# Patient Record
Sex: MALE | Race: WHITE | NOT HISPANIC OR LATINO | URBAN - METROPOLITAN AREA
[De-identification: names, ages, dates, MRNs, and addresses within clinical notes are randomized per-mention and may not be internally consistent; named-entity substitution may affect disease eponyms.]

---

## 2017-01-13 ENCOUNTER — EMERGENCY (EMERGENCY)
Facility: HOSPITAL | Age: 42
LOS: 1 days | Discharge: HOME | End: 2017-01-13
Admitting: EMERGENCY MEDICINE
Payer: MEDICAID

## 2017-01-13 PROCEDURE — 99285 EMERGENCY DEPT VISIT HI MDM: CPT

## 2017-01-13 PROCEDURE — 99283 EMERGENCY DEPT VISIT LOW MDM: CPT

## 2021-04-24 ENCOUNTER — EMERGENCY (EMERGENCY)
Facility: HOSPITAL | Age: 46
LOS: 1 days | Discharge: HOME | End: 2021-04-24
Attending: EMERGENCY MEDICINE | Admitting: EMERGENCY MEDICINE
Payer: SELF-PAY

## 2021-04-24 VITALS
RESPIRATION RATE: 18 BRPM | SYSTOLIC BLOOD PRESSURE: 115 MMHG | TEMPERATURE: 98 F | OXYGEN SATURATION: 92 % | DIASTOLIC BLOOD PRESSURE: 66 MMHG | WEIGHT: 179.9 LBS | HEIGHT: 68 IN | HEART RATE: 118 BPM

## 2021-04-24 VITALS
SYSTOLIC BLOOD PRESSURE: 121 MMHG | HEART RATE: 107 BPM | DIASTOLIC BLOOD PRESSURE: 74 MMHG | RESPIRATION RATE: 16 BRPM | OXYGEN SATURATION: 96 %

## 2021-04-24 LAB
ACETONE SERPL-MCNC: NEGATIVE — SIGNIFICANT CHANGE UP
ALBUMIN SERPL ELPH-MCNC: 3.3 G/DL — SIGNIFICANT CHANGE UP (ref 3.3–5)
ALP SERPL-CCNC: 94 U/L — SIGNIFICANT CHANGE UP (ref 40–120)
ALT FLD-CCNC: 71 U/L — HIGH (ref 10–45)
ANION GAP SERPL CALC-SCNC: 14 MMOL/L — SIGNIFICANT CHANGE UP (ref 5–17)
APTT BLD: 30.4 SEC — SIGNIFICANT CHANGE UP (ref 27.5–35.5)
AST SERPL-CCNC: 51 U/L — HIGH (ref 10–40)
BASOPHILS # BLD AUTO: 0.05 K/UL — SIGNIFICANT CHANGE UP (ref 0–0.2)
BASOPHILS NFR BLD AUTO: 1.1 % — SIGNIFICANT CHANGE UP (ref 0–2)
BILIRUB SERPL-MCNC: 0.5 MG/DL — SIGNIFICANT CHANGE UP (ref 0.2–1.2)
BUN SERPL-MCNC: 32 MG/DL — HIGH (ref 7–23)
CALCIUM SERPL-MCNC: 8.7 MG/DL — SIGNIFICANT CHANGE UP (ref 8.4–10.5)
CHLORIDE SERPL-SCNC: 103 MMOL/L — SIGNIFICANT CHANGE UP (ref 96–108)
CO2 BLDV-SCNC: 22 MMOL/L — SIGNIFICANT CHANGE UP (ref 21–29)
CO2 SERPL-SCNC: 21 MMOL/L — LOW (ref 22–31)
CREAT SERPL-MCNC: 1.6 MG/DL — HIGH (ref 0.5–1.3)
D DIMER BLD IA.RAPID-MCNC: 397 NG/ML DDU — HIGH
EOSINOPHIL # BLD AUTO: 0.06 K/UL — SIGNIFICANT CHANGE UP (ref 0–0.5)
EOSINOPHIL NFR BLD AUTO: 1.3 % — SIGNIFICANT CHANGE UP (ref 0–6)
ETHANOL SERPL-MCNC: 342 MG/DL — HIGH (ref 0–3)
GAS PNL BLDV: SIGNIFICANT CHANGE UP
GLUCOSE SERPL-MCNC: 433 MG/DL — HIGH (ref 70–99)
HCT VFR BLD CALC: 36 % — LOW (ref 39–50)
HGB BLD-MCNC: 12.6 G/DL — LOW (ref 13–17)
IMM GRANULOCYTES NFR BLD AUTO: 0.4 % — SIGNIFICANT CHANGE UP (ref 0–1.5)
INR BLD: 1.05 RATIO — SIGNIFICANT CHANGE UP (ref 0.88–1.16)
LYMPHOCYTES # BLD AUTO: 1.71 K/UL — SIGNIFICANT CHANGE UP (ref 1–3.3)
LYMPHOCYTES # BLD AUTO: 36.5 % — SIGNIFICANT CHANGE UP (ref 13–44)
MCHC RBC-ENTMCNC: 30.1 PG — SIGNIFICANT CHANGE UP (ref 27–34)
MCHC RBC-ENTMCNC: 35 GM/DL — SIGNIFICANT CHANGE UP (ref 32–36)
MCV RBC AUTO: 86.1 FL — SIGNIFICANT CHANGE UP (ref 80–100)
MONOCYTES # BLD AUTO: 0.29 K/UL — SIGNIFICANT CHANGE UP (ref 0–0.9)
MONOCYTES NFR BLD AUTO: 6.2 % — SIGNIFICANT CHANGE UP (ref 2–14)
NEUTROPHILS # BLD AUTO: 2.55 K/UL — SIGNIFICANT CHANGE UP (ref 1.8–7.4)
NEUTROPHILS NFR BLD AUTO: 54.5 % — SIGNIFICANT CHANGE UP (ref 43–77)
NRBC # BLD: 0 /100 WBCS — SIGNIFICANT CHANGE UP (ref 0–0)
PCO2 BLDV: 40 MMHG — LOW (ref 42–55)
PH BLDV: 7.33 — LOW (ref 7.35–7.45)
PLATELET # BLD AUTO: 114 K/UL — LOW (ref 150–400)
PO2 BLDV: 57 MMHG — HIGH (ref 25–45)
POTASSIUM SERPL-MCNC: 4 MMOL/L — SIGNIFICANT CHANGE UP (ref 3.5–5.3)
POTASSIUM SERPL-SCNC: 4 MMOL/L — SIGNIFICANT CHANGE UP (ref 3.5–5.3)
PROT SERPL-MCNC: 6.8 G/DL — SIGNIFICANT CHANGE UP (ref 6–8.3)
PROTHROM AB SERPL-ACNC: 12.7 SEC — SIGNIFICANT CHANGE UP (ref 10.6–13.6)
RBC # BLD: 4.18 M/UL — LOW (ref 4.2–5.8)
RBC # FLD: 12.5 % — SIGNIFICANT CHANGE UP (ref 10.3–14.5)
SAO2 % BLDV: 83 % — SIGNIFICANT CHANGE UP (ref 67–88)
SODIUM SERPL-SCNC: 138 MMOL/L — SIGNIFICANT CHANGE UP (ref 135–145)
WBC # BLD: 4.68 K/UL — SIGNIFICANT CHANGE UP (ref 3.8–10.5)
WBC # FLD AUTO: 4.68 K/UL — SIGNIFICANT CHANGE UP (ref 3.8–10.5)

## 2021-04-24 PROCEDURE — 93005 ELECTROCARDIOGRAM TRACING: CPT

## 2021-04-24 PROCEDURE — 36415 COLL VENOUS BLD VENIPUNCTURE: CPT

## 2021-04-24 PROCEDURE — 71275 CT ANGIOGRAPHY CHEST: CPT

## 2021-04-24 PROCEDURE — 93010 ELECTROCARDIOGRAM REPORT: CPT

## 2021-04-24 PROCEDURE — 85730 THROMBOPLASTIN TIME PARTIAL: CPT

## 2021-04-24 PROCEDURE — 99285 EMERGENCY DEPT VISIT HI MDM: CPT

## 2021-04-24 PROCEDURE — 85610 PROTHROMBIN TIME: CPT

## 2021-04-24 PROCEDURE — 82803 BLOOD GASES ANY COMBINATION: CPT

## 2021-04-24 PROCEDURE — 82962 GLUCOSE BLOOD TEST: CPT

## 2021-04-24 PROCEDURE — 80307 DRUG TEST PRSMV CHEM ANLYZR: CPT

## 2021-04-24 PROCEDURE — 96374 THER/PROPH/DIAG INJ IV PUSH: CPT | Mod: XU

## 2021-04-24 PROCEDURE — 96361 HYDRATE IV INFUSION ADD-ON: CPT

## 2021-04-24 PROCEDURE — 80053 COMPREHEN METABOLIC PANEL: CPT

## 2021-04-24 PROCEDURE — 99285 EMERGENCY DEPT VISIT HI MDM: CPT | Mod: 25

## 2021-04-24 PROCEDURE — 71275 CT ANGIOGRAPHY CHEST: CPT | Mod: 26,MA

## 2021-04-24 PROCEDURE — 85379 FIBRIN DEGRADATION QUANT: CPT

## 2021-04-24 PROCEDURE — 82009 KETONE BODYS QUAL: CPT

## 2021-04-24 PROCEDURE — 85025 COMPLETE CBC W/AUTO DIFF WBC: CPT

## 2021-04-24 RX ORDER — SODIUM CHLORIDE 9 MG/ML
1000 INJECTION INTRAMUSCULAR; INTRAVENOUS; SUBCUTANEOUS ONCE
Refills: 0 | Status: COMPLETED | OUTPATIENT
Start: 2021-04-24 | End: 2021-04-24

## 2021-04-24 RX ADMIN — Medication 1 MILLIGRAM(S): at 14:05

## 2021-04-24 RX ADMIN — SODIUM CHLORIDE 1000 MILLILITER(S): 9 INJECTION INTRAMUSCULAR; INTRAVENOUS; SUBCUTANEOUS at 14:20

## 2021-04-24 RX ADMIN — SODIUM CHLORIDE 1000 MILLILITER(S): 9 INJECTION INTRAMUSCULAR; INTRAVENOUS; SUBCUTANEOUS at 13:20

## 2021-04-24 RX ADMIN — SODIUM CHLORIDE 1000 MILLILITER(S): 9 INJECTION INTRAMUSCULAR; INTRAVENOUS; SUBCUTANEOUS at 14:42

## 2021-04-24 RX ADMIN — SODIUM CHLORIDE 1000 MILLILITER(S): 9 INJECTION INTRAMUSCULAR; INTRAVENOUS; SUBCUTANEOUS at 15:22

## 2021-04-24 RX ADMIN — SODIUM CHLORIDE 1000 MILLILITER(S): 9 INJECTION INTRAMUSCULAR; INTRAVENOUS; SUBCUTANEOUS at 13:42
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
## 2021-04-24 NOTE — ED ADULT NURSE NOTE - OBJECTIVE STATEMENT
BIB EMS from [place] for acute alcohol intoxication, upon arrival patient  is hyperglycemic (380s). Patient states he is prediabetic, has not been taking his medications due to affordability. Patient is uncooperative, unwilling to provide much history. He states he is not a daily drinker, he is declining ativan, librium and covid swab.

## 2021-04-24 NOTE — ED ADULT NURSE NOTE - NSIMPLEMENTINTERV_GEN_ALL_ED
Implemented All Fall with Harm Risk Interventions:  [place] to call system. Call bell, personal items and telephone within reach. Instruct patient to call for assistance. Room bathroom lighting operational. Non-slip footwear when patient is off stretcher. Physically safe environment: no spills, clutter or unnecessary equipment. Stretcher in lowest position, wheels locked, appropriate side rails in place. Provide visual cue, wrist band, yellow gown, etc. Monitor gait and stability. Monitor for mental status changes and reorient to person, place, and time. Review medications for side effects contributing to fall risk. Reinforce activity limits and safety measures with patient and family. Provide visual clues: red socks.
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
## 2021-04-24 NOTE — ED PROVIDER NOTE - NSFOLLOWUPCLINICS_GEN_ALL_ED_FT
[facility]  Family Medicine  [address] [place] [id number]  Phone: [phone]  Fax:

## 2021-04-24 NOTE — ED PROVIDER NOTE - PROGRESS NOTE DETAILS
pt refusing ekg and covid swab. initially cursing and refusing IV but now cooperating. still elopement risk as pt is intoxicated and states he is going to leave

## 2021-04-24 NOTE — ED PROVIDER NOTE - CARE PLAN
Principal Discharge DX:	Hyperglycemia   Principal Discharge DX:	Hyperglycemia  Secondary Diagnosis:	Alcoholic intoxication without complication  Secondary Diagnosis:	Hypoxia

## 2021-04-24 NOTE — ED PROVIDER NOTE - PATIENT PORTAL LINK FT
You can access the FollowMyHealth Patient Portal offered by [facility] by registering at the following website: [email or web address]. By joining [facility]’s FollowMyHealth portal, you will also be able to view your health information using other applications (apps) compatible with our system.

## 2021-04-24 NOTE — ED PROVIDER NOTE - CLINICAL SUMMARY MEDICAL DECISION MAKING FREE TEXT BOX
Dr. Aguilar: 45M h/o ETOH abuse, DM, non-adherent with meds because "cannot afford", BIBEMS for public intoxication and hyperglycemia, no trauma. On exam pt is intoxicated, uncooperative, small abrasion left forearm, rrr, ctab, abdo soft/nt/nd, dry mucosa, mild hypoxia 88-92% on RA, abdo soft/nt/nd. Pt medicated for agitation, will rehydrate, dimer r/o PE, obs till pt sober. Pt hyperglycemic but with no gap. Dr. Aguilar: 45M h/o ETOH abuse, DM, non-adherent with meds because "cannot afford", BIBEMS for public intoxication and hyperglycemia, no trauma. On exam pt is intoxicated, uncooperative, small abrasion left forearm, rrr, ctab, abdo soft/nt/nd, dry mucosa, mild hypoxia 88-92% on RA, abdo soft/nt/nd. Pt medicated for agitation, will rehydrate, dimer r/o PE, obs till pt sober. Pt hyperglycemic but with no gap.  On reassessment pt doing well, ambulating without difficulty. Mother, who he lives with, at bedside but pt states he will get his own ride home.

## 2021-04-24 NOTE — ED PROVIDER NOTE - NSFOLLOWUPINSTRUCTIONS_ED_ALL_ED_FT
1. Please take your diabetes meds as prescribed  2. Taper down your drinking  3. Follow up with Family Practice in 1-2 days  4. Return for any worsening symptoms  ***    Hyperglycemia      Hyperglycemia occurs when the level of sugar (glucose) in the blood is too high. Glucose is a type of sugar that provides the body's main source of energy. Certain hormones (insulin and glucagon) control the level of glucose in the blood. Insulin lowers blood glucose, and glucagon increases blood glucose. Hyperglycemia can result from not having enough insulin in the bloodstream, or from the body not responding normally to insulin.    Hyperglycemia occurs most often in people who have diabetes (diabetes mellitus), but it can happen in people who do not have diabetes. It can develop quickly, and it can be life-threatening if it causes you to become severely dehydrated (diabetic ketoacidosis or hyperglycemic hyperosmolar state). Severe hyperglycemia is a medical emergency.    For most people with diabetes, a blood glucose level above 240 mg/dL is considered hyperglycemia.      What are the causes?  If you have diabetes, hyperglycemia may be caused by:  •Medicines that increase blood glucose or affect your diabetes control.      •Getting less physical activity.      •Eating more than planned.      •Being sick or injured, having an infection, or having surgery.      •Stress.      •Not giving yourself enough insulin (if you are taking insulin).      If you have undiagnosed diabetes, this may cause hyperglycemia.  If you do not have diabetes, hyperglycemia may be caused by:•Certain medicines, including:  •Steroid medicines.      •Beta-blockers.      •Epinephrine.      •Thiazide diuretics.        •Stress.      •Having a serious illness, an infection, or surgery.      •Diseases of the pancreas.        What increases the risk?  Hyperglycemia is more likely to develop in people who have risk factors for diabetes, such as:  •Having a family member with diabetes.      •Certain conditions in which the body's disease-fighting system (immune system) attacks itself (autoimmune disorders).      •Being overweight or obese.      •Having an inactive (sedentary) lifestyle.      •Having been diagnosed with insulin resistance.      •Having a history of prediabetes, gestational diabetes, or polycystic ovarian syndrome (PCOS).        What are the signs or symptoms?  Hyperglycemia may not cause any symptoms. If you do have symptoms, they may include:  •Increased thirst.      •Needing to urinate more often than usual.      •Hunger.      •Feeling very tired.      •Blurry vision.    Other symptoms may develop if hyperglycemia gets worse, such as:  •Dry mouth.      •Abdominal pain.      •Loss of appetite.      •Fruity-smelling breath.      •Weakness.      •Unexpected weight loss.      •Tingling or numbness in the hands or feet.      •Headache.      •Cuts or bruises that are slow to heal.        How is this diagnosed?    Hyperglycemia is diagnosed with a blood test to measure your blood glucose level. This blood test is usually done while you are having symptoms. Your health care provider may also do a physical exam and review your medical history.  You may have more tests to determine the cause of your hyperglycemia, such as:  •A fasting blood glucose (FBG) test. You will not be allowed to eat (you will fast) for at least 8 hours before a blood sample is taken.      •An A1c (hemoglobin A1c) blood test. This provides information about blood glucose control over the previous 2–3 months.    •An oral glucose tolerance test (OGTT). This measures your blood glucose at two times:  •After fasting. This is your baseline blood glucose level.      •Two hours after drinking a beverage that contains glucose.          How is this treated?  Treatment depends on the cause of your hyperglycemia. Treatment may include:  •Taking medicine to regulate your blood glucose levels. If you take insulin or other diabetes medicines, your medicine or dosage may be adjusted.      •Lifestyle changes, such as exercising more, eating healthier foods, or losing weight.      •Treating an illness or infection.      •Checking your blood glucose more often.      •Stopping or reducing steroid medicines.      If your hyperglycemia becomes severe and it results in diabetic ketoacidosis or hyperglycemic hyperosmolar state, you must be hospitalized and given IV fluids and IV insulin.      Follow these instructions at home:    General instructions     •Take over-the-counter and prescription medicines only as told by your health care provider.      • Do not use any products that contain nicotine or tobacco, such as cigarettes, e-cigarettes, and chewing tobacco. If you need help quitting, ask your health care provider.    •If you drink alcohol: •Limit how much you use to:   •0–1 drink a day for women.      •0–2 drinks a day for men.         •Be aware of how much alcohol is in your drink. In the [place] one drink equals one 12 oz bottle of beer (355 mL), one 5 oz glass of wine (148 mL), or one 1½ oz glass of hard liquor (44 mL).        •Learn to manage stress. If you need help with this, ask your health care provider.      •Exercise regularly, as told by your health care provider.      •Keep all follow-up visits as told by your health care provider. This is important.        Eating and drinking      •Maintain a healthy weight.      •Stay hydrated, especially when you exercise, get sick, or spend time in hot temperatures. Drink enough fluid to keep your urine pale yellow.      •Follow your meal plan. Eat on time. Do not skip meals.        If you have diabetes:      •Make sure you know the symptoms of hyperglycemia.    •Follow your diabetes management plan as told by your health care provider. Make sure you:  •Take your insulin and medicines as told.      •Follow your exercise plan.      •Follow your meal plan. Eat on time, and do not skip meals.      •Check your blood glucose as often as told. Make sure to check your blood glucose before and after exercise. If you exercise longer or in a different way than usual, check your blood glucose more often.      •Follow your sick day plan whenever you cannot eat or drink normally. Make this plan in advance with your health care provider.        •Share your diabetes management plan with people in your workplace, school, and household.      •Check your urine for ketones when you are ill and as told by your health care provider.      •Carry a medical alert card or wear medical alert jewelry.        Contact a health care provider if:    •Your blood glucose is at or above 240 mg/dL (13.3 mmol/L) for 2 days in a row.      •You have problems keeping your blood glucose in your target range.      •You have frequent episodes of hyperglycemia.      •You have signs of illness, such as nausea, vomiting, or fever.        Get help right away if:    •Your blood glucose monitor reads "high" even when you are taking insulin.      •You have trouble breathing.      •You have a change in how you think, feel, or act (mental status).      •You have nausea or vomiting that does not go away.      These symptoms may represent a serious problem that is an emergency. Do not wait to see if the symptoms will go away. Get medical help right away. Call your local emergency services (911 in the [place] Do not drive yourself to the hospital.       Summary    •Hyperglycemia occurs when the level of sugar (glucose) in the blood is too high.      •Hyperglycemia can happen with or without diabetes, and severe hyperglycemia can be life-threatening.      •Hyperglycemia is diagnosed with a blood test to measure your blood glucose level. This blood test is usually done while you are having symptoms. Your health care provider may also do a physical exam and review your medical history.      •If you have diabetes, follow your diabetes management plan as told by your health care provider.      •Contact your health care provider if you have problems keeping your blood glucose in your target range.      This information is not intended to replace advice given to you by your health care provider. Make sure you discuss any questions you have with your health care provider.    ***      Alcohol Use Disorder      Alcohol use disorder is a condition in which drinking disrupts daily life. People with this condition drink too much alcohol and cannot control their drinking.    Alcohol use disorder can cause serious problems with physical health. It can affect the brain, heart, and other internal organs. This disorder can raise the risk for certain cancers and cause problems with mental health, such as depression or anxiety.      What are the causes?    This condition is caused by drinking too much alcohol over time. Some people with this condition drink to cope with or escape from negative life events. Others drink to relieve pain or symptoms of mental illness.      What increases the risk?  You are more likely to develop this condition if:  •You have a family history of alcohol use disorder.      •Your culture encourages drinking to the point of becoming drunk (intoxication).      •You had a mood or conduct disorder in childhood.      •You have been abused.    •You are an adolescent and you:  •Have poor performance in school.      •Have poor supervision or guidance.      •Act on impulse and like taking risks.          What are the signs or symptoms?  Symptoms of this condition include:  •Drinking more than you want to.      •Trying several times without success to drink less.      •Spending a lot of time thinking about alcohol, getting alcohol, drinking, or recovering from drinking.      •Continuing to drink even when it is causing serious problems in your daily life.      •Drinking when it is dangerous to drink, such as before driving a car.      •Needing more and more alcohol to get the same effect you want (building up tolerance).    •Having symptoms of withdrawal when you stop drinking. Withdrawal symptoms may include:  •Trouble sleeping, leading to tiredness (fatigue).      •Mood swings of depression and anxiety.      •Physical symptoms, such as a fast heart rate, rapid breathing, high blood pressure (hypertension), fever, cold sweats, or nausea.      •Seizures.      •Severe confusion.      •Feeling or seeing things that are not there (hallucinations).      •Shaking movements that you cannot control (tremors).          How is this diagnosed?    This condition is diagnosed with an assessment. Your health care provider may start by asking three or four questions about your drinking, or he or she may give you a simple test to take. This helps to get clear information from you.    You may also have a physical exam or lab tests. You may be referred to a substance abuse counselor.      How is this treated?  With education, some people with alcohol use disorder are able to reduce their drinking. Many with this disorder cannot change their drinking behavior on their own and need help from substance use specialists. These specialists are counselors who can help diagnose how severe your disorder is and what type of treatment you need. Treatments may include:  •Detoxification. Detoxification involves quitting drinking with supervision and direction of health care providers. Your health care provider may prescribe prescription medicines within the first week to help lessen withdrawal symptoms. Alcohol withdrawal can be dangerous and life-threatening. Detoxification may be provided in a home, community, or primary care setting, or in a hospital or substance use treatment facility.    •Counseling. This may involve motivational interviewing (MI), family therapy, or cognitive behavioral therapy (CBT). It is provided by substance use treatment counselors or professional therapists. A counselor can address the things you can do to change your drinking behavior and how to maintain the changes. Talk therapy aims to:  •Identify your positive motivations to change.      •Identify and avoid the things that trigger your drinking.      •Help you learn how to plan your behavior change.      •Develop support systems that can help you sustain the change.      •Medicines. Medicines can help treat this disorder by:  •Decreasing cravings.      •Decreasing the positive feeling you have when you drink.      •Causing an uncomfortable physical reaction when you drink (aversion therapy).        •[place] help groups such as Alcoholics Anonymous (AA). These groups are led by people who have quit drinking. The groups provide emotional support, advice, and guidance.      Some people with this condition benefit from a combination of treatments provided by specialized substance use treatment centers.      Follow these instructions at home:     Medicines     •Take over-the-counter and prescription medicines only as told by your health care provider.      •Ask before starting any new medicines, herbs, or supplements.      General instructions     •Ask friends and family members to support your choice to stay sober.      •Avoid situations where alcohol is served.      •Create a plan to deal with tempting situations.      •Attend support groups regularly.      •Practice hobbies or activities you enjoy.      • Do not drink and drive.      •Keep all follow-up visits as told by your health care provider. This is important.        How is this prevented?  •If you drink alcohol:•Limit how much you use to:  •0–1 drink a day for nonpregnant women.      •0–2 drinks a day for men.        •Be aware of how much alcohol is in your drink. In the [place] one drink equals one 12 oz bottle of beer (355 mL), one 5 oz glass of wine (148 mL), or one 1½ oz glass of hard liquor (44 mL).      •If you have a mental health condition, seek treatment. Develop a healthy lifestyle through:  •Meditation or deep breathing.      •Exercise.      •Spending time in nature.       •Listening to music.      •Talking with a trusted friend or family member.      •If you are an adolescent:  •Do not drink alcohol. Avoid gatherings where you might be tempted.      •Do not be afraid to say no if someone offers you alcohol. Speak up about why you do not want to drink. Set a positive example for others around you by not drinking.      •Build relationships with friends who do not drink.          Where to find more information    •Substance Abuse and Mental Health Services Administration: samhsa.gov      •Alcoholics Anonymous: aa.org        Contact a health care provider if:    •You cannot take your medicines as told.      •Your symptoms get worse or you experience symptoms of withdrawal when you stop drinking.      •You start drinking again (relapse) and your symptoms get worse.        Get help right away if:    •You have thoughts about hurting yourself or others.    If you ever feel like you may hurt yourself or others, or have thoughts about taking your own life, get help right away. Go to your nearest emergency department or:    • Call your local emergency services (645 in the [place]       • Call a suicide crisis helpline, such as the National Suicide Prevention Lifeline at 1-524.278.5020. This is open 24 hours a day in the [place]       • Text the Crisis Text Line at 397238 (in the [place]         Summary    •Alcohol use disorder is a condition in which drinking disrupts daily life. People with this condition drink too much alcohol and cannot control their drinking.      •Treatment may include detoxification, counseling, medicines, and support groups.      •Ask friends and family members to support you. Avoid situations where alcohol is served.      •Get help right away if you have thoughts about hurting yourself or others.      This information is not intended to replace advice given to you by your health care provider. Make sure you discuss any questions you have with your health care provider.

## 2021-04-24 NOTE — ED PROVIDER NOTE - ATTENDING CONTRIBUTION TO CARE
Dr. Aguilar: I performed a face to face bedside interview with patient regarding history of present illness, review of symptoms and past medical history. I completed an independent physical exam.  I have discussed patient's plan of care with PA.   I agree with note as stated above, having amended the EMR as needed to reflect my findings.   This includes HISTORY OF PRESENT ILLNESS, HIV, PAST MEDICAL/SURGICAL/FAMILY/SOCIAL HISTORY, ALLERGIES AND HOME MEDICATIONS, REVIEW OF SYSTEMS, PHYSICAL EXAM, and any PROGRESS NOTES during the time I functioned as the attending physician for this patient.    see mdm

## 2021-10-28 PROBLEM — R73.03 PREDIABETES: Chronic | Status: ACTIVE | Noted: 2021-04-24

## 2021-11-06 ENCOUNTER — OUTPATIENT (OUTPATIENT)
Dept: OUTPATIENT SERVICES | Facility: HOSPITAL | Age: 46
LOS: 1 days | End: 2021-11-06
Payer: MEDICAID

## 2021-11-06 ENCOUNTER — APPOINTMENT (OUTPATIENT)
Dept: RADIOLOGY | Facility: HOSPITAL | Age: 46
End: 2021-11-06
Payer: MEDICAID

## 2021-11-06 DIAGNOSIS — Z00.8 ENCOUNTER FOR OTHER GENERAL EXAMINATION: ICD-10-CM

## 2021-11-06 PROCEDURE — 73630 X-RAY EXAM OF FOOT: CPT | Mod: 26,RT,76

## 2021-11-06 PROCEDURE — 73630 X-RAY EXAM OF FOOT: CPT

## 2021-11-09 ENCOUNTER — NON-APPOINTMENT (OUTPATIENT)
Age: 46
End: 2021-11-09

## 2021-11-09 ENCOUNTER — APPOINTMENT (OUTPATIENT)
Dept: CARDIOLOGY | Facility: CLINIC | Age: 46
End: 2021-11-09
Payer: MEDICAID

## 2021-11-09 VITALS
HEART RATE: 111 BPM | OXYGEN SATURATION: 99 % | TEMPERATURE: 97.5 F | HEIGHT: 69 IN | BODY MASS INDEX: 24.88 KG/M2 | DIASTOLIC BLOOD PRESSURE: 98 MMHG | WEIGHT: 168 LBS | SYSTOLIC BLOOD PRESSURE: 149 MMHG | RESPIRATION RATE: 16 BRPM

## 2021-11-09 DIAGNOSIS — Z00.00 ENCOUNTER FOR GENERAL ADULT MEDICAL EXAMINATION W/OUT ABNORMAL FINDINGS: ICD-10-CM

## 2021-11-09 DIAGNOSIS — Z82.3 FAMILY HISTORY OF STROKE: ICD-10-CM

## 2021-11-09 DIAGNOSIS — R94.31 ABNORMAL ELECTROCARDIOGRAM [ECG] [EKG]: ICD-10-CM

## 2021-11-09 DIAGNOSIS — Z78.9 OTHER SPECIFIED HEALTH STATUS: ICD-10-CM

## 2021-11-09 PROCEDURE — 99203 OFFICE O/P NEW LOW 30 MIN: CPT

## 2021-11-09 PROCEDURE — 93000 ELECTROCARDIOGRAM COMPLETE: CPT

## 2021-11-10 PROBLEM — R94.31 ABNORMAL ELECTROCARDIOGRAM: Status: ACTIVE | Noted: 2021-11-09

## 2021-12-08 ENCOUNTER — APPOINTMENT (OUTPATIENT)
Dept: CARDIOLOGY | Facility: CLINIC | Age: 46
End: 2021-12-08
Payer: MEDICAID

## 2021-12-08 DIAGNOSIS — I73.9 PERIPHERAL VASCULAR DISEASE, UNSPECIFIED: ICD-10-CM

## 2021-12-08 PROCEDURE — 93015 CV STRESS TEST SUPVJ I&R: CPT

## 2021-12-08 PROCEDURE — 93306 TTE W/DOPPLER COMPLETE: CPT

## 2021-12-08 PROCEDURE — 99214 OFFICE O/P EST MOD 30 MIN: CPT | Mod: 25

## 2021-12-08 NOTE — REASON FOR VISIT
[CV Risk Factors and Non-Cardiac Disease] : CV risk factors and non-cardiac disease [Coronary Artery Disease] : coronary artery disease [FreeTextEntry1] : Angel reports a history of diabetes and a secondary peripheral neuropathy. During the pandemic he had some weight gain and he is currently being seen by podiatry. With an abnormal EKG and peripheral neuropathy he was sent in cardiac evaluation. He has not undergone Covid vaccination nor is he suffered a Covid -like illness. He  comes today for clarification of  his overall cardiovascular risk. he  was advised to undergo a complete cardiac evaluation. he denies chest pains shortness of breath or loss of consciousnes.

## 2021-12-08 NOTE — ASSESSMENT
[FreeTextEntry1] : The EKG is borderline abnormal with a QS pattern in V2 and anteroseptal abnormality cannot be excluded diabetes represents a coronary equivalent and a plain stress test and echo are planned and I would consider advancing towards cardiac CTA or even cardiac cath or angiography based upon the results fasting lipids are noted with a total cholesterol of 317 and LDL fraction of 220 however it is unclear whether or not this was a fasting sample we note marked elevation in blood sugar at 391 as well which is also severely elevated SARS-CoV -2 antibodies negative SAMEERA negative I have asked Angel to undergo detailed cardiac testing in order to evaluate his overall cardiovascular risk. An assessment of both structural and functional heart disease was recommended to the patient. In this regard, an echocardiogram and a stress test were advised to the patient. I await the upcoming noninvasive cardiac testing in order to assess her overall cardiovascular risk. We discussed the pros and cons of plain treadmill stress testing nuclear stress testing and angiography including a sensitivity analysis. We discussed current ACC guidelines and the calculated 10 year risk is approximately   19.4% which is borderline severely elevated. \par  More than half of the face to face encounter of  45 minutes  was spent in counseling the patient with respect to  cardiovascular risk\par \par Quality measures \par Tobacco intervention not indicated\par Statin for prevention of cardiovascular disease indicated\par Hypertension compensated\par Aspirin for ischemic vascular disease possibly indicated\par Tobacco screening cessation and intervention not indicated\par \par Medical necessity\par This is a high encounter based upon two or more chronic illnesses with mild exacerbation requiring further management and evaluation.   .\par \par EKG is indicated for evaluation of abnormal EKG\par \par risks benefits alternatives were discussed with the patient.\par all questions were answered to his satisfaction.

## 2021-12-09 NOTE — REASON FOR VISIT
[FreeTextEntry1] : Angel reports a history of diabetes and a secondary peripheral neuropathy. During the pandemic he had some weight gain and he is currently being seen by podiatry. With an abnormal EKG and peripheral neuropathy he was sent in cardiac evaluation. He has not undergone Covid vaccination nor is he suffered a Covid -like illness. He  comes today for clarification of  his overall cardiovascular risk. he  was advised to undergo a complete cardiac evaluation. he denies chest pains shortness of breath or loss of consciousnes. He is seeing Dr. De the neuro evaluation.

## 2021-12-09 NOTE — CARDIOLOGY SUMMARY
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

## 2021-12-09 NOTE — ASSESSMENT
[FreeTextEntry1] : The echocardiograph today demonstrates preserved left ventricular function stress test was nondiagnostic at the achieved heart rate Angel plans a consultation with Dr. De for a peripheral neuropathy in the meantime I have asked Angel to undergo a peripheral arterial study to rule out peripheral arterial disease in the setting of diabetes in addition to this I would caution against the use of pioglitazone which has increased cardiovascular risk especially if a diagnosis of heart failure is made a cardiac CTA is requested in view of diabetes which is a coronary equivalent and nondiagnostic noninvasive stress testing\par \par Medical necessity there is a high risk encounter based upon the recommendation for further diagnostic testing and drug evaluation and management\par \par EKG indicated for abnormal EKG

## 2021-12-10 ENCOUNTER — APPOINTMENT (OUTPATIENT)
Dept: NEUROLOGY | Facility: CLINIC | Age: 46
End: 2021-12-10
Payer: MEDICAID

## 2021-12-10 VITALS
DIASTOLIC BLOOD PRESSURE: 84 MMHG | HEIGHT: 69 IN | BODY MASS INDEX: 24.88 KG/M2 | HEART RATE: 94 BPM | SYSTOLIC BLOOD PRESSURE: 144 MMHG | WEIGHT: 168 LBS

## 2021-12-10 DIAGNOSIS — Z83.3 FAMILY HISTORY OF DIABETES MELLITUS: ICD-10-CM

## 2021-12-10 DIAGNOSIS — Z78.9 OTHER SPECIFIED HEALTH STATUS: ICD-10-CM

## 2021-12-10 PROCEDURE — 99204 OFFICE O/P NEW MOD 45 MIN: CPT

## 2021-12-10 RX ORDER — METFORMIN HYDROCHLORIDE 625 MG/1
TABLET ORAL
Refills: 0 | Status: ACTIVE | COMMUNITY

## 2021-12-10 NOTE — CONSULT LETTER
[Dear  ___] : Dear  [unfilled], [Consult Letter:] : I had the pleasure of evaluating your patient, [unfilled]. [Please see my note below.] : Please see my note below. [Consult Closing:] : Thank you very much for allowing me to participate in the care of this patient.  If you have any questions, please do not hesitate to contact me. [Sincerely,] : Sincerely, [FreeTextEntry3] : Rayshawn De MD\par

## 2021-12-10 NOTE — PHYSICAL EXAM
[FreeTextEntry1] : Head:  Normocephalic Neck: Supple nontender no carotid bruits.  Spine:  Nontender negative straight leg raising.\par \par Mental Status:  Alert Oriented X3 Speech normal and no aphasia or dysarthria.\par \par Cranial Nerves:  PERRL, Fundi normal Visual Fields full  EOMI no diplopia no ptosis no nystagmus, V through XII intact.\par \par Motor: Good strength in the uppers and there is atrophy of the left greater than right interossei.  In the lower extremities there is diffuse weakness proximal and distal at 4/5 with diffuse atrophy but no fasciculations and normal tone and no definite dysmetria.\par \par DTRs: Symmetric absent in the lowers at the knees and ankles..  Plantars withdrawal.  No Clonus.\par \par Sensory: Prominent decreased vibration and also decreased pinprick and touch distally in the lowers.  Position sense is basically preserved.\par \par Gait: Ataxic paretic difficulty with tandem walking sways in [place]..\par \par Present pedal pulses.\par

## 2021-12-10 NOTE — HISTORY OF PRESENT ILLNESS
[FreeTextEntry1] : Angel Holly is seen for an office consultation.  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  He describes muscle wasting generalized weakness more in the lower extremities painful lower extremities especially in his feet with occasional burning sensation and stinging but no definite numbness.  He has been diagnosed with diabetes within the last 1 month.  He has been started on medication. \par \par  He did have blood test performed on 10/11/2021 which have been reviewed in detail and the most remarkable result his A1c of 12.2.\par \par He denies cranial complaints or problems with cognition and speech or vision.  He is not having headaches.  He is not having neck or back pain.  He denies bowel or bladder problems..\par \par Family history positive for diabetes.

## 2021-12-10 NOTE — ASSESSMENT
[FreeTextEntry1] : Impression: This is a 46-year-old male patient presents with new onset diabetes with A1c of 12.1 and a significant diabetic peripheral neuropathy affecting both lower extremities with motor and sensory deficits and gait ataxia.  Other etiologies I think would be less likely.\par \par Recommendations: Diabetic control is most important.  Gabapentin beginning at 300 mg at bedtime and increasing to 300 mg 3 times daily for treatment of neuropathic pain.  EMG nerve conduction study.  Lumbar MRI.  A few additional blood tests have been ordered to rule out other causes of sensorimotor peripheral neuropathy.  Physical therapy.  Office follow-up.\par

## 2021-12-21 ENCOUNTER — APPOINTMENT (OUTPATIENT)
Dept: ENDOCRINOLOGY | Facility: CLINIC | Age: 46
End: 2021-12-21
Payer: MEDICAID

## 2021-12-21 ENCOUNTER — APPOINTMENT (OUTPATIENT)
Dept: CARDIOLOGY | Facility: CLINIC | Age: 46
End: 2021-12-21
Payer: MEDICAID

## 2021-12-21 VITALS
OXYGEN SATURATION: 98 % | WEIGHT: 168 LBS | TEMPERATURE: 98.1 F | SYSTOLIC BLOOD PRESSURE: 140 MMHG | BODY MASS INDEX: 24.88 KG/M2 | DIASTOLIC BLOOD PRESSURE: 80 MMHG | HEIGHT: 69 IN | HEART RATE: 104 BPM

## 2021-12-21 DIAGNOSIS — E78.5 HYPERLIPIDEMIA, UNSPECIFIED: ICD-10-CM

## 2021-12-21 PROCEDURE — 99203 OFFICE O/P NEW LOW 30 MIN: CPT

## 2021-12-21 PROCEDURE — 93925 LOWER EXTREMITY STUDY: CPT

## 2021-12-26 ENCOUNTER — TRANSCRIPTION ENCOUNTER (OUTPATIENT)
Age: 46
End: 2021-12-26

## 2021-12-26 PROBLEM — E78.5 HYPERLIPEMIA: Status: ACTIVE | Noted: 2021-11-09

## 2021-12-26 LAB — HBA1C MFR BLD HPLC: 9.7

## 2021-12-26 NOTE — HISTORY OF PRESENT ILLNESS
[FreeTextEntry1] : Diabetes New Patient HPI\par \par CC\par Patient referred by Dr. Masters for diabetes management.\par \par he reported that for the last several years he was having a lot of pain in his feet. \par He in the past year he was losing a lot of weight and noted to have muscle atrophy. \par Now has been started on gabapentin 300 mg BID \par He has history of christensen's neuroma \par \par HPI:\par \par Duration of Diabetes:         \par Is patient on Insulin?          \par If yes, how long on insulin?        \par \par List Current Medications for Glycemic control and the doses:\par 1-  Pioglitazone 15 mg daily         \par 2-  Metformin/Dapagliflozin 10 mg-1000 mg daily     \par 3-        \par \par SMBG (self monitored blood glucose) readings: \par - Name of glucometer:          \par - How often does the patient check BG?             \par - Does the patient keep a log?           \par \par If detailed record is available, what is the range of most of the BG readings?\par - Before Breakfast: _______\par - Before Lunch: ________\par - Before Dinner: __________\par - Before Bedtime: _____________\par \par \par \par Does patient get Hypoglycemic episodes?   None recently            \par    \par \par Diabetic Complications: Is patient aware of having any of those complications?\par - Eyes: Retinopathy?           \par        	When was the last fully dilated eye exam?     None recently        \par - Feet: 	Neuropathy? Yes             \par         	Foot Ulcers?  No        \par 	When was the last time patient saw a Podiatrist?   Yes       \par - Kidneys: Nephropathy?     gfr of 59      \par \par Diet: review patient's diet:      \par Breakfast: Skip \par Snacks: Chicken\par Dessert: Ice cream \par Dinner: Chicken, beef, fish, pasta, potatoes, vegetables \par \par \par Exercise: review patient exercise habits:  Minimal                  \par \par

## 2021-12-26 NOTE — REVIEW OF SYSTEMS
[Fatigue] : no fatigue [Decreased Appetite] : appetite not decreased [Recent Weight Gain (___ Lbs)] : no recent weight gain [Recent Weight Loss (___ Lbs)] : no recent weight loss

## 2021-12-26 NOTE — PHYSICAL EXAM
[Alert] : alert [Well Nourished] : well nourished [No Acute Distress] : no acute distress [Normal Sclera/Conjunctiva] : normal sclera/conjunctiva [EOMI] : extra ocular movement intact [PERRL] : pupils equal, round and reactive to light [Normal Outer Ear/Nose] : the ears and nose were normal in appearance [Normal Hearing] : hearing was normal [Normal TMs] : both tympanic membranes were normal [No Neck Mass] : no neck mass was observed [Thyroid Not Enlarged] : the thyroid was not enlarged [No Respiratory Distress] : no respiratory distress [Clear to Auscultation] : lungs were clear to auscultation bilaterally [Normal S1, S2] : normal S1 and S2 [Normal Rate] : heart rate was normal [Regular Rhythm] : with a regular rhythm [Normal Bowel Sounds] : normal bowel sounds [Not Tender] : non-tender [Soft] : abdomen soft [Normal Gait] : normal gait [No Clubbing, Cyanosis] : no clubbing  or cyanosis of the fingernails [No Joint Swelling] : no joint swelling seen [Normal Strength/Tone] : muscle strength and tone were normal [No Rash] : no rash [No Skin Lesions] : no skin lesions [No Motor Deficits] : the motor exam was normal [No Tremors] : no tremors [Normal Reflexes] : deep tendon reflexes were 2+ and symmetric [Oriented x3] : oriented to person, place, and time [Normal Affect] : the affect was normal [Normal Insight/Judgement] : insight and judgment were intact [Normal Mood] : the mood was normal

## 2021-12-26 NOTE — ASSESSMENT
[FreeTextEntry1] : 46 year old male here for evaluation of DM Type 2\par \par Dm Type 2\par -Currently on Pioglitazone 15 mg daily\par -Will be switching to Segluro-met 7.5-1000 mg daily \par -Advised to start checking BG 2-3 times per day \par -Low carb consistent diet is highly advised \par \par HLD\par -Currently on Rosuvastatin 10 mg daily\par \par -Check BMP, lipid panel and microalb/cr ratio \par \par Follow up in 3 months

## 2022-01-11 ENCOUNTER — APPOINTMENT (OUTPATIENT)
Dept: CARDIOLOGY | Facility: CLINIC | Age: 47
End: 2022-01-11

## 2022-01-20 ENCOUNTER — APPOINTMENT (OUTPATIENT)
Dept: CARDIOLOGY | Facility: CLINIC | Age: 47
End: 2022-01-20
Payer: MEDICAID

## 2022-01-20 PROCEDURE — 93015 CV STRESS TEST SUPVJ I&R: CPT

## 2022-01-20 PROCEDURE — 78452 HT MUSCLE IMAGE SPECT MULT: CPT

## 2022-01-20 PROCEDURE — A9500: CPT

## 2022-01-24 ENCOUNTER — NON-APPOINTMENT (OUTPATIENT)
Age: 47
End: 2022-01-24

## 2022-01-26 ENCOUNTER — NON-APPOINTMENT (OUTPATIENT)
Age: 47
End: 2022-01-26

## 2022-01-27 ENCOUNTER — APPOINTMENT (OUTPATIENT)
Dept: NEUROLOGY | Facility: CLINIC | Age: 47
End: 2022-01-27
Payer: MEDICAID

## 2022-01-27 VITALS
DIASTOLIC BLOOD PRESSURE: 80 MMHG | WEIGHT: 168 LBS | HEIGHT: 69 IN | BODY MASS INDEX: 24.88 KG/M2 | HEART RATE: 94 BPM | SYSTOLIC BLOOD PRESSURE: 128 MMHG

## 2022-01-27 PROCEDURE — 99215 OFFICE O/P EST HI 40 MIN: CPT | Mod: 25

## 2022-01-27 PROCEDURE — 95912 NRV CNDJ TEST 11-12 STUDIES: CPT

## 2022-01-27 PROCEDURE — 95885 MUSC TST DONE W/NERV TST LIM: CPT

## 2022-01-27 NOTE — PROCEDURE
[FreeTextEntry1] : Electrodiagnostic exam was performed of both lower extremities including nerve conduction testing and EMG.  The study is significantly abnormal with findings motor and sensory abnormalities with testing of distal latency and conduction velocity.  Sensory responses were absent in both lower extremities.  EMG testing revealed neuropathic changes in both lower extremities diffusely with denervation and chronic neuropathic findings.  The results suggest a severe sensorimotor peripheral neuropathy with axonal involvement.  The most likely etiology would be his diabetes.

## 2022-01-27 NOTE — HISTORY OF PRESENT ILLNESS
[FreeTextEntry1] : Angel Holly is seen for an office visit.  He is being treated for diabetes with neuropathy.  He has been taking gabapentin 300 mg 3 times daily with improvement in his lower extremity pain.  Also restless legs complaint has diminished.  He has residual numbness and weakness in the lower extremities.\par \par At the time of the last visit I suggested an MRI of the lumbar spine which was not performed.  Is not having significant lower back pain.  Physical therapy was not started.  Blood test was suggested to look at other possible causes of neuropathy which were not performed.\par \par

## 2022-01-27 NOTE — PHYSICAL EXAM
[FreeTextEntry1] : Head:  Normocephalic Neck: Supple nontender no carotid bruits.  Spine:  Nontender negative straight leg raising.\par \par Mental Status:  Alert Oriented X3 Speech normal and no aphasia or dysarthria.\par \par Cranial Nerves:  PERRL, Fundi normal Visual Fields full  EOMI no diplopia no ptosis no nystagmus, V through XII intact.\par \par Motor: Good strength in the uppers with atrophy of intrinsic hand muscles.  Lower extremities weakness proximal and also distal at 3-4 over 5.  There is diffuse atrophy but I did not detect any fasciculations.  Tone is definitely normal.  Rapid movements are slowed.. \par \par DTRs: Absent in the lowers with downgoing toe signs.\par \par Sensory: Decreased vibration pin touch distally in the lowers.\par \par Gait: Paretic unsteady improved when compared to his last exam in December 2021.\par

## 2022-01-27 NOTE — REASON FOR VISIT
[Follow-Up: _____] : a [unfilled] follow-up visit [FreeTextEntry1] : Diabetic peripheral neuropathy for follow-up visit and electrodiagnostic exam.

## 2022-01-27 NOTE — ASSESSMENT
[FreeTextEntry1] : Impression: This 46-year-old male patient with new onset diabetes has a significant sensorimotor peripheral neuropathy with axonal in involvement affecting both lower extremities.  The most likely etiology is diabetes.\par \par Recommendations: Diabetic control is essential.  Physical therapy.  Patient would be considered disabled and unable to return to work at this juncture.  Blood test looking for other causes of sensorimotor peripheral neuropathy which were ordered at the time of the 12/10/2021 visit should be performed though the most likely etiology the does appear to be his diabetes.  Office follow-up.\par

## 2022-01-27 NOTE — CONSULT LETTER
[Dear  ___] : Dear  [unfilled], [Consult Letter:] : I had the pleasure of evaluating your patient, [unfilled]. [Consult Closing:] : Thank you very much for allowing me to participate in the care of this patient.  If you have any questions, please do not hesitate to contact me. [Sincerely,] : Sincerely, [FreeTextEntry3] : Rayshawn De MD\par

## 2022-02-03 DIAGNOSIS — E11.9 TYPE 2 DIABETES MELLITUS W/OUT COMPLICATIONS: ICD-10-CM

## 2022-02-03 RX ORDER — ORAL SEMAGLUTIDE 3 MG/1
3 TABLET ORAL
Qty: 1 | Refills: 5 | Status: ACTIVE | COMMUNITY
Start: 2022-02-03 | End: 1900-01-01

## 2022-03-10 ENCOUNTER — APPOINTMENT (OUTPATIENT)
Dept: CARDIOLOGY | Facility: CLINIC | Age: 47
End: 2022-03-10
Payer: MEDICAID

## 2022-03-10 ENCOUNTER — NON-APPOINTMENT (OUTPATIENT)
Age: 47
End: 2022-03-10

## 2022-03-10 VITALS
DIASTOLIC BLOOD PRESSURE: 97 MMHG | HEART RATE: 89 BPM | BODY MASS INDEX: 28.14 KG/M2 | HEIGHT: 69 IN | TEMPERATURE: 97.1 F | RESPIRATION RATE: 16 BRPM | OXYGEN SATURATION: 98 % | SYSTOLIC BLOOD PRESSURE: 137 MMHG | WEIGHT: 190 LBS

## 2022-03-10 DIAGNOSIS — I10 ESSENTIAL (PRIMARY) HYPERTENSION: ICD-10-CM

## 2022-03-10 PROCEDURE — 99214 OFFICE O/P EST MOD 30 MIN: CPT

## 2022-03-10 PROCEDURE — 93000 ELECTROCARDIOGRAM COMPLETE: CPT

## 2022-03-10 RX ORDER — ORAL SEMAGLUTIDE 7 MG/1
7 TABLET ORAL
Refills: 0 | Status: DISCONTINUED | COMMUNITY
Start: 2022-03-10 | End: 2022-03-10

## 2022-03-10 RX ORDER — PIOGLITAZONE HYDROCHLORIDE 30 MG/1
30 TABLET ORAL
Qty: 1 | Refills: 1 | Status: DISCONTINUED | COMMUNITY
Start: 2021-12-21 | End: 2022-03-10

## 2022-03-10 RX ORDER — PIOGLITAZONE HYDROCHLORIDE 30 MG/1
30 TABLET ORAL
Refills: 0 | Status: DISCONTINUED | COMMUNITY
End: 2022-03-10

## 2022-03-10 RX ORDER — METOPROLOL SUCCINATE 50 MG/1
50 TABLET, EXTENDED RELEASE ORAL
Refills: 0 | Status: DISCONTINUED | COMMUNITY
End: 2022-03-10

## 2022-03-12 PROBLEM — I10 BENIGN ESSENTIAL HYPERTENSION: Status: ACTIVE | Noted: 2022-03-10

## 2022-03-12 NOTE — CARDIOLOGY SUMMARY
[de-identified] : 11/9/2021 sinus tachycardia QS in V2 minor Q waves 3 aVF [de-identified] : 12/8/2021 nondiagnostic at achieved heart rate no significant ST-T segment shifts noted\par 1/20/22 normal one motion post stress EF 66% [de-identified] : 12/8/2021 preserved left ventricular function possible claw deformity of aortic outflow no definite asymmetric hypertrophy to suggest hocm

## 2022-03-12 NOTE — DISCUSSION/SUMMARY
[FreeTextEntry1] : Technically the ARBs are allowed if renal function is stable. We will be considering this option especially In the setting of diabetes. However the meantime I will transition from metoprolol Tool available. Consider the addition of losartan with close attention to renal indicesIf stable\par \par Medical necessity was encounter based upon drug evaluation and management EKG indicated for coronary disease

## 2022-03-12 NOTE — PHYSICAL EXAM
[Well Developed] : well developed [Well Nourished] : well nourished [No Acute Distress] : no acute distress [Normal Conjunctiva] : normal conjunctiva [Normal Venous Pressure] : normal venous pressure [No Carotid Bruit] : no carotid bruit [Normal S1, S2] : normal S1, S2 [No Murmur] : no murmur [No Rub] : no rub [No Gallop] : no gallop [Clear Lung Fields] : clear lung fields [No Respiratory Distress] : no respiratory distress  [Good Air Entry] : good air entry [Soft] : abdomen soft [Non Tender] : non-tender [No Masses/organomegaly] : no masses/organomegaly [Normal Bowel Sounds] : normal bowel sounds [Normal Gait] : normal gait [No Edema] : no edema [No Cyanosis] : no cyanosis [No Varicosities] : no varicosities [No Clubbing] : no clubbing [No Rash] : no rash [No Skin Lesions] : no skin lesions [Moves all extremities] : moves all extremities [No Focal Deficits] : no focal deficits [Normal Speech] : normal speech [Alert and Oriented] : alert and oriented [Normal memory] : normal memory
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
## 2022-04-28 ENCOUNTER — APPOINTMENT (OUTPATIENT)
Dept: NEUROLOGY | Facility: CLINIC | Age: 47
End: 2022-04-28
Payer: MEDICAID

## 2022-04-28 VITALS
HEIGHT: 69 IN | WEIGHT: 190 LBS | DIASTOLIC BLOOD PRESSURE: 82 MMHG | BODY MASS INDEX: 28.14 KG/M2 | HEART RATE: 88 BPM | SYSTOLIC BLOOD PRESSURE: 128 MMHG

## 2022-04-28 DIAGNOSIS — G57.93 UNSPECIFIED MONONEUROPATHY OF BILATERAL LOWER LIMBS: ICD-10-CM

## 2022-04-28 DIAGNOSIS — G62.9 POLYNEUROPATHY, UNSPECIFIED: ICD-10-CM

## 2022-04-28 PROCEDURE — 99214 OFFICE O/P EST MOD 30 MIN: CPT

## 2022-04-28 RX ORDER — GABAPENTIN 300 MG/1
300 CAPSULE ORAL 3 TIMES DAILY
Qty: 180 | Refills: 3 | Status: ACTIVE | COMMUNITY
Start: 2021-12-10 | End: 1900-01-01

## 2022-04-28 NOTE — PHYSICAL EXAM
[FreeTextEntry1] : Head:  Normocephalic Neck: Supple nontender no carotid bruits.  Spine:  Nontender negative straight leg raising.\par \par Mental Status:  Alert Oriented X3 Speech normal and no aphasia or dysarthria.\par \par Cranial Nerves:  PERRL,Visual Fields full  EOMI no diplopia no ptosis no nystagmus, V through XII intact.\par \par Motor: Improved bilateral lower extremity motor power.  Power is now basically 4+/5 with the weakness being more distal than proximal..  There is distal atrophy.  There are no fasciculations.\par \par DTRs: Knee reflexes present ankle reflexes absent.  Plantars flexor.  No Clonus.\par \par Sensory: Stocking decreased to pin vibration touch distally though I do detect some improvement.\par \par Gait: Further improvement.  Able to attempt bilateral heel and toe walking.\par

## 2022-04-28 NOTE — HISTORY OF PRESENT ILLNESS
[FreeTextEntry1] : This patient presents for an office visit.  There is improvement in the neuropathic complaints referable to both distal lower extremities.  He continues to have tenderness involving the plantar aspect of both feet.  The sharp pains have diminished.  There is improvement with gabapentin which he takes 300 mg 3 times daily without side effects.  Diabetic control is improved and there has been weight gain.  Glipizide was recently added to the diabetic medication.  His gait has improved.  Strength has improved.

## 2022-04-28 NOTE — ASSESSMENT
[FreeTextEntry1] : Impression: This is a 46-year-old male patient with new onset diabetes and a significant sensorimotor peripheral neuropathy with axonal involvement based on EMG is showing improvement.\par \par Recommendations: Increase gabapentin gradually to 300 mg 2 capsules 3 times daily.  Continue diabetic control.  Office follow-up.\par

## 2022-05-24 ENCOUNTER — APPOINTMENT (OUTPATIENT)
Dept: ENDOCRINOLOGY | Facility: CLINIC | Age: 47
End: 2022-05-24

## 2022-06-11 ENCOUNTER — NON-APPOINTMENT (OUTPATIENT)
Age: 47
End: 2022-06-11

## 2022-06-12 ENCOUNTER — RX RENEWAL (OUTPATIENT)
Age: 47
End: 2022-06-12

## 2022-07-28 ENCOUNTER — RX RENEWAL (OUTPATIENT)
Age: 47
End: 2022-07-28

## 2022-07-28 RX ORDER — ERTUGLIFLOZIN AND METFORMIN HYDROCHLORIDE 7.5; 1 MG/1; MG/1
7.5-1 TABLET, FILM COATED ORAL
Qty: 30 | Refills: 5 | Status: ACTIVE | COMMUNITY
Start: 2021-12-21 | End: 1900-01-01

## 2022-09-12 ENCOUNTER — APPOINTMENT (OUTPATIENT)
Dept: NEUROLOGY | Facility: CLINIC | Age: 47
End: 2022-09-12

## 2023-03-14 ENCOUNTER — EMERGENCY (EMERGENCY)
Facility: HOSPITAL | Age: 48
LOS: 1 days | Discharge: HOME | End: 2023-03-14
Attending: EMERGENCY MEDICINE | Admitting: EMERGENCY MEDICINE
Payer: MEDICAID

## 2023-03-14 VITALS
TEMPERATURE: 99 F | HEART RATE: 71 BPM | DIASTOLIC BLOOD PRESSURE: 100 MMHG | SYSTOLIC BLOOD PRESSURE: 166 MMHG | WEIGHT: 194.01 LBS | RESPIRATION RATE: 15 BRPM | HEIGHT: 69 IN | OXYGEN SATURATION: 96 %

## 2023-03-14 PROCEDURE — 99284 EMERGENCY DEPT VISIT MOD MDM: CPT

## 2023-03-14 PROCEDURE — 99283 EMERGENCY DEPT VISIT LOW MDM: CPT

## 2023-03-14 RX ORDER — IBUPROFEN 200 MG
600 TABLET ORAL ONCE
Refills: 0 | Status: COMPLETED | OUTPATIENT
Start: 2023-03-14 | End: 2023-03-14

## 2023-03-14 NOTE — ED PROVIDER NOTE - CLINICAL SUMMARY MEDICAL DECISION MAKING FREE TEXT BOX
47 M c/o left ear pain, drainage. Last week was treated for uri/cough with zpak. Symptoms improving but few days ago developed left ear pain that increased and with drainage. Went to urgent care 2 days ago, was prescribed ciprodex ear drops and po cefdinir. Was told needs to follow up with ENT. Denies f/c, sore throat, cp, sob, n/v.    PE- VSS afebrile  Left ear tympanic membrane red and covered and vesicular eruption with drainage of serous fluid.  Ear canal with minor edema and debris.  Right ear clear posterior pharynx clear neck supple no adenopathy.  Lungs clear.  Impression is left otitis media with effusion.  Already on oral and topical antibiotics.  Patient needs ENT follow-up for further evaluation.  Will refer to ENT clinic and have patient continue medications as prescribed.

## 2023-03-14 NOTE — ED ADULT NURSE NOTE - OBJECTIVE STATEMENT
48yo male walked into ED, pt c/o "I have left ear pain". pt recently seen by MD and placed on antibiotics.

## 2023-03-14 NOTE — ED PROVIDER NOTE - PATIENT PORTAL LINK FT
You can access the FollowMyHealth Patient Portal offered by [facility] by registering at the following website: [email or web address]. By joining [facility]’s FollowMyHealth portal, you will also be able to view your health information using other applications (apps) compatible with our system.

## 2023-03-14 NOTE — ED PROVIDER NOTE - CROS ED CONS ALL NEG
negative...

## 2023-03-14 NOTE — ED ADULT NURSE NOTE - NSIMPLEMENTINTERV_GEN_ALL_ED
Implemented All Universal Safety Interventions:  [place] to call system. Call bell, personal items and telephone within reach. Instruct patient to call for assistance. Room bathroom lighting operational. Non-slip footwear when patient is off stretcher. Physically safe environment: no spills, clutter or unnecessary equipment. Stretcher in lowest position, wheels locked, appropriate side rails in place.

## 2023-03-14 NOTE — ED PROVIDER NOTE - NSFOLLOWUPCLINICS_GEN_ALL_ED_FT
[facility] - ENT  Otolaryngology (ENT)  [address] [place] [address]  Phone: [phone]  Fax:     [facility] ENT  ENT  [address], [address]  [place] [address]  Phone: [phone]  Fax:

## 2023-03-14 NOTE — ED PROVIDER NOTE - NSFOLLOWUPINSTRUCTIONS_ED_ALL_ED_FT
Continue medications prescribed to you.  Follow up with ENT.  Tylenol, motrin for pain.  Return for worsening or concerning symptoms.          Follow these instructions at home:    •Take over-the-counter and prescription medicines only as told by your doctor.      •If you were prescribed an antibiotic medicine, take it as told by your doctor. Do not stop taking it even if you start to feel better.      •Keep all follow-up visits.        Contact a doctor if:    •You have bleeding from your nose.      •There is a lump on your neck.      •You are not feeling better in 5 days.      •You feel worse instead of better.        Get help right away if:    •You have pain that is not helped with medicine.      •You have swelling, redness, or pain around your ear.      •You get a stiff neck.      •You cannot move part of your face (paralysis).      •You notice that the bone behind your ear hurts when you touch it.      •You get a very bad headache.        Summary    •Otitis media means that the middle ear is red, swollen, and full of fluid.      •This condition usually goes away on its own.       •If the problem does not go away, treatment may be needed. You may be given medicines to treat the infection or to treat your pain.      •If you were prescribed an antibiotic medicine, take it as told by your doctor. Do not stop taking it even if you start to feel better.      •Keep all follow-up visits.      This information is not intended to replace advice given to you by your health care provider. Make sure you discuss any questions you have with your health care provider.

## 2023-03-14 NOTE — ED PROVIDER NOTE - ENMT, MLM
Right ear and TM unremarkable. Left ear with swelling of canal, TM perforated, drainage, noted, erythema, tenderness with tragal and aural manipulation, tenderness around ear.

## 2023-03-14 NOTE — ED PROVIDER NOTE - OBJECTIVE STATEMENT
47 M c/o left ear pain, drainage. Last week was treated for uri/cough with zpak. Symptoms improving but few days ago developed left ear pain that increased and with drainage. Went to urgent care 2 days ago, was prescribed ciprodex ear drops and po cefdinir. Was told needs to follow up with ENT. Denies f/c, sore throat, cp, sob, n/v.
Patient/Caregiver provided printed discharge information.

## 2023-03-14 NOTE — ED PROVIDER NOTE - NS ED ATTENDING STATEMENT MOD
This was a shared visit with the [facility]. I reviewed and verified the documentation and independently performed the documented:

## 2023-03-15 ENCOUNTER — APPOINTMENT (OUTPATIENT)
Dept: OTOLARYNGOLOGY | Facility: CLINIC | Age: 48
End: 2023-03-15
Payer: MEDICAID

## 2023-03-15 VITALS — HEART RATE: 98 BPM | DIASTOLIC BLOOD PRESSURE: 84 MMHG | SYSTOLIC BLOOD PRESSURE: 138 MMHG

## 2023-03-15 PROCEDURE — 99202 OFFICE O/P NEW SF 15 MIN: CPT

## 2023-03-15 RX ORDER — ROSUVASTATIN CALCIUM 10 MG/1
10 TABLET, FILM COATED ORAL
Refills: 0 | Status: ACTIVE | COMMUNITY

## 2023-03-20 NOTE — HISTORY OF PRESENT ILLNESS
[de-identified] : 47 year old male presents with evaluation of Left ear infection for one week \par States went to urgent care and was prescribed cephalexin for 10 days and Ciprodex drops \par Recent sinus infection 2 weeks ago- treated with antibiotics \par States constant clear/ brown otorrhea \par Reports clogged/ muffled, decreased hearing and otalgia \par States Right ear is fine. \par Reports nasal congestion, left worse than right \par Reports sore throat\par Patient denies ear infections, hearing loss, tinnitus, dizziness, vertigo, headaches, epistaxis and fevers. \par Patient denies dysphagia, odynophagia, dyspnea, and dysphonia

## 2023-03-20 NOTE — ASSESSMENT
[FreeTextEntry1] : 47 year old male with left otitis externa - complete ciprodex drops as prescribed.

## 2023-03-22 ENCOUNTER — APPOINTMENT (OUTPATIENT)
Dept: OTOLARYNGOLOGY | Facility: CLINIC | Age: 48
End: 2023-03-22
Payer: MEDICAID

## 2023-03-22 PROCEDURE — 99212 OFFICE O/P EST SF 10 MIN: CPT

## 2023-03-27 NOTE — ASSESSMENT
[FreeTextEntry1] : 47 year old male with persistent right OM. To continue drops and return in 7-10 days.

## 2023-03-27 NOTE — HISTORY OF PRESENT ILLNESS
[de-identified] : 47 year old male presents for follow-up of left otitis externa\par Patient denies otalgia, otorrhea, ear infections, hearing loss, tinnitus, dizziness, vertigo, headaches related to hearing.

## 2023-03-30 ENCOUNTER — RESULT REVIEW (OUTPATIENT)
Age: 48
End: 2023-03-30

## 2023-03-30 ENCOUNTER — APPOINTMENT (OUTPATIENT)
Dept: OTOLARYNGOLOGY | Facility: CLINIC | Age: 48
End: 2023-03-30
Payer: MEDICAID

## 2023-03-30 VITALS
DIASTOLIC BLOOD PRESSURE: 72 MMHG | SYSTOLIC BLOOD PRESSURE: 120 MMHG | HEIGHT: 69 IN | OXYGEN SATURATION: 97 % | BODY MASS INDEX: 28.14 KG/M2 | HEART RATE: 96 BPM | WEIGHT: 190 LBS | TEMPERATURE: 97.5 F

## 2023-03-30 PROCEDURE — 99212 OFFICE O/P EST SF 10 MIN: CPT

## 2023-03-30 NOTE — HISTORY OF PRESENT ILLNESS
[de-identified] : 47 year old male presents for follow-up of left otitis externa\par Patient denies otalgia, otorrhea, ear infections, hearing loss, tinnitus, dizziness, vertigo, headaches related to hearing. \par  [FreeTextEntry1] : 3/30: Patient reports improvement in pain and drainage in ear but still has fullness and clogged sensation. He feels like his hearing is decreased still. Used drops yesterday. Previously completed two courses of oral abx.

## 2023-03-30 NOTE — ASSESSMENT
[FreeTextEntry1] : 47 year old male with left CSOM - improved but today his TM appears opacificied, thickened, and erythematous. Will start another course of abx, but I will also obtain hearing test and CTTB to eval middle ear.

## 2023-03-30 NOTE — PHYSICAL EXAM
[de-identified] : left OE resolved, TM opacified, thick erythematous appearing [Midline] : trachea located in midline position [Normal] : no rashes

## 2023-04-05 ENCOUNTER — APPOINTMENT (OUTPATIENT)
Dept: CT IMAGING | Facility: CLINIC | Age: 48
End: 2023-04-05
Payer: MEDICAID

## 2023-04-05 ENCOUNTER — OUTPATIENT (OUTPATIENT)
Dept: OUTPATIENT SERVICES | Facility: HOSPITAL | Age: 48
LOS: 1 days | End: 2023-04-05
Payer: MEDICAID

## 2023-04-05 DIAGNOSIS — H60.312 DIFFUSE OTITIS EXTERNA, LEFT EAR: ICD-10-CM

## 2023-04-05 PROCEDURE — 70480 CT ORBIT/EAR/FOSSA W/O DYE: CPT

## 2023-04-05 PROCEDURE — 70480 CT ORBIT/EAR/FOSSA W/O DYE: CPT | Mod: 26

## 2023-04-12 ENCOUNTER — APPOINTMENT (OUTPATIENT)
Dept: OTOLARYNGOLOGY | Facility: CLINIC | Age: 48
End: 2023-04-12
Payer: MEDICAID

## 2023-04-12 VITALS
HEIGHT: 69 IN | DIASTOLIC BLOOD PRESSURE: 73 MMHG | SYSTOLIC BLOOD PRESSURE: 107 MMHG | OXYGEN SATURATION: 93 % | WEIGHT: 190 LBS | HEART RATE: 84 BPM | BODY MASS INDEX: 28.14 KG/M2

## 2023-04-12 PROCEDURE — 99212 OFFICE O/P EST SF 10 MIN: CPT

## 2023-04-12 PROCEDURE — 92567 TYMPANOMETRY: CPT

## 2023-04-12 PROCEDURE — 92557 COMPREHENSIVE HEARING TEST: CPT

## 2023-04-12 NOTE — ASSESSMENT
[FreeTextEntry1] : 47 year old male with left chronic mastoiditis - CTTB consistent, with tegmen erosion, however to ossicle or scutum erosion. We will try to expedite appt with Otology.

## 2023-04-12 NOTE — HISTORY OF PRESENT ILLNESS
[de-identified] : 47 year old male presents for follow-up of  Left CSOM\par Completed oral antibiotics as well as antibiotic drops. NO improvement in symptoms. He still feels fullness, decreased hearing, and has left mastoid pain. He reports no longer has otorrhea. He has appointment with otology next month.  [Hearing Loss] : hearing loss [Ear Fullness] : ear fullness

## 2023-04-12 NOTE — PHYSICAL EXAM
[de-identified] : left OE resolved, TM opacified, thick erythematous appearing [Midline] : trachea located in midline position [Normal] : no rashes

## 2023-05-16 ENCOUNTER — APPOINTMENT (OUTPATIENT)
Dept: OTOLARYNGOLOGY | Facility: CLINIC | Age: 48
End: 2023-05-16
Payer: MEDICAID

## 2023-05-16 DIAGNOSIS — Z86.39 PERSONAL HISTORY OF OTHER ENDOCRINE, NUTRITIONAL AND METABOLIC DISEASE: ICD-10-CM

## 2023-05-16 DIAGNOSIS — H91.92 UNSPECIFIED HEARING LOSS, LEFT EAR: ICD-10-CM

## 2023-05-16 DIAGNOSIS — H93.12 TINNITUS, LEFT EAR: ICD-10-CM

## 2023-05-16 DIAGNOSIS — H93.8X9 OTHER SPECIFIED DISORDERS OF EAR, UNSPECIFIED EAR: ICD-10-CM

## 2023-05-16 DIAGNOSIS — Z86.79 PERSONAL HISTORY OF OTHER DISEASES OF THE CIRCULATORY SYSTEM: ICD-10-CM

## 2023-05-16 DIAGNOSIS — H92.02 OTALGIA, LEFT EAR: ICD-10-CM

## 2023-05-16 PROCEDURE — 99214 OFFICE O/P EST MOD 30 MIN: CPT | Mod: 25

## 2023-05-16 PROCEDURE — 69433 CREATE EARDRUM OPENING: CPT | Mod: LT

## 2023-05-16 PROCEDURE — 92504 EAR MICROSCOPY EXAMINATION: CPT

## 2023-05-16 RX ORDER — OFLOXACIN OTIC 3 MG/ML
0.3 SOLUTION AURICULAR (OTIC) TWICE DAILY
Qty: 1 | Refills: 1 | Status: DISCONTINUED | COMMUNITY
Start: 2023-03-22 | End: 2023-05-16

## 2023-05-16 RX ORDER — CEPHALEXIN 500 MG/1
500 CAPSULE ORAL
Refills: 0 | Status: DISCONTINUED | COMMUNITY
End: 2023-05-16

## 2023-05-16 RX ORDER — SULFAMETHOXAZOLE AND TRIMETHOPRIM 800; 160 MG/1; MG/1
800-160 TABLET ORAL TWICE DAILY
Qty: 14 | Refills: 0 | Status: DISCONTINUED | COMMUNITY
Start: 2023-03-30 | End: 2023-05-16

## 2023-05-16 RX ORDER — GLIPIZIDE 2.5 MG/1
TABLET ORAL
Refills: 0 | Status: ACTIVE | COMMUNITY

## 2023-05-16 RX ORDER — CIPROFLOXACIN AND DEXAMETHASONE 3; 1 MG/ML; MG/ML
0.3-0.1 SUSPENSION/ DROPS AURICULAR (OTIC) TWICE DAILY
Qty: 1 | Refills: 0 | Status: DISCONTINUED | COMMUNITY
Start: 2023-03-15 | End: 2023-05-16

## 2023-05-16 RX ORDER — OFLOXACIN OTIC 3 MG/ML
0.3 SOLUTION AURICULAR (OTIC)
Qty: 2 | Refills: 1 | Status: ACTIVE | COMMUNITY
Start: 2023-05-16 | End: 1900-01-01

## 2023-05-16 NOTE — PHYSICAL EXAM
[Binocular Microscopic Exam] : Binocular microscopic exam was performed [Rinne Test Air Conduction Persists > Bone Conduction Right] : air conduction greater than bone conduction on the right [Hearing Mckeon Test (Tuning Fork On Forehead)] : no lateralization of tone [Rinne Test Air Conduction Persists > Bone Conduction Left] : air conduction greater than bone conduction on the left [Midline] : trachea located in midline position [Normal] : no rashes [Hearing Loss Right Only] : normal [Hearing Loss Left Only] : normal [Nystagmus] : ~T no ~M nystagmus was seen [Fukuda Step Test] : Fukuda Step Test was Negative [Romberg's Sign] : Romberg's sign was absent [Fistula Sign] : Fistula Sign: Negative [Past-Pointing] : Past-Pointing: Negative [Luis A-Halljennyke] : [place]-Hallpike: Negative [FreeTextEntry9] : cerumen, removed [de-identified] : desquamating, thick, erythematous

## 2023-05-16 NOTE — HISTORY OF PRESENT ILLNESS
[de-identified] : 48 year old man, referred by Dr. Christianson, for Left chronic mastoiditis\par History of Left CSOM\par Associated symptoms of ear fullness, Left otalgia and significant diminished\par States no longer having otorrhea - treated for Left ear infection with 2 courses of oral antibiotics & 2 types of otic drops\par Reports Left ear popping and tinnitus\par Denies dizziness, vertigo, headaches related to ears or recent fevers\par last audio April 2023\par s/p CT IACs 4/05/23 IMPRESSION:\par 1.  Complete opacification of the left mastoid air cells and middle ear cavity suggesting otomastoiditis. Areas of bony thinning/dehiscence along the inner wall of the left mastoid (5:56, 7:134). Areas of thinning of the left tegmen mastoideum; dehiscence cannot be excluded.\par 2.  Soft tissue thickening along the left external auditory canal suggesting otitis externa.

## 2023-05-16 NOTE — DATA REVIEWED
[de-identified] : I have independently reviewed the patient's audiogram from april and my findings include B tymp L and CHL\par  [de-identified] : CT with otomastoiditis, no cholesteatoma

## 2023-05-16 NOTE — PROCEDURE
[Same] : same as the Pre Op Dx. [] : M & T [FreeTextEntry1] : Left mastoiditis [FreeTextEntry4] : none [FreeTextEntry6] : Operative microscope was used to examine the ear canal, ear drum and visible middle landmarks.  Adequate exam would not have been possible without the use of a microscope.  Findings are described.\par After application of topical phenol for anesthesia, incision was made with a myringotomy blade anteriorly in the tympanic membrane. Fluid was aspirated from the middle ear. A paparella type 1 tube was inserted atraumatically. Drops were instilled and cotton ball applied to lateral ear canal.  Patient tolerated the procedure well without complications.\par \par

## 2023-05-16 NOTE — CONSULT LETTER
[Consult Letter:] : I had the pleasure of evaluating your patient, [unfilled]. [Please see my note below.] : Please see my note below. [Consult Closing:] : Thank you very much for allowing me to participate in the care of this patient.  If you have any questions, please do not hesitate to contact me. [Sincerely,] : Sincerely, [FreeTextEntry2] : Ricardo Masters MD ([place], [place]) [FreeTextEntry3] : Nancy Shay MD, Otology Neurotology & Skull Base Surgery

## 2023-05-17 ENCOUNTER — RX RENEWAL (OUTPATIENT)
Age: 48
End: 2023-05-17

## 2023-05-23 LAB — EAR NOSE AND THROAT CULTURE: NORMAL

## 2023-05-30 ENCOUNTER — APPOINTMENT (OUTPATIENT)
Dept: OTOLARYNGOLOGY | Facility: CLINIC | Age: 48
End: 2023-05-30
Payer: MEDICAID

## 2023-05-30 DIAGNOSIS — H60.312 DIFFUSE OTITIS EXTERNA, LEFT EAR: ICD-10-CM

## 2023-05-30 DIAGNOSIS — H70.12 CHRONIC MASTOIDITIS, LEFT EAR: ICD-10-CM

## 2023-05-30 DIAGNOSIS — H90.A12 CONDUCTIVE HEARING LOSS, UNILATERAL, LEFT EAR WITH RESTRICTED HEARING ON THE CONTRALATERAL SIDE: ICD-10-CM

## 2023-05-30 PROCEDURE — 99213 OFFICE O/P EST LOW 20 MIN: CPT

## 2023-05-30 PROCEDURE — 92504 EAR MICROSCOPY EXAMINATION: CPT

## 2023-05-30 NOTE — HISTORY OF PRESENT ILLNESS
[de-identified] : 48 year old man, referred by Dr. Christianson, for Left chronic mastoiditis/CHARITY - s/p ear culture\par History of Left CSOM\par Associated symptoms of ear fullness, Left otalgia and significant diminished\par States no longer having otorrhea - treated for Left ear infection with 2 courses of oral antibiotics & 2 types of otic drops\par s/p placement of Left Myringotomy tube - prescribed Ofloxacin drops - using as needed\par Reports Left ear continues to have clogged sensation with increased ear pressure - intermittent popping and constant tinnitus - clear otorrhea noted, NO odor - having mild discomfort\par Denies dizziness, vertigo, headaches related to ears or recent fevers\par \par s/p CT IACs 4/05/23 IMPRESSION:\par 1.  Complete opacification of the left mastoid air cells and middle ear cavity suggesting otomastoiditis. Areas of bony thinning/dehiscence along the inner wall of the left mastoid (5:56, 7:134). Areas of thinning of the left tegmen mastoideum; dehiscence cannot be excluded.\par 2.  Soft tissue thickening along the left external auditory canal suggesting otitis externa.

## 2023-05-30 NOTE — PHYSICAL EXAM
[Midline] : trachea located in midline position [Binocular Microscopic Exam] : Binocular microscopic exam was performed [Rinne Test Air Conduction Persists > Bone Conduction Right] : air conduction greater than bone conduction on the right [Rinne Test Air Conduction Persists > Bone Conduction Left] : air conduction greater than bone conduction on the left [Hearing Mckeon Test (Tuning Fork On Forehead)] : no lateralization of tone [Normal] : gait was normal [Hearing Loss Right Only] : normal [Hearing Loss Left Only] : normal [Nystagmus] : ~T no ~M nystagmus was seen [Fukuda Step Test] : Fukuda Step Test was Negative [Romberg's Sign] : Romberg's sign was absent [Fistula Sign] : Fistula Sign: Negative [Past-Pointing] : Past-Pointing: Negative [Luis A-Halljennyke] : [place]-Hallpike: Negative [de-identified] : desquamating, thick, erythematous+ PE tube; powder and GV placed

## 2023-05-30 NOTE — CONSULT LETTER
[Consult Letter:] : I had the pleasure of evaluating your patient, [unfilled]. [Please see my note below.] : Please see my note below. [Consult Closing:] : Thank you very much for allowing me to participate in the care of this patient.  If you have any questions, please do not hesitate to contact me. [Sincerely,] : Sincerely, [Dear  ___] : Dear  [unfilled], [FreeTextEntry2] : Ricardo Masters MD ([place], [place]) [FreeTextEntry3] : Nancy Shay MD, Otology Neurotology & Skull Base Surgery

## 2023-07-06 ENCOUNTER — APPOINTMENT (OUTPATIENT)
Dept: OTOLARYNGOLOGY | Facility: CLINIC | Age: 48
End: 2023-07-06

## 2023-10-17 ENCOUNTER — APPOINTMENT (OUTPATIENT)
Dept: OTOLARYNGOLOGY | Facility: CLINIC | Age: 48
End: 2023-10-17

## 2023-11-18 ENCOUNTER — RX RENEWAL (OUTPATIENT)
Age: 48
End: 2023-11-18

## 2023-11-18 RX ORDER — LABETALOL HYDROCHLORIDE 100 MG/1
100 TABLET, FILM COATED ORAL
Qty: 180 | Refills: 1 | Status: ACTIVE | COMMUNITY
Start: 2022-03-10 | End: 1900-01-01

## 2024-09-17 ENCOUNTER — INPATIENT (INPATIENT)
Facility: HOSPITAL | Age: 49
LOS: 16 days | Discharge: HOME | DRG: 639 | End: 2024-10-04
Attending: INTERNAL MEDICINE | Admitting: INTERNAL MEDICINE
Payer: MEDICAID

## 2024-09-17 VITALS
DIASTOLIC BLOOD PRESSURE: 100 MMHG | SYSTOLIC BLOOD PRESSURE: 190 MMHG | HEART RATE: 96 BPM | WEIGHT: 199.96 LBS | RESPIRATION RATE: 16 BRPM | OXYGEN SATURATION: 98 % | HEIGHT: 69 IN | TEMPERATURE: 99 F

## 2024-09-17 DIAGNOSIS — E11.621 TYPE 2 DIABETES MELLITUS WITH FOOT ULCER: ICD-10-CM

## 2024-09-17 DIAGNOSIS — E11.628 TYPE 2 DIABETES MELLITUS WITH OTHER SKIN COMPLICATIONS: ICD-10-CM

## 2024-09-17 LAB
ACETONE SERPL-MCNC: NEGATIVE — SIGNIFICANT CHANGE UP
ALBUMIN SERPL ELPH-MCNC: 3.1 G/DL — LOW (ref 3.3–5)
ALP SERPL-CCNC: 126 U/L — HIGH (ref 30–120)
ALT FLD-CCNC: 66 U/L — HIGH (ref 10–60)
ANION GAP SERPL CALC-SCNC: 9 MMOL/L — SIGNIFICANT CHANGE UP (ref 5–17)
APTT BLD: 29.6 SEC — SIGNIFICANT CHANGE UP (ref 24.5–35.6)
AST SERPL-CCNC: 45 U/L — HIGH (ref 10–40)
BASOPHILS # BLD AUTO: 0.08 K/UL — SIGNIFICANT CHANGE UP (ref 0–0.2)
BASOPHILS NFR BLD AUTO: 1.1 % — SIGNIFICANT CHANGE UP (ref 0–2)
BILIRUB SERPL-MCNC: 0.9 MG/DL — SIGNIFICANT CHANGE UP (ref 0.2–1.2)
BUN SERPL-MCNC: 34 MG/DL — HIGH (ref 7–23)
CALCIUM SERPL-MCNC: 9.6 MG/DL — SIGNIFICANT CHANGE UP (ref 8.4–10.5)
CHLORIDE SERPL-SCNC: 93 MMOL/L — LOW (ref 96–108)
CO2 SERPL-SCNC: 26 MMOL/L — SIGNIFICANT CHANGE UP (ref 22–31)
CREAT SERPL-MCNC: 2.4 MG/DL — HIGH (ref 0.5–1.3)
EGFR: 32 ML/MIN/1.73M2 — LOW
EOSINOPHIL # BLD AUTO: 0.06 K/UL — SIGNIFICANT CHANGE UP (ref 0–0.5)
EOSINOPHIL NFR BLD AUTO: 0.8 % — SIGNIFICANT CHANGE UP (ref 0–6)
ERYTHROCYTE [SEDIMENTATION RATE] IN BLOOD: 89 MM/HR — HIGH (ref 0–9)
GLUCOSE BLDC GLUCOMTR-MCNC: 339 MG/DL — HIGH (ref 70–99)
GLUCOSE SERPL-MCNC: 464 MG/DL — CRITICAL HIGH (ref 70–99)
HCT VFR BLD CALC: 31.8 % — LOW (ref 39–50)
HGB BLD-MCNC: 11.2 G/DL — LOW (ref 13–17)
IMM GRANULOCYTES NFR BLD AUTO: 1.8 % — HIGH (ref 0–0.9)
INR BLD: 1.12 RATIO — SIGNIFICANT CHANGE UP (ref 0.85–1.18)
LACTATE SERPL-SCNC: 1.5 MMOL/L — SIGNIFICANT CHANGE UP (ref 0.7–2)
LYMPHOCYTES # BLD AUTO: 1.15 K/UL — SIGNIFICANT CHANGE UP (ref 1–3.3)
LYMPHOCYTES # BLD AUTO: 15.8 % — SIGNIFICANT CHANGE UP (ref 13–44)
MCHC RBC-ENTMCNC: 29.9 PG — SIGNIFICANT CHANGE UP (ref 27–34)
MCHC RBC-ENTMCNC: 35.2 GM/DL — SIGNIFICANT CHANGE UP (ref 32–36)
MCV RBC AUTO: 84.8 FL — SIGNIFICANT CHANGE UP (ref 80–100)
MONOCYTES # BLD AUTO: 0.92 K/UL — HIGH (ref 0–0.9)
MONOCYTES NFR BLD AUTO: 12.6 % — SIGNIFICANT CHANGE UP (ref 2–14)
NEUTROPHILS # BLD AUTO: 4.96 K/UL — SIGNIFICANT CHANGE UP (ref 1.8–7.4)
NEUTROPHILS NFR BLD AUTO: 67.9 % — SIGNIFICANT CHANGE UP (ref 43–77)
NRBC # BLD: 0 /100 WBCS — SIGNIFICANT CHANGE UP (ref 0–0)
PLATELET # BLD AUTO: 162 K/UL — SIGNIFICANT CHANGE UP (ref 150–400)
POTASSIUM SERPL-MCNC: 3.9 MMOL/L — SIGNIFICANT CHANGE UP (ref 3.5–5.3)
POTASSIUM SERPL-SCNC: 3.9 MMOL/L — SIGNIFICANT CHANGE UP (ref 3.5–5.3)
PROT SERPL-MCNC: 7.8 G/DL — SIGNIFICANT CHANGE UP (ref 6–8.3)
PROTHROM AB SERPL-ACNC: 12.2 SEC — SIGNIFICANT CHANGE UP (ref 9.5–13)
RBC # BLD: 3.75 M/UL — LOW (ref 4.2–5.8)
RBC # FLD: 12.1 % — SIGNIFICANT CHANGE UP (ref 10.3–14.5)
SODIUM SERPL-SCNC: 128 MMOL/L — LOW (ref 135–145)
WBC # BLD: 7.3 K/UL — SIGNIFICANT CHANGE UP (ref 3.8–10.5)
WBC # FLD AUTO: 7.3 K/UL — SIGNIFICANT CHANGE UP (ref 3.8–10.5)

## 2024-09-17 PROCEDURE — 73630 X-RAY EXAM OF FOOT: CPT | Mod: 26,LT

## 2024-09-17 PROCEDURE — 93925 LOWER EXTREMITY STUDY: CPT | Mod: 26

## 2024-09-17 PROCEDURE — 99285 EMERGENCY DEPT VISIT HI MDM: CPT

## 2024-09-17 RX ORDER — INSULIN LISPRO 100/ML
VIAL (ML) SUBCUTANEOUS
Refills: 0 | Status: DISCONTINUED | OUTPATIENT
Start: 2024-09-17 | End: 2024-09-21

## 2024-09-17 RX ORDER — SODIUM CHLORIDE IRRIG SOLUTION 0.9 %
1000 SOLUTION, IRRIGATION IRRIGATION
Refills: 0 | Status: DISCONTINUED | OUTPATIENT
Start: 2024-09-17 | End: 2024-10-04

## 2024-09-17 RX ORDER — ALCOHOL ANTISEPTIC PADS
25 PADS, MEDICATED (EA) TOPICAL ONCE
Refills: 0 | Status: DISCONTINUED | OUTPATIENT
Start: 2024-09-17 | End: 2024-10-04

## 2024-09-17 RX ORDER — LABETALOL HYDROCHLORIDE 200 MG/1
10 TABLET, FILM COATED ORAL EVERY 8 HOURS
Refills: 0 | Status: DISCONTINUED | OUTPATIENT
Start: 2024-09-17 | End: 2024-10-04

## 2024-09-17 RX ORDER — ENOXAPARIN SODIUM 150 MG/ML
40 INJECTION SUBCUTANEOUS EVERY 24 HOURS
Refills: 0 | Status: DISCONTINUED | OUTPATIENT
Start: 2024-09-17 | End: 2024-10-04

## 2024-09-17 RX ORDER — GLUCAGON INJECTION, SOLUTION 0.5 MG/.1ML
1 INJECTION, SOLUTION SUBCUTANEOUS ONCE
Refills: 0 | Status: DISCONTINUED | OUTPATIENT
Start: 2024-09-17 | End: 2024-10-04

## 2024-09-17 RX ORDER — MUPIROCIN 20 MG/G
1 OINTMENT TOPICAL
Refills: 0 | Status: DISCONTINUED | OUTPATIENT
Start: 2024-09-17 | End: 2024-10-04

## 2024-09-17 RX ORDER — SODIUM CHLORIDE 0.9 % (FLUSH) 0.9 %
1000 SYRINGE (ML) INJECTION ONCE
Refills: 0 | Status: COMPLETED | OUTPATIENT
Start: 2024-09-17 | End: 2024-09-17

## 2024-09-17 RX ORDER — INSULIN LISPRO 100/ML
VIAL (ML) SUBCUTANEOUS AT BEDTIME
Refills: 0 | Status: DISCONTINUED | OUTPATIENT
Start: 2024-09-17 | End: 2024-09-21

## 2024-09-17 RX ORDER — LACTOBACILLUS ACIDOPHILUS 25MM CELL
1 CAPSULE ORAL
Refills: 0 | Status: DISCONTINUED | OUTPATIENT
Start: 2024-09-17 | End: 2024-10-04

## 2024-09-17 RX ORDER — ALCOHOL ANTISEPTIC PADS
12.5 PADS, MEDICATED (EA) TOPICAL ONCE
Refills: 0 | Status: DISCONTINUED | OUTPATIENT
Start: 2024-09-17 | End: 2024-10-04

## 2024-09-17 RX ORDER — ALCOHOL ANTISEPTIC PADS
15 PADS, MEDICATED (EA) TOPICAL ONCE
Refills: 0 | Status: DISCONTINUED | OUTPATIENT
Start: 2024-09-17 | End: 2024-10-04

## 2024-09-17 RX ORDER — ACETAMINOPHEN 325 MG
650 TABLET ORAL EVERY 6 HOURS
Refills: 0 | Status: DISCONTINUED | OUTPATIENT
Start: 2024-09-17 | End: 2024-10-04

## 2024-09-17 RX ORDER — LOSARTAN POTASSIUM 100 MG/1
50 TABLET, FILM COATED ORAL DAILY
Refills: 0 | Status: DISCONTINUED | OUTPATIENT
Start: 2024-09-17 | End: 2024-09-20

## 2024-09-17 RX ADMIN — Medication 1000 MILLILITER(S): at 20:09

## 2024-09-17 RX ADMIN — Medication 1000 MILLILITER(S): at 18:02

## 2024-09-17 RX ADMIN — Medication 2: at 22:29

## 2024-09-17 RX ADMIN — Medication 400 MILLIGRAM(S): at 19:33

## 2024-09-17 RX ADMIN — Medication 200 MILLIGRAM(S): at 18:32

## 2024-09-17 RX ADMIN — Medication 1000 MILLILITER(S): at 19:40

## 2024-09-17 RX ADMIN — ENOXAPARIN SODIUM 40 MILLIGRAM(S): 150 INJECTION SUBCUTANEOUS at 22:30

## 2024-09-17 RX ADMIN — Medication 1000 MILLILITER(S): at 19:33

## 2024-09-17 NOTE — H&P ADULT - HISTORY OF PRESENT ILLNESS
49-year-old male with past medical history of DM2, HTN, HLD, NEUROPATHY, PVD, NON COMPLIANT WITH TT, ETOH ABUSE, presents today due to left toe infection.  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  Patient experienced a blister to the left toe in which he eventually fell orders follow-up on its own.  Patient notes that the blister opened up and he developed some blood in the blister as well.  Patient notes that the blister scab has slightly fallen off but now his toe is red and painful.  Patient not currently on antibiotics.  Patient denies fever, injury, numbness, weakness, or any other complaints.   IN ED TEMP 99, /100, HR 89, had consult with podiatry and sidney started on Cipro and admitted for further management

## 2024-09-17 NOTE — H&P ADULT - ADDITIONAL PE
cellulitic changes to left hallux  Noted plantar left hallux diabetic ulcer, apx 3cm x 4 cm x 0.1 cm + edema, + erythema, + calor, wound base fibro-granular, + tracking, + mal odor, - purulence, + hyperkeratotic borders   Vascular: Dorsalis Pedis and Posterior Tibial pulses 2/4.  Capillary re-fill time less then 3 seconds digits 1-5 bilateral.    Neuro: Protective sensation diminished to the level of the digits bilateral.

## 2024-09-17 NOTE — CONSULT NOTE ADULT - SUBJECTIVE AND OBJECTIVE BOX
S : 49y year old Male seen at bedside for diabetic left hallux ulcer    Chief Complaint : Patient is a 49y old  Male who presents with a chief complaint of left toe infection  HPI :  49-year-old male with past medical history of diabetes presents today due to left toe infection.  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  Patient experienced a blister to the left toe in which he eventually fell orders follow-up on its own.  Patient notes that the blister opened up and he developed some blood in the blister as well.  Patient notes that the blister scab has slightly fallen off but now his toe is red and painful.  Patient not currently on antibiotics.  Patient denies fever, injury, numbness, weakness, or any other complaints.      Patient admits to  (-) Fevers, (-) Chills, (-) Nausea, (-) Vomiting, (-) Shortness of Breath      PMH: Prediabetes      PSH:    Allergies:amoxicillin (Unknown)      Labs:                          11.2   7.30  )-----------( 162      ( 17 Sep 2024 18:00 )             31.8     WBC Trend  7.30 Date (09-17 @ 18:00)      Chem  09-17    128[L]  |  93[L]  |  34[H]  ----------------------------<  464[HH]  3.9   |  26  |  2.40[H]    Ca    9.6      17 Sep 2024 18:00    TPro  7.8  /  Alb  3.1[L]  /  TBili  0.9  /  DBili  x   /  AST  45[H]  /  ALT  66[H]  /  AlkPhos  126[H]  09-17          T(F): 99.3 (09-17-24 @ 17:14), Max: 99.3 (09-17-24 @ 17:14)  HR: 96 (09-17-24 @ 17:14) (96 - 96)  BP: 190/100 (09-17-24 @ 17:14) (190/100 - 190/100)  RR: 16 (09-17-24 @ 17:14) (16 - 16)  SpO2: 98% (09-17-24 @ 17:14) (98% - 98%)  Wt(kg): --    O:   General: Pleasant  male NAD & AOX3.    Integument:  Skin warm, dry and supple bilateral.    Noted cellulitic changes to left hallux  Noted plantar left hallux diabetic ulcer, apx 3cm x 4 cm x 0.1 cm + edema, + erythema, + calor, wound base fibro-granular, + tracking, + mal odor, - purulence, + hyperkeratotic borders   Vascular: Dorsalis Pedis and Posterior Tibial pulses 2/4.  Capillary re-fill time less then 3 seconds digits 1-5 bilateral.    Neuro: Protective sensation diminished to the level of the digits bilateral.  MSK: Muscle strength 5/5 all major muscle groups bilateral.

## 2024-09-17 NOTE — ED ADULT NURSE NOTE - NSFALLUNIVINTERV_ED_ALL_ED
Bed/Stretcher in lowest position, wheels locked, appropriate side rails in place/Call bell, personal items and telephone in reach/Instruct patient to call for assistance before getting out of bed/chair/stretcher/Non-slip footwear applied when patient is off stretcher/[place] to call system/Physically safe environment - no spills, clutter or unnecessary equipment/Purposeful proactive rounding/Room/bathroom lighting operational, light cord in reach

## 2024-09-17 NOTE — ED ADULT NURSE NOTE - OBJECTIVE STATEMENT
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 states hx of htn dm cholesterol but non compliant with med due to increased stress and caring for parents who are both in nursing homes. pt denies abd pain, nausea, vomiting, back pain, neck pain, recent travel, sick contacts, headache, numbness, tingling, weakness, blurred vision, sinus congestion, fevers, chills, body aches, hematuria, dizziness, chest pain, sob, trouble speaking, trouble walking or any other complaints.

## 2024-09-17 NOTE — H&P ADULT - NSICDXPASTMEDICALHX_GEN_ALL_CORE_FT
PAST MEDICAL HISTORY:  DM2 (diabetes mellitus, type 2)     HLD (hyperlipidemia)     HTN (hypertension)     Prediabetes

## 2024-09-17 NOTE — ED PROVIDER NOTE - PHYSICAL EXAMINATION
Constitutional: Awake, Alert, non-toxic. NAD. Well appearing, well nourished.   HEAD: Normocephalic, atraumatic.   EYES: EOM intact, conjunctiva and sclera are clear bilaterally.   ENT: No rhinorrhea, patent, mucous membranes pink/moist, no drooling or stridor.   NECK: Supple, non-tender  CARDIOVASCULAR: Normal S1, S2; regular rate and rhythm.  RESPIRATORY: Normal respiratory effort; breath sounds CTAB, no wheezes, rhonchi, or rales. Speaking in full sentences. No accessory muscle use.   ABDOMEN: Soft; non-tender, non-distended.   EXTREMITIES: Full passive and active ROM in all extremities; non-tender to palpation; distal pulses palpable and symmetric  SKIN: Warm, dry; good skin turgor, no apparent lesions or rashes, no ecchymosis, brisk capillary refill.  NEURO: A&O x3. Sensory and motor functions are grossly intact. Speech is normal. Appearance and judgement seem appropriate for gender and age. Constitutional: Awake, Alert, non-toxic  HEAD: Normocephalic, atraumatic.   EYES: EOM intact, conjunctiva and sclera are clear bilaterally.   ENT: No rhinorrhea, patent, mucous membranes pink/moist, no drooling or stridor.   NECK: Supple, non-tender  CARDIOVASCULAR: Normal S1, S2; regular rate and rhythm.  RESPIRATORY: Normal respiratory effort; breath sounds CTAB, no wheezes, rhonchi, or rales. Speaking in full sentences. No accessory muscle use.   EXTREMITIES: (+) left medial toe ulceration with discharge and surrounding erythema, (+) malodorous, foot non-tender to palpation; distal pulses palpable and symmetric  SKIN: Warm, dry; good skin turgor, no ecchymosis, brisk capillary refill.  NEURO: A&O x3. Sensory and motor functions are grossly intact. Speech is normal. Appearance and judgement seem appropriate for gender and age.

## 2024-09-17 NOTE — ED ADULT NURSE NOTE - WOUND TYPE
Benefits, risks, and possible complications of procedure explained to patient/caregiver who verbalized understanding and gave verbal consent.
ulcer

## 2024-09-17 NOTE — ED PROVIDER NOTE - OBJECTIVE STATEMENT
49-year-old male with past medical history of diabetes presents today due to left toe infection.  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  Patient experienced a blister to the left toe in which he eventually fell orders follow-up on its own.  Patient notes that the blister opened up and he developed some blood in the blister as well.  Patient notes that the blister scab has slightly fallen off but now his toe is red and painful.  Patient not currently on antibiotics.  Patient denies fever, injury, numbness, weakness, or any other complaints.

## 2024-09-17 NOTE — ED ADULT NURSE REASSESSMENT NOTE - BP NONINVASIVE DIASTOLIC (MM HG)
Discussed with pt, she is wanting the higher dose vitamin D, because everytime she tries to use the daily dose, her vitamin D level drops and she does not feel good.  She stays in the house a lot, and does not get much sunlight.    She would prefer to have the high dose vitamin D.  She would also like the folic acid and magnesium filled.      Desiree Godinez RN  [facility]     84

## 2024-09-17 NOTE — H&P ADULT - ASSESSMENT
49-year-old male with past medical history of DM2, HTN, HLD, NEUROPATHY, PVD, NON COMPLIANT WITH TT, ETOH ABUSE, presents today due to left toe infection.  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  Patient experienced a blister to the left toe in which he eventually fell orders follow-up on its own.  Patient notes that the blister opened up and he developed some blood in the blister as well.  Patient notes that the blister scab has slightly fallen off but now his toe is red and painful.  Patient not currently on antibiotics.  Patient denies fever, injury, numbness, weakness, or any other complaints.   IN ED TEMP 99, /100, HR 89, had consult with podiatry and sidney started on Cipro and admitted for further management    # Diabetic foot ulcer   started on ABX, podiatry consult noted, daily dressing, R/o PVD- arterial doppler  # DM2 with hyperglycemia   ssi, consistent carb, Endo consult,  hba1c  # Ess HTN   started on losartan, monitor, cardiology consult   DVT prophylaxis  ID , endo and podiatry consult     49-year-old male with past medical history of DM2, HTN, HLD, NEUROPATHY, PVD, NON COMPLIANT WITH TT, ETOH ABUSE, presents today due to left toe infection.  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  Patient experienced a blister to the left toe in which he eventually fell orders follow-up on its own.  Patient notes that the blister opened up and he developed some blood in the blister as well.  Patient notes that the blister scab has slightly fallen off but now his toe is red and painful.  Patient not currently on antibiotics.  Patient denies fever, injury, numbness, weakness, or any other complaints.   IN ED TEMP 99, /100, HR 89, had consult with podiatry and sidney started on Cipro and admitted for further management    # Diabetic foot ulcer with cellullitis(left halluxdiabetic ulcer 3x4x0.1 cm with erythema and edema mal odorous , hyperkeratotic  started on ABX, podiatry consult noted, daily dressing, R/o PVD- arterial doppler  ID consult  # DM2 with hyperglycemia, with neuropathy , nephropathy with likely PVD    ssi, consistent carb, Endo consult,  hba1c   Endo consult  # Ess HTN   started on losartan, monitor, cardiology consult   DVT prophylaxis  ID , endo and podiatry consult  # LUISA with liely CKD   nephro consult, will monitor indices  # DVT prophylaxis

## 2024-09-17 NOTE — ED PROVIDER NOTE - WHO RECOMMENDED
Discussed with Dr. Davenport (attending podiatrist) he will have his resident see patient Discussed with Dr. Davenport (attending podiatrist) he will have his resident see patient    Discussed with Dr. Weaver (medical attending) she will admit patient

## 2024-09-17 NOTE — ED ADULT NURSE REASSESSMENT NOTE - NS ED NURSE REASSESS COMMENT FT1
pt resting comfortably awaiting bed, plan of care on going. pt voices no complaints at this time.  no further concerns as of present, no acute changes noted, needs attended to, safety maintained,
pt resting comfortably, improvement noted, in no acute distress. Respirations are even and unlabored. pt voices no complaints at this time. plan of care ongoing, no further concerns as of present, pt repositioned in bed, pt hemodynamically stable. no acute changes noted, needs attended to, safety maintained, call bell within reach.

## 2024-09-17 NOTE — ED PROVIDER NOTE - ATTENDING APP SHARED VISIT CONTRIBUTION OF CARE
Patient complaining of left great toe swelling patient relates developed a blister to his left great toe which opened up a few days ago then he developed erythema swelling to his left great toe.  Patient denies fevers or chills.    Plan left foot x-ray labs IV fluids Cipro podiatry consult    Discussed with Dr. Davenport (attending podiatrist) he will have his resident see patient    Differential including but not limited to cellulitis abscess osteomyelitis

## 2024-09-18 DIAGNOSIS — E11.65 TYPE 2 DIABETES MELLITUS WITH HYPERGLYCEMIA: ICD-10-CM

## 2024-09-18 LAB
A1C WITH ESTIMATED AVERAGE GLUCOSE RESULT: 10.6 % — HIGH (ref 4–5.6)
ALBUMIN SERPL ELPH-MCNC: 2.6 G/DL — LOW (ref 3.3–5)
ALP SERPL-CCNC: 108 U/L — SIGNIFICANT CHANGE UP (ref 30–120)
ALT FLD-CCNC: 57 U/L — SIGNIFICANT CHANGE UP (ref 10–60)
ANION GAP SERPL CALC-SCNC: 8 MMOL/L — SIGNIFICANT CHANGE UP (ref 5–17)
AST SERPL-CCNC: 40 U/L — SIGNIFICANT CHANGE UP (ref 10–40)
BASOPHILS # BLD AUTO: 0.08 K/UL — SIGNIFICANT CHANGE UP (ref 0–0.2)
BASOPHILS NFR BLD AUTO: 1.2 % — SIGNIFICANT CHANGE UP (ref 0–2)
BILIRUB SERPL-MCNC: 1 MG/DL — SIGNIFICANT CHANGE UP (ref 0.2–1.2)
BUN SERPL-MCNC: 28 MG/DL — HIGH (ref 7–23)
CALCIUM SERPL-MCNC: 8.9 MG/DL — SIGNIFICANT CHANGE UP (ref 8.4–10.5)
CHLORIDE SERPL-SCNC: 98 MMOL/L — SIGNIFICANT CHANGE UP (ref 96–108)
CHOLEST SERPL-MCNC: 236 MG/DL — HIGH
CO2 SERPL-SCNC: 29 MMOL/L — SIGNIFICANT CHANGE UP (ref 22–31)
CREAT SERPL-MCNC: 2.14 MG/DL — HIGH (ref 0.5–1.3)
EGFR: 37 ML/MIN/1.73M2 — LOW
EOSINOPHIL # BLD AUTO: 0.1 K/UL — SIGNIFICANT CHANGE UP (ref 0–0.5)
EOSINOPHIL NFR BLD AUTO: 1.6 % — SIGNIFICANT CHANGE UP (ref 0–6)
ESTIMATED AVERAGE GLUCOSE: 258 MG/DL — HIGH (ref 68–114)
GLUCOSE BLDC GLUCOMTR-MCNC: 274 MG/DL — HIGH (ref 70–99)
GLUCOSE BLDC GLUCOMTR-MCNC: 343 MG/DL — HIGH (ref 70–99)
GLUCOSE BLDC GLUCOMTR-MCNC: 355 MG/DL — HIGH (ref 70–99)
GLUCOSE BLDC GLUCOMTR-MCNC: 362 MG/DL — HIGH (ref 70–99)
GLUCOSE SERPL-MCNC: 281 MG/DL — HIGH (ref 70–99)
HCT VFR BLD CALC: 31.1 % — LOW (ref 39–50)
HDLC SERPL-MCNC: 42 MG/DL — SIGNIFICANT CHANGE UP
HGB BLD-MCNC: 10.9 G/DL — LOW (ref 13–17)
IMM GRANULOCYTES NFR BLD AUTO: 1.4 % — HIGH (ref 0–0.9)
LIPID PNL WITH DIRECT LDL SERPL: 138 MG/DL — HIGH
LYMPHOCYTES # BLD AUTO: 1.42 K/UL — SIGNIFICANT CHANGE UP (ref 1–3.3)
LYMPHOCYTES # BLD AUTO: 22.1 % — SIGNIFICANT CHANGE UP (ref 13–44)
MCHC RBC-ENTMCNC: 30.4 PG — SIGNIFICANT CHANGE UP (ref 27–34)
MCHC RBC-ENTMCNC: 35 GM/DL — SIGNIFICANT CHANGE UP (ref 32–36)
MCV RBC AUTO: 86.9 FL — SIGNIFICANT CHANGE UP (ref 80–100)
MONOCYTES # BLD AUTO: 0.82 K/UL — SIGNIFICANT CHANGE UP (ref 0–0.9)
MONOCYTES NFR BLD AUTO: 12.8 % — SIGNIFICANT CHANGE UP (ref 2–14)
NEUTROPHILS # BLD AUTO: 3.91 K/UL — SIGNIFICANT CHANGE UP (ref 1.8–7.4)
NEUTROPHILS NFR BLD AUTO: 60.9 % — SIGNIFICANT CHANGE UP (ref 43–77)
NON HDL CHOLESTEROL: 193 MG/DL — HIGH
NRBC # BLD: 0 /100 WBCS — SIGNIFICANT CHANGE UP (ref 0–0)
PLATELET # BLD AUTO: 142 K/UL — LOW (ref 150–400)
POTASSIUM SERPL-MCNC: 3.6 MMOL/L — SIGNIFICANT CHANGE UP (ref 3.5–5.3)
POTASSIUM SERPL-SCNC: 3.6 MMOL/L — SIGNIFICANT CHANGE UP (ref 3.5–5.3)
PROT SERPL-MCNC: 7 G/DL — SIGNIFICANT CHANGE UP (ref 6–8.3)
RBC # BLD: 3.58 M/UL — LOW (ref 4.2–5.8)
RBC # FLD: 12.4 % — SIGNIFICANT CHANGE UP (ref 10.3–14.5)
SODIUM SERPL-SCNC: 135 MMOL/L — SIGNIFICANT CHANGE UP (ref 135–145)
TRIGL SERPL-MCNC: 305 MG/DL — HIGH
WBC # BLD: 6.42 K/UL — SIGNIFICANT CHANGE UP (ref 3.8–10.5)
WBC # FLD AUTO: 6.42 K/UL — SIGNIFICANT CHANGE UP (ref 3.8–10.5)

## 2024-09-18 PROCEDURE — 93010 ELECTROCARDIOGRAM REPORT: CPT

## 2024-09-18 PROCEDURE — 99221 1ST HOSP IP/OBS SF/LOW 40: CPT

## 2024-09-18 RX ORDER — CEFAZOLIN SODIUM 1 G
2000 VIAL (EA) INJECTION EVERY 8 HOURS
Refills: 0 | Status: DISCONTINUED | OUTPATIENT
Start: 2024-09-18 | End: 2024-09-20

## 2024-09-18 RX ORDER — INSULIN GLARGINE 300 U/ML
10 INJECTION, SOLUTION SUBCUTANEOUS AT BEDTIME
Refills: 0 | Status: DISCONTINUED | OUTPATIENT
Start: 2024-09-18 | End: 2024-09-23

## 2024-09-18 RX ORDER — 5-HYDROXYTRYPTOPHAN (5-HTP) 100 MG
3 TABLET,DISINTEGRATING ORAL AT BEDTIME
Refills: 0 | Status: DISCONTINUED | OUTPATIENT
Start: 2024-09-18 | End: 2024-10-04

## 2024-09-18 RX ORDER — INFLUENZA VIRUS VACCINE 15; 15; 15; 15 UG/.5ML; UG/.5ML; UG/.5ML; UG/.5ML
0.5 SUSPENSION INTRAMUSCULAR ONCE
Refills: 0 | Status: DISCONTINUED | OUTPATIENT
Start: 2024-09-18 | End: 2024-10-04

## 2024-09-18 RX ADMIN — ENOXAPARIN SODIUM 40 MILLIGRAM(S): 150 INJECTION SUBCUTANEOUS at 21:58

## 2024-09-18 RX ADMIN — Medication 3: at 22:04

## 2024-09-18 RX ADMIN — LOSARTAN POTASSIUM 50 MILLIGRAM(S): 100 TABLET, FILM COATED ORAL at 05:06

## 2024-09-18 RX ADMIN — Medication 3 MILLIGRAM(S): at 21:59

## 2024-09-18 RX ADMIN — Medication 100 MILLIGRAM(S): at 21:58

## 2024-09-18 RX ADMIN — Medication 4: at 12:33

## 2024-09-18 RX ADMIN — Medication 200 MILLIGRAM(S): at 05:05

## 2024-09-18 RX ADMIN — Medication 3: at 08:22

## 2024-09-18 RX ADMIN — Medication 1 TABLET(S): at 17:14

## 2024-09-18 RX ADMIN — Medication 5: at 17:10

## 2024-09-18 RX ADMIN — INSULIN GLARGINE 10 UNIT(S): 300 INJECTION, SOLUTION SUBCUTANEOUS at 21:58

## 2024-09-18 RX ADMIN — Medication 1 TABLET(S): at 08:23

## 2024-09-18 NOTE — PROGRESS NOTE ADULT - PROBLEM SELECTOR PLAN 1
Chart reviewed and Patient evaluated  Discussed diagnosis and treatment with patient  There is concern for uncontrolled diabetic infected toe ulcer, concern for OM  Wound culture results pending  Wound flush with betadine saline mixture  Upon discussion and verbal consent, with the use of pickups and suture scissors, hyperkeratotic tissue excised from left hallux, revealing undermining plantar hallux ulceration.    Applied dry sterile dressing  Bactroban to be applied daily with dressings  X-rays reviewed : Shows no obvious or acute fracture pathology  Rec vascular consult  Rec with IV antibiotics As Per ID  MRI pending  Weight bearing as tolerated  Patient understands he may require surgical intervention if symptoms do not remit, will allow demarcation of infective process, and will await MRI results to r/o OM  Discussed importance of daily foot examinations and proper shoe gear and to importance of lower Fasting Blood Glucose levels.   Podiatry will follow while in house.  will discuss care plan  with all  Attendings

## 2024-09-18 NOTE — CONSULT NOTE ADULT - SUBJECTIVE AND OBJECTIVE BOX
Patient is a 49y old  Male who presents with a chief complaint of infected toe (18 Sep 2024 14:10)    Type: 2 DM DX 10 years known complications neuropathy, no Endocrine, PCP Dr Sonam Silva, has not seen in >6 months, unsure last A1c. Rx home segluromet 7.5mg-1000mg daily, was taking rybelsus 3mg daily but unable to take as prescribed. has not taken medications in >6 months. educated patient on pathophysiology and progressive nature of T2DM, need for improved glycemic control, regimen and BG monitoring, will send script for testing supplies, possibly use long acting insulin on discharge. reviewed CC diet and carb identification/portion control, priortizing lean protein and nonstarchy vegetables, provided diabetes daily meal planning guide. reviewed elevated cholesterol panel, need to reduce carbs and fat intake. 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 educated need to f/u with endo and podiatrist annually. verbal education and handouts provided  diabetes education provided- A1c measure and BG targets  fasting, <180 2 hours postmeal. medication MOA and considerations/side effects, inhospital BGM frequency and insulin administration, s/s of hyperglycemia/hypoglycemia and management, glycemic control and preventing complications, consistent carb diet, balanced plate method, consistent meal planning. sick day management, provider f/u  Hx Neuropathy, HLD    HPI:  49-year-old male with past medical history of DM2, HTN, HLD, NEUROPATHY, PVD, NON COMPLIANT WITH TT, ETOH ABUSE, presents today due to left toe infection.  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  Patient experienced a blister to the left toe in which he eventually fell orders follow-up on its own.  Patient notes that the blister opened up and he developed some blood in the blister as well.  Patient notes that the blister scab has slightly fallen off but now his toe is red and painful.  Patient not currently on antibiotics.  Patient denies fever, injury, numbness, weakness, or any other complaints.   IN ED TEMP 99, /100, HR 89, had consult with podiatry and sidney started on Cipro and admitted for further management (17 Sep 2024 19:36)      PAST MEDICAL & SURGICAL HISTORY:  Prediabetes      HTN (hypertension)      HLD (hyperlipidemia)      DM2 (diabetes mellitus, type 2)      Allergies    amoxicillin (Unknown)    Intolerances        MEDICATIONS  (STANDING):  ceFAZolin   IVPB 2000 milliGRAM(s) IV Intermittent every 8 hours  dextrose 5%. 1000 milliLiter(s) (50 mL/Hr) IV Continuous <Continuous>  dextrose 5%. 1000 milliLiter(s) (100 mL/Hr) IV Continuous <Continuous>  dextrose 50% Injectable 25 Gram(s) IV Push once  dextrose 50% Injectable 12.5 Gram(s) IV Push once  dextrose 50% Injectable 25 Gram(s) IV Push once  enoxaparin Injectable 40 milliGRAM(s) SubCutaneous every 24 hours  glucagon  Injectable 1 milliGRAM(s) IntraMuscular once  influenza   Vaccine 0.5 milliLiter(s) IntraMuscular once  insulin glargine Injectable (LANTUS) 10 Unit(s) SubCutaneous at bedtime  insulin lispro (ADMELOG) corrective regimen sliding scale   SubCutaneous three times a day before meals  insulin lispro (ADMELOG) corrective regimen sliding scale   SubCutaneous at bedtime  lactobacillus acidophilus 1 Tablet(s) Oral two times a day with meals  losartan 50 milliGRAM(s) Oral daily  mupirocin 2% Ointment 1 Application(s) Topical <User Schedule>

## 2024-09-18 NOTE — CARE COORDINATION ASSESSMENT. - ASSESSMENT CONCERNS TO BE ADDRESSED
Pt is a 48 year old make who came in for infected toe.  Pt has DM and has reportedly not been complaint with medications.  Pt is alert and oriented.  States he has not been able to get to the doctor due to stressors ie. aging parents and problems at work.  Pt ambulates independently.  SW to follow for any needs and support.

## 2024-09-18 NOTE — CARE COORDINATION ASSESSMENT. - NSCAREPROVIDERS_GEN_ALL_CORE_FT
CARE PROVIDERS:  Accepting Physician: Mabel Weaver  Administration: Rigoberto Robison  Admitting: Mabel Weaver  Attending: Mabel Weaver  Consultant: Salvatore Davenport  Consultant: Walter Chawla  Consultant: Saman Mayo  ED ACP: Riky Casanova ED Attending: Raul Mcintyre  ED Nurse: Deandra Overton  Emergency Medicine: Pradeep Moise  Infection Control: Leeann Edwards  Nurse: Deandra Overton  Nurse: Riri Matute  Nurse: Elvira Lipscomb  Ordered: ServiceAccount, SCMMLM  Override: Gracia Marks  Override: Deandra Overton  PCA/Nursing Assistant: Millie Rodriguez  PCA/Nursing Assistant: Yong Faria  Primary Team: Mabel Weaver  Registered Dietitian: Gela Slater  : Anibal Gomez  : Tomasa Sellers

## 2024-09-18 NOTE — CONSULT NOTE ADULT - SUBJECTIVE AND OBJECTIVE BOX
History of Present Illness: The patient is a 49 year old male with a history of HTN, HL, DM, PAD who presents with a toe infection. He states he had a blister to the left toe with pain and swelling. No chest pain or shortness of breath. He has been walking a mile daily with no exertional symptoms.    Past Medical/Surgical History:  HTN, HL, DM, PAD    Medications:  Home Medications:  non compliant with meds for months:  (17 Sep 2024 19:54)      Family History: Non-contributory family history of premature cardiovascular atherosclerotic disease    Social History: No tobacco, alcohol or drug use    Review of Systems:  General: No fevers, chills, weight gain  Skin: No rashes, color changes  Cardiovascular: No chest pain, orthopnea  Respiratory: No shortness of breath, cough  Gastrointestinal: No nausea, abdominal pain  Genitourinary: No incontinence, pain with urination  Musculoskeletal: No pain, swelling, decreased range of motion  Neurological: No headache, weakness  Psychiatric: No depression, anxiety  Endocrine: No weight gain, increased thirst  All other systems are comprehensively negative.    Physical Exam:  Vitals:        Vital Signs Last 24 Hrs  T(C): 37.4 (18 Sep 2024 05:30), Max: 37.7 (18 Sep 2024 00:21)  T(F): 99.3 (18 Sep 2024 05:30), Max: 99.9 (18 Sep 2024 00:21)  HR: 89 (18 Sep 2024 05:30) (75 - 96)  BP: 163/90 (18 Sep 2024 05:30) (125/84 - 190/100)  BP(mean): --  RR: 18 (18 Sep 2024 05:30) (15 - 18)  SpO2: 96% (18 Sep 2024 05:30) (95% - 98%)  Parameters below as of 18 Sep 2024 05:30  Patient On (Oxygen Delivery Method): room air  General: NAD  HEENT: MMM  Neck: No JVD, no carotid bruit  Lungs: CTAB  CV: RRR, nl S1/S2, no M/R/G  Abdomen: S/NT/ND, +BS  Extremities: No LE edema, no cyanosis  Neuro: AAOx3, non-focal  Skin: No rash    Labs:                        10.9   6.42  )-----------( 142      ( 18 Sep 2024 05:30 )             31.1     09-18    135  |  98  |  28[H]  ----------------------------<  281[H]  3.6   |  29  |  2.14[H]    Ca    8.9      18 Sep 2024 05:30    TPro  7.0  /  Alb  2.6[L]  /  TBili  1.0  /  DBili  x   /  AST  40  /  ALT  57  /  AlkPhos  108  09-18        PT/INR - ( 17 Sep 2024 18:00 )   PT: 12.2 sec;   INR: 1.12 ratio         PTT - ( 17 Sep 2024 18:00 )  PTT:29.6 sec    ECG/Telemetry: Pending

## 2024-09-18 NOTE — PROGRESS NOTE ADULT - SUBJECTIVE AND OBJECTIVE BOX
PROGRESS NOTE   Patient is a 49y old  Male who presents with a chief complaint of infected toe (18 Sep 2024 10:26)      HPI:  49-year-old male with past medical history of DM2, HTN, HLD, NEUROPATHY, PVD, NON COMPLIANT WITH TT, ETOH ABUSE, presents today due to left toe infection.  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  Patient experienced a blister to the left toe in which he eventually fell orders follow-up on its own.  Patient notes that the blister opened up and he developed some blood in the blister as well.  Patient notes that the blister scab has slightly fallen off but now his toe is red and painful.  Patient not currently on antibiotics.  Patient denies fever, injury, numbness, weakness, or any other complaints.   IN ED TEMP 99, /100, HR 89, had consult with podiatry and sidney started on Cipro and admitted for further management (17 Sep 2024 19:36)      Vital Signs Last 24 Hrs  T(C): 37.4 (18 Sep 2024 05:30), Max: 37.7 (18 Sep 2024 00:21)  T(F): 99.3 (18 Sep 2024 05:30), Max: 99.9 (18 Sep 2024 00:21)  HR: 89 (18 Sep 2024 05:30) (75 - 96)  BP: 163/90 (18 Sep 2024 05:30) (125/84 - 190/100)  BP(mean): --  RR: 18 (18 Sep 2024 05:30) (15 - 18)  SpO2: 96% (18 Sep 2024 05:30) (95% - 98%)    Parameters below as of 18 Sep 2024 05:30  Patient On (Oxygen Delivery Method): room air                              10.9   6.42  )-----------( 142      ( 18 Sep 2024 05:30 )             31.1               09-18    135  |  98  |  28[H]  ----------------------------<  281[H]  3.6   |  29  |  2.14[H]    Ca    8.9      18 Sep 2024 05:30    TPro  7.0  /  Alb  2.6[L]  /  TBili  1.0  /  DBili  x   /  AST  40  /  ALT  57  /  AlkPhos  108  09-18      PHYSICAL EXAM:  :  General: Pleasant  male NAD & AOX3.    Integument:  Skin warm, dry and supple bilateral.    Noted cellulitic changes to left hallux  Noted plantar left hallux diabetic ulcer, apx 3cm x 4 cm x 0.1 cm + edema, + erythema, + calor, wound base fibro-granular, + tracking, + mal odor, - purulence, + hyperkeratotic borders   Vascular: Dorsalis Pedis and Posterior Tibial pulses 2/4.  Capillary re-fill time less then 3 seconds digits 1-5 bilateral.    Neuro: Protective sensation diminished to the level of the digits bilateral.  MSK: Muscle strength 5/5 all major muscle groups bilateral.

## 2024-09-18 NOTE — CONSULT NOTE ADULT - SUBJECTIVE AND OBJECTIVE BOX
HPI:  50YO M PMH DM2, HTN, HLD, NEUROPATHY, PVD, NON COMPLIANT WITH TT, ETOH ABUSE, who presented to the hospital with c/o left great toe infection.  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 Initially developed callous then blister of left great to then it opened up - noticed blood then he noted toe to be red swollen and painful. Denies fever chills n/v/d. Has not seen podiatrist for years.    Infectious Disease consult was called to evaluate pt and for antibiotic management.      Past Medical & Surgical Hx:  PAST MEDICAL & SURGICAL HISTORY:  HTN (hypertension)  HLD (hyperlipidemia)  DM2 (diabetes mellitus, type 2)    Social History--  EtOH: denies  Tobacco: denies  Drug Use: denies    FAMILY HISTORY:  Noncontributory    Allergies  amoxicillin (Unknown)    Intolerances  NONE      Home Medications:  non compliant with meds for months:  (17 Sep 2024 19:54)      Current Inpatient Medications :    ANTIBIOTICS:   ciprofloxacin   IVPB 400 milliGRAM(s) IV Intermittent every 12 hours      OTHER RELEVANT MEDICATIONS :  acetaminophen     Tablet .. 650 milliGRAM(s) Oral every 6 hours PRN  dextrose 5%. 1000 milliLiter(s) IV Continuous <Continuous>  dextrose 5%. 1000 milliLiter(s) IV Continuous <Continuous>  dextrose 50% Injectable 25 Gram(s) IV Push once  dextrose 50% Injectable 12.5 Gram(s) IV Push once  dextrose 50% Injectable 25 Gram(s) IV Push once  dextrose Oral Gel 15 Gram(s) Oral once PRN  enoxaparin Injectable 40 milliGRAM(s) SubCutaneous every 24 hours  glucagon  Injectable 1 milliGRAM(s) IntraMuscular once  influenza   Vaccine 0.5 milliLiter(s) IntraMuscular once  insulin lispro (ADMELOG) corrective regimen sliding scale   SubCutaneous three times a day before meals  insulin lispro (ADMELOG) corrective regimen sliding scale   SubCutaneous at bedtime  labetalol Injectable 10 milliGRAM(s) IV Push every 8 hours PRN  losartan 50 milliGRAM(s) Oral daily  mupirocin 2% Ointment 1 Application(s) Topical <User Schedule>      ROS:  CONSTITUTIONAL:  Negative fever or chills  EYES:  Negative  blurry vision or double vision  CARDIOVASCULAR:  Negative for chest pain or palpitations  RESPIRATORY:  Negative for cough, wheezing, or SOB   GASTROINTESTINAL:  Negative for nausea, vomiting, diarrhea, constipation, or abdominal pain  GENITOURINARY:  Negative frequency, urgency , dysuria or hematuria   NEUROLOGIC:  No headache, confusion, dizziness, lightheadedness  All other systems were reviewed and are negative      Physical Exam:  Vital Signs Last 24 Hrs  T(C): 37.4 (18 Sep 2024 05:30), Max: 37.7 (18 Sep 2024 00:21)  T(F): 99.3 (18 Sep 2024 05:30), Max: 99.9 (18 Sep 2024 00:21)  HR: 89 (18 Sep 2024 05:30) (75 - 96)  BP: 163/90 (18 Sep 2024 05:30) (125/84 - 190/100)  RR: 18 (18 Sep 2024 05:30) (15 - 18)  SpO2: 96% (18 Sep 2024 05:30) (95% - 98%)    Parameters below as of 18 Sep 2024 05:30  Patient On (Oxygen Delivery Method): room air      Height (cm): 175.3 (09-17 @ 17:14)  Weight (kg): 90.7 (09-17 @ 17:14)  BMI (kg/m2): 29.5 (09-17 @ 17:14)  BSA (m2): 2.07 (09-17 @ 17:14)    General: no acute distress  Neck: supple, trachea midline  Lungs: clear, no wheeze/rhonchi  Cardiovascular: regular rate and rhythm, S1 S2  Abdomen: soft, nontender, ND, bowel sounds normal  Neurological:  alert and oriented x3  Skin: no rash  Extremities: Left great toe swelling redness Ulcer base of toe + tender    Labs:                         10.9   6.42  )-----------( 142      ( 18 Sep 2024 05:30 )             31.1   09-18    135  |  98  |  28[H]  ----------------------------<  281[H]  3.6   |  29  |  2.14[H]    Ca    8.9      18 Sep 2024 05:30    TPro  7.0  /  Alb  2.6[L]  /  TBili  1.0  /  DBili  x   /  AST  40  /  ALT  57  /  AlkPhos  108  09-18      RECENT CULTURES:          RADIOLOGY & ADDITIONAL STUDIES:  ACC: [id number] EXAM:  XR FOOT COMP MIN 3 VIEWS LT   ORDERED BY: JUSTINE SANTA     PROCEDURE DATE:  09/17/2024          INTERPRETATION:  XR FOOT COMPLETE 3 VIEWS LEFT    Clinical History: First toe infection    AP, lateral and oblique view left foot      FINDINGS:    Moderate swelling of the foot.  Large wound plantar surface first toe  Deformity middle phalanx of second toe with periosteal changes compatible   with healing fracture.  Atherosclerotic changes.  There is no dislocation orjoint effusion.  Tiny plantar spur.  No radiopaque foreign body.    IMPRESSION:    Moderate swelling of the foot.  Large wound plantar surface first toe  Deformity middle phalanx of second toe with periosteal changes compatible   with healing fracture.  Atherosclerotic changes.    Assessment :   50YO M PMH DM2, HTN, HLD, NEUROPATHY, PVD, NON COMPLIANT WITH TT, ETOH ABUSE, admitted with infected DM left great toe infection -rule out OM      Plan :   Change antibiotic to Ancef  MRI rule out OM  Dm control  MRSA screen  Trend temps and cbc    Continue with present regiment .  Approptiate use of antibiotics and adverse effects reviewed.      I have discussed the above plan of care with patient in detail. He expressed understanding of the treatment plan . Risks, benefits and alternatives discussed in detail. I have asked if he has any questions or concerns and appropriately addressed them to the best of my ability .      > 45 minutes spent in direct patient care reviewing  the notes, lab data/ imaging , discussion with multidisciplinary team. All questions were addressed and answered to the best of my capacity .    Thank you for allowing me to participate in the care of your patient .      Linsey Mack MD  Infectious Disease  [phone]

## 2024-09-18 NOTE — CONSULT NOTE ADULT - ASSESSMENT
Physical Exam:   Vital Signs Last 24 Hrs  T(C): 37.4 (18 Sep 2024 05:30), Max: 37.7 (18 Sep 2024 00:21)  T(F): 99.3 (18 Sep 2024 05:30), Max: 99.9 (18 Sep 2024 00:21)  HR: 89 (18 Sep 2024 05:30) (75 - 96)  BP: 163/90 (18 Sep 2024 05:30) (125/84 - 190/100)  BP(mean): --  RR: 18 (18 Sep 2024 05:30) (15 - 18)  SpO2: 96% (18 Sep 2024 05:30) (95% - 98%)    Parameters below as of 18 Sep 2024 05:30  Patient On (Oxygen Delivery Method): room air     CAPILLARY BLOOD GLUCOSE      POCT Blood Glucose.: 343 mg/dL (18 Sep 2024 12:17)  POCT Blood Glucose.: 274 mg/dL (18 Sep 2024 07:37)  POCT Blood Glucose.: 339 mg/dL (17 Sep 2024 22:19)      Cholesterol, Serum: 113 mg/dL (05.19.21 @ 08:36)     HDL Cholesterol, Serum: 22 mg/dL (05.19.21 @ 08:36)     LDL Cholesterol Calculated: 66 mg/dL (05.19.21 @ 08:36)     DIET: CC  >50%

## 2024-09-18 NOTE — CARE COORDINATION ASSESSMENT. - NSPASTMEDSURGHISTORY_GEN_ALL_CORE_FT
PAST MEDICAL & SURGICAL HISTORY:  Prediabetes      DM2 (diabetes mellitus, type 2)      HLD (hyperlipidemia)      HTN (hypertension)

## 2024-09-18 NOTE — PROGRESS NOTE ADULT - SUBJECTIVE AND OBJECTIVE BOX
Patient is a 49y old  Male who presents with a chief complaint of infected toe (17 Sep 2024 19:36)      INTERVAL HPI/OVERNIGHT EVENTS:overnight events noted    Home Medications:  non compliant with meds for months:  (17 Sep 2024 19:54)      MEDICATIONS  (STANDING):  ciprofloxacin   IVPB 400 milliGRAM(s) IV Intermittent every 12 hours  dextrose 5%. 1000 milliLiter(s) (50 mL/Hr) IV Continuous <Continuous>  dextrose 5%. 1000 milliLiter(s) (100 mL/Hr) IV Continuous <Continuous>  dextrose 50% Injectable 25 Gram(s) IV Push once  dextrose 50% Injectable 12.5 Gram(s) IV Push once  dextrose 50% Injectable 25 Gram(s) IV Push once  enoxaparin Injectable 40 milliGRAM(s) SubCutaneous every 24 hours  glucagon  Injectable 1 milliGRAM(s) IntraMuscular once  influenza   Vaccine 0.5 milliLiter(s) IntraMuscular once  insulin lispro (ADMELOG) corrective regimen sliding scale   SubCutaneous at bedtime  insulin lispro (ADMELOG) corrective regimen sliding scale   SubCutaneous three times a day before meals  lactobacillus acidophilus 1 Tablet(s) Oral two times a day with meals  losartan 50 milliGRAM(s) Oral daily  mupirocin 2% Ointment 1 Application(s) Topical <User Schedule>    MEDICATIONS  (PRN):  acetaminophen     Tablet .. 650 milliGRAM(s) Oral every 6 hours PRN Temp greater or equal to 38C (100.4F), Mild Pain (1 - 3), Moderate Pain (4 - 6)  dextrose Oral Gel 15 Gram(s) Oral once PRN Blood Glucose LESS THAN 70 milliGRAM(s)/deciliter  labetalol Injectable 10 milliGRAM(s) IV Push every 8 hours PRN Systolic blood pressure >180      Allergies    amoxicillin (Unknown)    Intolerances        REVIEW OF SYSTEMS:  CONSTITUTIONAL: No fever, weight loss, or fatigue  EYES: No eye pain, visual disturbances, or discharge  ENMT:  No difficulty hearing, tinnitus, vertigo; No sinus or throat pain  NECK: No pain or stiffness  BREASTS: No pain, masses, or nipple discharge  RESPIRATORY: No cough, wheezing, chills or hemoptysis; No shortness of breath  CARDIOVASCULAR: No chest pain, palpitations, dizziness, or leg swelling  GASTROINTESTINAL: No abdominal or epigastric pain. No nausea, vomiting, or hematemesis; No diarrhea or constipation. No melena or hematochezia.  GENITOURINARY: No dysuria, frequency, hematuria, or incontinence  NEUROLOGICAL: No headaches, memory loss, loss of strength, numbness, or tremors  SKIN: No itching, burning, rashes, or lesions   ENDOCRINE: No heat or cold intolerance; No hair loss  MUSCULOSKELETAL: left foot pain and hallux ulcer    Vital Signs Last 24 Hrs  T(C): 37.4 (18 Sep 2024 05:30), Max: 37.7 (18 Sep 2024 00:21)  T(F): 99.3 (18 Sep 2024 05:30), Max: 99.9 (18 Sep 2024 00:21)  HR: 89 (18 Sep 2024 05:30) (75 - 96)  BP: 163/90 (18 Sep 2024 05:30) (125/84 - 190/100)  BP(mean): --  RR: 18 (18 Sep 2024 05:30) (15 - 18)  SpO2: 96% (18 Sep 2024 05:30) (95% - 98%)    Parameters below as of 18 Sep 2024 05:30  Patient On (Oxygen Delivery Method): room air        PHYSICAL EXAM:  GENERAL: NAD, well-groomed, well-developed  HEAD:  Atraumatic, Normocephalic  EYES: EOMI, PERRLA, conjunctiva and sclera clear  ENMT: Moist mucous membranes,   NECK: Supple, No JVD, Normal thyroid  NERVOUS SYSTEM:  Alert & Oriented X3, non focal  CHEST/LUNG: Clear to percussion bilaterally;   HEART: Regular rate and rhythm;   ABDOMEN: Soft, Nontender, Nondistended; Bowel sounds present  EXTREMITIES:  left foot in dressing  SKIN: No rashes or lesions    LABS:                        10.9   6.42  )-----------( 142      ( 18 Sep 2024 05:30 )             31.1     09-18    135  |  98  |  28[H]  ----------------------------<  281[H]  3.6   |  29  |  2.14[H]    Ca    8.9      18 Sep 2024 05:30    TPro  7.0  /  Alb  2.6[L]  /  TBili  1.0  /  DBili  x   /  AST  40  /  ALT  57  /  AlkPhos  108  09-18    PT/INR - ( 17 Sep 2024 18:00 )   PT: 12.2 sec;   INR: 1.12 ratio         PTT - ( 17 Sep 2024 18:00 )  PTT:29.6 sec  Urinalysis Basic - ( 18 Sep 2024 05:30 )    Color: x / Appearance: x / SG: x / pH: x  Gluc: 281 mg/dL / Ketone: x  / Bili: x / Urobili: x   Blood: x / Protein: x / Nitrite: x   Leuk Esterase: x / RBC: x / WBC x   Sq Epi: x / Non Sq Epi: x / Bacteria: x      CAPILLARY BLOOD GLUCOSE      POCT Blood Glucose.: 274 mg/dL (18 Sep 2024 07:37)  POCT Blood Glucose.: 339 mg/dL (17 Sep 2024 22:19)          I&O's Summary      RADIOLOGY & ADDITIONAL TESTS:    Imaging Personally Reviewed:  [ x] YES  [ ] NO    Consultant(s) Notes Reviewed:  [ x] YES  [ ] NO    Care Discussed with Consultants/Other Providers [ x] YES  [ ] NO

## 2024-09-18 NOTE — CONSULT NOTE ADULT - ASSESSMENT
The patient is a 49 year old male with a history of HTN, HL, DM, PAD who presents with a toe infection.    Plan:  - Check ECG  - The patient was on labetalol in the past but has been noncompliant with medications  - Continue losartan 50 mg daily - monitor for worsening LUISA  - If BP remains elevated, will add amlodipine 5 mg daily  - LE arterial duplex with no significant stenosis  - MRI foot planned  - Podiatry follow-up  - If there is a plan for surgery, the patient is at intermediate risk for cardiac events and is optimized to proceed from a cardiac standpoint

## 2024-09-18 NOTE — PROGRESS NOTE ADULT - ASSESSMENT
49-year-old male with past medical history of DM2, HTN, HLD, NEUROPATHY, PVD, NON COMPLIANT WITH TT, ETOH ABUSE, presents today due to left toe infection.  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  Patient experienced a blister to the left toe in which he eventually fell orders follow-up on its own.  Patient notes that the blister opened up and he developed some blood in the blister as well.  Patient notes that the blister scab has slightly fallen off but now his toe is red and painful.  Patient not currently on antibiotics.  Patient denies fever, injury, numbness, weakness, or any other complaints.   IN ED TEMP 99, /100, HR 89, had consult with podiatry and sidney started on Cipro and admitted for further management    # Diabetic foot ulcer with cellullitis(left halluxdiabetic ulcer 3x4x0.1 cm with erythema and edema mal odorous , hyperkeratotic  started on ABX, podiatry consult noted, daily dressing, R/o PVD- arterial doppler- negative for any arterial disease  ID consult  # DM2 with hyperglycemia, with neuropathy , nephropathy with likely PVD    ssi, consistent carb, Endo consult,  hba1c   Endo consult  # Ess HTN   started on losartan, monitor, cardiology consult noted  will monitor renal indices   DVT prophylaxis  ID , endo and podiatry consult  # LUISA with liely CKD   nephro consult, will monitor indices, improving   # DVT prophylaxis  Pt is Intermediate risk for surg and anaesthesia if planned for surg , being optimized

## 2024-09-18 NOTE — PATIENT PROFILE ADULT - NSPROGENOTHERPROVIDER_GEN_A_NUR
MEDICATIONS  (PRN):  albuterol    90 MICROgram(s) HFA Inhaler 2 Puff(s) Inhalation every 6 hours PRN asthma  aluminum hydroxide/magnesium hydroxide/simethicone Suspension 30 milliLiter(s) Oral every 8 hours PRN Dyspepsia  dextrose Oral Gel 15 Gram(s) Oral once PRN Blood Glucose LESS THAN 70 milliGRAM(s)/deciliter  haloperidol     Tablet 5 milliGRAM(s) Oral every 6 hours PRN agitation  haloperidol    Injectable 5 milliGRAM(s) IntraMuscular once PRN combative behavior  polyethylene glycol 3350 17 Gram(s) Oral daily PRN Constipation   head none

## 2024-09-19 ENCOUNTER — OUTPATIENT (OUTPATIENT)
Dept: OUTPATIENT SERVICES | Facility: HOSPITAL | Age: 49
LOS: 1 days | End: 2024-09-19
Payer: COMMERCIAL

## 2024-09-19 ENCOUNTER — TRANSCRIPTION ENCOUNTER (OUTPATIENT)
Age: 49
End: 2024-09-19

## 2024-09-19 DIAGNOSIS — M86.179 OTHER ACUTE OSTEOMYELITIS, UNSPECIFIED ANKLE AND FOOT: ICD-10-CM

## 2024-09-19 DIAGNOSIS — E11.628 TYPE 2 DIABETES MELLITUS WITH OTHER SKIN COMPLICATIONS: ICD-10-CM

## 2024-09-19 LAB
ALBUMIN SERPL ELPH-MCNC: 2.7 G/DL — LOW (ref 3.3–5)
ALP SERPL-CCNC: 110 U/L — SIGNIFICANT CHANGE UP (ref 30–120)
ALT FLD-CCNC: 62 U/L — HIGH (ref 10–60)
ANION GAP SERPL CALC-SCNC: 8 MMOL/L — SIGNIFICANT CHANGE UP (ref 5–17)
APPEARANCE UR: CLEAR — SIGNIFICANT CHANGE UP
AST SERPL-CCNC: 58 U/L — HIGH (ref 10–40)
BACTERIA # UR AUTO: ABNORMAL /HPF
BASOPHILS # BLD AUTO: 0.09 K/UL — SIGNIFICANT CHANGE UP (ref 0–0.2)
BASOPHILS NFR BLD AUTO: 1.1 % — SIGNIFICANT CHANGE UP (ref 0–2)
BILIRUB SERPL-MCNC: 0.8 MG/DL — SIGNIFICANT CHANGE UP (ref 0.2–1.2)
BILIRUB UR-MCNC: NEGATIVE — SIGNIFICANT CHANGE UP
BUN SERPL-MCNC: 31 MG/DL — HIGH (ref 7–23)
CALCIUM SERPL-MCNC: 9.3 MG/DL — SIGNIFICANT CHANGE UP (ref 8.4–10.5)
CHLORIDE SERPL-SCNC: 99 MMOL/L — SIGNIFICANT CHANGE UP (ref 96–108)
CO2 SERPL-SCNC: 29 MMOL/L — SIGNIFICANT CHANGE UP (ref 22–31)
COLOR SPEC: YELLOW — SIGNIFICANT CHANGE UP
CREAT SERPL-MCNC: 2.44 MG/DL — HIGH (ref 0.5–1.3)
DIFF PNL FLD: ABNORMAL
EGFR: 32 ML/MIN/1.73M2 — LOW
EOSINOPHIL # BLD AUTO: 0.09 K/UL — SIGNIFICANT CHANGE UP (ref 0–0.5)
EOSINOPHIL NFR BLD AUTO: 1.1 % — SIGNIFICANT CHANGE UP (ref 0–6)
EPI CELLS # UR: SIGNIFICANT CHANGE UP
FERRITIN SERPL-MCNC: 622 NG/ML — HIGH (ref 30–400)
FOLATE SERPL-MCNC: 8.5 NG/ML — SIGNIFICANT CHANGE UP
GLUCOSE BLDC GLUCOMTR-MCNC: 272 MG/DL — HIGH (ref 70–99)
GLUCOSE BLDC GLUCOMTR-MCNC: 315 MG/DL — HIGH (ref 70–99)
GLUCOSE BLDC GLUCOMTR-MCNC: 336 MG/DL — HIGH (ref 70–99)
GLUCOSE BLDC GLUCOMTR-MCNC: 393 MG/DL — HIGH (ref 70–99)
GLUCOSE SERPL-MCNC: 271 MG/DL — HIGH (ref 70–99)
GLUCOSE UR QL: >=1000 MG/DL
HCT VFR BLD CALC: 34.1 % — LOW (ref 39–50)
HGB BLD-MCNC: 11.6 G/DL — LOW (ref 13–17)
IMM GRANULOCYTES NFR BLD AUTO: 2 % — HIGH (ref 0–0.9)
IRON SATN MFR SERPL: 15 % — LOW (ref 16–55)
IRON SATN MFR SERPL: 41 UG/DL — LOW (ref 45–165)
KETONES UR-MCNC: ABNORMAL MG/DL
LEUKOCYTE ESTERASE UR-ACNC: NEGATIVE — SIGNIFICANT CHANGE UP
LYMPHOCYTES # BLD AUTO: 1.49 K/UL — SIGNIFICANT CHANGE UP (ref 1–3.3)
LYMPHOCYTES # BLD AUTO: 18.7 % — SIGNIFICANT CHANGE UP (ref 13–44)
MCHC RBC-ENTMCNC: 29.9 PG — SIGNIFICANT CHANGE UP (ref 27–34)
MCHC RBC-ENTMCNC: 34 GM/DL — SIGNIFICANT CHANGE UP (ref 32–36)
MCV RBC AUTO: 87.9 FL — SIGNIFICANT CHANGE UP (ref 80–100)
MONOCYTES # BLD AUTO: 0.97 K/UL — HIGH (ref 0–0.9)
MONOCYTES NFR BLD AUTO: 12.2 % — SIGNIFICANT CHANGE UP (ref 2–14)
NEUTROPHILS # BLD AUTO: 5.18 K/UL — SIGNIFICANT CHANGE UP (ref 1.8–7.4)
NEUTROPHILS NFR BLD AUTO: 64.9 % — SIGNIFICANT CHANGE UP (ref 43–77)
NITRITE UR-MCNC: NEGATIVE — SIGNIFICANT CHANGE UP
NRBC # BLD: 0 /100 WBCS — SIGNIFICANT CHANGE UP (ref 0–0)
PH UR: 5.5 — SIGNIFICANT CHANGE UP (ref 5–8)
PLATELET # BLD AUTO: 170 K/UL — SIGNIFICANT CHANGE UP (ref 150–400)
POTASSIUM SERPL-MCNC: 3.6 MMOL/L — SIGNIFICANT CHANGE UP (ref 3.5–5.3)
POTASSIUM SERPL-SCNC: 3.6 MMOL/L — SIGNIFICANT CHANGE UP (ref 3.5–5.3)
PROT SERPL-MCNC: 7.2 G/DL — SIGNIFICANT CHANGE UP (ref 6–8.3)
PROT UR-MCNC: >=1000 MG/DL
RBC # BLD: 3.88 M/UL — LOW (ref 4.2–5.8)
RBC # FLD: 12.5 % — SIGNIFICANT CHANGE UP (ref 10.3–14.5)
RBC CASTS # UR COMP ASSIST: 3 /HPF — SIGNIFICANT CHANGE UP (ref 0–4)
SODIUM SERPL-SCNC: 136 MMOL/L — SIGNIFICANT CHANGE UP (ref 135–145)
SP GR SPEC: 1.02 — SIGNIFICANT CHANGE UP (ref 1–1.03)
TIBC SERPL-MCNC: 277 UG/DL — SIGNIFICANT CHANGE UP (ref 220–430)
UIBC SERPL-MCNC: 236 UG/DL — SIGNIFICANT CHANGE UP (ref 110–370)
URATE SERPL-MCNC: 5.1 MG/DL — SIGNIFICANT CHANGE UP (ref 3.4–8.8)
UROBILINOGEN FLD QL: 0.2 MG/DL — SIGNIFICANT CHANGE UP (ref 0.2–1)
VIT B12 SERPL-MCNC: 782 PG/ML — SIGNIFICANT CHANGE UP (ref 232–1245)
WBC # BLD: 7.98 K/UL — SIGNIFICANT CHANGE UP (ref 3.8–10.5)
WBC # FLD AUTO: 7.98 K/UL — SIGNIFICANT CHANGE UP (ref 3.8–10.5)
WBC UR QL: 2 /HPF — SIGNIFICANT CHANGE UP (ref 0–5)

## 2024-09-19 PROCEDURE — 73718 MRI LOWER EXTREMITY W/O DYE: CPT

## 2024-09-19 PROCEDURE — 76775 US EXAM ABDO BACK WALL LIM: CPT | Mod: 26

## 2024-09-19 PROCEDURE — 73718 MRI LOWER EXTREMITY W/O DYE: CPT | Mod: 26,LT

## 2024-09-19 RX ADMIN — Medication 100 MILLIGRAM(S): at 21:27

## 2024-09-19 RX ADMIN — Medication 1 TABLET(S): at 07:54

## 2024-09-19 RX ADMIN — LOSARTAN POTASSIUM 50 MILLIGRAM(S): 100 TABLET, FILM COATED ORAL at 05:34

## 2024-09-19 RX ADMIN — Medication 100 MILLIGRAM(S): at 05:34

## 2024-09-19 RX ADMIN — MUPIROCIN 1 APPLICATION(S): 20 OINTMENT TOPICAL at 14:20

## 2024-09-19 RX ADMIN — Medication 3: at 07:54

## 2024-09-19 RX ADMIN — Medication 1 TABLET(S): at 17:36

## 2024-09-19 RX ADMIN — Medication 4: at 12:03

## 2024-09-19 RX ADMIN — Medication 2: at 21:26

## 2024-09-19 RX ADMIN — Medication 100 MILLIGRAM(S): at 13:47

## 2024-09-19 RX ADMIN — Medication 650 MILLIGRAM(S): at 19:02

## 2024-09-19 RX ADMIN — Medication 650 MILLIGRAM(S): at 17:37

## 2024-09-19 RX ADMIN — INSULIN GLARGINE 10 UNIT(S): 300 INJECTION, SOLUTION SUBCUTANEOUS at 21:27

## 2024-09-19 RX ADMIN — Medication 5: at 17:36

## 2024-09-19 RX ADMIN — ENOXAPARIN SODIUM 40 MILLIGRAM(S): 150 INJECTION SUBCUTANEOUS at 21:41

## 2024-09-19 NOTE — PROGRESS NOTE ADULT - SUBJECTIVE AND OBJECTIVE BOX
Patient is a 49y old  Male who presents with a chief complaint of infected toe (19 Sep 2024 09:59)      INTERVAL HPI/OVERNIGHT EVENTS:overnight events noted    Home Medications:  non compliant with meds for months:  (17 Sep 2024 19:54)      MEDICATIONS  (STANDING):  ceFAZolin   IVPB 2000 milliGRAM(s) IV Intermittent every 8 hours  dextrose 5%. 1000 milliLiter(s) (50 mL/Hr) IV Continuous <Continuous>  dextrose 5%. 1000 milliLiter(s) (100 mL/Hr) IV Continuous <Continuous>  dextrose 50% Injectable 25 Gram(s) IV Push once  dextrose 50% Injectable 12.5 Gram(s) IV Push once  dextrose 50% Injectable 25 Gram(s) IV Push once  enoxaparin Injectable 40 milliGRAM(s) SubCutaneous every 24 hours  glucagon  Injectable 1 milliGRAM(s) IntraMuscular once  influenza   Vaccine 0.5 milliLiter(s) IntraMuscular once  insulin glargine Injectable (LANTUS) 10 Unit(s) SubCutaneous at bedtime  insulin lispro (ADMELOG) corrective regimen sliding scale   SubCutaneous three times a day before meals  insulin lispro (ADMELOG) corrective regimen sliding scale   SubCutaneous at bedtime  lactobacillus acidophilus 1 Tablet(s) Oral two times a day with meals  losartan 50 milliGRAM(s) Oral daily  mupirocin 2% Ointment 1 Application(s) Topical <User Schedule>    MEDICATIONS  (PRN):  acetaminophen     Tablet .. 650 milliGRAM(s) Oral every 6 hours PRN Temp greater or equal to 38C (100.4F), Mild Pain (1 - 3), Moderate Pain (4 - 6)  dextrose Oral Gel 15 Gram(s) Oral once PRN Blood Glucose LESS THAN 70 milliGRAM(s)/deciliter  labetalol Injectable 10 milliGRAM(s) IV Push every 8 hours PRN Systolic blood pressure >180  melatonin 3 milliGRAM(s) Oral at bedtime PRN Insomnia      Allergies    amoxicillin (Unknown)    Intolerances        REVIEW OF SYSTEMS:  CONSTITUTIONAL: No fever, weight loss, or fatigue  EYES: No eye pain, visual disturbances, or discharge  ENMT:  No difficulty hearing, tinnitus, vertigo; No sinus or throat pain  NECK: No pain or stiffness  BREASTS: No pain, masses, or nipple discharge  RESPIRATORY: No cough, wheezing, chills or hemoptysis; No shortness of breath  CARDIOVASCULAR: No chest pain, palpitations, dizziness, or leg swelling  GASTROINTESTINAL: No abdominal or epigastric pain. No nausea, vomiting, or hematemesis; No diarrhea or constipation. No melena or hematochezia.  GENITOURINARY: No dysuria, frequency, hematuria, or incontinence  NEUROLOGICAL: No headaches, memory loss, loss of strength, numbness, or tremors  SKIN: No itching, burning, rashes, or lesions   MUSCULOSKELETAL: No joint pain or swelling; No muscle, back, or extremity pain, toe infection    Vital Signs Last 24 Hrs  T(C): 37.4 (19 Sep 2024 07:53), Max: 37.4 (19 Sep 2024 07:53)  T(F): 99.4 (19 Sep 2024 07:53), Max: 99.4 (19 Sep 2024 07:53)  HR: 100 (19 Sep 2024 07:53) (86 - 100)  BP: 153/100 (19 Sep 2024 07:53) (129/89 - 161/99)  BP(mean): --  RR: 16 (19 Sep 2024 07:53) (16 - 18)  SpO2: 97% (19 Sep 2024 07:53) (95% - 98%)    Parameters below as of 19 Sep 2024 07:53  Patient On (Oxygen Delivery Method): room air        PHYSICAL EXAM:  GENERAL: NAD, well-groomed, well-developed  HEAD:  Atraumatic, Normocephalic  EYES: EOMI, PERRLA, conjunctiva and sclera clear  ENMT: Moist mucous membranes,   NECK: Supple, No JVD, Normal thyroid  NERVOUS SYSTEM:  Alert & Oriented X3, non focal  CHEST/LUNG: Clear to percussion bilaterally;   HEART: Regular rate and rhythm;   ABDOMEN: Soft, Nontender, Nondistended; Bowel sounds present  EXTREMITIES:  2+ Peripheral Pulses, No clubbing, cyanosis, or edema, foot in dressing  SKIN: No rashes or lesions    LABS:                        11.6   7.98  )-----------( 170      ( 19 Sep 2024 08:00 )             34.1     09-19    136  |  99  |  31[H]  ----------------------------<  271[H]  3.6   |  29  |  2.44[H]    Ca    9.3      19 Sep 2024 08:00    TPro  7.2  /  Alb  2.7[L]  /  TBili  0.8  /  DBili  x   /  AST  58[H]  /  ALT  62[H]  /  AlkPhos  110  09-19    PT/INR - ( 17 Sep 2024 18:00 )   PT: 12.2 sec;   INR: 1.12 ratio         PTT - ( 17 Sep 2024 18:00 )  PTT:29.6 sec  Urinalysis Basic - ( 19 Sep 2024 08:00 )    Color: x / Appearance: x / SG: x / pH: x  Gluc: 271 mg/dL / Ketone: x  / Bili: x / Urobili: x   Blood: x / Protein: x / Nitrite: x   Leuk Esterase: x / RBC: x / WBC x   Sq Epi: x / Non Sq Epi: x / Bacteria: x      CAPILLARY BLOOD GLUCOSE      POCT Blood Glucose.: 272 mg/dL (19 Sep 2024 07:49)  POCT Blood Glucose.: 355 mg/dL (18 Sep 2024 21:04)  POCT Blood Glucose.: 362 mg/dL (18 Sep 2024 16:50)  POCT Blood Glucose.: 343 mg/dL (18 Sep 2024 12:17)        Culture - Blood (collected 09-17-24 @ 18:13)  Source: .Blood Blood-Peripheral  Preliminary Report (09-19-24 @ 01:01):    No growth at 24 hours    Culture - Blood (collected 09-17-24 @ 18:13)  Source: .Blood Blood-Peripheral  Preliminary Report (09-19-24 @ 01:01):    No growth at 24 hours        I&O's Summary      RADIOLOGY & ADDITIONAL TESTS:    Imaging Personally Reviewed:  [ x] YES  [ ] NO    Consultant(s) Notes Reviewed:  [x ] YES  [ ] NO    Care Discussed with Consultants/Other Providers [ x] YES  [ ] NO

## 2024-09-19 NOTE — DIETITIAN INITIAL EVALUATION ADULT - PERTINENT MEDS FT
MEDICATIONS  (STANDING):  ceFAZolin   IVPB 2000 milliGRAM(s) IV Intermittent every 8 hours  dextrose 5%. 1000 milliLiter(s) (50 mL/Hr) IV Continuous <Continuous>  dextrose 5%. 1000 milliLiter(s) (100 mL/Hr) IV Continuous <Continuous>  dextrose 50% Injectable 25 Gram(s) IV Push once  dextrose 50% Injectable 12.5 Gram(s) IV Push once  dextrose 50% Injectable 25 Gram(s) IV Push once  enoxaparin Injectable 40 milliGRAM(s) SubCutaneous every 24 hours  glucagon  Injectable 1 milliGRAM(s) IntraMuscular once  influenza   Vaccine 0.5 milliLiter(s) IntraMuscular once  insulin glargine Injectable (LANTUS) 10 Unit(s) SubCutaneous at bedtime  insulin lispro (ADMELOG) corrective regimen sliding scale   SubCutaneous three times a day before meals  insulin lispro (ADMELOG) corrective regimen sliding scale   SubCutaneous at bedtime  lactobacillus acidophilus 1 Tablet(s) Oral two times a day with meals  losartan 50 milliGRAM(s) Oral daily  mupirocin 2% Ointment 1 Application(s) Topical <User Schedule>    MEDICATIONS  (PRN):  acetaminophen     Tablet .. 650 milliGRAM(s) Oral every 6 hours PRN Temp greater or equal to 38C (100.4F), Mild Pain (1 - 3), Moderate Pain (4 - 6)  dextrose Oral Gel 15 Gram(s) Oral once PRN Blood Glucose LESS THAN 70 milliGRAM(s)/deciliter  labetalol Injectable 10 milliGRAM(s) IV Push every 8 hours PRN Systolic blood pressure >180  melatonin 3 milliGRAM(s) Oral at bedtime PRN Insomnia

## 2024-09-19 NOTE — DIETITIAN INITIAL EVALUATION ADULT - ADD RECOMMEND
1. Continue with current diet order, allow double protein at each meal  2. Provide ONS: Manish (7 gm arginine/7 gm glutamine/1.5 gm hmb) x1 packet bid  3. Encourage po intake as needed   4. Diet modified and sent to MD via teams  5. Reinforced diet as needed

## 2024-09-19 NOTE — CASE MANAGEMENT PROGRESS NOTE - NSCMPROGRESSNOTE_GEN_ALL_CORE
RN/CM noted that pt's case discussed during Interdisciplinary rounds, pt had MRI L foot area today, remains on IV Ancef Q 8H. Pt was independent prior to admission. DC pending hospital course. CM remains available.  RN/CM noted that pt's case discussed during Interdisciplinary rounds, pt had MRI L foot area today, remains on IV Ancef Q 8H. Pt was independent prior to admission. CM consult noted- Diabetes NP has been notified to eval pt. DC pending hospital course. CM remains available.  RN/CM noted that pt's case discussed during Interdisciplinary rounds, pt had MRI L foot area today, remains on IV Ancef Q 8H. Pt was independent prior to admission. CM consult noted- Diabetes NP has been notified- he met with pt and discussed DM management. DC pending hospital course. CM remains available.

## 2024-09-19 NOTE — CONSULT NOTE ADULT - SUBJECTIVE AND OBJECTIVE BOX
Patient is a 49y old  Male who presents with a chief complaint of infected toe (18 Sep 2024 15:50)      Reason For Consult:     HPI:  49-year-old male with past medical history of DM2, HTN, HLD, NEUROPATHY, PVD, NON COMPLIANT WITH TT, ETOH ABUSE, presents today due to left toe infection.  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  Patient experienced a blister to the left toe in which he eventually fell orders follow-up on its own.  Patient notes that the blister opened up and he developed some blood in the blister as well.  Patient notes that the blister scab has slightly fallen off but now his toe is red and painful.  Patient not currently on antibiotics.  Patient denies fever, injury, numbness, weakness, or any other complaints.   IN ED TEMP 99, /100, HR 89, had consult with podiatry and sidney started on Cipro and admitted for further management (17 Sep 2024 19:36)      PAST MEDICAL & SURGICAL HISTORY:  Prediabetes      HTN (hypertension)      HLD (hyperlipidemia)      DM2 (diabetes mellitus, type 2)          FAMILY HISTORY:        Social History:    MEDICATIONS  (STANDING):  ceFAZolin   IVPB 2000 milliGRAM(s) IV Intermittent every 8 hours  dextrose 5%. 1000 milliLiter(s) (100 mL/Hr) IV Continuous <Continuous>  dextrose 5%. 1000 milliLiter(s) (50 mL/Hr) IV Continuous <Continuous>  dextrose 50% Injectable 25 Gram(s) IV Push once  dextrose 50% Injectable 12.5 Gram(s) IV Push once  dextrose 50% Injectable 25 Gram(s) IV Push once  enoxaparin Injectable 40 milliGRAM(s) SubCutaneous every 24 hours  glucagon  Injectable 1 milliGRAM(s) IntraMuscular once  influenza   Vaccine 0.5 milliLiter(s) IntraMuscular once  insulin glargine Injectable (LANTUS) 10 Unit(s) SubCutaneous at bedtime  insulin lispro (ADMELOG) corrective regimen sliding scale   SubCutaneous three times a day before meals  insulin lispro (ADMELOG) corrective regimen sliding scale   SubCutaneous at bedtime  lactobacillus acidophilus 1 Tablet(s) Oral two times a day with meals  losartan 50 milliGRAM(s) Oral daily  mupirocin 2% Ointment 1 Application(s) Topical <User Schedule>    MEDICATIONS  (PRN):  acetaminophen     Tablet .. 650 milliGRAM(s) Oral every 6 hours PRN Temp greater or equal to 38C (100.4F), Mild Pain (1 - 3), Moderate Pain (4 - 6)  dextrose Oral Gel 15 Gram(s) Oral once PRN Blood Glucose LESS THAN 70 milliGRAM(s)/deciliter  labetalol Injectable 10 milliGRAM(s) IV Push every 8 hours PRN Systolic blood pressure >180  melatonin 3 milliGRAM(s) Oral at bedtime PRN Insomnia        T(C): 37.4 (09-19-24 @ 07:53), Max: 37.4 (09-19-24 @ 07:53)  HR: 100 (09-19-24 @ 07:53) (86 - 100)  BP: 153/100 (09-19-24 @ 07:53) (129/89 - 161/99)  RR: 16 (09-19-24 @ 07:53) (16 - 18)  SpO2: 97% (09-19-24 @ 07:53) (95% - 98%)  Wt(kg): --    PHYSICAL EXAM:  GENERAL: NAD, well-groomed, well-developed  HEAD:  Atraumatic, Normocephalic  NECK: Supple, No JVD, Normal thyroid  CHEST/LUNG: Clear to percussion bilaterally; No rales, rhonchi, wheezing, or rubs  HEART: Regular rate and rhythm; No murmurs, rubs, or gallops  ABDOMEN: Soft, Nontender, Nondistended; Bowel sounds present  EXTREMITIES: le foot dsg intact    CAPILLARY BLOOD GLUCOSE      POCT Blood Glucose.: 272 mg/dL (19 Sep 2024 07:49)  POCT Blood Glucose.: 355 mg/dL (18 Sep 2024 21:04)  POCT Blood Glucose.: 362 mg/dL (18 Sep 2024 16:50)  POCT Blood Glucose.: 343 mg/dL (18 Sep 2024 12:17)                            11.6   7.98  )-----------( 170      ( 19 Sep 2024 08:00 )             34.1       CMP:  09-19 @ 08:00  SGPT 62  Albumin 2.7   Alk Phos 110   Anion Gap 8   SGOT 58   Total Bili 0.8   BUN 31   Calcium Total 9.3   CO2 29   Chloride 99   Creatinine 2.44   eGFR if AA --   eGFR if non AA --   Glucose 271   Potassium 3.6   Protein 7.2   Sodium 136      Thyroid Function Tests:      Diabetes Tests:       Radiology:

## 2024-09-19 NOTE — DIETITIAN INITIAL EVALUATION ADULT - REASON FOR ADMISSION
HPI:  49-year-old male with past medical history of DM2, HTN, HLD, NEUROPATHY, PVD, NON COMPLIANT WITH TT, ETOH ABUSE, presents today due to left toe infection.  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  Patient experienced a blister to the left toe in which he eventually fell orders follow-up on its own.  Patient notes that the blister opened up and he developed some blood in the blister as well.  Patient notes that the blister scab has slightly fallen off but now his toe is red and painful.  Patient not currently on antibiotics.  Patient denies fever, injury, numbness, weakness, or any other complaints.   IN ED TEMP 99, /100, HR 89, had consult with podiatry and sidney started on Cipro and admitted for further management (17 Sep 2024 19:36)

## 2024-09-19 NOTE — DIETITIAN INITIAL EVALUATION ADULT - PERTINENT LABORATORY DATA
09-19    136  |  99  |  31[H]  ----------------------------<  271[H]  3.6   |  29  |  2.44[H]    Ca    9.3      19 Sep 2024 08:00    TPro  7.2  /  Alb  2.7[L]  /  TBili  0.8  /  DBili  x   /  AST  58[H]  /  ALT  62[H]  /  AlkPhos  110  09-19  POCT Blood Glucose.: 315 mg/dL (09-19-24 @ 12:00)  A1C with Estimated Average Glucose Result: 10.6 % (09-18-24 @ 05:30)

## 2024-09-19 NOTE — PROGRESS NOTE ADULT - SUBJECTIVE AND OBJECTIVE BOX
PROGRESS NOTE   Patient is a 49y old  Male who presents with a chief complaint of HPI:  49-year-old male with past medical history of DM2, HTN, HLD, NEUROPATHY, PVD, NON COMPLIANT WITH TT, ETOH ABUSE, presents today due to left toe infection.  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  Patient experienced a blister to the left toe in which he eventually fell orders follow-up on its own.  Patient notes that the blister opened up and he developed some blood in the blister as well.  Patient notes that the blister scab has slightly fallen off but now his toe is red and painful.  Patient not currently on antibiotics.  Patient denies fever, injury, numbness, weakness, or any other complaints.   IN ED TEMP 99, /100, HR 89, had consult with podiatry and sidney started on Cipro and admitted for further management (17 Sep 2024 19:36)     (19 Sep 2024 13:34)      HPI:  49-year-old male with past medical history of DM2, HTN, HLD, NEUROPATHY, PVD, NON COMPLIANT WITH TT, ETOH ABUSE, presents today due to left toe infection.  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  Patient experienced a blister to the left toe in which he eventually fell orders follow-up on its own.  Patient notes that the blister opened up and he developed some blood in the blister as well.  Patient notes that the blister scab has slightly fallen off but now his toe is red and painful.  Patient not currently on antibiotics.  Patient denies fever, injury, numbness, weakness, or any other complaints.   IN ED TEMP 99, /100, HR 89, had consult with podiatry and sidney started on Cipro and admitted for further management (17 Sep 2024 19:36)      Vital Signs Last 24 Hrs  T(C): 37.4 (19 Sep 2024 07:53), Max: 37.4 (19 Sep 2024 07:53)  T(F): 99.4 (19 Sep 2024 07:53), Max: 99.4 (19 Sep 2024 07:53)  HR: 95 (19 Sep 2024 14:01) (86 - 100)  BP: 162/92 (19 Sep 2024 14:01) (129/89 - 162/92)  BP(mean): --  RR: 18 (19 Sep 2024 14:01) (16 - 18)  SpO2: 95% (19 Sep 2024 14:01) (95% - 98%)    Parameters below as of 19 Sep 2024 07:53  Patient On (Oxygen Delivery Method): room air                              11.6   7.98  )-----------( 170      ( 19 Sep 2024 08:00 )             34.1               09-19    136  |  99  |  31[H]  ----------------------------<  271[H]  3.6   |  29  |  2.44[H]    Ca    9.3      19 Sep 2024 08:00    TPro  7.2  /  Alb  2.7[L]  /  TBili  0.8  /  DBili  x   /  AST  58[H]  /  ALT  62[H]  /  AlkPhos  110  09-19      PHYSICAL EXAM  General: Pleasant  male NAD & AOX3.    Integument:  Skin warm, dry and supple bilateral.    Noted cellulitic changes to left hallux  Noted plantar left hallux diabetic ulcer, apx 3cm x 4 cm x 0.1 cm + edema, + erythema, + calor, wound base fibro-granular, + tracking, + mal odor, - purulence, + hyperkeratotic borders   Vascular: Dorsalis Pedis and Posterior Tibial pulses 2/4.  Capillary re-fill time less then 3 seconds digits 1-5 bilateral.    Neuro: Protective sensation diminished to the level of the digits bilateral.  MSK: Muscle strength 5/5 all major muscle groups bilateral.    MRI IMPRESSION:    1. Acute on chronic osteomyelitis/septic arthritis at the proximal interphalangeal joint of left second toe in the proper clinical setting.  2. Focal acute osteomyelitis along the plantar lateral cortex of left great toe distal phalanx base with overlying deep soft tissue ulceration.    --- End of Report ---      JUANA URIBE MD; Attending Radiologist  This document has been electronically signed. Sep 19 2024 8:58AM

## 2024-09-19 NOTE — PROGRESS NOTE ADULT - SUBJECTIVE AND OBJECTIVE BOX
ANUJASUZANNA HURLEY is a 49yMale , patient examined and chart reviewed.    INTERVAL HPI/ OVERNIGHT EVENTS:   Afebrile. No events.    PAST MEDICAL & SURGICAL HISTORY:  Prediabetes  HTN (hypertension)  HLD (hyperlipidemia)  DM2 (diabetes mellitus, type 2)      For details regarding the patient's social history, family history, and other miscellaneous elements, please refer the initial infectious diseases consultation and/or the admitting history and physical examination for this admission.    ROS:  CONSTITUTIONAL:  Negative fever or chills  EYES:  Negative  blurry vision or double vision  CARDIOVASCULAR:  Negative for chest pain or palpitations  RESPIRATORY:  Negative for cough, wheezing, or SOB   GASTROINTESTINAL:  Negative for nausea, vomiting, diarrhea, constipation, or abdominal pain  GENITOURINARY:  Negative frequency, urgency or dysuria  NEUROLOGIC:  No headache, confusion, dizziness, lightheadedness  All other systems were reviewed and are negative     ALLERGIES  amoxicillin (Unknown)      Current inpatient medications :    ANTIBIOTICS/RELEVANT:  ceFAZolin   IVPB 2000 milliGRAM(s) IV Intermittent every 8 hours  lactobacillus acidophilus 1 Tablet(s) Oral two times a day with meals      acetaminophen     Tablet .. 650 milliGRAM(s) Oral every 6 hours PRN  dextrose 5%. 1000 milliLiter(s) IV Continuous <Continuous>  dextrose 5%. 1000 milliLiter(s) IV Continuous <Continuous>  dextrose 50% Injectable 25 Gram(s) IV Push once  dextrose 50% Injectable 12.5 Gram(s) IV Push once  dextrose 50% Injectable 25 Gram(s) IV Push once  dextrose Oral Gel 15 Gram(s) Oral once PRN  enoxaparin Injectable 40 milliGRAM(s) SubCutaneous every 24 hours  glucagon  Injectable 1 milliGRAM(s) IntraMuscular once  insulin glargine Injectable (LANTUS) 10 Unit(s) SubCutaneous at bedtime  insulin lispro (ADMELOG) corrective regimen sliding scale   SubCutaneous three times a day before meals  insulin lispro (ADMELOG) corrective regimen sliding scale   SubCutaneous at bedtime  labetalol Injectable 10 milliGRAM(s) IV Push every 8 hours PRN  losartan 50 milliGRAM(s) Oral daily  melatonin 3 milliGRAM(s) Oral at bedtime PRN  mupirocin 2% Ointment 1 Application(s) Topical <User Schedule>      Objective:  T(C): 37.4 (09-19-24 @ 07:53), Max: 37.4 (09-19-24 @ 07:53)  HR: 100 (09-19-24 @ 07:53) (86 - 100)  BP: 153/100 (09-19-24 @ 07:53) (129/89 - 161/99)  RR: 16 (09-19-24 @ 07:53) (16 - 18)  SpO2: 97% (09-19-24 @ 07:53) (95% - 98%)      Physical Exam:  General:  no acute distress  Neck: supple, trachea midline  Lungs: clear, no wheeze/rhonchi  Cardiovascular: regular rate and rhythm, S1 S2  Abdomen: soft, nontender,  bowel sounds normal  Neurological: alert and oriented x3  Skin: no rash  Extremities: right foot drsg c/d/i      LABS:                        11.6   7.98  )-----------( 170      ( 19 Sep 2024 08:00 )             34.1     09-19    136  |  99  |  31[H]  ----------------------------<  271[H]  3.6   |  29  |  2.44[H]    Ca    9.3      19 Sep 2024 08:00    TPro  7.2  /  Alb  2.7[L]  /  TBili  0.8  /  DBili  x   /  AST  58[H]  /  ALT  62[H]  /  AlkPhos  110  09-19      PT/INR - ( 17 Sep 2024 18:00 )   PT: 12.2 sec;   INR: 1.12 ratio         PTT - ( 17 Sep 2024 18:00 )  PTT:29.6 sec    MICROBIOLOGY:  Culture - Blood (collected 17 Sep 2024 18:13)  Source: .Blood Blood-Peripheral  Preliminary Report (19 Sep 2024 01:01):    No growth at 24 hours    Culture - Blood (collected 17 Sep 2024 18:13)  Source: .Blood Blood-Peripheral  Preliminary Report (19 Sep 2024 01:01):    No growth at 24 hours    RADIOLOGY & ADDITIONAL STUDIES:    ACC: [id number] EXAM:  MR FOOT LT   ORDERED BY: LISA ALY     PROCEDURE DATE:  09/19/2024          INTERPRETATION:  LEFT FOOT MRI    CLINICAL INDICATION: Diabetic foot ulcer, for evaluation of osteomyelitis.    COMPARISON: Radiographs dated 9/17/2024.    TECHNIQUE: Multiplanar, multisequence MRI was obtained of the left foot.    FINDINGS:    OSSEOUS: Acute on chronic osteomyelitis/septic arthritis at the proximal   interphalangeal joint of the second toe in the proper clinical setting.   Focal acute osteomyelitis along the plantar lateral cortex of the great   toe distal phalanx base with ill-defined overlying deep soft tissue   ulceration. Lisfranc interval is not widened on this non-weightbearing   examination.    TENDONS: Visualized flexor and extensor tendons are intact.    LIGAMENTS: Lisfranc ligament is intact.    GENERAL: No drainable encapsulated fluid collection. No significant   intermetatarsal bursal fluid distension. No interdigital neuroma. Mild to   moderate heterogeneous chronic muscular atrophy with superimposed patchy   denervation edema like signal.    IMPRESSION:    1.  Acute on chronic osteomyelitis/septic arthritis at the proximal   interphalangeal joint of left second toe in the proper clinical setting.  2.Focal acute osteomyelitis along the plantar lateral cortex of left   great toe distal phalanx base with overlying deep soft tissue ulceration.    Assessment :  48YO M PMH DM2, HTN, HLD, NEUROPATHY, PVD, NON COMPLIANT WITH TT, ETOH ABUSE, admitted with infected DM left great toe infection  MRI + OM      Plan :   Cont Ancef  Fu cultures  Dm control  Trend temps and cbc  Podiatry follow up    Continue with present regiment.  Appropriate use of antibiotics and adverse effects reviewed.      I have discussed the above plan of care with patient in detail. He expressed understanding of the  treatment plan . Risks, benefits and alternatives discussed in detail. I have asked if he has any questions or concerns and appropriately addressed them to the best of my ability .    > 35 minutes were spent in direct patient care reviewing notes, medications ,labs data/ imaging , discussion with multidisciplinary team.    Thank you for allowing me to participate in care of your patient .    Linsey Mack MD  Infectious Disease  [phone]      ANUJASUZANNA HURLEY is a 49yMale , patient examined and chart reviewed.    INTERVAL HPI/ OVERNIGHT EVENTS:   Afebrile. No events.    PAST MEDICAL & SURGICAL HISTORY:  Prediabetes  HTN (hypertension)  HLD (hyperlipidemia)  DM2 (diabetes mellitus, type 2)      For details regarding the patient's social history, family history, and other miscellaneous elements, please refer the initial infectious diseases consultation and/or the admitting history and physical examination for this admission.    ROS:  CONSTITUTIONAL:  Negative fever or chills  EYES:  Negative  blurry vision or double vision  CARDIOVASCULAR:  Negative for chest pain or palpitations  RESPIRATORY:  Negative for cough, wheezing, or SOB   GASTROINTESTINAL:  Negative for nausea, vomiting, diarrhea, constipation, or abdominal pain  GENITOURINARY:  Negative frequency, urgency or dysuria  NEUROLOGIC:  No headache, confusion, dizziness, lightheadedness  All other systems were reviewed and are negative     ALLERGIES  amoxicillin (Unknown)      Current inpatient medications :    ANTIBIOTICS/RELEVANT:  ceFAZolin   IVPB 2000 milliGRAM(s) IV Intermittent every 8 hours  lactobacillus acidophilus 1 Tablet(s) Oral two times a day with meals      acetaminophen     Tablet .. 650 milliGRAM(s) Oral every 6 hours PRN  dextrose 5%. 1000 milliLiter(s) IV Continuous <Continuous>  dextrose 5%. 1000 milliLiter(s) IV Continuous <Continuous>  dextrose 50% Injectable 25 Gram(s) IV Push once  dextrose 50% Injectable 12.5 Gram(s) IV Push once  dextrose 50% Injectable 25 Gram(s) IV Push once  dextrose Oral Gel 15 Gram(s) Oral once PRN  enoxaparin Injectable 40 milliGRAM(s) SubCutaneous every 24 hours  glucagon  Injectable 1 milliGRAM(s) IntraMuscular once  insulin glargine Injectable (LANTUS) 10 Unit(s) SubCutaneous at bedtime  insulin lispro (ADMELOG) corrective regimen sliding scale   SubCutaneous three times a day before meals  insulin lispro (ADMELOG) corrective regimen sliding scale   SubCutaneous at bedtime  labetalol Injectable 10 milliGRAM(s) IV Push every 8 hours PRN  losartan 50 milliGRAM(s) Oral daily  melatonin 3 milliGRAM(s) Oral at bedtime PRN  mupirocin 2% Ointment 1 Application(s) Topical <User Schedule>      Objective:  T(C): 37.4 (09-19-24 @ 07:53), Max: 37.4 (09-19-24 @ 07:53)  HR: 100 (09-19-24 @ 07:53) (86 - 100)  BP: 153/100 (09-19-24 @ 07:53) (129/89 - 161/99)  RR: 16 (09-19-24 @ 07:53) (16 - 18)  SpO2: 97% (09-19-24 @ 07:53) (95% - 98%)      Physical Exam:  General:  no acute distress  Neck: supple, trachea midline  Lungs: clear, no wheeze/rhonchi  Cardiovascular: regular rate and rhythm, S1 S2  Abdomen: soft, nontender,  bowel sounds normal  Neurological: alert and oriented x3  Skin: no rash  Extremities: Left foot drsg c/d/i      LABS:                        11.6   7.98  )-----------( 170      ( 19 Sep 2024 08:00 )             34.1     09-19    136  |  99  |  31[H]  ----------------------------<  271[H]  3.6   |  29  |  2.44[H]    Ca    9.3      19 Sep 2024 08:00    TPro  7.2  /  Alb  2.7[L]  /  TBili  0.8  /  DBili  x   /  AST  58[H]  /  ALT  62[H]  /  AlkPhos  110  09-19      PT/INR - ( 17 Sep 2024 18:00 )   PT: 12.2 sec;   INR: 1.12 ratio         PTT - ( 17 Sep 2024 18:00 )  PTT:29.6 sec    MICROBIOLOGY:  Culture - Blood (collected 17 Sep 2024 18:13)  Source: .Blood Blood-Peripheral  Preliminary Report (19 Sep 2024 01:01):    No growth at 24 hours    Culture - Blood (collected 17 Sep 2024 18:13)  Source: .Blood Blood-Peripheral  Preliminary Report (19 Sep 2024 01:01):    No growth at 24 hours    RADIOLOGY & ADDITIONAL STUDIES:    ACC: [id number] EXAM:  MR FOOT LT   ORDERED BY: LISA ALY     PROCEDURE DATE:  09/19/2024          INTERPRETATION:  LEFT FOOT MRI    CLINICAL INDICATION: Diabetic foot ulcer, for evaluation of osteomyelitis.    COMPARISON: Radiographs dated 9/17/2024.    TECHNIQUE: Multiplanar, multisequence MRI was obtained of the left foot.    FINDINGS:    OSSEOUS: Acute on chronic osteomyelitis/septic arthritis at the proximal   interphalangeal joint of the second toe in the proper clinical setting.   Focal acute osteomyelitis along the plantar lateral cortex of the great   toe distal phalanx base with ill-defined overlying deep soft tissue   ulceration. Lisfranc interval is not widened on this non-weightbearing   examination.    TENDONS: Visualized flexor and extensor tendons are intact.    LIGAMENTS: Lisfranc ligament is intact.    GENERAL: No drainable encapsulated fluid collection. No significant   intermetatarsal bursal fluid distension. No interdigital neuroma. Mild to   moderate heterogeneous chronic muscular atrophy with superimposed patchy   denervation edema like signal.    IMPRESSION:    1.  Acute on chronic osteomyelitis/septic arthritis at the proximal   interphalangeal joint of left second toe in the proper clinical setting.  2.Focal acute osteomyelitis along the plantar lateral cortex of left   great toe distal phalanx base with overlying deep soft tissue ulceration.    Assessment :  48YO M PMH DM2, HTN, HLD, NEUROPATHY, PVD, NON COMPLIANT WITH TT, ETOH ABUSE, admitted with infected DM left great toe infection  MRI + OM      Plan :   Cont Ancef  Fu cultures  Dm control  Trend temps and cbc  Podiatry follow up    Continue with present regiment.  Appropriate use of antibiotics and adverse effects reviewed.      I have discussed the above plan of care with patient in detail. He expressed understanding of the  treatment plan . Risks, benefits and alternatives discussed in detail. I have asked if he has any questions or concerns and appropriately addressed them to the best of my ability .    > 35 minutes were spent in direct patient care reviewing notes, medications ,labs data/ imaging , discussion with multidisciplinary team.    Thank you for allowing me to participate in care of your patient .    Linsey Mack MD  Infectious Disease  [phone]

## 2024-09-19 NOTE — PROGRESS NOTE ADULT - ASSESSMENT
The patient is a 49 year old male with a history of HTN, HL, DM, PAD who presents with a toe infection.    Plan:  - ECG with sinus rhythm and no evidence of ischemia/infarction  - The patient was on labetalol in the past but has been noncompliant with medications  - Continue losartan 50 mg daily  - If renal function worsens further, will discontinue losartan  - If BP remains elevated, will add amlodipine 5 mg daily  - LE arterial duplex with no significant stenosis  - MRI foot with acute/chronic osteomyelitis  - Podiatry follow-up  - If there is a plan for surgery, the patient is at intermediate risk for cardiac events and is optimized to proceed from a cardiac standpoint

## 2024-09-19 NOTE — PROGRESS NOTE ADULT - SUBJECTIVE AND OBJECTIVE BOX
Chief Complaint:     Interval Events:    Review of Systems:  General: No fevers, chills, weight gain  Skin: No rashes, color changes  Cardiovascular: No chest pain, orthopnea  Respiratory: No shortness of breath, cough  Gastrointestinal: No nausea, abdominal pain  Genitourinary: No incontinence, pain with urination  Musculoskeletal: No pain, swelling, decreased range of motion  Neurological: No headache, weakness  Psychiatric: No depression, anxiety  Endocrine: No weight gain, increased thirst  All other systems are comprehensively negative.    Physical Exam:  Vitals:        Vital Signs Last 24 Hrs  T(C): 36.3 (19 Sep 2024 05:10), Max: 37.3 (18 Sep 2024 18:36)  T(F): 97.4 (19 Sep 2024 05:10), Max: 99.2 (18 Sep 2024 18:36)  HR: 86 (19 Sep 2024 05:10) (86 - 90)  BP: 141/89 (19 Sep 2024 05:10) (129/89 - 161/99)  BP(mean): --  RR: 18 (19 Sep 2024 05:10) (16 - 18)  SpO2: 95% (19 Sep 2024 05:10) (95% - 98%)    Parameters below as of 19 Sep 2024 05:10  Patient On (Oxygen Delivery Method): room air      General: NAD  HEENT: MMM  Neck: No JVD, no carotid bruit  Lungs: CTAB  CV: RRR, nl S1/S2, no M/R/G  Abdomen: S/NT/ND, +BS  Extremities: No LE edema, no cyanosis  Neuro: AAOx3, non-focal  Skin: No rash    Labs:                        11.6   7.98  )-----------( 170      ( 19 Sep 2024 08:00 )             34.1     09-19    136  |  99  |  31[H]  ----------------------------<  271[H]  3.6   |  29  |  2.44[H]    Ca    9.3      19 Sep 2024 08:00    TPro  7.2  /  Alb  2.7[L]  /  TBili  0.8  /  DBili  x   /  AST  58[H]  /  ALT  62[H]  /  AlkPhos  110  09-19        PT/INR - ( 17 Sep 2024 18:00 )   PT: 12.2 sec;   INR: 1.12 ratio         PTT - ( 17 Sep 2024 18:00 )  PTT:29.6 sec    ECG/Telemetry: Sinus rhythm

## 2024-09-19 NOTE — DIETITIAN INITIAL EVALUATION ADULT - LAB (SPECIFY)
Attempt made to [facility] to get report from Rick KENT who answered said he will pass along the message.    FS

## 2024-09-19 NOTE — DIETITIAN INITIAL EVALUATION ADULT - OTHER INFO
Visited patient in room, presents with good appetite/po intake, consuming >% of meals. Denies n/v/d/c, last BM 9/19. No reported difficulty chewing or swallowing. NKFA. Reported UBW ~200#, current adm weight 200#, weight appears to be stable, will continue to monitor weight trends as able.     Pertinent medications/nutrition labs reviewed; -362 x24hr, a1c 10.6%, elevated LDL, cholesterol; In house ordered for Lantus 10 units, lispro sliding scale to aid in glucose management.     Discussed the importance of consistent carbohydrate intake with pt. Educated on avoidance of concentrated sweets/beverages. Pair a protein with carbohydrate at each meal/snack to aid in glycemic control. Recommend adding Manish (7 gm arginine/7 gm glutamine/1.5 gm hmb) x1 packet bid to aid in wound healing, allow double protein at each meal. Pt receptive, no nutrition related questions at this time. RD to remain available as needed.

## 2024-09-19 NOTE — DIETITIAN INITIAL EVALUATION ADULT - ORAL INTAKE PTA/DIET HISTORY
Reported following no concentrated diet, 50% protein meals at home, appetite/po intake was good. No vitamin/mineral or other nutrition supplements reported. Pt states not taking meds for 6 months 2/2 financial hardship, taking care of parents who are at NH, business etc.

## 2024-09-19 NOTE — PROGRESS NOTE ADULT - PROBLEM SELECTOR PLAN 1
Chart reviewed and Patient evaluated  Discussed diagnosis and treatment with patient  There is concern for uncontrolled diabetic infected toe ulcer, concern for OM  Wound culture results pending  Applied dry sterile dressing  Bactroban to be applied daily with dressings  X-rays reviewed : Demonstrates possible hx of fracture at 2nd PIPJ. Possible cortical irregularity at 1st distal phalanx.  Rec with IV antibiotics As Per ID  MRI Report:  1. Acute on chronic osteomyelitis/septic arthritis at the proximal interphalangeal joint of left second toe in the proper clinical setting.  2. Focal acute osteomyelitis along the plantar lateral cortex of left great toe distal phalanx base with overlying deep soft tissue ulceration.  Will proceed with surgical management tomorrow, 09/19/2024.  Patient will require medical/cardiac optimization prior to surgery tomorrow.  Weight bearing as tolerated  Podiatry will follow while in house.  Will discuss care plan with all attendings. Chart reviewed and Patient evaluated  Discussed diagnosis and treatment with patient  There is concern for uncontrolled diabetic infected toe ulcer, concern for OM  Wound culture results pending  Applied dry sterile dressing  Bactroban to be applied daily with dressings  X-rays reviewed : Demonstrates possible hx of fracture at 2nd PIPJ. Possible cortical irregularity at 1st distal phalanx.  Rec with IV antibiotics As Per ID  MRI Report:  1. Acute on chronic osteomyelitis/septic arthritis at the proximal interphalangeal joint of left second toe in the proper clinical setting.  2. Focal acute osteomyelitis along the plantar lateral cortex of left great toe distal phalanx base with overlying deep soft tissue ulceration.  Will proceed with left hallux amputation with debridement of all non-viable bone and soft tissue with flap closure tomorrow, 09/20/2024.  NPO after midnight.  Patient will require medical/cardiac optimization prior to surgery tomorrow.  Weight bearing as tolerated  Podiatry will follow while in house.  Will discuss care plan with all attendings.

## 2024-09-19 NOTE — PROGRESS NOTE ADULT - ASSESSMENT
49-year-old male with past medical history of DM2, HTN, HLD, NEUROPATHY, PVD, NON COMPLIANT WITH TT, ETOH ABUSE, presents today due to left toe infection.  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  Patient experienced a blister to the left toe in which he eventually fell orders follow-up on its own.  Patient notes that the blister opened up and he developed some blood in the blister as well.  Patient notes that the blister scab has slightly fallen off but now his toe is red and painful.  Patient not currently on antibiotics.  Patient denies fever, injury, numbness, weakness, or any other complaints.   IN ED TEMP 99, /100, HR 89, had consult with podiatry and sidney started on Cipro and admitted for further management    # Diabetic foot ulcer with cellullitis(left halluxdiabetic ulcer 3x4x0.1 cm with erythema and edema mal odorous , hyperkeratotic  started on ABX, podiatry consult noted, daily dressing, R/o PVD- arterial doppler- negative for any arterial disease  ID consult noted   MR foot done- ac on ch OM with sepstic arthritis of 2ndleft toe, OM left great toe- Podiatry and ID f up, abx as per ID  # DM2 with hyperglycemia, with neuropathy , nephropathy with likely PVD    ssi, consistent carb, Endo consult,  hba1c 10.2   Endo consult noted , started on lantus  # Ess HTN   started on losartan, monitor, cardiology consult noted  will monitor renal indices   DVT prophylaxis  ID , endo and podiatry consult  # LUISA with CKD   nephro consult, will monitor indices,  # HLD   low fat diet  # DVT prophylaxis  Pt is Intermediate risk for surg and anaesthesia if planned for surg , optimized, cardio clearance noted

## 2024-09-19 NOTE — CONSULT NOTE ADULT - SUBJECTIVE AND OBJECTIVE BOX
HPI:  Patient is a 49y old  Male with poorly controlled DM, Hgb A1C > 10, neuropathy, PAD who was admitted for management of R DFU with a concern for OM. Had MRI this morning, results still pending.   His serum Cr has ranged between 2.14 and 2.44 last few days. He is vaguely aware of CKD hx, has not seen a nephrologist. Per available data, his Cr was 1.6 in 2021.   Denies daily NSAIDs. No difficulty voiding.         PAST MEDICAL & SURGICAL HISTORY:  Prediabetes  HTN (hypertension)  HLD (hyperlipidemia)  DM2 (diabetes mellitus, type 2)  PAD,   CKD        Allergies:  amoxicillin (Unknown )        MEDICATIONS  (STANDING):  ceFAZolin   IVPB 2000 milliGRAM(s) IV Intermittent every 8 hours  dextrose 5%. 1000 milliLiter(s) (100 mL/Hr) IV Continuous <Continuous>  dextrose 5%. 1000 milliLiter(s) (50 mL/Hr) IV Continuous <Continuous>  dextrose 50% Injectable 25 Gram(s) IV Push once  dextrose 50% Injectable 12.5 Gram(s) IV Push once  dextrose 50% Injectable 25 Gram(s) IV Push once  enoxaparin Injectable 40 milliGRAM(s) SubCutaneous every 24 hours  glucagon  Injectable 1 milliGRAM(s) IntraMuscular once  influenza   Vaccine 0.5 milliLiter(s) IntraMuscular once  insulin glargine Injectable (LANTUS) 10 Unit(s) SubCutaneous at bedtime  insulin lispro (ADMELOG) corrective regimen sliding scale   SubCutaneous at bedtime  insulin lispro (ADMELOG) corrective regimen sliding scale   SubCutaneous three times a day before meals  lactobacillus acidophilus 1 Tablet(s) Oral two times a day with meals  losartan 50 milliGRAM(s) Oral daily  mupirocin 2% Ointment 1 Application(s) Topical <User Schedule>    MEDICATIONS  (PRN):  acetaminophen     Tablet .. 650 milliGRAM(s) Oral every 6 hours PRN Temp greater or equal to 38C (100.4F), Mild Pain (1 - 3), Moderate Pain (4 - 6)  dextrose Oral Gel 15 Gram(s) Oral once PRN Blood Glucose LESS THAN 70 milliGRAM(s)/deciliter  labetalol Injectable 10 milliGRAM(s) IV Push every 8 hours PRN Systolic blood pressure >180  melatonin 3 milliGRAM(s) Oral at bedtime PRN Insomnia      Daily Height in cm: 175.26 (18 Sep 2024 15:50)    Daily     Drug Dosing Weight  Height (cm): 175.3 (18 Sep 2024 15:50)  Weight (kg): 90.7 (18 Sep 2024 15:50)  BMI (kg/m2): 29.5 (18 Sep 2024 15:50)  BSA (m2): 2.07 (18 Sep 2024 15:50)      REVIEW OF SYSTEMS:    Per HPI          PHYSICAL EXAM:    GENERAL: NAD  HEAD:  Atraumatic, normocephalic  EYES: EOMI, PERRLA. Conjunctiva and sclera clear  ENMT: moist mucous membranes.   NECK: Supple. No increase in JVP  NERVOUS SYSTEM:  Alert & Oriented X3. Motor Strength 5/5 B/L upper and lower extremities; DTRs 2+ intact and symmetric  CHEST/LUNG: Clear to auscultation bilaterally  HEART: Regular rate and rhythm. No murmurs, rubs, or gallops  ABDOMEN: Soft, Nontender, Nondistended. POS BS  EXTREMITIES:  no edema. L foot dressed.   LYMPH: No lymphadenopathy noted  SKIN: No rashes or lesions    LABS:  CBC Full  -  ( 19 Sep 2024 08:00 )  WBC Count : 7.98 K/uL  RBC Count : 3.88 M/uL  Hemoglobin : 11.6 g/dL  Hematocrit : 34.1 %  Platelet Count - Automated : 170 K/uL  Mean Cell Volume : 87.9 fl  Mean Cell Hemoglobin : 29.9 pg  Mean Cell Hemoglobin Concentration : 34.0 gm/dL  Auto Neutrophil # : 5.18 K/uL  Auto Lymphocyte # : 1.49 K/uL  Auto Monocyte # : 0.97 K/uL  Auto Eosinophil # : 0.09 K/uL  Auto Basophil # : 0.09 K/uL  Auto Neutrophil % : 64.9 %  Auto Lymphocyte % : 18.7 %  Auto Monocyte % : 12.2 %  Auto Eosinophil % : 1.1 %  Auto Basophil % : 1.1 %    09-19    136  |  99  |  31[H]  ----------------------------<  271[H]  3.6   |  29  |  2.44[H]    Ca    9.3      19 Sep 2024 08:00    TPro  7.2  /  Alb  2.7[L]  /  TBili  0.8  /  DBili  x   /  AST  58[H]  /  ALT  62[H]  /  AlkPhos  110  09-19    CAPILLARY BLOOD GLUCOSE      POCT Blood Glucose.: 315 mg/dL (19 Sep 2024 12:00)    PT/INR - ( 17 Sep 2024 18:00 )   PT: 12.2 sec;   INR: 1.12 ratio           Impression:  * Elevated Cr. Suspect mainly diabetic CKD 3. ? component of septic ATN  * Poorly controlled DM, Hgb A1C >10  * DFU, concern for OM    Recommendations:   * Urine studies, Renal US requested  * Cont ARB as long as Cr remains stable  * Will reeval in 24h.

## 2024-09-20 LAB
-  AMOXICILLIN/CLAVULANIC ACID: SIGNIFICANT CHANGE UP
-  AMPICILLIN/SULBACTAM: SIGNIFICANT CHANGE UP
-  AMPICILLIN: SIGNIFICANT CHANGE UP
-  AMPICILLIN: SIGNIFICANT CHANGE UP
-  AZTREONAM: SIGNIFICANT CHANGE UP
-  CEFAZOLIN: SIGNIFICANT CHANGE UP
-  CEFEPIME: SIGNIFICANT CHANGE UP
-  CEFOXITIN: SIGNIFICANT CHANGE UP
-  CEFTRIAXONE: SIGNIFICANT CHANGE UP
-  CIPROFLOXACIN: SIGNIFICANT CHANGE UP
-  ERTAPENEM: SIGNIFICANT CHANGE UP
-  GENTAMICIN: SIGNIFICANT CHANGE UP
-  IMIPENEM: SIGNIFICANT CHANGE UP
-  LEVOFLOXACIN: SIGNIFICANT CHANGE UP
-  MEROPENEM: SIGNIFICANT CHANGE UP
-  PIPERACILLIN/TAZOBACTAM: SIGNIFICANT CHANGE UP
-  TOBRAMYCIN: SIGNIFICANT CHANGE UP
-  TRIMETHOPRIM/SULFAMETHOXAZOLE: SIGNIFICANT CHANGE UP
-  VANCOMYCIN: SIGNIFICANT CHANGE UP
ALBUMIN SERPL ELPH-MCNC: 2.8 G/DL — LOW (ref 3.3–5)
ALP SERPL-CCNC: 111 U/L — SIGNIFICANT CHANGE UP (ref 30–120)
ALT FLD-CCNC: 57 U/L — SIGNIFICANT CHANGE UP (ref 10–60)
ANION GAP SERPL CALC-SCNC: 8 MMOL/L — SIGNIFICANT CHANGE UP (ref 5–17)
AST SERPL-CCNC: 61 U/L — HIGH (ref 10–40)
BASOPHILS # BLD AUTO: 0 K/UL — SIGNIFICANT CHANGE UP (ref 0–0.2)
BASOPHILS NFR BLD AUTO: 0 % — SIGNIFICANT CHANGE UP (ref 0–2)
BILIRUB SERPL-MCNC: 0.6 MG/DL — SIGNIFICANT CHANGE UP (ref 0.2–1.2)
BUN SERPL-MCNC: 41 MG/DL — HIGH (ref 7–23)
CALCIUM SERPL-MCNC: 9 MG/DL — SIGNIFICANT CHANGE UP (ref 8.4–10.5)
CHLORIDE SERPL-SCNC: 101 MMOL/L — SIGNIFICANT CHANGE UP (ref 96–108)
CO2 SERPL-SCNC: 29 MMOL/L — SIGNIFICANT CHANGE UP (ref 22–31)
CREAT ?TM UR-MCNC: 142 MG/DL — SIGNIFICANT CHANGE UP
CREAT SERPL-MCNC: 2.45 MG/DL — HIGH (ref 0.5–1.3)
CULTURE RESULTS: ABNORMAL
EGFR: 31 ML/MIN/1.73M2 — LOW
EOSINOPHIL # BLD AUTO: 0 K/UL — SIGNIFICANT CHANGE UP (ref 0–0.5)
EOSINOPHIL NFR BLD AUTO: 0 % — SIGNIFICANT CHANGE UP (ref 0–6)
GLUCOSE BLDC GLUCOMTR-MCNC: 194 MG/DL — HIGH (ref 70–99)
GLUCOSE BLDC GLUCOMTR-MCNC: 234 MG/DL — HIGH (ref 70–99)
GLUCOSE BLDC GLUCOMTR-MCNC: 236 MG/DL — HIGH (ref 70–99)
GLUCOSE BLDC GLUCOMTR-MCNC: 245 MG/DL — HIGH (ref 70–99)
GLUCOSE SERPL-MCNC: 248 MG/DL — HIGH (ref 70–99)
HCT VFR BLD CALC: 34.3 % — LOW (ref 39–50)
HGB BLD-MCNC: 11.5 G/DL — LOW (ref 13–17)
LYMPHOCYTES # BLD AUTO: 2.41 K/UL — SIGNIFICANT CHANGE UP (ref 1–3.3)
LYMPHOCYTES # BLD AUTO: 24 % — SIGNIFICANT CHANGE UP (ref 13–44)
MANUAL SMEAR VERIFICATION: SIGNIFICANT CHANGE UP
MCHC RBC-ENTMCNC: 29.6 PG — SIGNIFICANT CHANGE UP (ref 27–34)
MCHC RBC-ENTMCNC: 33.5 GM/DL — SIGNIFICANT CHANGE UP (ref 32–36)
MCV RBC AUTO: 88.4 FL — SIGNIFICANT CHANGE UP (ref 80–100)
METHOD TYPE: SIGNIFICANT CHANGE UP
METHOD TYPE: SIGNIFICANT CHANGE UP
MONOCYTES # BLD AUTO: 1 K/UL — HIGH (ref 0–0.9)
MONOCYTES NFR BLD AUTO: 10 % — SIGNIFICANT CHANGE UP (ref 2–14)
MRSA PCR RESULT.: SIGNIFICANT CHANGE UP
MYELOCYTES NFR BLD: 1 % — HIGH (ref 0–0)
NEUTROPHILS # BLD AUTO: 6.52 K/UL — SIGNIFICANT CHANGE UP (ref 1.8–7.4)
NEUTROPHILS NFR BLD AUTO: 64 % — SIGNIFICANT CHANGE UP (ref 43–77)
NEUTS BAND # BLD: 1 % — SIGNIFICANT CHANGE UP (ref 0–8)
NRBC # BLD: 0 /100 WBCS — SIGNIFICANT CHANGE UP (ref 0–0)
NRBC # BLD: SIGNIFICANT CHANGE UP /100 WBCS (ref 0–0)
ORGANISM # SPEC MICROSCOPIC CNT: ABNORMAL
PLAT MORPH BLD: NORMAL — SIGNIFICANT CHANGE UP
PLATELET # BLD AUTO: 168 K/UL — SIGNIFICANT CHANGE UP (ref 150–400)
POTASSIUM SERPL-MCNC: 3.9 MMOL/L — SIGNIFICANT CHANGE UP (ref 3.5–5.3)
POTASSIUM SERPL-SCNC: 3.9 MMOL/L — SIGNIFICANT CHANGE UP (ref 3.5–5.3)
PROT ?TM UR-MCNC: 504 MG/DL — HIGH (ref 0–12)
PROT SERPL-MCNC: 6.5 G/DL — SIGNIFICANT CHANGE UP (ref 6–8.3)
PROT/CREAT UR-RTO: 3.6 RATIO — HIGH (ref 0–0.2)
RBC # BLD: 3.88 M/UL — LOW (ref 4.2–5.8)
RBC # FLD: 12.4 % — SIGNIFICANT CHANGE UP (ref 10.3–14.5)
RBC BLD AUTO: NORMAL — SIGNIFICANT CHANGE UP
S AUREUS DNA NOSE QL NAA+PROBE: SIGNIFICANT CHANGE UP
SODIUM SERPL-SCNC: 138 MMOL/L — SIGNIFICANT CHANGE UP (ref 135–145)
SPECIMEN SOURCE: SIGNIFICANT CHANGE UP
WBC # BLD: 10.03 K/UL — SIGNIFICANT CHANGE UP (ref 3.8–10.5)
WBC # FLD AUTO: 10.03 K/UL — SIGNIFICANT CHANGE UP (ref 3.8–10.5)

## 2024-09-20 PROCEDURE — 88305 TISSUE EXAM BY PATHOLOGIST: CPT | Mod: 26

## 2024-09-20 PROCEDURE — 88311 DECALCIFY TISSUE: CPT | Mod: 26

## 2024-09-20 RX ORDER — SODIUM CHLORIDE IRRIG SOLUTION 0.9 %
1000 SOLUTION, IRRIGATION IRRIGATION
Refills: 0 | Status: DISCONTINUED | OUTPATIENT
Start: 2024-09-20 | End: 2024-09-21

## 2024-09-20 RX ORDER — OXYCODONE HYDROCHLORIDE 30 MG/1
5 TABLET, FILM COATED, EXTENDED RELEASE ORAL ONCE
Refills: 0 | Status: DISCONTINUED | OUTPATIENT
Start: 2024-09-20 | End: 2024-09-21

## 2024-09-20 RX ORDER — ONDANSETRON HCL/PF 4 MG/2 ML
4 VIAL (ML) INJECTION ONCE
Refills: 0 | Status: DISCONTINUED | OUTPATIENT
Start: 2024-09-20 | End: 2024-09-21

## 2024-09-20 RX ORDER — PIPERACILLIN SODIUM AND TAZOBACTAM SODIUM 12; 1.5 G/60ML; G/60ML
3.38 INJECTION, POWDER, LYOPHILIZED, FOR SOLUTION INTRAVENOUS EVERY 8 HOURS
Refills: 0 | Status: COMPLETED | OUTPATIENT
Start: 2024-09-20 | End: 2024-09-30

## 2024-09-20 RX ORDER — HYDROMORPHONE HYDROCHLORIDE 1 MG/ML
0.25 INJECTION, SOLUTION INTRAMUSCULAR; INTRAVENOUS; SUBCUTANEOUS
Refills: 0 | Status: DISCONTINUED | OUTPATIENT
Start: 2024-09-20 | End: 2024-09-21

## 2024-09-20 RX ORDER — HYDROMORPHONE HYDROCHLORIDE 1 MG/ML
0.5 INJECTION, SOLUTION INTRAMUSCULAR; INTRAVENOUS; SUBCUTANEOUS
Refills: 0 | Status: DISCONTINUED | OUTPATIENT
Start: 2024-09-20 | End: 2024-09-21

## 2024-09-20 RX ADMIN — Medication 650 MILLIGRAM(S): at 21:59

## 2024-09-20 RX ADMIN — Medication 100 MILLIGRAM(S): at 14:04

## 2024-09-20 RX ADMIN — Medication 75 MILLILITER(S): at 18:13

## 2024-09-20 RX ADMIN — Medication 650 MILLIGRAM(S): at 21:29

## 2024-09-20 RX ADMIN — Medication 2: at 09:19

## 2024-09-20 RX ADMIN — ENOXAPARIN SODIUM 40 MILLIGRAM(S): 150 INJECTION SUBCUTANEOUS at 21:41

## 2024-09-20 RX ADMIN — MUPIROCIN 1 APPLICATION(S): 20 OINTMENT TOPICAL at 14:04

## 2024-09-20 RX ADMIN — INSULIN GLARGINE 10 UNIT(S): 300 INJECTION, SOLUTION SUBCUTANEOUS at 21:29

## 2024-09-20 RX ADMIN — Medication 2: at 12:39

## 2024-09-20 RX ADMIN — PIPERACILLIN SODIUM AND TAZOBACTAM SODIUM 25 GRAM(S): 12; 1.5 INJECTION, POWDER, LYOPHILIZED, FOR SOLUTION INTRAVENOUS at 21:30

## 2024-09-20 RX ADMIN — Medication 100 MILLIGRAM(S): at 05:18

## 2024-09-20 NOTE — CONSULT NOTE ADULT - PROBLEM SELECTOR RECOMMENDATION 9
lantus 10 units qhs added  admelog corrective scale coverage qac/qhs changed to moderate dose  cont fs bg monitoring cont cons cho diet  goal bg 100-180 in hosp setting
Chart reviewed and Patient evaluated  Discussed diagnosis and treatment with patient  There is concern for uncontrolled diabetic infected toe ulcer, concern for OM  Glucose levels 464, SER 89  Obtained wound culture to be sent to Pathology  Wound flush with betadine saline mixture  Upon discussion and verbal consent, with the use of pickups and suture scissors, hyperkeratotic tissue excised from left hallux, revealing undermining plantar hallux ulceration.    Applied dry sterile dressing  Bactroban ordered to be applied with daily dressings  X-rays reviewed : Shows no obvious or acute fracture pathology  Rec admit to hospitalist team  Rec vascular consult  Rec with IV antibiotics As Per ID  Rec MRI, paperwork filled out and in chart  Weight bearing as tolerated  Patient understands he may require surgical intervention if symptoms do not remit, will allow demarcation of infective process, and will await MRI results to r/o OM  Discussed importance of daily foot examinations and proper shoe gear and to importance of lower Fasting Blood Glucose levels.   Podiatry will follow while in house.  will discuss care plan  with all  Attendings   Thank you for consult
pt is to be medically optimized by the hospitalist team  Pt is to be brought to the OR for the planned procedures  pt is to be followed for the complete post operative course in the hospital and in our private practice  will await intraoperative cultures to establish appropriate ABX regimen  hospitalist team to resume all medical orders and cover for pain   will cont to follow and discuss with all attendings
Type 2 A1c pending% adm diabetic toe ulcer  add 10 units Lantus @ HS  increase to mod ISS ACHS  CC diet and accucheck ACHS  Recommend endocrine-Perlman onconsult  FU appt: TBA  DSC recommendations: return to home regimen and glucose monitoring  diabetes education provided as documented above  Diabetes support info and cell # [phone] given   Goal 100-180 mg/dL; 140-180 mg/dL in critical care areas

## 2024-09-20 NOTE — PROGRESS NOTE ADULT - SUBJECTIVE AND OBJECTIVE BOX
Chief Complaint: Foot wound    Interval Events: No events overnight.    Review of Systems:  General: No fevers, chills, weight gain  Skin: No rashes, color changes  Cardiovascular: No chest pain, orthopnea  Respiratory: No shortness of breath, cough  Gastrointestinal: No nausea, abdominal pain  Genitourinary: No incontinence, pain with urination  Musculoskeletal: No pain, swelling, decreased range of motion  Neurological: No headache, weakness  Psychiatric: No depression, anxiety  Endocrine: No weight gain, increased thirst  All other systems are comprehensively negative.    Physical Exam:  Vital Signs Last 24 Hrs  T(C): 36.9 (20 Sep 2024 05:07), Max: 37.9 (19 Sep 2024 17:21)  T(F): 98.5 (20 Sep 2024 05:07), Max: 100.3 (19 Sep 2024 17:21)  HR: 81 (20 Sep 2024 05:07) (81 - 96)  BP: 156/95 (20 Sep 2024 05:07) (156/95 - 162/92)  BP(mean): --  RR: 18 (20 Sep 2024 05:07) (16 - 18)  SpO2: 96% (20 Sep 2024 05:07) (95% - 96%)  Parameters below as of 20 Sep 2024 05:07  Patient On (Oxygen Delivery Method): room air  General: NAD  HEENT: MMM  Neck: No JVD, no carotid bruit  Lungs: CTAB  CV: RRR, nl S1/S2, no M/R/G  Abdomen: S/NT/ND, +BS  Extremities: No LE edema, no cyanosis  Neuro: AAOx3, non-focal  Skin: No rash    Labs:             09-20    138  |  101  |  41[H]  ----------------------------<  248[H]  3.9   |  29  |  2.45[H]    Ca    9.0      20 Sep 2024 07:59    TPro  6.5  /  Alb  2.8[L]  /  TBili  0.6  /  DBili  x   /  AST  61[H]  /  ALT  57  /  AlkPhos  111  09-20                        11.5   10.03 )-----------( 168      ( 20 Sep 2024 07:59 )             34.3       ECG/Telemetry: Sinus rhythm

## 2024-09-20 NOTE — PROGRESS NOTE ADULT - SUBJECTIVE AND OBJECTIVE BOX
Patient is a 49y old  Male who presents with a chief complaint of infected toe with OM left hallux (20 Sep 2024 11:49)      INTERVAL HPI/OVERNIGHT EVENTS:overnight events noted    Home Medications:  non compliant with meds for months:  (17 Sep 2024 19:54)      MEDICATIONS  (STANDING):  ceFAZolin   IVPB 2000 milliGRAM(s) IV Intermittent every 8 hours  dextrose 5%. 1000 milliLiter(s) (50 mL/Hr) IV Continuous <Continuous>  dextrose 5%. 1000 milliLiter(s) (100 mL/Hr) IV Continuous <Continuous>  dextrose 50% Injectable 25 Gram(s) IV Push once  dextrose 50% Injectable 12.5 Gram(s) IV Push once  dextrose 50% Injectable 25 Gram(s) IV Push once  enoxaparin Injectable 40 milliGRAM(s) SubCutaneous every 24 hours  glucagon  Injectable 1 milliGRAM(s) IntraMuscular once  influenza   Vaccine 0.5 milliLiter(s) IntraMuscular once  insulin glargine Injectable (LANTUS) 10 Unit(s) SubCutaneous at bedtime  insulin lispro (ADMELOG) corrective regimen sliding scale   SubCutaneous three times a day before meals  insulin lispro (ADMELOG) corrective regimen sliding scale   SubCutaneous at bedtime  lactobacillus acidophilus 1 Tablet(s) Oral two times a day with meals  losartan 50 milliGRAM(s) Oral daily  mupirocin 2% Ointment 1 Application(s) Topical <User Schedule>    MEDICATIONS  (PRN):  acetaminophen     Tablet .. 650 milliGRAM(s) Oral every 6 hours PRN Temp greater or equal to 38C (100.4F), Mild Pain (1 - 3), Moderate Pain (4 - 6)  dextrose Oral Gel 15 Gram(s) Oral once PRN Blood Glucose LESS THAN 70 milliGRAM(s)/deciliter  labetalol Injectable 10 milliGRAM(s) IV Push every 8 hours PRN Systolic blood pressure >180  melatonin 3 milliGRAM(s) Oral at bedtime PRN Insomnia      Allergies    amoxicillin (Unknown)    Intolerances        REVIEW OF SYSTEMS:  CONSTITUTIONAL: No fever, weight loss, or fatigue  EYES: No eye pain, visual disturbances, or discharge  ENMT:  No difficulty hearing, tinnitus, vertigo; No sinus or throat pain  NECK: No pain or stiffness  BREASTS: No pain, masses, or nipple discharge  RESPIRATORY: No cough, wheezing, chills or hemoptysis; No shortness of breath  CARDIOVASCULAR: No chest pain, palpitations, dizziness, or leg swelling  GASTROINTESTINAL: No abdominal or epigastric pain. No nausea, vomiting, or hematemesis; No diarrhea or constipation. No melena or hematochezia.  GENITOURINARY: No dysuria, frequency, hematuria, or incontinence  NEUROLOGICAL: No headaches, memory loss, loss of strength, numbness, or tremors  SKIN: No itching, burning, rashes, or lesions   LYMPH NODES: No enlarged glands  ENDOCRINE: No heat or cold intolerance; No hair loss  MUSCULOSKELETAL: foot pain    Vital Signs Last 24 Hrs  T(C): 37.2 (20 Sep 2024 12:16), Max: 37.9 (19 Sep 2024 17:21)  T(F): 98.9 (20 Sep 2024 12:16), Max: 100.3 (19 Sep 2024 17:21)  HR: 85 (20 Sep 2024 12:16) (81 - 96)  BP: 147/89 (20 Sep 2024 12:16) (147/89 - 162/92)  BP(mean): --  RR: 18 (20 Sep 2024 12:16) (16 - 18)  SpO2: 95% (20 Sep 2024 12:16) (95% - 96%)    Parameters below as of 20 Sep 2024 12:16  Patient On (Oxygen Delivery Method): room air        PHYSICAL EXAM:  GENERAL: NAD, well-groomed, well-developed  HEAD:  Atraumatic, Normocephalic  EYES: EOMI, PERRLA, conjunctiva and sclera clear  ENMT: Moist mucous membranes,   NECK: Supple, No JVD, Normal thyroid  NERVOUS SYSTEM:  Alert & Oriented X3, non focal  CHEST/LUNG: Clear to percussion bilaterally; No rales, rhonchi, wheezing, or rubs  HEART: Regular rate and rhythm; No murmurs, rubs, or gallops  ABDOMEN: Soft, Nontender, Nondistended; Bowel sounds present  EXTREMITIES:  foot in dressing  LYMPH: No lymphadenopathy noted  SKIN: No rashes or lesions    LABS:                        11.5   10.03 )-----------( 168      ( 20 Sep 2024 07:59 )             34.3     09-20    138  |  101  |  41[H]  ----------------------------<  248[H]  3.9   |  29  |  2.45[H]    Ca    9.0      20 Sep 2024 07:59    TPro  6.5  /  Alb  2.8[L]  /  TBili  0.6  /  DBili  x   /  AST  61[H]  /  ALT  57  /  AlkPhos  111  09-20      Urinalysis Basic - ( 20 Sep 2024 07:59 )    Color: x / Appearance: x / SG: x / pH: x  Gluc: 248 mg/dL / Ketone: x  / Bili: x / Urobili: x   Blood: x / Protein: x / Nitrite: x   Leuk Esterase: x / RBC: x / WBC x   Sq Epi: x / Non Sq Epi: x / Bacteria: x      CAPILLARY BLOOD GLUCOSE      POCT Blood Glucose.: 234 mg/dL (20 Sep 2024 12:11)  POCT Blood Glucose.: 236 mg/dL (20 Sep 2024 09:11)  POCT Blood Glucose.: 336 mg/dL (19 Sep 2024 20:55)  POCT Blood Glucose.: 393 mg/dL (19 Sep 2024 17:07)        Culture - Other (collected 09-17-24 @ 19:10)  Source: Wound Wound  Preliminary Report (09-20-24 @ 08:09):    Numerous Enterobacter cloacae complex    Numerous Enterococcus faecalis Susceptibility to follow.    Few Coag Negative Staphylococcus "Susceptibilities not performed"  Organism: Enterobacter cloacae complex (09-20-24 @ 08:09)  Organism: Enterobacter cloacae complex (09-20-24 @ 08:09)      Method Type: STEPHANIE      -  Amoxicillin/Clavulanic Acid: R >16/8      -  Ampicillin: R 16 These ampicillin results predict results for amoxicillin      -  Ampicillin/Sulbactam: R 8/4      -  Aztreonam: S <=4      -  Cefazolin: R >16      -  Cefepime: S <=2      -  Cefoxitin: R >16      -  Ceftriaxone: S <=1 Enterobacter cloacae, Klebsiella aerogenes, and Citrobacter freundii may develop resistance during prolonged therapy.      -  Ciprofloxacin: S <=0.25      -  Ertapenem: S <=0.5      -  Gentamicin: S <=2      -  Imipenem: S <=1      -  Levofloxacin: S <=0.5      -  Meropenem: S <=1      -  Piperacillin/Tazobactam: S <=8 Treatment with Pipercillin/Tazobactam is not recommended in severe infections casued by Klebsiella aerogenes, Enterobacter cloacae complex, and Citrobacter freundii complex.      -  Tobramycin: S <=2      -  Trimethoprim/Sulfamethoxazole: S <=0.5/9.5    Culture - Blood (collected 09-17-24 @ 18:13)  Source: .Blood Blood-Peripheral  Preliminary Report (09-20-24 @ 01:01):    No growth at 48 Hours    Culture - Blood (collected 09-17-24 @ 18:13)  Source: .Blood Blood-Peripheral  Preliminary Report (09-20-24 @ 01:01):    No growth at 48 Hours        I&O's Summary    19 Sep 2024 07:01  -  20 Sep 2024 07:00  --------------------------------------------------------  IN: 100 mL / OUT: 0 mL / NET: 100 mL        RADIOLOGY & ADDITIONAL TESTS:    Imaging Personally Reviewed:  [ x] YES  [ ] NO    Consultant(s) Notes Reviewed:  [x ] YES  [ ] NO    Care Discussed with Consultants/Other Providers [x ] YES  [ ] NO

## 2024-09-20 NOTE — PRE-OP CHECKLIST - MEDICAL/PEDIATRIC CLEARANCE ON MEDICAL RECORD
received from one [facility] cleared by inhouse docs, all on chart received from one [facility] cleared by inhouse , all on chart

## 2024-09-20 NOTE — CONSULT NOTE ADULT - SUBJECTIVE AND OBJECTIVE BOX
PRE-OP NOTE    Pre-Op Diagnosis:  diabetic ulceration with OM left hallux   Planned Procedure:  removal of all non viable soft tissue and bone with amputation of the left hallux with deep soft tissue and bone cultures and flap coverage all left foot   Scheduled Date / Time:  09/20/2024 1500  Indication:  OM left hallux with DM ulceration   Labs:                       11.5   10.03 )-----------( 168      ( 20 Sep 2024 07:59 )             34.3   09-20    138  |  101  |  41[H]  ----------------------------<  248[H]  3.9   |  29  |  2.45[H]    Ca    9.0      20 Sep 2024 07:59    TPro  6.5  /  Alb  2.8[L]  /  TBili  0.6  /  DBili  x   /  AST  61[H]  /  ALT  57  /  AlkPhos  111  09-20  LIVER FUNCTIONS - ( 20 Sep 2024 07:59 )  Alb: 2.8 g/dL / Pro: 6.5 g/dL / ALK PHOS: 111 U/L / ALT: 57 U/L / AST: 61 U/L / GGT: x           Vitals: Vital Signs Last 24 Hrs  T(C): 36.9 (20 Sep 2024 05:07), Max: 37.9 (19 Sep 2024 17:21)  T(F): 98.5 (20 Sep 2024 05:07), Max: 100.3 (19 Sep 2024 17:21)  HR: 81 (20 Sep 2024 05:07) (81 - 96)  BP: 156/95 (20 Sep 2024 05:07) (156/95 - 162/92)  BP(mean): --  RR: 18 (20 Sep 2024 05:07) (16 - 18)  SpO2: 96% (20 Sep 2024 05:07) (95% - 96%)    Parameters below as of 20 Sep 2024 05:07  Patient On (Oxygen Delivery Method): room air      PE:   Official CXR reading: (On Chart)  Official EKG reading: (On Chart)  Type and Cross/Screen:   NPO after MN  Antibiotics:   Anesthesia evaluation (on chart)  Operative Consent (on chart)       Patient  had an opportunity to have all questions asked and answered Patient is aware of all risks, benefits, alternatives and recuperative period involved with the planned surgical procedure.  No assurances or guarantees were given to the patient.  Pt understands the severity of his condition and that he is at risk to lose part of the foot, all of the foot or even a below knee amputation. Pt understands that serial procedures may be required. Pt consents to the proposed care plan at this time having had an opportunity to have all of questions asked and answered to his satisfaction.

## 2024-09-20 NOTE — CONSULT NOTE ADULT - PROBLEM SELECTOR PROBLEM 1
Diabetic toe ulcer
Osteomyelitis of ankle or foot, acute
Uncontrolled type 2 diabetes mellitus with hyperglycemia
0
Uncontrolled type 2 diabetes mellitus with hyperglycemia

## 2024-09-20 NOTE — BRIEF OPERATIVE NOTE - NSICDXBRIEFPROCEDURE_GEN_ALL_CORE_FT
PROCEDURES:  Amputation of single toe 20-Sep-2024 17:35:36  Salvatore Davenport  Filleted toe flap 20-Sep-2024 17:35:44  Salvatore Davenport

## 2024-09-20 NOTE — PROGRESS NOTE ADULT - SUBJECTIVE AND OBJECTIVE BOX
Subjective: NPO to OR      MEDICATIONS  (STANDING):  ceFAZolin   IVPB 2000 milliGRAM(s) IV Intermittent every 8 hours  dextrose 5%. 1000 milliLiter(s) (50 mL/Hr) IV Continuous <Continuous>  dextrose 5%. 1000 milliLiter(s) (100 mL/Hr) IV Continuous <Continuous>  dextrose 50% Injectable 25 Gram(s) IV Push once  dextrose 50% Injectable 12.5 Gram(s) IV Push once  dextrose 50% Injectable 25 Gram(s) IV Push once  enoxaparin Injectable 40 milliGRAM(s) SubCutaneous every 24 hours  glucagon  Injectable 1 milliGRAM(s) IntraMuscular once  influenza   Vaccine 0.5 milliLiter(s) IntraMuscular once  insulin glargine Injectable (LANTUS) 10 Unit(s) SubCutaneous at bedtime  insulin lispro (ADMELOG) corrective regimen sliding scale   SubCutaneous three times a day before meals  insulin lispro (ADMELOG) corrective regimen sliding scale   SubCutaneous at bedtime  lactobacillus acidophilus 1 Tablet(s) Oral two times a day with meals  losartan 50 milliGRAM(s) Oral daily  mupirocin 2% Ointment 1 Application(s) Topical <User Schedule>    MEDICATIONS  (PRN):  acetaminophen     Tablet .. 650 milliGRAM(s) Oral every 6 hours PRN Temp greater or equal to 38C (100.4F), Mild Pain (1 - 3), Moderate Pain (4 - 6)  dextrose Oral Gel 15 Gram(s) Oral once PRN Blood Glucose LESS THAN 70 milliGRAM(s)/deciliter  labetalol Injectable 10 milliGRAM(s) IV Push every 8 hours PRN Systolic blood pressure >180  melatonin 3 milliGRAM(s) Oral at bedtime PRN Insomnia          T(C): 37.3 (09-20-24 @ 13:33), Max: 37.9 (09-19-24 @ 17:21)  HR: 90 (09-20-24 @ 13:33) (81 - 96)  BP: 144/98 (09-20-24 @ 13:33) (144/98 - 160/99)  RR: 18 (09-20-24 @ 13:33) (16 - 18)  SpO2: 94% (09-20-24 @ 13:33) (94% - 96%)  Wt(kg): --        I&O's Detail    19 Sep 2024 07:01  -  20 Sep 2024 07:00  --------------------------------------------------------  IN:    IV PiggyBack: 100 mL  Total IN: 100 mL    OUT:  Total OUT: 0 mL    Total NET: 100 mL               PHYSICAL EXAM:    GENERAL: NAD  NECK: Supple, no inc in JVP  CHEST/LUNG: Clear  HEART: S1S2  ABDOMEN: Soft, Nontender, Nondistended; Bowel sounds present  EXTREMITIES:  l foot dressed. No ankles edema.   NEURO: no asterixis      LABS:  CBC Full  -  ( 20 Sep 2024 07:59 )  WBC Count : 10.03 K/uL  RBC Count : 3.88 M/uL  Hemoglobin : 11.5 g/dL  Hematocrit : 34.3 %  Platelet Count - Automated : 168 K/uL  Mean Cell Volume : 88.4 fl  Mean Cell Hemoglobin : 29.6 pg  Mean Cell Hemoglobin Concentration : 33.5 gm/dL  Auto Neutrophil # : 6.52 K/uL  Auto Lymphocyte # : 2.41 K/uL  Auto Monocyte # : 1.00 K/uL  Auto Eosinophil # : 0.00 K/uL  Auto Basophil # : 0.00 K/uL  Auto Neutrophil % : 64.0 %  Auto Lymphocyte % : 24.0 %  Auto Monocyte % : 10.0 %  Auto Eosinophil % : 0.0 %  Auto Basophil % : 0.0 %    09-20    138  |  101  |  41[H]  ----------------------------<  248[H]  3.9   |  29  |  2.45[H]    Ca    9.0      20 Sep 2024 07:59    TPro  6.5  /  Alb  2.8[L]  /  TBili  0.6  /  DBili  x   /  AST  61[H]  /  ALT  57  /  AlkPhos  111  09-20      Urinalysis Basic - ( 20 Sep 2024 07:59 )    Color: x / Appearance: x / SG: x / pH: x  Gluc: 248 mg/dL / Ketone: x  / Bili: x / Urobili: x   Blood: x / Protein: x / Nitrite: x   Leuk Esterase: x / RBC: x / WBC x   Sq Epi: x / Non Sq Epi: x / Bacteria: x        Impression:  * Elevated Cr. Suspect mainly diabetic CKD 3. ? component of septic ATN  * Poorly controlled DM, Hgb A1C >10  * DFU, concern for OM    Recommendations:   * Hold ARB  * IVFs periop  * BMP tomorrow.

## 2024-09-20 NOTE — PROGRESS NOTE ADULT - ASSESSMENT
The patient is a 49 year old male with a history of HTN, HL, DM, PAD who presents with a toe infection.    Plan:  - ECG with sinus rhythm and no evidence of ischemia/infarction  - The patient was on labetalol in the past but has been noncompliant with medications  - Continue losartan 50 mg daily  - If renal function worsens further, will discontinue losartan  - Will add amlodipine 5 mg daily post-procedure  - LE arterial duplex with no significant stenosis  - MRI foot with acute/chronic osteomyelitis  - Podiatry follow-up  - Plan for surgery; the patient is at intermediate risk for cardiac events and is optimized to proceed from a cardiac standpoint

## 2024-09-20 NOTE — CASE MANAGEMENT PROGRESS NOTE - NSCMPROGRESSNOTE_GEN_ALL_CORE
RN/CM noted that pt's case discussed during Interdisciplinary rounds, pt remains acute, on IV Ancef Q 8H, planned for OR today 09/20/24 as per podiatrist for left hallux amputation with debridement of all non-viable bone and soft tissue with flap closure. Pt was independent prior to admission. DC pending hospital course. CM remains available.

## 2024-09-20 NOTE — SOCIAL WORK PROGRESS NOTE - NSSWPROGRESSNOTE_GEN_ALL_CORE
SW attempted to meet with patient at his request to discuss his concern for financial situation if he will be out of work.  Pt was sleeping soundly.  SW to attempt to see him later in day.  
bed

## 2024-09-20 NOTE — PROGRESS NOTE ADULT - SUBJECTIVE AND OBJECTIVE BOX
POST OP NOTE    SUZANNA [facility]  [id number]    Date:  Surgeon: Salvatore Davenport DPM   Assistants: Saman Mayo DPM resident   Harley Beckwith DPM resident  Procedure: amputation of left hallux with fillet of toe flap coverage with deep soft tissue and bone cultures all left   Pathology: bone and soft tissue   EBL: 2ml    Patient tolerated both anesthesia and  procedures well and was transported from the operating room to the recovery room with vital signs stable and neurovascular status intact.  Patient transferred to PACU in stable condition.       PE / Post Op Check:       GEN: NAD, AAOx3, resting comfortably with pain controlled      LE Focused: CFT shows adequate perfusion b/l with no signs of ischemic compromise.  No strikethrough is appreciated on surgical dressings.  Dressings were dry, clean, and intact.  No neuromuscular deficits appreciated.

## 2024-09-20 NOTE — BRIEF OPERATIVE NOTE - NSICDXBRIEFPOSTOP_GEN_ALL_CORE_FT
POST-OP DIAGNOSIS:  Diabetic toe ulcer 20-Sep-2024 17:36:10  Salvatore Davenport  Acute osteomyelitis 20-Sep-2024 17:36:15  Salvatore Davenport

## 2024-09-20 NOTE — PROGRESS NOTE ADULT - PROBLEM SELECTOR PLAN 1
post operative radiographs ordered  will await intraoperative cultures to establish appropriate ABX  regimen  cont IV ABX as per ID   will cont to follow and discuss with all attendings

## 2024-09-20 NOTE — PROGRESS NOTE ADULT - SUBJECTIVE AND OBJECTIVE BOX
ANUJASUZANNA HURLEY is a 49yMale , patient examined and chart reviewed.    INTERVAL HPI/ OVERNIGHT EVENTS:   Afebrile. NAD.  Agreed for toe amputation.     PAST MEDICAL & SURGICAL HISTORY:  Prediabetes  HTN (hypertension)  HLD (hyperlipidemia)  DM2 (diabetes mellitus, type 2)      For details regarding the patient's social history, family history, and other miscellaneous elements, please refer the initial infectious diseases consultation and/or the admitting history and physical examination for this admission.    ROS:  CONSTITUTIONAL:  Negative fever or chills  EYES:  Negative  blurry vision or double vision  CARDIOVASCULAR:  Negative for chest pain or palpitations  RESPIRATORY:  Negative for cough, wheezing, or SOB   GASTROINTESTINAL:  Negative for nausea, vomiting, diarrhea, constipation, or abdominal pain  GENITOURINARY:  Negative frequency, urgency or dysuria  NEUROLOGIC:  No headache, confusion, dizziness, lightheadedness  All other systems were reviewed and are negative     ALLERGIES  amoxicillin (Unknown)      Current inpatient medications :    ANTIBIOTICS/RELEVANT:  ceFAZolin   IVPB 2000 milliGRAM(s) IV Intermittent every 8 hours    MEDICATIONS  (STANDING):  dextrose 5%. 1000 milliLiter(s) (100 mL/Hr) IV Continuous <Continuous>  dextrose 5%. 1000 milliLiter(s) (50 mL/Hr) IV Continuous <Continuous>  dextrose 50% Injectable 12.5 Gram(s) IV Push once  dextrose 50% Injectable 25 Gram(s) IV Push once  dextrose 50% Injectable 25 Gram(s) IV Push once  enoxaparin Injectable 40 milliGRAM(s) SubCutaneous every 24 hours  glucagon  Injectable 1 milliGRAM(s) IntraMuscular once  influenza   Vaccine 0.5 milliLiter(s) IntraMuscular once  insulin glargine Injectable (LANTUS) 10 Unit(s) SubCutaneous at bedtime  insulin lispro (ADMELOG) corrective regimen sliding scale   SubCutaneous at bedtime  insulin lispro (ADMELOG) corrective regimen sliding scale   SubCutaneous three times a day before meals  lactated ringers. 1000 milliLiter(s) (75 mL/Hr) IV Continuous <Continuous>  lactobacillus acidophilus 1 Tablet(s) Oral two times a day with meals  mupirocin 2% Ointment 1 Application(s) Topical <User Schedule>  sodium chloride 0.45%. 1000 milliLiter(s) (60 mL/Hr) IV Continuous <Continuous>    MEDICATIONS  (PRN):  acetaminophen     Tablet .. 650 milliGRAM(s) Oral every 6 hours PRN Temp greater or equal to 38C (100.4F), Mild Pain (1 - 3), Moderate Pain (4 - 6)  dextrose Oral Gel 15 Gram(s) Oral once PRN Blood Glucose LESS THAN 70 milliGRAM(s)/deciliter  HYDROmorphone  Injectable 0.25 milliGRAM(s) IV Push every 10 minutes PRN Moderate Pain (4 - 6)  HYDROmorphone  Injectable 0.5 milliGRAM(s) IV Push every 10 minutes PRN Severe Pain (7 - 10)  labetalol Injectable 10 milliGRAM(s) IV Push every 8 hours PRN Systolic blood pressure >180  melatonin 3 milliGRAM(s) Oral at bedtime PRN Insomnia  ondansetron Injectable 4 milliGRAM(s) IV Push once PRN Nausea and/or Vomiting  oxyCODONE    IR 5 milliGRAM(s) Oral once PRN Moderate Pain (4 - 6)      Objective:  Vital Signs Last 24 Hrs  T(C): 37.4 (20 Sep 2024 18:56), Max: 37.4 (20 Sep 2024 17:46)  T(F): 99.4 (20 Sep 2024 18:56), Max: 99.4 (20 Sep 2024 17:46)  HR: 96 (20 Sep 2024 18:56) (81 - 102)  BP: 130/83 (20 Sep 2024 18:56) (113/75 - 162/89)  BP(mean): 96 (20 Sep 2024 18:31) (80 - 96)  RR: 16 (20 Sep 2024 18:56) (12 - 25)  SpO2: 96% (20 Sep 2024 18:56) (94% - 99%)    Parameters below as of 20 Sep 2024 18:56  Patient On (Oxygen Delivery Method): room air      Physical Exam:  General:  no acute distress  Neck: supple, trachea midline  Lungs: clear, no wheeze/rhonchi  Cardiovascular: regular rate and rhythm, S1 S2  Abdomen: soft, nontender,  bowel sounds normal  Neurological: alert and oriented x3  Skin: no rash  Extremities: right foot drsg c/d/i      LABS:                        11.5   10.03 )-----------( 168      ( 20 Sep 2024 07:59 )             34.3   09-20    138  |  101  |  41[H]  ----------------------------<  248[H]  3.9   |  29  |  2.45[H]    Ca    9.0      20 Sep 2024 07:59    TPro  6.5  /  Alb  2.8[L]  /  TBili  0.6  /  DBili  x   /  AST  61[H]  /  ALT  57  /  AlkPhos  111  09-20      MICROBIOLOGY:  Culture - Other (09.17.24 @ 19:10)    -  Amoxicillin/Clavulanic Acid: R >16/8   -  Ampicillin: R 16 These ampicillin results predict results for amoxicillin   -  Ampicillin: S <=2 Predicts results to ampicillin/sulbactam, amoxacillin-clavulanate and  piperacillin-tazobactam.   -  Ampicillin/Sulbactam: R 8/4   -  Aztreonam: S <=4   -  Cefazolin: R >16   -  Cefepime: S <=2   -  Cefoxitin: R >16   -  Ceftriaxone: S <=1 Enterobacter cloacae, Klebsiella aerogenes, and Citrobacter freundii may develop resistance during prolonged therapy.   -  Ciprofloxacin: S <=0.25   -  Ertapenem: S <=0.5   -  Gentamicin: S <=2   -  Imipenem: S <=1   -  Levofloxacin: S <=0.5   -  Meropenem: S <=1   -  Piperacillin/Tazobactam: S <=8 Treatment with Pipercillin/Tazobactam is not recommended in severe infections casued by Klebsiella aerogenes, Enterobacter cloacae complex, and Citrobacter freundii complex.   -  Tobramycin: S <=2   -  Trimethoprim/Sulfamethoxazole: S <=0.5/9.5   -  Vancomycin: S 2   Specimen Source: Wound Wound   Culture Results:   Numerous Enterobacter cloacae complex  Numerous Enterococcus faecalis  Few Coag Negative Staphylococcus "Susceptibilities not performed"   Organism Identification: Enterobacter cloacae complex  Enterococcus faecalis   Organism: Enterobacter cloacae complex   Organism: Enterococcus faecalis   Method Type: STEPHANIE   Method Type: STEPHANIE    Culture - Blood (collected 17 Sep 2024 18:13)  Source: .Blood Blood-Peripheral  Preliminary Report (19 Sep 2024 01:01):    No growth at 24 hours    Culture - Blood (collected 17 Sep 2024 18:13)  Source: .Blood Blood-Peripheral  Preliminary Report (19 Sep 2024 01:01):    No growth at 24 hours    RADIOLOGY & ADDITIONAL STUDIES:    ACC: [id number] EXAM:  MR FOOT LT   ORDERED BY: LISA ALY     PROCEDURE DATE:  09/19/2024          INTERPRETATION:  LEFT FOOT MRI    CLINICAL INDICATION: Diabetic foot ulcer, for evaluation of osteomyelitis.    COMPARISON: Radiographs dated 9/17/2024.    TECHNIQUE: Multiplanar, multisequence MRI was obtained of the left foot.    FINDINGS:    OSSEOUS: Acute on chronic osteomyelitis/septic arthritis at the proximal   interphalangeal joint of the second toe in the proper clinical setting.   Focal acute osteomyelitis along the plantar lateral cortex of the great   toe distal phalanx base with ill-defined overlying deep soft tissue   ulceration. Lisfranc interval is not widened on this non-weightbearing   examination.    TENDONS: Visualized flexor and extensor tendons are intact.    LIGAMENTS: Lisfranc ligament is intact.    GENERAL: No drainable encapsulated fluid collection. No significant   intermetatarsal bursal fluid distension. No interdigital neuroma. Mild to   moderate heterogeneous chronic muscular atrophy with superimposed patchy   denervation edema like signal.    IMPRESSION:    1.  Acute on chronic osteomyelitis/septic arthritis at the proximal   interphalangeal joint of left second toe in the proper clinical setting.  2.Focal acute osteomyelitis along the plantar lateral cortex of left   great toe distal phalanx base with overlying deep soft tissue ulceration.    Assessment :  50YO M PMH DM2, HTN, HLD, NEUROPATHY, PVD, NON COMPLIANT WITH TT, ETOH ABUSE, admitted with infected DM left great toe infection  MRI + OM  For toe amputation today by Dr Davenport    Plan :   Change Ancef to Zosyn  To OR today  Fu OR cultures and path  Dm control  Trend temps and cbc  Podiatry follow up    Continue with present regiment.  Appropriate use of antibiotics and adverse effects reviewed.      I have discussed the above plan of care with patient in detail. He expressed understanding of the  treatment plan . Risks, benefits and alternatives discussed in detail. I have asked if he has any questions or concerns and appropriately addressed them to the best of my ability .    > 35 minutes were spent in direct patient care reviewing notes, medications ,labs data/ imaging , discussion with multidisciplinary team.    Thank you for allowing me to participate in care of your patient .    Linsey Mack MD  Infectious Disease  [phone]      ANUJASUZANNA HURLEY is a 49yMale , patient examined and chart reviewed.    INTERVAL HPI/ OVERNIGHT EVENTS:   Afebrile. NAD.  Agreed for toe amputation.     PAST MEDICAL & SURGICAL HISTORY:  Prediabetes  HTN (hypertension)  HLD (hyperlipidemia)  DM2 (diabetes mellitus, type 2)      For details regarding the patient's social history, family history, and other miscellaneous elements, please refer the initial infectious diseases consultation and/or the admitting history and physical examination for this admission.    ROS:  CONSTITUTIONAL:  Negative fever or chills  EYES:  Negative  blurry vision or double vision  CARDIOVASCULAR:  Negative for chest pain or palpitations  RESPIRATORY:  Negative for cough, wheezing, or SOB   GASTROINTESTINAL:  Negative for nausea, vomiting, diarrhea, constipation, or abdominal pain  GENITOURINARY:  Negative frequency, urgency or dysuria  NEUROLOGIC:  No headache, confusion, dizziness, lightheadedness  All other systems were reviewed and are negative     ALLERGIES  amoxicillin (Unknown)      Current inpatient medications :    ANTIBIOTICS/RELEVANT:  ceFAZolin   IVPB 2000 milliGRAM(s) IV Intermittent every 8 hours    MEDICATIONS  (STANDING):  dextrose 5%. 1000 milliLiter(s) (100 mL/Hr) IV Continuous <Continuous>  dextrose 5%. 1000 milliLiter(s) (50 mL/Hr) IV Continuous <Continuous>  dextrose 50% Injectable 12.5 Gram(s) IV Push once  dextrose 50% Injectable 25 Gram(s) IV Push once  dextrose 50% Injectable 25 Gram(s) IV Push once  enoxaparin Injectable 40 milliGRAM(s) SubCutaneous every 24 hours  glucagon  Injectable 1 milliGRAM(s) IntraMuscular once  influenza   Vaccine 0.5 milliLiter(s) IntraMuscular once  insulin glargine Injectable (LANTUS) 10 Unit(s) SubCutaneous at bedtime  insulin lispro (ADMELOG) corrective regimen sliding scale   SubCutaneous at bedtime  insulin lispro (ADMELOG) corrective regimen sliding scale   SubCutaneous three times a day before meals  lactated ringers. 1000 milliLiter(s) (75 mL/Hr) IV Continuous <Continuous>  lactobacillus acidophilus 1 Tablet(s) Oral two times a day with meals  mupirocin 2% Ointment 1 Application(s) Topical <User Schedule>  sodium chloride 0.45%. 1000 milliLiter(s) (60 mL/Hr) IV Continuous <Continuous>    MEDICATIONS  (PRN):  acetaminophen     Tablet .. 650 milliGRAM(s) Oral every 6 hours PRN Temp greater or equal to 38C (100.4F), Mild Pain (1 - 3), Moderate Pain (4 - 6)  dextrose Oral Gel 15 Gram(s) Oral once PRN Blood Glucose LESS THAN 70 milliGRAM(s)/deciliter  HYDROmorphone  Injectable 0.25 milliGRAM(s) IV Push every 10 minutes PRN Moderate Pain (4 - 6)  HYDROmorphone  Injectable 0.5 milliGRAM(s) IV Push every 10 minutes PRN Severe Pain (7 - 10)  labetalol Injectable 10 milliGRAM(s) IV Push every 8 hours PRN Systolic blood pressure >180  melatonin 3 milliGRAM(s) Oral at bedtime PRN Insomnia  ondansetron Injectable 4 milliGRAM(s) IV Push once PRN Nausea and/or Vomiting  oxyCODONE    IR 5 milliGRAM(s) Oral once PRN Moderate Pain (4 - 6)      Objective:  Vital Signs Last 24 Hrs  T(C): 37.4 (20 Sep 2024 18:56), Max: 37.4 (20 Sep 2024 17:46)  T(F): 99.4 (20 Sep 2024 18:56), Max: 99.4 (20 Sep 2024 17:46)  HR: 96 (20 Sep 2024 18:56) (81 - 102)  BP: 130/83 (20 Sep 2024 18:56) (113/75 - 162/89)  BP(mean): 96 (20 Sep 2024 18:31) (80 - 96)  RR: 16 (20 Sep 2024 18:56) (12 - 25)  SpO2: 96% (20 Sep 2024 18:56) (94% - 99%)    Parameters below as of 20 Sep 2024 18:56  Patient On (Oxygen Delivery Method): room air      Physical Exam:  General:  no acute distress  Neck: supple, trachea midline  Lungs: clear, no wheeze/rhonchi  Cardiovascular: regular rate and rhythm, S1 S2  Abdomen: soft, nontender,  bowel sounds normal  Neurological: alert and oriented x3  Skin: no rash  Extremities: Left foot drsg c/d/i      LABS:                        11.5   10.03 )-----------( 168      ( 20 Sep 2024 07:59 )             34.3   09-20    138  |  101  |  41[H]  ----------------------------<  248[H]  3.9   |  29  |  2.45[H]    Ca    9.0      20 Sep 2024 07:59    TPro  6.5  /  Alb  2.8[L]  /  TBili  0.6  /  DBili  x   /  AST  61[H]  /  ALT  57  /  AlkPhos  111  09-20      MICROBIOLOGY:  Culture - Other (09.17.24 @ 19:10)    -  Amoxicillin/Clavulanic Acid: R >16/8   -  Ampicillin: R 16 These ampicillin results predict results for amoxicillin   -  Ampicillin: S <=2 Predicts results to ampicillin/sulbactam, amoxacillin-clavulanate and  piperacillin-tazobactam.   -  Ampicillin/Sulbactam: R 8/4   -  Aztreonam: S <=4   -  Cefazolin: R >16   -  Cefepime: S <=2   -  Cefoxitin: R >16   -  Ceftriaxone: S <=1 Enterobacter cloacae, Klebsiella aerogenes, and Citrobacter freundii may develop resistance during prolonged therapy.   -  Ciprofloxacin: S <=0.25   -  Ertapenem: S <=0.5   -  Gentamicin: S <=2   -  Imipenem: S <=1   -  Levofloxacin: S <=0.5   -  Meropenem: S <=1   -  Piperacillin/Tazobactam: S <=8 Treatment with Pipercillin/Tazobactam is not recommended in severe infections casued by Klebsiella aerogenes, Enterobacter cloacae complex, and Citrobacter freundii complex.   -  Tobramycin: S <=2   -  Trimethoprim/Sulfamethoxazole: S <=0.5/9.5   -  Vancomycin: S 2   Specimen Source: Wound Wound   Culture Results:   Numerous Enterobacter cloacae complex  Numerous Enterococcus faecalis  Few Coag Negative Staphylococcus "Susceptibilities not performed"   Organism Identification: Enterobacter cloacae complex  Enterococcus faecalis   Organism: Enterobacter cloacae complex   Organism: Enterococcus faecalis   Method Type: STEPHANIE   Method Type: STEPHANIE    Culture - Blood (collected 17 Sep 2024 18:13)  Source: .Blood Blood-Peripheral  Preliminary Report (19 Sep 2024 01:01):    No growth at 24 hours    Culture - Blood (collected 17 Sep 2024 18:13)  Source: .Blood Blood-Peripheral  Preliminary Report (19 Sep 2024 01:01):    No growth at 24 hours    RADIOLOGY & ADDITIONAL STUDIES:    ACC: [id number] EXAM:  MR FOOT LT   ORDERED BY: LISA ALY     PROCEDURE DATE:  09/19/2024          INTERPRETATION:  LEFT FOOT MRI    CLINICAL INDICATION: Diabetic foot ulcer, for evaluation of osteomyelitis.    COMPARISON: Radiographs dated 9/17/2024.    TECHNIQUE: Multiplanar, multisequence MRI was obtained of the left foot.    FINDINGS:    OSSEOUS: Acute on chronic osteomyelitis/septic arthritis at the proximal   interphalangeal joint of the second toe in the proper clinical setting.   Focal acute osteomyelitis along the plantar lateral cortex of the great   toe distal phalanx base with ill-defined overlying deep soft tissue   ulceration. Lisfranc interval is not widened on this non-weightbearing   examination.    TENDONS: Visualized flexor and extensor tendons are intact.    LIGAMENTS: Lisfranc ligament is intact.    GENERAL: No drainable encapsulated fluid collection. No significant   intermetatarsal bursal fluid distension. No interdigital neuroma. Mild to   moderate heterogeneous chronic muscular atrophy with superimposed patchy   denervation edema like signal.    IMPRESSION:    1.  Acute on chronic osteomyelitis/septic arthritis at the proximal   interphalangeal joint of left second toe in the proper clinical setting.  2.Focal acute osteomyelitis along the plantar lateral cortex of left   great toe distal phalanx base with overlying deep soft tissue ulceration.    Assessment :  48YO M PMH DM2, HTN, HLD, NEUROPATHY, PVD, NON COMPLIANT WITH TT, ETOH ABUSE, admitted with infected DM left great toe infection  MRI + OM  For toe amputation today by Dr Davenport    Plan :   Change Ancef to Zosyn  To OR today  Fu OR cultures and path  Dm control  Trend temps and cbc  Podiatry follow up    Continue with present regiment.  Appropriate use of antibiotics and adverse effects reviewed.      I have discussed the above plan of care with patient in detail. He expressed understanding of the  treatment plan . Risks, benefits and alternatives discussed in detail. I have asked if he has any questions or concerns and appropriately addressed them to the best of my ability .    > 35 minutes were spent in direct patient care reviewing notes, medications ,labs data/ imaging , discussion with multidisciplinary team.    Thank you for allowing me to participate in care of your patient .    Linsey Mack MD  Infectious Disease  [phone]

## 2024-09-20 NOTE — BRIEF OPERATIVE NOTE - NSICDXBRIEFPREOP_GEN_ALL_CORE_FT
PRE-OP DIAGNOSIS:  Diabetic toe ulcer 20-Sep-2024 17:35:54  Salvatore Davenport  Acute osteomyelitis 20-Sep-2024 17:36:01  Salvatore Davenport

## 2024-09-20 NOTE — PROGRESS NOTE ADULT - ASSESSMENT
49-year-old male with past medical history of DM2, HTN, HLD, NEUROPATHY, PVD, NON COMPLIANT WITH TT, ETOH ABUSE, presents today due to left toe infection.  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  Patient experienced a blister to the left toe in which he eventually fell orders follow-up on its own.  Patient notes that the blister opened up and he developed some blood in the blister as well.  Patient notes that the blister scab has slightly fallen off but now his toe is red and painful.  Patient not currently on antibiotics.  Patient denies fever, injury, numbness, weakness, or any other complaints.   IN ED TEMP 99, /100, HR 89, had consult with podiatry and sidney started on Cipro and admitted for further management    # Diabetic foot ulcer with cellullitis(left halluxdiabetic ulcer 3x4x0.1 cm with erythema and edema mal odorous , hyperkeratotic  started on ABX, podiatry consult noted, daily dressing, R/o PVD- arterial doppler- negative for any arterial disease  ID consult noted   MR foot done- ac on ch OM with sepstic arthritis of 2ndleft toe, OM left great toe-  [id number] for surg by podiatry  # DM2 with hyperglycemia, with neuropathy , nephropathy with likely PVD    ssi, consistent carb, Endo consult,  hba1c 10.2   Endo consult noted , started on lantus  # Ess HTN   started on losartan, monitor, cardiology f up  noted  patient is at intermediate risk for cardiac events and is optimized to proceed from a cardiac standpoint  will monitor renal indices   DVT prophylaxis  ID , endo and podiatry consult  # LUISA with CKD   nephro consult, will monitor indices,  # HLD   low fat diet  # DVT prophylaxis  Pt is Intermediate risk for surg and anaesthesia  , optimized, cardio clearance noted

## 2024-09-21 LAB
ALBUMIN SERPL ELPH-MCNC: 2.7 G/DL — LOW (ref 3.3–5)
ALP SERPL-CCNC: 105 U/L — SIGNIFICANT CHANGE UP (ref 30–120)
ALT FLD-CCNC: 43 U/L — SIGNIFICANT CHANGE UP (ref 10–60)
ANION GAP SERPL CALC-SCNC: 9 MMOL/L — SIGNIFICANT CHANGE UP (ref 5–17)
AST SERPL-CCNC: 62 U/L — HIGH (ref 10–40)
BASOPHILS # BLD AUTO: 0.1 K/UL — SIGNIFICANT CHANGE UP (ref 0–0.2)
BASOPHILS NFR BLD AUTO: 1 % — SIGNIFICANT CHANGE UP (ref 0–2)
BILIRUB SERPL-MCNC: 0.8 MG/DL — SIGNIFICANT CHANGE UP (ref 0.2–1.2)
BUN SERPL-MCNC: 44 MG/DL — HIGH (ref 7–23)
CALCIUM SERPL-MCNC: 9.3 MG/DL — SIGNIFICANT CHANGE UP (ref 8.4–10.5)
CHLORIDE SERPL-SCNC: 98 MMOL/L — SIGNIFICANT CHANGE UP (ref 96–108)
CO2 SERPL-SCNC: 27 MMOL/L — SIGNIFICANT CHANGE UP (ref 22–31)
CREAT SERPL-MCNC: 2.64 MG/DL — HIGH (ref 0.5–1.3)
EGFR: 29 ML/MIN/1.73M2 — LOW
EOSINOPHIL # BLD AUTO: 0.1 K/UL — SIGNIFICANT CHANGE UP (ref 0–0.5)
EOSINOPHIL NFR BLD AUTO: 1 % — SIGNIFICANT CHANGE UP (ref 0–6)
GLUCOSE BLDC GLUCOMTR-MCNC: 205 MG/DL — HIGH (ref 70–99)
GLUCOSE BLDC GLUCOMTR-MCNC: 263 MG/DL — HIGH (ref 70–99)
GLUCOSE BLDC GLUCOMTR-MCNC: 299 MG/DL — HIGH (ref 70–99)
GLUCOSE BLDC GLUCOMTR-MCNC: 307 MG/DL — HIGH (ref 70–99)
GLUCOSE SERPL-MCNC: 216 MG/DL — HIGH (ref 70–99)
GRAM STN FLD: SIGNIFICANT CHANGE UP
HCT VFR BLD CALC: 34.5 % — LOW (ref 39–50)
HCT VFR BLD CALC: 34.6 % — LOW (ref 39–50)
HGB BLD-MCNC: 11.4 G/DL — LOW (ref 13–17)
HGB BLD-MCNC: 11.5 G/DL — LOW (ref 13–17)
IMM GRANULOCYTES NFR BLD AUTO: 1.7 % — HIGH (ref 0–0.9)
INR BLD: 1.23 RATIO — HIGH (ref 0.85–1.18)
LYMPHOCYTES # BLD AUTO: 1.61 K/UL — SIGNIFICANT CHANGE UP (ref 1–3.3)
LYMPHOCYTES # BLD AUTO: 15.9 % — SIGNIFICANT CHANGE UP (ref 13–44)
MCHC RBC-ENTMCNC: 29.6 PG — SIGNIFICANT CHANGE UP (ref 27–34)
MCHC RBC-ENTMCNC: 29.9 PG — SIGNIFICANT CHANGE UP (ref 27–34)
MCHC RBC-ENTMCNC: 32.9 GM/DL — SIGNIFICANT CHANGE UP (ref 32–36)
MCHC RBC-ENTMCNC: 33.3 GM/DL — SIGNIFICANT CHANGE UP (ref 32–36)
MCV RBC AUTO: 89.8 FL — SIGNIFICANT CHANGE UP (ref 80–100)
MCV RBC AUTO: 89.9 FL — SIGNIFICANT CHANGE UP (ref 80–100)
MONOCYTES # BLD AUTO: 0.94 K/UL — HIGH (ref 0–0.9)
MONOCYTES NFR BLD AUTO: 9.3 % — SIGNIFICANT CHANGE UP (ref 2–14)
NEUTROPHILS # BLD AUTO: 7.2 K/UL — SIGNIFICANT CHANGE UP (ref 1.8–7.4)
NEUTROPHILS NFR BLD AUTO: 71.1 % — SIGNIFICANT CHANGE UP (ref 43–77)
NIGHT BLUE STAIN TISS: SIGNIFICANT CHANGE UP
NIGHT BLUE STAIN TISS: SIGNIFICANT CHANGE UP
NRBC # BLD: 0 /100 WBCS — SIGNIFICANT CHANGE UP (ref 0–0)
NRBC # BLD: 0 /100 WBCS — SIGNIFICANT CHANGE UP (ref 0–0)
PLATELET # BLD AUTO: 150 K/UL — SIGNIFICANT CHANGE UP (ref 150–400)
PLATELET # BLD AUTO: 162 K/UL — SIGNIFICANT CHANGE UP (ref 150–400)
POTASSIUM SERPL-MCNC: 4.1 MMOL/L — SIGNIFICANT CHANGE UP (ref 3.5–5.3)
POTASSIUM SERPL-SCNC: 4.1 MMOL/L — SIGNIFICANT CHANGE UP (ref 3.5–5.3)
PROT SERPL-MCNC: 7.1 G/DL — SIGNIFICANT CHANGE UP (ref 6–8.3)
PROTHROM AB SERPL-ACNC: 13.7 SEC — HIGH (ref 9.5–13)
RBC # BLD: 3.84 M/UL — LOW (ref 4.2–5.8)
RBC # BLD: 3.85 M/UL — LOW (ref 4.2–5.8)
RBC # FLD: 12.6 % — SIGNIFICANT CHANGE UP (ref 10.3–14.5)
RBC # FLD: 12.7 % — SIGNIFICANT CHANGE UP (ref 10.3–14.5)
SODIUM SERPL-SCNC: 134 MMOL/L — LOW (ref 135–145)
SPECIMEN SOURCE: SIGNIFICANT CHANGE UP
WBC # BLD: 10.12 K/UL — SIGNIFICANT CHANGE UP (ref 3.8–10.5)
WBC # BLD: 10.2 K/UL — SIGNIFICANT CHANGE UP (ref 3.8–10.5)
WBC # FLD AUTO: 10.12 K/UL — SIGNIFICANT CHANGE UP (ref 3.8–10.5)
WBC # FLD AUTO: 10.2 K/UL — SIGNIFICANT CHANGE UP (ref 3.8–10.5)

## 2024-09-21 PROCEDURE — 73630 X-RAY EXAM OF FOOT: CPT | Mod: 26,LT

## 2024-09-21 RX ORDER — INSULIN LISPRO 100/ML
VIAL (ML) SUBCUTANEOUS
Refills: 0 | Status: DISCONTINUED | OUTPATIENT
Start: 2024-09-21 | End: 2024-10-04

## 2024-09-21 RX ORDER — INSULIN LISPRO 100/ML
VIAL (ML) SUBCUTANEOUS AT BEDTIME
Refills: 0 | Status: DISCONTINUED | OUTPATIENT
Start: 2024-09-21 | End: 2024-10-04

## 2024-09-21 RX ORDER — INSULIN LISPRO 100/ML
3 VIAL (ML) SUBCUTANEOUS
Refills: 0 | Status: DISCONTINUED | OUTPATIENT
Start: 2024-09-21 | End: 2024-09-23

## 2024-09-21 RX ORDER — AMLODIPINE BESYLATE 5 MG
5 TABLET ORAL DAILY
Refills: 0 | Status: DISCONTINUED | OUTPATIENT
Start: 2024-09-21 | End: 2024-09-24

## 2024-09-21 RX ADMIN — PIPERACILLIN SODIUM AND TAZOBACTAM SODIUM 25 GRAM(S): 12; 1.5 INJECTION, POWDER, LYOPHILIZED, FOR SOLUTION INTRAVENOUS at 21:33

## 2024-09-21 RX ADMIN — PIPERACILLIN SODIUM AND TAZOBACTAM SODIUM 25 GRAM(S): 12; 1.5 INJECTION, POWDER, LYOPHILIZED, FOR SOLUTION INTRAVENOUS at 14:08

## 2024-09-21 RX ADMIN — PIPERACILLIN SODIUM AND TAZOBACTAM SODIUM 25 GRAM(S): 12; 1.5 INJECTION, POWDER, LYOPHILIZED, FOR SOLUTION INTRAVENOUS at 05:34

## 2024-09-21 RX ADMIN — Medication 650 MILLIGRAM(S): at 09:42

## 2024-09-21 RX ADMIN — Medication 650 MILLIGRAM(S): at 15:39

## 2024-09-21 RX ADMIN — Medication 650 MILLIGRAM(S): at 23:47

## 2024-09-21 RX ADMIN — Medication 6: at 12:55

## 2024-09-21 RX ADMIN — Medication 6: at 17:21

## 2024-09-21 RX ADMIN — INSULIN GLARGINE 10 UNIT(S): 300 INJECTION, SOLUTION SUBCUTANEOUS at 21:33

## 2024-09-21 RX ADMIN — Medication 650 MILLIGRAM(S): at 22:53

## 2024-09-21 RX ADMIN — Medication 3 UNIT(S): at 17:21

## 2024-09-21 RX ADMIN — ENOXAPARIN SODIUM 40 MILLIGRAM(S): 150 INJECTION SUBCUTANEOUS at 21:36

## 2024-09-21 RX ADMIN — Medication 3 UNIT(S): at 12:52

## 2024-09-21 RX ADMIN — Medication 5 MILLIGRAM(S): at 08:26

## 2024-09-21 RX ADMIN — Medication 650 MILLIGRAM(S): at 08:26

## 2024-09-21 RX ADMIN — Medication 75 MILLILITER(S): at 05:34

## 2024-09-21 RX ADMIN — Medication 1 TABLET(S): at 17:24

## 2024-09-21 RX ADMIN — Medication 650 MILLIGRAM(S): at 16:41

## 2024-09-21 RX ADMIN — Medication 4: at 21:34

## 2024-09-21 RX ADMIN — Medication 2: at 08:14

## 2024-09-21 RX ADMIN — Medication 1 TABLET(S): at 08:14

## 2024-09-21 NOTE — PROGRESS NOTE ADULT - PROBLEM SELECTOR PLAN 1
cont lantus 10 units daily  change mod dose admelog corrective scale coverage qac/qhs  add admelog 3 units 3x/day before meals  cont cons cho diet  goal bg 100-180 in hosp setting

## 2024-09-21 NOTE — PROGRESS NOTE ADULT - ASSESSMENT
49-year-old male with past medical history of DM2, HTN, HLD, NEUROPATHY, PVD, NON COMPLIANT WITH TT, ETOH ABUSE, presents today due to left toe infection.  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  Patient experienced a blister to the left toe in which he eventually fell orders follow-up on its own.  Patient notes that the blister opened up and he developed some blood in the blister as well.  Patient notes that the blister scab has slightly fallen off but now his toe is red and painful.  Patient not currently on antibiotics.  Patient denies fever, injury, numbness, weakness, or any other complaints.   IN ED TEMP 99, /100, HR 89, had consult with podiatry and sidney started on Cipro and admitted for further management    # Diabetic foot ulcer with cellullitis(left halluxdiabetic ulcer 3x4x0.1 cm with erythema and edema mal odorous , hyperkeratotic  started on ABX, , R/o PVD- arterial doppler- negative for any arterial disease  ID consult noted   MR foot done- ac on ch OM with sepstic arthritis of 2ndleft toe, OM left great toe-  surg done by podiatry, awaite path and culture  # DM2 with hyperglycemia, with neuropathy , nephropathy with likely PVD    ssi, consistent carb, Endo consult,  hba1c 10.2   Endo consult noted , started on lantus  # Ess HTN   started on losartan, monitor, cardiology f up  noted  patient is at intermediate risk for cardiac events and is optimized to proceed from a cardiac standpoint  will monitor renal indices   DVT prophylaxis  ID , endo and podiatry consult  # LUISA with CKD   nephro consult, will monitor indices,  # HLD   low fat diet  # DVT prophylaxis

## 2024-09-21 NOTE — PROGRESS NOTE ADULT - SUBJECTIVE AND OBJECTIVE BOX
NEPHROLOGY PROGRESS NOTE    CHIEF COMPLAINT:  CKD    HPI:  No complaints.  BP hectic.      EXAM:  Vital Signs Last 24 Hrs  T(C): 37.3 (21 Sep 2024 13:04), Max: 37.4 (20 Sep 2024 17:46)  T(F): 99.1 (21 Sep 2024 13:04), Max: 99.4 (20 Sep 2024 17:46)  HR: 109 (21 Sep 2024 13:04) (80 - 109)  BP: 163/96 (21 Sep 2024 13:04) (113/75 - 188/100)  BP(mean): 96 (20 Sep 2024 18:31) (80 - 96)  RR: 17 (21 Sep 2024 13:04) (12 - 25)  SpO2: 92% (21 Sep 2024 13:04) (92% - 99%)    Parameters below as of 21 Sep 2024 05:07  Patient On (Oxygen Delivery Method): room air      I&O's Summary    20 Sep 2024 07:01  -  21 Sep 2024 07:00  --------------------------------------------------------  IN: 1740 mL / OUT: 305 mL / NET: 1435 mL    21 Sep 2024 07:01  -  21 Sep 2024 15:15  --------------------------------------------------------  IN: 300 mL / OUT: 700 mL / NET: -400 mL        Conversant, in no apparent distress  Normal respiratory effort, lungs clear bilaterally  Heart RRR with no murmur, no peripheral edema    LABS                        11.4   10.12 )-----------( 150      ( 21 Sep 2024 06:30 )             34.6     09-21    134[L]  |  98  |  44[H]  ----------------------------<  216[H]  4.1   |  27  |  2.64[H]    Ca    9.3      21 Sep 2024 06:30    TPro  7.1  /  Alb  2.7[L]  /  TBili  0.8  /  DBili  x   /  AST  62[H]  /  ALT  43  /  AlkPhos  105  09-21        Impression:  Elevated Cr. 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

## 2024-09-21 NOTE — PROGRESS NOTE ADULT - SUBJECTIVE AND OBJECTIVE BOX
PROGRESS NOTE   Patient is a 49y old  Male who presents with a chief complaint of infected toe (21 Sep 2024 12:10) Patient relates an episode of strikethrough bleeding after walking to the bathroom last night.  Pt is s/p debridement of all non-viable soft tissue and bone left foot with hallux amputation      HPI:  49-year-old male with past medical history of DM2, HTN, HLD, NEUROPATHY, PVD, NON COMPLIANT WITH TT, ETOH ABUSE, presents today due to left toe infection.  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  Patient experienced a blister to the left toe in which he eventually fell orders follow-up on its own.  Patient notes that the blister opened up and he developed some blood in the blister as well.  Patient notes that the blister scab has slightly fallen off but now his toe is red and painful.  Patient not currently on antibiotics.  Patient denies fever, injury, numbness, weakness, or any other complaints.   IN ED TEMP 99, /100, HR 89, had consult with podiatry and sidney started on Cipro and admitted for further management (17 Sep 2024 19:36)      Vital Signs Last 24 Hrs  T(C): 37.3 (21 Sep 2024 13:04), Max: 37.4 (20 Sep 2024 17:46)  T(F): 99.1 (21 Sep 2024 13:04), Max: 99.4 (20 Sep 2024 17:46)  HR: 109 (21 Sep 2024 13:04) (80 - 109)  BP: 163/96 (21 Sep 2024 13:04) (113/75 - 188/100)  BP(mean): 96 (20 Sep 2024 18:31) (80 - 96)  RR: 17 (21 Sep 2024 13:04) (12 - 25)  SpO2: 92% (21 Sep 2024 13:04) (92% - 99%)    Parameters below as of 21 Sep 2024 05:07  Patient On (Oxygen Delivery Method): room air                              11.4   10.12 )-----------( 150      ( 21 Sep 2024 06:30 )             34.6               09-21    134[L]  |  98  |  44[H]  ----------------------------<  216[H]  4.1   |  27  |  2.64[H]    Ca    9.3      21 Sep 2024 06:30    TPro  7.1  /  Alb  2.7[L]  /  TBili  0.8  /  DBili  x   /  AST  62[H]  /  ALT  43  /  AlkPhos  105  09-21      PHYSICAL EXAM  GEN: SUZANNA KAISER is a pleasant well-nourished, well developed 49y Male in no acute distress, alert awake, and oriented to person, place and time.   LE Focused:  Vascular: Dorsalis Pedis and Posterior Tibial pulses 2/4.  Capillary re-fill time less then 3 seconds digits 1-5 bilateral.    Neuro: Protective sensation diminished to the level of the digits bilateral.  MSK: Muscle strength 5/5 all major muscle groups bilateral.  Derm: Incision site of the Left foot noted with intact sutures, no dehiscence, mild ez-incision erythema, no proximal streaking, no fluctuance, no malodor, no signs of infection at this time.     PROGRESS NOTE   Patient is a 49y old  Male who presents with a chief complaint of infected toe (21 Sep 2024 12:10) Patient relates an episode of strikethrough bleeding after walking to the bathroom last night.  Pt is s/p debridement of all non-viable soft tissue and bone left foot with hallux amputation      HPI:  49-year-old male with past medical history of DM2, HTN, HLD, NEUROPATHY, PVD, NON COMPLIANT WITH TT, ETOH ABUSE, presents today due to left toe infection.  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  Patient experienced a blister to the left toe in which he eventually fell orders follow-up on its own.  Patient notes that the blister opened up and he developed some blood in the blister as well.  Patient notes that the blister scab has slightly fallen off but now his toe is red and painful.  Patient not currently on antibiotics.  Patient denies fever, injury, numbness, weakness, or any other complaints.   IN ED TEMP 99, /100, HR 89, had consult with podiatry and sidney started on Cipro and admitted for further management (17 Sep 2024 19:36)      Vital Signs Last 24 Hrs  T(C): 37.3 (21 Sep 2024 13:04), Max: 37.4 (20 Sep 2024 17:46)  T(F): 99.1 (21 Sep 2024 13:04), Max: 99.4 (20 Sep 2024 17:46)  HR: 109 (21 Sep 2024 13:04) (80 - 109)  BP: 163/96 (21 Sep 2024 13:04) (113/75 - 188/100)  BP(mean): 96 (20 Sep 2024 18:31) (80 - 96)  RR: 17 (21 Sep 2024 13:04) (12 - 25)  SpO2: 92% (21 Sep 2024 13:04) (92% - 99%)    Parameters below as of 21 Sep 2024 05:07  Patient On (Oxygen Delivery Method): room air                              11.4   10.12 )-----------( 150      ( 21 Sep 2024 06:30 )             34.6               09-21    134[L]  |  98  |  44[H]  ----------------------------<  216[H]  4.1   |  27  |  2.64[H]    Ca    9.3      21 Sep 2024 06:30    TPro  7.1  /  Alb  2.7[L]  /  TBili  0.8  /  DBili  x   /  AST  62[H]  /  ALT  43  /  AlkPhos  105  09-21      PHYSICAL EXAM  GEN: SUZANNA KAISER is a pleasant well-nourished, well developed 49y Male in no acute distress, alert awake, and oriented to person, place and time.   LE Focused:  Vascular: Dorsalis Pedis and Posterior Tibial pulses 2/4.  Capillary re-fill time less then 3 seconds digits 1-5 bilateral.    Neuro: Protective sensation diminished to the level of the digits bilateral.  MSK: Muscle strength 5/5 all major muscle groups bilateral.  Derm: Incision site of the Left foot noted with intact sutures, well coapted , mild ez-incision erythema, no proximal streaking, no fluctuance, no malodor, no signs of infection at this time. the surgical site responding favorable to the current care plan

## 2024-09-21 NOTE — PROGRESS NOTE ADULT - SUBJECTIVE AND OBJECTIVE BOX
CAPILLARY BLOOD GLUCOSE      POCT Blood Glucose.: 205 mg/dL (21 Sep 2024 07:49)  POCT Blood Glucose.: 245 mg/dL (20 Sep 2024 20:44)  POCT Blood Glucose.: 194 mg/dL (20 Sep 2024 17:50)  POCT Blood Glucose.: 234 mg/dL (20 Sep 2024 12:11)      Vital Signs Last 24 Hrs  T(C): 36.7 (21 Sep 2024 08:30), Max: 37.4 (20 Sep 2024 17:46)  T(F): 98 (21 Sep 2024 08:30), Max: 99.4 (20 Sep 2024 17:46)  HR: 92 (21 Sep 2024 10:13) (80 - 102)  BP: 125/78 (21 Sep 2024 10:13) (113/75 - 188/100)  BP(mean): 96 (20 Sep 2024 18:31) (80 - 96)  RR: 18 (21 Sep 2024 05:07) (12 - 25)  SpO2: 96% (21 Sep 2024 05:07) (94% - 99%)    Parameters below as of 21 Sep 2024 05:07  Patient On (Oxygen Delivery Method): room air        General: WN/WD NAD  Respiratory: CTA B/L  CV: RRR, S1S2, no murmurs, rubs or gallops  Abdominal: Soft, NT, ND +BS, Last BM  Extremities: No edema, + peripheral pulses     09-21    134[L]  |  98  |  44[H]  ----------------------------<  216[H]  4.1   |  27  |  2.64[H]    Ca    9.3      21 Sep 2024 06:30    TPro  7.1  /  Alb  2.7[L]  /  TBili  0.8  /  DBili  x   /  AST  62[H]  /  ALT  43  /  AlkPhos  105  09-21      dextrose 50% Injectable 25 Gram(s) IV Push once  dextrose 50% Injectable 12.5 Gram(s) IV Push once  dextrose 50% Injectable 25 Gram(s) IV Push once  dextrose Oral Gel 15 Gram(s) Oral once PRN  glucagon  Injectable 1 milliGRAM(s) IntraMuscular once  insulin glargine Injectable (LANTUS) 10 Unit(s) SubCutaneous at bedtime  insulin lispro (ADMELOG) corrective regimen sliding scale   SubCutaneous three times a day before meals  insulin lispro (ADMELOG) corrective regimen sliding scale   SubCutaneous at bedtime

## 2024-09-21 NOTE — PROGRESS NOTE ADULT - ASSESSMENT
The patient is a 49 year old male with a history of HTN, HL, DM, PAD who presents with a toe infection.    Plan:  - ECG with sinus rhythm and no evidence of ischemia/infarction  - Off of losartan  - Start amlodipine 5 mg daily  - LE arterial duplex with no significant stenosis  - MRI foot with acute/chronic osteomyelitis  - Podiatry follow-up

## 2024-09-21 NOTE — PHYSICAL THERAPY INITIAL EVALUATION ADULT - PERTINENT HX OF CURRENT PROBLEM, REHAB EVAL
Pt is a 50 y/o male with HX of DM who presented to ED with left Hallux blister and bleeding. Work up revealed OM for which an amputation of the hallux was performed.

## 2024-09-21 NOTE — PROGRESS NOTE ADULT - PROBLEM SELECTOR PLAN 1
Patient seen and evaluated  Pt is s/p debridement of all non-viable soft tissue and bone left foot with hallux amputation  Dressings change today  Surgical pathology report pending  WB as tolerated heel touch in surgical shoe  Rec cont abx per Id  Patient advised to f/u with Dr. Salvatore Davenport in [place] 3-5 days post discharge from hospital.  Please call [phone] to make an appointment.  Please keep dressings clean, dry, and intact until f/u in clinic.    If dressings get wet or fall off, please change with:  1. xeroform  2. gauze  3. abd pad  4. kerlex  5. ace bandage  Will cont to monitor while in house  Will discuss with all attendings

## 2024-09-21 NOTE — PROGRESS NOTE ADULT - SUBJECTIVE AND OBJECTIVE BOX
ANUJASUZANNA HURLEY is a 49yMale , patient examined and chart reviewed.    INTERVAL HPI/ OVERNIGHT EVENTS:   Afebrile. NAD.  Sp right great toe amputation 9/20    PAST MEDICAL & SURGICAL HISTORY:  Prediabetes  HTN (hypertension)  HLD (hyperlipidemia)  DM2 (diabetes mellitus, type 2)      For details regarding the patient's social history, family history, and other miscellaneous elements, please refer the initial infectious diseases consultation and/or the admitting history and physical examination for this admission.    ROS:  CONSTITUTIONAL:  Negative fever or chills  EYES:  Negative  blurry vision or double vision  CARDIOVASCULAR:  Negative for chest pain or palpitations  RESPIRATORY:  Negative for cough, wheezing, or SOB   GASTROINTESTINAL:  Negative for nausea, vomiting, diarrhea, constipation, or abdominal pain  GENITOURINARY:  Negative frequency, urgency or dysuria  NEUROLOGIC:  No headache, confusion, dizziness, lightheadedness  All other systems were reviewed and are negative     ALLERGIES  amoxicillin (Unknown)      Current inpatient medications :    ANTIBIOTICS/RELEVANT:  piperacillin/tazobactam IVPB.. 3.375 Gram(s) IV Intermittent every 8 hours    MEDICATIONS  (STANDING):  amLODIPine   Tablet 5 milliGRAM(s) Oral daily  dextrose 5%. 1000 milliLiter(s) (50 mL/Hr) IV Continuous <Continuous>  dextrose 5%. 1000 milliLiter(s) (100 mL/Hr) IV Continuous <Continuous>  dextrose 50% Injectable 25 Gram(s) IV Push once  dextrose 50% Injectable 12.5 Gram(s) IV Push once  dextrose 50% Injectable 25 Gram(s) IV Push once  enoxaparin Injectable 40 milliGRAM(s) SubCutaneous every 24 hours  glucagon  Injectable 1 milliGRAM(s) IntraMuscular once  influenza   Vaccine 0.5 milliLiter(s) IntraMuscular once  insulin glargine Injectable (LANTUS) 10 Unit(s) SubCutaneous at bedtime  insulin lispro (ADMELOG) corrective regimen sliding scale   SubCutaneous three times a day before meals  insulin lispro (ADMELOG) corrective regimen sliding scale   SubCutaneous at bedtime  insulin lispro Injectable (ADMELOG) 3 Unit(s) SubCutaneous three times a day before meals  lactobacillus acidophilus 1 Tablet(s) Oral two times a day with meals  mupirocin 2% Ointment 1 Application(s) Topical <User Schedule>    MEDICATIONS  (PRN):  acetaminophen     Tablet .. 650 milliGRAM(s) Oral every 6 hours PRN Temp greater or equal to 38C (100.4F), Mild Pain (1 - 3), Moderate Pain (4 - 6)  dextrose Oral Gel 15 Gram(s) Oral once PRN Blood Glucose LESS THAN 70 milliGRAM(s)/deciliter  labetalol Injectable 10 milliGRAM(s) IV Push every 8 hours PRN Systolic blood pressure >180  melatonin 3 milliGRAM(s) Oral at bedtime PRN Insomnia        Objective:  Vital Signs Last 24 Hrs  T(C): 37.2 (21 Sep 2024 16:51), Max: 37.3 (21 Sep 2024 13:04)  T(F): 98.9 (21 Sep 2024 16:51), Max: 99.1 (21 Sep 2024 13:04)  HR: 110 (21 Sep 2024 16:51) (80 - 110)  BP: 163/102 (21 Sep 2024 16:51) (125/78 - 188/100)  RR: 18 (21 Sep 2024 16:51) (17 - 18)  SpO2: 97% (21 Sep 2024 16:51) (92% - 97%)    Parameters below as of 21 Sep 2024 16:51  Patient On (Oxygen Delivery Method): room air    Physical Exam:  General:  no acute distress  Neck: supple, trachea midline  Lungs: clear, no wheeze/rhonchi  Cardiovascular: regular rate and rhythm, S1 S2  Abdomen: soft, nontender,  bowel sounds normal  Neurological: alert and oriented x3  Skin: no rash  Extremities: right foot drsg c/d/i      LABS:                        11.5   10.03 )-----------( 168      ( 20 Sep 2024 07:59 )             34.3   09-20    138  |  101  |  41[H]  ----------------------------<  248[H]  3.9   |  29  |  2.45[H]    Ca    9.0      20 Sep 2024 07:59    TPro  6.5  /  Alb  2.8[L]  /  TBili  0.6  /  DBili  x   /  AST  61[H]  /  ALT  57  /  AlkPhos  111  09-20      MICROBIOLOGY:    Culture - Tissue with Gram Stain (collected 20 Sep 2024 19:21)  Source: Bone Other  Gram Stain (21 Sep 2024 06:32):    No polymorphonuclear cells seen per low power field    No organisms seen per oil power field    Culture - Tissue with Gram Stain (collected 20 Sep 2024 19:21)  Source: Bone Other  Gram Stain (21 Sep 2024 06:33):    No polymorphonuclear cells seen per low power field    No organisms seen per oil power field    Culture - Tissue with Gram Stain (collected 20 Sep 2024 19:21)  Source: Tissue left foot  Gram Stain (21 Sep 2024 06:32):    No polymorphonuclear cells seen per low power field    No organisms seen per oil power field      Culture - Other (09.17.24 @ 19:10)    -  Amoxicillin/Clavulanic Acid: R >16/8   -  Ampicillin: R 16 These ampicillin results predict results for amoxicillin   -  Ampicillin: S <=2 Predicts results to ampicillin/sulbactam, amoxacillin-clavulanate and  piperacillin-tazobactam.   -  Ampicillin/Sulbactam: R 8/4   -  Aztreonam: S <=4   -  Cefazolin: R >16   -  Cefepime: S <=2   -  Cefoxitin: R >16   -  Ceftriaxone: S <=1 Enterobacter cloacae, Klebsiella aerogenes, and Citrobacter freundii may develop resistance during prolonged therapy.   -  Ciprofloxacin: S <=0.25   -  Ertapenem: S <=0.5   -  Gentamicin: S <=2   -  Imipenem: S <=1   -  Levofloxacin: S <=0.5   -  Meropenem: S <=1   -  Piperacillin/Tazobactam: S <=8 Treatment with Pipercillin/Tazobactam is not recommended in severe infections casued by Klebsiella aerogenes, Enterobacter cloacae complex, and Citrobacter freundii complex.   -  Tobramycin: S <=2   -  Trimethoprim/Sulfamethoxazole: S <=0.5/9.5   -  Vancomycin: S 2   Specimen Source: Wound Wound   Culture Results:   Numerous Enterobacter cloacae complex  Numerous Enterococcus faecalis  Few Coag Negative Staphylococcus "Susceptibilities not performed"   Organism Identification: Enterobacter cloacae complex  Enterococcus faecalis   Organism: Enterobacter cloacae complex   Organism: Enterococcus faecalis   Method Type: STEPHANIE   Method Type: STEPHANIE    Culture - Blood (collected 17 Sep 2024 18:13)  Source: .Blood Blood-Peripheral  Preliminary Report (19 Sep 2024 01:01):    No growth at 24 hours    Culture - Blood (collected 17 Sep 2024 18:13)  Source: .Blood Blood-Peripheral  Preliminary Report (19 Sep 2024 01:01):    No growth at 24 hours    RADIOLOGY & ADDITIONAL STUDIES:    ACC: [id number] EXAM:  MR FOOT LT   ORDERED BY: LISA ALY     PROCEDURE DATE:  09/19/2024          INTERPRETATION:  LEFT FOOT MRI    CLINICAL INDICATION: Diabetic foot ulcer, for evaluation of osteomyelitis.    COMPARISON: Radiographs dated 9/17/2024.    TECHNIQUE: Multiplanar, multisequence MRI was obtained of the left foot.    FINDINGS:    OSSEOUS: Acute on chronic osteomyelitis/septic arthritis at the proximal   interphalangeal joint of the second toe in the proper clinical setting.   Focal acute osteomyelitis along the plantar lateral cortex of the great   toe distal phalanx base with ill-defined overlying deep soft tissue   ulceration. Lisfranc interval is not widened on this non-weightbearing   examination.    TENDONS: Visualized flexor and extensor tendons are intact.    LIGAMENTS: Lisfranc ligament is intact.    GENERAL: No drainable encapsulated fluid collection. No significant   intermetatarsal bursal fluid distension. No interdigital neuroma. Mild to   moderate heterogeneous chronic muscular atrophy with superimposed patchy   denervation edema like signal.    IMPRESSION:    1.  Acute on chronic osteomyelitis/septic arthritis at the proximal   interphalangeal joint of left second toe in the proper clinical setting.  2.Focal acute osteomyelitis along the plantar lateral cortex of left   great toe distal phalanx base with overlying deep soft tissue ulceration.    Assessment :  48YO M PMH DM2, HTN, HLD, NEUROPATHY, PVD, NON COMPLIANT WITH TT, ETOH ABUSE, admitted with infected DM left great toe infection  MRI + OM  Sp right great toe amputation 9/20    Plan :   Cont Zosyn  Fu OR cultures and path  Dm control  Trend temps and cbc  Pulm toileting      Continue with present regiment.  Appropriate use of antibiotics and adverse effects reviewed.      > 35 minutes were spent in direct patient care reviewing notes, medications ,labs data/ imaging , discussion with multidisciplinary team.    Thank you for allowing me to participate in care of your patient .    Linsey Mack MD  Infectious Disease  [phone]      ANUJASUZANNA HURLEY is a 49yMale , patient examined and chart reviewed.    INTERVAL HPI/ OVERNIGHT EVENTS:   Afebrile. NAD.  Sp right great toe amputation 9/20    PAST MEDICAL & SURGICAL HISTORY:  Prediabetes  HTN (hypertension)  HLD (hyperlipidemia)  DM2 (diabetes mellitus, type 2)      For details regarding the patient's social history, family history, and other miscellaneous elements, please refer the initial infectious diseases consultation and/or the admitting history and physical examination for this admission.    ROS:  CONSTITUTIONAL:  Negative fever or chills  EYES:  Negative  blurry vision or double vision  CARDIOVASCULAR:  Negative for chest pain or palpitations  RESPIRATORY:  Negative for cough, wheezing, or SOB   GASTROINTESTINAL:  Negative for nausea, vomiting, diarrhea, constipation, or abdominal pain  GENITOURINARY:  Negative frequency, urgency or dysuria  NEUROLOGIC:  No headache, confusion, dizziness, lightheadedness  All other systems were reviewed and are negative     ALLERGIES  amoxicillin (Unknown)      Current inpatient medications :    ANTIBIOTICS/RELEVANT:  piperacillin/tazobactam IVPB.. 3.375 Gram(s) IV Intermittent every 8 hours    MEDICATIONS  (STANDING):  amLODIPine   Tablet 5 milliGRAM(s) Oral daily  dextrose 5%. 1000 milliLiter(s) (50 mL/Hr) IV Continuous <Continuous>  dextrose 5%. 1000 milliLiter(s) (100 mL/Hr) IV Continuous <Continuous>  dextrose 50% Injectable 25 Gram(s) IV Push once  dextrose 50% Injectable 12.5 Gram(s) IV Push once  dextrose 50% Injectable 25 Gram(s) IV Push once  enoxaparin Injectable 40 milliGRAM(s) SubCutaneous every 24 hours  glucagon  Injectable 1 milliGRAM(s) IntraMuscular once  influenza   Vaccine 0.5 milliLiter(s) IntraMuscular once  insulin glargine Injectable (LANTUS) 10 Unit(s) SubCutaneous at bedtime  insulin lispro (ADMELOG) corrective regimen sliding scale   SubCutaneous three times a day before meals  insulin lispro (ADMELOG) corrective regimen sliding scale   SubCutaneous at bedtime  insulin lispro Injectable (ADMELOG) 3 Unit(s) SubCutaneous three times a day before meals  lactobacillus acidophilus 1 Tablet(s) Oral two times a day with meals  mupirocin 2% Ointment 1 Application(s) Topical <User Schedule>    MEDICATIONS  (PRN):  acetaminophen     Tablet .. 650 milliGRAM(s) Oral every 6 hours PRN Temp greater or equal to 38C (100.4F), Mild Pain (1 - 3), Moderate Pain (4 - 6)  dextrose Oral Gel 15 Gram(s) Oral once PRN Blood Glucose LESS THAN 70 milliGRAM(s)/deciliter  labetalol Injectable 10 milliGRAM(s) IV Push every 8 hours PRN Systolic blood pressure >180  melatonin 3 milliGRAM(s) Oral at bedtime PRN Insomnia        Objective:  Vital Signs Last 24 Hrs  T(C): 37.2 (21 Sep 2024 16:51), Max: 37.3 (21 Sep 2024 13:04)  T(F): 98.9 (21 Sep 2024 16:51), Max: 99.1 (21 Sep 2024 13:04)  HR: 110 (21 Sep 2024 16:51) (80 - 110)  BP: 163/102 (21 Sep 2024 16:51) (125/78 - 188/100)  RR: 18 (21 Sep 2024 16:51) (17 - 18)  SpO2: 97% (21 Sep 2024 16:51) (92% - 97%)    Parameters below as of 21 Sep 2024 16:51  Patient On (Oxygen Delivery Method): room air    Physical Exam:  General:  no acute distress  Neck: supple, trachea midline  Lungs: clear, no wheeze/rhonchi  Cardiovascular: regular rate and rhythm, S1 S2  Abdomen: soft, nontender,  bowel sounds normal  Neurological: alert and oriented x3  Skin: no rash  Extremities: Left foot drsg c/d/i      LABS:                        11.5   10.03 )-----------( 168      ( 20 Sep 2024 07:59 )             34.3   09-20    138  |  101  |  41[H]  ----------------------------<  248[H]  3.9   |  29  |  2.45[H]    Ca    9.0      20 Sep 2024 07:59    TPro  6.5  /  Alb  2.8[L]  /  TBili  0.6  /  DBili  x   /  AST  61[H]  /  ALT  57  /  AlkPhos  111  09-20      MICROBIOLOGY:    Culture - Tissue with Gram Stain (collected 20 Sep 2024 19:21)  Source: Bone Other  Gram Stain (21 Sep 2024 06:32):    No polymorphonuclear cells seen per low power field    No organisms seen per oil power field    Culture - Tissue with Gram Stain (collected 20 Sep 2024 19:21)  Source: Bone Other  Gram Stain (21 Sep 2024 06:33):    No polymorphonuclear cells seen per low power field    No organisms seen per oil power field    Culture - Tissue with Gram Stain (collected 20 Sep 2024 19:21)  Source: Tissue left foot  Gram Stain (21 Sep 2024 06:32):    No polymorphonuclear cells seen per low power field    No organisms seen per oil power field      Culture - Other (09.17.24 @ 19:10)    -  Amoxicillin/Clavulanic Acid: R >16/8   -  Ampicillin: R 16 These ampicillin results predict results for amoxicillin   -  Ampicillin: S <=2 Predicts results to ampicillin/sulbactam, amoxacillin-clavulanate and  piperacillin-tazobactam.   -  Ampicillin/Sulbactam: R 8/4   -  Aztreonam: S <=4   -  Cefazolin: R >16   -  Cefepime: S <=2   -  Cefoxitin: R >16   -  Ceftriaxone: S <=1 Enterobacter cloacae, Klebsiella aerogenes, and Citrobacter freundii may develop resistance during prolonged therapy.   -  Ciprofloxacin: S <=0.25   -  Ertapenem: S <=0.5   -  Gentamicin: S <=2   -  Imipenem: S <=1   -  Levofloxacin: S <=0.5   -  Meropenem: S <=1   -  Piperacillin/Tazobactam: S <=8 Treatment with Pipercillin/Tazobactam is not recommended in severe infections casued by Klebsiella aerogenes, Enterobacter cloacae complex, and Citrobacter freundii complex.   -  Tobramycin: S <=2   -  Trimethoprim/Sulfamethoxazole: S <=0.5/9.5   -  Vancomycin: S 2   Specimen Source: Wound Wound   Culture Results:   Numerous Enterobacter cloacae complex  Numerous Enterococcus faecalis  Few Coag Negative Staphylococcus "Susceptibilities not performed"   Organism Identification: Enterobacter cloacae complex  Enterococcus faecalis   Organism: Enterobacter cloacae complex   Organism: Enterococcus faecalis   Method Type: STEPHANIE   Method Type: STEPHANIE    Culture - Blood (collected 17 Sep 2024 18:13)  Source: .Blood Blood-Peripheral  Preliminary Report (19 Sep 2024 01:01):    No growth at 24 hours    Culture - Blood (collected 17 Sep 2024 18:13)  Source: .Blood Blood-Peripheral  Preliminary Report (19 Sep 2024 01:01):    No growth at 24 hours    RADIOLOGY & ADDITIONAL STUDIES:    ACC: [id number] EXAM:  MR FOOT LT   ORDERED BY: LISA ALY     PROCEDURE DATE:  09/19/2024          INTERPRETATION:  LEFT FOOT MRI    CLINICAL INDICATION: Diabetic foot ulcer, for evaluation of osteomyelitis.    COMPARISON: Radiographs dated 9/17/2024.    TECHNIQUE: Multiplanar, multisequence MRI was obtained of the left foot.    FINDINGS:    OSSEOUS: Acute on chronic osteomyelitis/septic arthritis at the proximal   interphalangeal joint of the second toe in the proper clinical setting.   Focal acute osteomyelitis along the plantar lateral cortex of the great   toe distal phalanx base with ill-defined overlying deep soft tissue   ulceration. Lisfranc interval is not widened on this non-weightbearing   examination.    TENDONS: Visualized flexor and extensor tendons are intact.    LIGAMENTS: Lisfranc ligament is intact.    GENERAL: No drainable encapsulated fluid collection. No significant   intermetatarsal bursal fluid distension. No interdigital neuroma. Mild to   moderate heterogeneous chronic muscular atrophy with superimposed patchy   denervation edema like signal.    IMPRESSION:    1.  Acute on chronic osteomyelitis/septic arthritis at the proximal   interphalangeal joint of left second toe in the proper clinical setting.  2.Focal acute osteomyelitis along the plantar lateral cortex of left   great toe distal phalanx base with overlying deep soft tissue ulceration.    Assessment :  50YO M PMH DM2, HTN, HLD, NEUROPATHY, PVD, NON COMPLIANT WITH TT, ETOH ABUSE, admitted with infected DM left great toe infection  MRI + OM  Sp Left great toe amputation 9/20    Plan :   Cont Zosyn  Fu OR cultures and path  Dm control  Trend temps and cbc  Pulm toileting      Continue with present regiment.  Appropriate use of antibiotics and adverse effects reviewed.      > 35 minutes were spent in direct patient care reviewing notes, medications ,labs data/ imaging , discussion with multidisciplinary team.    Thank you for allowing me to participate in care of your patient .    Linsey Mack MD  Infectious Disease  [phone]

## 2024-09-21 NOTE — PROGRESS NOTE ADULT - SUBJECTIVE AND OBJECTIVE BOX
Patient is a 49y old  Male who presents with a chief complaint of infected toe with OM left hallux (20 Sep 2024 17:37)      INTERVAL HPI/OVERNIGHT EVENTS:overnight    Home Medications:  non compliant with meds for months:  (17 Sep 2024 19:54)      MEDICATIONS  (STANDING):  amLODIPine   Tablet 5 milliGRAM(s) Oral daily  dextrose 5%. 1000 milliLiter(s) (100 mL/Hr) IV Continuous <Continuous>  dextrose 5%. 1000 milliLiter(s) (50 mL/Hr) IV Continuous <Continuous>  dextrose 50% Injectable 25 Gram(s) IV Push once  dextrose 50% Injectable 12.5 Gram(s) IV Push once  dextrose 50% Injectable 25 Gram(s) IV Push once  enoxaparin Injectable 40 milliGRAM(s) SubCutaneous every 24 hours  glucagon  Injectable 1 milliGRAM(s) IntraMuscular once  influenza   Vaccine 0.5 milliLiter(s) IntraMuscular once  insulin glargine Injectable (LANTUS) 10 Unit(s) SubCutaneous at bedtime  insulin lispro (ADMELOG) corrective regimen sliding scale   SubCutaneous three times a day before meals  insulin lispro (ADMELOG) corrective regimen sliding scale   SubCutaneous at bedtime  lactobacillus acidophilus 1 Tablet(s) Oral two times a day with meals  mupirocin 2% Ointment 1 Application(s) Topical <User Schedule>  piperacillin/tazobactam IVPB.. 3.375 Gram(s) IV Intermittent every 8 hours  sodium chloride 0.45%. 1000 milliLiter(s) (60 mL/Hr) IV Continuous <Continuous>    MEDICATIONS  (PRN):  acetaminophen     Tablet .. 650 milliGRAM(s) Oral every 6 hours PRN Temp greater or equal to 38C (100.4F), Mild Pain (1 - 3), Moderate Pain (4 - 6)  dextrose Oral Gel 15 Gram(s) Oral once PRN Blood Glucose LESS THAN 70 milliGRAM(s)/deciliter  labetalol Injectable 10 milliGRAM(s) IV Push every 8 hours PRN Systolic blood pressure >180  melatonin 3 milliGRAM(s) Oral at bedtime PRN Insomnia      Allergies    amoxicillin (Unknown)    Intolerances        REVIEW OF SYSTEMS:  CONSTITUTIONAL: No fever, weight loss, or fatigue  EYES: No eye pain, visual disturbances, or discharge  ENMT:  No difficulty hearing, tinnitus, vertigo; No sinus or throat pain  NECK: No pain or stiffness  BREASTS: No pain, masses, or nipple discharge  RESPIRATORY: No cough, wheezing, chills or hemoptysis; No shortness of breath  CARDIOVASCULAR: No chest pain, palpitations, dizziness, or leg swelling  GASTROINTESTINAL: No abdominal or epigastric pain. No nausea, vomiting, or hematemesis; No diarrhea or constipation. No melena or hematochezia.  GENITOURINARY: No dysuria, frequency, hematuria, or incontinence  NEUROLOGICAL: No headaches, memory loss, loss of strength, numbness, or tremors  SKIN: No itching, burning, rashes, or lesions   LYMPH NODES: No enlarged glands  ENDOCRINE: No heat or cold intolerance; No hair loss  MUSCULOSKELETAL: No joint pain or swelling; No muscle, back, or extremity pain  PSYCHIATRIC: No depression, anxiety, mood swings, or difficulty sleeping  HEME/LYMPH: No easy bruising, or bleeding gums  ALLERGY AND IMMUNOLOGIC: No hives or eczema    Vital Signs Last 24 Hrs  T(C): 36.7 (21 Sep 2024 08:30), Max: 37.4 (20 Sep 2024 17:46)  T(F): 98 (21 Sep 2024 08:30), Max: 99.4 (20 Sep 2024 17:46)  HR: 92 (21 Sep 2024 10:13) (80 - 102)  BP: 125/78 (21 Sep 2024 10:13) (113/75 - 188/100)  BP(mean): 96 (20 Sep 2024 18:31) (80 - 96)  RR: 18 (21 Sep 2024 05:07) (12 - 25)  SpO2: 96% (21 Sep 2024 05:07) (94% - 99%)    Parameters below as of 21 Sep 2024 05:07  Patient On (Oxygen Delivery Method): room air        PHYSICAL EXAM:  GENERAL: NAD, well-groomed, well-developed  HEAD:  Atraumatic, Normocephalic  EYES: EOMI, PERRLA, conjunctiva and sclera clear  ENMT: Moist mucous membranes,   NECK: Supple, No JVD, Normal thyroid  NERVOUS SYSTEM:  Alert & Oriented X3, Good concentration; Motor Strength 5/5 B/L upper and lower extremities; DTRs 2+ intact and symmetric  CHEST/LUNG: Clear to percussion bilaterally; No rales, rhonchi, wheezing, or rubs  HEART: Regular rate and rhythm; No murmurs, rubs, or gallops  ABDOMEN: Soft, Nontender, Nondistended; Bowel sounds present  EXTREMITIES:  2+ Peripheral Pulses, No clubbing, cyanosis, or edema  LYMPH: No lymphadenopathy noted  SKIN: No rashes or lesions    LABS:                        11.4   10.12 )-----------( 150      ( 21 Sep 2024 06:30 )             34.6     09-21    134[L]  |  98  |  44[H]  ----------------------------<  216[H]  4.1   |  27  |  2.64[H]    Ca    9.3      21 Sep 2024 06:30    TPro  7.1  /  Alb  2.7[L]  /  TBili  0.8  /  DBili  x   /  AST  62[H]  /  ALT  43  /  AlkPhos  105  09-21    PT/INR - ( 21 Sep 2024 02:15 )   PT: 13.7 sec;   INR: 1.23 ratio           Urinalysis Basic - ( 21 Sep 2024 06:30 )    Color: x / Appearance: x / SG: x / pH: x  Gluc: 216 mg/dL / Ketone: x  / Bili: x / Urobili: x   Blood: x / Protein: x / Nitrite: x   Leuk Esterase: x / RBC: x / WBC x   Sq Epi: x / Non Sq Epi: x / Bacteria: x      CAPILLARY BLOOD GLUCOSE      POCT Blood Glucose.: 205 mg/dL (21 Sep 2024 07:49)  POCT Blood Glucose.: 245 mg/dL (20 Sep 2024 20:44)  POCT Blood Glucose.: 194 mg/dL (20 Sep 2024 17:50)  POCT Blood Glucose.: 234 mg/dL (20 Sep 2024 12:11)        Culture - Tissue with Gram Stain (collected 09-20-24 @ 19:21)  Source: Bone Other  Gram Stain (09-21-24 @ 06:32):    No polymorphonuclear cells seen per low power field    No organisms seen per oil power field    Culture - Tissue with Gram Stain (collected 09-20-24 @ 19:21)  Source: Bone Other  Gram Stain (09-21-24 @ 06:33):    No polymorphonuclear cells seen per low power field    No organisms seen per oil power field    Culture - Tissue with Gram Stain (collected 09-20-24 @ 19:21)  Source: Tissue left foot  Gram Stain (09-21-24 @ 06:32):    No polymorphonuclear cells seen per low power field    No organisms seen per oil power field    Culture - Fungal, Tissue (collected 09-20-24 @ 19:21)  Source: Tissue  Preliminary Report (09-21-24 @ 07:08):    Testing in progress    Culture - Other (collected 09-17-24 @ 19:10)  Source: Wound Wound  Final Report (09-20-24 @ 13:25):    Numerous Enterobacter cloacae complex    Numerous Enterococcus faecalis    Few Coag Negative Staphylococcus "Susceptibilities not performed"  Organism: Enterobacter cloacae complex  Enterococcus faecalis (09-20-24 @ 13:25)  Organism: Enterococcus faecalis (09-20-24 @ 13:25)      Method Type: STEPHANIE      -  Ampicillin: S <=2 Predicts results to ampicillin/sulbactam, amoxacillin-clavulanate and  piperacillin-tazobactam.      -  Vancomycin: S 2  Organism: Enterobacter cloacae complex (09-20-24 @ 13:25)      Method Type: STEPHANIE      -  Amoxicillin/Clavulanic Acid: R >16/8      -  Ampicillin: R 16 These ampicillin results predict results for amoxicillin      -  Ampicillin/Sulbactam: R 8/4      -  Aztreonam: S <=4      -  Cefazolin: R >16      -  Cefepime: S <=2      -  Cefoxitin: R >16      -  Ceftriaxone: S <=1 Enterobacter cloacae, Klebsiella aerogenes, and Citrobacter freundii may develop resistance during prolonged therapy.      -  Ciprofloxacin: S <=0.25      -  Ertapenem: S <=0.5      -  Gentamicin: S <=2      -  Imipenem: S <=1      -  Levofloxacin: S <=0.5      -  Meropenem: S <=1      -  Piperacillin/Tazobactam: S <=8 Treatment with Pipercillin/Tazobactam is not recommended in severe infections casued by Klebsiella aerogenes, Enterobacter cloacae complex, and Citrobacter freundii complex.      -  Tobramycin: S <=2      -  Trimethoprim/Sulfamethoxazole: S <=0.5/9.5    Culture - Blood (collected 09-17-24 @ 18:13)  Source: .Blood Blood-Peripheral  Preliminary Report (09-21-24 @ 01:01):    No growth at 72 Hours    Culture - Blood (collected 09-17-24 @ 18:13)  Source: .Blood Blood-Peripheral  Preliminary Report (09-21-24 @ 01:01):    No growth at 72 Hours        I&O's Summary    20 Sep 2024 07:01  -  21 Sep 2024 07:00  --------------------------------------------------------  IN: 1740 mL / OUT: 305 mL / NET: 1435 mL    21 Sep 2024 07:01  -  21 Sep 2024 10:41  --------------------------------------------------------  IN: 300 mL / OUT: 700 mL / NET: -400 mL        RADIOLOGY & ADDITIONAL TESTS:    Imaging Personally Reviewed:  [ ] YES  [ ] NO    Consultant(s) Notes Reviewed:  [ ] YES  [ ] NO    Care Discussed with Consultants/Other Providers [ ] YES  [ ] NO Patient is a 49y old  Male who presents with a chief complaint of infected toe with OM left hallux (20 Sep 2024 17:37)      INTERVAL HPI/OVERNIGHT EVENTS:overnight    Home Medications:  non compliant with meds for months:  (17 Sep 2024 19:54)      MEDICATIONS  (STANDING):  amLODIPine   Tablet 5 milliGRAM(s) Oral daily  dextrose 5%. 1000 milliLiter(s) (100 mL/Hr) IV Continuous <Continuous>  dextrose 5%. 1000 milliLiter(s) (50 mL/Hr) IV Continuous <Continuous>  dextrose 50% Injectable 25 Gram(s) IV Push once  dextrose 50% Injectable 12.5 Gram(s) IV Push once  dextrose 50% Injectable 25 Gram(s) IV Push once  enoxaparin Injectable 40 milliGRAM(s) SubCutaneous every 24 hours  glucagon  Injectable 1 milliGRAM(s) IntraMuscular once  influenza   Vaccine 0.5 milliLiter(s) IntraMuscular once  insulin glargine Injectable (LANTUS) 10 Unit(s) SubCutaneous at bedtime  insulin lispro (ADMELOG) corrective regimen sliding scale   SubCutaneous three times a day before meals  insulin lispro (ADMELOG) corrective regimen sliding scale   SubCutaneous at bedtime  lactobacillus acidophilus 1 Tablet(s) Oral two times a day with meals  mupirocin 2% Ointment 1 Application(s) Topical <User Schedule>  piperacillin/tazobactam IVPB.. 3.375 Gram(s) IV Intermittent every 8 hours  sodium chloride 0.45%. 1000 milliLiter(s) (60 mL/Hr) IV Continuous <Continuous>    MEDICATIONS  (PRN):  acetaminophen     Tablet .. 650 milliGRAM(s) Oral every 6 hours PRN Temp greater or equal to 38C (100.4F), Mild Pain (1 - 3), Moderate Pain (4 - 6)  dextrose Oral Gel 15 Gram(s) Oral once PRN Blood Glucose LESS THAN 70 milliGRAM(s)/deciliter  labetalol Injectable 10 milliGRAM(s) IV Push every 8 hours PRN Systolic blood pressure >180  melatonin 3 milliGRAM(s) Oral at bedtime PRN Insomnia      Allergies    amoxicillin (Unknown)    Intolerances        REVIEW OF SYSTEMS:  CONSTITUTIONAL: No fever, weight loss, or fatigue  EYES: No eye pain, visual disturbances, or discharge  ENMT:  No difficulty hearing, tinnitus, vertigo; No sinus or throat pain  NECK: No pain or stiffness  BREASTS: No pain, masses, or nipple discharge  RESPIRATORY: No cough, wheezing, chills or hemoptysis; No shortness of breath  CARDIOVASCULAR: No chest pain, palpitations, dizziness, or leg swelling  GASTROINTESTINAL: No abdominal or epigastric pain. No nausea, vomiting, or hematemesis; No diarrhea or constipation. No melena or hematochezia.  GENITOURINARY: No dysuria, frequency, hematuria, or incontinence  NEUROLOGICAL: No headaches, memory loss, loss of strength, numbness, or tremors  SKIN: No itching, burning, rashes, or lesions     Vital Signs Last 24 Hrs  T(C): 36.7 (21 Sep 2024 08:30), Max: 37.4 (20 Sep 2024 17:46)  T(F): 98 (21 Sep 2024 08:30), Max: 99.4 (20 Sep 2024 17:46)  HR: 92 (21 Sep 2024 10:13) (80 - 102)  BP: 125/78 (21 Sep 2024 10:13) (113/75 - 188/100)  BP(mean): 96 (20 Sep 2024 18:31) (80 - 96)  RR: 18 (21 Sep 2024 05:07) (12 - 25)  SpO2: 96% (21 Sep 2024 05:07) (94% - 99%)    Parameters below as of 21 Sep 2024 05:07  Patient On (Oxygen Delivery Method): room air        PHYSICAL EXAM:  GENERAL: NAD, well-groomed, well-developed  HEAD:  Atraumatic, Normocephalic  EYES: EOMI, PERRLA, conjunctiva and sclera clear  ENMT: Moist mucous membranes,   NECK: Supple, No JVD, Normal thyroid  NERVOUS SYSTEM:  Alert & Oriented X3, Good concentration; Motor Strength 5/5 B/L upper and lower extremities; DTRs 2+ intact and symmetric  CHEST/LUNG: Clear to percussion bilaterally; No rales, rhonchi, wheezing, or rubs  HEART: Regular rate and rhythm; No murmurs, rubs, or gallops  ABDOMEN: Soft, Nontender, Nondistended; Bowel sounds present  EXTREMITIES: foot in dressing  SKIN: No rashes or lesions    LABS:                        11.4   10.12 )-----------( 150      ( 21 Sep 2024 06:30 )             34.6     09-21    134[L]  |  98  |  44[H]  ----------------------------<  216[H]  4.1   |  27  |  2.64[H]    Ca    9.3      21 Sep 2024 06:30    TPro  7.1  /  Alb  2.7[L]  /  TBili  0.8  /  DBili  x   /  AST  62[H]  /  ALT  43  /  AlkPhos  105  09-21    PT/INR - ( 21 Sep 2024 02:15 )   PT: 13.7 sec;   INR: 1.23 ratio           Urinalysis Basic - ( 21 Sep 2024 06:30 )    Color: x / Appearance: x / SG: x / pH: x  Gluc: 216 mg/dL / Ketone: x  / Bili: x / Urobili: x   Blood: x / Protein: x / Nitrite: x   Leuk Esterase: x / RBC: x / WBC x   Sq Epi: x / Non Sq Epi: x / Bacteria: x      CAPILLARY BLOOD GLUCOSE      POCT Blood Glucose.: 205 mg/dL (21 Sep 2024 07:49)  POCT Blood Glucose.: 245 mg/dL (20 Sep 2024 20:44)  POCT Blood Glucose.: 194 mg/dL (20 Sep 2024 17:50)  POCT Blood Glucose.: 234 mg/dL (20 Sep 2024 12:11)        Culture - Tissue with Gram Stain (collected 09-20-24 @ 19:21)  Source: Bone Other  Gram Stain (09-21-24 @ 06:32):    No polymorphonuclear cells seen per low power field    No organisms seen per oil power field    Culture - Tissue with Gram Stain (collected 09-20-24 @ 19:21)  Source: Bone Other  Gram Stain (09-21-24 @ 06:33):    No polymorphonuclear cells seen per low power field    No organisms seen per oil power field    Culture - Tissue with Gram Stain (collected 09-20-24 @ 19:21)  Source: Tissue left foot  Gram Stain (09-21-24 @ 06:32):    No polymorphonuclear cells seen per low power field    No organisms seen per oil power field    Culture - Fungal, Tissue (collected 09-20-24 @ 19:21)  Source: Tissue  Preliminary Report (09-21-24 @ 07:08):    Testing in progress    Culture - Other (collected 09-17-24 @ 19:10)  Source: Wound Wound  Final Report (09-20-24 @ 13:25):    Numerous Enterobacter cloacae complex    Numerous Enterococcus faecalis    Few Coag Negative Staphylococcus "Susceptibilities not performed"  Organism: Enterobacter cloacae complex  Enterococcus faecalis (09-20-24 @ 13:25)  Organism: Enterococcus faecalis (09-20-24 @ 13:25)      Method Type: STEPHANIE      -  Ampicillin: S <=2 Predicts results to ampicillin/sulbactam, amoxacillin-clavulanate and  piperacillin-tazobactam.      -  Vancomycin: S 2  Organism: Enterobacter cloacae complex (09-20-24 @ 13:25)      Method Type: STEPHANIE      -  Amoxicillin/Clavulanic Acid: R >16/8      -  Ampicillin: R 16 These ampicillin results predict results for amoxicillin      -  Ampicillin/Sulbactam: R 8/4      -  Aztreonam: S <=4      -  Cefazolin: R >16      -  Cefepime: S <=2      -  Cefoxitin: R >16      -  Ceftriaxone: S <=1 Enterobacter cloacae, Klebsiella aerogenes, and Citrobacter freundii may develop resistance during prolonged therapy.      -  Ciprofloxacin: S <=0.25      -  Ertapenem: S <=0.5      -  Gentamicin: S <=2      -  Imipenem: S <=1      -  Levofloxacin: S <=0.5      -  Meropenem: S <=1      -  Piperacillin/Tazobactam: S <=8 Treatment with Pipercillin/Tazobactam is not recommended in severe infections casued by Klebsiella aerogenes, Enterobacter cloacae complex, and Citrobacter freundii complex.      -  Tobramycin: S <=2      -  Trimethoprim/Sulfamethoxazole: S <=0.5/9.5    Culture - Blood (collected 09-17-24 @ 18:13)  Source: .Blood Blood-Peripheral  Preliminary Report (09-21-24 @ 01:01):    No growth at 72 Hours    Culture - Blood (collected 09-17-24 @ 18:13)  Source: .Blood Blood-Peripheral  Preliminary Report (09-21-24 @ 01:01):    No growth at 72 Hours        I&O's Summary    20 Sep 2024 07:01  -  21 Sep 2024 07:00  --------------------------------------------------------  IN: 1740 mL / OUT: 305 mL / NET: 1435 mL    21 Sep 2024 07:01  -  21 Sep 2024 10:41  --------------------------------------------------------  IN: 300 mL / OUT: 700 mL / NET: -400 mL        RADIOLOGY & ADDITIONAL TESTS:    Imaging Personally Reviewed:  [ x] YES  [ ] NO    Consultant(s) Notes Reviewed:  [x ] YES  [ ] NO    Care Discussed with Consultants/Other Providers [ x] YES  [ ] NO

## 2024-09-22 LAB
GLUCOSE BLDC GLUCOMTR-MCNC: 261 MG/DL — HIGH (ref 70–99)
GLUCOSE BLDC GLUCOMTR-MCNC: 290 MG/DL — HIGH (ref 70–99)
GLUCOSE BLDC GLUCOMTR-MCNC: 292 MG/DL — HIGH (ref 70–99)
GLUCOSE BLDC GLUCOMTR-MCNC: 384 MG/DL — HIGH (ref 70–99)
GRAM STN FLD: ABNORMAL
GRAM STN FLD: ABNORMAL

## 2024-09-22 RX ADMIN — Medication 650 MILLIGRAM(S): at 21:19

## 2024-09-22 RX ADMIN — Medication 5 MILLIGRAM(S): at 06:12

## 2024-09-22 RX ADMIN — Medication 1 TABLET(S): at 17:30

## 2024-09-22 RX ADMIN — ENOXAPARIN SODIUM 40 MILLIGRAM(S): 150 INJECTION SUBCUTANEOUS at 21:20

## 2024-09-22 RX ADMIN — Medication 3 UNIT(S): at 17:28

## 2024-09-22 RX ADMIN — INSULIN GLARGINE 10 UNIT(S): 300 INJECTION, SOLUTION SUBCUTANEOUS at 21:18

## 2024-09-22 RX ADMIN — Medication 650 MILLIGRAM(S): at 06:50

## 2024-09-22 RX ADMIN — Medication 3 UNIT(S): at 08:11

## 2024-09-22 RX ADMIN — Medication 2: at 21:19

## 2024-09-22 RX ADMIN — Medication 10: at 17:27

## 2024-09-22 RX ADMIN — PIPERACILLIN SODIUM AND TAZOBACTAM SODIUM 25 GRAM(S): 12; 1.5 INJECTION, POWDER, LYOPHILIZED, FOR SOLUTION INTRAVENOUS at 13:07

## 2024-09-22 RX ADMIN — Medication 3 UNIT(S): at 12:29

## 2024-09-22 RX ADMIN — Medication 650 MILLIGRAM(S): at 06:12

## 2024-09-22 RX ADMIN — Medication 650 MILLIGRAM(S): at 21:49

## 2024-09-22 RX ADMIN — Medication 6: at 08:10

## 2024-09-22 RX ADMIN — PIPERACILLIN SODIUM AND TAZOBACTAM SODIUM 25 GRAM(S): 12; 1.5 INJECTION, POWDER, LYOPHILIZED, FOR SOLUTION INTRAVENOUS at 06:11

## 2024-09-22 RX ADMIN — Medication 1 TABLET(S): at 08:11

## 2024-09-22 RX ADMIN — PIPERACILLIN SODIUM AND TAZOBACTAM SODIUM 25 GRAM(S): 12; 1.5 INJECTION, POWDER, LYOPHILIZED, FOR SOLUTION INTRAVENOUS at 21:18

## 2024-09-22 RX ADMIN — Medication 6: at 12:28

## 2024-09-22 NOTE — PROGRESS NOTE ADULT - ASSESSMENT
49-year-old male with past medical history of DM2, HTN, HLD, NEUROPATHY, PVD, NON COMPLIANT WITH TT, ETOH ABUSE, presents today due to left toe infection.  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  Patient experienced a blister to the left toe in which he eventually fell orders follow-up on its own.  Patient notes that the blister opened up and he developed some blood in the blister as well.  Patient notes that the blister scab has slightly fallen off but now his toe is red and painful.  Patient not currently on antibiotics.  Patient denies fever, injury, numbness, weakness, or any other complaints.   IN ED TEMP 99, /100, HR 89, had consult with podiatry and sidney started on Cipro and admitted for further management    # Diabetic foot ulcer with cellullitis(left halluxdiabetic ulcer 3x4x0.1 cm with erythema and edema mal odorous , hyperkeratotic  started on ABX, , R/o PVD- arterial doppler- negative for any arterial disease   MR foot done- ac on ch OM with sepstic arthritis of 2ndleft toe, OM left great toe-  surg done by podiatry,9/20  culture of wound E fecalis  pt on zosyn    abx as per ID      # DM2 with hyperglycemia, with neuropathy , nephropathy with likely PVD    ssi, consistent carb, Endo consult,  hba1c 10.2   Endo consult noted , started on lantus  # Ess HTN   amlodipine 5 mg, off losartan     DVT prophylaxis  ID , endo and podiatry consult  # 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

## 2024-09-22 NOTE — PROGRESS NOTE ADULT - PROBLEM SELECTOR PLAN 1
Patient seen and evaluated  Pt is s/p debridement of all non-viable soft tissue and bone left foot with hallux amputation  Dressings change today  Surgical pathology report pending  WB as tolerated heel touch in surgical shoe  Continue IV Abx as per ID    If dressings get wet or fall off, please change with:  1. xeroform  2. gauze  3. abd pad  4. kerlex  5. ace bandage  Will cont to monitor while in house  Will discuss with all attendings

## 2024-09-22 NOTE — PROGRESS NOTE ADULT - ASSESSMENT
The patient is a 49 year old male with a history of HTN, HL, DM, PAD who presents with a toe infection.    Plan:  - ECG with sinus rhythm and no evidence of ischemia/infarction  - Off of losartan  - Continue amlodipine 5 mg daily  - LE arterial duplex with no significant stenosis  - MRI foot with acute/chronic osteomyelitis  - Podiatry follow-up

## 2024-09-22 NOTE — PROGRESS NOTE ADULT - SUBJECTIVE AND OBJECTIVE BOX
Patient is a 49y old  Male who presents with a chief complaint of infected toe (21 Sep 2024 19:38)      INTERVAL HPI/OVERNIGHT EVENTS:overnight events noted    Home Medications:  non compliant with meds for months:  (17 Sep 2024 19:54)      MEDICATIONS  (STANDING):  amLODIPine   Tablet 5 milliGRAM(s) Oral daily  dextrose 5%. 1000 milliLiter(s) (100 mL/Hr) IV Continuous <Continuous>  dextrose 5%. 1000 milliLiter(s) (50 mL/Hr) IV Continuous <Continuous>  dextrose 50% Injectable 12.5 Gram(s) IV Push once  dextrose 50% Injectable 25 Gram(s) IV Push once  dextrose 50% Injectable 25 Gram(s) IV Push once  enoxaparin Injectable 40 milliGRAM(s) SubCutaneous every 24 hours  glucagon  Injectable 1 milliGRAM(s) IntraMuscular once  influenza   Vaccine 0.5 milliLiter(s) IntraMuscular once  insulin glargine Injectable (LANTUS) 10 Unit(s) SubCutaneous at bedtime  insulin lispro (ADMELOG) corrective regimen sliding scale   SubCutaneous three times a day before meals  insulin lispro (ADMELOG) corrective regimen sliding scale   SubCutaneous at bedtime  insulin lispro Injectable (ADMELOG) 3 Unit(s) SubCutaneous three times a day before meals  lactobacillus acidophilus 1 Tablet(s) Oral two times a day with meals  mupirocin 2% Ointment 1 Application(s) Topical <User Schedule>  piperacillin/tazobactam IVPB.. 3.375 Gram(s) IV Intermittent every 8 hours    MEDICATIONS  (PRN):  acetaminophen     Tablet .. 650 milliGRAM(s) Oral every 6 hours PRN Temp greater or equal to 38C (100.4F), Mild Pain (1 - 3), Moderate Pain (4 - 6)  dextrose Oral Gel 15 Gram(s) Oral once PRN Blood Glucose LESS THAN 70 milliGRAM(s)/deciliter  labetalol Injectable 10 milliGRAM(s) IV Push every 8 hours PRN Systolic blood pressure >180  melatonin 3 milliGRAM(s) Oral at bedtime PRN Insomnia      Allergies    amoxicillin (Unknown)    Intolerances        REVIEW OF SYSTEMS:  CONSTITUTIONAL: No fever, weight loss, or fatigue  EYES: No eye pain, visual disturbances, or discharge  ENMT:  No difficulty hearing, tinnitus, vertigo; No sinus or throat pain  NECK: No pain or stiffness  BREASTS: No pain, masses, or nipple discharge  RESPIRATORY: No cough, wheezing, chills or hemoptysis; No shortness of breath  CARDIOVASCULAR: No chest pain, palpitations, dizziness, or leg swelling  GASTROINTESTINAL: No abdominal or epigastric pain. No nausea, vomiting, or hematemesis; No diarrhea or constipation. No melena or hematochezia.  GENITOURINARY: No dysuria, frequency, hematuria, or incontinence  NEUROLOGICAL: No headaches, memory loss, loss of strength, numbness, or tremors  SKIN: No itching, burning, rashes, or lesions   ENDOCRINE: No heat or cold intolerance; No hair loss  MUSCULOSKELETAL: foot pain    Vital Signs Last 24 Hrs  T(C): 37.3 (22 Sep 2024 04:56), Max: 38.4 (21 Sep 2024 22:39)  T(F): 99.2 (22 Sep 2024 04:56), Max: 101.2 (21 Sep 2024 22:39)  HR: 94 (22 Sep 2024 04:56) (84 - 110)  BP: 154/93 (22 Sep 2024 04:56) (135/80 - 171/103)  BP(mean): --  RR: 18 (22 Sep 2024 04:56) (17 - 18)  SpO2: 98% (22 Sep 2024 04:56) (92% - 98%)    Parameters below as of 22 Sep 2024 04:56  Patient On (Oxygen Delivery Method): room air        PHYSICAL EXAM:  GENERAL: NAD, well-groomed, well-developed  HEAD:  Atraumatic, Normocephalic  EYES: EOMI, PERRLA, conjunctiva and sclera clear  ENMT: Moist mucous membranes,   NECK: Supple, No JVD, Normal thyroid  NERVOUS SYSTEM:  Alert & Oriented X3, Good concentration; Motor Strength 5/5 B/L upper and lower extremities; DTRs 2+ intact and symmetric  CHEST/LUNG: Clear to percussion bilaterally; No rales, rhonchi, wheezing, or rubs  HEART: Regular rate and rhythm; No murmurs, rubs, or gallops  ABDOMEN: Soft, Nontender, Nondistended; Bowel sounds present  EXTREMITIES: foot in dressing  SKIN: No rashes or lesions    LABS:                        11.4   10.12 )-----------( 150      ( 21 Sep 2024 06:30 )             34.6     09-21    134[L]  |  98  |  44[H]  ----------------------------<  216[H]  4.1   |  27  |  2.64[H]    Ca    9.3      21 Sep 2024 06:30    TPro  7.1  /  Alb  2.7[L]  /  TBili  0.8  /  DBili  x   /  AST  62[H]  /  ALT  43  /  AlkPhos  105  09-21    PT/INR - ( 21 Sep 2024 02:15 )   PT: 13.7 sec;   INR: 1.23 ratio           Urinalysis Basic - ( 21 Sep 2024 06:30 )    Color: x / Appearance: x / SG: x / pH: x  Gluc: 216 mg/dL / Ketone: x  / Bili: x / Urobili: x   Blood: x / Protein: x / Nitrite: x   Leuk Esterase: x / RBC: x / WBC x   Sq Epi: x / Non Sq Epi: x / Bacteria: x      CAPILLARY BLOOD GLUCOSE      POCT Blood Glucose.: 290 mg/dL (22 Sep 2024 08:01)  POCT Blood Glucose.: 307 mg/dL (21 Sep 2024 21:26)  POCT Blood Glucose.: 263 mg/dL (21 Sep 2024 16:51)  POCT Blood Glucose.: 299 mg/dL (21 Sep 2024 12:17)        Culture - Fungal, Tissue (collected 09-20-24 @ 19:21)  Source: Tissue Left foot  Preliminary Report (09-22-24 @ 07:58):    Testing in progress    Culture - Fungal, Tissue (collected 09-20-24 @ 19:21)  Source: Bone Other  Preliminary Report (09-22-24 @ 07:58):    Testing in progress    Culture - Tissue with Gram Stain (collected 09-20-24 @ 19:21)  Source: Bone Other  Gram Stain (09-21-24 @ 06:32):    No polymorphonuclear cells seen per low power field    No organisms seen per oil power field  Preliminary Report (09-22-24 @ 08:47):    No growth to date    Culture - Fungal, Tissue (collected 09-20-24 @ 19:21)  Source: Bone Other  Preliminary Report (09-22-24 @ 07:58):    Testing in progress    Culture - Tissue with Gram Stain (collected 09-20-24 @ 19:21)  Source: Bone Other  Gram Stain (09-22-24 @ 09:37):    No polymorphonuclear cells seen per low power field    No organisms seen per oil power field  Preliminary Report (09-22-24 @ 09:37):    Growth in fluid media only Gram positive cocci in pairs    Culture - Tissue with Gram Stain (collected 09-20-24 @ 19:21)  Source: Tissue left foot  Gram Stain (09-22-24 @ 09:38):    No polymorphonuclear cells seen per low power field    No organisms seen per oil power field  Preliminary Report (09-22-24 @ 09:38):    Growth in fluid media only Gram positive cocci in pairs    Culture - Fungal, Tissue (collected 09-20-24 @ 19:21)  Source: Tissue  Preliminary Report (09-21-24 @ 07:08):    Testing in progress    Culture - Acid Fast - Tissue w/Smear (collected 09-20-24 @ 19:21)  Source: Tissue Other    Culture - Acid Fast - Tissue w/Smear (collected 09-20-24 @ 19:21)  Source: Tissue Other    Culture - Other (collected 09-17-24 @ 19:10)  Source: Wound Wound  Final Report (09-20-24 @ 13:25):    Numerous Enterobacter cloacae complex    Numerous Enterococcus faecalis    Few Coag Negative Staphylococcus "Susceptibilities not performed"  Organism: Enterobacter cloacae complex  Enterococcus faecalis (09-20-24 @ 13:25)  Organism: Enterococcus faecalis (09-20-24 @ 13:25)      Method Type: STEPHANIE      -  Ampicillin: S <=2 Predicts results to ampicillin/sulbactam, amoxacillin-clavulanate and  piperacillin-tazobactam.      -  Vancomycin: S 2  Organism: Enterobacter cloacae complex (09-20-24 @ 13:25)      Method Type: STEPHANIE      -  Amoxicillin/Clavulanic Acid: R >16/8      -  Ampicillin: R 16 These ampicillin results predict results for amoxicillin      -  Ampicillin/Sulbactam: R 8/4      -  Aztreonam: S <=4      -  Cefazolin: R >16      -  Cefepime: S <=2      -  Cefoxitin: R >16      -  Ceftriaxone: S <=1 Enterobacter cloacae, Klebsiella aerogenes, and Citrobacter freundii may develop resistance during prolonged therapy.      -  Ciprofloxacin: S <=0.25      -  Ertapenem: S <=0.5      -  Gentamicin: S <=2      -  Imipenem: S <=1      -  Levofloxacin: S <=0.5      -  Meropenem: S <=1      -  Piperacillin/Tazobactam: S <=8 Treatment with Pipercillin/Tazobactam is not recommended in severe infections casued by Klebsiella aerogenes, Enterobacter cloacae complex, and Citrobacter freundii complex.      -  Tobramycin: S <=2      -  Trimethoprim/Sulfamethoxazole: S <=0.5/9.5    Culture - Blood (collected 09-17-24 @ 18:13)  Source: .Blood Blood-Peripheral  Preliminary Report (09-22-24 @ 01:01):    No growth at 4 days    Culture - Blood (collected 09-17-24 @ 18:13)  Source: .Blood Blood-Peripheral  Preliminary Report (09-22-24 @ 01:01):    No growth at 4 days        I&O's Summary    21 Sep 2024 07:01  -  22 Sep 2024 07:00  --------------------------------------------------------  IN: 300 mL / OUT: 1200 mL / NET: -900 mL    22 Sep 2024 07:01  -  22 Sep 2024 11:39  --------------------------------------------------------  IN: 0 mL / OUT: 400 mL / NET: -400 mL        RADIOLOGY & ADDITIONAL TESTS:    Imaging Personally Reviewed:  [ x] YES  [ ] NO    Consultant(s) Notes Reviewed:  [ x] YES  [ ] NO    Care Discussed with Consultants/Other Providers [ x] YES  [ ] NO

## 2024-09-22 NOTE — PROGRESS NOTE ADULT - SUBJECTIVE AND OBJECTIVE BOX
PROGRESS NOTE   Patient is a 49y old  Male who presents with a chief complaint of infected toe (22 Sep 2024 11:39)      HPI:  49-year-old male with past medical history of DM2, HTN, HLD, NEUROPATHY, PVD, NON COMPLIANT WITH TT, ETOH ABUSE, presents today due to left toe infection.  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  Patient experienced a blister to the left toe in which he eventually fell orders follow-up on its own.  Patient notes that the blister opened up and he developed some blood in the blister as well.  Patient notes that the blister scab has slightly fallen off but now his toe is red and painful.  Patient not currently on antibiotics.  Patient denies fever, injury, numbness, weakness, or any other complaints.   IN ED TEMP 99, /100, HR 89, had consult with podiatry and sdiney started on Cipro and admitted for further management (17 Sep 2024 19:36)      Vital Signs Last 24 Hrs  T(C): 37.3 (22 Sep 2024 04:56), Max: 38.4 (21 Sep 2024 22:39)  T(F): 99.2 (22 Sep 2024 04:56), Max: 101.2 (21 Sep 2024 22:39)  HR: 94 (22 Sep 2024 04:56) (84 - 110)  BP: 154/93 (22 Sep 2024 04:56) (135/80 - 171/103)  BP(mean): --  RR: 18 (22 Sep 2024 04:56) (17 - 18)  SpO2: 98% (22 Sep 2024 04:56) (92% - 98%)    Parameters below as of 22 Sep 2024 04:56  Patient On (Oxygen Delivery Method): room air                              11.4   10.12 )-----------( 150      ( 21 Sep 2024 06:30 )             34.6               09-21    134[L]  |  98  |  44[H]  ----------------------------<  216[H]  4.1   |  27  |  2.64[H]    Ca    9.3      21 Sep 2024 06:30    TPro  7.1  /  Alb  2.7[L]  /  TBili  0.8  /  DBili  x   /  AST  62[H]  /  ALT  43  /  AlkPhos  105  09-21      PHYSICAL EXAM  GEN: SUZANNA KAISER is a pleasant well-nourished, well developed 49y Male in no acute distress, alert awake, and oriented to person, place and time.   LE Focused:  Vascular: Dorsalis Pedis and Posterior Tibial pulses 2/4.  Capillary re-fill time less then 3 seconds digits 1-5 bilateral.    Neuro: Protective sensation diminished to the level of the digits bilateral.  MSK: Muscle strength 5/5 all major muscle groups bilateral.  Derm: POD#2 . Incision site of the Left foot noted with intact sutures, well coapted , mild ez-incision erythema, no proximal streaking, no fluctuance, no malodor, no signs of infection at this time. the surgical site responding favorable to the current care plan

## 2024-09-22 NOTE — PROGRESS NOTE ADULT - SUBJECTIVE AND OBJECTIVE BOX
ANUJASUZANNA HURLEY is a 49yMale , patient examined and chart reviewed.    INTERVAL HPI/ OVERNIGHT EVENTS:   Afebrile. NAD.  No events. Pain controlled.    PAST MEDICAL & SURGICAL HISTORY:  Prediabetes  HTN (hypertension)  HLD (hyperlipidemia)  DM2 (diabetes mellitus, type 2)      For details regarding the patient's social history, family history, and other miscellaneous elements, please refer the initial infectious diseases consultation and/or the admitting history and physical examination for this admission.    ROS:  CONSTITUTIONAL:  Negative fever or chills  EYES:  Negative  blurry vision or double vision  CARDIOVASCULAR:  Negative for chest pain or palpitations  RESPIRATORY:  Negative for cough, wheezing, or SOB   GASTROINTESTINAL:  Negative for nausea, vomiting, diarrhea, constipation, or abdominal pain  GENITOURINARY:  Negative frequency, urgency or dysuria  NEUROLOGIC:  No headache, confusion, dizziness, lightheadedness  All other systems were reviewed and are negative     ALLERGIES  amoxicillin (Unknown)      Current inpatient medications :    ANTIBIOTICS/RELEVANT:  piperacillin/tazobactam IVPB.. 3.375 Gram(s) IV Intermittent every 8 hours    MEDICATIONS  (STANDING):  amLODIPine   Tablet 5 milliGRAM(s) Oral daily  dextrose 5%. 1000 milliLiter(s) (50 mL/Hr) IV Continuous <Continuous>  dextrose 5%. 1000 milliLiter(s) (100 mL/Hr) IV Continuous <Continuous>  dextrose 50% Injectable 25 Gram(s) IV Push once  dextrose 50% Injectable 12.5 Gram(s) IV Push once  dextrose 50% Injectable 25 Gram(s) IV Push once  enoxaparin Injectable 40 milliGRAM(s) SubCutaneous every 24 hours  glucagon  Injectable 1 milliGRAM(s) IntraMuscular once  influenza   Vaccine 0.5 milliLiter(s) IntraMuscular once  insulin glargine Injectable (LANTUS) 10 Unit(s) SubCutaneous at bedtime  insulin lispro (ADMELOG) corrective regimen sliding scale   SubCutaneous three times a day before meals  insulin lispro (ADMELOG) corrective regimen sliding scale   SubCutaneous at bedtime  insulin lispro Injectable (ADMELOG) 3 Unit(s) SubCutaneous three times a day before meals  lactobacillus acidophilus 1 Tablet(s) Oral two times a day with meals  mupirocin 2% Ointment 1 Application(s) Topical <User Schedule>    MEDICATIONS  (PRN):  acetaminophen     Tablet .. 650 milliGRAM(s) Oral every 6 hours PRN Temp greater or equal to 38C (100.4F), Mild Pain (1 - 3), Moderate Pain (4 - 6)  dextrose Oral Gel 15 Gram(s) Oral once PRN Blood Glucose LESS THAN 70 milliGRAM(s)/deciliter  labetalol Injectable 10 milliGRAM(s) IV Push every 8 hours PRN Systolic blood pressure >180  melatonin 3 milliGRAM(s) Oral at bedtime PRN Insomnia      Objective:  Vital Signs Last 24 Hrs  T(C): 36.8 (22 Sep 2024 18:16), Max: 37.3 (22 Sep 2024 04:56)  T(F): 98.3 (22 Sep 2024 18:16), Max: 99.2 (22 Sep 2024 04:56)  HR: 103 (22 Sep 2024 22:28) (84 - 107)  BP: 152/93 (22 Sep 2024 22:28) (135/80 - 169/94)  RR: 17 (22 Sep 2024 22:28) (16 - 18)  SpO2: 98% (22 Sep 2024 22:28) (96% - 98%)    Parameters below as of 22 Sep 2024 22:28  Patient On (Oxygen Delivery Method): room air        Physical Exam:  General:  no acute distress  Neck: supple, trachea midline  Lungs: clear, no wheeze/rhonchi  Cardiovascular: regular rate and rhythm, S1 S2  Abdomen: soft, nontender,  bowel sounds normal  Neurological: alert and oriented x3  Skin: no rash  Extremities: right foot drsg c/d/i      LABS:                        11.4   10.12 )-----------( 150      ( 21 Sep 2024 06:30 )             34.6   09-21    134[L]  |  98  |  44[H]  ----------------------------<  216[H]  4.1   |  27  |  2.64[H]    Ca    9.3      21 Sep 2024 06:30    TPro  7.1  /  Alb  2.7[L]  /  TBili  0.8  /  DBili  x   /  AST  62[H]  /  ALT  43  /  AlkPhos  105  09-21        MICROBIOLOGY:    Culture - Tissue with Gram Stain (collected 20 Sep 2024 19:21)  Source: Bone Other  Gram Stain (21 Sep 2024 06:32):    No polymorphonuclear cells seen per low power field    No organisms seen per oil power field  Preliminary Report (22 Sep 2024 08:47):    No growth to date    Culture - Fungal, Tissue (collected 20 Sep 2024 19:21)  Source: Bone Other  Preliminary Report (22 Sep 2024 07:58):    Testing in progress    Culture - Tissue with Gram Stain (collected 20 Sep 2024 19:21)  Source: Bone Other  Gram Stain (22 Sep 2024 09:37):    No polymorphonuclear cells seen per low power field    No organisms seen per oil power field  Preliminary Report (22 Sep 2024 09:37):    Growth in fluid media only Gram positive cocci in pairs    Culture - Tissue with Gram Stain (collected 20 Sep 2024 19:21)  Source: Tissue left foot  Gram Stain (22 Sep 2024 09:38):    No polymorphonuclear cells seen per low power field    No organisms seen per oil power field  Preliminary Report (22 Sep 2024 09:38):    Growth in fluid media only Gram positive cocci in pairs    Culture - Other (09.17.24 @ 19:10)    -  Amoxicillin/Clavulanic Acid: R >16/8   -  Ampicillin: R 16 These ampicillin results predict results for amoxicillin   -  Ampicillin: S <=2 Predicts results to ampicillin/sulbactam, amoxacillin-clavulanate and  piperacillin-tazobactam.   -  Ampicillin/Sulbactam: R 8/4   -  Aztreonam: S <=4   -  Cefazolin: R >16   -  Cefepime: S <=2   -  Cefoxitin: R >16   -  Ceftriaxone: S <=1 Enterobacter cloacae, Klebsiella aerogenes, and Citrobacter freundii may develop resistance during prolonged therapy.   -  Ciprofloxacin: S <=0.25   -  Ertapenem: S <=0.5   -  Gentamicin: S <=2   -  Imipenem: S <=1   -  Levofloxacin: S <=0.5   -  Meropenem: S <=1   -  Piperacillin/Tazobactam: S <=8 Treatment with Pipercillin/Tazobactam is not recommended in severe infections casued by Klebsiella aerogenes, Enterobacter cloacae complex, and Citrobacter freundii complex.   -  Tobramycin: S <=2   -  Trimethoprim/Sulfamethoxazole: S <=0.5/9.5   -  Vancomycin: S 2   Specimen Source: Wound Wound   Culture Results:   Numerous Enterobacter cloacae complex  Numerous Enterococcus faecalis  Few Coag Negative Staphylococcus "Susceptibilities not performed"   Organism Identification: Enterobacter cloacae complex  Enterococcus faecalis   Organism: Enterobacter cloacae complex   Organism: Enterococcus faecalis   Method Type: STEPHANIE   Method Type: STEPHANIE    Culture - Blood (collected 17 Sep 2024 18:13)  Source: .Blood Blood-Peripheral  Preliminary Report (19 Sep 2024 01:01):    No growth at 24 hours    Culture - Blood (collected 17 Sep 2024 18:13)  Source: .Blood Blood-Peripheral  Preliminary Report (19 Sep 2024 01:01):    No growth at 24 hours    RADIOLOGY & ADDITIONAL STUDIES:    ACC: [id number] EXAM:  MR FOOT LT   ORDERED BY: LISA ALY     PROCEDURE DATE:  09/19/2024          INTERPRETATION:  LEFT FOOT MRI    CLINICAL INDICATION: Diabetic foot ulcer, for evaluation of osteomyelitis.    COMPARISON: Radiographs dated 9/17/2024.    TECHNIQUE: Multiplanar, multisequence MRI was obtained of the left foot.    FINDINGS:    OSSEOUS: Acute on chronic osteomyelitis/septic arthritis at the proximal   interphalangeal joint of the second toe in the proper clinical setting.   Focal acute osteomyelitis along the plantar lateral cortex of the great   toe distal phalanx base with ill-defined overlying deep soft tissue   ulceration. Lisfranc interval is not widened on this non-weightbearing   examination.    TENDONS: Visualized flexor and extensor tendons are intact.    LIGAMENTS: Lisfranc ligament is intact.    GENERAL: No drainable encapsulated fluid collection. No significant   intermetatarsal bursal fluid distension. No interdigital neuroma. Mild to   moderate heterogeneous chronic muscular atrophy with superimposed patchy   denervation edema like signal.    IMPRESSION:    1.  Acute on chronic osteomyelitis/septic arthritis at the proximal   interphalangeal joint of left second toe in the proper clinical setting.  2.Focal acute osteomyelitis along the plantar lateral cortex of left   great toe distal phalanx base with overlying deep soft tissue ulceration.    Assessment :  48YO M PMH DM2, HTN, HLD, NEUROPATHY, PVD, NON COMPLIANT WITH TT, ETOH ABUSE, admitted with infected DM left great toe infection  MRI + OM  Sp right great toe amputation 9/20  OR bone cultures noted  Clinically stable    Plan :   Cont Zosyn  Fu OR cultures and path  Dm control  Trend temps and cbc  Pulm toileting  Stable from ID standpoint      Continue with present regiment.  Appropriate use of antibiotics and adverse effects reviewed.      > 35 minutes were spent in direct patient care reviewing notes, medications ,labs data/ imaging , discussion with multidisciplinary team.    Thank you for allowing me to participate in care of your patient .    Linsey Mack MD  Infectious Disease  [phone]      ANUJASUZANNA HURLEY is a 49yMale , patient examined and chart reviewed.    INTERVAL HPI/ OVERNIGHT EVENTS:   Afebrile. NAD.  No events. Pain controlled.    PAST MEDICAL & SURGICAL HISTORY:  Prediabetes  HTN (hypertension)  HLD (hyperlipidemia)  DM2 (diabetes mellitus, type 2)      For details regarding the patient's social history, family history, and other miscellaneous elements, please refer the initial infectious diseases consultation and/or the admitting history and physical examination for this admission.    ROS:  CONSTITUTIONAL:  Negative fever or chills  EYES:  Negative  blurry vision or double vision  CARDIOVASCULAR:  Negative for chest pain or palpitations  RESPIRATORY:  Negative for cough, wheezing, or SOB   GASTROINTESTINAL:  Negative for nausea, vomiting, diarrhea, constipation, or abdominal pain  GENITOURINARY:  Negative frequency, urgency or dysuria  NEUROLOGIC:  No headache, confusion, dizziness, lightheadedness  All other systems were reviewed and are negative     ALLERGIES  amoxicillin (Unknown)      Current inpatient medications :    ANTIBIOTICS/RELEVANT:  piperacillin/tazobactam IVPB.. 3.375 Gram(s) IV Intermittent every 8 hours    MEDICATIONS  (STANDING):  amLODIPine   Tablet 5 milliGRAM(s) Oral daily  dextrose 5%. 1000 milliLiter(s) (50 mL/Hr) IV Continuous <Continuous>  dextrose 5%. 1000 milliLiter(s) (100 mL/Hr) IV Continuous <Continuous>  dextrose 50% Injectable 25 Gram(s) IV Push once  dextrose 50% Injectable 12.5 Gram(s) IV Push once  dextrose 50% Injectable 25 Gram(s) IV Push once  enoxaparin Injectable 40 milliGRAM(s) SubCutaneous every 24 hours  glucagon  Injectable 1 milliGRAM(s) IntraMuscular once  influenza   Vaccine 0.5 milliLiter(s) IntraMuscular once  insulin glargine Injectable (LANTUS) 10 Unit(s) SubCutaneous at bedtime  insulin lispro (ADMELOG) corrective regimen sliding scale   SubCutaneous three times a day before meals  insulin lispro (ADMELOG) corrective regimen sliding scale   SubCutaneous at bedtime  insulin lispro Injectable (ADMELOG) 3 Unit(s) SubCutaneous three times a day before meals  lactobacillus acidophilus 1 Tablet(s) Oral two times a day with meals  mupirocin 2% Ointment 1 Application(s) Topical <User Schedule>    MEDICATIONS  (PRN):  acetaminophen     Tablet .. 650 milliGRAM(s) Oral every 6 hours PRN Temp greater or equal to 38C (100.4F), Mild Pain (1 - 3), Moderate Pain (4 - 6)  dextrose Oral Gel 15 Gram(s) Oral once PRN Blood Glucose LESS THAN 70 milliGRAM(s)/deciliter  labetalol Injectable 10 milliGRAM(s) IV Push every 8 hours PRN Systolic blood pressure >180  melatonin 3 milliGRAM(s) Oral at bedtime PRN Insomnia      Objective:  Vital Signs Last 24 Hrs  T(C): 36.8 (22 Sep 2024 18:16), Max: 37.3 (22 Sep 2024 04:56)  T(F): 98.3 (22 Sep 2024 18:16), Max: 99.2 (22 Sep 2024 04:56)  HR: 103 (22 Sep 2024 22:28) (84 - 107)  BP: 152/93 (22 Sep 2024 22:28) (135/80 - 169/94)  RR: 17 (22 Sep 2024 22:28) (16 - 18)  SpO2: 98% (22 Sep 2024 22:28) (96% - 98%)    Parameters below as of 22 Sep 2024 22:28  Patient On (Oxygen Delivery Method): room air        Physical Exam:  General:  no acute distress  Neck: supple, trachea midline  Lungs: clear, no wheeze/rhonchi  Cardiovascular: regular rate and rhythm, S1 S2  Abdomen: soft, nontender,  bowel sounds normal  Neurological: alert and oriented x3  Skin: no rash  Extremities: Left foot drsg c/d/i      LABS:                        11.4   10.12 )-----------( 150      ( 21 Sep 2024 06:30 )             34.6   09-21    134[L]  |  98  |  44[H]  ----------------------------<  216[H]  4.1   |  27  |  2.64[H]    Ca    9.3      21 Sep 2024 06:30    TPro  7.1  /  Alb  2.7[L]  /  TBili  0.8  /  DBili  x   /  AST  62[H]  /  ALT  43  /  AlkPhos  105  09-21        MICROBIOLOGY:    Culture - Tissue with Gram Stain (collected 20 Sep 2024 19:21)  Source: Bone Other  Gram Stain (21 Sep 2024 06:32):    No polymorphonuclear cells seen per low power field    No organisms seen per oil power field  Preliminary Report (22 Sep 2024 08:47):    No growth to date    Culture - Fungal, Tissue (collected 20 Sep 2024 19:21)  Source: Bone Other  Preliminary Report (22 Sep 2024 07:58):    Testing in progress    Culture - Tissue with Gram Stain (collected 20 Sep 2024 19:21)  Source: Bone Other  Gram Stain (22 Sep 2024 09:37):    No polymorphonuclear cells seen per low power field    No organisms seen per oil power field  Preliminary Report (22 Sep 2024 09:37):    Growth in fluid media only Gram positive cocci in pairs    Culture - Tissue with Gram Stain (collected 20 Sep 2024 19:21)  Source: Tissue left foot  Gram Stain (22 Sep 2024 09:38):    No polymorphonuclear cells seen per low power field    No organisms seen per oil power field  Preliminary Report (22 Sep 2024 09:38):    Growth in fluid media only Gram positive cocci in pairs    Culture - Other (09.17.24 @ 19:10)    -  Amoxicillin/Clavulanic Acid: R >16/8   -  Ampicillin: R 16 These ampicillin results predict results for amoxicillin   -  Ampicillin: S <=2 Predicts results to ampicillin/sulbactam, amoxacillin-clavulanate and  piperacillin-tazobactam.   -  Ampicillin/Sulbactam: R 8/4   -  Aztreonam: S <=4   -  Cefazolin: R >16   -  Cefepime: S <=2   -  Cefoxitin: R >16   -  Ceftriaxone: S <=1 Enterobacter cloacae, Klebsiella aerogenes, and Citrobacter freundii may develop resistance during prolonged therapy.   -  Ciprofloxacin: S <=0.25   -  Ertapenem: S <=0.5   -  Gentamicin: S <=2   -  Imipenem: S <=1   -  Levofloxacin: S <=0.5   -  Meropenem: S <=1   -  Piperacillin/Tazobactam: S <=8 Treatment with Pipercillin/Tazobactam is not recommended in severe infections casued by Klebsiella aerogenes, Enterobacter cloacae complex, and Citrobacter freundii complex.   -  Tobramycin: S <=2   -  Trimethoprim/Sulfamethoxazole: S <=0.5/9.5   -  Vancomycin: S 2   Specimen Source: Wound Wound   Culture Results:   Numerous Enterobacter cloacae complex  Numerous Enterococcus faecalis  Few Coag Negative Staphylococcus "Susceptibilities not performed"   Organism Identification: Enterobacter cloacae complex  Enterococcus faecalis   Organism: Enterobacter cloacae complex   Organism: Enterococcus faecalis   Method Type: STEPHANIE   Method Type: STEPHANIE    Culture - Blood (collected 17 Sep 2024 18:13)  Source: .Blood Blood-Peripheral  Preliminary Report (19 Sep 2024 01:01):    No growth at 24 hours    Culture - Blood (collected 17 Sep 2024 18:13)  Source: .Blood Blood-Peripheral  Preliminary Report (19 Sep 2024 01:01):    No growth at 24 hours    RADIOLOGY & ADDITIONAL STUDIES:    ACC: [id number] EXAM:  MR FOOT LT   ORDERED BY: LISA ALY     PROCEDURE DATE:  09/19/2024          INTERPRETATION:  LEFT FOOT MRI    CLINICAL INDICATION: Diabetic foot ulcer, for evaluation of osteomyelitis.    COMPARISON: Radiographs dated 9/17/2024.    TECHNIQUE: Multiplanar, multisequence MRI was obtained of the left foot.    FINDINGS:    OSSEOUS: Acute on chronic osteomyelitis/septic arthritis at the proximal   interphalangeal joint of the second toe in the proper clinical setting.   Focal acute osteomyelitis along the plantar lateral cortex of the great   toe distal phalanx base with ill-defined overlying deep soft tissue   ulceration. Lisfranc interval is not widened on this non-weightbearing   examination.    TENDONS: Visualized flexor and extensor tendons are intact.    LIGAMENTS: Lisfranc ligament is intact.    GENERAL: No drainable encapsulated fluid collection. No significant   intermetatarsal bursal fluid distension. No interdigital neuroma. Mild to   moderate heterogeneous chronic muscular atrophy with superimposed patchy   denervation edema like signal.    IMPRESSION:    1.  Acute on chronic osteomyelitis/septic arthritis at the proximal   interphalangeal joint of left second toe in the proper clinical setting.  2.Focal acute osteomyelitis along the plantar lateral cortex of left   great toe distal phalanx base with overlying deep soft tissue ulceration.    Assessment :  50YO M PMH DM2, HTN, HLD, NEUROPATHY, PVD, NON COMPLIANT WITH TT, ETOH ABUSE, admitted with infected DM left great toe infection  MRI + OM  Sp Left great toe amputation 9/20  OR bone cultures noted  Clinically stable    Plan :   Cont Zosyn  Fu OR cultures and path  Dm control  Trend temps and cbc  Pulm toileting  Stable from ID standpoint      Continue with present regiment.  Appropriate use of antibiotics and adverse effects reviewed.      > 35 minutes were spent in direct patient care reviewing notes, medications ,labs data/ imaging , discussion with multidisciplinary team.    Thank you for allowing me to participate in care of your patient .    Linsey Mack MD  Infectious Disease  [phone]

## 2024-09-22 NOTE — PROGRESS NOTE ADULT - SUBJECTIVE AND OBJECTIVE BOX
Chief Complaint: Foot wound    Interval Events: No events overnight.    Review of Systems:  General: No fevers, chills, weight gain  Skin: No rashes, color changes  Cardiovascular: No chest pain, orthopnea  Respiratory: No shortness of breath, cough  Gastrointestinal: No nausea, abdominal pain  Genitourinary: No incontinence, pain with urination  Musculoskeletal: No pain, swelling, decreased range of motion  Neurological: No headache, weakness  Psychiatric: No depression, anxiety  Endocrine: No weight gain, increased thirst  All other systems are comprehensively negative.    Physical Exam:  Vital Signs Last 24 Hrs  T(C): 37.3 (22 Sep 2024 04:56), Max: 38.4 (21 Sep 2024 22:39)  T(F): 99.2 (22 Sep 2024 04:56), Max: 101.2 (21 Sep 2024 22:39)  HR: 94 (22 Sep 2024 04:56) (84 - 110)  BP: 154/93 (22 Sep 2024 04:56) (125/78 - 171/103)  BP(mean): --  RR: 18 (22 Sep 2024 04:56) (17 - 18)  SpO2: 98% (22 Sep 2024 04:56) (92% - 98%)  Parameters below as of 22 Sep 2024 04:56  Patient On (Oxygen Delivery Method): room air  General: NAD  HEENT: MMM  Neck: No JVD, no carotid bruit  Lungs: CTAB  CV: RRR, nl S1/S2, no M/R/G  Abdomen: S/NT/ND, +BS  Extremities: No LE edema, no cyanosis  Neuro: AAOx3, non-focal  Skin: No rash    Labs:             09-21    134[L]  |  98  |  44[H]  ----------------------------<  216[H]  4.1   |  27  |  2.64[H]    Ca    9.3      21 Sep 2024 06:30    TPro  7.1  /  Alb  2.7[L]  /  TBili  0.8  /  DBili  x   /  AST  62[H]  /  ALT  43  /  AlkPhos  105  09-21                        11.4   10.12 )-----------( 150      ( 21 Sep 2024 06:30 )             34.6       ECG/Telemetry: Sinus rhythm

## 2024-09-23 LAB
ALBUMIN SERPL ELPH-MCNC: 2.8 G/DL — LOW (ref 3.3–5)
ALP SERPL-CCNC: 118 U/L — SIGNIFICANT CHANGE UP (ref 30–120)
ALT FLD-CCNC: 46 U/L — SIGNIFICANT CHANGE UP (ref 10–60)
ANION GAP SERPL CALC-SCNC: 10 MMOL/L — SIGNIFICANT CHANGE UP (ref 5–17)
APPEARANCE UR: CLEAR — SIGNIFICANT CHANGE UP
AST SERPL-CCNC: 39 U/L — SIGNIFICANT CHANGE UP (ref 10–40)
BACTERIA # UR AUTO: ABNORMAL /HPF
BASOPHILS # BLD AUTO: 0.09 K/UL — SIGNIFICANT CHANGE UP (ref 0–0.2)
BASOPHILS NFR BLD AUTO: 0.9 % — SIGNIFICANT CHANGE UP (ref 0–2)
BILIRUB SERPL-MCNC: 1 MG/DL — SIGNIFICANT CHANGE UP (ref 0.2–1.2)
BILIRUB UR-MCNC: NEGATIVE — SIGNIFICANT CHANGE UP
BUN SERPL-MCNC: 45 MG/DL — HIGH (ref 7–23)
CALCIUM SERPL-MCNC: 9.3 MG/DL — SIGNIFICANT CHANGE UP (ref 8.4–10.5)
CHLORIDE SERPL-SCNC: 100 MMOL/L — SIGNIFICANT CHANGE UP (ref 96–108)
CO2 SERPL-SCNC: 27 MMOL/L — SIGNIFICANT CHANGE UP (ref 22–31)
COLOR SPEC: YELLOW — SIGNIFICANT CHANGE UP
CREAT SERPL-MCNC: 2.6 MG/DL — HIGH (ref 0.5–1.3)
CULTURE RESULTS: SIGNIFICANT CHANGE UP
CULTURE RESULTS: SIGNIFICANT CHANGE UP
DIFF PNL FLD: ABNORMAL
EGFR: 29 ML/MIN/1.73M2 — LOW
EOSINOPHIL # BLD AUTO: 0.09 K/UL — SIGNIFICANT CHANGE UP (ref 0–0.5)
EOSINOPHIL NFR BLD AUTO: 0.9 % — SIGNIFICANT CHANGE UP (ref 0–6)
EPI CELLS # UR: SIGNIFICANT CHANGE UP
GLUCOSE BLDC GLUCOMTR-MCNC: 226 MG/DL — HIGH (ref 70–99)
GLUCOSE BLDC GLUCOMTR-MCNC: 241 MG/DL — HIGH (ref 70–99)
GLUCOSE BLDC GLUCOMTR-MCNC: 255 MG/DL — HIGH (ref 70–99)
GLUCOSE BLDC GLUCOMTR-MCNC: 256 MG/DL — HIGH (ref 70–99)
GLUCOSE BLDC GLUCOMTR-MCNC: 343 MG/DL — HIGH (ref 70–99)
GLUCOSE SERPL-MCNC: 234 MG/DL — HIGH (ref 70–99)
GLUCOSE UR QL: 250 MG/DL
HCT VFR BLD CALC: 32.8 % — LOW (ref 39–50)
HGB BLD-MCNC: 11.3 G/DL — LOW (ref 13–17)
IMM GRANULOCYTES NFR BLD AUTO: 1.4 % — HIGH (ref 0–0.9)
KETONES UR-MCNC: NEGATIVE MG/DL — SIGNIFICANT CHANGE UP
LEUKOCYTE ESTERASE UR-ACNC: NEGATIVE — SIGNIFICANT CHANGE UP
LYMPHOCYTES # BLD AUTO: 1.63 K/UL — SIGNIFICANT CHANGE UP (ref 1–3.3)
LYMPHOCYTES # BLD AUTO: 16.8 % — SIGNIFICANT CHANGE UP (ref 13–44)
MCHC RBC-ENTMCNC: 30.4 PG — SIGNIFICANT CHANGE UP (ref 27–34)
MCHC RBC-ENTMCNC: 34.5 GM/DL — SIGNIFICANT CHANGE UP (ref 32–36)
MCV RBC AUTO: 88.2 FL — SIGNIFICANT CHANGE UP (ref 80–100)
MONOCYTES # BLD AUTO: 1.32 K/UL — HIGH (ref 0–0.9)
MONOCYTES NFR BLD AUTO: 13.6 % — SIGNIFICANT CHANGE UP (ref 2–14)
NEUTROPHILS # BLD AUTO: 6.45 K/UL — SIGNIFICANT CHANGE UP (ref 1.8–7.4)
NEUTROPHILS NFR BLD AUTO: 66.4 % — SIGNIFICANT CHANGE UP (ref 43–77)
NITRITE UR-MCNC: NEGATIVE — SIGNIFICANT CHANGE UP
NRBC # BLD: 0 /100 WBCS — SIGNIFICANT CHANGE UP (ref 0–0)
PH UR: 6 — SIGNIFICANT CHANGE UP (ref 5–8)
PLATELET # BLD AUTO: 128 K/UL — LOW (ref 150–400)
POTASSIUM SERPL-MCNC: 4.3 MMOL/L — SIGNIFICANT CHANGE UP (ref 3.5–5.3)
POTASSIUM SERPL-SCNC: 4.3 MMOL/L — SIGNIFICANT CHANGE UP (ref 3.5–5.3)
PROT SERPL-MCNC: 6.8 G/DL — SIGNIFICANT CHANGE UP (ref 6–8.3)
PROT UR-MCNC: 300 MG/DL
RBC # BLD: 3.72 M/UL — LOW (ref 4.2–5.8)
RBC # FLD: 12.2 % — SIGNIFICANT CHANGE UP (ref 10.3–14.5)
RBC CASTS # UR COMP ASSIST: 1 /HPF — SIGNIFICANT CHANGE UP (ref 0–4)
SODIUM SERPL-SCNC: 137 MMOL/L — SIGNIFICANT CHANGE UP (ref 135–145)
SP GR SPEC: 1.02 — SIGNIFICANT CHANGE UP (ref 1–1.03)
SPECIMEN SOURCE: SIGNIFICANT CHANGE UP
SPECIMEN SOURCE: SIGNIFICANT CHANGE UP
UROBILINOGEN FLD QL: 0.2 MG/DL — SIGNIFICANT CHANGE UP (ref 0.2–1)
WBC # BLD: 9.72 K/UL — SIGNIFICANT CHANGE UP (ref 3.8–10.5)
WBC # FLD AUTO: 9.72 K/UL — SIGNIFICANT CHANGE UP (ref 3.8–10.5)
WBC UR QL: 2 /HPF — SIGNIFICANT CHANGE UP (ref 0–5)

## 2024-09-23 RX ORDER — INSULIN GLARGINE 300 U/ML
15 INJECTION, SOLUTION SUBCUTANEOUS AT BEDTIME
Refills: 0 | Status: DISCONTINUED | OUTPATIENT
Start: 2024-09-23 | End: 2024-09-24

## 2024-09-23 RX ORDER — INSULIN LISPRO 100/ML
6 VIAL (ML) SUBCUTANEOUS
Refills: 0 | Status: DISCONTINUED | OUTPATIENT
Start: 2024-09-23 | End: 2024-09-24

## 2024-09-23 RX ORDER — CARVEDILOL 3.125 MG
3.12 TABLET ORAL EVERY 12 HOURS
Refills: 0 | Status: DISCONTINUED | OUTPATIENT
Start: 2024-09-23 | End: 2024-09-26

## 2024-09-23 RX ORDER — GABAPENTIN 800 MG/1
100 TABLET, FILM COATED ORAL THREE TIMES A DAY
Refills: 0 | Status: DISCONTINUED | OUTPATIENT
Start: 2024-09-23 | End: 2024-10-04

## 2024-09-23 RX ADMIN — Medication 6 UNIT(S): at 17:03

## 2024-09-23 RX ADMIN — Medication 6: at 12:13

## 2024-09-23 RX ADMIN — PIPERACILLIN SODIUM AND TAZOBACTAM SODIUM 25 GRAM(S): 12; 1.5 INJECTION, POWDER, LYOPHILIZED, FOR SOLUTION INTRAVENOUS at 14:01

## 2024-09-23 RX ADMIN — Medication 4: at 08:08

## 2024-09-23 RX ADMIN — Medication 8: at 17:03

## 2024-09-23 RX ADMIN — Medication 3 MILLIGRAM(S): at 21:11

## 2024-09-23 RX ADMIN — Medication 650 MILLIGRAM(S): at 07:13

## 2024-09-23 RX ADMIN — GABAPENTIN 100 MILLIGRAM(S): 800 TABLET, FILM COATED ORAL at 14:01

## 2024-09-23 RX ADMIN — PIPERACILLIN SODIUM AND TAZOBACTAM SODIUM 25 GRAM(S): 12; 1.5 INJECTION, POWDER, LYOPHILIZED, FOR SOLUTION INTRAVENOUS at 05:49

## 2024-09-23 RX ADMIN — Medication 3.12 MILLIGRAM(S): at 17:03

## 2024-09-23 RX ADMIN — INSULIN GLARGINE 15 UNIT(S): 300 INJECTION, SOLUTION SUBCUTANEOUS at 21:10

## 2024-09-23 RX ADMIN — GABAPENTIN 100 MILLIGRAM(S): 800 TABLET, FILM COATED ORAL at 21:11

## 2024-09-23 RX ADMIN — ENOXAPARIN SODIUM 40 MILLIGRAM(S): 150 INJECTION SUBCUTANEOUS at 21:20

## 2024-09-23 RX ADMIN — Medication 1 TABLET(S): at 17:02

## 2024-09-23 RX ADMIN — Medication 5 MILLIGRAM(S): at 05:50

## 2024-09-23 RX ADMIN — Medication 3.12 MILLIGRAM(S): at 11:46

## 2024-09-23 RX ADMIN — Medication 650 MILLIGRAM(S): at 17:16

## 2024-09-23 RX ADMIN — Medication 6 UNIT(S): at 12:13

## 2024-09-23 RX ADMIN — Medication 650 MILLIGRAM(S): at 06:43

## 2024-09-23 RX ADMIN — Medication 650 MILLIGRAM(S): at 18:23

## 2024-09-23 RX ADMIN — Medication 6 UNIT(S): at 08:08

## 2024-09-23 RX ADMIN — PIPERACILLIN SODIUM AND TAZOBACTAM SODIUM 25 GRAM(S): 12; 1.5 INJECTION, POWDER, LYOPHILIZED, FOR SOLUTION INTRAVENOUS at 21:09

## 2024-09-23 RX ADMIN — Medication 1 TABLET(S): at 08:07

## 2024-09-23 NOTE — CASE MANAGEMENT PROGRESS NOTE - NSCMPROGRESSNOTE_GEN_ALL_CORE
RN/CM noted that pt's case discussed during Interdisciplinary rounds, pt remains acute,  S/P 09/20/2024 s/p debridement of all non-viable soft tissue and bone left foot with hallux amputation. AS per ID MD, cont IV Zosyn-OR cultures and path pending. Pt with local wound care- Xeroform being performed by podiatrist from [facility] Delgado's office. DC pending hospital course. CM remains available.  RN/CM noted that pt's case discussed during Interdisciplinary rounds, pt remains acute,  S/P 09/20/2024 s/p debridement of all non-viable soft tissue and bone left foot with hallux amputation. AS per ID MD, cont IV Zosyn-OR cultures and path pending. Pt with local wound care- Xeroform being performed by podiatrist from [facility] office. DC pending hospital course. MSW aware pt requested resources on financial assistance. CM remains available.

## 2024-09-23 NOTE — PROGRESS NOTE ADULT - ASSESSMENT
CKD 4, Diabetic nephropathy  Diabetes, DFU   Hypertension    Renal indices close to baseline. Add coreg for BP control. Off ARB. Podiatry follow up.   Monitor blood sugar levels. Insulin coverage as needed. Dietary restriction. Trend BP and titrate BP meds as needed   D/w patient regarding need for out patient nephrology follow up. Will follow electrolytes and renal function trend.   Avoid nephrotoxic meds as possible. Avoid ACEI, ARB, NSAIDs and IV contrast.   CKD 4, Diabetic nephropathy  Diabetes, DFU   Hypertension    Renal indices close to baseline. Add coreg for BP control. Off ARB. Podiatry follow up. Repeat UA, check urine p/c.   Monitor blood sugar levels. Insulin coverage as needed. Dietary restriction. Trend BP and titrate BP meds as needed   D/w patient regarding need for out patient nephrology follow up. Will follow electrolytes and renal function trend.   Avoid nephrotoxic meds as possible. Avoid ACEI, ARB, NSAIDs and IV contrast.

## 2024-09-23 NOTE — PROGRESS NOTE ADULT - PROBLEM SELECTOR PLAN 1
Patient seen and evaluated.  Pt is s/p debridement of all non-viable soft tissue and bone left foot with hallux amputation.  Dressings changed today.  Surgical pathology report pending.  Concern for flap necrosis and venous congestin. Will continue to monitor closely.   Albumin 2.8, rec nutrition consult and metabolic optimization to improve healing potential.   WB as tolerated heel touch in surgical shoe.  Continue IV Abx as per ID.  If dressings get wet or fall off, please change with:  1. xeroform  2. gauze  3. abd pad  4. kerlex  5. ace bandage  Will cont to monitor while in house.  Will discuss with all attendings. Patient seen and evaluated.  Pt is s/p debridement of all non-viable soft tissue and bone left foot with hallux amputation.  Dressings changed today.  Surgical pathology report pending.  Concern for flap due to  venous congestin. Will continue to monitor closely.   Albumin 2.8, rec nutrition consult and metabolic optimization to improve healing potential.   WB as tolerated heel touch in surgical shoe.  Pt instructed to elevate his operative extremity on two pillows  Pt instructed to limit standing and walking on the operative extremity  Pt made aware of the severity of his conditions and that serial procedures maybe required if symptoms persist  ordered vascular consultation and appreciate their expert opinion  Continue IV Abx as per ID.  If dressings get wet or fall off, please change with:  1. xeroform  2. gauze  3. abd pad  4. kerlex  5. ace bandage  Will cont to monitor while in house.  Will discuss with all attendings.

## 2024-09-23 NOTE — PROGRESS NOTE ADULT - SUBJECTIVE AND OBJECTIVE BOX
PROGRESS NOTE   Patient is a 49y old  Male who presents with a chief complaint of infected toe (23 Sep 2024 11:09)      HPI:  49-year-old male with past medical history of DM2, HTN, HLD, NEUROPATHY, PVD, NON COMPLIANT WITH TT, ETOH ABUSE, presents today due to left toe infection.  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  Patient experienced a blister to the left toe in which he eventually fell orders follow-up on its own.  Patient notes that the blister opened up and he developed some blood in the blister as well.  Patient notes that the blister scab has slightly fallen off but now his toe is red and painful.  Patient not currently on antibiotics.  Patient denies fever, injury, numbness, weakness, or any other complaints.   IN ED TEMP 99, /100, HR 89, had consult with podiatry and sidney started on Cipro and admitted for further management (17 Sep 2024 19:36)      Vital Signs Last 24 Hrs  T(C): 37.4 (23 Sep 2024 05:30), Max: 37.4 (23 Sep 2024 00:30)  T(F): 99.3 (23 Sep 2024 05:30), Max: 99.3 (23 Sep 2024 00:30)  HR: 85 (23 Sep 2024 11:49) (85 - 107)  BP: 136/85 (23 Sep 2024 11:49) (136/85 - 169/94)  BP(mean): --  RR: 17 (23 Sep 2024 05:30) (16 - 17)  SpO2: 99% (23 Sep 2024 05:30) (96% - 99%)    Parameters below as of 22 Sep 2024 22:28  Patient On (Oxygen Delivery Method): room air                              11.3   9.72  )-----------( 128      ( 23 Sep 2024 08:03 )             32.8               09-23    137  |  100  |  45[H]  ----------------------------<  234[H]  4.3   |  27  |  2.60[H]    Ca    9.3      23 Sep 2024 08:03    TPro  6.8  /  Alb  2.8[L]  /  TBili  1.0  /  DBili  x   /  AST  39  /  ALT  46  /  AlkPhos  118  09-23      PHYSICAL EXAM  GEN: SUZANNA KAISER is a pleasant well-nourished, well developed 49y Male in no acute distress, alert awake, and oriented to person, place and time.   LE Focused:  Vascular: Dorsalis Pedis and Posterior Tibial pulses 2/4.  Capillary re-fill time less then 3 seconds digits 1-5 bilateral.    Neuro: Protective sensation diminished to the level of the digits bilateral.  MSK: Muscle strength 5/5 all major muscle groups bilateral.  Derm: POD#3 . Incision site of the Left foot noted with intact sutures. Dorsal and plantar flap necrosis noted today with venous congestion.      PROGRESS NOTE   Patient is a 49y old  Male who presents with a chief complaint of infected toe (23 Sep 2024 11:09)      HPI:  49-year-old male with past medical history of DM2, HTN, HLD, NEUROPATHY, PVD, NON COMPLIANT WITH TT, ETOH ABUSE, presents today due to left toe infection.  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  Patient experienced a blister to the left toe in which he eventually fell orders follow-up on its own.  Patient notes that the blister opened up and he developed some blood in the blister as well.  Patient notes that the blister scab has slightly fallen off but now his toe is red and painful.  Patient not currently on antibiotics.  Patient denies fever, injury, numbness, weakness, or any other complaints.   IN ED TEMP 99, /100, HR 89, had consult with podiatry and sidney started on Cipro and admitted for further management (17 Sep 2024 19:36)      Vital Signs Last 24 Hrs  T(C): 37.4 (23 Sep 2024 05:30), Max: 37.4 (23 Sep 2024 00:30)  T(F): 99.3 (23 Sep 2024 05:30), Max: 99.3 (23 Sep 2024 00:30)  HR: 85 (23 Sep 2024 11:49) (85 - 107)  BP: 136/85 (23 Sep 2024 11:49) (136/85 - 169/94)  BP(mean): --  RR: 17 (23 Sep 2024 05:30) (16 - 17)  SpO2: 99% (23 Sep 2024 05:30) (96% - 99%)    Parameters below as of 22 Sep 2024 22:28  Patient On (Oxygen Delivery Method): room air                              11.3   9.72  )-----------( 128      ( 23 Sep 2024 08:03 )             32.8               09-23    137  |  100  |  45[H]  ----------------------------<  234[H]  4.3   |  27  |  2.60[H]    Ca    9.3      23 Sep 2024 08:03    TPro  6.8  /  Alb  2.8[L]  /  TBili  1.0  /  DBili  x   /  AST  39  /  ALT  46  /  AlkPhos  118  09-23      PHYSICAL EXAM  GEN: SUZANNA KAISER is a pleasant well-nourished, well developed 49y Male in no acute distress, alert awake, and oriented to person, place and time.   LE Focused:  Vascular: Dorsalis Pedis and Posterior Tibial pulses 2/4.  Capillary re-fill time less then 3 seconds digits 1-5 bilateral.    Neuro: Protective sensation diminished to the level of the digits bilateral.  MSK: Muscle strength 5/5 all major muscle groups bilateral.  Derm: POD#3 . Incision site of the Left foot noted with intact sutures. Dorsal and plantar flap showing signs of  venous congestion.  Guarded prognosis for the flap at this time. Upon elevation there is decreased venous congestion. the flap is viable at this time

## 2024-09-23 NOTE — PROGRESS NOTE ADULT - SUBJECTIVE AND OBJECTIVE BOX
CAPILLARY BLOOD GLUCOSE      POCT Blood Glucose.: 261 mg/dL (22 Sep 2024 20:22)  POCT Blood Glucose.: 384 mg/dL (22 Sep 2024 17:00)  POCT Blood Glucose.: 292 mg/dL (22 Sep 2024 12:24)  POCT Blood Glucose.: 290 mg/dL (22 Sep 2024 08:01)      Vital Signs Last 24 Hrs  T(C): 37.4 (23 Sep 2024 05:30), Max: 37.4 (23 Sep 2024 00:30)  T(F): 99.3 (23 Sep 2024 05:30), Max: 99.3 (23 Sep 2024 00:30)  HR: 94 (23 Sep 2024 05:30) (90 - 107)  BP: 162/96 (23 Sep 2024 05:30) (137/84 - 169/94)  BP(mean): --  RR: 17 (23 Sep 2024 05:30) (16 - 17)  SpO2: 99% (23 Sep 2024 05:30) (96% - 99%)    Parameters below as of 22 Sep 2024 22:28  Patient On (Oxygen Delivery Method): room air               dextrose 50% Injectable 25 Gram(s) IV Push once  dextrose 50% Injectable 12.5 Gram(s) IV Push once  dextrose 50% Injectable 25 Gram(s) IV Push once  dextrose Oral Gel 15 Gram(s) Oral once PRN  glucagon  Injectable 1 milliGRAM(s) IntraMuscular once  insulin glargine Injectable (LANTUS) 10 Unit(s) SubCutaneous at bedtime  insulin lispro (ADMELOG) corrective regimen sliding scale   SubCutaneous three times a day before meals  insulin lispro (ADMELOG) corrective regimen sliding scale   SubCutaneous at bedtime  insulin lispro Injectable (ADMELOG) 3 Unit(s) SubCutaneous three times a day before meals

## 2024-09-23 NOTE — PROGRESS NOTE ADULT - SUBJECTIVE AND OBJECTIVE BOX
Chief Complaint: Foot wound    Interval Events: No events overnight.    Review of Systems:  General: No fevers, chills, weight gain  Skin: No rashes, color changes  Cardiovascular: No chest pain, orthopnea  Respiratory: No shortness of breath, cough  Gastrointestinal: No nausea, abdominal pain  Genitourinary: No incontinence, pain with urination  Musculoskeletal: No pain, swelling, decreased range of motion  Neurological: No headache, weakness  Psychiatric: No depression, anxiety  Endocrine: No weight gain, increased thirst  All other systems are comprehensively negative.    Physical Exam:  Vital Signs Last 24 Hrs  T(C): 37.4 (23 Sep 2024 05:30), Max: 37.4 (23 Sep 2024 00:30)  T(F): 99.3 (23 Sep 2024 05:30), Max: 99.3 (23 Sep 2024 00:30)  HR: 94 (23 Sep 2024 05:30) (90 - 107)  BP: 162/96 (23 Sep 2024 05:30) (137/84 - 169/94)  BP(mean): --  RR: 17 (23 Sep 2024 05:30) (16 - 17)  SpO2: 99% (23 Sep 2024 05:30) (96% - 99%)  Parameters below as of 22 Sep 2024 22:28  Patient On (Oxygen Delivery Method): room air  General: NAD  HEENT: MMM  Neck: No JVD, no carotid bruit  Lungs: CTAB  CV: RRR, nl S1/S2, no M/R/G  Abdomen: S/NT/ND, +BS  Extremities: No LE edema, no cyanosis  Neuro: AAOx3, non-focal  Skin: No rash    Labs:        ECG/Telemetry: Sinus rhythm

## 2024-09-23 NOTE — CONSULT NOTE ADULT - SUBJECTIVE AND OBJECTIVE BOX
SUZANNA KAISER  MRN-[id number]  49y    THIS IS A 48 YO MALE WITH SIGNIFICANT PAST MEDICAL HISTORY OF HTN, HYPERLIPIDEMIA, T2D POORLY CONTROLLED, DIABETIC NEUROPATHY, ETOH ABUSE, PVD  ADMITTED 09/17/2024 FOR MANAGEMENT OF INFECTED BLISTER ON THE IST DIGIT LEFT FOOT.     HE IS POD #3 S/P IST DIGIT LEFT TOW AMPUTATION FOR INFECTED TOE AND OSTEOMYELITIS.     NOTED TODAY BY PODIATRY TO HAVE NECROTIC CHANGES TO THE FLAPS AND A VASCULAR CONSULT IS REQUESTED.    HE DENIES ANY REST PAIN/ CALF PAIN.         PAST MEDICAL & SURGICAL HISTORY:  HTN  HYPERLIPIDEMIA   T2D    Home Medications:  non compliant with meds for months:  (17 Sep 2024 19:54)    Allergies  amoxicillin (Unknown)    TOBACCO USE: DENIES  ALCOHOL USE: 3-4 DRINKS/ MONTH   DRUG USE: DENIES      REVIEW OF SYSTEMS: SEE PAST MEDICAL AND SURGICAL HISTORY     CONSTITUTIONAL: No weakness, fevers or chills  EYES/ENT: No visual changes;  No vertigo or throat pain   NECK: No pain or stiffness  RESPIRATORY: No cough, wheezing, hemoptysis; No shortness of breath  CARDIOVASCULAR: No chest pain or palpitations  GASTROINTESTINAL: No abdominal or epigastric pain. No nausea, vomiting, or hematemesis; No diarrhea or constipation. No melena or hematochezia.  GENITOURINARY: No dysuria, frequency or hematuria    MEDICATIONS  (STANDING):  amLODIPine   Tablet 5 milliGRAM(s) Oral daily  carvedilol 3.125 milliGRAM(s) Oral every 12 hours  dextrose 5%. 1000 milliLiter(s) (50 mL/Hr) IV Continuous <Continuous>  dextrose 5%. 1000 milliLiter(s) (100 mL/Hr) IV Continuous <Continuous>  dextrose 50% Injectable 25 Gram(s) IV Push once  dextrose 50% Injectable 12.5 Gram(s) IV Push once  dextrose 50% Injectable 25 Gram(s) IV Push once  enoxaparin Injectable 40 milliGRAM(s) SubCutaneous every 24 hours  gabapentin 100 milliGRAM(s) Oral three times a day  glucagon  Injectable 1 milliGRAM(s) IntraMuscular once  influenza   Vaccine 0.5 milliLiter(s) IntraMuscular once  insulin glargine Injectable (LANTUS) 15 Unit(s) SubCutaneous at bedtime  insulin lispro (ADMELOG) corrective regimen sliding scale   SubCutaneous three times a day before meals  insulin lispro (ADMELOG) corrective regimen sliding scale   SubCutaneous at bedtime  insulin lispro Injectable (ADMELOG) 6 Unit(s) SubCutaneous three times a day before meals  lactobacillus acidophilus 1 Tablet(s) Oral two times a day with meals  mupirocin 2% Ointment 1 Application(s) Topical <User Schedule>  piperacillin/tazobactam IVPB.. 3.375 Gram(s) IV Intermittent every 8 hours    MEDICATIONS  (PRN):  acetaminophen     Tablet .. 650 milliGRAM(s) Oral every 6 hours PRN Temp greater or equal to 38C (100.4F), Mild Pain (1 - 3), Moderate Pain (4 - 6)  dextrose Oral Gel 15 Gram(s) Oral once PRN Blood Glucose LESS THAN 70 milliGRAM(s)/deciliter  labetalol Injectable 10 milliGRAM(s) IV Push every 8 hours PRN Systolic blood pressure >180  melatonin 3 milliGRAM(s) Oral at bedtime PRN Insomnia       Vital Signs (24 Hrs):  T(C): 36.8 (09-23-24 @ 13:18), Max: 37.4 (09-23-24 @ 00:30)  HR: 89 (09-23-24 @ 13:18) (85 - 107)  BP: 145/94 (09-23-24 @ 13:18) (136/85 - 162/96)  RR: 17 (09-23-24 @ 13:18) (16 - 17)  SpO2: 98% (09-23-24 @ 13:18) (96% - 99%)    22 Sep 2024 07:01  -  23 Sep 2024 07:00  --------------------------------------------------------  IN: 100 mL / OUT: 700 mL / NET: -600 mL      PHYSICAL EXAM:    GENERAL: NAD  HEAD:  ATRAUMATIC, NORMOCEPHALIC  EYES: EOMI, PERRLA, CONJUNCTIVA AND SCLERA CLEAR   ENT: MOIST MUCOUS MEMBRANE   NECK: SUPPLE, NO JVD  CHEST/LUNG: CLEAR TO AUSCULTATION, NO W/R/R  HEART: REGULAR RATE, RHYTHM, NO MURMUR, RUBS, GALLOPS  ABDOMEN: + BS, SOFT, NOT DISTENDED, NO PALPABLE/ PULSATILE  MASS ,NON TENDER TO PALPATION. NO CVA  TENDERNESS   VASCULAR: 2+ PERIPHERAL PULSES : AXILLARY, BRACHIAL, FEMORAL, POPLITEAL DP AND PT   LEFT FOOT: IST DIGIT S/P TOE AMPUTATION . SUTURES IN PLACE WITH GOOD APPROXIMATION, SOME EDEMA TO MID FOOT, FLAP IS PARTIALLY NECROTIC / CONGESTED  MORE PRONOUNCED AT THE WOUND EDGE. + 2 PT AND DP PULSES    EXTREMITIES: NO CALF TENDERNESS    NERVOUS SYSTEM: ALERT AND ORIENTED X3, CLEAR SPEECH . NO DEFICITS   MUSCULOSKELETAL : GOOD ROM ALL 4 EXTREMITIES                         11.3   9.72  )-----------( 128      ( 23 Sep 2024 08:03 )             32.8       23 Sep 2024 08:03    137    |  100    |  45     ----------------------------<  234    4.3     |  27     |  2.60     Ca    9.3        23 Sep 2024 08:03    TPro  6.8    /  Alb  2.8    /  TBili  1.0    /  DBili  x      /  AST  39     /  ALT  46     /  AlkPhos  118    23 Sep 2024 08:03      US DPLX LWR EXT ARTS COMPL BI   ORDERED BY: LISA ALY   PROCEDURE DATE:  09/17/2024      INTERPRETATION:  US LOWER EXTREMITY ARTERIAL DUPLEX COMPLETE BILATERAL    CLINICAL INDICATION:  Peripheral artery disease of the legs    COMPARISON: None    Real-time sonography of the bilateral lower extremity arterial system was   performed using a high-resolution linear array transducer and including   color and spectral Doppler.    Mild scattered bilateral plaque. No soft tissue abnormalities are   demonstrated in either leg. Flow phase patterns and peak systolic   velocity measurements (in cm/s) were observed as follows:    RIGHT:  CFA: Triphasic; 143  Proximal SFA: Triphasic; 104  Mid SFA: Triphasic; 125  Distal SFA: Triphasic;  112  Popliteal: Triphasic;  94, 92  Anterior tibial: Triphasic; 96  Posterior tibial: Triphasic; 129  Peroneal: Triphasic;  61  Dorsalis pedis: Triphasic;  96    LEFT:  CFA: Triphasic; 128  Proximal SFA: Triphasic; 137  Mid SFA: Triphasic; 113  Distal SFA: Triphasic; 99  Popliteal: Triphasic;  107, 121  Anterior tibial: Triphasic; 112  Posterior tibial: Biphasic; 132  Peroneal: Triphasic; 84  Dorsalis pedis: Triphasic;  118    IMPRESSION:  *  No significant arterial disease in the legs.    JINNY NAVA MD; Attending Radiologist      MR FOOT LT   ORDERED BY: LISA ALY   PROCEDURE DATE:  09/19/2024      INTERPRETATION:  LEFT FOOT MRI    CLINICAL INDICATION: Diabetic foot ulcer, for evaluation of osteomyelitis.    COMPARISON: Radiographs dated 9/17/2024.    TECHNIQUE: Multiplanar, multisequence MRI was obtained of the left foot.    FINDINGS:    OSSEOUS: Acute on chronic osteomyelitis/septic arthritis at the proximal   interphalangeal joint of the second toe in the proper clinical setting.   Focal acute osteomyelitis along the plantar lateral cortex of the great   toe distal phalanx base with ill-defined overlying deep soft tissue   ulceration. Lisfranc interval is not widened on this non-weightbearing   examination.    TENDONS: Visualized flexor and extensor tendons are intact.    LIGAMENTS: Lisfranc ligament is intact.    GENERAL: No drainable encapsulated fluid collection. No significant   intermetatarsal bursal fluid distension. No interdigital neuroma. Mild to   moderate heterogeneous chronic muscular atrophy with superimposed patchy   denervation edema like signal.    IMPRESSION:    1.  Acute on chronic osteomyelitis/septic arthritis at the proximal   interphalangeal joint of left second toe in the proper clinical setting.  2.Focal acute osteomyelitis along the plantar lateral cortex of left   great toe distal phalanx base with overlying deep soft tissue ulceration.    JUANA URIBE MD; Attending Radiologist    ASSESSMENT AND PLAN     NECROTIC WOUND POD # 3 S/P LEFT IST DIGIT AMPUTATION FOR INFECTED TOE WITH OSTEOMYELITIS     HISTORY OF HTN, HYPERLIPIDEMIA, T2D POORLY CONTROLLED, DIABETIC NEUROPATHY, ETOH ABUSE, PVD    NO ACUTE VASCULAR SURGICAL INTERVENTION AT THIS POINT  CONTINUE LOCAL WOUND CARE  TO FOLLOW UP WITH VASCULAR CLINIC IN 1-2 WEEKS, FOR RE-EVALUATION AND POSSIBLE FURTHER WORK    WOUND MANAGEMENT BY PODIATRY  MEDICAL MANAGEMENT BY PRIMARY TEAM      CASE DISCUSSED WITH DR. YASIR WRIGHT

## 2024-09-23 NOTE — CHART NOTE - NSCHARTNOTEFT_GEN_A_CORE
Assessment: HPI: 49-year-old male with past medical history of DM2, HTN, HLD, NEUROPATHY, PVD, NON COMPLIANT WITH TT, ETOH ABUSE, presents today due to left toe infection.  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  Patient experienced a blister to the left toe in which he eventually fell orders follow-up on its own.  Patient notes that the blister opened up and he developed some blood in the blister as well.  Patient notes that the blister scab has slightly fallen off but now his toe is red and painful.  Patient not currently on antibiotics.  Patient denies fever, injury, numbness, weakness, or any other complaints.  IN ED TEMP 99, /100, HR 89, had consult with podiatry and sidney started on Cipro and admitted for further management (17 Sep 2024 19:36)    Interim: 9/23  Visited patient in room, presents with good appetite/po intake, consuming >% of [facility] norma snack meals. Denies n/v/d/c, last BM 9/22. Pertinent medications/nutrition labs reviewed; -384 x24hr, a1c 10.6%, elevated LDL, cholesterol; In house ordered for Lantus (increased from 10 to 15 units), lispro sliding scale and added 6 units TID before meals to aid in glucose management. Reassessed the importance of consistent carbohydrate intake with pt. Educated on avoidance of concentrated sweets/beverages. Pair a protein with carbohydrate at each meal/snack to aid in glycemic control. Recommend continue Manish (7 gm arginine/7 gm glutamine/1.5 gm hmb) x1 packet bid to aid in wound healing for diabetic ulcer, allow double protein at each meal. Remove evening snack as pt having continued hyperglycemia. Pt receptive, no nutrition related questions at this time. RD to remain available as needed.    Factors impacting intake: [ ] none [ ] nausea  [ ] vomiting [ ] diarrhea [ ] constipation  [ ]chewing problems [ ] swallowing issues  [ ] other:     Diet Prescription: Diet, Consistent Carbohydrate w/Evening Snack:   DASH/TLC {Sodium & Cholesterol Restricted} (09-17-24 @ 20:04)    Intake: %    Daily   % Weight Change    Pertinent Medications: MEDICATIONS  (STANDING):  amLODIPine   Tablet 5 milliGRAM(s) Oral daily  dextrose 5%. 1000 milliLiter(s) (100 mL/Hr) IV Continuous <Continuous>  dextrose 5%. 1000 milliLiter(s) (50 mL/Hr) IV Continuous <Continuous>  dextrose 50% Injectable 12.5 Gram(s) IV Push once  dextrose 50% Injectable 25 Gram(s) IV Push once  dextrose 50% Injectable 25 Gram(s) IV Push once  enoxaparin Injectable 40 milliGRAM(s) SubCutaneous every 24 hours  glucagon  Injectable 1 milliGRAM(s) IntraMuscular once  influenza   Vaccine 0.5 milliLiter(s) IntraMuscular once  insulin glargine Injectable (LANTUS) 15 Unit(s) SubCutaneous at bedtime  insulin lispro (ADMELOG) corrective regimen sliding scale   SubCutaneous three times a day before meals  insulin lispro (ADMELOG) corrective regimen sliding scale   SubCutaneous at bedtime  insulin lispro Injectable (ADMELOG) 6 Unit(s) SubCutaneous three times a day before meals  lactobacillus acidophilus 1 Tablet(s) Oral two times a day with meals  mupirocin 2% Ointment 1 Application(s) Topical <User Schedule>  piperacillin/tazobactam IVPB.. 3.375 Gram(s) IV Intermittent every 8 hours    MEDICATIONS  (PRN):  acetaminophen     Tablet .. 650 milliGRAM(s) Oral every 6 hours PRN Temp greater or equal to 38C (100.4F), Mild Pain (1 - 3), Moderate Pain (4 - 6)  dextrose Oral Gel 15 Gram(s) Oral once PRN Blood Glucose LESS THAN 70 milliGRAM(s)/deciliter  labetalol Injectable 10 milliGRAM(s) IV Push every 8 hours PRN Systolic blood pressure >180  melatonin 3 milliGRAM(s) Oral at bedtime PRN Insomnia    Pertinent Labs: 09-23 Na137 mmol/L Glu 234 mg/dL[H] K+ 4.3 mmol/L Cr  2.60 mg/dL[H] BUN 45 mg/dL[H] 09-23 Alb 2.8 g/dL[L] 09-18 Chol 236 mg/dL[H] LDL --    HDL 42 mg/dL Trig 305 mg/dL[H]     CAPILLARY BLOOD GLUCOSE      POCT Blood Glucose.: 226 mg/dL (23 Sep 2024 07:57)  POCT Blood Glucose.: 261 mg/dL (22 Sep 2024 20:22)  POCT Blood Glucose.: 384 mg/dL (22 Sep 2024 17:00)  POCT Blood Glucose.: 292 mg/dL (22 Sep 2024 12:24)    No edema or pressure injury documented per flowsheets.      Estimated Needs:   [x ] no change since previous assessment  [ ] recalculated:     Previous Nutrition Diagnosis:   [ ] Inadequate Energy Intake [ ]Inadequate Oral Intake [ ] Excessive Energy Intake   [ ] Underweight [ ] Increased Nutrient Needs [ ] Overweight/Obesity [x ] Altered Nutrition related lab values  [ ] Altered GI Function [ ] Unintended Weight Loss [ ] Food & Nutrition Related Knowledge Deficit [ ] Malnutrition     Nutrition Diagnosis is [ x] ongoing  [ ] resolved [ ] not applicable     New Nutrition Diagnosis: [ x] not applicable       Interventions:   1. Recommend consistent carb and remove evening snack; allow double protein at each meal  2. Provide ONS: Manish (7 gm arginine/7 gm glutamine/1.5 gm hmb) x1 packet bid  3. Continue to monitor PO intake, tolerance to diet prescription, weights, labs, GI tolerance, and skin integrity. MD notified via teams regarding any change/changes to diet order.     Recommend  [ ] Change Diet To:  [ ] Nutrition Supplement  [ ] Nutrition Support  [ ] Other:     Monitoring and Evaluation:   [X ] Intake [ x ] Tolerance to diet prescription [ x ] weights [ x ] labs[ x ] follow up per protocol  [ ] other: Assessment: HPI: 49-year-old male with past medical history of DM2, HTN, HLD, NEUROPATHY, PVD, NON COMPLIANT WITH TT, ETOH ABUSE, presents today due to left toe infection.  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  Patient experienced a blister to the left toe in which he eventually fell orders follow-up on its own.  Patient notes that the blister opened up and he developed some blood in the blister as well.  Patient notes that the blister scab has slightly fallen off but now his toe is red and painful.  Patient not currently on antibiotics.  Patient denies fever, injury, numbness, weakness, or any other complaints.  IN ED TEMP 99, /100, HR 89, had consult with podiatry and sidney started on Cipro and admitted for further management (17 Sep 2024 19:36)    Interim: 9/23  Visited patient in room, presents with good appetite/po intake, consuming >% of [employer] snack meals and is avoiding the carbs on the plate, having double protein. Drinking floridalma provided. Denies n/v/d/c, last BM 9/22. Pertinent medications/nutrition labs reviewed; -384 x24hr, a1c 10.6%, elevated LDL, cholesterol; In house ordered for Lantus (increased from 10 to 15 units), lispro sliding scale and added 6 units TID before meals to aid in glucose management. Reassessed the importance of consistent carbohydrate intake with pt. Reeducated on avoidance of concentrated sweets/beverages, pairing a protein with carbohydrate at each meal/snack to aid in glycemic control. Discussed affordable protein/healthy foods at supermarkets to purchase. Recommend continue Floridalma (7 gm arginine/7 gm glutamine/1.5 gm hmb) x1 packet bid to aid in wound healing for diabetic ulcer, allow double protein at each meal. Recommend Removing evening snack as pt having continued hyperglycemia. Pt receptive, no nutrition related questions at this time. RD to remain available as needed.    Factors impacting intake: [ x] none [ ] nausea  [ ] vomiting [ ] diarrhea [ ] constipation  [ ]chewing problems [ ] swallowing issues  [ ] other:     Diet Prescription: Diet, Consistent Carbohydrate w/Evening Snack:   DASH/TLC {Sodium & Cholesterol Restricted} (09-17-24 @ 20:04)    Intake: %    Daily   % Weight Change    Pertinent Medications: MEDICATIONS  (STANDING):  amLODIPine   Tablet 5 milliGRAM(s) Oral daily  dextrose 5%. 1000 milliLiter(s) (100 mL/Hr) IV Continuous <Continuous>  dextrose 5%. 1000 milliLiter(s) (50 mL/Hr) IV Continuous <Continuous>  dextrose 50% Injectable 12.5 Gram(s) IV Push once  dextrose 50% Injectable 25 Gram(s) IV Push once  dextrose 50% Injectable 25 Gram(s) IV Push once  enoxaparin Injectable 40 milliGRAM(s) SubCutaneous every 24 hours  glucagon  Injectable 1 milliGRAM(s) IntraMuscular once  influenza   Vaccine 0.5 milliLiter(s) IntraMuscular once  insulin glargine Injectable (LANTUS) 15 Unit(s) SubCutaneous at bedtime  insulin lispro (ADMELOG) corrective regimen sliding scale   SubCutaneous three times a day before meals  insulin lispro (ADMELOG) corrective regimen sliding scale   SubCutaneous at bedtime  insulin lispro Injectable (ADMELOG) 6 Unit(s) SubCutaneous three times a day before meals  lactobacillus acidophilus 1 Tablet(s) Oral two times a day with meals  mupirocin 2% Ointment 1 Application(s) Topical <User Schedule>  piperacillin/tazobactam IVPB.. 3.375 Gram(s) IV Intermittent every 8 hours    MEDICATIONS  (PRN):  acetaminophen     Tablet .. 650 milliGRAM(s) Oral every 6 hours PRN Temp greater or equal to 38C (100.4F), Mild Pain (1 - 3), Moderate Pain (4 - 6)  dextrose Oral Gel 15 Gram(s) Oral once PRN Blood Glucose LESS THAN 70 milliGRAM(s)/deciliter  labetalol Injectable 10 milliGRAM(s) IV Push every 8 hours PRN Systolic blood pressure >180  melatonin 3 milliGRAM(s) Oral at bedtime PRN Insomnia    Pertinent Labs: 09-23 Na137 mmol/L Glu 234 mg/dL[H] K+ 4.3 mmol/L Cr  2.60 mg/dL[H] BUN 45 mg/dL[H] 09-23 Alb 2.8 g/dL[L] 09-18 Chol 236 mg/dL[H] LDL --    HDL 42 mg/dL Trig 305 mg/dL[H]     CAPILLARY BLOOD GLUCOSE      POCT Blood Glucose.: 226 mg/dL (23 Sep 2024 07:57)  POCT Blood Glucose.: 261 mg/dL (22 Sep 2024 20:22)  POCT Blood Glucose.: 384 mg/dL (22 Sep 2024 17:00)  POCT Blood Glucose.: 292 mg/dL (22 Sep 2024 12:24)    No edema or pressure injury documented per flowsheets.      Estimated Needs:   [x ] no change since previous assessment  [ ] recalculated:     Previous Nutrition Diagnosis:   [ ] Inadequate Energy Intake [ ]Inadequate Oral Intake [ ] Excessive Energy Intake   [ ] Underweight [ ] Increased Nutrient Needs [ ] Overweight/Obesity [x ] Altered Nutrition related lab values  [ ] Altered GI Function [ ] Unintended Weight Loss [ ] Food & Nutrition Related Knowledge Deficit [ ] Malnutrition     Nutrition Diagnosis is [ x] ongoing  [ ] resolved [ ] not applicable     New Nutrition Diagnosis: [ x] not applicable       Interventions:   1. Recommend consistent carb and remove evening snack; allow double protein at each meal  2. Provide ONS: Floridalma (7 gm arginine/7 gm glutamine/1.5 gm hmb) x1 packet bid  3. Continue to monitor PO intake, tolerance to diet prescription, weights, labs, GI tolerance, and skin integrity. MD notified via teams regarding any change/changes to diet order.     Recommend  [ ] Change Diet To:  [ ] Nutrition Supplement  [ ] Nutrition Support  [ ] Other:     Monitoring and Evaluation:   [X ] Intake [ x ] Tolerance to diet prescription [ x ] weights [ x ] labs[ x ] follow up per protocol  [ ] other:

## 2024-09-23 NOTE — PROGRESS NOTE ADULT - SUBJECTIVE AND OBJECTIVE BOX
Patient is a 49y old  Male who presents with a chief complaint of infected toe (23 Sep 2024 07:46)  Patient seen in follow up for CKD 4.        PAST MEDICAL HISTORY:  Prediabetes    HTN (hypertension)    HLD (hyperlipidemia)    DM2 (diabetes mellitus, type 2)      MEDICATIONS  (STANDING):  amLODIPine   Tablet 5 milliGRAM(s) Oral daily  dextrose 5%. 1000 milliLiter(s) (50 mL/Hr) IV Continuous <Continuous>  dextrose 5%. 1000 milliLiter(s) (100 mL/Hr) IV Continuous <Continuous>  dextrose 50% Injectable 25 Gram(s) IV Push once  dextrose 50% Injectable 12.5 Gram(s) IV Push once  dextrose 50% Injectable 25 Gram(s) IV Push once  enoxaparin Injectable 40 milliGRAM(s) SubCutaneous every 24 hours  glucagon  Injectable 1 milliGRAM(s) IntraMuscular once  influenza   Vaccine 0.5 milliLiter(s) IntraMuscular once  insulin glargine Injectable (LANTUS) 15 Unit(s) SubCutaneous at bedtime  insulin lispro (ADMELOG) corrective regimen sliding scale   SubCutaneous three times a day before meals  insulin lispro (ADMELOG) corrective regimen sliding scale   SubCutaneous at bedtime  insulin lispro Injectable (ADMELOG) 6 Unit(s) SubCutaneous three times a day before meals  lactobacillus acidophilus 1 Tablet(s) Oral two times a day with meals  mupirocin 2% Ointment 1 Application(s) Topical <User Schedule>  piperacillin/tazobactam IVPB.. 3.375 Gram(s) IV Intermittent every 8 hours    MEDICATIONS  (PRN):  acetaminophen     Tablet .. 650 milliGRAM(s) Oral every 6 hours PRN Temp greater or equal to 38C (100.4F), Mild Pain (1 - 3), Moderate Pain (4 - 6)  dextrose Oral Gel 15 Gram(s) Oral once PRN Blood Glucose LESS THAN 70 milliGRAM(s)/deciliter  labetalol Injectable 10 milliGRAM(s) IV Push every 8 hours PRN Systolic blood pressure >180  melatonin 3 milliGRAM(s) Oral at bedtime PRN Insomnia    T(C): 37.4 (09-23-24 @ 05:30), Max: 37.4 (09-23-24 @ 00:30)  HR: 94 (09-23-24 @ 05:30) (84 - 107)  BP: 162/96 (09-23-24 @ 05:30) (135/80 - 169/94)  RR: 17 (09-23-24 @ 05:30) (16 - 18)  SpO2: 99% (09-23-24 @ 05:30) (96% - 99%)  Wt(kg): --  I&O's Detail    22 Sep 2024 07:01  -  23 Sep 2024 07:00  --------------------------------------------------------  IN:    IV PiggyBack: 100 mL  Total IN: 100 mL    OUT:    Voided (mL): 700 mL  Total OUT: 700 mL    Total NET: -600 mL          PHYSICAL EXAM:  General: No distress  Respiratory: b/l air entry  Cardiovascular: S1 S2  Gastrointestinal: soft  Extremities:  no edema, left foot dressing                              11.3   9.72  )-----------( 128      ( 23 Sep 2024 08:03 )             32.8     09-23    137  |  100  |  45[H]  ----------------------------<  234[H]  4.3   |  27  |  2.60[H]    Ca    9.3      23 Sep 2024 08:03    TPro  6.8  /  Alb  2.8[L]  /  TBili  1.0  /  DBili  x   /  AST  39  /  ALT  46  /  AlkPhos  118  09-23        LIVER FUNCTIONS - ( 23 Sep 2024 08:03 )  Alb: 2.8 g/dL / Pro: 6.8 g/dL / ALK PHOS: 118 U/L / ALT: 46 U/L / AST: 39 U/L / GGT: x           Urinalysis Basic - ( 23 Sep 2024 08:03 )    Color: x / Appearance: x / SG: x / pH: x  Gluc: 234 mg/dL / Ketone: x  / Bili: x / Urobili: x   Blood: x / Protein: x / Nitrite: x   Leuk Esterase: x / RBC: x / WBC x   Sq Epi: x / Non Sq Epi: x / Bacteria: x        Sodium, Serum: 137 (09-23 @ 08:03)  Sodium, Serum: 134 (09-21 @ 06:30)  Sodium, Serum: 138 (09-20 @ 07:59)    Creatinine, Serum: 2.60 (09-23 @ 08:03)  Creatinine, Serum: 2.64 (09-21 @ 06:30)  Creatinine, Serum: 2.45 (09-20 @ 07:59)    Potassium, Serum: 4.3 (09-23 @ 08:03)  Potassium, Serum: 4.1 (09-21 @ 06:30)  Potassium, Serum: 3.9 (09-20 @ 07:59)    Hemoglobin: 11.3 (09-23 @ 08:03)  Hemoglobin: 11.4 (09-21 @ 06:30)  Hemoglobin: 11.5 (09-21 @ 02:15)  Hemoglobin: 11.5 (09-20 @ 07:59)      < from: US Renal (09.19.24 @ 14:14) >    ACC: [id number] EXAM:  US KIDNEY(S)   ORDERED BY: JUAN VALENCIA     PROCEDURE DATE:  09/19/2024          INTERPRETATION:  CLINICAL INFORMATION: Acute kidney injury    COMPARISON: Upper abdominal images included on Chest CT 4/24/2021 .    TECHNIQUE: Sonography of the kidneys and bladder.    FINDINGS:  Right kidney: 11.3 cm. No renal mass, hydronephrosis or calculi.    Left kidney: 12.3 cm. No renal mass, hydronephrosis or calculi.    Urinary bladder: Underdistended urinary bladder.    IMPRESSION:  No obstructive uropathy.        --- End of Report ---            JULIANO ANTONIO MD; Attending Radiologist  This document has been electronically signed. Sep 19 2024  2:31PM    < end of copied text >

## 2024-09-23 NOTE — PROGRESS NOTE ADULT - SUBJECTIVE AND OBJECTIVE BOX
Patient is a 49y old  Male who presents with a chief complaint of infected toe (23 Sep 2024 10:46)      INTERVAL HPI/OVERNIGHT EVENTS:overnight events noted    Home Medications:  non compliant with meds for months:  (17 Sep 2024 19:54)      MEDICATIONS  (STANDING):  amLODIPine   Tablet 5 milliGRAM(s) Oral daily  carvedilol 3.125 milliGRAM(s) Oral every 12 hours  dextrose 5%. 1000 milliLiter(s) (50 mL/Hr) IV Continuous <Continuous>  dextrose 5%. 1000 milliLiter(s) (100 mL/Hr) IV Continuous <Continuous>  dextrose 50% Injectable 25 Gram(s) IV Push once  dextrose 50% Injectable 12.5 Gram(s) IV Push once  dextrose 50% Injectable 25 Gram(s) IV Push once  enoxaparin Injectable 40 milliGRAM(s) SubCutaneous every 24 hours  glucagon  Injectable 1 milliGRAM(s) IntraMuscular once  influenza   Vaccine 0.5 milliLiter(s) IntraMuscular once  insulin glargine Injectable (LANTUS) 15 Unit(s) SubCutaneous at bedtime  insulin lispro (ADMELOG) corrective regimen sliding scale   SubCutaneous three times a day before meals  insulin lispro (ADMELOG) corrective regimen sliding scale   SubCutaneous at bedtime  insulin lispro Injectable (ADMELOG) 6 Unit(s) SubCutaneous three times a day before meals  lactobacillus acidophilus 1 Tablet(s) Oral two times a day with meals  mupirocin 2% Ointment 1 Application(s) Topical <User Schedule>  piperacillin/tazobactam IVPB.. 3.375 Gram(s) IV Intermittent every 8 hours    MEDICATIONS  (PRN):  acetaminophen     Tablet .. 650 milliGRAM(s) Oral every 6 hours PRN Temp greater or equal to 38C (100.4F), Mild Pain (1 - 3), Moderate Pain (4 - 6)  dextrose Oral Gel 15 Gram(s) Oral once PRN Blood Glucose LESS THAN 70 milliGRAM(s)/deciliter  labetalol Injectable 10 milliGRAM(s) IV Push every 8 hours PRN Systolic blood pressure >180  melatonin 3 milliGRAM(s) Oral at bedtime PRN Insomnia      Allergies    amoxicillin (Unknown)    Intolerances        REVIEW OF SYSTEMS:  CONSTITUTIONAL: No fever, weight loss, or fatigue  EYES: No eye pain, visual disturbances, or discharge  ENMT:  No difficulty hearing, tinnitus, vertigo; No sinus or throat pain  NECK: No pain or stiffness  BREASTS: No pain, masses, or nipple discharge  RESPIRATORY: No cough, wheezing, chills or hemoptysis; No shortness of breath  CARDIOVASCULAR: No chest pain, palpitations, dizziness, or leg swelling  GASTROINTESTINAL: No abdominal or epigastric pain. No nausea, vomiting, or hematemesis; No diarrhea or constipation. No melena or hematochezia.  GENITOURINARY: No dysuria, frequency, hematuria, or incontinence  NEUROLOGICAL: No headaches, memory loss, loss of strength, numbness, or tremors  SKIN: No itching, burning, rashes, or lesions     Vital Signs Last 24 Hrs  T(C): 37.4 (23 Sep 2024 05:30), Max: 37.4 (23 Sep 2024 00:30)  T(F): 99.3 (23 Sep 2024 05:30), Max: 99.3 (23 Sep 2024 00:30)  HR: 94 (23 Sep 2024 05:30) (90 - 107)  BP: 162/96 (23 Sep 2024 05:30) (137/84 - 169/94)  BP(mean): --  RR: 17 (23 Sep 2024 05:30) (16 - 17)  SpO2: 99% (23 Sep 2024 05:30) (96% - 99%)    Parameters below as of 22 Sep 2024 22:28  Patient On (Oxygen Delivery Method): room air        PHYSICAL EXAM:  GENERAL: NAD, well-groomed, well-developed  HEAD:  Atraumatic, Normocephalic  EYES: EOMI, PERRLA, conjunctiva and sclera clear  ENMT: Moist mucous membranes,   NECK: Supple, No JVD, Normal thyroid  NERVOUS SYSTEM:  Alert & Oriented X3, non focal  CHEST/LUNG: Clear to percussion bilaterally; No rales, rhonchi, wheezing, or rubs  HEART: Regular rate and rhythm;   ABDOMEN: Soft, Nontender, Nondistended; Bowel sounds present  EXTREMITIES:  2+ Peripheral Pulses, No clubbing, cyanosis, or edema  SKIN: No rashes or lesions    LABS:                        11.3   9.72  )-----------( 128      ( 23 Sep 2024 08:03 )             32.8     09-23    137  |  100  |  45[H]  ----------------------------<  234[H]  4.3   |  27  |  2.60[H]    Ca    9.3      23 Sep 2024 08:03    TPro  6.8  /  Alb  2.8[L]  /  TBili  1.0  /  DBili  x   /  AST  39  /  ALT  46  /  AlkPhos  118  09-23      Urinalysis Basic - ( 23 Sep 2024 08:03 )    Color: x / Appearance: x / SG: x / pH: x  Gluc: 234 mg/dL / Ketone: x  / Bili: x / Urobili: x   Blood: x / Protein: x / Nitrite: x   Leuk Esterase: x / RBC: x / WBC x   Sq Epi: x / Non Sq Epi: x / Bacteria: x      CAPILLARY BLOOD GLUCOSE      POCT Blood Glucose.: 226 mg/dL (23 Sep 2024 07:57)  POCT Blood Glucose.: 261 mg/dL (22 Sep 2024 20:22)  POCT Blood Glucose.: 384 mg/dL (22 Sep 2024 17:00)  POCT Blood Glucose.: 292 mg/dL (22 Sep 2024 12:24)        Culture - Fungal, Tissue (collected 09-20-24 @ 19:21)  Source: Tissue Left foot  Preliminary Report (09-22-24 @ 07:58):    Testing in progress    Culture - Fungal, Tissue (collected 09-20-24 @ 19:21)  Source: Bone Other  Preliminary Report (09-22-24 @ 07:58):    Testing in progress    Culture - Tissue with Gram Stain (collected 09-20-24 @ 19:21)  Source: Bone Other  Gram Stain (09-21-24 @ 06:32):    No polymorphonuclear cells seen per low power field    No organisms seen per oil power field  Preliminary Report (09-22-24 @ 08:47):    No growth to date    Culture - Fungal, Tissue (collected 09-20-24 @ 19:21)  Source: Bone Other  Preliminary Report (09-22-24 @ 07:58):    Testing in progress    Culture - Tissue with Gram Stain (collected 09-20-24 @ 19:21)  Source: Bone Other  Gram Stain (09-22-24 @ 09:37):    No polymorphonuclear cells seen per low power field    No organisms seen per oil power field  Preliminary Report (09-22-24 @ 09:37):    Growth in fluid media only Gram positive cocci in pairs    Culture - Tissue with Gram Stain (collected 09-20-24 @ 19:21)  Source: Tissue left foot  Gram Stain (09-22-24 @ 09:38):    No polymorphonuclear cells seen per low power field    No organisms seen per oil power field  Preliminary Report (09-22-24 @ 09:38):    Growth in fluid media only Gram positive cocci in pairs    Culture - Fungal, Tissue (collected 09-20-24 @ 19:21)  Source: Tissue  Preliminary Report (09-21-24 @ 07:08):    Testing in progress    Culture - Acid Fast - Tissue w/Smear (collected 09-20-24 @ 19:21)  Source: Tissue Other    Culture - Acid Fast - Tissue w/Smear (collected 09-20-24 @ 19:21)  Source: Tissue Other    Culture - Other (collected 09-17-24 @ 19:10)  Source: Wound Wound  Final Report (09-20-24 @ 13:25):    Numerous Enterobacter cloacae complex    Numerous Enterococcus faecalis    Few Coag Negative Staphylococcus "Susceptibilities not performed"  Organism: Enterobacter cloacae complex  Enterococcus faecalis (09-20-24 @ 13:25)  Organism: Enterococcus faecalis (09-20-24 @ 13:25)      Method Type: STEPHANIE      -  Ampicillin: S <=2 Predicts results to ampicillin/sulbactam, amoxacillin-clavulanate and  piperacillin-tazobactam.      -  Vancomycin: S 2  Organism: Enterobacter cloacae complex (09-20-24 @ 13:25)      Method Type: STEPHANIE      -  Amoxicillin/Clavulanic Acid: R >16/8      -  Ampicillin: R 16 These ampicillin results predict results for amoxicillin      -  Ampicillin/Sulbactam: R 8/4      -  Aztreonam: S <=4      -  Cefazolin: R >16      -  Cefepime: S <=2      -  Cefoxitin: R >16      -  Ceftriaxone: S <=1 Enterobacter cloacae, Klebsiella aerogenes, and Citrobacter freundii may develop resistance during prolonged therapy.      -  Ciprofloxacin: S <=0.25      -  Ertapenem: S <=0.5      -  Gentamicin: S <=2      -  Imipenem: S <=1      -  Levofloxacin: S <=0.5      -  Meropenem: S <=1      -  Piperacillin/Tazobactam: S <=8 Treatment with Pipercillin/Tazobactam is not recommended in severe infections casued by Klebsiella aerogenes, Enterobacter cloacae complex, and Citrobacter freundii complex.      -  Tobramycin: S <=2      -  Trimethoprim/Sulfamethoxazole: S <=0.5/9.5    Culture - Blood (collected 09-17-24 @ 18:13)  Source: .Blood Blood-Peripheral  Final Report (09-23-24 @ 01:00):    No growth at 5 days    Culture - Blood (collected 09-17-24 @ 18:13)  Source: .Blood Blood-Peripheral  Final Report (09-23-24 @ 01:00):    No growth at 5 days        I&O's Summary    22 Sep 2024 07:01  -  23 Sep 2024 07:00  --------------------------------------------------------  IN: 100 mL / OUT: 700 mL / NET: -600 mL        RADIOLOGY & ADDITIONAL TESTS:    Imaging Personally Reviewed:  [x ] YES  [ ] NO    Consultant(s) Notes Reviewed:  [ x] YES  [ ] NO    Care Discussed with Consultants/Other Providers [x ] YES  [ ] NO

## 2024-09-23 NOTE — PROGRESS NOTE ADULT - ASSESSMENT
49-year-old male with past medical history of DM2, HTN, HLD, NEUROPATHY, PVD, NON COMPLIANT WITH TT, ETOH ABUSE, presents today due to left toe infection.  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  Patient experienced a blister to the left toe in which he eventually fell orders follow-up on its own.  Patient notes that the blister opened up and he developed some blood in the blister as well.  Patient notes that the blister scab has slightly fallen off but now his toe is red and painful.  Patient not currently on antibiotics.  Patient denies fever, injury, numbness, weakness, or any other complaints.   IN ED TEMP 99, /100, HR 89, had consult with podiatry and sidney started on Cipro and admitted for further management    # Diabetic foot ulcer with cellullitis(left halluxdiabetic ulcer 3x4x0.1 cm with erythema and edema mal odorous , hyperkeratotic  started on ABX, , R/o PVD- arterial doppler- negative for any arterial disease   MR foot done- ac on ch OM with sepstic arthritis of 2ndleft toe, OM left great toe-  surg done by podiatry,9/20  culture of wound E fecalis  pt on zosyn    abx as per ID      # DM2 with hyperglycemia, with neuropathy , nephropathy with likely PVD    ssi, consistent carb, Endo consult,  hba1c 10.2   Endo consult noted , started on lantus  # Ess HTN   amlodipine 5 mg, off losartan     DVT prophylaxis  ID , endo and podiatry consult  # LUISA with CKD 4   nephro consult,noted  likely Diabetic nephropathy with septic ATN  Hold ARB in short term and titrate other anti hypertensives as needed  # HLD   low fat diet  # DVT prophylaxis

## 2024-09-23 NOTE — PROGRESS NOTE ADULT - PROBLEM SELECTOR PLAN 1
increase lantus 15 units qhs  increase admelog 6 units 3x/day before meals  cont mod dose admelog corrective scale coverage qac/qhs  cont cons cho diet  goal bg 100-180 in hosp setting

## 2024-09-23 NOTE — PROGRESS NOTE ADULT - SUBJECTIVE AND OBJECTIVE BOX
ANUJASUZANNA HURLEY is a 49yMale , patient examined and chart reviewed.    INTERVAL HPI/ OVERNIGHT EVENTS:   Afebrile.  NAD.  No events.     PAST MEDICAL & SURGICAL HISTORY:  Prediabetes  HTN (hypertension)  HLD (hyperlipidemia)  DM2 (diabetes mellitus, type 2)      For details regarding the patient's social history, family history, and other miscellaneous elements, please refer the initial infectious diseases consultation and/or the admitting history and physical examination for this admission.    ROS:  CONSTITUTIONAL:  Negative fever or chills  EYES:  Negative  blurry vision or double vision  CARDIOVASCULAR:  Negative for chest pain or palpitations  RESPIRATORY:  Negative for cough, wheezing, or SOB   GASTROINTESTINAL:  Negative for nausea, vomiting, diarrhea, constipation, or abdominal pain  GENITOURINARY:  Negative frequency, urgency or dysuria  NEUROLOGIC:  No headache, confusion, dizziness, lightheadedness  All other systems were reviewed and are negative     ALLERGIES  amoxicillin (Unknown)      Current inpatient medications :    ANTIBIOTICS/RELEVANT:  piperacillin/tazobactam IVPB.. 3.375 Gram(s) IV Intermittent every 8 hours    MEDICATIONS  (STANDING):  amLODIPine   Tablet 5 milliGRAM(s) Oral daily  carvedilol 3.125 milliGRAM(s) Oral every 12 hours  dextrose 5%. 1000 milliLiter(s) (50 mL/Hr) IV Continuous <Continuous>  dextrose 5%. 1000 milliLiter(s) (100 mL/Hr) IV Continuous <Continuous>  dextrose 50% Injectable 25 Gram(s) IV Push once  dextrose 50% Injectable 12.5 Gram(s) IV Push once  dextrose 50% Injectable 25 Gram(s) IV Push once  enoxaparin Injectable 40 milliGRAM(s) SubCutaneous every 24 hours  gabapentin 100 milliGRAM(s) Oral three times a day  glucagon  Injectable 1 milliGRAM(s) IntraMuscular once  influenza   Vaccine 0.5 milliLiter(s) IntraMuscular once  insulin glargine Injectable (LANTUS) 15 Unit(s) SubCutaneous at bedtime  insulin lispro (ADMELOG) corrective regimen sliding scale   SubCutaneous three times a day before meals  insulin lispro (ADMELOG) corrective regimen sliding scale   SubCutaneous at bedtime  insulin lispro Injectable (ADMELOG) 6 Unit(s) SubCutaneous three times a day before meals  lactobacillus acidophilus 1 Tablet(s) Oral two times a day with meals  mupirocin 2% Ointment 1 Application(s) Topical <User Schedule>    MEDICATIONS  (PRN):  acetaminophen     Tablet .. 650 milliGRAM(s) Oral every 6 hours PRN Temp greater or equal to 38C (100.4F), Mild Pain (1 - 3), Moderate Pain (4 - 6)  dextrose Oral Gel 15 Gram(s) Oral once PRN Blood Glucose LESS THAN 70 milliGRAM(s)/deciliter  labetalol Injectable 10 milliGRAM(s) IV Push every 8 hours PRN Systolic blood pressure >180  melatonin 3 milliGRAM(s) Oral at bedtime PRN Insomnia      Objective:  T(C): 37.4 (23 Sep 2024 05:30), Max: 37.4 (23 Sep 2024 00:30)  T(F): 99.3 (23 Sep 2024 05:30), Max: 99.3 (23 Sep 2024 00:30)  HR: 85 (23 Sep 2024 11:49) (85 - 107)  BP: 136/85 (23 Sep 2024 11:49) (136/85 - 169/94)  BP(mean): --  RR: 17 (23 Sep 2024 05:30) (16 - 17)  SpO2: 99% (23 Sep 2024 05:30) (96% - 99%)    Parameters below as of 22 Sep 2024 22:28  Patient On (Oxygen Delivery Method): room air      Physical Exam:  General:  no acute distress  Neck: supple, trachea midline  Lungs: clear, no wheeze/rhonchi  Cardiovascular: regular rate and rhythm, S1 S2  Abdomen: soft, nontender,  bowel sounds normal  Neurological: alert and oriented x3  Skin: no rash  Extremities: right foot drsg c/d/i      LABS:                        11.4   10.12 )-----------( 150      ( 21 Sep 2024 06:30 )             34.6   09-21    134[L]  |  98  |  44[H]  ----------------------------<  216[H]  4.1   |  27  |  2.64[H]    Ca    9.3      21 Sep 2024 06:30    TPro  7.1  /  Alb  2.7[L]  /  TBili  0.8  /  DBili  x   /  AST  62[H]  /  ALT  43  /  AlkPhos  105  09-21        MICROBIOLOGY:    Culture - Tissue with Gram Stain (collected 20 Sep 2024 19:21)  Source: Bone Other  Gram Stain (21 Sep 2024 06:32):    No polymorphonuclear cells seen per low power field    No organisms seen per oil power field  Preliminary Report (22 Sep 2024 08:47):    No growth to date    Culture - Fungal, Tissue (collected 20 Sep 2024 19:21)  Source: Bone Other  Preliminary Report (22 Sep 2024 07:58):    Testing in progress    Culture - Tissue with Gram Stain (collected 20 Sep 2024 19:21)  Source: Bone Other  Gram Stain (22 Sep 2024 09:37):    No polymorphonuclear cells seen per low power field    No organisms seen per oil power field  Preliminary Report (22 Sep 2024 09:37):    Growth in fluid media only Gram positive cocci in pairs    Culture - Tissue with Gram Stain (collected 20 Sep 2024 19:21)  Source: Tissue left foot  Gram Stain (22 Sep 2024 09:38):    No polymorphonuclear cells seen per low power field    No organisms seen per oil power field  Preliminary Report (22 Sep 2024 09:38):    Growth in fluid media only Gram positive cocci in pairs    Culture - Other (09.17.24 @ 19:10)    -  Amoxicillin/Clavulanic Acid: R >16/8   -  Ampicillin: R 16 These ampicillin results predict results for amoxicillin   -  Ampicillin: S <=2 Predicts results to ampicillin/sulbactam, amoxacillin-clavulanate and  piperacillin-tazobactam.   -  Ampicillin/Sulbactam: R 8/4   -  Aztreonam: S <=4   -  Cefazolin: R >16   -  Cefepime: S <=2   -  Cefoxitin: R >16   -  Ceftriaxone: S <=1 Enterobacter cloacae, Klebsiella aerogenes, and Citrobacter freundii may develop resistance during prolonged therapy.   -  Ciprofloxacin: S <=0.25   -  Ertapenem: S <=0.5   -  Gentamicin: S <=2   -  Imipenem: S <=1   -  Levofloxacin: S <=0.5   -  Meropenem: S <=1   -  Piperacillin/Tazobactam: S <=8 Treatment with Pipercillin/Tazobactam is not recommended in severe infections casued by Klebsiella aerogenes, Enterobacter cloacae complex, and Citrobacter freundii complex.   -  Tobramycin: S <=2   -  Trimethoprim/Sulfamethoxazole: S <=0.5/9.5   -  Vancomycin: S 2   Specimen Source: Wound Wound   Culture Results:   Numerous Enterobacter cloacae complex  Numerous Enterococcus faecalis  Few Coag Negative Staphylococcus "Susceptibilities not performed"   Organism Identification: Enterobacter cloacae complex  Enterococcus faecalis   Organism: Enterobacter cloacae complex   Organism: Enterococcus faecalis   Method Type: STEPHANIE   Method Type: STEPHANIE    Culture - Blood (collected 17 Sep 2024 18:13)  Source: .Blood Blood-Peripheral  Preliminary Report (19 Sep 2024 01:01):    No growth at 24 hours    Culture - Blood (collected 17 Sep 2024 18:13)  Source: .Blood Blood-Peripheral  Preliminary Report (19 Sep 2024 01:01):    No growth at 24 hours    RADIOLOGY & ADDITIONAL STUDIES:    ACC: [id number] EXAM:  MR FOOT LT   ORDERED BY: LISA ALY     PROCEDURE DATE:  09/19/2024          INTERPRETATION:  LEFT FOOT MRI    CLINICAL INDICATION: Diabetic foot ulcer, for evaluation of osteomyelitis.    COMPARISON: Radiographs dated 9/17/2024.    TECHNIQUE: Multiplanar, multisequence MRI was obtained of the left foot.    FINDINGS:    OSSEOUS: Acute on chronic osteomyelitis/septic arthritis at the proximal   interphalangeal joint of the second toe in the proper clinical setting.   Focal acute osteomyelitis along the plantar lateral cortex of the great   toe distal phalanx base with ill-defined overlying deep soft tissue   ulceration. Lisfranc interval is not widened on this non-weightbearing   examination.    TENDONS: Visualized flexor and extensor tendons are intact.    LIGAMENTS: Lisfranc ligament is intact.    GENERAL: No drainable encapsulated fluid collection. No significant   intermetatarsal bursal fluid distension. No interdigital neuroma. Mild to   moderate heterogeneous chronic muscular atrophy with superimposed patchy   denervation edema like signal.    IMPRESSION:    1.  Acute on chronic osteomyelitis/septic arthritis at the proximal   interphalangeal joint of left second toe in the proper clinical setting.  2.Focal acute osteomyelitis along the plantar lateral cortex of left   great toe distal phalanx base with overlying deep soft tissue ulceration.    Assessment :  48YO M PMH DM2, HTN, HLD, NEUROPATHY, PVD, NON COMPLIANT WITH TT, ETOH ABUSE, admitted with infected DM left great toe infection  MRI + OM  Sp right great toe amputation 9/20  OR bone cultures noted with E Faecalis  Clinically stable    Plan :   Cont Zosyn  Fu OR cultures and path  Dm control  Trend temps and cbc  Pulm toileting  Stable from ID standpoint      Continue with present regiment.  Appropriate use of antibiotics and adverse effects reviewed.      > 35 minutes were spent in direct patient care reviewing notes, medications ,labs data/ imaging , discussion with multidisciplinary team.    Thank you for allowing me to participate in care of your patient .    Linsey Mack MD  Infectious Disease  [phone]      ANUJASUZANNA HURLEY is a 49yMale , patient examined and chart reviewed.    INTERVAL HPI/ OVERNIGHT EVENTS:   Afebrile.  NAD.  No events.     PAST MEDICAL & SURGICAL HISTORY:  Prediabetes  HTN (hypertension)  HLD (hyperlipidemia)  DM2 (diabetes mellitus, type 2)      For details regarding the patient's social history, family history, and other miscellaneous elements, please refer the initial infectious diseases consultation and/or the admitting history and physical examination for this admission.    ROS:  CONSTITUTIONAL:  Negative fever or chills  EYES:  Negative  blurry vision or double vision  CARDIOVASCULAR:  Negative for chest pain or palpitations  RESPIRATORY:  Negative for cough, wheezing, or SOB   GASTROINTESTINAL:  Negative for nausea, vomiting, diarrhea, constipation, or abdominal pain  GENITOURINARY:  Negative frequency, urgency or dysuria  NEUROLOGIC:  No headache, confusion, dizziness, lightheadedness  All other systems were reviewed and are negative     ALLERGIES  amoxicillin (Unknown)      Current inpatient medications :    ANTIBIOTICS/RELEVANT:  piperacillin/tazobactam IVPB.. 3.375 Gram(s) IV Intermittent every 8 hours    MEDICATIONS  (STANDING):  amLODIPine   Tablet 5 milliGRAM(s) Oral daily  carvedilol 3.125 milliGRAM(s) Oral every 12 hours  dextrose 5%. 1000 milliLiter(s) (50 mL/Hr) IV Continuous <Continuous>  dextrose 5%. 1000 milliLiter(s) (100 mL/Hr) IV Continuous <Continuous>  dextrose 50% Injectable 25 Gram(s) IV Push once  dextrose 50% Injectable 12.5 Gram(s) IV Push once  dextrose 50% Injectable 25 Gram(s) IV Push once  enoxaparin Injectable 40 milliGRAM(s) SubCutaneous every 24 hours  gabapentin 100 milliGRAM(s) Oral three times a day  glucagon  Injectable 1 milliGRAM(s) IntraMuscular once  influenza   Vaccine 0.5 milliLiter(s) IntraMuscular once  insulin glargine Injectable (LANTUS) 15 Unit(s) SubCutaneous at bedtime  insulin lispro (ADMELOG) corrective regimen sliding scale   SubCutaneous three times a day before meals  insulin lispro (ADMELOG) corrective regimen sliding scale   SubCutaneous at bedtime  insulin lispro Injectable (ADMELOG) 6 Unit(s) SubCutaneous three times a day before meals  lactobacillus acidophilus 1 Tablet(s) Oral two times a day with meals  mupirocin 2% Ointment 1 Application(s) Topical <User Schedule>    MEDICATIONS  (PRN):  acetaminophen     Tablet .. 650 milliGRAM(s) Oral every 6 hours PRN Temp greater or equal to 38C (100.4F), Mild Pain (1 - 3), Moderate Pain (4 - 6)  dextrose Oral Gel 15 Gram(s) Oral once PRN Blood Glucose LESS THAN 70 milliGRAM(s)/deciliter  labetalol Injectable 10 milliGRAM(s) IV Push every 8 hours PRN Systolic blood pressure >180  melatonin 3 milliGRAM(s) Oral at bedtime PRN Insomnia      Objective:  T(C): 37.4 (23 Sep 2024 05:30), Max: 37.4 (23 Sep 2024 00:30)  T(F): 99.3 (23 Sep 2024 05:30), Max: 99.3 (23 Sep 2024 00:30)  HR: 85 (23 Sep 2024 11:49) (85 - 107)  BP: 136/85 (23 Sep 2024 11:49) (136/85 - 169/94)  BP(mean): --  RR: 17 (23 Sep 2024 05:30) (16 - 17)  SpO2: 99% (23 Sep 2024 05:30) (96% - 99%)    Parameters below as of 22 Sep 2024 22:28  Patient On (Oxygen Delivery Method): room air      Physical Exam:  General:  no acute distress  Neck: supple, trachea midline  Lungs: clear, no wheeze/rhonchi  Cardiovascular: regular rate and rhythm, S1 S2  Abdomen: soft, nontender,  bowel sounds normal  Neurological: alert and oriented x3  Skin: no rash  Extremities: Left foot drsg c/d/i      LABS:                        11.4   10.12 )-----------( 150      ( 21 Sep 2024 06:30 )             34.6   09-21    134[L]  |  98  |  44[H]  ----------------------------<  216[H]  4.1   |  27  |  2.64[H]    Ca    9.3      21 Sep 2024 06:30    TPro  7.1  /  Alb  2.7[L]  /  TBili  0.8  /  DBili  x   /  AST  62[H]  /  ALT  43  /  AlkPhos  105  09-21        MICROBIOLOGY:    Culture - Tissue with Gram Stain (collected 20 Sep 2024 19:21)  Source: Bone Other  Gram Stain (21 Sep 2024 06:32):    No polymorphonuclear cells seen per low power field    No organisms seen per oil power field  Preliminary Report (22 Sep 2024 08:47):    No growth to date    Culture - Fungal, Tissue (collected 20 Sep 2024 19:21)  Source: Bone Other  Preliminary Report (22 Sep 2024 07:58):    Testing in progress    Culture - Tissue with Gram Stain (collected 20 Sep 2024 19:21)  Source: Bone Other  Gram Stain (22 Sep 2024 09:37):    No polymorphonuclear cells seen per low power field    No organisms seen per oil power field  Preliminary Report (22 Sep 2024 09:37):    Growth in fluid media only Gram positive cocci in pairs    Culture - Tissue with Gram Stain (collected 20 Sep 2024 19:21)  Source: Tissue left foot  Gram Stain (22 Sep 2024 09:38):    No polymorphonuclear cells seen per low power field    No organisms seen per oil power field  Preliminary Report (22 Sep 2024 09:38):    Growth in fluid media only Gram positive cocci in pairs    Culture - Other (09.17.24 @ 19:10)    -  Amoxicillin/Clavulanic Acid: R >16/8   -  Ampicillin: R 16 These ampicillin results predict results for amoxicillin   -  Ampicillin: S <=2 Predicts results to ampicillin/sulbactam, amoxacillin-clavulanate and  piperacillin-tazobactam.   -  Ampicillin/Sulbactam: R 8/4   -  Aztreonam: S <=4   -  Cefazolin: R >16   -  Cefepime: S <=2   -  Cefoxitin: R >16   -  Ceftriaxone: S <=1 Enterobacter cloacae, Klebsiella aerogenes, and Citrobacter freundii may develop resistance during prolonged therapy.   -  Ciprofloxacin: S <=0.25   -  Ertapenem: S <=0.5   -  Gentamicin: S <=2   -  Imipenem: S <=1   -  Levofloxacin: S <=0.5   -  Meropenem: S <=1   -  Piperacillin/Tazobactam: S <=8 Treatment with Pipercillin/Tazobactam is not recommended in severe infections casued by Klebsiella aerogenes, Enterobacter cloacae complex, and Citrobacter freundii complex.   -  Tobramycin: S <=2   -  Trimethoprim/Sulfamethoxazole: S <=0.5/9.5   -  Vancomycin: S 2   Specimen Source: Wound Wound   Culture Results:   Numerous Enterobacter cloacae complex  Numerous Enterococcus faecalis  Few Coag Negative Staphylococcus "Susceptibilities not performed"   Organism Identification: Enterobacter cloacae complex  Enterococcus faecalis   Organism: Enterobacter cloacae complex   Organism: Enterococcus faecalis   Method Type: STEPHANIE   Method Type: STEPHANIE    Culture - Blood (collected 17 Sep 2024 18:13)  Source: .Blood Blood-Peripheral  Preliminary Report (19 Sep 2024 01:01):    No growth at 24 hours    Culture - Blood (collected 17 Sep 2024 18:13)  Source: .Blood Blood-Peripheral  Preliminary Report (19 Sep 2024 01:01):    No growth at 24 hours    RADIOLOGY & ADDITIONAL STUDIES:    ACC: [id number] EXAM:  MR FOOT LT   ORDERED BY: LISA ALY     PROCEDURE DATE:  09/19/2024          INTERPRETATION:  LEFT FOOT MRI    CLINICAL INDICATION: Diabetic foot ulcer, for evaluation of osteomyelitis.    COMPARISON: Radiographs dated 9/17/2024.    TECHNIQUE: Multiplanar, multisequence MRI was obtained of the left foot.    FINDINGS:    OSSEOUS: Acute on chronic osteomyelitis/septic arthritis at the proximal   interphalangeal joint of the second toe in the proper clinical setting.   Focal acute osteomyelitis along the plantar lateral cortex of the great   toe distal phalanx base with ill-defined overlying deep soft tissue   ulceration. Lisfranc interval is not widened on this non-weightbearing   examination.    TENDONS: Visualized flexor and extensor tendons are intact.    LIGAMENTS: Lisfranc ligament is intact.    GENERAL: No drainable encapsulated fluid collection. No significant   intermetatarsal bursal fluid distension. No interdigital neuroma. Mild to   moderate heterogeneous chronic muscular atrophy with superimposed patchy   denervation edema like signal.    IMPRESSION:    1.  Acute on chronic osteomyelitis/septic arthritis at the proximal   interphalangeal joint of left second toe in the proper clinical setting.  2.Focal acute osteomyelitis along the plantar lateral cortex of left   great toe distal phalanx base with overlying deep soft tissue ulceration.    Assessment :  50YO M PMH DM2, HTN, HLD, NEUROPATHY, PVD, NON COMPLIANT WITH TT, ETOH ABUSE, admitted with infected DM left great toe infection  MRI + OM  Sp Left great toe amputation 9/20  OR bone cultures noted with E Faecalis  Clinically stable    Plan :   Cont Zosyn  Fu OR cultures and path  Dm control  Trend temps and cbc  Pulm toileting  Stable from ID standpoint      Continue with present regiment.  Appropriate use of antibiotics and adverse effects reviewed.      > 35 minutes were spent in direct patient care reviewing notes, medications ,labs data/ imaging , discussion with multidisciplinary team.    Thank you for allowing me to participate in care of your patient .    Linsey Mack MD  Infectious Disease  [phone]

## 2024-09-24 LAB
-  AMPICILLIN: SIGNIFICANT CHANGE UP
-  AMPICILLIN: SIGNIFICANT CHANGE UP
-  VANCOMYCIN: SIGNIFICANT CHANGE UP
-  VANCOMYCIN: SIGNIFICANT CHANGE UP
ALBUMIN SERPL ELPH-MCNC: 2.6 G/DL — LOW (ref 3.3–5)
ALP SERPL-CCNC: 117 U/L — SIGNIFICANT CHANGE UP (ref 30–120)
ALT FLD-CCNC: 44 U/L — SIGNIFICANT CHANGE UP (ref 10–60)
ANION GAP SERPL CALC-SCNC: 10 MMOL/L — SIGNIFICANT CHANGE UP (ref 5–17)
AST SERPL-CCNC: 33 U/L — SIGNIFICANT CHANGE UP (ref 10–40)
BASOPHILS # BLD AUTO: 0.1 K/UL — SIGNIFICANT CHANGE UP (ref 0–0.2)
BASOPHILS NFR BLD AUTO: 1.2 % — SIGNIFICANT CHANGE UP (ref 0–2)
BILIRUB SERPL-MCNC: 0.8 MG/DL — SIGNIFICANT CHANGE UP (ref 0.2–1.2)
BUN SERPL-MCNC: 52 MG/DL — HIGH (ref 7–23)
CALCIUM SERPL-MCNC: 9.1 MG/DL — SIGNIFICANT CHANGE UP (ref 8.4–10.5)
CALCIUM SERPL-MCNC: 9.3 MG/DL — SIGNIFICANT CHANGE UP (ref 8.4–10.5)
CHLORIDE SERPL-SCNC: 99 MMOL/L — SIGNIFICANT CHANGE UP (ref 96–108)
CO2 SERPL-SCNC: 28 MMOL/L — SIGNIFICANT CHANGE UP (ref 22–31)
CREAT ?TM UR-MCNC: 76 MG/DL — SIGNIFICANT CHANGE UP
CREAT SERPL-MCNC: 2.69 MG/DL — HIGH (ref 0.5–1.3)
EGFR: 28 ML/MIN/1.73M2 — LOW
EOSINOPHIL # BLD AUTO: 0.15 K/UL — SIGNIFICANT CHANGE UP (ref 0–0.5)
EOSINOPHIL NFR BLD AUTO: 1.8 % — SIGNIFICANT CHANGE UP (ref 0–6)
GLUCOSE BLDC GLUCOMTR-MCNC: 208 MG/DL — HIGH (ref 70–99)
GLUCOSE BLDC GLUCOMTR-MCNC: 235 MG/DL — HIGH (ref 70–99)
GLUCOSE BLDC GLUCOMTR-MCNC: 277 MG/DL — HIGH (ref 70–99)
GLUCOSE BLDC GLUCOMTR-MCNC: 306 MG/DL — HIGH (ref 70–99)
GLUCOSE SERPL-MCNC: 211 MG/DL — HIGH (ref 70–99)
HCT VFR BLD CALC: 30.9 % — LOW (ref 39–50)
HGB BLD-MCNC: 10.4 G/DL — LOW (ref 13–17)
IMM GRANULOCYTES NFR BLD AUTO: 1.1 % — HIGH (ref 0–0.9)
LYMPHOCYTES # BLD AUTO: 1.53 K/UL — SIGNIFICANT CHANGE UP (ref 1–3.3)
LYMPHOCYTES # BLD AUTO: 18.5 % — SIGNIFICANT CHANGE UP (ref 13–44)
MCHC RBC-ENTMCNC: 30 PG — SIGNIFICANT CHANGE UP (ref 27–34)
MCHC RBC-ENTMCNC: 33.7 G/DL — SIGNIFICANT CHANGE UP (ref 32–36)
MCV RBC AUTO: 89 FL — SIGNIFICANT CHANGE UP (ref 80–100)
METHOD TYPE: SIGNIFICANT CHANGE UP
METHOD TYPE: SIGNIFICANT CHANGE UP
MONOCYTES # BLD AUTO: 1.1 K/UL — HIGH (ref 0–0.9)
MONOCYTES NFR BLD AUTO: 13.3 % — SIGNIFICANT CHANGE UP (ref 2–14)
NEUTROPHILS # BLD AUTO: 5.28 K/UL — SIGNIFICANT CHANGE UP (ref 1.8–7.4)
NEUTROPHILS NFR BLD AUTO: 64.1 % — SIGNIFICANT CHANGE UP (ref 43–77)
NRBC # BLD: 0 /100 WBCS — SIGNIFICANT CHANGE UP (ref 0–0)
PHOSPHATE SERPL-MCNC: 4.5 MG/DL — SIGNIFICANT CHANGE UP (ref 2.5–4.5)
PLATELET # BLD AUTO: 128 K/UL — LOW (ref 150–400)
POTASSIUM SERPL-MCNC: 4.4 MMOL/L — SIGNIFICANT CHANGE UP (ref 3.5–5.3)
POTASSIUM SERPL-SCNC: 4.4 MMOL/L — SIGNIFICANT CHANGE UP (ref 3.5–5.3)
PROT ?TM UR-MCNC: 225 MG/DL — HIGH (ref 0–12)
PROT SERPL-MCNC: 6.5 G/DL — SIGNIFICANT CHANGE UP (ref 6–8.3)
PROT/CREAT UR-RTO: 2.9 RATIO — HIGH (ref 0–0.2)
PTH-INTACT FLD-MCNC: 30 PG/ML — SIGNIFICANT CHANGE UP (ref 15–65)
RBC # BLD: 3.47 M/UL — LOW (ref 4.2–5.8)
RBC # FLD: 12.2 % — SIGNIFICANT CHANGE UP (ref 10.3–14.5)
SODIUM SERPL-SCNC: 137 MMOL/L — SIGNIFICANT CHANGE UP (ref 135–145)
WBC # BLD: 8.25 K/UL — SIGNIFICANT CHANGE UP (ref 3.8–10.5)
WBC # FLD AUTO: 8.25 K/UL — SIGNIFICANT CHANGE UP (ref 3.8–10.5)

## 2024-09-24 PROCEDURE — 99233 SBSQ HOSP IP/OBS HIGH 50: CPT

## 2024-09-24 RX ORDER — INSULIN GLARGINE 300 U/ML
20 INJECTION, SOLUTION SUBCUTANEOUS AT BEDTIME
Refills: 0 | Status: DISCONTINUED | OUTPATIENT
Start: 2024-09-24 | End: 2024-09-25

## 2024-09-24 RX ORDER — INSULIN LISPRO 100/ML
7 VIAL (ML) SUBCUTANEOUS
Refills: 0 | Status: DISCONTINUED | OUTPATIENT
Start: 2024-09-24 | End: 2024-09-25

## 2024-09-24 RX ORDER — AMLODIPINE BESYLATE 5 MG
10 TABLET ORAL DAILY
Refills: 0 | Status: DISCONTINUED | OUTPATIENT
Start: 2024-09-25 | End: 2024-10-04

## 2024-09-24 RX ORDER — INSULIN ASPART INJECTION 100 [IU]/ML
7 INJECTION, SOLUTION SUBCUTANEOUS
Qty: 5 | Refills: 0
Start: 2024-09-24 | End: 2024-10-23

## 2024-09-24 RX ORDER — INSULIN GLARGINE 300 U/ML
20 INJECTION, SOLUTION SUBCUTANEOUS
Qty: 5 | Refills: 1
Start: 2024-09-24 | End: 2024-11-22

## 2024-09-24 RX ADMIN — Medication 6 UNIT(S): at 08:21

## 2024-09-24 RX ADMIN — Medication 5 MILLIGRAM(S): at 05:31

## 2024-09-24 RX ADMIN — Medication 8: at 17:24

## 2024-09-24 RX ADMIN — GABAPENTIN 100 MILLIGRAM(S): 800 TABLET, FILM COATED ORAL at 05:32

## 2024-09-24 RX ADMIN — Medication 4: at 08:20

## 2024-09-24 RX ADMIN — PIPERACILLIN SODIUM AND TAZOBACTAM SODIUM 25 GRAM(S): 12; 1.5 INJECTION, POWDER, LYOPHILIZED, FOR SOLUTION INTRAVENOUS at 21:23

## 2024-09-24 RX ADMIN — PIPERACILLIN SODIUM AND TAZOBACTAM SODIUM 25 GRAM(S): 12; 1.5 INJECTION, POWDER, LYOPHILIZED, FOR SOLUTION INTRAVENOUS at 05:32

## 2024-09-24 RX ADMIN — Medication 6: at 12:34

## 2024-09-24 RX ADMIN — Medication 1 TABLET(S): at 08:20

## 2024-09-24 RX ADMIN — Medication 650 MILLIGRAM(S): at 11:56

## 2024-09-24 RX ADMIN — Medication 6 UNIT(S): at 12:34

## 2024-09-24 RX ADMIN — Medication 650 MILLIGRAM(S): at 10:56

## 2024-09-24 RX ADMIN — PIPERACILLIN SODIUM AND TAZOBACTAM SODIUM 25 GRAM(S): 12; 1.5 INJECTION, POWDER, LYOPHILIZED, FOR SOLUTION INTRAVENOUS at 13:24

## 2024-09-24 RX ADMIN — Medication 7 UNIT(S): at 17:24

## 2024-09-24 RX ADMIN — INSULIN GLARGINE 20 UNIT(S): 300 INJECTION, SOLUTION SUBCUTANEOUS at 23:53

## 2024-09-24 RX ADMIN — Medication 3.12 MILLIGRAM(S): at 17:24

## 2024-09-24 RX ADMIN — GABAPENTIN 100 MILLIGRAM(S): 800 TABLET, FILM COATED ORAL at 21:23

## 2024-09-24 RX ADMIN — ENOXAPARIN SODIUM 40 MILLIGRAM(S): 150 INJECTION SUBCUTANEOUS at 21:24

## 2024-09-24 RX ADMIN — Medication 3 MILLIGRAM(S): at 21:23

## 2024-09-24 RX ADMIN — Medication 3.12 MILLIGRAM(S): at 05:31

## 2024-09-24 RX ADMIN — GABAPENTIN 100 MILLIGRAM(S): 800 TABLET, FILM COATED ORAL at 13:24

## 2024-09-24 RX ADMIN — Medication 1 TABLET(S): at 17:24

## 2024-09-24 NOTE — PROGRESS NOTE ADULT - SUBJECTIVE AND OBJECTIVE BOX
Patient is a 49y old  Male who presents with a chief complaint of infected toe (24 Sep 2024 11:29)    updates: pt reports no new issues, reviewed F/S still elevated >200mg/dL, increasing basal bolus regimen, discussed with patient about c/w insulin on discharge for improved glycemic control, patient states he cannot do short acting insulin premeal. discussed current regimen and continuing with adding basal insulin @ HS, start CGM and lifestyle diet modifications, eating small consistent meals. will submit scripts for coverage    HPI:  49-year-old male with past medical history of DM2, HTN, HLD, NEUROPATHY, PVD, NON COMPLIANT WITH TT, ETOH ABUSE, presents today due to left toe infection.  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  Patient experienced a blister to the left toe in which he eventually fell orders follow-up on its own.  Patient notes that the blister opened up and he developed some blood in the blister as well.  Patient notes that the blister scab has slightly fallen off but now his toe is red and painful.  Patient not currently on antibiotics.  Patient denies fever, injury, numbness, weakness, or any other complaints.   IN ED TEMP 99, /100, HR 89, had consult with podiatry and sidney started on Cipro and admitted for further management (17 Sep 2024 19:36)      PAST MEDICAL & SURGICAL HISTORY:  Prediabetes      HTN (hypertension)      HLD (hyperlipidemia)      DM2 (diabetes mellitus, type 2)      Allergies    amoxicillin (Unknown)    Intolerances        MEDICATIONS  (STANDING):  carvedilol 3.125 milliGRAM(s) Oral every 12 hours  dextrose 5%. 1000 milliLiter(s) (100 mL/Hr) IV Continuous <Continuous>  dextrose 5%. 1000 milliLiter(s) (50 mL/Hr) IV Continuous <Continuous>  dextrose 50% Injectable 12.5 Gram(s) IV Push once  dextrose 50% Injectable 25 Gram(s) IV Push once  dextrose 50% Injectable 25 Gram(s) IV Push once  enoxaparin Injectable 40 milliGRAM(s) SubCutaneous every 24 hours  gabapentin 100 milliGRAM(s) Oral three times a day  glucagon  Injectable 1 milliGRAM(s) IntraMuscular once  influenza   Vaccine 0.5 milliLiter(s) IntraMuscular once  insulin glargine Injectable (LANTUS) 20 Unit(s) SubCutaneous at bedtime  insulin lispro (ADMELOG) corrective regimen sliding scale   SubCutaneous three times a day before meals  insulin lispro (ADMELOG) corrective regimen sliding scale   SubCutaneous at bedtime  insulin lispro Injectable (ADMELOG) 7 Unit(s) SubCutaneous three times a day before meals  lactobacillus acidophilus 1 Tablet(s) Oral two times a day with meals  mupirocin 2% Ointment 1 Application(s) Topical <User Schedule>  piperacillin/tazobactam IVPB.. 3.375 Gram(s) IV Intermittent every 8 hours

## 2024-09-24 NOTE — PROGRESS NOTE ADULT - SUBJECTIVE AND OBJECTIVE BOX
PROGRESS NOTE   Patient is a 49y old  Male who presents with a chief complaint of infected toe (24 Sep 2024 14:02). Patient seen at bedside, no acute events overnight. Dressings clean/dry/intact      HPI:  49-year-old male with past medical history of DM2, HTN, HLD, NEUROPATHY, PVD, NON COMPLIANT WITH TT, ETOH ABUSE, presents today due to left toe infection.  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  Patient experienced a blister to the left toe in which he eventually fell orders follow-up on its own.  Patient notes that the blister opened up and he developed some blood in the blister as well.  Patient notes that the blister scab has slightly fallen off but now his toe is red and painful.  Patient not currently on antibiotics.  Patient denies fever, injury, numbness, weakness, or any other complaints.   IN ED TEMP 99, /100, HR 89, had consult with podiatry and sidney started on Cipro and admitted for further management (17 Sep 2024 19:36)      Vital Signs Last 24 Hrs  T(C): 37.4 (24 Sep 2024 14:26), Max: 38.1 (23 Sep 2024 17:11)  T(F): 99.4 (24 Sep 2024 14:26), Max: 100.6 (23 Sep 2024 17:11)  HR: 96 (24 Sep 2024 14:26) (84 - 96)  BP: 132/82 (24 Sep 2024 14:26) (132/82 - 154/93)  BP(mean): --  RR: 16 (24 Sep 2024 14:26) (16 - 18)  SpO2: 95% (24 Sep 2024 14:26) (95% - 97%)    Parameters below as of 24 Sep 2024 14:26  Patient On (Oxygen Delivery Method): room air                              10.4   8.25  )-----------( 128      ( 24 Sep 2024 07:52 )             30.9               09-24    137  |  99  |  52[H]  ----------------------------<  211[H]  4.4   |  28  |  2.69[H]    Ca    9.3      24 Sep 2024 07:52  Phos  4.5     09-24    TPro  6.5  /  Alb  2.6[L]  /  TBili  0.8  /  DBili  x   /  AST  33  /  ALT  44  /  AlkPhos  117  09-24      PHYSICAL EXAM  GEN: SUZANNA KAISER is a pleasant well-nourished, well developed 49y Male in no acute distress, alert awake, and oriented to person, place and time.   LE Focused:  Vascular: Dorsalis Pedis and Posterior Tibial pulses 2/4.  Capillary re-fill time less then 3 seconds digits 1-5 bilateral.    Neuro: Protective sensation diminished to the level of the digits bilateral.  MSK: Muscle strength 5/5 all major muscle groups bilateral.  Derm: POD#4 . Incision site of the Left foot noted with some necrotic changes distal flap. Dorsal and plantar flap showing signs of  venous congestion.  Guarded prognosis for the flap at this time. Upon elevation there is decreased venous congestion. the flap is viable at this time     PROGRESS NOTE   Patient is a 49y old  Male who presents with a chief complaint of infected toe (24 Sep 2024 14:02). Patient seen at bedside, no acute events overnight. Dressings clean/dry/intact      HPI:  49-year-old male with past medical history of DM2, HTN, HLD, NEUROPATHY, PVD, NON COMPLIANT WITH TT, ETOH ABUSE, presents today due to left toe infection.  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  Patient experienced a blister to the left toe in which he eventually fell orders follow-up on its own.  Patient notes that the blister opened up and he developed some blood in the blister as well.  Patient notes that the blister scab has slightly fallen off but now his toe is red and painful.  Patient not currently on antibiotics.  Patient denies fever, injury, numbness, weakness, or any other complaints.   IN ED TEMP 99, /100, HR 89, had consult with podiatry and sidney started on Cipro and admitted for further management (17 Sep 2024 19:36)      Vital Signs Last 24 Hrs  T(C): 37.4 (24 Sep 2024 14:26), Max: 38.1 (23 Sep 2024 17:11)  T(F): 99.4 (24 Sep 2024 14:26), Max: 100.6 (23 Sep 2024 17:11)  HR: 96 (24 Sep 2024 14:26) (84 - 96)  BP: 132/82 (24 Sep 2024 14:26) (132/82 - 154/93)  BP(mean): --  RR: 16 (24 Sep 2024 14:26) (16 - 18)  SpO2: 95% (24 Sep 2024 14:26) (95% - 97%)    Parameters below as of 24 Sep 2024 14:26  Patient On (Oxygen Delivery Method): room air                              10.4   8.25  )-----------( 128      ( 24 Sep 2024 07:52 )             30.9               09-24    137  |  99  |  52[H]  ----------------------------<  211[H]  4.4   |  28  |  2.69[H]    Ca    9.3      24 Sep 2024 07:52  Phos  4.5     09-24    TPro  6.5  /  Alb  2.6[L]  /  TBili  0.8  /  DBili  x   /  AST  33  /  ALT  44  /  AlkPhos  117  09-24      PHYSICAL EXAM  GEN: SUZANNA KAISER is a pleasant well-nourished, well developed 49y Male in no acute distress, alert awake, and oriented to person, place and time.   LE Focused:  Vascular: Dorsalis Pedis and Posterior Tibial pulses 2/4.  Capillary re-fill time less then 3 seconds digits 1-5 bilateral.    Neuro: Protective sensation diminished to the level of the digits bilateral.  MSK: Muscle strength 5/5 all major muscle groups bilateral.  Derm: POD#4 . Incision site of the Left foot noted with some necrotic changes distal flap. Dorsal and plantar flap showing signs of  venous congestion.  Guarded prognosis for the flap at this time. Upon elevation there is decreased venous congestion. the flap is mostly  viable at this time

## 2024-09-24 NOTE — PROGRESS NOTE ADULT - ASSESSMENT
49-year-old male with past medical history of DM2, HTN, HLD, NEUROPATHY, PVD, NON COMPLIANT WITH TT, ETOH ABUSE, presents today due to left toe infection.  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  Patient experienced a blister to the left toe in which he eventually fell orders follow-up on its own.  Patient notes that the blister opened up and he developed some blood in the blister as well.  Patient notes that the blister scab has slightly fallen off but now his toe is red and painful.  Patient not currently on antibiotics.  Patient denies fever, injury, numbness, weakness, or any other complaints.   IN ED TEMP 99, /100, HR 89, had consult with podiatry and sidney started on Cipro and admitted for further management    # Diabetic foot ulcer with cellullitis(left halluxdiabetic ulcer 3x4x0.1 cm with erythema and edema mal odorous , hyperkeratotic  started on ABX, , R/o PVD- arterial doppler- negative for any arterial disease   MR foot done- ac on ch OM with sepstic arthritis of 2ndleft toe, OM left great toe-  surg done by podiatry,9/20  culture of wound E fecalis  pt on zosyn    abx as per ID  vascular consult noted - no need for any vascular procedure    # DM2 with hyperglycemia, with neuropathy , nephropathy with likely PVD    ssi, consistent carb, Endo consult,  hba1c 10.2   Endo consult noted , started on lantus  # Ess HTN   amlodipine 5 mg, off losartan     DVT prophylaxis  ID , endo and podiatry consult  # LUISA with CKD 4   nephro consult,noted  likely Diabetic nephropathy with septic ATN  Hold ARB in short term and titrate other anti hypertensives as needed  # HLD   low fat diet  # DVT prophylaxis

## 2024-09-24 NOTE — PROGRESS NOTE ADULT - ASSESSMENT
Physical Exam:   Vital Signs Last 24 Hrs  T(C): 37.3 (24 Sep 2024 05:00), Max: 38.1 (23 Sep 2024 17:11)  T(F): 99.1 (24 Sep 2024 05:00), Max: 100.6 (23 Sep 2024 17:11)  HR: 84 (24 Sep 2024 05:00) (84 - 89)  BP: 154/93 (24 Sep 2024 05:00) (142/87 - 154/93)  BP(mean): --  RR: 18 (24 Sep 2024 05:00) (16 - 18)  SpO2: 97% (24 Sep 2024 05:00) (95% - 97%)    Parameters below as of 24 Sep 2024 05:00  Patient On (Oxygen Delivery Method): room air         CAPILLARY BLOOD GLUCOSE      POCT Blood Glucose.: 277 mg/dL (24 Sep 2024 11:56)  POCT Blood Glucose.: 208 mg/dL (24 Sep 2024 07:59)  POCT Blood Glucose.: 241 mg/dL (23 Sep 2024 21:08)  POCT Blood Glucose.: 256 mg/dL (23 Sep 2024 20:52)  POCT Blood Glucose.: 343 mg/dL (23 Sep 2024 16:54)      Cholesterol, Serum: 113 mg/dL (05.19.21 @ 08:36)     HDL Cholesterol, Serum: 22 mg/dL (05.19.21 @ 08:36)     LDL Cholesterol Calculated: 66 mg/dL (05.19.21 @ 08:36)     DIET: CC  >50%

## 2024-09-24 NOTE — PROGRESS NOTE ADULT - SUBJECTIVE AND OBJECTIVE BOX
Chief Complaint: Foot wound    Interval Events: No events overnight.    Review of Systems:  General: No fevers, chills, weight gain  Skin: No rashes, color changes  Cardiovascular: No chest pain, orthopnea  Respiratory: No shortness of breath, cough  Gastrointestinal: No nausea, abdominal pain  Genitourinary: No incontinence, pain with urination  Musculoskeletal: No pain, swelling, decreased range of motion  Neurological: No headache, weakness  Psychiatric: No depression, anxiety  Endocrine: No weight gain, increased thirst  All other systems are comprehensively negative.    Physical Exam:  Vital Signs Last 24 Hrs  T(C): 37.3 (24 Sep 2024 05:00), Max: 38.1 (23 Sep 2024 17:11)  T(F): 99.1 (24 Sep 2024 05:00), Max: 100.6 (23 Sep 2024 17:11)  HR: 84 (24 Sep 2024 05:00) (84 - 89)  BP: 154/93 (24 Sep 2024 05:00) (136/85 - 154/93)  BP(mean): --  RR: 18 (24 Sep 2024 05:00) (16 - 18)  SpO2: 97% (24 Sep 2024 05:00) (95% - 98%)  Parameters below as of 24 Sep 2024 05:00  Patient On (Oxygen Delivery Method): room air  General: NAD  HEENT: MMM  Neck: No JVD, no carotid bruit  Lungs: CTAB  CV: RRR, nl S1/S2, no M/R/G  Abdomen: S/NT/ND, +BS  Extremities: No LE edema, no cyanosis  Neuro: AAOx3, non-focal  Skin: No rash    Labs:    09-24    137  |  99  |  52[H]  ----------------------------<  211[H]  4.4   |  28  |  2.69[H]    Ca    9.3      24 Sep 2024 07:52  Phos  4.5     09-24    TPro  6.5  /  Alb  2.6[L]  /  TBili  0.8  /  DBili  x   /  AST  33  /  ALT  44  /  AlkPhos  117  09-24                        10.4   8.25  )-----------( 128      ( 24 Sep 2024 07:52 )             30.9       ECG/Telemetry: Sinus rhythm

## 2024-09-24 NOTE — PROGRESS NOTE ADULT - SUBJECTIVE AND OBJECTIVE BOX
Patient is a 49y old  Male who presents with a chief complaint of infected toe (23 Sep 2024 14:45)      INTERVAL HPI/OVERNIGHT EVENTS:overnight events noted    Home Medications:  non compliant with meds for months:  (17 Sep 2024 19:54)      MEDICATIONS  (STANDING):  carvedilol 3.125 milliGRAM(s) Oral every 12 hours  dextrose 5%. 1000 milliLiter(s) (100 mL/Hr) IV Continuous <Continuous>  dextrose 5%. 1000 milliLiter(s) (50 mL/Hr) IV Continuous <Continuous>  dextrose 50% Injectable 25 Gram(s) IV Push once  dextrose 50% Injectable 12.5 Gram(s) IV Push once  dextrose 50% Injectable 25 Gram(s) IV Push once  enoxaparin Injectable 40 milliGRAM(s) SubCutaneous every 24 hours  gabapentin 100 milliGRAM(s) Oral three times a day  glucagon  Injectable 1 milliGRAM(s) IntraMuscular once  influenza   Vaccine 0.5 milliLiter(s) IntraMuscular once  insulin glargine Injectable (LANTUS) 15 Unit(s) SubCutaneous at bedtime  insulin lispro (ADMELOG) corrective regimen sliding scale   SubCutaneous three times a day before meals  insulin lispro (ADMELOG) corrective regimen sliding scale   SubCutaneous at bedtime  insulin lispro Injectable (ADMELOG) 6 Unit(s) SubCutaneous three times a day before meals  lactobacillus acidophilus 1 Tablet(s) Oral two times a day with meals  mupirocin 2% Ointment 1 Application(s) Topical <User Schedule>  piperacillin/tazobactam IVPB.. 3.375 Gram(s) IV Intermittent every 8 hours    MEDICATIONS  (PRN):  acetaminophen     Tablet .. 650 milliGRAM(s) Oral every 6 hours PRN Temp greater or equal to 38C (100.4F), Mild Pain (1 - 3), Moderate Pain (4 - 6)  dextrose Oral Gel 15 Gram(s) Oral once PRN Blood Glucose LESS THAN 70 milliGRAM(s)/deciliter  labetalol Injectable 10 milliGRAM(s) IV Push every 8 hours PRN Systolic blood pressure >180  melatonin 3 milliGRAM(s) Oral at bedtime PRN Insomnia      Allergies    amoxicillin (Unknown)    Intolerances        REVIEW OF SYSTEMS:  CONSTITUTIONAL: No fever, weight loss, or fatigue  EYES: No eye pain, visual disturbances, or discharge  ENMT:  No difficulty hearing, tinnitus, vertigo; No sinus or throat pain  NECK: No pain or stiffness  BREASTS: No pain, masses, or nipple discharge  RESPIRATORY: No cough, wheezing, chills or hemoptysis; No shortness of breath  CARDIOVASCULAR: No chest pain, palpitations, dizziness, or leg swelling  GASTROINTESTINAL: No abdominal or epigastric pain. No nausea, vomiting, or hematemesis; No diarrhea or constipation. No melena or hematochezia.  GENITOURINARY: No dysuria, frequency, hematuria, or incontinence  NEUROLOGICAL: No headaches, memory loss, loss of strength, numbness, or tremors  SKIN: No itching, burning, rashes, or lesions   Vital Signs Last 24 Hrs  T(C): 37.3 (24 Sep 2024 05:00), Max: 38.1 (23 Sep 2024 17:11)  T(F): 99.1 (24 Sep 2024 05:00), Max: 100.6 (23 Sep 2024 17:11)  HR: 84 (24 Sep 2024 05:00) (84 - 89)  BP: 154/93 (24 Sep 2024 05:00) (136/85 - 154/93)  BP(mean): --  RR: 18 (24 Sep 2024 05:00) (16 - 18)  SpO2: 97% (24 Sep 2024 05:00) (95% - 98%)    Parameters below as of 24 Sep 2024 05:00  Patient On (Oxygen Delivery Method): room air        PHYSICAL EXAM:  GENERAL: well-groomed, well-developed  HEAD:  Atraumatic, Normocephalic  EYES: EOMI, PERRLA, conjunctiva and sclera clear  ENMT: Moist mucous membranes,   NECK: Supple, No JVD, Normal thyroid  NERVOUS SYSTEM:  Alert & Oriented X3, non focal  CHEST/LUNG: Clear to percussion bilaterally; No rales, rhonchi, wheezing, or rubs  HEART: Regular rate and rhythm; No murmurs, rubs, or gallops  ABDOMEN: Soft, Nontender, Nondistended; Bowel sounds present  EXTREMITIES:  2+ Peripheral Pulses, or edema, foot in dressing  SKIN: No rashes or lesions    LABS:                        10.4   8.25  )-----------( 128      ( 24 Sep 2024 07:52 )             30.9     09-24    137  |  99  |  52[H]  ----------------------------<  211[H]  4.4   |  28  |  2.69[H]    Ca    9.3      24 Sep 2024 07:52  Phos  4.5     09-24    TPro  6.5  /  Alb  2.6[L]  /  TBili  0.8  /  DBili  x   /  AST  33  /  ALT  44  /  AlkPhos  117  09-24      Urinalysis Basic - ( 24 Sep 2024 07:52 )    Color: x / Appearance: x / SG: x / pH: x  Gluc: 211 mg/dL / Ketone: x  / Bili: x / Urobili: x   Blood: x / Protein: x / Nitrite: x   Leuk Esterase: x / RBC: x / WBC x   Sq Epi: x / Non Sq Epi: x / Bacteria: x      CAPILLARY BLOOD GLUCOSE      POCT Blood Glucose.: 208 mg/dL (24 Sep 2024 07:59)  POCT Blood Glucose.: 241 mg/dL (23 Sep 2024 21:08)  POCT Blood Glucose.: 256 mg/dL (23 Sep 2024 20:52)  POCT Blood Glucose.: 343 mg/dL (23 Sep 2024 16:54)  POCT Blood Glucose.: 255 mg/dL (23 Sep 2024 11:48)        Culture - Fungal, Tissue (collected 09-20-24 @ 19:21)  Source: Tissue Left foot  Preliminary Report (09-22-24 @ 07:58):    Testing in progress    Culture - Fungal, Tissue (collected 09-20-24 @ 19:21)  Source: Bone Other  Preliminary Report (09-22-24 @ 07:58):    Testing in progress    Culture - Tissue with Gram Stain (collected 09-20-24 @ 19:21)  Source: Bone Other  Gram Stain (09-21-24 @ 06:32):    No polymorphonuclear cells seen per low power field    No organisms seen per oil power field  Preliminary Report (09-22-24 @ 08:47):    No growth to date    Culture - Fungal, Tissue (collected 09-20-24 @ 19:21)  Source: Bone Other  Preliminary Report (09-22-24 @ 07:58):    Testing in progress    Culture - Tissue with Gram Stain (collected 09-20-24 @ 19:21)  Source: Bone Other  Gram Stain (09-22-24 @ 09:37):    No polymorphonuclear cells seen per low power field    No organisms seen per oil power field  Preliminary Report (09-23-24 @ 15:18):    Growth in fluid media only Enterococcus faecalis    Culture - Tissue with Gram Stain (collected 09-20-24 @ 19:21)  Source: Tissue left foot  Gram Stain (09-22-24 @ 09:38):    No polymorphonuclear cells seen per low power field    No organisms seen per oil power field  Preliminary Report (09-23-24 @ 14:56):    Growth in fluid media only Enterococcus faecalis    Culture - Fungal, Tissue (collected 09-20-24 @ 19:21)  Source: Tissue  Preliminary Report (09-21-24 @ 07:08):    Testing in progress    Culture - Acid Fast - Tissue w/Smear (collected 09-20-24 @ 19:21)  Source: Tissue Other    Culture - Acid Fast - Tissue w/Smear (collected 09-20-24 @ 19:21)  Source: Tissue Other    Culture - Other (collected 09-17-24 @ 19:10)  Source: Wound Wound  Final Report (09-20-24 @ 13:25):    Numerous Enterobacter cloacae complex    Numerous Enterococcus faecalis    Few Coag Negative Staphylococcus "Susceptibilities not performed"  Organism: Enterobacter cloacae complex  Enterococcus faecalis (09-20-24 @ 13:25)  Organism: Enterococcus faecalis (09-20-24 @ 13:25)      Method Type: STEPHANIE      -  Ampicillin: S <=2 Predicts results to ampicillin/sulbactam, amoxacillin-clavulanate and  piperacillin-tazobactam.      -  Vancomycin: S 2  Organism: Enterobacter cloacae complex (09-20-24 @ 13:25)      Method Type: STPEHANIE      -  Amoxicillin/Clavulanic Acid: R >16/8      -  Ampicillin: R 16 These ampicillin results predict results for amoxicillin      -  Ampicillin/Sulbactam: R 8/4      -  Aztreonam: S <=4      -  Cefazolin: R >16      -  Cefepime: S <=2      -  Cefoxitin: R >16      -  Ceftriaxone: S <=1 Enterobacter cloacae, Klebsiella aerogenes, and Citrobacter freundii may develop resistance during prolonged therapy.      -  Ciprofloxacin: S <=0.25      -  Ertapenem: S <=0.5      -  Gentamicin: S <=2      -  Imipenem: S <=1      -  Levofloxacin: S <=0.5      -  Meropenem: S <=1      -  Piperacillin/Tazobactam: S <=8 Treatment with Pipercillin/Tazobactam is not recommended in severe infections casued by Klebsiella aerogenes, Enterobacter cloacae complex, and Citrobacter freundii complex.      -  Tobramycin: S <=2      -  Trimethoprim/Sulfamethoxazole: S <=0.5/9.5    Culture - Blood (collected 09-17-24 @ 18:13)  Source: .Blood Blood-Peripheral  Final Report (09-23-24 @ 01:00):    No growth at 5 days    Culture - Blood (collected 09-17-24 @ 18:13)  Source: .Blood Blood-Peripheral  Final Report (09-23-24 @ 01:00):    No growth at 5 days        I&O's Summary    23 Sep 2024 07:01  -  24 Sep 2024 07:00  --------------------------------------------------------  IN: 1135 mL / OUT: 500 mL / NET: 635 mL        RADIOLOGY & ADDITIONAL TESTS:    Imaging Personally Reviewed:  [x ] YES  [ ] NO    Consultant(s) Notes Reviewed:  [x ] YES  [ ] NO    Care Discussed with Consultants/Other Providers [x ] YES  [ ] NO

## 2024-09-24 NOTE — PROGRESS NOTE ADULT - SUBJECTIVE AND OBJECTIVE BOX
ANUJASUZANNA HURLEY is a 49yMale , patient examined and chart reviewed.    INTERVAL HPI/ OVERNIGHT EVENTS:   Afebrile.  NAD.  Seen with podiatry.    PAST MEDICAL & SURGICAL HISTORY:  Prediabetes  HTN (hypertension)  HLD (hyperlipidemia)  DM2 (diabetes mellitus, type 2)      For details regarding the patient's social history, family history, and other miscellaneous elements, please refer the initial infectious diseases consultation and/or the admitting history and physical examination for this admission.    ROS:  CONSTITUTIONAL:  Negative fever or chills  EYES:  Negative  blurry vision or double vision  CARDIOVASCULAR:  Negative for chest pain or palpitations  RESPIRATORY:  Negative for cough, wheezing, or SOB   GASTROINTESTINAL:  Negative for nausea, vomiting, diarrhea, constipation, or abdominal pain  GENITOURINARY:  Negative frequency, urgency or dysuria  NEUROLOGIC:  No headache, confusion, dizziness, lightheadedness  All other systems were reviewed and are negative     ALLERGIES  amoxicillin (Unknown)      Current inpatient medications :    ANTIBIOTICS/RELEVANT:  piperacillin/tazobactam IVPB.. 3.375 Gram(s) IV Intermittent every 8 hours    MEDICATIONS  (STANDING):  carvedilol 3.125 milliGRAM(s) Oral every 12 hours  dextrose 5%. 1000 milliLiter(s) (100 mL/Hr) IV Continuous <Continuous>  dextrose 5%. 1000 milliLiter(s) (50 mL/Hr) IV Continuous <Continuous>  dextrose 50% Injectable 25 Gram(s) IV Push once  dextrose 50% Injectable 12.5 Gram(s) IV Push once  dextrose 50% Injectable 25 Gram(s) IV Push once  enoxaparin Injectable 40 milliGRAM(s) SubCutaneous every 24 hours  gabapentin 100 milliGRAM(s) Oral three times a day  glucagon  Injectable 1 milliGRAM(s) IntraMuscular once  influenza   Vaccine 0.5 milliLiter(s) IntraMuscular once  insulin glargine Injectable (LANTUS) 20 Unit(s) SubCutaneous at bedtime  insulin lispro (ADMELOG) corrective regimen sliding scale   SubCutaneous three times a day before meals  insulin lispro (ADMELOG) corrective regimen sliding scale   SubCutaneous at bedtime  insulin lispro Injectable (ADMELOG) 7 Unit(s) SubCutaneous three times a day before meals  lactobacillus acidophilus 1 Tablet(s) Oral two times a day with meals  mupirocin 2% Ointment 1 Application(s) Topical <User Schedule>    MEDICATIONS  (PRN):  acetaminophen     Tablet .. 650 milliGRAM(s) Oral every 6 hours PRN Temp greater or equal to 38C (100.4F), Mild Pain (1 - 3), Moderate Pain (4 - 6)  dextrose Oral Gel 15 Gram(s) Oral once PRN Blood Glucose LESS THAN 70 milliGRAM(s)/deciliter  labetalol Injectable 10 milliGRAM(s) IV Push every 8 hours PRN Systolic blood pressure >180  melatonin 3 milliGRAM(s) Oral at bedtime PRN Insomnia      Objective:  Vital Signs Last 24 Hrs  T(C): 37.4 (24 Sep 2024 14:26), Max: 37.4 (24 Sep 2024 14:26)  T(F): 99.4 (24 Sep 2024 14:26), Max: 99.4 (24 Sep 2024 14:26)  HR: 97 (24 Sep 2024 17:28) (84 - 97)  BP: 131/89 (24 Sep 2024 17:28) (131/89 - 154/93)  RR: 16 (24 Sep 2024 14:26) (16 - 18)  SpO2: 95% (24 Sep 2024 14:26) (95% - 97%)    Parameters below as of 24 Sep 2024 14:26  Patient On (Oxygen Delivery Method): room air      Physical Exam:  General:  no acute distress  Neck: supple, trachea midline  Lungs: clear, no wheeze/rhonchi  Cardiovascular: regular rate and rhythm, S1 S2  Abdomen: soft, nontender,  bowel sounds normal  Neurological: alert and oriented x3  Skin: no rash  Extremities: Left foot amputation stump with some necrotic changes distal flap. Dorsal and plantar flap showing signs of  venous congestion.      LABS:                                   10.4   8.25  )-----------( 128      ( 24 Sep 2024 07:52 )             30.9   09-24    137  |  99  |  52[H]  ----------------------------<  211[H]  4.4   |  28  |  2.69[H]    Ca    9.3      24 Sep 2024 07:52  Phos  4.5     09-24    TPro  6.5  /  Alb  2.6[L]  /  TBili  0.8  /  DBili  x   /  AST  33  /  ALT  44  /  AlkPhos  117  09-24      MICROBIOLOGY:  Culture - Tissue with Gram Stain (collected 20 Sep 2024 19:21)  Source: Bone Other  Gram Stain (21 Sep 2024 06:32):    No polymorphonuclear cells seen per low power field    No organisms seen per oil power field  Preliminary Report (22 Sep 2024 08:47):    No growth to date    Culture - Fungal, Tissue (collected 20 Sep 2024 19:21)  Source: Bone Other  Preliminary Report (22 Sep 2024 07:58):    Testing in progress    Culture - Tissue with Gram Stain (09.20.24 @ 19:21)    Gram Stain:   No polymorphonuclear cells seen per low power field  No organisms seen per oil power field   -  Vancomycin: S 4   -  Ampicillin: S <=2 Predicts results to ampicillin/sulbactam, amoxacillin-clavulanate and  piperacillin-tazobactam.   Specimen Source: Bone Other   Culture Results:   Growth in fluid media only Enterococcus faecalis  Rare Staphylococcus epidermidis   Organism Identification: Enterococcus faecalis   Organism: Enterococcus faecalis   Method Type: STEPHANIE      Culture - Other (09.17.24 @ 19:10)    -  Amoxicillin/Clavulanic Acid: R >16/8   -  Ampicillin: R 16 These ampicillin results predict results for amoxicillin   -  Ampicillin: S <=2 Predicts results to ampicillin/sulbactam, amoxacillin-clavulanate and  piperacillin-tazobactam.   -  Ampicillin/Sulbactam: R 8/4   -  Aztreonam: S <=4   -  Cefazolin: R >16   -  Cefepime: S <=2   -  Cefoxitin: R >16   -  Ceftriaxone: S <=1 Enterobacter cloacae, Klebsiella aerogenes, and Citrobacter freundii may develop resistance during prolonged therapy.   -  Ciprofloxacin: S <=0.25   -  Ertapenem: S <=0.5   -  Gentamicin: S <=2   -  Imipenem: S <=1   -  Levofloxacin: S <=0.5   -  Meropenem: S <=1   -  Piperacillin/Tazobactam: S <=8 Treatment with Pipercillin/Tazobactam is not recommended in severe infections casued by Klebsiella aerogenes, Enterobacter cloacae complex, and Citrobacter freundii complex.   -  Tobramycin: S <=2   -  Trimethoprim/Sulfamethoxazole: S <=0.5/9.5   -  Vancomycin: S 2   Specimen Source: Wound Wound   Culture Results:   Numerous Enterobacter cloacae complex  Numerous Enterococcus faecalis  Few Coag Negative Staphylococcus "Susceptibilities not performed"   Organism Identification: Enterobacter cloacae complex  Enterococcus faecalis   Organism: Enterobacter cloacae complex   Organism: Enterococcus faecalis   Method Type: STEPHANIE   Method Type: STEPHANIE    Culture - Blood (collected 17 Sep 2024 18:13)  Source: .Blood Blood-Peripheral  Preliminary Report (19 Sep 2024 01:01):    No growth at 24 hours    Culture - Blood (collected 17 Sep 2024 18:13)  Source: .Blood Blood-Peripheral  Preliminary Report (19 Sep 2024 01:01):    No growth at 24 hours    RADIOLOGY & ADDITIONAL STUDIES:    ACC: [id number] EXAM:  MR FOOT LT   ORDERED BY: LISA ALY     PROCEDURE DATE:  09/19/2024          INTERPRETATION:  LEFT FOOT MRI    CLINICAL INDICATION: Diabetic foot ulcer, for evaluation of osteomyelitis.    COMPARISON: Radiographs dated 9/17/2024.    TECHNIQUE: Multiplanar, multisequence MRI was obtained of the left foot.    FINDINGS:    OSSEOUS: Acute on chronic osteomyelitis/septic arthritis at the proximal   interphalangeal joint of the second toe in the proper clinical setting.   Focal acute osteomyelitis along the plantar lateral cortex of the great   toe distal phalanx base with ill-defined overlying deep soft tissue   ulceration. Lisfranc interval is not widened on this non-weightbearing   examination.    TENDONS: Visualized flexor and extensor tendons are intact.    LIGAMENTS: Lisfranc ligament is intact.    GENERAL: No drainable encapsulated fluid collection. No significant   intermetatarsal bursal fluid distension. No interdigital neuroma. Mild to   moderate heterogeneous chronic muscular atrophy with superimposed patchy   denervation edema like signal.    IMPRESSION:    1.  Acute on chronic osteomyelitis/septic arthritis at the proximal   interphalangeal joint of left second toe in the proper clinical setting.  2.Focal acute osteomyelitis along the plantar lateral cortex of left   great toe distal phalanx base with overlying deep soft tissue ulceration.    Assessment :  50YO M PMH DM2, HTN, HLD, NEUROPATHY, PVD, NON COMPLIANT WITH TT, ETOH ABUSE, admitted with infected DM left great toe infection  MRI + OM  Sp Left great toe amputation 9/20  OR bone cultures noted with E Faecalis  Seen by podiatry today -  Left foot amputation stump with some necrotic changes distal flap. Dorsal and plantar flap showing signs of  venous congestion.      Plan :   Cont Zosyn  Fu OR  path  Vascular on case  Dm control  Trend temps and cbc  Pulm toileting  Stable from ID standpoint    D/w Dr Davenport      Continue with present regiment.  Appropriate use of antibiotics and adverse effects reviewed.      > 35 minutes were spent in direct patient care reviewing notes, medications ,labs data/ imaging , discussion with multidisciplinary team.    Thank you for allowing me to participate in care of your patient .    Linsey Mack MD  Infectious Disease  [phone]

## 2024-09-24 NOTE — PROGRESS NOTE ADULT - PROBLEM SELECTOR PLAN 1
Type 2 A1c 10.6% adm diabetic foot infection  increase lantus to 20 units @ HS  increase admelog to 7 units TIDAC  c/w mod ISS ACHS  CC diet and accucheck ACHS  Recommend endocrine-Perlman onconsult  FU appt: TBA  DSC recommendations: return to home lantus 20 units @ HS, segluromet 7.5mg-1000mg BID, and glucose monitoring, lifestyle and diet modifications  diabetes education provided as documented above  Diabetes support info and cell # [phone] given   Goal 100-180 mg/dL; 140-180 mg/dL in critical care areas

## 2024-09-24 NOTE — CASE MANAGEMENT PROGRESS NOTE - NSCMPROGRESSNOTE_GEN_ALL_CORE
RN/CM noted that pt's case discussed during Interdisciplinary rounds, pt remains acute,  S/P 09/20/2024 s/p debridement of all non-viable soft tissue and bone left foot with hallux amputation. AS per ID MD, IV Zosyn continues-OR cultures and path pending. Pt with local wound care- Xeroform being performed by podiatrist from Salvatore Delgado's office. DC pending hospital course. MSW aware pt requested resources on financial assistance. CM remains available.  RN/CM noted that pt's case discussed during Interdisciplinary rounds, pt remains acute,  S/P 09/20/2024 s/p debridement of all non-viable soft tissue and bone left foot with hallux amputation. AS per ID MD, IV Zosyn continues-OR cultures and path pending. Pt with local wound care- Xeroform being performed by podiatrist from [facility]'s office. CM discussed with pt about possible LT IV ABT and pt is willing to learn but is really hoping that he would be able to go home on PO ABT, as he is VERY eager to return to work. DC pending hospital course. MSW aware pt requested resources on financial assistance. CM remains available.

## 2024-09-24 NOTE — PROGRESS NOTE ADULT - ASSESSMENT
The patient is a 49 year old male with a history of HTN, HL, DM, PAD who presents with a toe infection.    Plan:  - ECG with sinus rhythm and no evidence of ischemia/infarction  - Off of losartan  - Increase to amlodipine 10 mg daily  - Continue carvedilol 3.125 mg bid  - LE arterial duplex with no significant stenosis  - MRI foot with acute/chronic osteomyelitis  - Podiatry follow-up

## 2024-09-24 NOTE — PROGRESS NOTE ADULT - PROBLEM SELECTOR PLAN 1
Patient seen and evaluated.  Pt is s/p debridement of all non-viable soft tissue and bone left foot with hallux amputation.  Surgical pathology report pending.  Concern for flap due to  venous congestin. Will continue to monitor closely.   Albumin 2.6 rec nutrition consult and metabolic optimization to improve healing potential.   Partial weight bearing on left foot  Pt instructed to elevate his operative extremity on two pillows  Pt instructed to limit standing and walking on the operative extremity  Pt made aware of the severity of his conditions and that serial procedures maybe required if symptoms persist  Appreciates Vascular consult  Continue IV Abx as per ID.  If dressings get wet or fall off, please change with:  1. xeroform  2. gauze  3. abd pad  4. kerlex  5. ace bandage  Will cont to monitor while in house.  Will discuss with all attendings.

## 2024-09-25 LAB
-  CLINDAMYCIN: SIGNIFICANT CHANGE UP
-  ERYTHROMYCIN: SIGNIFICANT CHANGE UP
-  GENTAMICIN: SIGNIFICANT CHANGE UP
-  OXACILLIN: SIGNIFICANT CHANGE UP
-  PENICILLIN: SIGNIFICANT CHANGE UP
-  RIFAMPIN: SIGNIFICANT CHANGE UP
-  TETRACYCLINE: SIGNIFICANT CHANGE UP
-  TRIMETHOPRIM/SULFAMETHOXAZOLE: SIGNIFICANT CHANGE UP
-  VANCOMYCIN: SIGNIFICANT CHANGE UP
ALBUMIN SERPL ELPH-MCNC: 2.6 G/DL — LOW (ref 3.3–5)
ALP SERPL-CCNC: 123 U/L — HIGH (ref 30–120)
ALT FLD-CCNC: 43 U/L — SIGNIFICANT CHANGE UP (ref 10–60)
ANION GAP SERPL CALC-SCNC: 10 MMOL/L — SIGNIFICANT CHANGE UP (ref 5–17)
AST SERPL-CCNC: 35 U/L — SIGNIFICANT CHANGE UP (ref 10–40)
BASOPHILS # BLD AUTO: 0.12 K/UL — SIGNIFICANT CHANGE UP (ref 0–0.2)
BASOPHILS NFR BLD AUTO: 1.5 % — SIGNIFICANT CHANGE UP (ref 0–2)
BILIRUB SERPL-MCNC: 0.8 MG/DL — SIGNIFICANT CHANGE UP (ref 0.2–1.2)
BUN SERPL-MCNC: 59 MG/DL — HIGH (ref 7–23)
CALCIUM SERPL-MCNC: 9.4 MG/DL — SIGNIFICANT CHANGE UP (ref 8.4–10.5)
CHLORIDE SERPL-SCNC: 99 MMOL/L — SIGNIFICANT CHANGE UP (ref 96–108)
CO2 SERPL-SCNC: 28 MMOL/L — SIGNIFICANT CHANGE UP (ref 22–31)
CREAT SERPL-MCNC: 3.08 MG/DL — HIGH (ref 0.5–1.3)
EGFR: 24 ML/MIN/1.73M2 — LOW
EOSINOPHIL # BLD AUTO: 0.13 K/UL — SIGNIFICANT CHANGE UP (ref 0–0.5)
EOSINOPHIL NFR BLD AUTO: 1.6 % — SIGNIFICANT CHANGE UP (ref 0–6)
GLUCOSE BLDC GLUCOMTR-MCNC: 206 MG/DL — HIGH (ref 70–99)
GLUCOSE BLDC GLUCOMTR-MCNC: 263 MG/DL — HIGH (ref 70–99)
GLUCOSE BLDC GLUCOMTR-MCNC: 291 MG/DL — HIGH (ref 70–99)
GLUCOSE BLDC GLUCOMTR-MCNC: 305 MG/DL — HIGH (ref 70–99)
GLUCOSE SERPL-MCNC: 211 MG/DL — HIGH (ref 70–99)
HCT VFR BLD CALC: 29.7 % — LOW (ref 39–50)
HGB BLD-MCNC: 9.9 G/DL — LOW (ref 13–17)
IMM GRANULOCYTES NFR BLD AUTO: 0.9 % — SIGNIFICANT CHANGE UP (ref 0–0.9)
LYMPHOCYTES # BLD AUTO: 1.52 K/UL — SIGNIFICANT CHANGE UP (ref 1–3.3)
LYMPHOCYTES # BLD AUTO: 18.7 % — SIGNIFICANT CHANGE UP (ref 13–44)
MCHC RBC-ENTMCNC: 29.7 PG — SIGNIFICANT CHANGE UP (ref 27–34)
MCHC RBC-ENTMCNC: 33.3 G/DL — SIGNIFICANT CHANGE UP (ref 32–36)
MCV RBC AUTO: 89.2 FL — SIGNIFICANT CHANGE UP (ref 80–100)
METHOD TYPE: SIGNIFICANT CHANGE UP
MONOCYTES # BLD AUTO: 1.1 K/UL — HIGH (ref 0–0.9)
MONOCYTES NFR BLD AUTO: 13.5 % — SIGNIFICANT CHANGE UP (ref 2–14)
NEUTROPHILS # BLD AUTO: 5.21 K/UL — SIGNIFICANT CHANGE UP (ref 1.8–7.4)
NEUTROPHILS NFR BLD AUTO: 63.8 % — SIGNIFICANT CHANGE UP (ref 43–77)
NRBC # BLD: 0 /100 WBCS — SIGNIFICANT CHANGE UP (ref 0–0)
PLATELET # BLD AUTO: 151 K/UL — SIGNIFICANT CHANGE UP (ref 150–400)
POTASSIUM SERPL-MCNC: 4.5 MMOL/L — SIGNIFICANT CHANGE UP (ref 3.5–5.3)
POTASSIUM SERPL-SCNC: 4.5 MMOL/L — SIGNIFICANT CHANGE UP (ref 3.5–5.3)
PROT SERPL-MCNC: 6.6 G/DL — SIGNIFICANT CHANGE UP (ref 6–8.3)
RBC # BLD: 3.33 M/UL — LOW (ref 4.2–5.8)
RBC # FLD: 12.3 % — SIGNIFICANT CHANGE UP (ref 10.3–14.5)
SODIUM SERPL-SCNC: 137 MMOL/L — SIGNIFICANT CHANGE UP (ref 135–145)
WBC # BLD: 8.15 K/UL — SIGNIFICANT CHANGE UP (ref 3.8–10.5)
WBC # FLD AUTO: 8.15 K/UL — SIGNIFICANT CHANGE UP (ref 3.8–10.5)

## 2024-09-25 RX ORDER — INSULIN GLARGINE 300 U/ML
25 INJECTION, SOLUTION SUBCUTANEOUS AT BEDTIME
Refills: 0 | Status: DISCONTINUED | OUTPATIENT
Start: 2024-09-25 | End: 2024-09-26

## 2024-09-25 RX ORDER — INSULIN LISPRO 100/ML
9 VIAL (ML) SUBCUTANEOUS
Refills: 0 | Status: DISCONTINUED | OUTPATIENT
Start: 2024-09-25 | End: 2024-09-26

## 2024-09-25 RX ADMIN — Medication 4: at 21:25

## 2024-09-25 RX ADMIN — Medication 3.12 MILLIGRAM(S): at 05:34

## 2024-09-25 RX ADMIN — Medication 7 UNIT(S): at 08:35

## 2024-09-25 RX ADMIN — Medication 650 MILLIGRAM(S): at 22:39

## 2024-09-25 RX ADMIN — PIPERACILLIN SODIUM AND TAZOBACTAM SODIUM 25 GRAM(S): 12; 1.5 INJECTION, POWDER, LYOPHILIZED, FOR SOLUTION INTRAVENOUS at 21:26

## 2024-09-25 RX ADMIN — Medication 6: at 17:37

## 2024-09-25 RX ADMIN — Medication 1 TABLET(S): at 08:34

## 2024-09-25 RX ADMIN — Medication 9 UNIT(S): at 17:37

## 2024-09-25 RX ADMIN — Medication 650 MILLIGRAM(S): at 23:09

## 2024-09-25 RX ADMIN — Medication 3.12 MILLIGRAM(S): at 17:36

## 2024-09-25 RX ADMIN — Medication 4: at 08:34

## 2024-09-25 RX ADMIN — INSULIN GLARGINE 25 UNIT(S): 300 INJECTION, SOLUTION SUBCUTANEOUS at 22:16

## 2024-09-25 RX ADMIN — PIPERACILLIN SODIUM AND TAZOBACTAM SODIUM 25 GRAM(S): 12; 1.5 INJECTION, POWDER, LYOPHILIZED, FOR SOLUTION INTRAVENOUS at 13:22

## 2024-09-25 RX ADMIN — GABAPENTIN 100 MILLIGRAM(S): 800 TABLET, FILM COATED ORAL at 13:22

## 2024-09-25 RX ADMIN — GABAPENTIN 100 MILLIGRAM(S): 800 TABLET, FILM COATED ORAL at 05:34

## 2024-09-25 RX ADMIN — ENOXAPARIN SODIUM 40 MILLIGRAM(S): 150 INJECTION SUBCUTANEOUS at 22:15

## 2024-09-25 RX ADMIN — Medication 3 MILLIGRAM(S): at 22:39

## 2024-09-25 RX ADMIN — Medication 6: at 12:36

## 2024-09-25 RX ADMIN — GABAPENTIN 100 MILLIGRAM(S): 800 TABLET, FILM COATED ORAL at 21:25

## 2024-09-25 RX ADMIN — Medication 10 MILLIGRAM(S): at 05:34

## 2024-09-25 RX ADMIN — PIPERACILLIN SODIUM AND TAZOBACTAM SODIUM 25 GRAM(S): 12; 1.5 INJECTION, POWDER, LYOPHILIZED, FOR SOLUTION INTRAVENOUS at 05:33

## 2024-09-25 RX ADMIN — Medication 7 UNIT(S): at 12:37

## 2024-09-25 RX ADMIN — Medication 650 MILLIGRAM(S): at 09:55

## 2024-09-25 RX ADMIN — Medication 1 TABLET(S): at 17:36

## 2024-09-25 RX ADMIN — Medication 650 MILLIGRAM(S): at 10:55

## 2024-09-25 NOTE — PROGRESS NOTE ADULT - SUBJECTIVE AND OBJECTIVE BOX
No change in respiratory status, will continue to monitor.        ANUJASUZANNA HURLEY is a 49yMale , patient examined and chart reviewed.    INTERVAL HPI/ OVERNIGHT EVENTS:   Afebrile.  NAD.    PAST MEDICAL & SURGICAL HISTORY:  Prediabetes  HTN (hypertension)  HLD (hyperlipidemia)  DM2 (diabetes mellitus, type 2)      For details regarding the patient's social history, family history, and other miscellaneous elements, please refer the initial infectious diseases consultation and/or the admitting history and physical examination for this admission.    ROS:  CONSTITUTIONAL:  Negative fever or chills  EYES:  Negative  blurry vision or double vision  CARDIOVASCULAR:  Negative for chest pain or palpitations  RESPIRATORY:  Negative for cough, wheezing, or SOB   GASTROINTESTINAL:  Negative for nausea, vomiting, diarrhea, constipation, or abdominal pain  GENITOURINARY:  Negative frequency, urgency or dysuria  NEUROLOGIC:  No headache, confusion, dizziness, lightheadedness  All other systems were reviewed and are negative     ALLERGIES  amoxicillin (Unknown)      Current inpatient medications :    ANTIBIOTICS/RELEVANT:  piperacillin/tazobactam IVPB.. 3.375 Gram(s) IV Intermittent every 8 hours    MEDICATIONS  (STANDING):  amLODIPine   Tablet 10 milliGRAM(s) Oral daily  carvedilol 3.125 milliGRAM(s) Oral every 12 hours  dextrose 5%. 1000 milliLiter(s) (100 mL/Hr) IV Continuous <Continuous>  dextrose 5%. 1000 milliLiter(s) (50 mL/Hr) IV Continuous <Continuous>  dextrose 50% Injectable 25 Gram(s) IV Push once  dextrose 50% Injectable 12.5 Gram(s) IV Push once  dextrose 50% Injectable 25 Gram(s) IV Push once  enoxaparin Injectable 40 milliGRAM(s) SubCutaneous every 24 hours  gabapentin 100 milliGRAM(s) Oral three times a day  glucagon  Injectable 1 milliGRAM(s) IntraMuscular once  influenza   Vaccine 0.5 milliLiter(s) IntraMuscular once  insulin glargine Injectable (LANTUS) 25 Unit(s) SubCutaneous at bedtime  insulin lispro (ADMELOG) corrective regimen sliding scale   SubCutaneous three times a day before meals  insulin lispro (ADMELOG) corrective regimen sliding scale   SubCutaneous at bedtime  insulin lispro Injectable (ADMELOG) 9 Unit(s) SubCutaneous three times a day before meals  lactobacillus acidophilus 1 Tablet(s) Oral two times a day with meals  mupirocin 2% Ointment 1 Application(s) Topical <User Schedule>    MEDICATIONS  (PRN):  acetaminophen     Tablet .. 650 milliGRAM(s) Oral every 6 hours PRN Temp greater or equal to 38C (100.4F), Mild Pain (1 - 3), Moderate Pain (4 - 6)  dextrose Oral Gel 15 Gram(s) Oral once PRN Blood Glucose LESS THAN 70 milliGRAM(s)/deciliter  labetalol Injectable 10 milliGRAM(s) IV Push every 8 hours PRN Systolic blood pressure >180  melatonin 3 milliGRAM(s) Oral at bedtime PRN Insomnia    Objective:  Vital Signs Last 24 Hrs  T(C): 37.2 (25 Sep 2024 13:01), Max: 37.3 (25 Sep 2024 05:00)  T(F): 99 (25 Sep 2024 13:01), Max: 99.1 (25 Sep 2024 05:00)  HR: 87 (25 Sep 2024 13:01) (79 - 97)  BP: 147/87 (25 Sep 2024 13:01) (127/80 - 147/87)  RR: 18 (25 Sep 2024 13:01) (17 - 18)  SpO2: 96% (25 Sep 2024 13:01) (94% - 96%)    Parameters below as of 25 Sep 2024 05:00  Patient On (Oxygen Delivery Method): room air      Physical Exam:  General:  no acute distress  Neck: supple, trachea midline  Lungs: clear, no wheeze/rhonchi  Cardiovascular: regular rate and rhythm, S1 S2  Abdomen: soft, nontender,  bowel sounds normal  Neurological: alert and oriented x3  Skin: no rash  Extremities: Left foot amputation stump with some necrotic changes distal flap. Dorsal and plantar flap showing signs of  venous congestion.      LABS:                        9.9    8.15  )-----------( 151      ( 25 Sep 2024 08:01 )             29.7   09-25    137  |  99  |  59[H]  ----------------------------<  211[H]  4.5   |  28  |  3.08[H]    Ca    9.4      25 Sep 2024 08:01  Phos  4.5     09-24    TPro  6.6  /  Alb  2.6[L]  /  TBili  0.8  /  DBili  x   /  AST  35  /  ALT  43  /  AlkPhos  123[H]  09-25      MICROBIOLOGY:  Culture - Tissue with Gram Stain (collected 20 Sep 2024 19:21)  Source: Bone Other  Gram Stain (21 Sep 2024 06:32):    No polymorphonuclear cells seen per low power field    No organisms seen per oil power field  Preliminary Report (22 Sep 2024 08:47):    No growth to date    Culture - Fungal, Tissue (collected 20 Sep 2024 19:21)  Source: Bone Other  Preliminary Report (22 Sep 2024 07:58):    Testing in progress    Culture - Tissue with Gram Stain (09.20.24 @ 19:21)    Gram Stain:   No polymorphonuclear cells seen per low power field  No organisms seen per oil power field   -  Vancomycin: S 4   -  Ampicillin: S <=2 Predicts results to ampicillin/sulbactam, amoxacillin-clavulanate and  piperacillin-tazobactam.   Specimen Source: Bone Other   Culture Results:   Growth in fluid media only Enterococcus faecalis  Rare Staphylococcus epidermidis   Organism Identification: Enterococcus faecalis   Organism: Enterococcus faecalis   Method Type: STEPHANIE      Culture - Other (09.17.24 @ 19:10)    -  Amoxicillin/Clavulanic Acid: R >16/8   -  Ampicillin: R 16 These ampicillin results predict results for amoxicillin   -  Ampicillin: S <=2 Predicts results to ampicillin/sulbactam, amoxacillin-clavulanate and  piperacillin-tazobactam.   -  Ampicillin/Sulbactam: R 8/4   -  Aztreonam: S <=4   -  Cefazolin: R >16   -  Cefepime: S <=2   -  Cefoxitin: R >16   -  Ceftriaxone: S <=1 Enterobacter cloacae, Klebsiella aerogenes, and Citrobacter freundii may develop resistance during prolonged therapy.   -  Ciprofloxacin: S <=0.25   -  Ertapenem: S <=0.5   -  Gentamicin: S <=2   -  Imipenem: S <=1   -  Levofloxacin: S <=0.5   -  Meropenem: S <=1   -  Piperacillin/Tazobactam: S <=8 Treatment with Pipercillin/Tazobactam is not recommended in severe infections casued by Klebsiella aerogenes, Enterobacter cloacae complex, and Citrobacter freundii complex.   -  Tobramycin: S <=2   -  Trimethoprim/Sulfamethoxazole: S <=0.5/9.5   -  Vancomycin: S 2   Specimen Source: Wound Wound   Culture Results:   Numerous Enterobacter cloacae complex  Numerous Enterococcus faecalis  Few Coag Negative Staphylococcus "Susceptibilities not performed"   Organism Identification: Enterobacter cloacae complex  Enterococcus faecalis   Organism: Enterobacter cloacae complex   Organism: Enterococcus faecalis   Method Type: STEPHANIE   Method Type: STEPHANIE    Culture - Blood (collected 17 Sep 2024 18:13)  Source: .Blood Blood-Peripheral  Preliminary Report (19 Sep 2024 01:01):    No growth at 24 hours    Culture - Blood (collected 17 Sep 2024 18:13)  Source: .Blood Blood-Peripheral  Preliminary Report (19 Sep 2024 01:01):    No growth at 24 hours    RADIOLOGY & ADDITIONAL STUDIES:    ACC: [id number] EXAM:  MR FOOT LT   ORDERED BY: LISA ALY     PROCEDURE DATE:  09/19/2024          INTERPRETATION:  LEFT FOOT MRI    CLINICAL INDICATION: Diabetic foot ulcer, for evaluation of osteomyelitis.    COMPARISON: Radiographs dated 9/17/2024.    TECHNIQUE: Multiplanar, multisequence MRI was obtained of the left foot.    FINDINGS:    OSSEOUS: Acute on chronic osteomyelitis/septic arthritis at the proximal   interphalangeal joint of the second toe in the proper clinical setting.   Focal acute osteomyelitis along the plantar lateral cortex of the great   toe distal phalanx base with ill-defined overlying deep soft tissue   ulceration. Lisfranc interval is not widened on this non-weightbearing   examination.    TENDONS: Visualized flexor and extensor tendons are intact.    LIGAMENTS: Lisfranc ligament is intact.    GENERAL: No drainable encapsulated fluid collection. No significant   intermetatarsal bursal fluid distension. No interdigital neuroma. Mild to   moderate heterogeneous chronic muscular atrophy with superimposed patchy   denervation edema like signal.    IMPRESSION:    1.  Acute on chronic osteomyelitis/septic arthritis at the proximal   interphalangeal joint of left second toe in the proper clinical setting.  2.Focal acute osteomyelitis along the plantar lateral cortex of left   great toe distal phalanx base with overlying deep soft tissue ulceration.    Assessment :  50YO M PMH DM2, HTN, HLD, NEUROPATHY, PVD, NON COMPLIANT WITH TT, ETOH ABUSE, admitted with infected DM left great toe infection  MRI + OM  Sp Left great toe amputation 9/20  OR bone cultures noted with E Faecalis  Left foot amputation stump with some necrotic changes distal flap. Dorsal and plantar flap showing signs of  venous congestion.  Path with acute on chronic OM on prox clean margins    Plan :   May need revision fo amputation site  Cont Zosyn  Fu OR  path  Vascular on case  Dm control  Trend temps and cbc  Pulm toileting  Stable from ID standpoint    D/w Dr Davenport      Continue with present regiment.  Appropriate use of antibiotics and adverse effects reviewed.      > 35 minutes were spent in direct patient care reviewing notes, medications ,labs data/ imaging , discussion with multidisciplinary team.    Thank you for allowing me to participate in care of your patient .    Linsey Mack MD  Infectious Disease  [phone]

## 2024-09-25 NOTE — PROGRESS NOTE ADULT - SUBJECTIVE AND OBJECTIVE BOX
CAPILLARY BLOOD GLUCOSE      POCT Blood Glucose.: 206 mg/dL (25 Sep 2024 08:21)  POCT Blood Glucose.: 235 mg/dL (24 Sep 2024 20:59)  POCT Blood Glucose.: 306 mg/dL (24 Sep 2024 16:58)  POCT Blood Glucose.: 277 mg/dL (24 Sep 2024 11:56)      Vital Signs Last 24 Hrs  T(C): 37.3 (25 Sep 2024 05:00), Max: 37.4 (24 Sep 2024 14:26)  T(F): 99.1 (25 Sep 2024 05:00), Max: 99.4 (24 Sep 2024 14:26)  HR: 79 (25 Sep 2024 05:00) (79 - 97)  BP: 127/80 (25 Sep 2024 05:00) (127/80 - 141/85)  BP(mean): --  RR: 17 (25 Sep 2024 05:00) (16 - 17)  SpO2: 95% (25 Sep 2024 05:00) (94% - 95%)    Parameters below as of 25 Sep 2024 05:00  Patient On (Oxygen Delivery Method): room air        General: WN/WD NAD  Respiratory: CTA B/L  CV: RRR, S1S2, no murmurs, rubs or gallops  Abdominal: Soft, NT, ND +BS, Last BM  Extremities: le foot dsg intact     09-25    137  |  99  |  59[H]  ----------------------------<  211[H]  4.5   |  28  |  3.08[H]    Ca    9.4      25 Sep 2024 08:01  Phos  4.5     09-24    TPro  6.6  /  Alb  2.6[L]  /  TBili  0.8  /  DBili  x   /  AST  35  /  ALT  43  /  AlkPhos  123[H]  09-25      dextrose 50% Injectable 25 Gram(s) IV Push once  dextrose 50% Injectable 12.5 Gram(s) IV Push once  dextrose 50% Injectable 25 Gram(s) IV Push once  dextrose Oral Gel 15 Gram(s) Oral once PRN  glucagon  Injectable 1 milliGRAM(s) IntraMuscular once  insulin glargine Injectable (LANTUS) 20 Unit(s) SubCutaneous at bedtime  insulin lispro (ADMELOG) corrective regimen sliding scale   SubCutaneous three times a day before meals  insulin lispro (ADMELOG) corrective regimen sliding scale   SubCutaneous at bedtime  insulin lispro Injectable (ADMELOG) 7 Unit(s) SubCutaneous three times a day before meals

## 2024-09-25 NOTE — PROGRESS NOTE ADULT - SUBJECTIVE AND OBJECTIVE BOX
Chief Complaint: Foot wound    Interval Events: No events overnight.    Review of Systems:  General: No fevers, chills, weight gain  Skin: No rashes, color changes  Cardiovascular: No chest pain, orthopnea  Respiratory: No shortness of breath, cough  Gastrointestinal: No nausea, abdominal pain  Genitourinary: No incontinence, pain with urination  Musculoskeletal: No pain, swelling, decreased range of motion  Neurological: No headache, weakness  Psychiatric: No depression, anxiety  Endocrine: No weight gain, increased thirst  All other systems are comprehensively negative.    Physical Exam:  Vital Signs Last 24 Hrs  T(C): 37.3 (25 Sep 2024 05:00), Max: 37.4 (24 Sep 2024 14:26)  T(F): 99.1 (25 Sep 2024 05:00), Max: 99.4 (24 Sep 2024 14:26)  HR: 79 (25 Sep 2024 05:00) (79 - 97)  BP: 127/80 (25 Sep 2024 05:00) (127/80 - 141/85)  BP(mean): --  RR: 17 (25 Sep 2024 05:00) (16 - 17)  SpO2: 95% (25 Sep 2024 05:00) (94% - 95%)  Parameters below as of 25 Sep 2024 05:00  Patient On (Oxygen Delivery Method): room air  General: NAD  HEENT: MMM  Neck: No JVD, no carotid bruit  Lungs: CTAB  CV: RRR, nl S1/S2, no M/R/G  Abdomen: S/NT/ND, +BS  Extremities: No LE edema, no cyanosis  Neuro: AAOx3, non-focal  Skin: No rash    Labs:    09-25    137  |  99  |  59[H]  ----------------------------<  211[H]  4.5   |  28  |  3.08[H]    Ca    9.4      25 Sep 2024 08:01  Phos  4.5     09-24    TPro  6.6  /  Alb  2.6[L]  /  TBili  0.8  /  DBili  x   /  AST  35  /  ALT  43  /  AlkPhos  123[H]  09-25                        9.9    8.15  )-----------( 151      ( 25 Sep 2024 08:01 )             29.7       ECG/Telemetry: Sinus rhythm

## 2024-09-25 NOTE — PROGRESS NOTE ADULT - SUBJECTIVE AND OBJECTIVE BOX
Patient is a 49y old  Male who presents with a chief complaint of infected toe (25 Sep 2024 11:43)      INTERVAL HPI/OVERNIGHT EVENTS:overnight events noted    Home Medications:  non compliant with meds for months:  (17 Sep 2024 19:54)      MEDICATIONS  (STANDING):  amLODIPine   Tablet 10 milliGRAM(s) Oral daily  carvedilol 3.125 milliGRAM(s) Oral every 12 hours  dextrose 5%. 1000 milliLiter(s) (100 mL/Hr) IV Continuous <Continuous>  dextrose 5%. 1000 milliLiter(s) (50 mL/Hr) IV Continuous <Continuous>  dextrose 50% Injectable 25 Gram(s) IV Push once  dextrose 50% Injectable 12.5 Gram(s) IV Push once  dextrose 50% Injectable 25 Gram(s) IV Push once  enoxaparin Injectable 40 milliGRAM(s) SubCutaneous every 24 hours  gabapentin 100 milliGRAM(s) Oral three times a day  glucagon  Injectable 1 milliGRAM(s) IntraMuscular once  influenza   Vaccine 0.5 milliLiter(s) IntraMuscular once  insulin glargine Injectable (LANTUS) 25 Unit(s) SubCutaneous at bedtime  insulin lispro (ADMELOG) corrective regimen sliding scale   SubCutaneous three times a day before meals  insulin lispro (ADMELOG) corrective regimen sliding scale   SubCutaneous at bedtime  insulin lispro Injectable (ADMELOG) 9 Unit(s) SubCutaneous three times a day before meals  lactobacillus acidophilus 1 Tablet(s) Oral two times a day with meals  mupirocin 2% Ointment 1 Application(s) Topical <User Schedule>  piperacillin/tazobactam IVPB.. 3.375 Gram(s) IV Intermittent every 8 hours    MEDICATIONS  (PRN):  acetaminophen     Tablet .. 650 milliGRAM(s) Oral every 6 hours PRN Temp greater or equal to 38C (100.4F), Mild Pain (1 - 3), Moderate Pain (4 - 6)  dextrose Oral Gel 15 Gram(s) Oral once PRN Blood Glucose LESS THAN 70 milliGRAM(s)/deciliter  labetalol Injectable 10 milliGRAM(s) IV Push every 8 hours PRN Systolic blood pressure >180  melatonin 3 milliGRAM(s) Oral at bedtime PRN Insomnia      Allergies    amoxicillin (Unknown)    Intolerances        REVIEW OF SYSTEMS:  CONSTITUTIONAL: No fever, weight loss, or fatigue  EYES: No eye pain, visual disturbances, or discharge  ENMT:  No difficulty hearing, tinnitus, vertigo; No sinus or throat pain  NECK: No pain or stiffness  BREASTS: No pain, masses, or nipple discharge  RESPIRATORY: No cough, wheezing, chills or hemoptysis; No shortness of breath  CARDIOVASCULAR: No chest pain, palpitations, dizziness, or leg swelling  GASTROINTESTINAL: No abdominal or epigastric pain. No nausea, vomiting, or hematemesis; No diarrhea or constipation. No melena or hematochezia.  GENITOURINARY: No dysuria, frequency, hematuria, or incontinence  NEUROLOGICAL: No headaches, memory loss, loss of strength, numbness, or tremors  SKIN: No itching, burning, rashes, or lesions     Vital Signs Last 24 Hrs  T(C): 37.3 (25 Sep 2024 05:00), Max: 37.4 (24 Sep 2024 14:26)  T(F): 99.1 (25 Sep 2024 05:00), Max: 99.4 (24 Sep 2024 14:26)  HR: 79 (25 Sep 2024 05:00) (79 - 97)  BP: 127/80 (25 Sep 2024 05:00) (127/80 - 141/85)  BP(mean): --  RR: 17 (25 Sep 2024 05:00) (16 - 17)  SpO2: 95% (25 Sep 2024 05:00) (94% - 95%)    Parameters below as of 25 Sep 2024 05:00  Patient On (Oxygen Delivery Method): room air        PHYSICAL EXAM:  GENERAL: NAD, well-groomed, well-developed  HEAD:  Atraumatic, Normocephalic  EYES: EOMI, PERRLA, conjunctiva and sclera clear  ENMT: Moist mucous membranes,   NECK: Supple, No JVD, Normal thyroid  NERVOUS SYSTEM:  Alert & Oriented X3, Good concentration; Motor Strength 5/5 B/L upper and lower extremities; DTRs 2+ intact and symmetric  CHEST/LUNG: Clear to percussion bilaterally;   HEART: Regular rate and rhythm;   ABDOMEN: Soft, Nontender, Nondistended; Bowel sounds present  EXTREMITIES:  2+ Peripheral Pulses, No clubbing, cyanosis, or edema, foot in dressing  SKIN: No rashes or lesions    LABS:                        9.9    8.15  )-----------( 151      ( 25 Sep 2024 08:01 )             29.7     09-25    137  |  99  |  59[H]  ----------------------------<  211[H]  4.5   |  28  |  3.08[H]    Ca    9.4      25 Sep 2024 08:01  Phos  4.5     09-24    TPro  6.6  /  Alb  2.6[L]  /  TBili  0.8  /  DBili  x   /  AST  35  /  ALT  43  /  AlkPhos  123[H]  09-25      Urinalysis Basic - ( 25 Sep 2024 08:01 )    Color: x / Appearance: x / SG: x / pH: x  Gluc: 211 mg/dL / Ketone: x  / Bili: x / Urobili: x   Blood: x / Protein: x / Nitrite: x   Leuk Esterase: x / RBC: x / WBC x   Sq Epi: x / Non Sq Epi: x / Bacteria: x      CAPILLARY BLOOD GLUCOSE      POCT Blood Glucose.: 206 mg/dL (25 Sep 2024 08:21)  POCT Blood Glucose.: 235 mg/dL (24 Sep 2024 20:59)  POCT Blood Glucose.: 306 mg/dL (24 Sep 2024 16:58)        Culture - Fungal, Tissue (collected 09-20-24 @ 19:21)  Source: Tissue Left foot  Preliminary Report (09-22-24 @ 07:58):    Testing in progress    Culture - Fungal, Tissue (collected 09-20-24 @ 19:21)  Source: Bone Other  Preliminary Report (09-22-24 @ 07:58):    Testing in progress    Culture - Tissue with Gram Stain (collected 09-20-24 @ 19:21)  Source: Bone Other  Gram Stain (09-21-24 @ 06:32):    No polymorphonuclear cells seen per low power field    No organisms seen per oil power field  Preliminary Report (09-22-24 @ 08:47):    No growth to date    Culture - Fungal, Tissue (collected 09-20-24 @ 19:21)  Source: Bone Other  Preliminary Report (09-22-24 @ 07:58):    Testing in progress    Culture - Tissue with Gram Stain (collected 09-20-24 @ 19:21)  Source: Bone Other  Gram Stain (09-22-24 @ 09:37):    No polymorphonuclear cells seen per low power field    No organisms seen per oil power field  Preliminary Report (09-24-24 @ 15:12):    Growth in fluid media only Enterococcus faecalis    Rare Staphylococcus epidermidis  Organism: Enterococcus faecalis (09-24-24 @ 15:11)  Organism: Enterococcus faecalis (09-24-24 @ 15:11)      Method Type: STEPHANIE      -  Ampicillin: S <=2 Predicts results to ampicillin/sulbactam, amoxacillin-clavulanate and  piperacillin-tazobactam.      -  Vancomycin: S 4    Culture - Tissue with Gram Stain (collected 09-20-24 @ 19:21)  Source: Tissue left foot  Gram Stain (09-22-24 @ 09:38):    No polymorphonuclear cells seen per low power field    No organisms seen per oil power field  Preliminary Report (09-23-24 @ 14:56):    Growth in fluid media only Enterococcus faecalis  Organism: Enterococcus faecalis (09-24-24 @ 15:18)  Organism: Enterococcus faecalis (09-24-24 @ 15:18)      Method Type: STEPHANIE      -  Ampicillin: S <=2 Predicts results to ampicillin/sulbactam, amoxacillin-clavulanate and  piperacillin-tazobactam.      -  Vancomycin: S 4    Culture - Fungal, Tissue (collected 09-20-24 @ 19:21)  Source: Tissue  Preliminary Report (09-21-24 @ 07:08):    Testing in progress    Culture - Acid Fast - Tissue w/Smear (collected 09-20-24 @ 19:21)  Source: Tissue Other    Culture - Acid Fast - Tissue w/Smear (collected 09-20-24 @ 19:21)  Source: Tissue Other        I&O's Summary    24 Sep 2024 07:01  -  25 Sep 2024 07:00  --------------------------------------------------------  IN: 870 mL / OUT: 0 mL / NET: 870 mL        RADIOLOGY & ADDITIONAL TESTS:    Imaging Personally Reviewed:  [x ] YES  [ ] NO    Consultant(s) Notes Reviewed:  [x ] YES  [ ] NO    Care Discussed with Consultants/Other Providers [x ] YES  [ ] NO

## 2024-09-25 NOTE — PROGRESS NOTE ADULT - ASSESSMENT
The patient is a 49 year old male with a history of HTN, HL, DM, PAD who presents with a toe infection.    Plan:  - ECG with sinus rhythm and no evidence of ischemia/infarction  - Off of losartan  - Continue amlodipine 10 mg daily  - Continue carvedilol 3.125 mg bid  - LE arterial duplex with no significant stenosis  - MRI foot with acute/chronic osteomyelitis  - Podiatry and ID follow-up

## 2024-09-25 NOTE — PROGRESS NOTE ADULT - PROBLEM SELECTOR PLAN 1
Patient seen and evaluated.  Pt is s/p debridement of all non-viable soft tissue and bone left foot with hallux amputation.  Surgical pathology report pending.  Concern for flap due to  venous congestion. Will continue to monitor closely.   Partial weight bearing on left foot.  Pt instructed to elevate his operative extremity on two pillows.  Pt instructed to limit standing and walking on the operative extremity.  Appreciate Vascular consult.  Continue IV Abx as per ID.  If dressings get wet or fall off, please change with:  1. xeroform  2. gauze  3. abd pad  4. kerlex  5. ace bandage  Will cont to monitor while in house.  Will discuss with all attendings.

## 2024-09-25 NOTE — PROGRESS NOTE ADULT - SUBJECTIVE AND OBJECTIVE BOX
PROGRESS NOTE   Patient is a 49y old  Male who presents with a chief complaint of infected toe (25 Sep 2024 11:56)      HPI:  49-year-old male with past medical history of DM2, HTN, HLD, NEUROPATHY, PVD, NON COMPLIANT WITH TT, ETOH ABUSE, presents today due to left toe infection.  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  Patient experienced a blister to the left toe in which he eventually fell orders follow-up on its own.  Patient notes that the blister opened up and he developed some blood in the blister as well.  Patient notes that the blister scab has slightly fallen off but now his toe is red and painful.  Patient not currently on antibiotics.  Patient denies fever, injury, numbness, weakness, or any other complaints.   IN ED TEMP 99, /100, HR 89, had consult with podiatry and sidney started on Cipro and admitted for further management (17 Sep 2024 19:36)      Vital Signs Last 24 Hrs  T(C): 37.3 (25 Sep 2024 05:00), Max: 37.4 (24 Sep 2024 14:26)  T(F): 99.1 (25 Sep 2024 05:00), Max: 99.4 (24 Sep 2024 14:26)  HR: 79 (25 Sep 2024 05:00) (79 - 97)  BP: 127/80 (25 Sep 2024 05:00) (127/80 - 141/85)  BP(mean): --  RR: 17 (25 Sep 2024 05:00) (16 - 17)  SpO2: 95% (25 Sep 2024 05:00) (94% - 95%)    Parameters below as of 25 Sep 2024 05:00  Patient On (Oxygen Delivery Method): room air                              9.9    8.15  )-----------( 151      ( 25 Sep 2024 08:01 )             29.7               09-25    137  |  99  |  59[H]  ----------------------------<  211[H]  4.5   |  28  |  3.08[H]    Ca    9.4      25 Sep 2024 08:01  Phos  4.5     09-24    TPro  6.6  /  Alb  2.6[L]  /  TBili  0.8  /  DBili  x   /  AST  35  /  ALT  43  /  AlkPhos  123[H]  09-25      PHYSICAL EXAM  GEN: SUZANNA KAISER is a pleasant well-nourished, well developed 49y Male in no acute distress, alert awake, and oriented to person, place and time.   LE Focused:  Vascular: Dorsalis Pedis and Posterior Tibial pulses 2/4.  Capillary re-fill time less then 3 seconds digits 1-5 bilateral.    Neuro: Protective sensation diminished to the level of the digits bilateral.  MSK: Muscle strength 5/5 all major muscle groups bilateral.  Derm: POD#5. Incision site of the Left foot noted with necrotic changes to the distal flap. Dorsal and plantar flap showing signs of  venous congestion.  Guarded prognosis for the flap at this time. Upon elevation there is decreased venous congestion.    PROGRESS NOTE   Patient is a 49y old  Male who presents with a chief complaint of infected toe (25 Sep 2024 11:56)      HPI:  49-year-old male with past medical history of DM2, HTN, HLD, NEUROPATHY, PVD, NON COMPLIANT WITH TT, ETOH ABUSE, presents today due to left toe infection.  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  Patient experienced a blister to the left toe in which he eventually fell orders follow-up on its own.  Patient notes that the blister opened up and he developed some blood in the blister as well.  Patient notes that the blister scab has slightly fallen off but now his toe is red and painful.  Patient not currently on antibiotics.  Patient denies fever, injury, numbness, weakness, or any other complaints.   IN ED TEMP 99, /100, HR 89, had consult with podiatry and sidney started on Cipro and admitted for further management (17 Sep 2024 19:36)      Vital Signs Last 24 Hrs  T(C): 37.3 (25 Sep 2024 05:00), Max: 37.4 (24 Sep 2024 14:26)  T(F): 99.1 (25 Sep 2024 05:00), Max: 99.4 (24 Sep 2024 14:26)  HR: 79 (25 Sep 2024 05:00) (79 - 97)  BP: 127/80 (25 Sep 2024 05:00) (127/80 - 141/85)  BP(mean): --  RR: 17 (25 Sep 2024 05:00) (16 - 17)  SpO2: 95% (25 Sep 2024 05:00) (94% - 95%)    Parameters below as of 25 Sep 2024 05:00  Patient On (Oxygen Delivery Method): room air                              9.9    8.15  )-----------( 151      ( 25 Sep 2024 08:01 )             29.7               09-25    137  |  99  |  59[H]  ----------------------------<  211[H]  4.5   |  28  |  3.08[H]    Ca    9.4      25 Sep 2024 08:01  Phos  4.5     09-24    TPro  6.6  /  Alb  2.6[L]  /  TBili  0.8  /  DBili  x   /  AST  35  /  ALT  43  /  AlkPhos  123[H]  09-25      PHYSICAL EXAM  GEN: SUZANNA KAISER is a pleasant well-nourished, well developed 49y Male in no acute distress, alert awake, and oriented to person, place and time.   LE Focused:  Vascular: Dorsalis Pedis and Posterior Tibial pulses 2/4.  Capillary re-fill time less then 3 seconds digits 1-5 bilateral.    Neuro: Protective sensation diminished to the level of the digits bilateral.  MSK: Muscle strength 5/5 all major muscle groups bilateral.  Derm: POD#5. Incision site of the Left foot noted with necrotic changes to the distal flap. Dorsal and plantar flap showing signs of  venous congestion.  Guarded prognosis for the flap at this time. Upon elevation there is decreased venous congestion.  Will allow the region to cont to demarcate.

## 2024-09-25 NOTE — CASE MANAGEMENT PROGRESS NOTE - NSCMPROGRESSNOTE_GEN_ALL_CORE
RN/CM noted that pt's case discussed during Interdisciplinary rounds, pt remains acute,  S/P 09/20/2024 s/p debridement of all non-viable soft tissue and bone left foot with hallux amputation. AS per ID MD, IV Zosyn continues-OR cultures and path pending. Pt's BUN/CREA trending up. Pt with local wound care- Xeroform being performed by podiatrist from Salvatore Delgado's office. CM discussed with pt about possible LT IV ABT and pt is willing to learn but is really hoping that he would be able to go home on PO ABT, as he is VERY eager to return to work. CM strongly encouraged pt to follow-up with MD prior to returning to work. DC pending hospital course. MSW aware pt requested resources on financial assistance. CM remains available.

## 2024-09-25 NOTE — PROGRESS NOTE ADULT - PROBLEM SELECTOR PLAN 1
increase lantus 25 units qhs  increase admelog 9 units 3x/day before meals  cont mod dose admelog corrective scale coverage qac/qhs  cont cons cho diet  goal bg 100-180 in hosp setting

## 2024-09-25 NOTE — PROGRESS NOTE ADULT - SUBJECTIVE AND OBJECTIVE BOX
Subjective: no complaints. Well hydrates orally. Cr 2.69-->3.06 since yest. BP stable. No difficulty voiding.       MEDICATIONS  (STANDING):  amLODIPine   Tablet 10 milliGRAM(s) Oral daily  carvedilol 3.125 milliGRAM(s) Oral every 12 hours  dextrose 5%. 1000 milliLiter(s) (100 mL/Hr) IV Continuous <Continuous>  dextrose 5%. 1000 milliLiter(s) (50 mL/Hr) IV Continuous <Continuous>  dextrose 50% Injectable 25 Gram(s) IV Push once  dextrose 50% Injectable 12.5 Gram(s) IV Push once  dextrose 50% Injectable 25 Gram(s) IV Push once  enoxaparin Injectable 40 milliGRAM(s) SubCutaneous every 24 hours  gabapentin 100 milliGRAM(s) Oral three times a day  glucagon  Injectable 1 milliGRAM(s) IntraMuscular once  influenza   Vaccine 0.5 milliLiter(s) IntraMuscular once  insulin glargine Injectable (LANTUS) 25 Unit(s) SubCutaneous at bedtime  insulin lispro (ADMELOG) corrective regimen sliding scale   SubCutaneous three times a day before meals  insulin lispro (ADMELOG) corrective regimen sliding scale   SubCutaneous at bedtime  insulin lispro Injectable (ADMELOG) 9 Unit(s) SubCutaneous three times a day before meals  lactobacillus acidophilus 1 Tablet(s) Oral two times a day with meals  mupirocin 2% Ointment 1 Application(s) Topical <User Schedule>  piperacillin/tazobactam IVPB.. 3.375 Gram(s) IV Intermittent every 8 hours    MEDICATIONS  (PRN):  acetaminophen     Tablet .. 650 milliGRAM(s) Oral every 6 hours PRN Temp greater or equal to 38C (100.4F), Mild Pain (1 - 3), Moderate Pain (4 - 6)  dextrose Oral Gel 15 Gram(s) Oral once PRN Blood Glucose LESS THAN 70 milliGRAM(s)/deciliter  labetalol Injectable 10 milliGRAM(s) IV Push every 8 hours PRN Systolic blood pressure >180  melatonin 3 milliGRAM(s) Oral at bedtime PRN Insomnia          T(C): 37.3 (09-25-24 @ 05:00), Max: 37.4 (09-24-24 @ 14:26)  HR: 79 (09-25-24 @ 05:00) (79 - 97)  BP: 127/80 (09-25-24 @ 05:00) (127/80 - 141/85)  RR: 17 (09-25-24 @ 05:00) (16 - 17)  SpO2: 95% (09-25-24 @ 05:00) (94% - 95%)  Wt(kg): --        I&O's Detail    24 Sep 2024 07:01  -  25 Sep 2024 07:00  --------------------------------------------------------  IN:    IV PiggyBack: 150 mL    Oral Fluid: 720 mL  Total IN: 870 mL    OUT:  Total OUT: 0 mL    Total NET: 870 mL               PHYSICAL EXAM:    GENERAL: NAD  NECK: Supple, no inc in JVP  CHEST/LUNG: Clear  HEART: S1S2  ABDOMEN: Soft, Nontender, Nondistended; Bowel sounds present  EXTREMITIES:  L foot dressed. Trace edema of ankle  NEURO: no asterixis      LABS:  CBC Full  -  ( 25 Sep 2024 08:01 )  WBC Count : 8.15 K/uL  RBC Count : 3.33 M/uL  Hemoglobin : 9.9 g/dL  Hematocrit : 29.7 %  Platelet Count - Automated : 151 K/uL  Mean Cell Volume : 89.2 fL  Mean Cell Hemoglobin : 29.7 pg  Mean Cell Hemoglobin Concentration : 33.3 g/dL  Auto Neutrophil # : 5.21 K/uL  Auto Lymphocyte # : 1.52 K/uL  Auto Monocyte # : 1.10 K/uL  Auto Eosinophil # : 0.13 K/uL  Auto Basophil # : 0.12 K/uL  Auto Neutrophil % : 63.8 %  Auto Lymphocyte % : 18.7 %  Auto Monocyte % : 13.5 %  Auto Eosinophil % : 1.6 %  Auto Basophil % : 1.5 %    09-25    137  |  99  |  59[H]  ----------------------------<  211[H]  4.5   |  28  |  3.08[H]    Ca    9.4      25 Sep 2024 08:01  Phos  4.5     09-24    TPro  6.6  /  Alb  2.6[L]  /  TBili  0.8  /  DBili  x   /  AST  35  /  ALT  43  /  AlkPhos  123[H]  09-25        Impression:  LUISA on 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 Unlikely pre-renal. Exclude post-renal component. DDx: AIN, septic ATN  Poorly controlled DM, Hgb A1C >10  Diabetic foot ulcer    Recommendations:   Cont to keep off ARB  Check FeNa  Check bladder scan.   CBC with diff tomorrow.

## 2024-09-26 LAB
ALBUMIN SERPL ELPH-MCNC: 2.5 G/DL — LOW (ref 3.3–5)
ALP SERPL-CCNC: 114 U/L — SIGNIFICANT CHANGE UP (ref 30–120)
ALT FLD-CCNC: 49 U/L — SIGNIFICANT CHANGE UP (ref 10–60)
ANION GAP SERPL CALC-SCNC: 8 MMOL/L — SIGNIFICANT CHANGE UP (ref 5–17)
AST SERPL-CCNC: 37 U/L — SIGNIFICANT CHANGE UP (ref 10–40)
BASOPHILS # BLD AUTO: 0.08 K/UL — SIGNIFICANT CHANGE UP (ref 0–0.2)
BASOPHILS NFR BLD AUTO: 1.1 % — SIGNIFICANT CHANGE UP (ref 0–2)
BILIRUB SERPL-MCNC: 0.7 MG/DL — SIGNIFICANT CHANGE UP (ref 0.2–1.2)
BUN SERPL-MCNC: 61 MG/DL — HIGH (ref 7–23)
CALCIUM SERPL-MCNC: 9.3 MG/DL — SIGNIFICANT CHANGE UP (ref 8.4–10.5)
CHLORIDE SERPL-SCNC: 99 MMOL/L — SIGNIFICANT CHANGE UP (ref 96–108)
CO2 SERPL-SCNC: 27 MMOL/L — SIGNIFICANT CHANGE UP (ref 22–31)
CREAT SERPL-MCNC: 3.01 MG/DL — HIGH (ref 0.5–1.3)
CULTURE RESULTS: ABNORMAL
CULTURE RESULTS: ABNORMAL
CULTURE RESULTS: NO GROWTH — SIGNIFICANT CHANGE UP
EGFR: 25 ML/MIN/1.73M2 — LOW
EOSINOPHIL # BLD AUTO: 0.12 K/UL — SIGNIFICANT CHANGE UP (ref 0–0.5)
EOSINOPHIL NFR BLD AUTO: 1.6 % — SIGNIFICANT CHANGE UP (ref 0–6)
FERRITIN SERPL-MCNC: 406 NG/ML — HIGH (ref 30–400)
FOLATE SERPL-MCNC: 11 NG/ML — SIGNIFICANT CHANGE UP
GLUCOSE BLDC GLUCOMTR-MCNC: 199 MG/DL — HIGH (ref 70–99)
GLUCOSE BLDC GLUCOMTR-MCNC: 228 MG/DL — HIGH (ref 70–99)
GLUCOSE BLDC GLUCOMTR-MCNC: 237 MG/DL — HIGH (ref 70–99)
GLUCOSE BLDC GLUCOMTR-MCNC: 240 MG/DL — HIGH (ref 70–99)
GLUCOSE SERPL-MCNC: 215 MG/DL — HIGH (ref 70–99)
HCT VFR BLD CALC: 28.8 % — LOW (ref 39–50)
HGB BLD-MCNC: 9.7 G/DL — LOW (ref 13–17)
IMM GRANULOCYTES NFR BLD AUTO: 0.9 % — SIGNIFICANT CHANGE UP (ref 0–0.9)
IRON SATN MFR SERPL: 11 % — LOW (ref 16–55)
IRON SATN MFR SERPL: 31 UG/DL — LOW (ref 45–165)
LYMPHOCYTES # BLD AUTO: 1.35 K/UL — SIGNIFICANT CHANGE UP (ref 1–3.3)
LYMPHOCYTES # BLD AUTO: 18.2 % — SIGNIFICANT CHANGE UP (ref 13–44)
MCHC RBC-ENTMCNC: 29.9 PG — SIGNIFICANT CHANGE UP (ref 27–34)
MCHC RBC-ENTMCNC: 33.7 G/DL — SIGNIFICANT CHANGE UP (ref 32–36)
MCV RBC AUTO: 88.9 FL — SIGNIFICANT CHANGE UP (ref 80–100)
MONOCYTES # BLD AUTO: 1.04 K/UL — HIGH (ref 0–0.9)
MONOCYTES NFR BLD AUTO: 14 % — SIGNIFICANT CHANGE UP (ref 2–14)
NEUTROPHILS # BLD AUTO: 4.76 K/UL — SIGNIFICANT CHANGE UP (ref 1.8–7.4)
NEUTROPHILS NFR BLD AUTO: 64.2 % — SIGNIFICANT CHANGE UP (ref 43–77)
NRBC # BLD: 0 /100 WBCS — SIGNIFICANT CHANGE UP (ref 0–0)
ORGANISM # SPEC MICROSCOPIC CNT: ABNORMAL
PLATELET # BLD AUTO: 156 K/UL — SIGNIFICANT CHANGE UP (ref 150–400)
POTASSIUM SERPL-MCNC: 4.6 MMOL/L — SIGNIFICANT CHANGE UP (ref 3.5–5.3)
POTASSIUM SERPL-SCNC: 4.6 MMOL/L — SIGNIFICANT CHANGE UP (ref 3.5–5.3)
PROT SERPL-MCNC: 6.3 G/DL — SIGNIFICANT CHANGE UP (ref 6–8.3)
RBC # BLD: 3.24 M/UL — LOW (ref 4.2–5.8)
RBC # FLD: 12.2 % — SIGNIFICANT CHANGE UP (ref 10.3–14.5)
SODIUM SERPL-SCNC: 134 MMOL/L — LOW (ref 135–145)
SPECIMEN SOURCE: SIGNIFICANT CHANGE UP
TIBC SERPL-MCNC: 281 UG/DL — SIGNIFICANT CHANGE UP (ref 220–430)
UIBC SERPL-MCNC: 251 UG/DL — SIGNIFICANT CHANGE UP (ref 110–370)
VIT B12 SERPL-MCNC: 809 PG/ML — SIGNIFICANT CHANGE UP (ref 232–1245)
WBC # BLD: 7.42 K/UL — SIGNIFICANT CHANGE UP (ref 3.8–10.5)
WBC # FLD AUTO: 7.42 K/UL — SIGNIFICANT CHANGE UP (ref 3.8–10.5)

## 2024-09-26 RX ORDER — INSULIN GLARGINE 300 U/ML
30 INJECTION, SOLUTION SUBCUTANEOUS AT BEDTIME
Refills: 0 | Status: DISCONTINUED | OUTPATIENT
Start: 2024-09-26 | End: 2024-09-29

## 2024-09-26 RX ORDER — CARVEDILOL 3.125 MG
6.25 TABLET ORAL EVERY 12 HOURS
Refills: 0 | Status: DISCONTINUED | OUTPATIENT
Start: 2024-09-26 | End: 2024-10-04

## 2024-09-26 RX ORDER — INSULIN LISPRO 100/ML
10 VIAL (ML) SUBCUTANEOUS
Refills: 0 | Status: DISCONTINUED | OUTPATIENT
Start: 2024-09-26 | End: 2024-09-29

## 2024-09-26 RX ADMIN — GABAPENTIN 100 MILLIGRAM(S): 800 TABLET, FILM COATED ORAL at 05:17

## 2024-09-26 RX ADMIN — Medication 3.12 MILLIGRAM(S): at 05:17

## 2024-09-26 RX ADMIN — Medication 9 UNIT(S): at 08:16

## 2024-09-26 RX ADMIN — PIPERACILLIN SODIUM AND TAZOBACTAM SODIUM 25 GRAM(S): 12; 1.5 INJECTION, POWDER, LYOPHILIZED, FOR SOLUTION INTRAVENOUS at 21:46

## 2024-09-26 RX ADMIN — PIPERACILLIN SODIUM AND TAZOBACTAM SODIUM 25 GRAM(S): 12; 1.5 INJECTION, POWDER, LYOPHILIZED, FOR SOLUTION INTRAVENOUS at 05:17

## 2024-09-26 RX ADMIN — Medication 3 MILLIGRAM(S): at 22:46

## 2024-09-26 RX ADMIN — Medication 10 MILLIGRAM(S): at 05:17

## 2024-09-26 RX ADMIN — Medication 1 TABLET(S): at 17:17

## 2024-09-26 RX ADMIN — INSULIN GLARGINE 30 UNIT(S): 300 INJECTION, SOLUTION SUBCUTANEOUS at 21:46

## 2024-09-26 RX ADMIN — MUPIROCIN 1 APPLICATION(S): 20 OINTMENT TOPICAL at 13:09

## 2024-09-26 RX ADMIN — GABAPENTIN 100 MILLIGRAM(S): 800 TABLET, FILM COATED ORAL at 13:08

## 2024-09-26 RX ADMIN — Medication 650 MILLIGRAM(S): at 20:39

## 2024-09-26 RX ADMIN — Medication 2: at 17:18

## 2024-09-26 RX ADMIN — Medication 6.25 MILLIGRAM(S): at 17:17

## 2024-09-26 RX ADMIN — Medication 4: at 12:20

## 2024-09-26 RX ADMIN — Medication 650 MILLIGRAM(S): at 13:58

## 2024-09-26 RX ADMIN — Medication 4: at 08:15

## 2024-09-26 RX ADMIN — Medication 10 UNIT(S): at 17:19

## 2024-09-26 RX ADMIN — Medication 9 UNIT(S): at 12:20

## 2024-09-26 RX ADMIN — GABAPENTIN 100 MILLIGRAM(S): 800 TABLET, FILM COATED ORAL at 21:46

## 2024-09-26 RX ADMIN — Medication 650 MILLIGRAM(S): at 15:00

## 2024-09-26 RX ADMIN — Medication 1 TABLET(S): at 08:14

## 2024-09-26 RX ADMIN — PIPERACILLIN SODIUM AND TAZOBACTAM SODIUM 25 GRAM(S): 12; 1.5 INJECTION, POWDER, LYOPHILIZED, FOR SOLUTION INTRAVENOUS at 13:08

## 2024-09-26 RX ADMIN — Medication 650 MILLIGRAM(S): at 20:09

## 2024-09-26 NOTE — CASE MANAGEMENT PROGRESS NOTE - NSCMPROGRESSNOTE_GEN_ALL_CORE
RN/CM noted that pt's case discussed during Interdisciplinary rounds, pt remains acute,  S/P 09/20/2024 s/p debridement of all non-viable soft tissue and bone left foot with hallux amputation. AS per ID MD, IV Zosyn continues-OR cultures and path pending. Pt's BUN/CREA still elevated. Pt with local wound care- Xeroform being performed by podiatrist from [facility]atore Delgado's office. AS per podiatrist, might need  revision of amputation site due to wound deterioration. CM discussed with pt about possible LT IV ABT and pt is willing to learn but is really hoping that he would be able to go home on PO ABT, as he is VERY eager to return to work. CM strongly encouraged pt to follow-up with MD prior to returning to work. DC pending hospital course. CM remains available.

## 2024-09-26 NOTE — PROGRESS NOTE ADULT - SUBJECTIVE AND OBJECTIVE BOX
SUZANNA KAISER is a 49yMale , patient examined and chart reviewed.    INTERVAL HPI/ OVERNIGHT EVENTS:   Tmax 100.5 NAD.      PAST MEDICAL & SURGICAL HISTORY:  Prediabetes  HTN (hypertension)  HLD (hyperlipidemia)  DM2 (diabetes mellitus, type 2)      For details regarding the patient's social history, family history, and other miscellaneous elements, please refer the initial infectious diseases consultation and/or the admitting history and physical examination for this admission.    ROS:  CONSTITUTIONAL:  + fever no chills  EYES:  Negative  blurry vision or double vision  CARDIOVASCULAR:  Negative for chest pain or palpitations  RESPIRATORY:  Negative for cough, wheezing, or SOB   GASTROINTESTINAL:  Negative for nausea, vomiting, diarrhea, constipation, or abdominal pain  GENITOURINARY:  Negative frequency, urgency or dysuria  NEUROLOGIC:  No headache, confusion, dizziness, lightheadedness  All other systems were reviewed and are negative     ALLERGIES  amoxicillin (Unknown)      Current inpatient medications :    ANTIBIOTICS/RELEVANT:  piperacillin/tazobactam IVPB.. 3.375 Gram(s) IV Intermittent every 8 hours    MEDICATIONS  (STANDING):  amLODIPine   Tablet 10 milliGRAM(s) Oral daily  carvedilol 6.25 milliGRAM(s) Oral every 12 hours  dextrose 5%. 1000 milliLiter(s) (50 mL/Hr) IV Continuous <Continuous>  dextrose 5%. 1000 milliLiter(s) (100 mL/Hr) IV Continuous <Continuous>  dextrose 50% Injectable 12.5 Gram(s) IV Push once  dextrose 50% Injectable 25 Gram(s) IV Push once  dextrose 50% Injectable 25 Gram(s) IV Push once  enoxaparin Injectable 40 milliGRAM(s) SubCutaneous every 24 hours  gabapentin 100 milliGRAM(s) Oral three times a day  glucagon  Injectable 1 milliGRAM(s) IntraMuscular once  influenza   Vaccine 0.5 milliLiter(s) IntraMuscular once  insulin glargine Injectable (LANTUS) 30 Unit(s) SubCutaneous at bedtime  insulin lispro (ADMELOG) corrective regimen sliding scale   SubCutaneous three times a day before meals  insulin lispro (ADMELOG) corrective regimen sliding scale   SubCutaneous at bedtime  insulin lispro Injectable (ADMELOG) 10 Unit(s) SubCutaneous three times a day before meals  lactobacillus acidophilus 1 Tablet(s) Oral two times a day with meals  mupirocin 2% Ointment 1 Application(s) Topical <User Schedule>    MEDICATIONS  (PRN):  acetaminophen     Tablet .. 650 milliGRAM(s) Oral every 6 hours PRN Temp greater or equal to 38C (100.4F), Mild Pain (1 - 3), Moderate Pain (4 - 6)  dextrose Oral Gel 15 Gram(s) Oral once PRN Blood Glucose LESS THAN 70 milliGRAM(s)/deciliter  labetalol Injectable 10 milliGRAM(s) IV Push every 8 hours PRN Systolic blood pressure >180  melatonin 3 milliGRAM(s) Oral at bedtime PRN Insomnia    Objective:  Vital Signs Last 24 Hrs  T(C): 38.1 (26 Sep 2024 13:52), Max: 38.1 (26 Sep 2024 13:52)  T(F): 100.5 (26 Sep 2024 13:52), Max: 100.5 (26 Sep 2024 13:52)  HR: 91 (26 Sep 2024 13:52) (85 - 91)  BP: 125/77 (26 Sep 2024 13:52) (125/77 - 170/99)  RR: 18 (26 Sep 2024 13:52) (18 - 19)  SpO2: 93% (26 Sep 2024 13:52) (93% - 100%)    Parameters below as of 26 Sep 2024 13:52  Patient On (Oxygen Delivery Method): room air      Physical Exam:  General:  no acute distress  Neck: supple, trachea midline  Lungs: clear, no wheeze/rhonchi  Cardiovascular: regular rate and rhythm, S1 S2  Abdomen: soft, nontender,  bowel sounds normal  Neurological: alert and oriented x3  Skin: no rash  Extremities: Left foot amputation stump with some necrotic changes distal flap. Dorsal and plantar flap showing signs of  venous congestion.      LABS:                        9.9    8.15  )-----------( 151      ( 25 Sep 2024 08:01 )             29.7   09-25    137  |  99  |  59[H]  ----------------------------<  211[H]  4.5   |  28  |  3.08[H]    Ca    9.4      25 Sep 2024 08:01  Phos  4.5     09-24    TPro  6.6  /  Alb  2.6[L]  /  TBili  0.8  /  DBili  x   /  AST  35  /  ALT  43  /  AlkPhos  123[H]  09-25      MICROBIOLOGY:  Culture - Tissue with Gram Stain (collected 20 Sep 2024 19:21)  Source: Bone Other  Gram Stain (21 Sep 2024 06:32):    No polymorphonuclear cells seen per low power field    No organisms seen per oil power field  Preliminary Report (22 Sep 2024 08:47):    No growth to date    Culture - Fungal, Tissue (collected 20 Sep 2024 19:21)  Source: Bone Other  Preliminary Report (22 Sep 2024 07:58):    Testing in progress    Culture - Tissue with Gram Stain (09.20.24 @ 19:21)    Gram Stain:   No polymorphonuclear cells seen per low power field  No organisms seen per oil power field   -  Vancomycin: S 4   -  Ampicillin: S <=2 Predicts results to ampicillin/sulbactam, amoxacillin-clavulanate and  piperacillin-tazobactam.   Specimen Source: Bone Other   Culture Results:   Growth in fluid media only Enterococcus faecalis  Rare Staphylococcus epidermidis   Organism Identification: Enterococcus faecalis   Organism: Enterococcus faecalis   Method Type: STEPHANIE      Culture - Other (09.17.24 @ 19:10)    -  Amoxicillin/Clavulanic Acid: R >16/8   -  Ampicillin: R 16 These ampicillin results predict results for amoxicillin   -  Ampicillin: S <=2 Predicts results to ampicillin/sulbactam, amoxacillin-clavulanate and  piperacillin-tazobactam.   -  Ampicillin/Sulbactam: R 8/4   -  Aztreonam: S <=4   -  Cefazolin: R >16   -  Cefepime: S <=2   -  Cefoxitin: R >16   -  Ceftriaxone: S <=1 Enterobacter cloacae, Klebsiella aerogenes, and Citrobacter freundii may develop resistance during prolonged therapy.   -  Ciprofloxacin: S <=0.25   -  Ertapenem: S <=0.5   -  Gentamicin: S <=2   -  Imipenem: S <=1   -  Levofloxacin: S <=0.5   -  Meropenem: S <=1   -  Piperacillin/Tazobactam: S <=8 Treatment with Pipercillin/Tazobactam is not recommended in severe infections casued by Klebsiella aerogenes, Enterobacter cloacae complex, and Citrobacter freundii complex.   -  Tobramycin: S <=2   -  Trimethoprim/Sulfamethoxazole: S <=0.5/9.5   -  Vancomycin: S 2   Specimen Source: Wound Wound   Culture Results:   Numerous Enterobacter cloacae complex  Numerous Enterococcus faecalis  Few Coag Negative Staphylococcus "Susceptibilities not performed"   Organism Identification: Enterobacter cloacae complex  Enterococcus faecalis   Organism: Enterobacter cloacae complex   Organism: Enterococcus faecalis   Method Type: STEPHANIE   Method Type: STEPHANIE    Culture - Blood (collected 17 Sep 2024 18:13)  Source: .Blood Blood-Peripheral  Preliminary Report (19 Sep 2024 01:01):    No growth at 24 hours    Culture - Blood (collected 17 Sep 2024 18:13)  Source: .Blood Blood-Peripheral  Preliminary Report (19 Sep 2024 01:01):    No growth at 24 hours    RADIOLOGY & ADDITIONAL STUDIES:    ACC: [id number] EXAM:  MR FOOT LT   ORDERED BY: LISA ALY     PROCEDURE DATE:  09/19/2024          INTERPRETATION:  LEFT FOOT MRI    CLINICAL INDICATION: Diabetic foot ulcer, for evaluation of osteomyelitis.    COMPARISON: Radiographs dated 9/17/2024.    TECHNIQUE: Multiplanar, multisequence MRI was obtained of the left foot.    FINDINGS:    OSSEOUS: Acute on chronic osteomyelitis/septic arthritis at the proximal   interphalangeal joint of the second toe in the proper clinical setting.   Focal acute osteomyelitis along the plantar lateral cortex of the great   toe distal phalanx base with ill-defined overlying deep soft tissue   ulceration. Lisfranc interval is not widened on this non-weightbearing   examination.    TENDONS: Visualized flexor and extensor tendons are intact.    LIGAMENTS: Lisfranc ligament is intact.    GENERAL: No drainable encapsulated fluid collection. No significant   intermetatarsal bursal fluid distension. No interdigital neuroma. Mild to   moderate heterogeneous chronic muscular atrophy with superimposed patchy   denervation edema like signal.    IMPRESSION:    1.  Acute on chronic osteomyelitis/septic arthritis at the proximal   interphalangeal joint of left second toe in the proper clinical setting.  2.Focal acute osteomyelitis along the plantar lateral cortex of left   great toe distal phalanx base with overlying deep soft tissue ulceration.    Assessment :  50YO M PMH DM2, HTN, HLD, NEUROPATHY, PVD, NON COMPLIANT WITH TT, ETOH ABUSE, admitted with infected DM left great toe infection  MRI + OM  Sp Left great toe amputation 9/20  OR bone cultures noted with E Faecalis Enterobacter  Left foot amputation stump with some necrotic changes distal flap. Dorsal and plantar flap showing signs of  venous congestion.  Path with acute on chronic OM on prox clean margins  Tmax 100.5 today    Plan :   Will likely need revision fo amputation site  Cont Zosyn  Vascular on case  Dm control  Trend temps and cbc  Pulm toileting  Stable from ID standpoint    D/w Podiatry    I will be away 9/27/24- 9/30/24. [facility] ID Dr Giang will be covering.      Continue with present regiment.  Appropriate use of antibiotics and adverse effects reviewed.      > 35 minutes were spent in direct patient care reviewing notes, medications ,labs data/ imaging , discussion with multidisciplinary team.    Thank you for allowing me to participate in care of your patient .    Linsey Mack MD  Infectious Disease  [phone]

## 2024-09-26 NOTE — PROGRESS NOTE ADULT - SUBJECTIVE AND OBJECTIVE BOX
Chief Complaint: Foot wound    Interval Events: No events overnight.    Review of Systems:  General: No fevers, chills, weight gain  Skin: No rashes, color changes  Cardiovascular: No chest pain, orthopnea  Respiratory: No shortness of breath, cough  Gastrointestinal: No nausea, abdominal pain  Genitourinary: No incontinence, pain with urination  Musculoskeletal: No pain, swelling, decreased range of motion  Neurological: No headache, weakness  Psychiatric: No depression, anxiety  Endocrine: No weight gain, increased thirst  All other systems are comprehensively negative.    Physical Exam:  Vital Signs Last 24 Hrs  T(C): 36.3 (26 Sep 2024 04:30), Max: 37.2 (25 Sep 2024 13:01)  T(F): 97.4 (26 Sep 2024 04:30), Max: 99 (25 Sep 2024 13:01)  HR: 85 (26 Sep 2024 04:30) (85 - 87)  BP: 170/99 (26 Sep 2024 04:30) (147/87 - 170/99)  BP(mean): --  RR: 19 (26 Sep 2024 04:30) (18 - 19)  SpO2: 100% (26 Sep 2024 04:30) (96% - 100%)  Parameters below as of 26 Sep 2024 04:30  Patient On (Oxygen Delivery Method): room air  General: NAD  HEENT: MMM  Neck: No JVD, no carotid bruit  Lungs: CTAB  CV: RRR, nl S1/S2, no M/R/G  Abdomen: S/NT/ND, +BS  Extremities: No LE edema, no cyanosis  Neuro: AAOx3, non-focal  Skin: No rash    Labs:    09-26    134[L]  |  99  |  61[H]  ----------------------------<  215[H]  4.6   |  27  |  3.01[H]    Ca    9.3      26 Sep 2024 07:52    TPro  6.3  /  Alb  2.5[L]  /  TBili  0.7  /  DBili  x   /  AST  37  /  ALT  49  /  AlkPhos  114  09-26                        9.7    7.42  )-----------( 156      ( 26 Sep 2024 07:52 )             28.8       ECG/Telemetry: Sinus rhythm

## 2024-09-26 NOTE — PROGRESS NOTE ADULT - SUBJECTIVE AND OBJECTIVE BOX
PROGRESS NOTE   Patient is a 49y old  Male who presents with a chief complaint of infected toe (26 Sep 2024 12:02)      HPI:  49-year-old male with past medical history of DM2, HTN, HLD, NEUROPATHY, PVD, NON COMPLIANT WITH TT, ETOH ABUSE, presents today due to left toe infection.  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  Patient experienced a blister to the left toe in which he eventually fell orders follow-up on its own.  Patient notes that the blister opened up and he developed some blood in the blister as well.  Patient notes that the blister scab has slightly fallen off but now his toe is red and painful.  Patient not currently on antibiotics.  Patient denies fever, injury, numbness, weakness, or any other complaints.   IN ED TEMP 99, /100, HR 89, had consult with podiatry and sidney started on Cipro and admitted for further management (17 Sep 2024 19:36)      Vital Signs Last 24 Hrs  T(C): 38.1 (26 Sep 2024 13:52), Max: 38.1 (26 Sep 2024 13:52)  T(F): 100.5 (26 Sep 2024 13:52), Max: 100.5 (26 Sep 2024 13:52)  HR: 91 (26 Sep 2024 13:52) (85 - 91)  BP: 125/77 (26 Sep 2024 13:52) (125/77 - 170/99)  BP(mean): --  RR: 18 (26 Sep 2024 13:52) (18 - 19)  SpO2: 93% (26 Sep 2024 13:52) (93% - 100%)    Parameters below as of 26 Sep 2024 13:52  Patient On (Oxygen Delivery Method): room air                              9.7    7.42  )-----------( 156      ( 26 Sep 2024 07:52 )             28.8               09-26    134[L]  |  99  |  61[H]  ----------------------------<  215[H]  4.6   |  27  |  3.01[H]    Ca    9.3      26 Sep 2024 07:52    TPro  6.3  /  Alb  2.5[L]  /  TBili  0.7  /  DBili  x   /  AST  37  /  ALT  49  /  AlkPhos  114  09-26      PHYSICAL EXAM  GEN: SUZANNA KAISER is a pleasant well-nourished, well developed 49y Male in no acute distress, alert awake, and oriented to person, place and time.   LE Focused:  Vascular: Dorsalis Pedis and Posterior Tibial pulses 2/4.  Capillary re-fill time less then 3 seconds digits 1-5 bilateral.    Neuro: Protective sensation diminished to the level of the digits bilateral.  MSK: Muscle strength 5/5 all major muscle groups bilateral.  Derm: POD#6. Incision site of the Left foot noted with necrotic changes to the distal flap. Dorsal and plantar flap showing signs of  venous congestion.  Guarded prognosis for the flap at this time. Upon elevation there is decreased venous congestion.  Will allow the region to cont to demarcate.

## 2024-09-26 NOTE — PROVIDER CONTACT NOTE (OTHER) - ASSESSMENT
No acute distress, pain, or SOB noted on room air. All other VSS. Tylenol 650mg PRN administered per order.

## 2024-09-26 NOTE — PROGRESS NOTE ADULT - ASSESSMENT
The patient is a 49 year old male with a history of HTN, HL, DM, PAD who presents with a toe infection.    Plan:  - ECG with sinus rhythm and no evidence of ischemia/infarction  - Off of losartan  - Continue amlodipine 10 mg daily  - Increase to carvedilol 6.25 mg bid  - LE arterial duplex with no significant stenosis  - MRI foot with acute/chronic osteomyelitis  - Path margins with +osteomyelitis  - Podiatry and ID follow-up

## 2024-09-26 NOTE — PROGRESS NOTE ADULT - SUBJECTIVE AND OBJECTIVE BOX
Patient is a 49y old  Male who presents with a chief complaint of infected toe (25 Sep 2024 13:37)      INTERVAL HPI/OVERNIGHT EVENTS:overnight events noted    Home Medications:  non compliant with meds for months:  (17 Sep 2024 19:54)      MEDICATIONS  (STANDING):  amLODIPine   Tablet 10 milliGRAM(s) Oral daily  carvedilol 6.25 milliGRAM(s) Oral every 12 hours  dextrose 5%. 1000 milliLiter(s) (100 mL/Hr) IV Continuous <Continuous>  dextrose 5%. 1000 milliLiter(s) (50 mL/Hr) IV Continuous <Continuous>  dextrose 50% Injectable 25 Gram(s) IV Push once  dextrose 50% Injectable 12.5 Gram(s) IV Push once  dextrose 50% Injectable 25 Gram(s) IV Push once  enoxaparin Injectable 40 milliGRAM(s) SubCutaneous every 24 hours  gabapentin 100 milliGRAM(s) Oral three times a day  glucagon  Injectable 1 milliGRAM(s) IntraMuscular once  influenza   Vaccine 0.5 milliLiter(s) IntraMuscular once  insulin glargine Injectable (LANTUS) 25 Unit(s) SubCutaneous at bedtime  insulin lispro (ADMELOG) corrective regimen sliding scale   SubCutaneous three times a day before meals  insulin lispro (ADMELOG) corrective regimen sliding scale   SubCutaneous at bedtime  insulin lispro Injectable (ADMELOG) 9 Unit(s) SubCutaneous three times a day before meals  lactobacillus acidophilus 1 Tablet(s) Oral two times a day with meals  mupirocin 2% Ointment 1 Application(s) Topical <User Schedule>  piperacillin/tazobactam IVPB.. 3.375 Gram(s) IV Intermittent every 8 hours    MEDICATIONS  (PRN):  acetaminophen     Tablet .. 650 milliGRAM(s) Oral every 6 hours PRN Temp greater or equal to 38C (100.4F), Mild Pain (1 - 3), Moderate Pain (4 - 6)  dextrose Oral Gel 15 Gram(s) Oral once PRN Blood Glucose LESS THAN 70 milliGRAM(s)/deciliter  labetalol Injectable 10 milliGRAM(s) IV Push every 8 hours PRN Systolic blood pressure >180  melatonin 3 milliGRAM(s) Oral at bedtime PRN Insomnia      Allergies    amoxicillin (Unknown)    Intolerances        REVIEW OF SYSTEMS:  CONSTITUTIONAL: No fever, weight loss, or fatigue  EYES: No eye pain, visual disturbances, or discharge  ENMT:  No difficulty hearing, tinnitus, vertigo; No sinus or throat pain  NECK: No pain or stiffness  BREASTS: No pain, masses, or nipple discharge  RESPIRATORY: No cough, wheezing, chills or hemoptysis; No shortness of breath  CARDIOVASCULAR: No chest pain, palpitations, dizziness, or leg swelling  GASTROINTESTINAL: No abdominal or epigastric pain. No nausea, vomiting, or hematemesis; No diarrhea or constipation. No melena or hematochezia.  GENITOURINARY: No dysuria, frequency, hematuria, or incontinence  NEUROLOGICAL: No headaches, memory loss, loss of strength, numbness, or tremors  SKIN: No itching, burning, rashes, or lesions   MUSCULOSKELETAL: No joint pain or swelling; No muscle, back, or extremity pain  Vital Signs Last 24 Hrs  T(C): 36.3 (26 Sep 2024 04:30), Max: 37.2 (25 Sep 2024 13:01)  T(F): 97.4 (26 Sep 2024 04:30), Max: 99 (25 Sep 2024 13:01)  HR: 85 (26 Sep 2024 04:30) (85 - 87)  BP: 170/99 (26 Sep 2024 04:30) (147/87 - 170/99)  BP(mean): --  RR: 19 (26 Sep 2024 04:30) (18 - 19)  SpO2: 100% (26 Sep 2024 04:30) (96% - 100%)    Parameters below as of 26 Sep 2024 04:30  Patient On (Oxygen Delivery Method): room air        PHYSICAL EXAM:  GENERAL: NAD, well-groomed, well-developed  HEAD:  Atraumatic, Normocephalic  EYES: EOMI, PERRLA, conjunctiva and sclera clear  ENMT: Moist mucous membranes,   NECK: Supple, No JVD, Normal thyroid  NERVOUS SYSTEM:  Alert & Oriented X3, Good concentration; Motor Strength 5/5 B/L upper and lower extremities; DTRs 2+ intact and symmetric  CHEST/LUNG: Clear to percussion bilaterally;   HEART: Regular rate and rhythm;   ABDOMEN: Soft, Nontender, Nondistended; Bowel sounds present  EXTREMITIES:  2+ Peripheral Pulses,   SKIN: No rashes or lesions    LABS:                        9.7    7.42  )-----------( 156      ( 26 Sep 2024 07:52 )             28.8     09-26    134[L]  |  99  |  61[H]  ----------------------------<  215[H]  4.6   |  27  |  3.01[H]    Ca    9.3      26 Sep 2024 07:52    TPro  6.3  /  Alb  2.5[L]  /  TBili  0.7  /  DBili  x   /  AST  37  /  ALT  49  /  AlkPhos  114  09-26      Urinalysis Basic - ( 26 Sep 2024 07:52 )    Color: x / Appearance: x / SG: x / pH: x  Gluc: 215 mg/dL / Ketone: x  / Bili: x / Urobili: x   Blood: x / Protein: x / Nitrite: x   Leuk Esterase: x / RBC: x / WBC x   Sq Epi: x / Non Sq Epi: x / Bacteria: x      CAPILLARY BLOOD GLUCOSE      POCT Blood Glucose.: 228 mg/dL (26 Sep 2024 07:51)  POCT Blood Glucose.: 305 mg/dL (25 Sep 2024 20:55)  POCT Blood Glucose.: 263 mg/dL (25 Sep 2024 17:06)  POCT Blood Glucose.: 291 mg/dL (25 Sep 2024 12:15)        Culture - Fungal, Tissue (collected 09-20-24 @ 19:21)  Source: Bone Other  Preliminary Report (09-25-24 @ 23:02):    Culture is being performed. Fungal cultures are held for 4 weeks.    Culture - Fungal, Tissue (collected 09-20-24 @ 19:21)  Source: Bone Other  Preliminary Report (09-25-24 @ 23:02):    Culture is being performed. Fungal cultures are held for 4 weeks.    Culture - Fungal, Tissue (collected 09-20-24 @ 19:21)  Source: Tissue Left foot  Preliminary Report (09-25-24 @ 23:02):    Culture is being performed. Fungal cultures are held for 4 weeks.    Culture - Acid Fast - Tissue w/Smear (collected 09-20-24 @ 19:21)  Source: Tissue Other  Preliminary Report (09-25-24 @ 23:06):    Culture is being performed.    Culture - Acid Fast - Tissue w/Smear (collected 09-20-24 @ 19:21)  Source: Tissue Other  Preliminary Report (09-25-24 @ 23:06):    Culture is being performed.    Culture - Fungal, Tissue (collected 09-20-24 @ 19:21)  Source: Tissue  Preliminary Report (09-25-24 @ 23:02):    Culture is being performed. Fungal cultures are held for 4 weeks.    Culture - Tissue with Gram Stain (collected 09-20-24 @ 19:21)  Source: Bone Other  Gram Stain (09-22-24 @ 09:37):    No polymorphonuclear cells seen per low power field    No organisms seen per oil power field  Final Report (09-26-24 @ 09:32):    Growth in fluid media only Enterococcus faecalis    Rare Staphylococcus epidermidis  Organism: Enterococcus faecalis  Staphylococcus epidermidis (09-26-24 @ 09:32)  Organism: Staphylococcus epidermidis (09-26-24 @ 09:32)      -  Clindamycin: R >4      -  Oxacillin: R >2      -  Gentamicin: S <=1 Should not be used as monotherapy      -  Vancomycin: S 1      -  Tetracycline: S 2      Method Type: STEPHANIE      -  Penicillin: R >8      -  Rifampin: S <=1 Should not be used as monotherapy      -  Erythromycin: R >4      -  Trimethoprim/Sulfamethoxazole: R >2/38  Organism: Enterococcus faecalis (09-26-24 @ 09:32)      -  Vancomycin: S 4      -  Ampicillin: S <=2 Predicts results to ampicillin/sulbactam, amoxacillin-clavulanate and  piperacillin-tazobactam.      Method Type: STEPHANIE    Culture - Tissue with Gram Stain (collected 09-20-24 @ 19:21)  Source: Bone Other  Gram Stain (09-21-24 @ 06:32):    No polymorphonuclear cells seen per low power field    No organisms seen per oil power field  Final Report (09-26-24 @ 09:24):    No growth    Culture - Tissue with Gram Stain (collected 09-20-24 @ 19:21)  Source: Tissue left foot  Gram Stain (09-22-24 @ 09:38):    No polymorphonuclear cells seen per low power field    No organisms seen per oil power field  Final Report (09-26-24 @ 09:21):    Growth in fluid media only Enterococcus faecalis  Organism: Enterococcus faecalis (09-26-24 @ 09:21)  Organism: Enterococcus faecalis (09-26-24 @ 09:21)      -  Vancomycin: S 4      -  Ampicillin: S <=2 Predicts results to ampicillin/sulbactam, amoxacillin-clavulanate and  piperacillin-tazobactam.      Method Type: STEPHANIE        I&O's Summary    25 Sep 2024 07:01  -  26 Sep 2024 07:00  --------------------------------------------------------  IN: 100 mL / OUT: 1160 mL / NET: -1060 mL        RADIOLOGY & ADDITIONAL TESTS:    Imaging Personally Reviewed:  [x ] YES  [ ] NO    Consultant(s) Notes Reviewed:  [x ] YES  [ ] NO    Care Discussed with Consultants/Other Providers [ ] YES  [ ] NO

## 2024-09-26 NOTE — PROVIDER CONTACT NOTE (OTHER) - ACTION/TREATMENT ORDERED:
Per Dr. Weaver, reassess temperature in one hour. Podiatry following, patient may need second procedure.

## 2024-09-26 NOTE — PROGRESS NOTE ADULT - ASSESSMENT
49-year-old male with past medical history of DM2, HTN, HLD, NEUROPATHY, PVD, NON COMPLIANT WITH TT, ETOH ABUSE, presents today due to left toe infection.  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  Patient experienced a blister to the left toe in which he eventually fell orders follow-up on its own.  Patient notes that the blister opened up and he developed some blood in the blister as well.  Patient notes that the blister scab has slightly fallen off but now his toe is red and painful.  Patient not currently on antibiotics.  Patient denies fever, injury, numbness, weakness, or any other complaints.   IN ED TEMP 99, /100, HR 89, had consult with podiatry and sidney started on Cipro and admitted for further management    # Diabetic foot ulcer with cellullitis(left halluxdiabetic ulcer 3x4x0.1 cm with erythema and edema mal odorous , hyperkeratotic  started on ABX, , R/o PVD- arterial doppler- negative for any arterial disease   MR foot done- ac on ch OM with sepstic arthritis of 2ndleft toe, OM left great toe-  surg done by podiatry,9/20  culture of wound E fecalis  pt on zosyn    abx as per ID  vascular consult noted - no need for any vascular procedure   podiatry f up ,     # DM2 with hyperglycemia, with neuropathy , nephropathy with likely PVD    ssi, consistent carb, Endo consult,  hba1c 10.2   Endo consult noted , started on lantus  # Ess HTN   amlodipine 5 mg, off losartan     DVT prophylaxis  ID , endo and podiatry consult  # LUISA with CKD 4   nephro consult,noted  likely Diabetic nephropathy with septic ATN  Hold ARB in short term and titrate other anti hypertensives as needed  # HLD   low fat diet  # DVT prophylaxis

## 2024-09-26 NOTE — PROGRESS NOTE ADULT - SUBJECTIVE AND OBJECTIVE BOX
Subjective: no complaints.       MEDICATIONS  (STANDING):  amLODIPine   Tablet 10 milliGRAM(s) Oral daily  carvedilol 6.25 milliGRAM(s) Oral every 12 hours  dextrose 5%. 1000 milliLiter(s) (100 mL/Hr) IV Continuous <Continuous>  dextrose 5%. 1000 milliLiter(s) (50 mL/Hr) IV Continuous <Continuous>  dextrose 50% Injectable 25 Gram(s) IV Push once  dextrose 50% Injectable 12.5 Gram(s) IV Push once  dextrose 50% Injectable 25 Gram(s) IV Push once  enoxaparin Injectable 40 milliGRAM(s) SubCutaneous every 24 hours  gabapentin 100 milliGRAM(s) Oral three times a day  glucagon  Injectable 1 milliGRAM(s) IntraMuscular once  influenza   Vaccine 0.5 milliLiter(s) IntraMuscular once  insulin glargine Injectable (LANTUS) 25 Unit(s) SubCutaneous at bedtime  insulin lispro (ADMELOG) corrective regimen sliding scale   SubCutaneous three times a day before meals  insulin lispro (ADMELOG) corrective regimen sliding scale   SubCutaneous at bedtime  insulin lispro Injectable (ADMELOG) 9 Unit(s) SubCutaneous three times a day before meals  lactobacillus acidophilus 1 Tablet(s) Oral two times a day with meals  mupirocin 2% Ointment 1 Application(s) Topical <User Schedule>  piperacillin/tazobactam IVPB.. 3.375 Gram(s) IV Intermittent every 8 hours    MEDICATIONS  (PRN):  acetaminophen     Tablet .. 650 milliGRAM(s) Oral every 6 hours PRN Temp greater or equal to 38C (100.4F), Mild Pain (1 - 3), Moderate Pain (4 - 6)  dextrose Oral Gel 15 Gram(s) Oral once PRN Blood Glucose LESS THAN 70 milliGRAM(s)/deciliter  labetalol Injectable 10 milliGRAM(s) IV Push every 8 hours PRN Systolic blood pressure >180  melatonin 3 milliGRAM(s) Oral at bedtime PRN Insomnia          T(C): 36.3 (09-26-24 @ 04:30), Max: 37.2 (09-25-24 @ 13:01)  HR: 85 (09-26-24 @ 04:30) (85 - 87)  BP: 170/99 (09-26-24 @ 04:30) (147/87 - 170/99)  RR: 19 (09-26-24 @ 04:30) (18 - 19)  SpO2: 100% (09-26-24 @ 04:30) (96% - 100%)  Wt(kg): --        I&O's Detail    25 Sep 2024 07:01  -  26 Sep 2024 07:00  --------------------------------------------------------  IN:    IV PiggyBack: 100 mL  Total IN: 100 mL    OUT:    Voided (mL): 1160 mL  Total OUT: 1160 mL    Total NET: -1060 mL               PHYSICAL EXAM:    GENERAL: NAD  NECK: Supple, no inc in JVP  CHEST/LUNG: Clear  HEART: S1S2  ABDOMEN: Soft, Nontender, Nondistended; Bowel sounds present  EXTREMITIES:  trace edema on L, foot dressed.   NEURO: no asterixis      LABS:  CBC Full  -  ( 26 Sep 2024 07:52 )  WBC Count : 7.42 K/uL  RBC Count : 3.24 M/uL  Hemoglobin : 9.7 g/dL  Hematocrit : 28.8 %  Platelet Count - Automated : 156 K/uL  Mean Cell Volume : 88.9 fL  Mean Cell Hemoglobin : 29.9 pg  Mean Cell Hemoglobin Concentration : 33.7 g/dL  Auto Neutrophil # : 4.76 K/uL  Auto Lymphocyte # : 1.35 K/uL  Auto Monocyte # : 1.04 K/uL  Auto Eosinophil # : 0.12 K/uL  Auto Basophil # : 0.08 K/uL  Auto Neutrophil % : 64.2 %  Auto Lymphocyte % : 18.2 %  Auto Monocyte % : 14.0 %  Auto Eosinophil % : 1.6 %  Auto Basophil % : 1.1 %    09-26    134[L]  |  99  |  61[H]  ----------------------------<  215[H]  4.6   |  27  |  3.01[H]    Ca    9.3      26 Sep 2024 07:52    TPro  6.3  /  Alb  2.5[L]  /  TBili  0.7  /  DBili  x   /  AST  37  /  ALT  49  /  AlkPhos  114  09-26        Impression:  LUISA on 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 DDx: AIN, septic ATN. Stabilizing  Poorly controlled DM, Hgb A1C >10  Uncontrolled HTN  Diabetic foot ulcer    Recommendations:   Incr Coreg to 12.5 BID  Cont to keep off ARB  Follow FeNa  Check bladder scan.   CBC with diff tomorrow, BMP tomorrow.

## 2024-09-26 NOTE — PROVIDER CONTACT NOTE (OTHER) - BACKGROUND
The patient is administered for a diabetic foot ulcer. The patient had an amputation of the left 1st toe. Per podiatry, possible necrotic tissue is seen around surgical site and is following.

## 2024-09-26 NOTE — PROGRESS NOTE ADULT - PROBLEM SELECTOR PLAN 1
Patient seen and evaluated.  Pt is s/p debridement of all non-viable soft tissue and bone left foot with hallux amputation.  Surgical pathology : Osteomyelitis  Concern for flap due to  venous congestion. Will continue to monitor closely.   Partial weight bearing on left foot.  Pt instructed to elevate his operative extremity on two pillows.  Pt instructed to limit standing and walking on the operative extremity.  Appreciate Vascular consult.   Continue IV Abx as per ID.  If dressings get wet or fall off, please change with:  1. xeroform  2. gauze  3. abd pad  4. kerlex  5. ace bandage  Will cont to monitor while in house.  Will discuss with all attendings.

## 2024-09-27 LAB
ALBUMIN SERPL ELPH-MCNC: 2.7 G/DL — LOW (ref 3.3–5)
ALP SERPL-CCNC: 125 U/L — HIGH (ref 30–120)
ALT FLD-CCNC: 58 U/L — SIGNIFICANT CHANGE UP (ref 10–60)
ANION GAP SERPL CALC-SCNC: 7 MMOL/L — SIGNIFICANT CHANGE UP (ref 5–17)
AST SERPL-CCNC: 41 U/L — HIGH (ref 10–40)
BASOPHILS # BLD AUTO: 0.09 K/UL — SIGNIFICANT CHANGE UP (ref 0–0.2)
BASOPHILS NFR BLD AUTO: 1.2 % — SIGNIFICANT CHANGE UP (ref 0–2)
BILIRUB SERPL-MCNC: 0.7 MG/DL — SIGNIFICANT CHANGE UP (ref 0.2–1.2)
BUN SERPL-MCNC: 63 MG/DL — HIGH (ref 7–23)
CALCIUM SERPL-MCNC: 9.4 MG/DL — SIGNIFICANT CHANGE UP (ref 8.4–10.5)
CHLORIDE SERPL-SCNC: 98 MMOL/L — SIGNIFICANT CHANGE UP (ref 96–108)
CO2 SERPL-SCNC: 28 MMOL/L — SIGNIFICANT CHANGE UP (ref 22–31)
CREAT ?TM UR-MCNC: 83 MG/DL — SIGNIFICANT CHANGE UP
CREAT SERPL-MCNC: 2.92 MG/DL — HIGH (ref 0.5–1.3)
EGFR: 26 ML/MIN/1.73M2 — LOW
EOSINOPHIL # BLD AUTO: 0.19 K/UL — SIGNIFICANT CHANGE UP (ref 0–0.5)
EOSINOPHIL NFR BLD AUTO: 2.6 % — SIGNIFICANT CHANGE UP (ref 0–6)
GLUCOSE BLDC GLUCOMTR-MCNC: 146 MG/DL — HIGH (ref 70–99)
GLUCOSE BLDC GLUCOMTR-MCNC: 171 MG/DL — HIGH (ref 70–99)
GLUCOSE BLDC GLUCOMTR-MCNC: 243 MG/DL — HIGH (ref 70–99)
GLUCOSE BLDC GLUCOMTR-MCNC: 293 MG/DL — HIGH (ref 70–99)
GLUCOSE SERPL-MCNC: 184 MG/DL — HIGH (ref 70–99)
HCT VFR BLD CALC: 29.7 % — LOW (ref 39–50)
HGB BLD-MCNC: 9.9 G/DL — LOW (ref 13–17)
IMM GRANULOCYTES NFR BLD AUTO: 1.4 % — HIGH (ref 0–0.9)
LYMPHOCYTES # BLD AUTO: 1.52 K/UL — SIGNIFICANT CHANGE UP (ref 1–3.3)
LYMPHOCYTES # BLD AUTO: 20.6 % — SIGNIFICANT CHANGE UP (ref 13–44)
MCHC RBC-ENTMCNC: 30 PG — SIGNIFICANT CHANGE UP (ref 27–34)
MCHC RBC-ENTMCNC: 33.3 G/DL — SIGNIFICANT CHANGE UP (ref 32–36)
MCV RBC AUTO: 90 FL — SIGNIFICANT CHANGE UP (ref 80–100)
MONOCYTES # BLD AUTO: 1.02 K/UL — HIGH (ref 0–0.9)
MONOCYTES NFR BLD AUTO: 13.8 % — SIGNIFICANT CHANGE UP (ref 2–14)
NEUTROPHILS # BLD AUTO: 4.46 K/UL — SIGNIFICANT CHANGE UP (ref 1.8–7.4)
NEUTROPHILS NFR BLD AUTO: 60.4 % — SIGNIFICANT CHANGE UP (ref 43–77)
NRBC # BLD: 0 /100 WBCS — SIGNIFICANT CHANGE UP (ref 0–0)
PLATELET # BLD AUTO: 170 K/UL — SIGNIFICANT CHANGE UP (ref 150–400)
POTASSIUM SERPL-MCNC: 4.5 MMOL/L — SIGNIFICANT CHANGE UP (ref 3.5–5.3)
POTASSIUM SERPL-SCNC: 4.5 MMOL/L — SIGNIFICANT CHANGE UP (ref 3.5–5.3)
PROT SERPL-MCNC: 6.7 G/DL — SIGNIFICANT CHANGE UP (ref 6–8.3)
RBC # BLD: 3.3 M/UL — LOW (ref 4.2–5.8)
RBC # FLD: 12.2 % — SIGNIFICANT CHANGE UP (ref 10.3–14.5)
SODIUM SERPL-SCNC: 133 MMOL/L — LOW (ref 135–145)
SODIUM UR-SCNC: 35 MMOL/L — SIGNIFICANT CHANGE UP
WBC # BLD: 7.38 K/UL — SIGNIFICANT CHANGE UP (ref 3.8–10.5)
WBC # FLD AUTO: 7.38 K/UL — SIGNIFICANT CHANGE UP (ref 3.8–10.5)

## 2024-09-27 RX ORDER — AMOXICILLIN 250 MG/5ML
500 SUSPENSION, RECONSTITUTED, ORAL (ML) ORAL ONCE
Refills: 0 | Status: COMPLETED | OUTPATIENT
Start: 2024-09-27 | End: 2024-09-27

## 2024-09-27 RX ADMIN — Medication 10 MILLIGRAM(S): at 06:22

## 2024-09-27 RX ADMIN — Medication 6.25 MILLIGRAM(S): at 06:23

## 2024-09-27 RX ADMIN — Medication 3 MILLIGRAM(S): at 21:38

## 2024-09-27 RX ADMIN — PIPERACILLIN SODIUM AND TAZOBACTAM SODIUM 25 GRAM(S): 12; 1.5 INJECTION, POWDER, LYOPHILIZED, FOR SOLUTION INTRAVENOUS at 21:39

## 2024-09-27 RX ADMIN — PIPERACILLIN SODIUM AND TAZOBACTAM SODIUM 25 GRAM(S): 12; 1.5 INJECTION, POWDER, LYOPHILIZED, FOR SOLUTION INTRAVENOUS at 14:23

## 2024-09-27 RX ADMIN — Medication 1 TABLET(S): at 08:07

## 2024-09-27 RX ADMIN — Medication 1 TABLET(S): at 17:15

## 2024-09-27 RX ADMIN — Medication 10 UNIT(S): at 17:14

## 2024-09-27 RX ADMIN — INSULIN GLARGINE 30 UNIT(S): 300 INJECTION, SOLUTION SUBCUTANEOUS at 21:41

## 2024-09-27 RX ADMIN — ENOXAPARIN SODIUM 40 MILLIGRAM(S): 150 INJECTION SUBCUTANEOUS at 01:45

## 2024-09-27 RX ADMIN — Medication 10 UNIT(S): at 12:35

## 2024-09-27 RX ADMIN — ENOXAPARIN SODIUM 40 MILLIGRAM(S): 150 INJECTION SUBCUTANEOUS at 21:40

## 2024-09-27 RX ADMIN — Medication 650 MILLIGRAM(S): at 21:52

## 2024-09-27 RX ADMIN — MUPIROCIN 1 APPLICATION(S): 20 OINTMENT TOPICAL at 14:02

## 2024-09-27 RX ADMIN — Medication 2: at 08:08

## 2024-09-27 RX ADMIN — GABAPENTIN 100 MILLIGRAM(S): 800 TABLET, FILM COATED ORAL at 14:23

## 2024-09-27 RX ADMIN — PIPERACILLIN SODIUM AND TAZOBACTAM SODIUM 25 GRAM(S): 12; 1.5 INJECTION, POWDER, LYOPHILIZED, FOR SOLUTION INTRAVENOUS at 06:23

## 2024-09-27 RX ADMIN — Medication 4: at 17:14

## 2024-09-27 RX ADMIN — GABAPENTIN 100 MILLIGRAM(S): 800 TABLET, FILM COATED ORAL at 21:38

## 2024-09-27 RX ADMIN — Medication 10 UNIT(S): at 08:09

## 2024-09-27 RX ADMIN — Medication 650 MILLIGRAM(S): at 22:22

## 2024-09-27 RX ADMIN — Medication 6.25 MILLIGRAM(S): at 17:21

## 2024-09-27 RX ADMIN — Medication 500 MILLIGRAM(S): at 14:49

## 2024-09-27 RX ADMIN — GABAPENTIN 100 MILLIGRAM(S): 800 TABLET, FILM COATED ORAL at 06:23

## 2024-09-27 RX ADMIN — Medication 2: at 21:39

## 2024-09-27 NOTE — PROGRESS NOTE ADULT - ASSESSMENT
49-year-old male with past medical history of DM2, HTN, HLD, NEUROPATHY, PVD, NON COMPLIANT WITH TT, ETOH ABUSE, presents today due to left toe infection.  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  Patient experienced a blister to the left toe in which he eventually fell orders follow-up on its own.  Patient notes that the blister opened up and he developed some blood in the blister as well.  Patient notes that the blister scab has slightly fallen off but now his toe is red and painful.  Patient not currently on antibiotics.  Patient denies fever, injury, numbness, weakness, or any other complaints.   IN ED TEMP 99, /100, HR 89, had consult with podiatry and sidney started on Cipro and admitted for further management    # Diabetic foot ulcer with cellullitis(left halluxdiabetic ulcer 3x4x0.1 cm with erythema and edema mal odorous , hyperkeratotic  started on ABX, , R/o PVD- arterial doppler- negative for any arterial disease   MR foot done- ac on ch OM with sepstic arthritis of 2ndleft toe, OM left great toe-  surg done by podiatry,9/20  culture of wound E fecalis  pt on zosyn    abx as per ID  vascular consult noted - no need for any vascular procedure   podiatry f up ,   planned revision of wound flap    # DM2 with hyperglycemia, with neuropathy , nephropathy with likely PVD    ssi, consistent carb, Endo consult,  hba1c 10.2   Endo consult noted , started on lantus  # Ess HTN   amlodipine 5 mg, off losartan     DVT prophylaxis  ID , endo and podiatry consult  # LUISA with CKD 4   nephro consult,noted  likely Diabetic nephropathy with septic ATN  Hold ARB in short term and titrate other anti hypertensives as needed  # HLD   low fat diet  # DVT prophylaxis

## 2024-09-27 NOTE — PROGRESS NOTE ADULT - SUBJECTIVE AND OBJECTIVE BOX
PROGRESS NOTE   Patient is a 49y old  Male who presents with a chief complaint of infected toe (27 Sep 2024 13:08)      HPI:  49-year-old male with past medical history of DM2, HTN, HLD, NEUROPATHY, PVD, NON COMPLIANT WITH TT, ETOH ABUSE, presents today due to left toe infection.  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  Patient experienced a blister to the left toe in which he eventually fell orders follow-up on its own.  Patient notes that the blister opened up and he developed some blood in the blister as well.  Patient notes that the blister scab has slightly fallen off but now his toe is red and painful.  Patient not currently on antibiotics.  Patient denies fever, injury, numbness, weakness, or any other complaints.   IN ED TEMP 99, /100, HR 89, had consult with podiatry and sidney started on Cipro and admitted for further management (17 Sep 2024 19:36)      Vital Signs Last 24 Hrs  T(C): 37 (27 Sep 2024 05:26), Max: 37.8 (26 Sep 2024 15:00)  T(F): 98.6 (27 Sep 2024 05:26), Max: 100 (26 Sep 2024 15:00)  HR: 78 (27 Sep 2024 05:26) (72 - 84)  BP: 138/79 (27 Sep 2024 05:26) (138/79 - 147/87)  BP(mean): --  RR: 18 (27 Sep 2024 05:26) (18 - 18)  SpO2: 97% (27 Sep 2024 05:26) (97% - 97%)    Parameters below as of 27 Sep 2024 05:26  Patient On (Oxygen Delivery Method): room air                              9.9    7.38  )-----------( 170      ( 27 Sep 2024 07:40 )             29.7               09-27    133[L]  |  98  |  63[H]  ----------------------------<  184[H]  4.5   |  28  |  2.92[H]    Ca    9.4      27 Sep 2024 07:40    TPro  6.7  /  Alb  2.7[L]  /  TBili  0.7  /  DBili  x   /  AST  41[H]  /  ALT  58  /  AlkPhos  125[H]  09-27      PHYSICAL EXAM  GEN: SUZANNA KAISER is a pleasant well-nourished, well developed 49y Male in no acute distress, alert awake, and oriented to person, place and time.   LE Focused:  Vascular: Dorsalis Pedis and Posterior Tibial pulses 2/4.  Capillary re-fill time less then 3 seconds digits 1-5 bilateral.    Neuro: Protective sensation diminished to the level of the digits bilateral.  MSK: Muscle strength 5/5 all major muscle groups bilateral.  Derm: POD#6. Incision site of the Left foot noted with necrotic changes to the distal flap. Dorsal and plantar flap showing signs of  venous congestion.  Guarded prognosis for the flap at this time. Upon elevation there is decreased venous congestion.  Will allow the region to cont to demarcate.

## 2024-09-27 NOTE — PROGRESS NOTE ADULT - SUBJECTIVE AND OBJECTIVE BOX
Chief Complaint: Foot wound    Interval Events: No events overnight.    Review of Systems:  General: No fevers, chills, weight gain  Skin: No rashes, color changes  Cardiovascular: No chest pain, orthopnea  Respiratory: No shortness of breath, cough  Gastrointestinal: No nausea, abdominal pain  Genitourinary: No incontinence, pain with urination  Musculoskeletal: No pain, swelling, decreased range of motion  Neurological: No headache, weakness  Psychiatric: No depression, anxiety  Endocrine: No weight gain, increased thirst  All other systems are comprehensively negative.    Physical Exam:  Vital Signs Last 24 Hrs  T(C): 37 (27 Sep 2024 05:26), Max: 38.1 (26 Sep 2024 13:52)  T(F): 98.6 (27 Sep 2024 05:26), Max: 100.5 (26 Sep 2024 13:52)  HR: 78 (27 Sep 2024 05:26) (72 - 91)  BP: 138/79 (27 Sep 2024 05:26) (125/77 - 147/87)  BP(mean): --  RR: 18 (27 Sep 2024 05:26) (18 - 18)  SpO2: 97% (27 Sep 2024 05:26) (93% - 97%)  Parameters below as of 27 Sep 2024 05:26  Patient On (Oxygen Delivery Method): room air  General: NAD  HEENT: MMM  Neck: No JVD, no carotid bruit  Lungs: CTAB  CV: RRR, nl S1/S2, no M/R/G  Abdomen: S/NT/ND, +BS  Extremities: No LE edema, no cyanosis  Neuro: AAOx3, non-focal  Skin: No rash    Labs:    09-27    133[L]  |  98  |  63[H]  ----------------------------<  184[H]  4.5   |  28  |  2.92[H]    Ca    9.4      27 Sep 2024 07:40    TPro  6.7  /  Alb  2.7[L]  /  TBili  0.7  /  DBili  x   /  AST  41[H]  /  ALT  58  /  AlkPhos  125[H]  09-27                        9.9    7.38  )-----------( 170      ( 27 Sep 2024 07:40 )             29.7       ECG/Telemetry: Sinus rhythm

## 2024-09-27 NOTE — CASE MANAGEMENT PROGRESS NOTE - NSCMPROGRESSNOTE_GEN_ALL_CORE
RN/CM noted that pt's case discussed during Interdisciplinary rounds, pt remains acute,  S/P 09/20/2024 s/p debridement of all non-viable soft tissue and bone left foot with hallux amputation. AS per ID MD, IV Zosyn continues-OR cultures and path pending. Pt with local wound care- Xeroform being performed by podiatrist from Salvatore Delgado's office. AS per podiatrist, will need  revision of amputation site on TUES 10/1/2024 due to wound deterioration. CM discussed with pt about possible LT IV ABT and pt is willing to learn but is really hoping that he would be able to go home on PO ABT, as he is VERY eager to return to work. CM strongly encouraged pt to follow-up with MD prior to returning to work. DC pending hospital course. CM remains available.

## 2024-09-27 NOTE — CHART NOTE - NSCHARTNOTEFT_GEN_A_CORE
Assessment: 49-year-old male with past medical history of DM2, HTN, HLD, NEUROPATHY, PVD, NON COMPLIANT WITH TT, ETOH ABUSE, presents today due to left toe infection.  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  Patient experienced a blister to the left toe in which he eventually fell orders follow-up on its own.  Patient notes that the blister opened up and he developed some blood in the blister as well.  Patient notes that the blister scab has slightly fallen off but now his toe is red and painful.  Patient not currently on antibiotics.  Patient denies fever, injury, numbness, weakness, or any other complaints.  IN ED TEMP 99, /100, HR 89, had consult with podiatry and sidney started on Cipro and admitted for further management (17 Sep 2024 19:36)    9/27  Pt due for nutrition follow-up.  Pt admitted with diabetic foot ulcer to L foot (s/p OR 9/20, noted pending additional procedure 10/1).  Pt with hx DM2, A1c 10.6%; followed by diabetes specialist/NP and endo - noted med regimen adjusted (receiving lantus 30u once daily, admelog 10u before meals).  Pt continues on Con CHO diet with good appetite and intake (consuming >% of meals per documentation).  Pt with nutrition related questions regarding CHO portions and servings on tray- reinforced importance of consistent carbohydrate intake, especially while receiving nutritional insulin before meals; reinforced pairing a protein with carbohydrate at each meal/snack to aid in glycemic control; starchy vs. non starchy vegetables.  Given diabetic foot ulcer, Manish (7 gm arginine/7 gm glutamine/1.5 gm hmb) x1 packet provided bid to aid in wound healing.  RD to follow up and will continue to monitor pt's nutrition status/provide diet education reinforcement.     Factors impacting intake: [ ] none [ ] nausea  [ ] vomiting [ ] diarrhea [ ] constipation  [ ]chewing problems [ ] swallowing issues  [ ] other:     Diet Prescription: Diet, DASH/TLC:   Sodium & Cholesterol Restricted  Consistent Carbohydrate {No Snacks}  Manish(7 Gm Arginine/7 Gm Glut/1.2 Gm HMB     Qty per Day:  2 (09-23-24 @ 14:44)    Intake: good (%)    Current Weight: Weight (kg): 90.7 (09-20 @ 15:59)  % Weight Change    Pertinent Medications: MEDICATIONS  (STANDING):  amLODIPine   Tablet 10 milliGRAM(s) Oral daily  carvedilol 6.25 milliGRAM(s) Oral every 12 hours  dextrose 5%. 1000 milliLiter(s) (100 mL/Hr) IV Continuous <Continuous>  dextrose 5%. 1000 milliLiter(s) (50 mL/Hr) IV Continuous <Continuous>  dextrose 50% Injectable 25 Gram(s) IV Push once  dextrose 50% Injectable 12.5 Gram(s) IV Push once  dextrose 50% Injectable 25 Gram(s) IV Push once  enoxaparin Injectable 40 milliGRAM(s) SubCutaneous every 24 hours  gabapentin 100 milliGRAM(s) Oral three times a day  glucagon  Injectable 1 milliGRAM(s) IntraMuscular once  influenza   Vaccine 0.5 milliLiter(s) IntraMuscular once  insulin glargine Injectable (LANTUS) 30 Unit(s) SubCutaneous at bedtime  insulin lispro (ADMELOG) corrective regimen sliding scale   SubCutaneous three times a day before meals  insulin lispro (ADMELOG) corrective regimen sliding scale   SubCutaneous at bedtime  insulin lispro Injectable (ADMELOG) 10 Unit(s) SubCutaneous three times a day before meals  lactobacillus acidophilus 1 Tablet(s) Oral two times a day with meals  mupirocin 2% Ointment 1 Application(s) Topical <User Schedule>  piperacillin/tazobactam IVPB.. 3.375 Gram(s) IV Intermittent every 8 hours    MEDICATIONS  (PRN):  acetaminophen     Tablet .. 650 milliGRAM(s) Oral every 6 hours PRN Temp greater or equal to 38C (100.4F), Mild Pain (1 - 3), Moderate Pain (4 - 6)  dextrose Oral Gel 15 Gram(s) Oral once PRN Blood Glucose LESS THAN 70 milliGRAM(s)/deciliter  labetalol Injectable 10 milliGRAM(s) IV Push every 8 hours PRN Systolic blood pressure >180  melatonin 3 milliGRAM(s) Oral at bedtime PRN Insomnia    Pertinent Labs: 09-27 Na133 mmol/L[L] Glu 184 mg/dL[H] K+ 4.5 mmol/L Cr  2.92 mg/dL[H] BUN 63 mg/dL[H] 09-24 Phos 4.5 mg/dL 09-27 Alb 2.7 g/dL[L] 09-18 Chol 236 mg/dL[H] LDL --    HDL 42 mg/dL Trig 305 mg/dL[H]     CAPILLARY BLOOD GLUCOSE      POCT Blood Glucose.: 146 mg/dL (27 Sep 2024 11:52)  POCT Blood Glucose.: 171 mg/dL (27 Sep 2024 07:46)  POCT Blood Glucose.: 240 mg/dL (26 Sep 2024 21:16)  POCT Blood Glucose.: 199 mg/dL (26 Sep 2024 16:52)    Skin: L diabetic foot ulcer    Estimated Needs:   [ x] no change since previous assessment  [ ] recalculated:     Previous Nutrition Diagnosis:   [ ] Inadequate Energy Intake [ ]Inadequate Oral Intake [ ] Excessive Energy Intake   [ ] Underweight [ ] Increased Nutrient Needs [ ] Overweight/Obesity [x] altered nutrition related lab values  [ ] Altered GI Function [ ] Unintended Weight Loss [ ] Food & Nutrition Related Knowledge Deficit [ ] Malnutrition     Nutrition Diagnosis is [ ] ongoing  [ ] resolved [ ] not applicable     New Nutrition Diagnosis: [ ] not applicable       Interventions:  COn CHO diet, Manish (7 gm arginine/7 gm glutamine/1.5 gm hmb) x1 packet bid, encourage HBV protein source, diet education reinforcement   Recommend  [ ] Change Diet To:  [ ] Nutrition Supplement  [ ] Nutrition Support  [ ] Other:     Monitoring and Evaluation:   [ ] PO intake [ x ] Tolerance to diet prescription [ x ] weights [ x ] labs[ x ] follow up per protocol  [ ] other:

## 2024-09-27 NOTE — PROGRESS NOTE ADULT - ASSESSMENT
The patient is a 49 year old male with a history of HTN, HL, DM, PAD who presents with a toe infection.    Plan:  - ECG with sinus rhythm and no evidence of ischemia/infarction  - Off of losartan  - Continue amlodipine 10 mg daily  - Continue carvedilol 6.25 mg bid  - LE arterial duplex with no significant stenosis  - MRI foot with acute/chronic osteomyelitis  - Path margins with +osteomyelitis  - Podiatry and ID follow-up

## 2024-09-27 NOTE — PROGRESS NOTE ADULT - ASSESSMENT
50YO M PMH DM2, HTN, HLD, NEUROPATHY, PVD, NON COMPLIANT WITH TT, ETOH ABUSE, admitted with infected DM left great toe infection  MRI + OM  Sp Left great toe amputation 9/20  OR bone cultures noted with E Faecalis Amp S and Vanco S  Left foot amputation stump with some necrotic changes distal flap. Dorsal and plantar flap showing signs of  venous congestion.  Path with acute on chronic OM on prox clean margins    RECOMMENDATIONS   with residual osteo plan for further surgery Tuesday  continue abx for now Zosyn based on micro    Thank you for consulting us and involving us in the management of this most interesting and challenging case.  We will follow along in the care of this patient. Please call us at [phone] or text me directly on my cell# at [phone] with any concerns.

## 2024-09-27 NOTE — PROGRESS NOTE ADULT - SUBJECTIVE AND OBJECTIVE BOX
CAPILLARY BLOOD GLUCOSE      POCT Blood Glucose.: 171 mg/dL (27 Sep 2024 07:46)  POCT Blood Glucose.: 240 mg/dL (26 Sep 2024 21:16)  POCT Blood Glucose.: 199 mg/dL (26 Sep 2024 16:52)  POCT Blood Glucose.: 237 mg/dL (26 Sep 2024 12:06)      Vital Signs Last 24 Hrs  T(C): 37 (27 Sep 2024 05:26), Max: 38.1 (26 Sep 2024 13:52)  T(F): 98.6 (27 Sep 2024 05:26), Max: 100.5 (26 Sep 2024 13:52)  HR: 78 (27 Sep 2024 05:26) (72 - 91)  BP: 138/79 (27 Sep 2024 05:26) (125/77 - 147/87)  BP(mean): --  RR: 18 (27 Sep 2024 05:26) (18 - 18)  SpO2: 97% (27 Sep 2024 05:26) (93% - 97%)    Parameters below as of 27 Sep 2024 05:26  Patient On (Oxygen Delivery Method): room air        General: WN/WD NAD  Respiratory: CTA B/L  CV: RRR, S1S2, no murmurs, rubs or gallops  Abdominal: Soft, NT, ND +BS, Last BM  Extremities: le foot dsg intact     09-27    133[L]  |  98  |  63[H]  ----------------------------<  184[H]  4.5   |  28  |  2.92[H]    Ca    9.4      27 Sep 2024 07:40    TPro  6.7  /  Alb  2.7[L]  /  TBili  0.7  /  DBili  x   /  AST  41[H]  /  ALT  58  /  AlkPhos  125[H]  09-27      dextrose 50% Injectable 25 Gram(s) IV Push once  dextrose 50% Injectable 12.5 Gram(s) IV Push once  dextrose 50% Injectable 25 Gram(s) IV Push once  dextrose Oral Gel 15 Gram(s) Oral once PRN  glucagon  Injectable 1 milliGRAM(s) IntraMuscular once  insulin glargine Injectable (LANTUS) 30 Unit(s) SubCutaneous at bedtime  insulin lispro (ADMELOG) corrective regimen sliding scale   SubCutaneous three times a day before meals  insulin lispro (ADMELOG) corrective regimen sliding scale   SubCutaneous at bedtime  insulin lispro Injectable (ADMELOG) 10 Unit(s) SubCutaneous three times a day before meals

## 2024-09-27 NOTE — PROGRESS NOTE ADULT - SUBJECTIVE AND OBJECTIVE BOX
[facility] DIVISION of INFECTIOUS DISEASE  Bob Giang MD PhD, Sary Staples MD, Nancy Rothman MD, Vaishnavi Rogers MD, Kameron Harrison MD  and providing coverage with Linsey Mack MD  Providing Infectious Disease Consultations at [facility], [facility], [facility]'s    Office# [phone] to schedule follow up appointments  Answering Service for urgent calls or New Consults [phone]  Cell# to text for urgent issues Bob Giang [phone]     infectious diseases progress note:    SUZANNA KAISER is a 49y y. o. Male patient    Overnight and events of the last 24hrs reviewed    Allergies    amoxicillin (Unknown)    Intolerances        ANTIBIOTICS/RELEVANT:  antimicrobials  piperacillin/tazobactam IVPB.. 3.375 Gram(s) IV Intermittent every 8 hours    immunologic:  influenza   Vaccine 0.5 milliLiter(s) IntraMuscular once    OTHER:  acetaminophen     Tablet .. 650 milliGRAM(s) Oral every 6 hours PRN  amLODIPine   Tablet 10 milliGRAM(s) Oral daily  carvedilol 6.25 milliGRAM(s) Oral every 12 hours  dextrose 5%. 1000 milliLiter(s) IV Continuous <Continuous>  dextrose 5%. 1000 milliLiter(s) IV Continuous <Continuous>  dextrose 50% Injectable 25 Gram(s) IV Push once  dextrose 50% Injectable 12.5 Gram(s) IV Push once  dextrose 50% Injectable 25 Gram(s) IV Push once  dextrose Oral Gel 15 Gram(s) Oral once PRN  enoxaparin Injectable 40 milliGRAM(s) SubCutaneous every 24 hours  gabapentin 100 milliGRAM(s) Oral three times a day  glucagon  Injectable 1 milliGRAM(s) IntraMuscular once  insulin glargine Injectable (LANTUS) 30 Unit(s) SubCutaneous at bedtime  insulin lispro (ADMELOG) corrective regimen sliding scale   SubCutaneous three times a day before meals  insulin lispro (ADMELOG) corrective regimen sliding scale   SubCutaneous at bedtime  insulin lispro Injectable (ADMELOG) 10 Unit(s) SubCutaneous three times a day before meals  labetalol Injectable 10 milliGRAM(s) IV Push every 8 hours PRN  lactobacillus acidophilus 1 Tablet(s) Oral two times a day with meals  melatonin 3 milliGRAM(s) Oral at bedtime PRN  mupirocin 2% Ointment 1 Application(s) Topical <User Schedule>      Objective:  Vital Signs Last 24 Hrs  T(C): 37 (27 Sep 2024 05:26), Max: 38.1 (26 Sep 2024 13:52)  T(F): 98.6 (27 Sep 2024 05:26), Max: 100.5 (26 Sep 2024 13:52)  HR: 78 (27 Sep 2024 05:26) (72 - 91)  BP: 138/79 (27 Sep 2024 05:26) (125/77 - 147/87)  BP(mean): --  RR: 18 (27 Sep 2024 05:26) (18 - 18)  SpO2: 97% (27 Sep 2024 05:26) (93% - 97%)    Parameters below as of 27 Sep 2024 05:26  Patient On (Oxygen Delivery Method): room air        T(C): 37 (09-27-24 @ 05:26), Max: 38.1 (09-26-24 @ 13:52)  T(C): 37 (09-27-24 @ 05:26), Max: 38.1 (09-26-24 @ 13:52)  T(C): 37 (09-27-24 @ 05:26), Max: 38.1 (09-23-24 @ 17:11)    PHYSICAL EXAM:  HEENT: NC atraumatic  Neck: supple  Respiratory: no accessory muscle use, breathing comfortably  Cardiovascular: distant  Gastrointestinal: normal appearing, nondistended  Extremities: no clubbing, no cyanosis, foot wrapped        LABS:                          9.9    7.38  )-----------( 170      ( 27 Sep 2024 07:40 )             29.7       WBC  7.38 09-27 @ 07:40  7.42 09-26 @ 07:52  8.15 09-25 @ 08:01  8.25 09-24 @ 07:52  9.72 09-23 @ 08:03  10.12 09-21 @ 06:30  10.20 09-21 @ 02:15      09-27    133[L]  |  98  |  63[H]  ----------------------------<  184[H]  4.5   |  28  |  2.92[H]    Ca    9.4      27 Sep 2024 07:40    TPro  6.7  /  Alb  2.7[L]  /  TBili  0.7  /  DBili  x   /  AST  41[H]  /  ALT  58  /  AlkPhos  125[H]  09-27      Creatinine: 2.92 mg/dL (09-27-24 @ 07:40)  Creatinine: 3.01 mg/dL (09-26-24 @ 07:52)  Creatinine: 3.08 mg/dL (09-25-24 @ 08:01)  Creatinine: 2.69 mg/dL (09-24-24 @ 07:52)  Creatinine: 2.60 mg/dL (09-23-24 @ 08:03)  Creatinine: 2.64 mg/dL (09-21-24 @ 06:30)        Urinalysis Basic - ( 27 Sep 2024 07:40 )    Color: x / Appearance: x / SG: x / pH: x  Gluc: 184 mg/dL / Ketone: x  / Bili: x / Urobili: x   Blood: x / Protein: x / Nitrite: x   Leuk Esterase: x / RBC: x / WBC x   Sq Epi: x / Non Sq Epi: x / Bacteria: x            INFLAMMATORY MARKERS      MICROBIOLOGY:              RADIOLOGY & ADDITIONAL STUDIES:

## 2024-09-27 NOTE — PROGRESS NOTE ADULT - SUBJECTIVE AND OBJECTIVE BOX
Subjective: no complaints.       MEDICATIONS  (STANDING):  amLODIPine   Tablet 10 milliGRAM(s) Oral daily  carvedilol 6.25 milliGRAM(s) Oral every 12 hours  enoxaparin Injectable 40 milliGRAM(s) SubCutaneous every 24 hours  gabapentin 100 milliGRAM(s) Oral three times a day  glucagon  Injectable 1 milliGRAM(s) IntraMuscular once  influenza   Vaccine 0.5 milliLiter(s) IntraMuscular once  insulin glargine Injectable (LANTUS) 30 Unit(s) SubCutaneous at bedtime  insulin lispro (ADMELOG) corrective regimen sliding scale   SubCutaneous three times a day before meals  insulin lispro (ADMELOG) corrective regimen sliding scale   SubCutaneous at bedtime  insulin lispro Injectable (ADMELOG) 10 Unit(s) SubCutaneous three times a day before meals  lactobacillus acidophilus 1 Tablet(s) Oral two times a day with meals  mupirocin 2% Ointment 1 Application(s) Topical <User Schedule>  piperacillin/tazobactam IVPB.. 3.375 Gram(s) IV Intermittent every 8 hours    MEDICATIONS  (PRN):  acetaminophen     Tablet .. 650 milliGRAM(s) Oral every 6 hours PRN Temp greater or equal to 38C (100.4F), Mild Pain (1 - 3), Moderate Pain (4 - 6)  dextrose Oral Gel 15 Gram(s) Oral once PRN Blood Glucose LESS THAN 70 milliGRAM(s)/deciliter  labetalol Injectable 10 milliGRAM(s) IV Push every 8 hours PRN Systolic blood pressure >180  melatonin 3 milliGRAM(s) Oral at bedtime PRN Insomnia          T(C): 37 (09-27-24 @ 05:26), Max: 38.1 (09-26-24 @ 13:52)  HR: 78 (09-27-24 @ 05:26) (72 - 91)  BP: 138/79 (09-27-24 @ 05:26) (125/77 - 147/87)  RR: 18 (09-27-24 @ 05:26) (18 - 18)  SpO2: 97% (09-27-24 @ 05:26) (93% - 97%)  Wt(kg): --        I&O's Detail    26 Sep 2024 07:01  -  27 Sep 2024 07:00  --------------------------------------------------------  IN:  Total IN: 0 mL    OUT:    Voided (mL): 800 mL  Total OUT: 800 mL    Total NET: -800 mL               PHYSICAL EXAM:    GENERAL: NAD  NECK: Supple, no inc in JVP  CHEST/LUNG: Clear  HEART: S1S2  ABDOMEN: Soft, Nontender, Nondistended; Bowel sounds present  EXTREMITIES:  trace edema of left ankle. Foot dressed.   NEURO: no asterixis      LABS:  CBC Full  -  ( 27 Sep 2024 07:40 )  WBC Count : 7.38 K/uL  RBC Count : 3.30 M/uL  Hemoglobin : 9.9 g/dL  Hematocrit : 29.7 %  Platelet Count - Automated : 170 K/uL  Mean Cell Volume : 90.0 fL  Mean Cell Hemoglobin : 30.0 pg  Mean Cell Hemoglobin Concentration : 33.3 g/dL  Auto Neutrophil # : 4.46 K/uL  Auto Lymphocyte # : 1.52 K/uL  Auto Monocyte # : 1.02 K/uL  Auto Eosinophil # : 0.19 K/uL  Auto Basophil # : 0.09 K/uL  Auto Neutrophil % : 60.4 %  Auto Lymphocyte % : 20.6 %  Auto Monocyte % : 13.8 %  Auto Eosinophil % : 2.6 %  Auto Basophil % : 1.2 %    09-27    133[L]  |  98  |  63[H]  ----------------------------<  184[H]  4.5   |  28  |  2.92[H]    Ca    9.4      27 Sep 2024 07:40    TPro  6.7  /  Alb  2.7[L]  /  TBili  0.7  /  DBili  x   /  AST  41[H]  /  ALT  58  /  AlkPhos  125[H]  09-27        Impression:  LUISA on 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 DDx: AIN, septic ATN. Slowly improving.   Poorly controlled DM, Hgb A1C >10  Uncontrolled HTN. Spontaneously better.   Diabetic foot ulcer    Recommendations:   Cont to keep off ARB  BMP tomorrow.    Oral hydration encouraged.

## 2024-09-27 NOTE — PROGRESS NOTE ADULT - PROBLEM SELECTOR PLAN 1
lantus increased 30 units daily  admelog increased 10 units 3x/day before meals  cont mod dose admelog corrective scale coverage qac/qhs  cont cons cho diet  goal bg 100-180 in hosp setting lantus increased 30 units daily  admelog increased 10 units 3x/day before meals  cont mod dose admelog corrective scale coverage qac/qhs  cont cons cho diet  goal bg 100-180 in hosp setting  discussed lantus/glipizide/metformin upon d/c vs long-acting/rapid-acting regimen upon d/c  advised f/u with outpatient endocrinologist

## 2024-09-27 NOTE — PROGRESS NOTE ADULT - PROBLEM SELECTOR PLAN 1
Patient seen and evaluated.  Pt is s/p debridement of all non-viable soft tissue and bone left foot with hallux amputation.  Surgical pathology : Demonstrates acute on chronic OM on prox clean margins.  Plan for RTOR 10/01/2024 for amputation revision.  Concern for flap due to  venous congestion. Will continue to monitor closely.   Partial weight bearing on left foot.  Pt instructed to elevate his operative extremity on two pillows.  Pt instructed to limit standing and walking on the operative extremity.  Appreciate Vascular consult.   Continue IV Abx as per ID.  If dressings get wet or fall off, please change with:  1. xeroform  2. gauze  3. abd pad  4. kerlex  5. ace bandage  Will cont to monitor while in house.  Will discuss with all attendings.

## 2024-09-27 NOTE — PROGRESS NOTE ADULT - SUBJECTIVE AND OBJECTIVE BOX
Patient is a 49y old  Male who presents with a chief complaint of infected toe (27 Sep 2024 09:18)      INTERVAL HPI/OVERNIGHT EVENTS:overnight events noted    Home Medications:  non compliant with meds for months:  (17 Sep 2024 19:54)      MEDICATIONS  (STANDING):  amLODIPine   Tablet 10 milliGRAM(s) Oral daily  carvedilol 6.25 milliGRAM(s) Oral every 12 hours  dextrose 5%. 1000 milliLiter(s) (50 mL/Hr) IV Continuous <Continuous>  dextrose 5%. 1000 milliLiter(s) (100 mL/Hr) IV Continuous <Continuous>  dextrose 50% Injectable 25 Gram(s) IV Push once  dextrose 50% Injectable 12.5 Gram(s) IV Push once  dextrose 50% Injectable 25 Gram(s) IV Push once  enoxaparin Injectable 40 milliGRAM(s) SubCutaneous every 24 hours  gabapentin 100 milliGRAM(s) Oral three times a day  glucagon  Injectable 1 milliGRAM(s) IntraMuscular once  influenza   Vaccine 0.5 milliLiter(s) IntraMuscular once  insulin glargine Injectable (LANTUS) 30 Unit(s) SubCutaneous at bedtime  insulin lispro (ADMELOG) corrective regimen sliding scale   SubCutaneous three times a day before meals  insulin lispro (ADMELOG) corrective regimen sliding scale   SubCutaneous at bedtime  insulin lispro Injectable (ADMELOG) 10 Unit(s) SubCutaneous three times a day before meals  lactobacillus acidophilus 1 Tablet(s) Oral two times a day with meals  mupirocin 2% Ointment 1 Application(s) Topical <User Schedule>  piperacillin/tazobactam IVPB.. 3.375 Gram(s) IV Intermittent every 8 hours    MEDICATIONS  (PRN):  acetaminophen     Tablet .. 650 milliGRAM(s) Oral every 6 hours PRN Temp greater or equal to 38C (100.4F), Mild Pain (1 - 3), Moderate Pain (4 - 6)  dextrose Oral Gel 15 Gram(s) Oral once PRN Blood Glucose LESS THAN 70 milliGRAM(s)/deciliter  labetalol Injectable 10 milliGRAM(s) IV Push every 8 hours PRN Systolic blood pressure >180  melatonin 3 milliGRAM(s) Oral at bedtime PRN Insomnia      Allergies    amoxicillin (Unknown)    Intolerances        REVIEW OF SYSTEMS:  CONSTITUTIONAL: No fever, weight loss, or fatigue  EYES: No eye pain, visual disturbances, or discharge  ENMT:  No difficulty hearing, tinnitus, vertigo; No sinus or throat pain  NECK: No pain or stiffness  BREASTS: No pain, masses, or nipple discharge  RESPIRATORY: No cough, wheezing, chills or hemoptysis; No shortness of breath  CARDIOVASCULAR: No chest pain, palpitations, dizziness, or leg swelling  GASTROINTESTINAL: No abdominal or epigastric pain. No nausea, vomiting, or hematemesis; No diarrhea or constipation. No melena or hematochezia.  GENITOURINARY: No dysuria, frequency, hematuria, or incontinence  NEUROLOGICAL: No headaches, memory loss, loss of strength, numbness, or tremors  SKIN: No itching, burning, rashes, or lesions     Vital Signs Last 24 Hrs  T(C): 37 (27 Sep 2024 05:26), Max: 38.1 (26 Sep 2024 13:52)  T(F): 98.6 (27 Sep 2024 05:26), Max: 100.5 (26 Sep 2024 13:52)  HR: 78 (27 Sep 2024 05:26) (72 - 91)  BP: 138/79 (27 Sep 2024 05:26) (125/77 - 147/87)  BP(mean): --  RR: 18 (27 Sep 2024 05:26) (18 - 18)  SpO2: 97% (27 Sep 2024 05:26) (93% - 97%)    Parameters below as of 27 Sep 2024 05:26  Patient On (Oxygen Delivery Method): room air        PHYSICAL EXAM:  GENERAL: NAD, well-groomed, well-developed  HEAD:  Atraumatic, Normocephalic  EYES: EOMI, PERRLA, conjunctiva and sclera clear  ENMT: Moist mucous membranes,   NECK: Supple, No JVD, Normal thyroid  NERVOUS SYSTEM:  Alert & Oriented X3, Good concentration; Motor Strength 5/5 B/L upper and lower extremities; DTRs 2+ intact and symmetric  CHEST/LUNG: Clear to percussion bilaterally; No rales, rhonchi, wheezing, or rubs  HEART: Regular rate and rhythm; No murmurs, rubs, or gallops  ABDOMEN: Soft, Nontender, Nondistended; Bowel sounds present  EXTREMITIES:  foot in dressing  SKIN: No rashes or lesions    LABS:                        9.9    7.38  )-----------( 170      ( 27 Sep 2024 07:40 )             29.7     09-27    133[L]  |  98  |  63[H]  ----------------------------<  184[H]  4.5   |  28  |  2.92[H]    Ca    9.4      27 Sep 2024 07:40    TPro  6.7  /  Alb  2.7[L]  /  TBili  0.7  /  DBili  x   /  AST  41[H]  /  ALT  58  /  AlkPhos  125[H]  09-27      Urinalysis Basic - ( 27 Sep 2024 07:40 )    Color: x / Appearance: x / SG: x / pH: x  Gluc: 184 mg/dL / Ketone: x  / Bili: x / Urobili: x   Blood: x / Protein: x / Nitrite: x   Leuk Esterase: x / RBC: x / WBC x   Sq Epi: x / Non Sq Epi: x / Bacteria: x      CAPILLARY BLOOD GLUCOSE      POCT Blood Glucose.: 171 mg/dL (27 Sep 2024 07:46)  POCT Blood Glucose.: 240 mg/dL (26 Sep 2024 21:16)  POCT Blood Glucose.: 199 mg/dL (26 Sep 2024 16:52)  POCT Blood Glucose.: 237 mg/dL (26 Sep 2024 12:06)        Culture - Fungal, Tissue (collected 09-20-24 @ 19:21)  Source: Tissue Left foot  Preliminary Report (09-25-24 @ 23:02):    Culture is being performed. Fungal cultures are held for 4 weeks.    Culture - Fungal, Tissue (collected 09-20-24 @ 19:21)  Source: Bone Other  Preliminary Report (09-25-24 @ 23:02):    Culture is being performed. Fungal cultures are held for 4 weeks.    Culture - Tissue with Gram Stain (collected 09-20-24 @ 19:21)  Source: Bone Other  Gram Stain (09-21-24 @ 06:32):    No polymorphonuclear cells seen per low power field    No organisms seen per oil power field  Final Report (09-26-24 @ 09:24):    No growth    Culture - Fungal, Tissue (collected 09-20-24 @ 19:21)  Source: Bone Other  Preliminary Report (09-25-24 @ 23:02):    Culture is being performed. Fungal cultures are held for 4 weeks.    Culture - Tissue with Gram Stain (collected 09-20-24 @ 19:21)  Source: Bone Other  Gram Stain (09-22-24 @ 09:37):    No polymorphonuclear cells seen per low power field    No organisms seen per oil power field  Final Report (09-26-24 @ 09:32):    Growth in fluid media only Enterococcus faecalis    Rare Staphylococcus epidermidis  Organism: Enterococcus faecalis  Staphylococcus epidermidis (09-26-24 @ 09:32)  Organism: Staphylococcus epidermidis (09-26-24 @ 09:32)      Method Type: STEPHANIE      -  Clindamycin: R >4      -  Erythromycin: R >4      -  Gentamicin: S <=1 Should not be used as monotherapy      -  Oxacillin: R >2      -  Penicillin: R >8      -  Rifampin: S <=1 Should not be used as monotherapy      -  Tetracycline: S 2      -  Trimethoprim/Sulfamethoxazole: R >2/38      -  Vancomycin: S 1  Organism: Enterococcus faecalis (09-26-24 @ 09:32)      Method Type: STEPHANIE      -  Ampicillin: S <=2 Predicts results to ampicillin/sulbactam, amoxacillin-clavulanate and  piperacillin-tazobactam.      -  Vancomycin: S 4    Culture - Tissue with Gram Stain (collected 09-20-24 @ 19:21)  Source: Tissue left foot  Gram Stain (09-22-24 @ 09:38):    No polymorphonuclear cells seen per low power field    No organisms seen per oil power field  Final Report (09-26-24 @ 09:21):    Growth in fluid media only Enterococcus faecalis  Organism: Enterococcus faecalis (09-26-24 @ 09:21)  Organism: Enterococcus faecalis (09-26-24 @ 09:21)      Method Type: STEPHANIE      -  Ampicillin: S <=2 Predicts results to ampicillin/sulbactam, amoxacillin-clavulanate and  piperacillin-tazobactam.      -  Vancomycin: S 4    Culture - Fungal, Tissue (collected 09-20-24 @ 19:21)  Source: Tissue  Preliminary Report (09-25-24 @ 23:02):    Culture is being performed. Fungal cultures are held for 4 weeks.    Culture - Acid Fast - Tissue w/Smear (collected 09-20-24 @ 19:21)  Source: Tissue Other  Preliminary Report (09-25-24 @ 23:06):    Culture is being performed.    Culture - Acid Fast - Tissue w/Smear (collected 09-20-24 @ 19:21)  Source: Tissue Other  Preliminary Report (09-25-24 @ 23:06):    Culture is being performed.        I&O's Summary    26 Sep 2024 07:01  -  27 Sep 2024 07:00  --------------------------------------------------------  IN: 0 mL / OUT: 800 mL / NET: -800 mL        RADIOLOGY & ADDITIONAL TESTS:    Imaging Personally Reviewed:  [x ] YES  [ ] NO    Consultant(s) Notes Reviewed:  [x ] YES  [ ] NO    Care Discussed with Consultants/Other Providers [ x] YES  [ ] NO

## 2024-09-28 LAB
ALBUMIN SERPL ELPH-MCNC: 2.6 G/DL — LOW (ref 3.3–5)
ALP SERPL-CCNC: 120 U/L — SIGNIFICANT CHANGE UP (ref 30–120)
ALT FLD-CCNC: 57 U/L — SIGNIFICANT CHANGE UP (ref 10–60)
ANION GAP SERPL CALC-SCNC: 9 MMOL/L — SIGNIFICANT CHANGE UP (ref 5–17)
AST SERPL-CCNC: 40 U/L — SIGNIFICANT CHANGE UP (ref 10–40)
BASOPHILS # BLD AUTO: 0.05 K/UL — SIGNIFICANT CHANGE UP (ref 0–0.2)
BASOPHILS NFR BLD AUTO: 0.7 % — SIGNIFICANT CHANGE UP (ref 0–2)
BILIRUB SERPL-MCNC: 0.6 MG/DL — SIGNIFICANT CHANGE UP (ref 0.2–1.2)
BUN SERPL-MCNC: 65 MG/DL — HIGH (ref 7–23)
CALCIUM SERPL-MCNC: 10.1 MG/DL — SIGNIFICANT CHANGE UP (ref 8.4–10.5)
CHLORIDE SERPL-SCNC: 99 MMOL/L — SIGNIFICANT CHANGE UP (ref 96–108)
CO2 SERPL-SCNC: 27 MMOL/L — SIGNIFICANT CHANGE UP (ref 22–31)
CREAT SERPL-MCNC: 3.31 MG/DL — HIGH (ref 0.5–1.3)
EGFR: 22 ML/MIN/1.73M2 — LOW
EOSINOPHIL # BLD AUTO: 0.21 K/UL — SIGNIFICANT CHANGE UP (ref 0–0.5)
EOSINOPHIL NFR BLD AUTO: 3 % — SIGNIFICANT CHANGE UP (ref 0–6)
GLUCOSE BLDC GLUCOMTR-MCNC: 172 MG/DL — HIGH (ref 70–99)
GLUCOSE BLDC GLUCOMTR-MCNC: 209 MG/DL — HIGH (ref 70–99)
GLUCOSE BLDC GLUCOMTR-MCNC: 213 MG/DL — HIGH (ref 70–99)
GLUCOSE BLDC GLUCOMTR-MCNC: 246 MG/DL — HIGH (ref 70–99)
GLUCOSE SERPL-MCNC: 229 MG/DL — HIGH (ref 70–99)
HCT VFR BLD CALC: 29.1 % — LOW (ref 39–50)
HGB BLD-MCNC: 9.7 G/DL — LOW (ref 13–17)
IMM GRANULOCYTES NFR BLD AUTO: 0.7 % — SIGNIFICANT CHANGE UP (ref 0–0.9)
LYMPHOCYTES # BLD AUTO: 1.1 K/UL — SIGNIFICANT CHANGE UP (ref 1–3.3)
LYMPHOCYTES # BLD AUTO: 15.7 % — SIGNIFICANT CHANGE UP (ref 13–44)
MCHC RBC-ENTMCNC: 29.9 PG — SIGNIFICANT CHANGE UP (ref 27–34)
MCHC RBC-ENTMCNC: 33.3 G/DL — SIGNIFICANT CHANGE UP (ref 32–36)
MCV RBC AUTO: 89.8 FL — SIGNIFICANT CHANGE UP (ref 80–100)
MONOCYTES # BLD AUTO: 0.93 K/UL — HIGH (ref 0–0.9)
MONOCYTES NFR BLD AUTO: 13.2 % — SIGNIFICANT CHANGE UP (ref 2–14)
NEUTROPHILS # BLD AUTO: 4.68 K/UL — SIGNIFICANT CHANGE UP (ref 1.8–7.4)
NEUTROPHILS NFR BLD AUTO: 66.7 % — SIGNIFICANT CHANGE UP (ref 43–77)
NRBC # BLD: 0 /100 WBCS — SIGNIFICANT CHANGE UP (ref 0–0)
PLATELET # BLD AUTO: 169 K/UL — SIGNIFICANT CHANGE UP (ref 150–400)
POTASSIUM SERPL-MCNC: 4.9 MMOL/L — SIGNIFICANT CHANGE UP (ref 3.5–5.3)
POTASSIUM SERPL-SCNC: 4.9 MMOL/L — SIGNIFICANT CHANGE UP (ref 3.5–5.3)
PROT SERPL-MCNC: 7.4 G/DL — SIGNIFICANT CHANGE UP (ref 6–8.3)
RBC # BLD: 3.24 M/UL — LOW (ref 4.2–5.8)
RBC # FLD: 12.4 % — SIGNIFICANT CHANGE UP (ref 10.3–14.5)
SODIUM SERPL-SCNC: 135 MMOL/L — SIGNIFICANT CHANGE UP (ref 135–145)
WBC # BLD: 7.02 K/UL — SIGNIFICANT CHANGE UP (ref 3.8–10.5)
WBC # FLD AUTO: 7.02 K/UL — SIGNIFICANT CHANGE UP (ref 3.8–10.5)

## 2024-09-28 RX ADMIN — Medication 1 TABLET(S): at 08:15

## 2024-09-28 RX ADMIN — ENOXAPARIN SODIUM 40 MILLIGRAM(S): 150 INJECTION SUBCUTANEOUS at 21:13

## 2024-09-28 RX ADMIN — Medication 10 MILLIGRAM(S): at 05:49

## 2024-09-28 RX ADMIN — GABAPENTIN 100 MILLIGRAM(S): 800 TABLET, FILM COATED ORAL at 05:49

## 2024-09-28 RX ADMIN — Medication 10 UNIT(S): at 12:30

## 2024-09-28 RX ADMIN — Medication 2: at 12:30

## 2024-09-28 RX ADMIN — GABAPENTIN 100 MILLIGRAM(S): 800 TABLET, FILM COATED ORAL at 14:54

## 2024-09-28 RX ADMIN — Medication 10 UNIT(S): at 17:16

## 2024-09-28 RX ADMIN — Medication 6.25 MILLIGRAM(S): at 05:49

## 2024-09-28 RX ADMIN — INSULIN GLARGINE 30 UNIT(S): 300 INJECTION, SOLUTION SUBCUTANEOUS at 21:11

## 2024-09-28 RX ADMIN — Medication 6.25 MILLIGRAM(S): at 17:17

## 2024-09-28 RX ADMIN — PIPERACILLIN SODIUM AND TAZOBACTAM SODIUM 25 GRAM(S): 12; 1.5 INJECTION, POWDER, LYOPHILIZED, FOR SOLUTION INTRAVENOUS at 14:54

## 2024-09-28 RX ADMIN — Medication 1 TABLET(S): at 17:16

## 2024-09-28 RX ADMIN — GABAPENTIN 100 MILLIGRAM(S): 800 TABLET, FILM COATED ORAL at 21:12

## 2024-09-28 RX ADMIN — Medication 4: at 17:16

## 2024-09-28 RX ADMIN — Medication 10 UNIT(S): at 08:14

## 2024-09-28 RX ADMIN — PIPERACILLIN SODIUM AND TAZOBACTAM SODIUM 25 GRAM(S): 12; 1.5 INJECTION, POWDER, LYOPHILIZED, FOR SOLUTION INTRAVENOUS at 21:11

## 2024-09-28 RX ADMIN — PIPERACILLIN SODIUM AND TAZOBACTAM SODIUM 25 GRAM(S): 12; 1.5 INJECTION, POWDER, LYOPHILIZED, FOR SOLUTION INTRAVENOUS at 05:49

## 2024-09-28 RX ADMIN — Medication 3 MILLIGRAM(S): at 21:12

## 2024-09-28 RX ADMIN — Medication 4: at 08:14

## 2024-09-28 RX ADMIN — MUPIROCIN 1 APPLICATION(S): 20 OINTMENT TOPICAL at 10:00

## 2024-09-28 NOTE — PROGRESS NOTE ADULT - ASSESSMENT
49-year-old male with past medical history of DM2, HTN, HLD, NEUROPATHY, PVD, NON COMPLIANT WITH TT, ETOH ABUSE, presents today due to left toe infection.  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  Patient experienced a blister to the left toe in which he eventually fell orders follow-up on its own.  Patient notes that the blister opened up and he developed some blood in the blister as well.  Patient notes that the blister scab has slightly fallen off but now his toe is red and painful.  Patient not currently on antibiotics.  Patient denies fever, injury, numbness, weakness, or any other complaints.   IN ED TEMP 99, /100, HR 89, had consult with podiatry and sidney started on Cipro and admitted for further management    # Diabetic foot ulcer with cellullitis(left halluxdiabetic ulcer 3x4x0.1 cm with erythema and edema mal odorous , hyperkeratotic  started on ABX, , R/o PVD- arterial doppler- negative for any arterial disease   MR foot done- ac on ch OM with sepstic arthritis of 2ndleft toe, OM left great toe-  surg done by podiatry,9/20  culture of wound E fecalis, path OM , with clear margins   abx as per ID  vascular consult noted - no need for any vascular procedure   podiatry f up ,   planned revision of wound flap    # DM2 with hyperglycemia, with neuropathy , nephropathy with likely PVD    ssi, consistent carb, Endo consult,  hba1c 10.2   Endo consult noted , started on lantus  # Ess HTN   amlodipine 5 mg, off losartan     DVT prophylaxis  ID , endo and podiatry consult  # LUISA with CKD 4   nephro consult,noted  likely Diabetic nephropathy with septic ATN  Hold ARB in short term and titrate other anti hypertensives as needed  # HLD   low fat diet  # DVT prophylaxis

## 2024-09-28 NOTE — PROGRESS NOTE ADULT - SUBJECTIVE AND OBJECTIVE BOX
Patient is a 49y Male with a known history of :  Diabetic toe ulcer [E11.621]    Uncontrolled type 2 diabetes mellitus with hyperglycemia [E11.65]    Osteomyelitis of ankle or foot, acute [M86.179]      HPI:  49-year-old male with past medical history of DM2, HTN, HLD, NEUROPATHY, PVD, NON COMPLIANT WITH TT, ETOH ABUSE, presents today due to left toe infection.  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  Patient experienced a blister to the left toe in which he eventually fell orders follow-up on its own.  Patient notes that the blister opened up and he developed some blood in the blister as well.  Patient notes that the blister scab has slightly fallen off but now his toe is red and painful.  Patient not currently on antibiotics.  Patient denies fever, injury, numbness, weakness, or any other complaints.   IN ED TEMP 99, /100, HR 89, had consult with podiatry and sidney started on Cipro and admitted for further management (17 Sep 2024 19:36)      REVIEW OF SYSTEMS:    CONSTITUTIONAL: No fever, weight loss, or fatigue  EYES: No eye pain, visual disturbances, or discharge  ENMT:  No difficulty hearing, tinnitus, vertigo; No sinus or throat pain  NECK: No pain or stiffness  BREASTS: No pain, masses, or nipple discharge  RESPIRATORY: No cough, wheezing, chills or hemoptysis; No shortness of breath  CARDIOVASCULAR: No chest pain, palpitations, dizziness, or leg swelling  GASTROINTESTINAL: No abdominal or epigastric pain. No nausea, vomiting, or hematemesis; No diarrhea or constipation. No melena or hematochezia.  GENITOURINARY: No dysuria, frequency, hematuria, or incontinence  NEUROLOGICAL: No headaches, memory loss, loss of strength, numbness, or tremors  SKIN: No itching, burning, rashes, or lesions   LYMPH NODES: No enlarged glands  ENDOCRINE: No heat or cold intolerance; No hair loss  MUSCULOSKELETAL: No joint pain or swelling; No muscle, back, or extremity pain  PSYCHIATRIC: No depression, anxiety, mood swings, or difficulty sleeping  HEME/LYMPH: No easy bruising, or bleeding gums  ALLERGY AND IMMUNOLOGIC: No hives or eczema    MEDICATIONS  (STANDING):  amLODIPine   Tablet 10 milliGRAM(s) Oral daily  carvedilol 6.25 milliGRAM(s) Oral every 12 hours  dextrose 5%. 1000 milliLiter(s) (100 mL/Hr) IV Continuous <Continuous>  dextrose 5%. 1000 milliLiter(s) (50 mL/Hr) IV Continuous <Continuous>  dextrose 50% Injectable 12.5 Gram(s) IV Push once  dextrose 50% Injectable 25 Gram(s) IV Push once  dextrose 50% Injectable 25 Gram(s) IV Push once  enoxaparin Injectable 40 milliGRAM(s) SubCutaneous every 24 hours  gabapentin 100 milliGRAM(s) Oral three times a day  glucagon  Injectable 1 milliGRAM(s) IntraMuscular once  influenza   Vaccine 0.5 milliLiter(s) IntraMuscular once  insulin glargine Injectable (LANTUS) 30 Unit(s) SubCutaneous at bedtime  insulin lispro (ADMELOG) corrective regimen sliding scale   SubCutaneous three times a day before meals  insulin lispro (ADMELOG) corrective regimen sliding scale   SubCutaneous at bedtime  insulin lispro Injectable (ADMELOG) 10 Unit(s) SubCutaneous three times a day before meals  lactobacillus acidophilus 1 Tablet(s) Oral two times a day with meals  mupirocin 2% Ointment 1 Application(s) Topical <User Schedule>  piperacillin/tazobactam IVPB.. 3.375 Gram(s) IV Intermittent every 8 hours    MEDICATIONS  (PRN):  acetaminophen     Tablet .. 650 milliGRAM(s) Oral every 6 hours PRN Temp greater or equal to 38C (100.4F), Mild Pain (1 - 3), Moderate Pain (4 - 6)  dextrose Oral Gel 15 Gram(s) Oral once PRN Blood Glucose LESS THAN 70 milliGRAM(s)/deciliter  labetalol Injectable 10 milliGRAM(s) IV Push every 8 hours PRN Systolic blood pressure >180  melatonin 3 milliGRAM(s) Oral at bedtime PRN Insomnia      ALLERGIES: No Known Allergies      FAMILY HISTORY:      Social history:  Alochol:   Smoking:   Drug Use:   Marital Status:     PHYSICAL EXAMINATION:  -----------------------------  T(C): 36.6 (09-28-24 @ 06:16), Max: 37.7 (09-27-24 @ 21:03)  HR: 80 (09-28-24 @ 06:16) (80 - 89)  BP: 143/83 (09-28-24 @ 06:16) (143/83 - 151/88)  RR: 18 (09-28-24 @ 06:16) (18 - 18)  SpO2: 97% (09-28-24 @ 06:16) (94% - 97%)  Wt(kg): --        Constitutional: well developed, normal appearance, well groomed, well nourished, no deformities and no acute distress.   Eyes: the conjunctiva exhibited no abnormalities and the eyelids demonstrated no xanthelasmas.   HEENT: normal oral mucosa, no oral pallor and no oral cyanosis.   Neck: normal jugular venous A waves present, normal jugular venous V waves present and no jugular venous berrios A waves.   Pulmonary: no respiratory distress, normal respiratory rhythm and effort, no accessory muscle use and lungs were clear to auscultation bilaterally.   Cardiovascular: heart rate and rhythm were normal, normal S1 and S2 and no murmur, gallop, rub, heave or thrill are present.    Musculoskeletal: the gait could not be assessed.   Extremities: no clubbing of the fingernails, no localized cyanosis, no petechial hemorrhages and no ischemic changes.   Skin: normal skin color and pigmentation, no rash, no venous stasis, no skin lesions, no skin ulcer and no xanthoma was observed. Left foot with bandage  Psychiatric: oriented to person, place, and time, the affect was normal, the mood was normal and not feeling anxious.     LABS:   --------  09-28    135  |  99  |  65[H]  ----------------------------<  229[H]  4.9   |  27  |  3.31[H]    Ca    10.1      28 Sep 2024 06:00    TPro  7.4  /  Alb  2.6[L]  /  TBili  0.6  /  DBili  x   /  AST  40  /  ALT  57  /  AlkPhos  120  09-28                         9.7    7.02  )-----------( 169      ( 28 Sep 2024 06:00 )             29.1                   Radiology:

## 2024-09-28 NOTE — PROGRESS NOTE ADULT - SUBJECTIVE AND OBJECTIVE BOX
Patient is a 49y old  Male who presents with a chief complaint of infected toe (28 Sep 2024 10:37)      INTERVAL HPI/OVERNIGHT EVENTS:overnight events noted    Home Medications:  non compliant with meds for months:  (17 Sep 2024 19:54)      MEDICATIONS  (STANDING):  amLODIPine   Tablet 10 milliGRAM(s) Oral daily  carvedilol 6.25 milliGRAM(s) Oral every 12 hours  dextrose 5%. 1000 milliLiter(s) (50 mL/Hr) IV Continuous <Continuous>  dextrose 5%. 1000 milliLiter(s) (100 mL/Hr) IV Continuous <Continuous>  dextrose 50% Injectable 12.5 Gram(s) IV Push once  dextrose 50% Injectable 25 Gram(s) IV Push once  dextrose 50% Injectable 25 Gram(s) IV Push once  enoxaparin Injectable 40 milliGRAM(s) SubCutaneous every 24 hours  gabapentin 100 milliGRAM(s) Oral three times a day  glucagon  Injectable 1 milliGRAM(s) IntraMuscular once  influenza   Vaccine 0.5 milliLiter(s) IntraMuscular once  insulin glargine Injectable (LANTUS) 30 Unit(s) SubCutaneous at bedtime  insulin lispro (ADMELOG) corrective regimen sliding scale   SubCutaneous three times a day before meals  insulin lispro (ADMELOG) corrective regimen sliding scale   SubCutaneous at bedtime  insulin lispro Injectable (ADMELOG) 10 Unit(s) SubCutaneous three times a day before meals  lactobacillus acidophilus 1 Tablet(s) Oral two times a day with meals  mupirocin 2% Ointment 1 Application(s) Topical <User Schedule>  piperacillin/tazobactam IVPB.. 3.375 Gram(s) IV Intermittent every 8 hours    MEDICATIONS  (PRN):  acetaminophen     Tablet .. 650 milliGRAM(s) Oral every 6 hours PRN Temp greater or equal to 38C (100.4F), Mild Pain (1 - 3), Moderate Pain (4 - 6)  dextrose Oral Gel 15 Gram(s) Oral once PRN Blood Glucose LESS THAN 70 milliGRAM(s)/deciliter  labetalol Injectable 10 milliGRAM(s) IV Push every 8 hours PRN Systolic blood pressure >180  melatonin 3 milliGRAM(s) Oral at bedtime PRN Insomnia      Allergies    No Known Allergies    Intolerances        REVIEW OF SYSTEMS:  CONSTITUTIONAL: No fever, weight loss, or fatigue  EYES: No eye pain, visual disturbances, or discharge  ENMT:  No difficulty hearing, tinnitus, vertigo; No sinus or throat pain  NECK: No pain or stiffness  BREASTS: No pain, masses, or nipple discharge  RESPIRATORY: No cough, wheezing, chills or hemoptysis; No shortness of breath  CARDIOVASCULAR: No chest pain, palpitations, dizziness, or leg swelling  GASTROINTESTINAL: No abdominal or epigastric pain. No nausea, vomiting, or hematemesis; No diarrhea or constipation. No melena or hematochezia.  GENITOURINARY: No dysuria, frequency, hematuria, or incontinence  NEUROLOGICAL: No headaches, memory loss, loss of strength, numbness, or tremors  SKIN: No itching, burning, rashes, or lesions     Vital Signs Last 24 Hrs  T(C): 36.6 (28 Sep 2024 06:16), Max: 37.7 (27 Sep 2024 21:03)  T(F): 97.8 (28 Sep 2024 06:16), Max: 99.9 (27 Sep 2024 21:03)  HR: 80 (28 Sep 2024 06:16) (80 - 89)  BP: 143/83 (28 Sep 2024 06:16) (143/83 - 151/88)  BP(mean): --  RR: 18 (28 Sep 2024 06:16) (18 - 18)  SpO2: 97% (28 Sep 2024 06:16) (94% - 97%)    Parameters below as of 28 Sep 2024 06:16  Patient On (Oxygen Delivery Method): room air        PHYSICAL EXAM:  GENERAL: NAD, well-groomed, well-developed  HEAD:  Atraumatic, Normocephalic  EYES: EOMI, PERRLA, conjunctiva and sclera clear  ENMT: Moist mucous membranes,   NECK: Supple, No JVD, Normal thyroid  NERVOUS SYSTEM:  Alert & Oriented X3, Good concentration; Motor Strength 5/5 B/L upper and lower extremities; DTRs 2+ intact and symmetric  CHEST/LUNG: Clear to percussion bilaterally;   HEART: Regular rate and rhythm;   ABDOMEN: Soft, Nontender, Nondistended; Bowel sounds present  EXTREMITIES:  foot in dressing  SKIN: No rashes or lesions    LABS:                        9.7    7.02  )-----------( 169      ( 28 Sep 2024 06:00 )             29.1     09-28    135  |  99  |  65[H]  ----------------------------<  229[H]  4.9   |  27  |  3.31[H]    Ca    10.1      28 Sep 2024 06:00    TPro  7.4  /  Alb  2.6[L]  /  TBili  0.6  /  DBili  x   /  AST  40  /  ALT  57  /  AlkPhos  120  09-28      Urinalysis Basic - ( 28 Sep 2024 06:00 )    Color: x / Appearance: x / SG: x / pH: x  Gluc: 229 mg/dL / Ketone: x  / Bili: x / Urobili: x   Blood: x / Protein: x / Nitrite: x   Leuk Esterase: x / RBC: x / WBC x   Sq Epi: x / Non Sq Epi: x / Bacteria: x      CAPILLARY BLOOD GLUCOSE      POCT Blood Glucose.: 209 mg/dL (28 Sep 2024 07:51)  POCT Blood Glucose.: 293 mg/dL (27 Sep 2024 20:56)  POCT Blood Glucose.: 243 mg/dL (27 Sep 2024 16:57)  POCT Blood Glucose.: 146 mg/dL (27 Sep 2024 11:52)          I&O's Summary      RADIOLOGY & ADDITIONAL TESTS:    Imaging Personally Reviewed:  [ x] YES  [ ] NO    Consultant(s) Notes Reviewed:  [x ] YES  [ ] NO    Care Discussed with Consultants/Other Providers [x ] YES  [ ] NO

## 2024-09-28 NOTE — PROGRESS NOTE ADULT - SUBJECTIVE AND OBJECTIVE BOX
PROGRESS NOTE   Patient is a 49y old  Male who presents with a chief complaint of infected toe (28 Sep 2024 13:08)      HPI:  49-year-old male with past medical history of DM2, HTN, HLD, NEUROPATHY, PVD, NON COMPLIANT WITH TT, ETOH ABUSE, presents today due to left toe infection.  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  Patient experienced a blister to the left toe in which he eventually fell orders follow-up on its own.  Patient notes that the blister opened up and he developed some blood in the blister as well.  Patient notes that the blister scab has slightly fallen off but now his toe is red and painful.  Patient not currently on antibiotics.  Patient denies fever, injury, numbness, weakness, or any other complaints.   IN ED TEMP 99, /100, HR 89, had consult with podiatry and sidney started on Cipro and admitted for further management (17 Sep 2024 19:36)      Vital Signs Last 24 Hrs  T(C): 36.7 (28 Sep 2024 13:28), Max: 37.7 (27 Sep 2024 21:03)  T(F): 98 (28 Sep 2024 13:28), Max: 99.9 (27 Sep 2024 21:03)  HR: 89 (28 Sep 2024 13:28) (80 - 89)  BP: 152/90 (28 Sep 2024 13:28) (143/83 - 152/90)  BP(mean): --  RR: 18 (28 Sep 2024 13:28) (18 - 18)  SpO2: 95% (28 Sep 2024 13:28) (95% - 97%)    Parameters below as of 28 Sep 2024 06:16  Patient On (Oxygen Delivery Method): room air                              9.7    7.02  )-----------( 169      ( 28 Sep 2024 06:00 )             29.1               09-28    135  |  99  |  65[H]  ----------------------------<  229[H]  4.9   |  27  |  3.31[H]    Ca    10.1      28 Sep 2024 06:00    TPro  7.4  /  Alb  2.6[L]  /  TBili  0.6  /  DBili  x   /  AST  40  /  ALT  57  /  AlkPhos  120  09-28      PHYSICAL EXAM  SUZANNA KAISER is a pleasant well-nourished, well developed 49y Male in no acute distress, alert awake, and oriented to person, place and time.   LE Focused:  Vascular: Dorsalis Pedis and Posterior Tibial pulses 2/4.  Capillary re-fill time less then 3 seconds digits 1-5 bilateral.    Neuro: Protective sensation diminished to the level of the digits bilateral.  MSK: Muscle strength 5/5 all major muscle groups bilateral.  Derm: POD#8. Incision site of the Left foot noted with necrotic changes to the distal flap. Dorsal and plantar flap showing signs of  venous congestion.  Guarded prognosis for the flap at this time. Upon elevation there is decreased venous congestion.  Will allow the region to cont to demarcate.        PROGRESS NOTE   Patient is a 49y old  Male who presents with a chief complaint of infected toe (28 Sep 2024 13:08)      HPI:  49-year-old male with past medical history of DM2, HTN, HLD, NEUROPATHY, PVD, NON COMPLIANT WITH TT, ETOH ABUSE, presents today due to left toe infection.  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  Patient experienced a blister to the left toe in which he eventually fell orders follow-up on its own.  Patient notes that the blister opened up and he developed some blood in the blister as well.  Patient notes that the blister scab has slightly fallen off but now his toe is red and painful.  Patient not currently on antibiotics.  Patient denies fever, injury, numbness, weakness, or any other complaints.   IN ED TEMP 99, /100, HR 89, had consult with podiatry and sidney started on Cipro and admitted for further management (17 Sep 2024 19:36)      Vital Signs Last 24 Hrs  T(C): 36.7 (28 Sep 2024 13:28), Max: 37.7 (27 Sep 2024 21:03)  T(F): 98 (28 Sep 2024 13:28), Max: 99.9 (27 Sep 2024 21:03)  HR: 89 (28 Sep 2024 13:28) (80 - 89)  BP: 152/90 (28 Sep 2024 13:28) (143/83 - 152/90)  BP(mean): --  RR: 18 (28 Sep 2024 13:28) (18 - 18)  SpO2: 95% (28 Sep 2024 13:28) (95% - 97%)    Parameters below as of 28 Sep 2024 06:16  Patient On (Oxygen Delivery Method): room air                              9.7    7.02  )-----------( 169      ( 28 Sep 2024 06:00 )             29.1               09-28    135  |  99  |  65[H]  ----------------------------<  229[H]  4.9   |  27  |  3.31[H]    Ca    10.1      28 Sep 2024 06:00    TPro  7.4  /  Alb  2.6[L]  /  TBili  0.6  /  DBili  x   /  AST  40  /  ALT  57  /  AlkPhos  120  09-28      PHYSICAL EXAM  SUZANNA KAISER is a pleasant well-nourished, well developed 49y Male in no acute distress, alert awake, and oriented to person, place and time.   LE Focused:  Vascular: Dorsalis Pedis and Posterior Tibial pulses 2/4.  Capillary re-fill time less then 3 seconds digits 1-5 bilateral.    Neuro: Protective sensation diminished to the level of the digits bilateral.  MSK: Muscle strength 5/5 all major muscle groups bilateral.  Derm: POD#8. Incision site of the Left foot noted with necrotic changes to the most  distal aspect of the  flap. Dorsal and plantar flap showing signs of  venous congestion.  Guarded prognosis for the flap at this time. Upon elevation there is decreased venous congestion.  Will allow the region to cont to demarcate.

## 2024-09-28 NOTE — PROGRESS NOTE ADULT - ASSESSMENT
50YO M PMH DM2, HTN, HLD, NEUROPATHY, PVD, NON COMPLIANT WITH TT, ETOH ABUSE, admitted with infected DM left great toe infection  MRI + OM  Sp Left great toe amputation 9/20  OR bone cultures noted with E Faecalis Amp S and Vanco S  Left foot amputation stump with some necrotic changes distal flap. Dorsal and plantar flap showing signs of  venous congestion.  Path with acute on chronic OM on prox clean margins    RECOMMENDATIONS   with residual osteo plan for further surgery Tuesday  continue abx for now Zosyn based on micro    amoxicillin taken 9/27 with no issues so removed PCN allergy    Thank you for consulting us and involving us in the management of this most interesting and challenging case.  We will follow along in the care of this patient. Please call us at [phone] or text me directly on my cell# at [phone] with any concerns.

## 2024-09-28 NOTE — PROGRESS NOTE ADULT - ASSESSMENT
The patient is a 49 year old male with a history of HTN, HL, DM, PAD who presents with a toe infection.    9/28/24  Seen at [facility]  Sitting at edge of bed, eating lunch  No cardiac complaints offered    Plan:  - ECG with sinus rhythm and no evidence of ischemia/infarction  - Off of losartan  - Continue amlodipine 10 mg daily  - Continue carvedilol 6.25 mg bid  - LE arterial duplex with no significant stenosis  - MRI foot with acute/chronic osteomyelitis  - Path margins with +osteomyelitis  - Podiatry and ID follow-up   The patient is a 49 year old male with a history of HTN, HL, DM, PAD who presents with a toe infection.    9/28/24  Seen at [facility]  Sitting at edge of bed, eating lunch  No cardiac complaints offered  BUN/Creat-65/3.31    Plan:  - ECG with sinus rhythm and no evidence of ischemia/infarction  - Off of losartan  - Continue amlodipine 10 mg daily  - Continue carvedilol 6.25 mg bid  - LE arterial duplex with no significant stenosis  - MRI foot with acute/chronic osteomyelitis  - Path margins with +osteomyelitis  - Podiatry and ID follow-up

## 2024-09-28 NOTE — PROGRESS NOTE ADULT - PROBLEM SELECTOR PLAN 1
Patient seen and evaluated.  Pt is s/p debridement of all non-viable soft tissue and bone left foot with hallux amputation.  Surgical pathology : Demonstrates acute on chronic OM on prox clean margins.  Concern for flap due to  venous congestion. Will continue to monitor closely.   Partial weight bearing on left foot.  Pt instructed to elevate his operative extremity on two pillows.  Pt instructed to limit standing and walking on the operative extremity.  Appreciate Vascular consult.   Continue IV Abx as per ID.  If dressings get wet or fall off, please change with:  1. xeroform  2. gauze  3. abd pad  4. kerlex  5. ace bandage  Will cont to monitor while in house.  Will discuss with all attendings. Patient seen and evaluated.  Pt is s/p debridement of all non-viable soft tissue and bone left foot with hallux amputation.  Surgical pathology : Demonstrates acute on chronic OM on prox clean margins.  Concern for flap due to  venous congestion. Will continue to monitor closely.   Partial weight bearing on left foot.  Pt instructed to elevate his operative extremity on two pillows.  Pt instructed to limit standing and walking on the operative extremity.  Appreciate Vascular consult.   Continue IV Abx as per ID.  If dressings get wet or fall off, please change with:  1. xeroform  2. gauze  3. abd pad  4. kerlex  5. ace bandage  Pt understands that additional surgical procedures may be required in the future   Will cont to monitor while in house.  Will discuss with all attendings.

## 2024-09-28 NOTE — PROGRESS NOTE ADULT - SUBJECTIVE AND OBJECTIVE BOX
[facility] DIV[facility] of INFECTIOUS DISEASE  Bob Giang MD PhD, Sary Staples MD, Nancy Rothman MD, Vaishnavi Rogers MD, Kameron Harrison MD  and providing coverage with Linsey Mack MD  Providing Infectious Disease Consultations at [facility], [facility], [facility]'s    Office# [phone] to schedule follow up appointments  Answering Service for urgent calls or New Consults [phone]  Cell# to text for urgent issues Bob Giang [phone]     infectious diseases progress note:    SUZANNA KAISER is a 49y y. o. Male patient    Overnight and events of the last 24hrs reviewed    Allergies    No Known Allergies    Intolerances        ANTIBIOTICS/RELEVANT:  antimicrobials  piperacillin/tazobactam IVPB.. 3.375 Gram(s) IV Intermittent every 8 hours    immunologic:  influenza   Vaccine 0.5 milliLiter(s) IntraMuscular once    OTHER:  acetaminophen     Tablet .. 650 milliGRAM(s) Oral every 6 hours PRN  amLODIPine   Tablet 10 milliGRAM(s) Oral daily  carvedilol 6.25 milliGRAM(s) Oral every 12 hours  dextrose 5%. 1000 milliLiter(s) IV Continuous <Continuous>  dextrose 5%. 1000 milliLiter(s) IV Continuous <Continuous>  dextrose 50% Injectable 12.5 Gram(s) IV Push once  dextrose 50% Injectable 25 Gram(s) IV Push once  dextrose 50% Injectable 25 Gram(s) IV Push once  dextrose Oral Gel 15 Gram(s) Oral once PRN  enoxaparin Injectable 40 milliGRAM(s) SubCutaneous every 24 hours  gabapentin 100 milliGRAM(s) Oral three times a day  glucagon  Injectable 1 milliGRAM(s) IntraMuscular once  insulin glargine Injectable (LANTUS) 30 Unit(s) SubCutaneous at bedtime  insulin lispro (ADMELOG) corrective regimen sliding scale   SubCutaneous three times a day before meals  insulin lispro (ADMELOG) corrective regimen sliding scale   SubCutaneous at bedtime  insulin lispro Injectable (ADMELOG) 10 Unit(s) SubCutaneous three times a day before meals  labetalol Injectable 10 milliGRAM(s) IV Push every 8 hours PRN  lactobacillus acidophilus 1 Tablet(s) Oral two times a day with meals  melatonin 3 milliGRAM(s) Oral at bedtime PRN  mupirocin 2% Ointment 1 Application(s) Topical <User Schedule>      Objective:  Vital Signs Last 24 Hrs  T(C): 36.6 (28 Sep 2024 06:16), Max: 37.7 (27 Sep 2024 21:03)  T(F): 97.8 (28 Sep 2024 06:16), Max: 99.9 (27 Sep 2024 21:03)  HR: 80 (28 Sep 2024 06:16) (80 - 89)  BP: 143/83 (28 Sep 2024 06:16) (143/83 - 151/88)  BP(mean): --  RR: 18 (28 Sep 2024 06:16) (18 - 18)  SpO2: 97% (28 Sep 2024 06:16) (94% - 97%)    Parameters below as of 28 Sep 2024 06:16  Patient On (Oxygen Delivery Method): room air        T(C): 36.6 (09-28-24 @ 06:16), Max: 38.1 (09-26-24 @ 13:52)  T(C): 36.6 (09-28-24 @ 06:16), Max: 38.1 (09-26-24 @ 13:52)  T(C): 36.6 (09-28-24 @ 06:16), Max: 38.1 (09-26-24 @ 13:52)    PHYSICAL EXAM:  HEENT: NC atraumatic  Neck: supple  Respiratory: no accessory muscle use, breathing comfortably  Cardiovascular: distant  Gastrointestinal: normal appearing, nondistended  Extremities: no clubbing, no cyanosis,        LABS:                          9.7    7.02  )-----------( 169      ( 28 Sep 2024 06:00 )             29.1       WBC  7.02 09-28 @ 06:00  7.38 09-27 @ 07:40  7.42 09-26 @ 07:52  8.15 09-25 @ 08:01  8.25 09-24 @ 07:52  9.72 09-23 @ 08:03      09-28    135  |  99  |  65[H]  ----------------------------<  229[H]  4.9   |  27  |  3.31[H]    Ca    10.1      28 Sep 2024 06:00    TPro  7.4  /  Alb  2.6[L]  /  TBili  0.6  /  DBili  x   /  AST  40  /  ALT  57  /  AlkPhos  120  09-28      Creatinine: 3.31 mg/dL (09-28-24 @ 06:00)  Creatinine: 2.92 mg/dL (09-27-24 @ 07:40)  Creatinine: 3.01 mg/dL (09-26-24 @ 07:52)  Creatinine: 3.08 mg/dL (09-25-24 @ 08:01)  Creatinine: 2.69 mg/dL (09-24-24 @ 07:52)  Creatinine: 2.60 mg/dL (09-23-24 @ 08:03)        Urinalysis Basic - ( 28 Sep 2024 06:00 )    Color: x / Appearance: x / SG: x / pH: x  Gluc: 229 mg/dL / Ketone: x  / Bili: x / Urobili: x   Blood: x / Protein: x / Nitrite: x   Leuk Esterase: x / RBC: x / WBC x   Sq Epi: x / Non Sq Epi: x / Bacteria: x            INFLAMMATORY MARKERS      MICROBIOLOGY:              RADIOLOGY & ADDITIONAL STUDIES:

## 2024-09-29 LAB
ALBUMIN SERPL ELPH-MCNC: 2.8 G/DL — LOW (ref 3.3–5)
ALP SERPL-CCNC: 132 U/L — HIGH (ref 30–120)
ALT FLD-CCNC: 69 U/L — HIGH (ref 10–60)
ANION GAP SERPL CALC-SCNC: 7 MMOL/L — SIGNIFICANT CHANGE UP (ref 5–17)
AST SERPL-CCNC: 44 U/L — HIGH (ref 10–40)
BASOPHILS # BLD AUTO: 0.04 K/UL — SIGNIFICANT CHANGE UP (ref 0–0.2)
BASOPHILS NFR BLD AUTO: 0.5 % — SIGNIFICANT CHANGE UP (ref 0–2)
BILIRUB SERPL-MCNC: 0.7 MG/DL — SIGNIFICANT CHANGE UP (ref 0.2–1.2)
BUN SERPL-MCNC: 65 MG/DL — HIGH (ref 7–23)
CALCIUM SERPL-MCNC: 9.5 MG/DL — SIGNIFICANT CHANGE UP (ref 8.4–10.5)
CHLORIDE SERPL-SCNC: 99 MMOL/L — SIGNIFICANT CHANGE UP (ref 96–108)
CO2 SERPL-SCNC: 28 MMOL/L — SIGNIFICANT CHANGE UP (ref 22–31)
CREAT SERPL-MCNC: 3.22 MG/DL — HIGH (ref 0.5–1.3)
EGFR: 23 ML/MIN/1.73M2 — LOW
EOSINOPHIL # BLD AUTO: 0.22 K/UL — SIGNIFICANT CHANGE UP (ref 0–0.5)
EOSINOPHIL NFR BLD AUTO: 2.9 % — SIGNIFICANT CHANGE UP (ref 0–6)
GLUCOSE BLDC GLUCOMTR-MCNC: 181 MG/DL — HIGH (ref 70–99)
GLUCOSE BLDC GLUCOMTR-MCNC: 231 MG/DL — HIGH (ref 70–99)
GLUCOSE BLDC GLUCOMTR-MCNC: 293 MG/DL — HIGH (ref 70–99)
GLUCOSE BLDC GLUCOMTR-MCNC: 325 MG/DL — HIGH (ref 70–99)
GLUCOSE SERPL-MCNC: 212 MG/DL — HIGH (ref 70–99)
HCT VFR BLD CALC: 29.9 % — LOW (ref 39–50)
HGB BLD-MCNC: 9.9 G/DL — LOW (ref 13–17)
IMM GRANULOCYTES NFR BLD AUTO: 1.2 % — HIGH (ref 0–0.9)
LYMPHOCYTES # BLD AUTO: 1.08 K/UL — SIGNIFICANT CHANGE UP (ref 1–3.3)
LYMPHOCYTES # BLD AUTO: 14 % — SIGNIFICANT CHANGE UP (ref 13–44)
MCHC RBC-ENTMCNC: 29.6 PG — SIGNIFICANT CHANGE UP (ref 27–34)
MCHC RBC-ENTMCNC: 33.1 G/DL — SIGNIFICANT CHANGE UP (ref 32–36)
MCV RBC AUTO: 89.3 FL — SIGNIFICANT CHANGE UP (ref 80–100)
MONOCYTES # BLD AUTO: 0.89 K/UL — SIGNIFICANT CHANGE UP (ref 0–0.9)
MONOCYTES NFR BLD AUTO: 11.6 % — SIGNIFICANT CHANGE UP (ref 2–14)
NEUTROPHILS # BLD AUTO: 5.37 K/UL — SIGNIFICANT CHANGE UP (ref 1.8–7.4)
NEUTROPHILS NFR BLD AUTO: 69.8 % — SIGNIFICANT CHANGE UP (ref 43–77)
NRBC # BLD: 0 /100 WBCS — SIGNIFICANT CHANGE UP (ref 0–0)
PLATELET # BLD AUTO: 206 K/UL — SIGNIFICANT CHANGE UP (ref 150–400)
POTASSIUM SERPL-MCNC: 4.9 MMOL/L — SIGNIFICANT CHANGE UP (ref 3.5–5.3)
POTASSIUM SERPL-MCNC: 5.5 MMOL/L — HIGH (ref 3.5–5.3)
POTASSIUM SERPL-SCNC: 4.9 MMOL/L — SIGNIFICANT CHANGE UP (ref 3.5–5.3)
POTASSIUM SERPL-SCNC: 5.5 MMOL/L — HIGH (ref 3.5–5.3)
PROT SERPL-MCNC: 6.9 G/DL — SIGNIFICANT CHANGE UP (ref 6–8.3)
RBC # BLD: 3.35 M/UL — LOW (ref 4.2–5.8)
RBC # FLD: 12.2 % — SIGNIFICANT CHANGE UP (ref 10.3–14.5)
SODIUM SERPL-SCNC: 134 MMOL/L — LOW (ref 135–145)
WBC # BLD: 7.69 K/UL — SIGNIFICANT CHANGE UP (ref 3.8–10.5)
WBC # FLD AUTO: 7.69 K/UL — SIGNIFICANT CHANGE UP (ref 3.8–10.5)

## 2024-09-29 RX ORDER — INSULIN GLARGINE 300 U/ML
35 INJECTION, SOLUTION SUBCUTANEOUS AT BEDTIME
Refills: 0 | Status: DISCONTINUED | OUTPATIENT
Start: 2024-09-29 | End: 2024-10-01

## 2024-09-29 RX ORDER — SODIUM ZIRCONIUM CYCLOSILICATE 10 G/10G
5 POWDER, FOR SUSPENSION ORAL ONCE
Refills: 0 | Status: COMPLETED | OUTPATIENT
Start: 2024-09-29 | End: 2024-09-29

## 2024-09-29 RX ORDER — DIPHENHYDRAMINE HCL 12.5MG/5ML
25 LIQUID (ML) ORAL EVERY 6 HOURS
Refills: 0 | Status: DISCONTINUED | OUTPATIENT
Start: 2024-09-29 | End: 2024-10-04

## 2024-09-29 RX ORDER — INSULIN LISPRO 100/ML
12 VIAL (ML) SUBCUTANEOUS
Refills: 0 | Status: DISCONTINUED | OUTPATIENT
Start: 2024-09-29 | End: 2024-10-01

## 2024-09-29 RX ADMIN — Medication 10 UNIT(S): at 12:16

## 2024-09-29 RX ADMIN — Medication 6: at 16:56

## 2024-09-29 RX ADMIN — PIPERACILLIN SODIUM AND TAZOBACTAM SODIUM 25 GRAM(S): 12; 1.5 INJECTION, POWDER, LYOPHILIZED, FOR SOLUTION INTRAVENOUS at 15:05

## 2024-09-29 RX ADMIN — GABAPENTIN 100 MILLIGRAM(S): 800 TABLET, FILM COATED ORAL at 05:43

## 2024-09-29 RX ADMIN — Medication 10 MILLIGRAM(S): at 05:42

## 2024-09-29 RX ADMIN — Medication 1 TABLET(S): at 08:06

## 2024-09-29 RX ADMIN — Medication 4: at 21:15

## 2024-09-29 RX ADMIN — ENOXAPARIN SODIUM 40 MILLIGRAM(S): 150 INJECTION SUBCUTANEOUS at 21:18

## 2024-09-29 RX ADMIN — SODIUM ZIRCONIUM CYCLOSILICATE 5 GRAM(S): 10 POWDER, FOR SUSPENSION ORAL at 13:05

## 2024-09-29 RX ADMIN — GABAPENTIN 100 MILLIGRAM(S): 800 TABLET, FILM COATED ORAL at 15:05

## 2024-09-29 RX ADMIN — Medication 2: at 12:15

## 2024-09-29 RX ADMIN — PIPERACILLIN SODIUM AND TAZOBACTAM SODIUM 25 GRAM(S): 12; 1.5 INJECTION, POWDER, LYOPHILIZED, FOR SOLUTION INTRAVENOUS at 21:15

## 2024-09-29 RX ADMIN — INSULIN GLARGINE 35 UNIT(S): 300 INJECTION, SOLUTION SUBCUTANEOUS at 21:14

## 2024-09-29 RX ADMIN — Medication 6.25 MILLIGRAM(S): at 17:01

## 2024-09-29 RX ADMIN — Medication 10 UNIT(S): at 08:05

## 2024-09-29 RX ADMIN — Medication 1 TABLET(S): at 16:58

## 2024-09-29 RX ADMIN — PIPERACILLIN SODIUM AND TAZOBACTAM SODIUM 25 GRAM(S): 12; 1.5 INJECTION, POWDER, LYOPHILIZED, FOR SOLUTION INTRAVENOUS at 05:43

## 2024-09-29 RX ADMIN — Medication 4: at 08:04

## 2024-09-29 RX ADMIN — Medication 3 MILLIGRAM(S): at 21:16

## 2024-09-29 RX ADMIN — MUPIROCIN 1 APPLICATION(S): 20 OINTMENT TOPICAL at 15:05

## 2024-09-29 RX ADMIN — GABAPENTIN 100 MILLIGRAM(S): 800 TABLET, FILM COATED ORAL at 21:16

## 2024-09-29 RX ADMIN — Medication 12 UNIT(S): at 16:57

## 2024-09-29 RX ADMIN — Medication 6.25 MILLIGRAM(S): at 05:42

## 2024-09-29 NOTE — PROGRESS NOTE ADULT - PROBLEM SELECTOR PLAN 1
Patient seen and evaluated.  Pt is s/p debridement of all non-viable soft tissue and bone left foot with hallux amputation.  Surgical pathology : Demonstrates acute on chronic OM on prox clean margins.  Concern for flap due to  venous congestion. Will continue to monitor closely.   Partial weight bearing on left foot.  Pt instructed to elevate his operative extremity on two pillows.  Pt instructed to limit standing and walking on the operative extremity.  Appreciate Vascular consult.   Continue IV Abx as per ID.  If dressings get wet or fall off, please change with:  1. xeroform  2. gauze  3. abd pad  4. kerlex  5. ace bandage  Pt understands that additional surgical procedures may be required in the future   Will cont to monitor while in house.  Will discuss with all attendings.

## 2024-09-29 NOTE — PROGRESS NOTE ADULT - SUBJECTIVE AND OBJECTIVE BOX
CAPILLARY BLOOD GLUCOSE      POCT Blood Glucose.: 181 mg/dL (29 Sep 2024 12:07)  POCT Blood Glucose.: 231 mg/dL (29 Sep 2024 07:50)  POCT Blood Glucose.: 246 mg/dL (28 Sep 2024 20:56)  POCT Blood Glucose.: 213 mg/dL (28 Sep 2024 16:57)      Vital Signs Last 24 Hrs  T(C): 37.2 (29 Sep 2024 05:58), Max: 37.2 (29 Sep 2024 05:58)  T(F): 98.9 (29 Sep 2024 05:58), Max: 98.9 (29 Sep 2024 05:58)  HR: 86 (29 Sep 2024 05:58) (86 - 90)  BP: 160/99 (29 Sep 2024 05:58) (118/73 - 160/99)  BP(mean): --  RR: 18 (29 Sep 2024 05:58) (18 - 18)  SpO2: 96% (29 Sep 2024 05:58) (96% - 96%)    Parameters below as of 29 Sep 2024 05:58  Patient On (Oxygen Delivery Method): room air        General: WN/WD NAD  Respiratory: CTA B/L  CV: RRR, S1S2, no murmurs, rubs or gallops  Abdominal: Soft, NT, ND +BS, Last BM  Extremities: le foot dsg     09-29    134[L]  |  99  |  65[H]  ----------------------------<  212[H]  5.5[H]   |  28  |  3.22[H]    Ca    9.5      29 Sep 2024 12:20    TPro  6.9  /  Alb  2.8[L]  /  TBili  0.7  /  DBili  x   /  AST  44[H]  /  ALT  69[H]  /  AlkPhos  132[H]  09-29      dextrose 50% Injectable 25 Gram(s) IV Push once  dextrose 50% Injectable 12.5 Gram(s) IV Push once  dextrose 50% Injectable 25 Gram(s) IV Push once  dextrose Oral Gel 15 Gram(s) Oral once PRN  glucagon  Injectable 1 milliGRAM(s) IntraMuscular once  insulin glargine Injectable (LANTUS) 30 Unit(s) SubCutaneous at bedtime  insulin lispro (ADMELOG) corrective regimen sliding scale   SubCutaneous three times a day before meals  insulin lispro (ADMELOG) corrective regimen sliding scale   SubCutaneous at bedtime  insulin lispro Injectable (ADMELOG) 10 Unit(s) SubCutaneous three times a day before meals

## 2024-09-29 NOTE — PROGRESS NOTE ADULT - SUBJECTIVE AND OBJECTIVE BOX
Patient is a 49y Male with a known history of :  Diabetic toe ulcer [E11.621]    Uncontrolled type 2 diabetes mellitus with hyperglycemia [E11.65]    Osteomyelitis of ankle or foot, acute [M86.179]      HPI:  49-year-old male with past medical history of DM2, HTN, HLD, NEUROPATHY, PVD, NON COMPLIANT WITH TT, ETOH ABUSE, presents today due to left toe infection.  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  Patient experienced a blister to the left toe in which he eventually fell orders follow-up on its own.  Patient notes that the blister opened up and he developed some blood in the blister as well.  Patient notes that the blister scab has slightly fallen off but now his toe is red and painful.  Patient not currently on antibiotics.  Patient denies fever, injury, numbness, weakness, or any other complaints.   IN ED TEMP 99, /100, HR 89, had consult with podiatry and sidney started on Cipro and admitted for further management (17 Sep 2024 19:36)      REVIEW OF SYSTEMS:    CONSTITUTIONAL: No fever, weight loss, or fatigue  EYES: No eye pain, visual disturbances, or discharge  ENMT:  No difficulty hearing, tinnitus, vertigo; No sinus or throat pain  NECK: No pain or stiffness  BREASTS: No pain, masses, or nipple discharge  RESPIRATORY: No cough, wheezing, chills or hemoptysis; No shortness of breath  CARDIOVASCULAR: No chest pain, palpitations, dizziness, or leg swelling  GASTROINTESTINAL: No abdominal or epigastric pain. No nausea, vomiting, or hematemesis; No diarrhea or constipation. No melena or hematochezia.  GENITOURINARY: No dysuria, frequency, hematuria, or incontinence  NEUROLOGICAL: No headaches, memory loss, loss of strength, numbness, or tremors  SKIN: No itching, burning, rashes, or lesions   LYMPH NODES: No enlarged glands  ENDOCRINE: No heat or cold intolerance; No hair loss  MUSCULOSKELETAL: No joint pain or swelling; No muscle, back, or extremity pain  PSYCHIATRIC: No depression, anxiety, mood swings, or difficulty sleeping  HEME/LYMPH: No easy bruising, or bleeding gums  ALLERGY AND IMMUNOLOGIC: No hives or eczema    MEDICATIONS  (STANDING):  amLODIPine   Tablet 10 milliGRAM(s) Oral daily  carvedilol 6.25 milliGRAM(s) Oral every 12 hours  dextrose 5%. 1000 milliLiter(s) (50 mL/Hr) IV Continuous <Continuous>  dextrose 5%. 1000 milliLiter(s) (100 mL/Hr) IV Continuous <Continuous>  dextrose 50% Injectable 12.5 Gram(s) IV Push once  dextrose 50% Injectable 25 Gram(s) IV Push once  dextrose 50% Injectable 25 Gram(s) IV Push once  enoxaparin Injectable 40 milliGRAM(s) SubCutaneous every 24 hours  gabapentin 100 milliGRAM(s) Oral three times a day  glucagon  Injectable 1 milliGRAM(s) IntraMuscular once  influenza   Vaccine 0.5 milliLiter(s) IntraMuscular once  insulin glargine Injectable (LANTUS) 30 Unit(s) SubCutaneous at bedtime  insulin lispro (ADMELOG) corrective regimen sliding scale   SubCutaneous three times a day before meals  insulin lispro (ADMELOG) corrective regimen sliding scale   SubCutaneous at bedtime  insulin lispro Injectable (ADMELOG) 10 Unit(s) SubCutaneous three times a day before meals  lactobacillus acidophilus 1 Tablet(s) Oral two times a day with meals  mupirocin 2% Ointment 1 Application(s) Topical <User Schedule>  piperacillin/tazobactam IVPB.. 3.375 Gram(s) IV Intermittent every 8 hours    MEDICATIONS  (PRN):  acetaminophen     Tablet .. 650 milliGRAM(s) Oral every 6 hours PRN Temp greater or equal to 38C (100.4F), Mild Pain (1 - 3), Moderate Pain (4 - 6)  dextrose Oral Gel 15 Gram(s) Oral once PRN Blood Glucose LESS THAN 70 milliGRAM(s)/deciliter  labetalol Injectable 10 milliGRAM(s) IV Push every 8 hours PRN Systolic blood pressure >180  melatonin 3 milliGRAM(s) Oral at bedtime PRN Insomnia      ALLERGIES: No Known Allergies      FAMILY HISTORY:      Social history:  Alochol:   Smoking:   Drug Use:   Marital Status:     PHYSICAL EXAMINATION:  -----------------------------  T(C): 37.2 (09-29-24 @ 05:58), Max: 37.2 (09-29-24 @ 05:58)  HR: 86 (09-29-24 @ 05:58) (86 - 90)  BP: 160/99 (09-29-24 @ 05:58) (118/73 - 160/99)  RR: 18 (09-29-24 @ 05:58) (18 - 18)  SpO2: 96% (09-29-24 @ 05:58) (95% - 96%)  Wt(kg): --    09-28 @ 07:01  -  09-29 @ 07:00  --------------------------------------------------------  IN:    IV PiggyBack: 150 mL    Oral Fluid: 480 mL  Total IN: 630 mL    OUT:    Voided (mL): 1700 mL  Total OUT: 1700 mL    Total NET: -1070 mL      09-29 @ 07:01  -  09-29 @ 09:52  --------------------------------------------------------  IN:    IV PiggyBack: 25 mL  Total IN: 25 mL    OUT:    Voided (mL): 800 mL  Total OUT: 800 mL    Total NET: -775 mL            Constitutional: well developed, normal appearance, well groomed, well nourished, no deformities and no acute distress.   Eyes: the conjunctiva exhibited no abnormalities and the eyelids demonstrated no xanthelasmas.   HEENT: normal oral mucosa, no oral pallor and no oral cyanosis.   Neck: normal jugular venous A waves present, normal jugular venous V waves present and no jugular venous berrios A waves.   Pulmonary: no respiratory distress, normal respiratory rhythm and effort, no accessory muscle use and lungs were clear to auscultation bilaterally. Anteriorly  Cardiovascular: heart rate and rhythm were normal, normal S1 and S2 and no murmur, gallop, rub, heave or thrill are present.   Musculoskeletal: the gait could not be assessed.   Extremities: no clubbing of the fingernails, no localized cyanosis, no petechial hemorrhages and no ischemic changes. Left foot with bandage  Skin: normal skin color and pigmentation, no rash, no venous stasis, no skin lesions, no skin ulcer and no xanthoma was observed.       LABS:   --------  09-28    135  |  99  |  65[H]  ----------------------------<  229[H]  4.9   |  27  |  3.31[H]    Ca    10.1      28 Sep 2024 06:00    TPro  7.4  /  Alb  2.6[L]  /  TBili  0.6  /  DBili  x   /  AST  40  /  ALT  57  /  AlkPhos  120  09-28                         9.9    7.69  )-----------( 206      ( 29 Sep 2024 06:00 )             29.9                   Radiology:

## 2024-09-29 NOTE — PROGRESS NOTE ADULT - ASSESSMENT
The patient is a 49 year old male with a history of HTN, HL, DM, PAD who presents with a toe infection.    9/29/24  Seen at [facility]  Lying flat, sleeping comfortably  BUN/Creat-65/3.31 (9/28/24)    Plan:  - ECG with sinus rhythm and no evidence of ischemia/infarction  - Off of losartan  - Continue amlodipine 10 mg daily  - Continue carvedilol 6.25 mg bid  - LE arterial duplex with no significant stenosis  - MRI foot with acute/chronic osteomyelitis  - Path margins with +osteomyelitis  - Podiatry and ID follow-up

## 2024-09-29 NOTE — PROGRESS NOTE ADULT - SUBJECTIVE AND OBJECTIVE BOX
Patient is a 49y old  Male who presents with a chief complaint of infected toe (23 Sep 2024 07:46)  Patient seen in follow up for CKD 4.        PAST MEDICAL HISTORY:  Prediabetes    HTN (hypertension)    HLD (hyperlipidemia)    DM2 (diabetes mellitus, type 2)      MEDICATIONS  (STANDING):  amLODIPine   Tablet 10 milliGRAM(s) Oral daily  carvedilol 6.25 milliGRAM(s) Oral every 12 hours  dextrose 5%. 1000 milliLiter(s) (100 mL/Hr) IV Continuous <Continuous>  dextrose 5%. 1000 milliLiter(s) (50 mL/Hr) IV Continuous <Continuous>  dextrose 50% Injectable 25 Gram(s) IV Push once  dextrose 50% Injectable 12.5 Gram(s) IV Push once  dextrose 50% Injectable 25 Gram(s) IV Push once  enoxaparin Injectable 40 milliGRAM(s) SubCutaneous every 24 hours  gabapentin 100 milliGRAM(s) Oral three times a day  glucagon  Injectable 1 milliGRAM(s) IntraMuscular once  influenza   Vaccine 0.5 milliLiter(s) IntraMuscular once  insulin glargine Injectable (LANTUS) 30 Unit(s) SubCutaneous at bedtime  insulin lispro (ADMELOG) corrective regimen sliding scale   SubCutaneous three times a day before meals  insulin lispro (ADMELOG) corrective regimen sliding scale   SubCutaneous at bedtime  insulin lispro Injectable (ADMELOG) 10 Unit(s) SubCutaneous three times a day before meals  lactobacillus acidophilus 1 Tablet(s) Oral two times a day with meals  mupirocin 2% Ointment 1 Application(s) Topical <User Schedule>  piperacillin/tazobactam IVPB.. 3.375 Gram(s) IV Intermittent every 8 hours    MEDICATIONS  (PRN):  acetaminophen     Tablet .. 650 milliGRAM(s) Oral every 6 hours PRN Temp greater or equal to 38C (100.4F), Mild Pain (1 - 3), Moderate Pain (4 - 6)  dextrose Oral Gel 15 Gram(s) Oral once PRN Blood Glucose LESS THAN 70 milliGRAM(s)/deciliter  labetalol Injectable 10 milliGRAM(s) IV Push every 8 hours PRN Systolic blood pressure >180  melatonin 3 milliGRAM(s) Oral at bedtime PRN Insomnia    T(C): 37.2 (09-29-24 @ 05:58), Max: 37.7 (09-27-24 @ 21:03)  HR: 86 (09-29-24 @ 05:58) (80 - 90)  BP: 160/99 (09-29-24 @ 05:58) (118/73 - 160/99)  RR: 18 (09-29-24 @ 05:58)  SpO2: 96% (09-29-24 @ 05:58)  Wt(kg): --  I&O's Detail    28 Sep 2024 07:01  -  29 Sep 2024 07:00  --------------------------------------------------------  IN:    IV PiggyBack: 150 mL    Oral Fluid: 480 mL  Total IN: 630 mL    OUT:    Voided (mL): 1700 mL  Total OUT: 1700 mL    Total NET: -1070 mL      29 Sep 2024 07:01  -  29 Sep 2024 12:41  --------------------------------------------------------  IN:    IV PiggyBack: 25 mL  Total IN: 25 mL    OUT:    Voided (mL): 800 mL  Total OUT: 800 mL    Total NET: -775 mL                PHYSICAL EXAM:  General: No distress  Respiratory: b/l air entry  Cardiovascular: S1 S2  Gastrointestinal: soft  Extremities:  no edema, left foot dressing                   LABORATORY:                        9.9    7.69  )-----------( 206      ( 29 Sep 2024 06:00 )             29.9     09-29    134[L]  |  99  |  65[H]  ----------------------------<  212[H]  5.5[H]   |  28  |  3.22[H]    Ca    9.5      29 Sep 2024 12:20    TPro  6.9  /  Alb  2.8[L]  /  TBili  0.7  /  DBili  x   /  AST  44[H]  /  ALT  69[H]  /  AlkPhos  132[H]  09-29    Sodium: 134 mmol/L (09-29 @ 12:20)  Sodium: 135 mmol/L (09-28 @ 06:00)    Potassium: 5.5 mmol/L (09-29 @ 12:20)  Potassium: 4.9 mmol/L (09-28 @ 06:00)    Hemoglobin: 9.9 g/dL (09-29 @ 06:00)  Hemoglobin: 9.7 g/dL (09-28 @ 06:00)  Hemoglobin: 9.9 g/dL (09-27 @ 07:40)    Creatinine, Serum 3.22 (09-29 @ 12:20)  Creatinine, Serum 3.31 (09-28 @ 06:00)  Creatinine, Serum 2.92 (09-27 @ 07:40)        LIVER FUNCTIONS - ( 29 Sep 2024 12:20 )  Alb: 2.8 g/dL / Pro: 6.9 g/dL / ALK PHOS: 132 U/L / ALT: 69 U/L / AST: 44 U/L / GGT: x           Urinalysis Basic - ( 29 Sep 2024 12:20 )    Color: x / Appearance: x / SG: x / pH: x  Gluc: 212 mg/dL / Ketone: x  / Bili: x / Urobili: x   Blood: x / Protein: x / Nitrite: x   Leuk Esterase: x / RBC: x / WBC x   Sq Epi: x / Non Sq Epi: x / Bacteria: x

## 2024-09-29 NOTE — PROGRESS NOTE ADULT - SUBJECTIVE AND OBJECTIVE BOX
Patient is a 49y old  Male who presents with a chief complaint of infected toe (29 Sep 2024 13:55)      INTERVAL HPI/OVERNIGHT EVENTS:    Home Medications:  non compliant with meds for months:  (17 Sep 2024 19:54)      MEDICATIONS  (STANDING):  amLODIPine   Tablet 10 milliGRAM(s) Oral daily  carvedilol 6.25 milliGRAM(s) Oral every 12 hours  dextrose 5%. 1000 milliLiter(s) (100 mL/Hr) IV Continuous <Continuous>  dextrose 5%. 1000 milliLiter(s) (50 mL/Hr) IV Continuous <Continuous>  dextrose 50% Injectable 25 Gram(s) IV Push once  dextrose 50% Injectable 12.5 Gram(s) IV Push once  dextrose 50% Injectable 25 Gram(s) IV Push once  enoxaparin Injectable 40 milliGRAM(s) SubCutaneous every 24 hours  gabapentin 100 milliGRAM(s) Oral three times a day  glucagon  Injectable 1 milliGRAM(s) IntraMuscular once  influenza   Vaccine 0.5 milliLiter(s) IntraMuscular once  insulin glargine Injectable (LANTUS) 35 Unit(s) SubCutaneous at bedtime  insulin lispro (ADMELOG) corrective regimen sliding scale   SubCutaneous three times a day before meals  insulin lispro (ADMELOG) corrective regimen sliding scale   SubCutaneous at bedtime  insulin lispro Injectable (ADMELOG) 12 Unit(s) SubCutaneous three times a day before meals  lactobacillus acidophilus 1 Tablet(s) Oral two times a day with meals  mupirocin 2% Ointment 1 Application(s) Topical <User Schedule>  piperacillin/tazobactam IVPB.. 3.375 Gram(s) IV Intermittent every 8 hours    MEDICATIONS  (PRN):  acetaminophen     Tablet .. 650 milliGRAM(s) Oral every 6 hours PRN Temp greater or equal to 38C (100.4F), Mild Pain (1 - 3), Moderate Pain (4 - 6)  dextrose Oral Gel 15 Gram(s) Oral once PRN Blood Glucose LESS THAN 70 milliGRAM(s)/deciliter  labetalol Injectable 10 milliGRAM(s) IV Push every 8 hours PRN Systolic blood pressure >180  melatonin 3 milliGRAM(s) Oral at bedtime PRN Insomnia      Allergies    No Known Allergies    Intolerances        REVIEW OF SYSTEMS:  CONSTITUTIONAL: No fever, weight loss, or fatigue  EYES: No eye pain, visual disturbances, or discharge  ENMT:  No difficulty hearing, tinnitus, vertigo; No sinus or throat pain  NECK: No pain or stiffness  BREASTS: No pain, masses, or nipple discharge  RESPIRATORY: No cough, wheezing, chills or hemoptysis; No shortness of breath  CARDIOVASCULAR: No chest pain, palpitations, dizziness, or leg swelling  GASTROINTESTINAL: No abdominal or epigastric pain. No nausea, vomiting, or hematemesis; No diarrhea or constipation. No melena or hematochezia.  GENITOURINARY: No dysuria, frequency, hematuria, or incontinence  NEUROLOGICAL: No headaches, memory loss, loss of strength, numbness, or tremors  SKIN: No itching, burning, rashes, or lesions   LYMPH NODES: No enlarged glands  ENDOCRINE: No heat or cold intolerance; No hair loss  MUSCULOSKELETAL: No joint pain or swelling; No muscle, back, or extremity pain  PSYCHIATRIC: No depression, anxiety, mood swings, or difficulty sleeping  HEME/LYMPH: No easy bruising, or bleeding gums  ALLERGY AND IMMUNOLOGIC: No hives or eczema    Vital Signs Last 24 Hrs  T(C): 37.6 (29 Sep 2024 20:44), Max: 37.6 (29 Sep 2024 20:44)  T(F): 99.7 (29 Sep 2024 20:44), Max: 99.7 (29 Sep 2024 20:44)  HR: 88 (29 Sep 2024 20:44) (74 - 93)  BP: 147/83 (29 Sep 2024 20:44) (121/75 - 160/99)  BP(mean): --  RR: 18 (29 Sep 2024 20:44) (18 - 18)  SpO2: 96% (29 Sep 2024 20:44) (93% - 96%)    Parameters below as of 29 Sep 2024 20:44  Patient On (Oxygen Delivery Method): room air        PHYSICAL EXAM:  GENERAL: NAD, well-groomed, well-developed  HEAD:  Atraumatic, Normocephalic  EYES: EOMI, PERRLA, conjunctiva and sclera clear  ENMT: Moist mucous membranes,   NECK: Supple, No JVD, Normal thyroid  NERVOUS SYSTEM:  Alert & Oriented X3, Good concentration; Motor Strength 5/5 B/L upper and lower extremities; DTRs 2+ intact and symmetric  CHEST/LUNG: Clear to percussion bilaterally; No rales, rhonchi, wheezing, or rubs  HEART: Regular rate and rhythm; No murmurs, rubs, or gallops  ABDOMEN: Soft, Nontender, Nondistended; Bowel sounds present  EXTREMITIES:  2+ Peripheral Pulses, No clubbing, cyanosis, or edema  LYMPH: No lymphadenopathy noted  SKIN: No rashes or lesions    LABS:                        9.9    7.69  )-----------( 206      ( 29 Sep 2024 06:00 )             29.9     09-29    x   |  x   |  x   ----------------------------<  x   4.9   |  x   |  x     Ca    9.5      29 Sep 2024 12:20    TPro  6.9  /  Alb  2.8[L]  /  TBili  0.7  /  DBili  x   /  AST  44[H]  /  ALT  69[H]  /  AlkPhos  132[H]  09-29      Urinalysis Basic - ( 29 Sep 2024 12:20 )    Color: x / Appearance: x / SG: x / pH: x  Gluc: 212 mg/dL / Ketone: x  / Bili: x / Urobili: x   Blood: x / Protein: x / Nitrite: x   Leuk Esterase: x / RBC: x / WBC x   Sq Epi: x / Non Sq Epi: x / Bacteria: x      CAPILLARY BLOOD GLUCOSE      POCT Blood Glucose.: 325 mg/dL (29 Sep 2024 20:56)  POCT Blood Glucose.: 293 mg/dL (29 Sep 2024 16:50)  POCT Blood Glucose.: 181 mg/dL (29 Sep 2024 12:07)  POCT Blood Glucose.: 231 mg/dL (29 Sep 2024 07:50)          I&O's Summary    28 Sep 2024 07:01  -  29 Sep 2024 07:00  --------------------------------------------------------  IN: 630 mL / OUT: 1700 mL / NET: -1070 mL    29 Sep 2024 07:01  -  29 Sep 2024 21:47  --------------------------------------------------------  IN: 25 mL / OUT: 800 mL / NET: -775 mL        RADIOLOGY & ADDITIONAL TESTS:    Imaging Personally Reviewed:  [ x] YES  [ ] NO    Consultant(s) Notes Reviewed:  x[ ] YES  [ ] NO    Care Discussed with Consultants/Other Providers [x ] YES  [ ] NO

## 2024-09-29 NOTE — PROGRESS NOTE ADULT - ASSESSMENT
CKD 4, Diabetic nephropathy  Diabetes, DFU   Hypertension  Mild hyperkalemia    Renal indices fluctuating but close to baseline. Low potassium diet. lose to baseline. Increase coreg for BP control. Off ARB.   Podiatry follow up. Monitor blood sugar levels. Insulin coverage as needed. Dietary restriction. Trend BP and titrate BP meds as needed   Will follow electrolytes and renal function trend. Avoid nephrotoxic meds as possible. Avoid ACEI, ARB, NSAIDs and IV contrast.    D/w patient regarding need for out patient nephrology follow up.  CKD 4, Diabetic nephropathy/Proteinuria  Diabetes, DFU   Hypertension  Mild hyperkalemia    Renal indices fluctuating but close to baseline. Low potassium diet. lose to baseline. Increase coreg for BP control. Off ARB.   Podiatry follow up. Monitor blood sugar levels. Insulin coverage as needed. Dietary restriction. Trend BP and titrate BP meds as needed   Will follow electrolytes and renal function trend. Avoid nephrotoxic meds as possible. Avoid ACEI, ARB, NSAIDs and IV contrast.    D/w patient regarding need for out patient nephrology follow up.

## 2024-09-29 NOTE — PROGRESS NOTE ADULT - PROBLEM SELECTOR PLAN 1
increase lantus 35 units qhs  increase admelog 12 units 3x/day before meals  cont mod dose admelog corrective scale coverage qac/qhs  cont cons cho diet  goal bg 100-180 in hosp setting

## 2024-09-29 NOTE — PROGRESS NOTE ADULT - SUBJECTIVE AND OBJECTIVE BOX
PROGRESS NOTE   Patient is a 49y old  Male who presents with a chief complaint of infected toe (29 Sep 2024 09:52)      HPI:  49-year-old male with past medical history of DM2, HTN, HLD, NEUROPATHY, PVD, NON COMPLIANT WITH TT, ETOH ABUSE, presents today due to left toe infection.  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  Patient experienced a blister to the left toe in which he eventually fell orders follow-up on its own.  Patient notes that the blister opened up and he developed some blood in the blister as well.  Patient notes that the blister scab has slightly fallen off but now his toe is red and painful.  Patient not currently on antibiotics.  Patient denies fever, injury, numbness, weakness, or any other complaints.   IN ED TEMP 99, /100, HR 89, had consult with podiatry and sidney started on Cipro and admitted for further management (17 Sep 2024 19:36)      Vital Signs Last 24 Hrs  T(C): 37.2 (29 Sep 2024 05:58), Max: 37.2 (29 Sep 2024 05:58)  T(F): 98.9 (29 Sep 2024 05:58), Max: 98.9 (29 Sep 2024 05:58)  HR: 86 (29 Sep 2024 05:58) (86 - 90)  BP: 160/99 (29 Sep 2024 05:58) (118/73 - 160/99)  BP(mean): --  RR: 18 (29 Sep 2024 05:58) (18 - 18)  SpO2: 96% (29 Sep 2024 05:58) (95% - 96%)    Parameters below as of 29 Sep 2024 05:58  Patient On (Oxygen Delivery Method): room air                              9.9    7.69  )-----------( 206      ( 29 Sep 2024 06:00 )             29.9               09-28    135  |  99  |  65[H]  ----------------------------<  229[H]  4.9   |  27  |  3.31[H]    Ca    10.1      28 Sep 2024 06:00    TPro  7.4  /  Alb  2.6[L]  /  TBili  0.6  /  DBili  x   /  AST  40  /  ALT  57  /  AlkPhos  120  09-28      PHYSICAL EXAM  SUZANNA KAISER is a pleasant well-nourished, well developed 49y Male in no acute distress, alert awake, and oriented to person, place and time.   LE Focused:  Vascular: Dorsalis Pedis and Posterior Tibial pulses 2/4.  Capillary re-fill time less then 3 seconds digits 1-5 bilateral.    Neuro: Protective sensation diminished to the level of the digits bilateral.  MSK: Muscle strength 5/5 all major muscle groups bilateral.  Derm: POD#9. Incision site of the Left foot noted with necrotic changes to the most  distal aspect of the  flap. Dorsal and plantar flap showing signs of  venous congestion.  Guarded prognosis for the flap at this time. Upon elevation there is decreased venous congestion.  Will allow the region to cont to demarcate.

## 2024-09-29 NOTE — PROGRESS NOTE ADULT - ASSESSMENT
49-year-old male with past medical history of DM2, HTN, HLD, NEUROPATHY, PVD, NON COMPLIANT WITH TT, ETOH ABUSE, presents today due to left toe infection.  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  Patient experienced a blister to the left toe in which he eventually fell orders follow-up on its own.  Patient notes that the blister opened up and he developed some blood in the blister as well.  Patient notes that the blister scab has slightly fallen off but now his toe is red and painful.  Patient not currently on antibiotics.  Patient denies fever, injury, numbness, weakness, or any other complaints.   IN ED TEMP 99, /100, HR 89, had consult with podiatry and sidney started on Cipro and admitted for further management    # Diabetic foot ulcer with cellullitis(left halluxdiabetic ulcer 3x4x0.1 cm with erythema and edema mal odorous , hyperkeratotic  started on ABX, , R/o PVD- arterial doppler- negative for any arterial disease   MR foot done- ac on ch OM with sepstic arthritis of 2ndleft toe, OM left great toe-  surg done by podiatry,9/20  culture of wound E fecalis, path OM , with clear margins   abx as per ID  vascular consult noted - no need for any vascular procedure   podiatry f up ,   planned revision of wound flap    # DM2 with hyperglycemia, with neuropathy , nephropathy with likely PVD    ssi, consistent carb, Endo consult,  hba1c 10.2   Endo consult noted , started on lantus  # Ess HTN   amlodipine 5 mg, off losartan     DVT prophylaxis  ID , endo and podiatry consult  # LUISA with CKD 4   nephro consult,noted  likely Diabetic nephropathy with septic ATN  Hold ARB in short term and titrate other anti hypertensives as needed  # HLD   low fat diet   # hyperkalemia  lokelma   nephro f up  # DVT prophylaxis

## 2024-09-30 LAB
ALBUMIN SERPL ELPH-MCNC: 2.5 G/DL — LOW (ref 3.3–5)
ALP SERPL-CCNC: 113 U/L — SIGNIFICANT CHANGE UP (ref 30–120)
ALT FLD-CCNC: 58 U/L — SIGNIFICANT CHANGE UP (ref 10–60)
ANION GAP SERPL CALC-SCNC: 8 MMOL/L — SIGNIFICANT CHANGE UP (ref 5–17)
AST SERPL-CCNC: 43 U/L — HIGH (ref 10–40)
BASOPHILS # BLD AUTO: 0.06 K/UL — SIGNIFICANT CHANGE UP (ref 0–0.2)
BASOPHILS NFR BLD AUTO: 0.8 % — SIGNIFICANT CHANGE UP (ref 0–2)
BILIRUB SERPL-MCNC: 0.5 MG/DL — SIGNIFICANT CHANGE UP (ref 0.2–1.2)
BUN SERPL-MCNC: 60 MG/DL — HIGH (ref 7–23)
CALCIUM SERPL-MCNC: 9.2 MG/DL — SIGNIFICANT CHANGE UP (ref 8.4–10.5)
CHLORIDE SERPL-SCNC: 100 MMOL/L — SIGNIFICANT CHANGE UP (ref 96–108)
CO2 SERPL-SCNC: 26 MMOL/L — SIGNIFICANT CHANGE UP (ref 22–31)
CREAT SERPL-MCNC: 3.16 MG/DL — HIGH (ref 0.5–1.3)
EGFR: 23 ML/MIN/1.73M2 — LOW
EOSINOPHIL # BLD AUTO: 0.24 K/UL — SIGNIFICANT CHANGE UP (ref 0–0.5)
EOSINOPHIL NFR BLD AUTO: 3.4 % — SIGNIFICANT CHANGE UP (ref 0–6)
GLUCOSE BLDC GLUCOMTR-MCNC: 161 MG/DL — HIGH (ref 70–99)
GLUCOSE BLDC GLUCOMTR-MCNC: 229 MG/DL — HIGH (ref 70–99)
GLUCOSE BLDC GLUCOMTR-MCNC: 295 MG/DL — HIGH (ref 70–99)
GLUCOSE BLDC GLUCOMTR-MCNC: 300 MG/DL — HIGH (ref 70–99)
GLUCOSE SERPL-MCNC: 178 MG/DL — HIGH (ref 70–99)
HCT VFR BLD CALC: 26.6 % — LOW (ref 39–50)
HGB BLD-MCNC: 8.8 G/DL — LOW (ref 13–17)
IMM GRANULOCYTES NFR BLD AUTO: 1.1 % — HIGH (ref 0–0.9)
LYMPHOCYTES # BLD AUTO: 1.2 K/UL — SIGNIFICANT CHANGE UP (ref 1–3.3)
LYMPHOCYTES # BLD AUTO: 17 % — SIGNIFICANT CHANGE UP (ref 13–44)
MCHC RBC-ENTMCNC: 29.5 PG — SIGNIFICANT CHANGE UP (ref 27–34)
MCHC RBC-ENTMCNC: 33.1 G/DL — SIGNIFICANT CHANGE UP (ref 32–36)
MCV RBC AUTO: 89.3 FL — SIGNIFICANT CHANGE UP (ref 80–100)
MONOCYTES # BLD AUTO: 0.9 K/UL — SIGNIFICANT CHANGE UP (ref 0–0.9)
MONOCYTES NFR BLD AUTO: 12.7 % — SIGNIFICANT CHANGE UP (ref 2–14)
NEUTROPHILS # BLD AUTO: 4.58 K/UL — SIGNIFICANT CHANGE UP (ref 1.8–7.4)
NEUTROPHILS NFR BLD AUTO: 65 % — SIGNIFICANT CHANGE UP (ref 43–77)
NRBC # BLD: 0 /100 WBCS — SIGNIFICANT CHANGE UP (ref 0–0)
PLATELET # BLD AUTO: 201 K/UL — SIGNIFICANT CHANGE UP (ref 150–400)
POTASSIUM SERPL-MCNC: 4.4 MMOL/L — SIGNIFICANT CHANGE UP (ref 3.5–5.3)
POTASSIUM SERPL-SCNC: 4.4 MMOL/L — SIGNIFICANT CHANGE UP (ref 3.5–5.3)
PROT SERPL-MCNC: 6.3 G/DL — SIGNIFICANT CHANGE UP (ref 6–8.3)
RBC # BLD: 2.98 M/UL — LOW (ref 4.2–5.8)
RBC # FLD: 12.3 % — SIGNIFICANT CHANGE UP (ref 10.3–14.5)
SODIUM SERPL-SCNC: 134 MMOL/L — LOW (ref 135–145)
WBC # BLD: 7.06 K/UL — SIGNIFICANT CHANGE UP (ref 3.8–10.5)
WBC # FLD AUTO: 7.06 K/UL — SIGNIFICANT CHANGE UP (ref 3.8–10.5)

## 2024-09-30 RX ADMIN — PIPERACILLIN SODIUM AND TAZOBACTAM SODIUM 25 GRAM(S): 12; 1.5 INJECTION, POWDER, LYOPHILIZED, FOR SOLUTION INTRAVENOUS at 13:14

## 2024-09-30 RX ADMIN — Medication 650 MILLIGRAM(S): at 17:51

## 2024-09-30 RX ADMIN — Medication 2: at 08:05

## 2024-09-30 RX ADMIN — GABAPENTIN 100 MILLIGRAM(S): 800 TABLET, FILM COATED ORAL at 13:14

## 2024-09-30 RX ADMIN — GABAPENTIN 100 MILLIGRAM(S): 800 TABLET, FILM COATED ORAL at 21:15

## 2024-09-30 RX ADMIN — Medication 12 UNIT(S): at 17:21

## 2024-09-30 RX ADMIN — INSULIN GLARGINE 35 UNIT(S): 300 INJECTION, SOLUTION SUBCUTANEOUS at 21:16

## 2024-09-30 RX ADMIN — MUPIROCIN 1 APPLICATION(S): 20 OINTMENT TOPICAL at 08:06

## 2024-09-30 RX ADMIN — Medication 10 MILLIGRAM(S): at 05:24

## 2024-09-30 RX ADMIN — Medication 12 UNIT(S): at 12:43

## 2024-09-30 RX ADMIN — Medication 4: at 12:43

## 2024-09-30 RX ADMIN — GABAPENTIN 100 MILLIGRAM(S): 800 TABLET, FILM COATED ORAL at 05:24

## 2024-09-30 RX ADMIN — Medication 6.25 MILLIGRAM(S): at 05:24

## 2024-09-30 RX ADMIN — Medication 25 MILLIGRAM(S): at 22:22

## 2024-09-30 RX ADMIN — Medication 1 TABLET(S): at 08:06

## 2024-09-30 RX ADMIN — Medication 650 MILLIGRAM(S): at 17:22

## 2024-09-30 RX ADMIN — PIPERACILLIN SODIUM AND TAZOBACTAM SODIUM 25 GRAM(S): 12; 1.5 INJECTION, POWDER, LYOPHILIZED, FOR SOLUTION INTRAVENOUS at 05:24

## 2024-09-30 RX ADMIN — Medication 6: at 17:20

## 2024-09-30 RX ADMIN — Medication 25 MILLIGRAM(S): at 01:49

## 2024-09-30 RX ADMIN — Medication 1 TABLET(S): at 17:21

## 2024-09-30 RX ADMIN — Medication 12 UNIT(S): at 08:05

## 2024-09-30 RX ADMIN — ENOXAPARIN SODIUM 40 MILLIGRAM(S): 150 INJECTION SUBCUTANEOUS at 21:37

## 2024-09-30 RX ADMIN — Medication 3 MILLIGRAM(S): at 22:20

## 2024-09-30 RX ADMIN — Medication 2: at 21:17

## 2024-09-30 RX ADMIN — Medication 6.25 MILLIGRAM(S): at 17:21

## 2024-09-30 NOTE — CONSULT NOTE ADULT - REASON FOR ADMISSION
infected toe
infected toe with OM left hallux
infected toe

## 2024-09-30 NOTE — PROGRESS NOTE ADULT - ASSESSMENT
49-year-old male with past medical history of DM2, HTN, HLD, NEUROPATHY, PVD, NON COMPLIANT WITH TT, ETOH ABUSE, presents today due to left toe infection.  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  Patient experienced a blister to the left toe in which he eventually fell orders follow-up on its own.  Patient notes that the blister opened up and he developed some blood in the blister as well.  Patient notes that the blister scab has slightly fallen off but now his toe is red and painful.  Patient not currently on antibiotics.  Patient denies fever, injury, numbness, weakness, or any other complaints.   IN ED TEMP 99, /100, HR 89, had consult with podiatry and sidney started on Cipro and admitted for further management    # Diabetic foot ulcer with cellullitis(left halluxdiabetic ulcer 3x4x0.1 cm with erythema and edema mal odorous , hyperkeratotic  started on ABX, , R/o PVD- arterial doppler- negative for any arterial disease   MR foot done- ac on ch OM with sepstic arthritis of 2ndleft toe, OM left great toe-  surg done by podiatry,9/20  culture of wound E fecalis, path OM , with clear margins   abx as per ID  vascular consult noted - no need for any vascular procedure   podiatry f up ,   planned revision of wound flap    # DM2 with hyperglycemia, with neuropathy , nephropathy with likely PVD    ssi, consistent carb, Endo consult,  hba1c 10.2   Endo consult noted , started on lantus  # Ess HTN   amlodipine 5 mg, off losartan     DVT prophylaxis  ID , endo and podiatry consult  # LUISA with CKD 4   nephro consult,noted  likely Diabetic nephropathy with septic ATN  Hold ARB in short term and titrate other anti hypertensives as needed  # HLD   low fat diet   # hyperkalemia  lokelma   nephro f up  # Anemia - w up done, likely of ch disease, multifactorial , hematology consult, worsening with infection  # DVT prophylaxis

## 2024-09-30 NOTE — CONSULT NOTE ADULT - CONSULT REQUESTED BY NAME
Dr. ALEXANDRU Weaver
MD
Owen HORVATH
Dr Mabel Weaver
STACEY HYDE DPM
Dr Weaver
Ed team
hospitalist
Dr. Mabel Weaver

## 2024-09-30 NOTE — PROGRESS NOTE ADULT - SUBJECTIVE AND OBJECTIVE BOX
NEPHROLOGY PROGRESS NOTE    CHIEF COMPLAINT:  CKD    HPI:  Renal function fairly stable.  BP better.  Has new whole body skin eruption.      EXAM:  Vital Signs Last 24 Hrs  T(C): 37.1 (30 Sep 2024 13:15), Max: 37.6 (29 Sep 2024 20:44)  T(F): 98.7 (30 Sep 2024 13:15), Max: 99.7 (29 Sep 2024 20:44)  HR: 87 (30 Sep 2024 13:15) (74 - 93)  BP: 135/85 (30 Sep 2024 13:15) (135/73 - 147/83)  BP(mean): --  RR: 18 (30 Sep 2024 13:15) (18 - 18)  SpO2: 95% (30 Sep 2024 13:15) (93% - 96%)    Parameters below as of 30 Sep 2024 13:15  Patient On (Oxygen Delivery Method): room air      I&O's Summary    29 Sep 2024 07:01  -  30 Sep 2024 07:00  --------------------------------------------------------  IN: 25 mL / OUT: 1200 mL / NET: -1175 mL      Conversant, in no apparent distress  Normal respiratory effort, lungs clear bilaterally  Heart RRR with no murmur, no peripheral edema    LABS                        8.8    7.06  )-----------( 201      ( 30 Sep 2024 07:59 )             26.6     09-30    134[L]  |  100  |  60[H]  ----------------------------<  178[H]  4.4   |  26  |  3.16[H]    Ca    9.2      30 Sep 2024 07:59    TPro  6.3  /  Alb  2.5[L]  /  TBili  0.5  /  DBili  x   /  AST  43[H]  /  ALT  58  /  AlkPhos  113  09-30        Impression:  CKD 4, Diabetic nephropathy/proteinuria  Diabetes, DFU   Hypertension better    Recommend:  Low K diet  No change anti hypertensives  Avoid nephrotoxic meds as possible

## 2024-09-30 NOTE — CONSULT NOTE ADULT - PROVIDER SPECIALTY LIST ADULT
Vascular Surgery
Endocrinology
Cardiology
Heme/Onc
Nephrology
Podiatry
Infectious Disease
Podiatry
Diabetes

## 2024-09-30 NOTE — CONSULT NOTE ADULT - ASSESSMENT
[ASSESSMENT and  PLAN]  D63.8     Anemia chronic disease  D63.1     Anemia CKD  N18.4     CKD4    48yo M admitted with L toe infections    PMH DM2, HTN, HLD, NEUROPATHY, PVD, NON COMPLIANT WITH TT, ETOH ABUSE, presents today due to left toe infection.    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     Diabetic foot ulcer with cellullitis  (left hallux diabetic ulcer 3x4x0.1 cm with erythema and edema mal odorous , hyperkeratotic.    started on ABX  Patient experienced a blister to the left toe in which he eventually opened on its own.  He developed some blood in the blister as well.    Since the blister scab has slightly fallen off but now his toe is red and painful.     Anemia due to chronic disease and CKD  Cr elevated 3.16. renal evaluating  Ferritin adequate, 406  B12 adequate, 809  Folate adequate 11.0    RECOMMENDATIONS    Anemia  Transfuse PRBC as clinically indicated.   Transfuse PRBC if Hgb <7.0 or if symptomatic.   Follow CBC  Outpt re-eval for need for CONSTANZA. If Hgb >8, prob less benefit to tx    DVT Prophylaxis  SQ Lovenox or SQ heparin    Discussed plan of care with patient and or family in detail.   Pt/Family expressed understanding of the treatment plan.   Risks, benefits and alternatives discussed in detail.   Opportunity given for questions and discussion.   Questions or concerns all addressed and answered to their satisfaction, and in lay terms.     Discussed with Dr Weaver.    Thank you for consulting us.     > xxxxxx minutes spent in direct patient care, examining and counseling patient,  reviewing  the notes, lab data/ imaging , discussion with multidisciplinary team.      [ASSESSMENT and  PLAN]  D63.8     Anemia chronic disease  D63.1     Anemia CKD  N18.4     CKD4    50yo M admitted with L toe infections    PMH DM2, HTN, HLD, NEUROPATHY, PVD, NON COMPLIANT WITH TT, ETOH ABUSE, presents today due to left toe infection.    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     Diabetic foot ulcer with cellullitis  (left hallux diabetic ulcer 3x4x0.1 cm with erythema and edema mal odorous , hyperkeratotic.    started on ABX  Patient experienced a blister to the left toe in which he eventually opened on its own.  He developed some blood in the blister as well.    Since the blister scab has slightly fallen off but now his toe is red and painful.     Anemia due to chronic disease and CKD  Cr elevated 3.16. renal evaluating  Ferritin adequate, 406  B12 adequate, 809  Folate adequate 11.0    RECOMMENDATIONS    Anemia  Transfuse PRBC as clinically indicated.   Transfuse PRBC if Hgb <7.0 or if symptomatic.   Follow CBC  Outpt re-eval for need for CONSTANZA. If Hgb >8, prob less benefit to tx    DVT Prophylaxis  SQ Lovenox or SQ heparin    Discussed plan of care with patient in detail.   Pt expressed understanding of the treatment plan.   Risks, benefits and alternatives discussed in detail.   Opportunity given for questions and discussion.   Questions or concerns all addressed and answered to their satisfaction, and in lay terms.     Discussed with Dr Weaver.    Thank you for consulting us.   No additional recommendations at current time.   Will sign off on case for now.   Please call, or re-consult if needed.     Follow in office in 1-2mo post DC.     > 59 minutes spent in direct patient care, examining and counseling patient,  reviewing  the notes, lab data/ imaging , discussion with multidisciplinary team.

## 2024-09-30 NOTE — PROGRESS NOTE ADULT - SUBJECTIVE AND OBJECTIVE BOX
Chief Complaint: Foot wound    Interval Events: No events overnight.    Review of Systems:  General: No fevers, chills, weight gain  Skin: No rashes, color changes  Cardiovascular: No chest pain, orthopnea  Respiratory: No shortness of breath, cough  Gastrointestinal: No nausea, abdominal pain  Genitourinary: No incontinence, pain with urination  Musculoskeletal: No pain, swelling, decreased range of motion  Neurological: No headache, weakness  Psychiatric: No depression, anxiety  Endocrine: No weight gain, increased thirst  All other systems are comprehensively negative.    Physical Exam:  Vital Signs Last 24 Hrs  T(C): 37 (30 Sep 2024 05:15), Max: 37.6 (29 Sep 2024 20:44)  T(F): 98.6 (30 Sep 2024 05:15), Max: 99.7 (29 Sep 2024 20:44)  HR: 81 (30 Sep 2024 05:15) (74 - 93)  BP: 142/83 (30 Sep 2024 05:15) (121/75 - 147/83)  BP(mean): --  RR: 18 (30 Sep 2024 05:15) (18 - 18)  SpO2: 96% (30 Sep 2024 05:15) (93% - 96%)  Parameters below as of 30 Sep 2024 05:15  Patient On (Oxygen Delivery Method): room air  General: NAD  HEENT: MMM  Neck: No JVD, no carotid bruit  Lungs: CTAB  CV: RRR, nl S1/S2, no M/R/G  Abdomen: S/NT/ND, +BS  Extremities: No LE edema, no cyanosis  Neuro: AAOx3, non-focal  Skin: No rash    Labs:    09-30    134[L]  |  100  |  60[H]  ----------------------------<  178[H]  4.4   |  26  |  3.16[H]    Ca    9.2      30 Sep 2024 07:59    TPro  6.3  /  Alb  2.5[L]  /  TBili  0.5  /  DBili  x   /  AST  43[H]  /  ALT  58  /  AlkPhos  113  09-30                        8.8    7.06  )-----------( 201      ( 30 Sep 2024 07:59 )             26.6     ECG/Telemetry: Sinus rhythm

## 2024-09-30 NOTE — PROGRESS NOTE ADULT - SUBJECTIVE AND OBJECTIVE BOX
Patient is a 49y old  Male who presents with a chief complaint of infected toe (29 Sep 2024 15:46)      INTERVAL HPI/OVERNIGHT EVENTS:overnight events noted    Home Medications:  non compliant with meds for months:  (17 Sep 2024 19:54)      MEDICATIONS  (STANDING):  amLODIPine   Tablet 10 milliGRAM(s) Oral daily  carvedilol 6.25 milliGRAM(s) Oral every 12 hours  dextrose 5%. 1000 milliLiter(s) (50 mL/Hr) IV Continuous <Continuous>  dextrose 5%. 1000 milliLiter(s) (100 mL/Hr) IV Continuous <Continuous>  dextrose 50% Injectable 25 Gram(s) IV Push once  dextrose 50% Injectable 12.5 Gram(s) IV Push once  dextrose 50% Injectable 25 Gram(s) IV Push once  enoxaparin Injectable 40 milliGRAM(s) SubCutaneous every 24 hours  gabapentin 100 milliGRAM(s) Oral three times a day  glucagon  Injectable 1 milliGRAM(s) IntraMuscular once  influenza   Vaccine 0.5 milliLiter(s) IntraMuscular once  insulin glargine Injectable (LANTUS) 35 Unit(s) SubCutaneous at bedtime  insulin lispro (ADMELOG) corrective regimen sliding scale   SubCutaneous three times a day before meals  insulin lispro (ADMELOG) corrective regimen sliding scale   SubCutaneous at bedtime  insulin lispro Injectable (ADMELOG) 12 Unit(s) SubCutaneous three times a day before meals  lactobacillus acidophilus 1 Tablet(s) Oral two times a day with meals  mupirocin 2% Ointment 1 Application(s) Topical <User Schedule>  piperacillin/tazobactam IVPB.. 3.375 Gram(s) IV Intermittent every 8 hours    MEDICATIONS  (PRN):  acetaminophen     Tablet .. 650 milliGRAM(s) Oral every 6 hours PRN Temp greater or equal to 38C (100.4F), Mild Pain (1 - 3), Moderate Pain (4 - 6)  dextrose Oral Gel 15 Gram(s) Oral once PRN Blood Glucose LESS THAN 70 milliGRAM(s)/deciliter  diphenhydrAMINE 25 milliGRAM(s) Oral every 6 hours PRN Rash and/or Itching  labetalol Injectable 10 milliGRAM(s) IV Push every 8 hours PRN Systolic blood pressure >180  melatonin 3 milliGRAM(s) Oral at bedtime PRN Insomnia      Allergies    No Known Allergies    Intolerances        REVIEW OF SYSTEMS:  CONSTITUTIONAL: No fever, weight loss, or fatigue  EYES: No eye pain, visual disturbances, or discharge  ENMT:  No difficulty hearing, tinnitus, vertigo; No sinus or throat pain  NECK: No pain or stiffness  BREASTS: No pain, masses, or nipple discharge  RESPIRATORY: No cough, wheezing, chills or hemoptysis; No shortness of breath  CARDIOVASCULAR: No chest pain, palpitations, dizziness, or leg swelling  GASTROINTESTINAL: No abdominal or epigastric pain. No nausea, vomiting, or hematemesis; No diarrhea or constipation. No melena or hematochezia.  GENITOURINARY: No dysuria, frequency, hematuria, or incontinence  NEUROLOGICAL: No headaches, memory loss, loss of strength, numbness, or tremors  SKIN: No itching, burning, rashes, or lesions     Vital Signs Last 24 Hrs  T(C): 37 (30 Sep 2024 05:15), Max: 37.6 (29 Sep 2024 20:44)  T(F): 98.6 (30 Sep 2024 05:15), Max: 99.7 (29 Sep 2024 20:44)  HR: 81 (30 Sep 2024 05:15) (74 - 93)  BP: 142/83 (30 Sep 2024 05:15) (121/75 - 147/83)  BP(mean): --  RR: 18 (30 Sep 2024 05:15) (18 - 18)  SpO2: 96% (30 Sep 2024 05:15) (93% - 96%)    Parameters below as of 30 Sep 2024 05:15  Patient On (Oxygen Delivery Method): room air        PHYSICAL EXAM:  GENERAL: NAD, well-groomed, well-developed  HEAD:  Atraumatic, Normocephalic  EYES: EOMI, PERRLA, conjunctiva and sclera clear  ENMT: Moist mucous membranes,   NECK: Supple, No JVD, Normal thyroid  NERVOUS SYSTEM:  Alert & Oriented X3, Good concentration; Motor Strength 5/5 B/L upper and lower extremities; DTRs 2+ intact and symmetric  CHEST/LUNG: Clear to percussion bilaterally; No rales, rhonchi, wheezing, or rubs  HEART: Regular rate and rhythm; No murmurs, rubs, or gallops  ABDOMEN: Soft, Nontender, Nondistended; Bowel sounds present  EXTREMITIES:  foot in dressing  SKIN: No rashes or lesions    LABS:                        8.8    7.06  )-----------( 201      ( 30 Sep 2024 07:59 )             26.6     09-30    134[L]  |  100  |  60[H]  ----------------------------<  178[H]  4.4   |  26  |  3.16[H]    Ca    9.2      30 Sep 2024 07:59    TPro  6.3  /  Alb  2.5[L]  /  TBili  0.5  /  DBili  x   /  AST  43[H]  /  ALT  58  /  AlkPhos  113  09-30      Urinalysis Basic - ( 30 Sep 2024 07:59 )    Color: x / Appearance: x / SG: x / pH: x  Gluc: 178 mg/dL / Ketone: x  / Bili: x / Urobili: x   Blood: x / Protein: x / Nitrite: x   Leuk Esterase: x / RBC: x / WBC x   Sq Epi: x / Non Sq Epi: x / Bacteria: x      CAPILLARY BLOOD GLUCOSE      POCT Blood Glucose.: 161 mg/dL (30 Sep 2024 07:56)  POCT Blood Glucose.: 325 mg/dL (29 Sep 2024 20:56)  POCT Blood Glucose.: 293 mg/dL (29 Sep 2024 16:50)  POCT Blood Glucose.: 181 mg/dL (29 Sep 2024 12:07)          I&O's Summary    29 Sep 2024 07:01  -  30 Sep 2024 07:00  --------------------------------------------------------  IN: 25 mL / OUT: 1200 mL / NET: -1175 mL        RADIOLOGY & ADDITIONAL TESTS:    Imaging Personally Reviewed:  [ x] YES  [ ] NO    Consultant(s) Notes Reviewed:  [ x] YES  [ ] NO    Care Discussed with Consultants/Other Providers [ x] YES  [ ] NO

## 2024-09-30 NOTE — CONSULT NOTE ADULT - CONSULT REQUESTED DATE/TIME
17-Sep-2024 19:20
18-Sep-2024 15:50
19-Sep-2024 12:32
20-Sep-2024 11:49
23-Sep-2024 14:47
18-Sep-2024 08:17
18-Sep-2024 14:11
30-Sep-2024 15:49
19-Sep-2024 10:00

## 2024-09-30 NOTE — PROGRESS NOTE ADULT - ASSESSMENT
48YO M PMH DM2, HTN, HLD, NEUROPATHY, PVD, NON COMPLIANT WITH TT, ETOH ABUSE, admitted with infected DM left great toe infection  MRI + OM  Sp Left great toe amputation 9/20  OR bone cultures noted with E Faecalis Amp S and Vanco S  Left foot amputation stump with some necrotic changes distal flap. Dorsal and plantar flap showing signs of  venous congestion.  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 Please call with any questions.  Nancy Rothman M.D.  [facility] Division of Infectious Diseases [phone]  For after 5 P.M. and weekends, please call [phone]   50YO M PMH DM2, HTN, HLD, NEUROPATHY, PVD, NON COMPLIANT WITH TT, ETOH ABUSE, admitted with infected DM left great toe infection  MRI + OM  Sp Left great toe amputation 9/20  OR bone cultures noted with E Faecalis Amp S and Vanco S  Left foot amputation stump with some necrotic changes distal flap. Dorsal and plantar flap showing signs of  venous congestion.  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 Please call with any questions.  Nancy Rothman M.D.  [facility] Division of Infectious Diseases [phone]  For after 5 P.M. and weekends, please call [phone]

## 2024-09-30 NOTE — CASE MANAGEMENT PROGRESS NOTE - NSCMPROGRESSNOTE_GEN_ALL_CORE
RN/CM noted that pt's case discussed during Interdisciplinary rounds, pt remains acute,  S/P 09/20/2024 s/p debridement of all non-viable soft tissue and bone left foot with hallux amputation. IV Zosyn continues. Pt with local wound care- Xeroform being performed by podiatrist from Salvatore Delgado's office. CM discussed with pt about possible LT IV ABT and pt is willing to learn but is really hoping that he would be able to go home on PO ABT, as he is VERY eager to return to work. CM strongly encouraged pt to follow-up with MD prior to returning to work. DC pending hospital course. CM remains available.

## 2024-09-30 NOTE — PROGRESS NOTE ADULT - SUBJECTIVE AND OBJECTIVE BOX
[facility], Division of Infectious Diseases  SIERRA Rainey Y. Patel, S. Shah, [facility]  [phone]    Name: SUZANNA KAISER  Age: 49y  Gender: Male  MRN: [id number]    Interval History:  Patient seen and examined at bedside this morning  No acute overnight events.   Notes reviewed    Antibiotics:  piperacillin/tazobactam IVPB.. 3.375 Gram(s) IV Intermittent every 8 hours      Medications:  acetaminophen     Tablet .. 650 milliGRAM(s) Oral every 6 hours PRN  amLODIPine   Tablet 10 milliGRAM(s) Oral daily  carvedilol 6.25 milliGRAM(s) Oral every 12 hours  dextrose 5%. 1000 milliLiter(s) IV Continuous <Continuous>  dextrose 5%. 1000 milliLiter(s) IV Continuous <Continuous>  dextrose 50% Injectable 25 Gram(s) IV Push once  dextrose 50% Injectable 12.5 Gram(s) IV Push once  dextrose 50% Injectable 25 Gram(s) IV Push once  dextrose Oral Gel 15 Gram(s) Oral once PRN  diphenhydrAMINE 25 milliGRAM(s) Oral every 6 hours PRN  enoxaparin Injectable 40 milliGRAM(s) SubCutaneous every 24 hours  gabapentin 100 milliGRAM(s) Oral three times a day  glucagon  Injectable 1 milliGRAM(s) IntraMuscular once  influenza   Vaccine 0.5 milliLiter(s) IntraMuscular once  insulin glargine Injectable (LANTUS) 35 Unit(s) SubCutaneous at bedtime  insulin lispro (ADMELOG) corrective regimen sliding scale   SubCutaneous at bedtime  insulin lispro (ADMELOG) corrective regimen sliding scale   SubCutaneous three times a day before meals  insulin lispro Injectable (ADMELOG) 12 Unit(s) SubCutaneous three times a day before meals  labetalol Injectable 10 milliGRAM(s) IV Push every 8 hours PRN  lactobacillus acidophilus 1 Tablet(s) Oral two times a day with meals  melatonin 3 milliGRAM(s) Oral at bedtime PRN  mupirocin 2% Ointment 1 Application(s) Topical <User Schedule>  piperacillin/tazobactam IVPB.. 3.375 Gram(s) IV Intermittent every 8 hours      Review of Systems:  A 10-point review of systems was obtained.   Review of systems otherwise negative except as previously noted.    Allergies: No Known Allergies    For details regarding the patient's past medical history, social history, family history, and other miscellaneous elements, please refer the initial infectious diseases consultation and/or the admitting history and physical examination for this admission.    Objective:  Vitals:   T(C): 37 (09-30-24 @ 05:15), Max: 37.6 (09-29-24 @ 20:44)  HR: 81 (09-30-24 @ 05:15) (74 - 93)  BP: 142/83 (09-30-24 @ 05:15) (121/75 - 147/83)  RR: 18 (09-30-24 @ 05:15) (18 - 18)  SpO2: 96% (09-30-24 @ 05:15) (93% - 96%)    Physical Examination:  General: no acute distress  HEENT: NC/AT, EOMI,   Cardio: S1, S2 heard, RRR, no murmurs  Resp: decreased breath sounds  Abd: soft, NT, ND  Ext: no edema or cyanosis, foot in dressing  Skin: warm, dry, no visible rash      Laboratory Studies:  CBC:                       8.8    7.06  )-----------( 201      ( 30 Sep 2024 07:59 )             26.6     CMP: 09-30    134[L]  |  100  |  60[H]  ----------------------------<  178[H]  4.4   |  26  |  3.16[H]    Ca    9.2      30 Sep 2024 07:59    TPro  6.3  /  Alb  2.5[L]  /  TBili  0.5  /  DBili  x   /  AST  43[H]  /  ALT  58  /  AlkPhos  113  09-30    LIVER FUNCTIONS - ( 30 Sep 2024 07:59 )  Alb: 2.5 g/dL / Pro: 6.3 g/dL / ALK PHOS: 113 U/L / ALT: 58 U/L / AST: 43 U/L / GGT: x           Urinalysis Basic - ( 30 Sep 2024 07:59 )    Color: x / Appearance: x / SG: x / pH: x  Gluc: 178 mg/dL / Ketone: x  / Bili: x / Urobili: x   Blood: x / Protein: x / Nitrite: x   Leuk Esterase: x / RBC: x / WBC x   Sq Epi: x / Non Sq Epi: x / Bacteria: x        Microbiology: reviewed    Culture - Fungal, Tissue (collected 09-20-24 @ 19:21)  Source: Tissue Left foot  Preliminary Report (09-28-24 @ 23:02):    No fungus isolated at 1 week.    Culture - Fungal, Tissue (collected 09-20-24 @ 19:21)  Source: Bone Other  Preliminary Report (09-28-24 @ 23:02):    No fungus isolated at 1 week.    Culture - Tissue with Gram Stain (collected 09-20-24 @ 19:21)  Source: Bone Other  Gram Stain (09-21-24 @ 06:32):    No polymorphonuclear cells seen per low power field    No organisms seen per oil power field  Final Report (09-26-24 @ 09:24):    No growth    Culture - Fungal, Tissue (collected 09-20-24 @ 19:21)  Source: Bone Other  Preliminary Report (09-28-24 @ 23:02):    No fungus isolated at 1 week.    Culture - Tissue with Gram Stain (collected 09-20-24 @ 19:21)  Source: Bone Other  Gram Stain (09-22-24 @ 09:37):    No polymorphonuclear cells seen per low power field    No organisms seen per oil power field  Final Report (09-26-24 @ 09:32):    Growth in fluid media only Enterococcus faecalis    Rare Staphylococcus epidermidis  Organism: Enterococcus faecalis  Staphylococcus epidermidis (09-26-24 @ 09:32)  Organism: Staphylococcus epidermidis (09-26-24 @ 09:32)      Method Type: STEPHANIE      -  Clindamycin: R >4      -  Erythromycin: R >4      -  Gentamicin: S <=1 Should not be used as monotherapy      -  Oxacillin: R >2      -  Penicillin: R >8      -  Rifampin: S <=1 Should not be used as monotherapy      -  Tetracycline: S 2      -  Trimethoprim/Sulfamethoxazole: R >2/38      -  Vancomycin: S 1  Organism: Enterococcus faecalis (09-26-24 @ 09:32)      Method Type: STEPHANIE      -  Ampicillin: S <=2 Predicts results to ampicillin/sulbactam, amoxacillin-clavulanate and  piperacillin-tazobactam.      -  Vancomycin: S 4    Culture - Tissue with Gram Stain (collected 09-20-24 @ 19:21)  Source: Tissue left foot  Gram Stain (09-22-24 @ 09:38):    No polymorphonuclear cells seen per low power field    No organisms seen per oil power field  Final Report (09-26-24 @ 09:21):    Growth in fluid media only Enterococcus faecalis  Organism: Enterococcus faecalis (09-26-24 @ 09:21)  Organism: Enterococcus faecalis (09-26-24 @ 09:21)      Method Type: STEPHANIE      -  Ampicillin: S <=2 Predicts results to ampicillin/sulbactam, amoxacillin-clavulanate and  piperacillin-tazobactam.      -  Vancomycin: S 4    Culture - Fungal, Tissue (collected 09-20-24 @ 19:21)  Source: Tissue  Preliminary Report (09-28-24 @ 23:02):    No fungus isolated at 1 week.    Culture - Acid Fast - Tissue w/Smear (collected 09-20-24 @ 19:21)  Source: Tissue Other  Preliminary Report (09-28-24 @ 23:05):    No acid-fast bacilli isolated at 1 week. ****Acid-fast cultures are held    for 6 weeks.****    Culture - Acid Fast - Tissue w/Smear (collected 09-20-24 @ 19:21)  Source: Tissue Other  Preliminary Report (09-28-24 @ 23:05):    No acid-fast bacilli isolated at 1 week. ****Acid-fast cultures are held    for 6 weeks.****    Culture - Other (collected 09-17-24 @ 19:10)  Source: Wound Wound  Final Report (09-20-24 @ 13:25):    Numerous Enterobacter cloacae complex    Numerous Enterococcus faecalis    Few Coag Negative Staphylococcus "Susceptibilities not performed"  Organism: Enterobacter cloacae complex  Enterococcus faecalis (09-20-24 @ 13:25)  Organism: Enterococcus faecalis (09-20-24 @ 13:25)      Method Type: STEPHANIE      -  Ampicillin: S <=2 Predicts results to ampicillin/sulbactam, amoxacillin-clavulanate and  piperacillin-tazobactam.      -  Vancomycin: S 2  Organism: Enterobacter cloacae complex (09-20-24 @ 13:25)      Method Type: STEPHANIE      -  Amoxicillin/Clavulanic Acid: R >16/8      -  Ampicillin: R 16 These ampicillin results predict results for amoxicillin      -  Ampicillin/Sulbactam: R 8/4      -  Aztreonam: S <=4      -  Cefazolin: R >16      -  Cefepime: S <=2      -  Cefoxitin: R >16      -  Ceftriaxone: S <=1 Enterobacter cloacae, Klebsiella aerogenes, and Citrobacter freundii may develop resistance during prolonged therapy.      -  Ciprofloxacin: S <=0.25      -  Ertapenem: S <=0.5      -  Gentamicin: S <=2      -  Imipenem: S <=1      -  Levofloxacin: S <=0.5      -  Meropenem: S <=1      -  Piperacillin/Tazobactam: S <=8 Treatment with Pipercillin/Tazobactam is not recommended in severe infections casued by Klebsiella aerogenes, Enterobacter cloacae complex, and Citrobacter freundii complex.      -  Tobramycin: S <=2      -  Trimethoprim/Sulfamethoxazole: S <=0.5/9.5    Culture - Blood (collected 09-17-24 @ 18:13)  Source: .Blood Blood-Peripheral  Final Report (09-23-24 @ 01:00):    No growth at 5 days    Culture - Blood (collected 09-17-24 @ 18:13)  Source: .Blood Blood-Peripheral  Final Report (09-23-24 @ 01:00):    No growth at 5 days          Radiology: reviewed

## 2024-09-30 NOTE — CONSULT NOTE ADULT - CONSULT REASON
Infected Left DM great toe ulcer rule out OM
HTN, pre-operative evaluation
LUISA, CKD
49y  diabetes mellitus uncontrolled type 2
CONGESTED / NECROTIC FLAP POST LEFT FOOT IST DIGIT AMPUTATION 09/20/2024
Left hallux diabetic ulcer
dm2 uncontrolled
D63.8     Anemia chronic disease  D63.1     Anemia CKD  N18.4     CKD4
diabetic ulceration with OM left hallux

## 2024-09-30 NOTE — PROGRESS NOTE ADULT - SUBJECTIVE AND OBJECTIVE BOX
PROGRESS NOTE   Patient is a 49y old  Male who presents with a chief complaint of infected toe (29 Sep 2024 15:46)      HPI:  49-year-old male with past medical history of DM2, HTN, HLD, NEUROPATHY, PVD, NON COMPLIANT WITH TT, ETOH ABUSE, presents today due to left toe infection.  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  Patient experienced a blister to the left toe in which he eventually fell orders follow-up on its own.  Patient notes that the blister opened up and he developed some blood in the blister as well.  Patient notes that the blister scab has slightly fallen off but now his toe is red and painful.  Patient not currently on antibiotics.  Patient denies fever, injury, numbness, weakness, or any other complaints.   IN ED TEMP 99, /100, HR 89, had consult with podiatry and sidney started on Cipro and admitted for further management (17 Sep 2024 19:36)      Vital Signs Last 24 Hrs  T(C): 37 (30 Sep 2024 05:15), Max: 37.6 (29 Sep 2024 20:44)  T(F): 98.6 (30 Sep 2024 05:15), Max: 99.7 (29 Sep 2024 20:44)  HR: 81 (30 Sep 2024 05:15) (74 - 93)  BP: 142/83 (30 Sep 2024 05:15) (121/75 - 147/83)  BP(mean): --  RR: 18 (30 Sep 2024 05:15) (18 - 18)  SpO2: 96% (30 Sep 2024 05:15) (93% - 96%)    Parameters below as of 30 Sep 2024 05:15  Patient On (Oxygen Delivery Method): room air                              8.8    7.06  )-----------( 201      ( 30 Sep 2024 07:59 )             26.6               09-30    134[L]  |  100  |  60[H]  ----------------------------<  178[H]  4.4   |  26  |  3.16[H]    Ca    9.2      30 Sep 2024 07:59    TPro  6.3  /  Alb  2.5[L]  /  TBili  0.5  /  DBili  x   /  AST  43[H]  /  ALT  58  /  AlkPhos  113  09-30      PHYSICAL EXAM  SUZANNA KAISER is a pleasant well-nourished, well developed 49y Male in no acute distress, alert awake, and oriented to person, place and time.   LE Focused:  Vascular: Dorsalis Pedis and Posterior Tibial pulses 2/4.  Capillary re-fill time less then 3 seconds digits 1-5 bilateral.    Neuro: Protective sensation diminished to the level of the digits bilateral.  MSK: Muscle strength 5/5 all major muscle groups bilateral.  Derm: POD#10. Incision site of the Left foot noted with necrotic changes to the most  distal aspect of the  flap. Dorsal and plantar flap showing signs of  venous congestion.  Guarded prognosis for the flap at this time. Upon elevation there is decreased venous congestion.  Will allow the region to cont to demarcate.

## 2024-10-01 LAB
ALBUMIN SERPL ELPH-MCNC: 2.5 G/DL — LOW (ref 3.3–5)
ALP SERPL-CCNC: 115 U/L — SIGNIFICANT CHANGE UP (ref 30–120)
ALT FLD-CCNC: 55 U/L — SIGNIFICANT CHANGE UP (ref 10–60)
ANION GAP SERPL CALC-SCNC: 9 MMOL/L — SIGNIFICANT CHANGE UP (ref 5–17)
ASO AB SER QL: 64 IU/ML — SIGNIFICANT CHANGE UP (ref 0–199)
AST SERPL-CCNC: 32 U/L — SIGNIFICANT CHANGE UP (ref 10–40)
BASOPHILS # BLD AUTO: 0.08 K/UL — SIGNIFICANT CHANGE UP (ref 0–0.2)
BASOPHILS NFR BLD AUTO: 1 % — SIGNIFICANT CHANGE UP (ref 0–2)
BILIRUB SERPL-MCNC: 0.5 MG/DL — SIGNIFICANT CHANGE UP (ref 0.2–1.2)
BUN SERPL-MCNC: 59 MG/DL — HIGH (ref 7–23)
C3 SERPL-MCNC: 155 MG/DL — SIGNIFICANT CHANGE UP (ref 81–157)
C4 SERPL-MCNC: 24 MG/DL — SIGNIFICANT CHANGE UP (ref 13–39)
CALCIUM SERPL-MCNC: 9.4 MG/DL — SIGNIFICANT CHANGE UP (ref 8.4–10.5)
CHLORIDE SERPL-SCNC: 100 MMOL/L — SIGNIFICANT CHANGE UP (ref 96–108)
CO2 SERPL-SCNC: 25 MMOL/L — SIGNIFICANT CHANGE UP (ref 22–31)
CREAT SERPL-MCNC: 2.98 MG/DL — HIGH (ref 0.5–1.3)
EGFR: 25 ML/MIN/1.73M2 — LOW
EOSINOPHIL # BLD AUTO: 0.25 K/UL — SIGNIFICANT CHANGE UP (ref 0–0.5)
EOSINOPHIL NFR BLD AUTO: 3.2 % — SIGNIFICANT CHANGE UP (ref 0–6)
GLUCOSE BLDC GLUCOMTR-MCNC: 222 MG/DL — HIGH (ref 70–99)
GLUCOSE BLDC GLUCOMTR-MCNC: 222 MG/DL — HIGH (ref 70–99)
GLUCOSE BLDC GLUCOMTR-MCNC: 232 MG/DL — HIGH (ref 70–99)
GLUCOSE BLDC GLUCOMTR-MCNC: 336 MG/DL — HIGH (ref 70–99)
GLUCOSE SERPL-MCNC: 208 MG/DL — HIGH (ref 70–99)
HCT VFR BLD CALC: 27.2 % — LOW (ref 39–50)
HGB BLD-MCNC: 9 G/DL — LOW (ref 13–17)
IMM GRANULOCYTES NFR BLD AUTO: 1.1 % — HIGH (ref 0–0.9)
LYMPHOCYTES # BLD AUTO: 1.41 K/UL — SIGNIFICANT CHANGE UP (ref 1–3.3)
LYMPHOCYTES # BLD AUTO: 17.9 % — SIGNIFICANT CHANGE UP (ref 13–44)
MCHC RBC-ENTMCNC: 29.6 PG — SIGNIFICANT CHANGE UP (ref 27–34)
MCHC RBC-ENTMCNC: 33.1 G/DL — SIGNIFICANT CHANGE UP (ref 32–36)
MCV RBC AUTO: 89.5 FL — SIGNIFICANT CHANGE UP (ref 80–100)
MONOCYTES # BLD AUTO: 1.16 K/UL — HIGH (ref 0–0.9)
MONOCYTES NFR BLD AUTO: 14.8 % — HIGH (ref 2–14)
NEUTROPHILS # BLD AUTO: 4.87 K/UL — SIGNIFICANT CHANGE UP (ref 1.8–7.4)
NEUTROPHILS NFR BLD AUTO: 62 % — SIGNIFICANT CHANGE UP (ref 43–77)
NRBC # BLD: 0 /100 WBCS — SIGNIFICANT CHANGE UP (ref 0–0)
PLATELET # BLD AUTO: 194 K/UL — SIGNIFICANT CHANGE UP (ref 150–400)
POTASSIUM SERPL-MCNC: 4.4 MMOL/L — SIGNIFICANT CHANGE UP (ref 3.5–5.3)
POTASSIUM SERPL-SCNC: 4.4 MMOL/L — SIGNIFICANT CHANGE UP (ref 3.5–5.3)
PROT SERPL-MCNC: 6.2 G/DL — SIGNIFICANT CHANGE UP (ref 6–8.3)
RBC # BLD: 3.04 M/UL — LOW (ref 4.2–5.8)
RBC # FLD: 12.3 % — SIGNIFICANT CHANGE UP (ref 10.3–14.5)
SODIUM SERPL-SCNC: 134 MMOL/L — LOW (ref 135–145)
WBC # BLD: 7.86 K/UL — SIGNIFICANT CHANGE UP (ref 3.8–10.5)
WBC # FLD AUTO: 7.86 K/UL — SIGNIFICANT CHANGE UP (ref 3.8–10.5)

## 2024-10-01 RX ORDER — PIPERACILLIN SODIUM AND TAZOBACTAM SODIUM 12; 1.5 G/60ML; G/60ML
3.38 INJECTION, POWDER, LYOPHILIZED, FOR SOLUTION INTRAVENOUS EVERY 8 HOURS
Refills: 0 | Status: DISCONTINUED | OUTPATIENT
Start: 2024-10-01 | End: 2024-10-04

## 2024-10-01 RX ORDER — INSULIN GLARGINE 300 U/ML
40 INJECTION, SOLUTION SUBCUTANEOUS AT BEDTIME
Refills: 0 | Status: DISCONTINUED | OUTPATIENT
Start: 2024-10-01 | End: 2024-10-04

## 2024-10-01 RX ORDER — INSULIN LISPRO 100/ML
14 VIAL (ML) SUBCUTANEOUS
Refills: 0 | Status: DISCONTINUED | OUTPATIENT
Start: 2024-10-01 | End: 2024-10-03

## 2024-10-01 RX ADMIN — Medication 8: at 17:06

## 2024-10-01 RX ADMIN — Medication 14 UNIT(S): at 12:45

## 2024-10-01 RX ADMIN — PIPERACILLIN SODIUM AND TAZOBACTAM SODIUM 25 GRAM(S): 12; 1.5 INJECTION, POWDER, LYOPHILIZED, FOR SOLUTION INTRAVENOUS at 21:13

## 2024-10-01 RX ADMIN — PIPERACILLIN SODIUM AND TAZOBACTAM SODIUM 25 GRAM(S): 12; 1.5 INJECTION, POWDER, LYOPHILIZED, FOR SOLUTION INTRAVENOUS at 13:14

## 2024-10-01 RX ADMIN — Medication 6.25 MILLIGRAM(S): at 05:17

## 2024-10-01 RX ADMIN — Medication 650 MILLIGRAM(S): at 21:18

## 2024-10-01 RX ADMIN — Medication 650 MILLIGRAM(S): at 21:48

## 2024-10-01 RX ADMIN — MUPIROCIN 1 APPLICATION(S): 20 OINTMENT TOPICAL at 08:25

## 2024-10-01 RX ADMIN — GABAPENTIN 100 MILLIGRAM(S): 800 TABLET, FILM COATED ORAL at 05:17

## 2024-10-01 RX ADMIN — Medication 10 MILLIGRAM(S): at 05:17

## 2024-10-01 RX ADMIN — Medication 25 MILLIGRAM(S): at 21:12

## 2024-10-01 RX ADMIN — ENOXAPARIN SODIUM 40 MILLIGRAM(S): 150 INJECTION SUBCUTANEOUS at 21:15

## 2024-10-01 RX ADMIN — Medication 6.25 MILLIGRAM(S): at 17:09

## 2024-10-01 RX ADMIN — Medication 12 UNIT(S): at 08:24

## 2024-10-01 RX ADMIN — GABAPENTIN 100 MILLIGRAM(S): 800 TABLET, FILM COATED ORAL at 21:13

## 2024-10-01 RX ADMIN — Medication 1 TABLET(S): at 08:25

## 2024-10-01 RX ADMIN — INSULIN GLARGINE 40 UNIT(S): 300 INJECTION, SOLUTION SUBCUTANEOUS at 21:25

## 2024-10-01 RX ADMIN — Medication 1 TABLET(S): at 17:07

## 2024-10-01 RX ADMIN — Medication 14 UNIT(S): at 17:07

## 2024-10-01 RX ADMIN — GABAPENTIN 100 MILLIGRAM(S): 800 TABLET, FILM COATED ORAL at 13:13

## 2024-10-01 RX ADMIN — Medication 4: at 12:44

## 2024-10-01 RX ADMIN — Medication 4: at 08:23

## 2024-10-01 RX ADMIN — Medication 3 MILLIGRAM(S): at 21:12

## 2024-10-01 NOTE — PROGRESS NOTE ADULT - SUBJECTIVE AND OBJECTIVE BOX
PROGRESS NOTE   Patient is a 49y old  Male who presents with a chief complaint of infected toe (01 Oct 2024 14:10)      HPI:  49-year-old male with past medical history of DM2, HTN, HLD, NEUROPATHY, PVD, NON COMPLIANT WITH TT, ETOH ABUSE, presents today due to left toe infection.  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  Patient experienced a blister to the left toe in which he eventually fell orders follow-up on its own.  Patient notes that the blister opened up and he developed some blood in the blister as well.  Patient notes that the blister scab has slightly fallen off but now his toe is red and painful.  Patient not currently on antibiotics.  Patient denies fever, injury, numbness, weakness, or any other complaints.   IN ED TEMP 99, /100, HR 89, had consult with podiatry and sidney started on Cipro and admitted for further management (17 Sep 2024 19:36)      Vital Signs Last 24 Hrs  T(C): 36.6 (01 Oct 2024 14:03), Max: 38.2 (30 Sep 2024 17:13)  T(F): 97.8 (01 Oct 2024 14:03), Max: 100.7 (30 Sep 2024 17:13)  HR: 92 (01 Oct 2024 14:03) (92 - 99)  BP: 133/78 (01 Oct 2024 14:03) (126/71 - 166/89)  BP(mean): --  RR: 17 (01 Oct 2024 14:03) (16 - 17)  SpO2: 92% (01 Oct 2024 14:03) (92% - 95%)    Parameters below as of 01 Oct 2024 14:03  Patient On (Oxygen Delivery Method): room air                              9.0    7.86  )-----------( 194      ( 01 Oct 2024 08:06 )             27.2               10-01    134[L]  |  100  |  59[H]  ----------------------------<  208[H]  4.4   |  25  |  2.98[H]    Ca    9.4      01 Oct 2024 08:06    TPro  6.2  /  Alb  2.5[L]  /  TBili  0.5  /  DBili  x   /  AST  32  /  ALT  55  /  AlkPhos  115  10-01    PHYSICAL EXAM  SUZANNA KAISER is a pleasant well-nourished, well developed 49y Male in no acute distress, alert awake, and oriented to person, place and time.   LE Focused:  Vascular: Dorsalis Pedis and Posterior Tibial pulses 2/4.  Capillary re-fill time less then 3 seconds digits 1-5 bilateral.    Neuro: Protective sensation diminished to the level of the digits bilateral.  MSK: Muscle strength 5/5 all major muscle groups bilateral.  Derm: POD#11. Incision site of the Left foot noted with necrotic changes to the most  distal aspect of the  flap. Dorsal and plantar flap showing signs of  venous congestion.  Guarded prognosis for the flap at this time. Upon elevation there is decreased venous congestion.  Will allow the region to cont to demarcate.

## 2024-10-01 NOTE — PROGRESS NOTE ADULT - ASSESSMENT
49-year-old male with past medical history of DM2, HTN, HLD, NEUROPATHY, PVD, NON COMPLIANT WITH TT, ETOH ABUSE, presents today due to left toe infection.  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  Patient experienced a blister to the left toe in which he eventually fell orders follow-up on its own.  Patient notes that the blister opened up and he developed some blood in the blister as well.  Patient notes that the blister scab has slightly fallen off but now his toe is red and painful.  Patient not currently on antibiotics.  Patient denies fever, injury, numbness, weakness, or any other complaints.   IN ED TEMP 99, /100, HR 89, had consult with podiatry and sidney started on Cipro and admitted for further management    # Diabetic foot ulcer with cellullitis(left halluxdiabetic ulcer 3x4x0.1 cm with erythema and edema mal odorous , hyperkeratotic  started on ABX, , R/o PVD- arterial doppler- negative for any arterial disease   MR foot done- ac on ch OM with sepstic arthritis of 2ndleft toe, OM left great toe-  surg done by podiatry,9/20  culture of wound E fecalis, path OM , with clear margins   abx as per ID  vascular consult noted - no need for any vascular procedure   podiatry f up ,   planned revision of wound flap    # DM2 with hyperglycemia, with neuropathy , nephropathy with likely PVD    ssi, consistent carb, Endo consult,  hba1c 10.2   Endo consult noted , started on lantus  # Ess HTN   amlodipine 5 mg, off losartan     DVT prophylaxis  ID , endo and podiatry consult  # LUISA with CKD 4   nephro consult,noted  likely Diabetic nephropathy with septic ATN  Hold ARB in short term and titrate other anti hypertensives as needed  # HLD   low fat diet   # hyperkalemia  lokelma, resolved   nephro f up  # Anemia - w up done, likely of ch disease, multifactorial , hematology consult, worsening with infection, hematology consult noted  # DVT prophylaxis         49-year-old male with past medical history of DM2, HTN, HLD, NEUROPATHY, PVD, NON COMPLIANT WITH TT, ETOH ABUSE, presents today due to left toe infection.  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  Patient experienced a blister to the left toe in which he eventually fell orders follow-up on its own.  Patient notes that the blister opened up and he developed some blood in the blister as well.  Patient notes that the blister scab has slightly fallen off but now his toe is red and painful.  Patient not currently on antibiotics.  Patient denies fever, injury, numbness, weakness, or any other complaints.   IN ED TEMP 99, /100, HR 89, had consult with podiatry and sidney started on Cipro and admitted for further management    # Diabetic foot ulcer with cellullitis(left halluxdiabetic ulcer 3x4x0.1 cm with erythema and edema mal odorous , hyperkeratotic  started on ABX, , R/o PVD- arterial doppler- negative for any arterial disease   MR foot done- ac on ch OM with sepstic arthritis of 2ndleft toe, OM left great toe-  surg done by podiatry,9/20  culture of wound E fecalis, path OM , with clear margins   abx as per ID  vascular consult noted - no need for any vascular procedure   podiatry f up ,   planned revision of wound flap    # DM2 with hyperglycemia, with neuropathy , nephropathy with likely PVD    ssi, consistent carb, Endo consult,  hba1c 10.2   Endo consult noted , started on lantus  # Ess HTN   amlodipine 5 mg, off losartan     DVT prophylaxis  ID , endo and podiatry consult  # LUISA with CKD 4   nephro consult,noted  likely Diabetic nephropathy with septic ATN  Hold ARB in short term and titrate other anti hypertensives as needed  # HLD   low fat diet   # hyperkalemia  lokelma, resolved   nephro f up  # Anemia - w up done, likely of ch disease, multifactorial , hematology consult, worsening with infection, hematology consult noted  # DVT prophylaxis  Patient is intermediate risk and optimized for foot surg .

## 2024-10-01 NOTE — PROGRESS NOTE ADULT - ASSESSMENT
The patient is a 49 year old male with a history of HTN, HL, DM, PAD who presents with a toe infection.    Plan:  - ECG with sinus rhythm and no evidence of ischemia/infarction  - Off of losartan  - Continue amlodipine 10 mg daily  - Continue carvedilol 6.25 mg bid  - LE arterial duplex with no significant stenosis  - MRI foot with acute/chronic osteomyelitis  - Path margins with +osteomyelitis  - Podiatry and ID follow-up  - If plan includes re-operation, may proceed from a cardiac standpoint

## 2024-10-01 NOTE — PROGRESS NOTE ADULT - PROBLEM SELECTOR PLAN 1
Patient seen and evaluated.  Pt is s/p debridement of all non-viable soft tissue and bone left foot with hallux amputation.  Surgical pathology : Demonstrates acute on chronic OM on prox clean margins.  Concern for flap due to  venous congestion. Will continue to monitor closely.   Partial weight bearing on left foot.  Pt instructed to elevate his operative extremity on two pillows.  Pt instructed to limit standing and walking on the operative extremity.  Appreciate Vascular consult.   Continue IV Abx as per ID.  If dressings get wet or fall off, please change with:  1. xeroform  2. gauze  3. abd pad  4. kerlex  5. ace bandage  Patient will follow up with Dr. Salvatore Davenport 2-3 days after discharge. Please call [phone] for any question  Pt understands that additional surgical procedures may be required in the future   Will cont to monitor while in house.  Will discuss with all attendings.

## 2024-10-01 NOTE — PROGRESS NOTE ADULT - SUBJECTIVE AND OBJECTIVE BOX
CAPILLARY BLOOD GLUCOSE      POCT Blood Glucose.: 222 mg/dL (01 Oct 2024 08:18)  POCT Blood Glucose.: 300 mg/dL (30 Sep 2024 20:53)  POCT Blood Glucose.: 295 mg/dL (30 Sep 2024 16:50)  POCT Blood Glucose.: 229 mg/dL (30 Sep 2024 12:32)      Vital Signs Last 24 Hrs  T(C): 37.1 (01 Oct 2024 05:13), Max: 38.2 (30 Sep 2024 17:13)  T(F): 98.8 (01 Oct 2024 05:13), Max: 100.7 (30 Sep 2024 17:13)  HR: 93 (01 Oct 2024 05:13) (87 - 99)  BP: 166/89 (01 Oct 2024 05:13) (126/71 - 166/89)  BP(mean): --  RR: 17 (01 Oct 2024 05:13) (16 - 18)  SpO2: 95% (01 Oct 2024 05:13) (94% - 95%)    Parameters below as of 01 Oct 2024 05:13  Patient On (Oxygen Delivery Method): room air        General: WN/WD NAD  Respiratory: CTA B/L  CV: RRR, S1S2, no murmurs, rubs or gallops  Abdominal: Soft, NT, ND +BS, Last BM  Extremities: le foot dsg intact     09-30    134[L]  |  100  |  60[H]  ----------------------------<  178[H]  4.4   |  26  |  3.16[H]    Ca    9.2      30 Sep 2024 07:59    TPro  6.3  /  Alb  2.5[L]  /  TBili  0.5  /  DBili  x   /  AST  43[H]  /  ALT  58  /  AlkPhos  113  09-30      dextrose 50% Injectable 25 Gram(s) IV Push once  dextrose 50% Injectable 12.5 Gram(s) IV Push once  dextrose 50% Injectable 25 Gram(s) IV Push once  dextrose Oral Gel 15 Gram(s) Oral once PRN  glucagon  Injectable 1 milliGRAM(s) IntraMuscular once  insulin glargine Injectable (LANTUS) 35 Unit(s) SubCutaneous at bedtime  insulin lispro (ADMELOG) corrective regimen sliding scale   SubCutaneous three times a day before meals  insulin lispro (ADMELOG) corrective regimen sliding scale   SubCutaneous at bedtime  insulin lispro Injectable (ADMELOG) 12 Unit(s) SubCutaneous three times a day before meals

## 2024-10-01 NOTE — PROGRESS NOTE ADULT - SUBJECTIVE AND OBJECTIVE BOX
[facility], Division of Infectious Diseases  SIERRA Rainey Y. Patel, S. Shah, G. [facility]  [phone]    Name: SUZANNA KAISER  Age: 49y  Gender: Male  MRN: [id number]    Interval History:  Patient seen and examined at bedside this morning  No acute overnight events. Afebrile    Notes reviewed    Antibiotics:      Medications:  acetaminophen     Tablet .. 650 milliGRAM(s) Oral every 6 hours PRN  amLODIPine   Tablet 10 milliGRAM(s) Oral daily  carvedilol 6.25 milliGRAM(s) Oral every 12 hours  dextrose 5%. 1000 milliLiter(s) IV Continuous <Continuous>  dextrose 5%. 1000 milliLiter(s) IV Continuous <Continuous>  dextrose 50% Injectable 25 Gram(s) IV Push once  dextrose 50% Injectable 12.5 Gram(s) IV Push once  dextrose 50% Injectable 25 Gram(s) IV Push once  dextrose Oral Gel 15 Gram(s) Oral once PRN  diphenhydrAMINE 25 milliGRAM(s) Oral every 6 hours PRN  enoxaparin Injectable 40 milliGRAM(s) SubCutaneous every 24 hours  gabapentin 100 milliGRAM(s) Oral three times a day  glucagon  Injectable 1 milliGRAM(s) IntraMuscular once  influenza   Vaccine 0.5 milliLiter(s) IntraMuscular once  insulin glargine Injectable (LANTUS) 40 Unit(s) SubCutaneous at bedtime  insulin lispro (ADMELOG) corrective regimen sliding scale   SubCutaneous three times a day before meals  insulin lispro (ADMELOG) corrective regimen sliding scale   SubCutaneous at bedtime  insulin lispro Injectable (ADMELOG) 14 Unit(s) SubCutaneous three times a day before meals  labetalol Injectable 10 milliGRAM(s) IV Push every 8 hours PRN  lactobacillus acidophilus 1 Tablet(s) Oral two times a day with meals  melatonin 3 milliGRAM(s) Oral at bedtime PRN  mupirocin 2% Ointment 1 Application(s) Topical <User Schedule>      Review of Systems:  Review of systems otherwise negative except as previously noted.    Allergies: No Known Allergies    For details regarding the patient's past medical history, social history, family history, and other miscellaneous elements, please refer the initial infectious diseases consultation and/or the admitting history and physical examination for this admission.    Objective:  Vitals:   T(C): 37.1 (10-01-24 @ 05:13), Max: 38.2 (09-30-24 @ 17:13)  HR: 93 (10-01-24 @ 05:13) (87 - 99)  BP: 166/89 (10-01-24 @ 05:13) (126/71 - 166/89)  RR: 17 (10-01-24 @ 05:13) (16 - 18)  SpO2: 95% (10-01-24 @ 05:13) (94% - 95%)    Physical Examination:  General: no acute distress  HEENT: NC/AT  Cardio: RRR  Resp: decreased breath sounds  Abd: soft, NT, ND  Ext: no edema or cyanosis  Skin: warm, dry, no visible rash      Laboratory Studies:  CBC:                       9.0    7.86  )-----------( 194      ( 01 Oct 2024 08:06 )             27.2     CMP: 10-01    134[L]  |  100  |  59[H]  ----------------------------<  208[H]  4.4   |  25  |  2.98[H]    Ca    9.4      01 Oct 2024 08:06    TPro  6.2  /  Alb  2.5[L]  /  TBili  0.5  /  DBili  x   /  AST  32  /  ALT  55  /  AlkPhos  115  10-01    LIVER FUNCTIONS - ( 01 Oct 2024 08:06 )  Alb: 2.5 g/dL / Pro: 6.2 g/dL / ALK PHOS: 115 U/L / ALT: 55 U/L / AST: 32 U/L / GGT: x           Urinalysis Basic - ( 01 Oct 2024 08:06 )    Color: x / Appearance: x / SG: x / pH: x  Gluc: 208 mg/dL / Ketone: x  / Bili: x / Urobili: x   Blood: x / Protein: x / Nitrite: x   Leuk Esterase: x / RBC: x / WBC x   Sq Epi: x / Non Sq Epi: x / Bacteria: x        Microbiology: reviewed    Culture - Fungal, Tissue (collected 09-20-24 @ 19:21)  Source: Tissue  Preliminary Report (09-28-24 @ 23:02):    No fungus isolated at 1 week.    Culture - Fungal, Tissue (collected 09-20-24 @ 19:21)  Source: Bone Other  Preliminary Report (09-28-24 @ 23:02):    No fungus isolated at 1 week.    Culture - Fungal, Tissue (collected 09-20-24 @ 19:21)  Source: Bone Other  Preliminary Report (09-28-24 @ 23:02):    No fungus isolated at 1 week.    Culture - Fungal, Tissue (collected 09-20-24 @ 19:21)  Source: Tissue Left foot  Preliminary Report (09-28-24 @ 23:02):    No fungus isolated at 1 week.    Culture - Acid Fast - Tissue w/Smear (collected 09-20-24 @ 19:21)  Source: Tissue Other  Preliminary Report (09-28-24 @ 23:05):    No acid-fast bacilli isolated at 1 week. ****Acid-fast cultures are held    for 6 weeks.****    Culture - Acid Fast - Tissue w/Smear (collected 09-20-24 @ 19:21)  Source: Tissue Other  Preliminary Report (09-28-24 @ 23:05):    No acid-fast bacilli isolated at 1 week. ****Acid-fast cultures are held    for 6 weeks.****    Culture - Tissue with Gram Stain (collected 09-20-24 @ 19:21)  Source: Bone Other  Gram Stain (09-22-24 @ 09:37):    No polymorphonuclear cells seen per low power field    No organisms seen per oil power field  Final Report (09-26-24 @ 09:32):    Growth in fluid media only Enterococcus faecalis    Rare Staphylococcus epidermidis  Organism: Enterococcus faecalis  Staphylococcus epidermidis (09-26-24 @ 09:32)  Organism: Staphylococcus epidermidis (09-26-24 @ 09:32)      -  Clindamycin: R >4      -  Oxacillin: R >2      -  Gentamicin: S <=1 Should not be used as monotherapy      -  Vancomycin: S 1      -  Tetracycline: S 2      Method Type: STEPHANIE      -  Penicillin: R >8      -  Rifampin: S <=1 Should not be used as monotherapy      -  Erythromycin: R >4      -  Trimethoprim/Sulfamethoxazole: R >2/38  Organism: Enterococcus faecalis (09-26-24 @ 09:32)      -  Vancomycin: S 4      -  Ampicillin: S <=2 Predicts results to ampicillin/sulbactam, amoxacillin-clavulanate and  piperacillin-tazobactam.      Method Type: STEPHANIE    Culture - Tissue with Gram Stain (collected 09-20-24 @ 19:21)  Source: Bone Other  Gram Stain (09-21-24 @ 06:32):    No polymorphonuclear cells seen per low power field    No organisms seen per oil power field  Final Report (09-26-24 @ 09:24):    No growth    Culture - Tissue with Gram Stain (collected 09-20-24 @ 19:21)  Source: Tissue left foot  Gram Stain (09-22-24 @ 09:38):    No polymorphonuclear cells seen per low power field    No organisms seen per oil power field  Final Report (09-26-24 @ 09:21):    Growth in fluid media only Enterococcus faecalis  Organism: Enterococcus faecalis (09-26-24 @ 09:21)  Organism: Enterococcus faecalis (09-26-24 @ 09:21)      -  Vancomycin: S 4      -  Ampicillin: S <=2 Predicts results to ampicillin/sulbactam, amoxacillin-clavulanate and  piperacillin-tazobactam.      Method Type: STEPHANIE    Culture - Other (collected 09-17-24 @ 19:10)  Source: Wound Wound  Final Report (09-20-24 @ 13:25):    Numerous Enterobacter cloacae complex    Numerous Enterococcus faecalis    Few Coag Negative Staphylococcus "Susceptibilities not performed"  Organism: Enterobacter cloacae complex  Enterococcus faecalis (09-20-24 @ 13:25)  Organism: Enterococcus faecalis (09-20-24 @ 13:25)      -  Vancomycin: S 2      -  Ampicillin: S <=2 Predicts results to ampicillin/sulbactam, amoxacillin-clavulanate and  piperacillin-tazobactam.      Method Type: STEPHANIE  Organism: Enterobacter cloacae complex (09-20-24 @ 13:25)      -  Levofloxacin: S <=0.5      -  Tobramycin: S <=2      -  Aztreonam: S <=4      -  Gentamicin: S <=2      -  Cefazolin: R >16      -  Cefepime: S <=2      -  Piperacillin/Tazobactam: S <=8 Treatment with Pipercillin/Tazobactam is not recommended in severe infections casued by Klebsiella aerogenes, Enterobacter cloacae complex, and Citrobacter freundii complex.      -  Ciprofloxacin: S <=0.25      -  Imipenem: S <=1      -  Ceftriaxone: S <=1 Enterobacter cloacae, Klebsiella aerogenes, and Citrobacter freundii may develop resistance during prolonged therapy.      -  Ampicillin: R 16 These ampicillin results predict results for amoxicillin      Method Type: STEPHANIE      -  Meropenem: S <=1      -  Ampicillin/Sulbactam: R 8/4      -  Cefoxitin: R >16      -  Amoxicillin/Clavulanic Acid: R >16/8      -  Trimethoprim/Sulfamethoxazole: S <=0.5/9.5      -  Ertapenem: S <=0.5    Culture - Blood (collected 09-17-24 @ 18:13)  Source: .Blood Blood-Peripheral  Final Report (09-23-24 @ 01:00):    No growth at 5 days    Culture - Blood (collected 09-17-24 @ 18:13)  Source: .Blood Blood-Peripheral  Final Report (09-23-24 @ 01:00):    No growth at 5 days          Radiology: reviewed       [facility], Division of Infectious Diseases  SIERRA Rainey Y. Patel, S. Shah, G. [facility]  [phone]    Name: SUZANNA KAISER  Age: 49y  Gender: Male  MRN: [id number]    Interval History:  Patient seen and examined at bedside this morning  Febrile overnight Tm 100.7F    Notes reviewed    Antibiotics:      Medications:  acetaminophen     Tablet .. 650 milliGRAM(s) Oral every 6 hours PRN  amLODIPine   Tablet 10 milliGRAM(s) Oral daily  carvedilol 6.25 milliGRAM(s) Oral every 12 hours  dextrose 5%. 1000 milliLiter(s) IV Continuous <Continuous>  dextrose 5%. 1000 milliLiter(s) IV Continuous <Continuous>  dextrose 50% Injectable 25 Gram(s) IV Push once  dextrose 50% Injectable 12.5 Gram(s) IV Push once  dextrose 50% Injectable 25 Gram(s) IV Push once  dextrose Oral Gel 15 Gram(s) Oral once PRN  diphenhydrAMINE 25 milliGRAM(s) Oral every 6 hours PRN  enoxaparin Injectable 40 milliGRAM(s) SubCutaneous every 24 hours  gabapentin 100 milliGRAM(s) Oral three times a day  glucagon  Injectable 1 milliGRAM(s) IntraMuscular once  influenza   Vaccine 0.5 milliLiter(s) IntraMuscular once  insulin glargine Injectable (LANTUS) 40 Unit(s) SubCutaneous at bedtime  insulin lispro (ADMELOG) corrective regimen sliding scale   SubCutaneous three times a day before meals  insulin lispro (ADMELOG) corrective regimen sliding scale   SubCutaneous at bedtime  insulin lispro Injectable (ADMELOG) 14 Unit(s) SubCutaneous three times a day before meals  labetalol Injectable 10 milliGRAM(s) IV Push every 8 hours PRN  lactobacillus acidophilus 1 Tablet(s) Oral two times a day with meals  melatonin 3 milliGRAM(s) Oral at bedtime PRN  mupirocin 2% Ointment 1 Application(s) Topical <User Schedule>      Review of Systems:  Review of systems otherwise negative except as previously noted.    Allergies: No Known Allergies    For details regarding the patient's past medical history, social history, family history, and other miscellaneous elements, please refer the initial infectious diseases consultation and/or the admitting history and physical examination for this admission.    Objective:  Vitals:   T(C): 37.1 (10-01-24 @ 05:13), Max: 38.2 (09-30-24 @ 17:13)  HR: 93 (10-01-24 @ 05:13) (87 - 99)  BP: 166/89 (10-01-24 @ 05:13) (126/71 - 166/89)  RR: 17 (10-01-24 @ 05:13) (16 - 18)  SpO2: 95% (10-01-24 @ 05:13) (94% - 95%)    Physical Examination:  General: no acute distress  HEENT: NC/AT  Cardio: RRR  Resp: decreased breath sounds  Abd: soft, NT, ND  Ext: no edema or cyanosis  Skin: warm, dry, no visible rash      Laboratory Studies:  CBC:                       9.0    7.86  )-----------( 194      ( 01 Oct 2024 08:06 )             27.2     CMP: 10-01    134[L]  |  100  |  59[H]  ----------------------------<  208[H]  4.4   |  25  |  2.98[H]    Ca    9.4      01 Oct 2024 08:06    TPro  6.2  /  Alb  2.5[L]  /  TBili  0.5  /  DBili  x   /  AST  32  /  ALT  55  /  AlkPhos  115  10-01    LIVER FUNCTIONS - ( 01 Oct 2024 08:06 )  Alb: 2.5 g/dL / Pro: 6.2 g/dL / ALK PHOS: 115 U/L / ALT: 55 U/L / AST: 32 U/L / GGT: x           Urinalysis Basic - ( 01 Oct 2024 08:06 )    Color: x / Appearance: x / SG: x / pH: x  Gluc: 208 mg/dL / Ketone: x  / Bili: x / Urobili: x   Blood: x / Protein: x / Nitrite: x   Leuk Esterase: x / RBC: x / WBC x   Sq Epi: x / Non Sq Epi: x / Bacteria: x        Microbiology: reviewed    Culture - Fungal, Tissue (collected 09-20-24 @ 19:21)  Source: Tissue  Preliminary Report (09-28-24 @ 23:02):    No fungus isolated at 1 week.    Culture - Fungal, Tissue (collected 09-20-24 @ 19:21)  Source: Bone Other  Preliminary Report (09-28-24 @ 23:02):    No fungus isolated at 1 week.    Culture - Fungal, Tissue (collected 09-20-24 @ 19:21)  Source: Bone Other  Preliminary Report (09-28-24 @ 23:02):    No fungus isolated at 1 week.    Culture - Fungal, Tissue (collected 09-20-24 @ 19:21)  Source: Tissue Left foot  Preliminary Report (09-28-24 @ 23:02):    No fungus isolated at 1 week.    Culture - Acid Fast - Tissue w/Smear (collected 09-20-24 @ 19:21)  Source: Tissue Other  Preliminary Report (09-28-24 @ 23:05):    No acid-fast bacilli isolated at 1 week. ****Acid-fast cultures are held    for 6 weeks.****    Culture - Acid Fast - Tissue w/Smear (collected 09-20-24 @ 19:21)  Source: Tissue Other  Preliminary Report (09-28-24 @ 23:05):    No acid-fast bacilli isolated at 1 week. ****Acid-fast cultures are held    for 6 weeks.****    Culture - Tissue with Gram Stain (collected 09-20-24 @ 19:21)  Source: Bone Other  Gram Stain (09-22-24 @ 09:37):    No polymorphonuclear cells seen per low power field    No organisms seen per oil power field  Final Report (09-26-24 @ 09:32):    Growth in fluid media only Enterococcus faecalis    Rare Staphylococcus epidermidis  Organism: Enterococcus faecalis  Staphylococcus epidermidis (09-26-24 @ 09:32)  Organism: Staphylococcus epidermidis (09-26-24 @ 09:32)      -  Clindamycin: R >4      -  Oxacillin: R >2      -  Gentamicin: S <=1 Should not be used as monotherapy      -  Vancomycin: S 1      -  Tetracycline: S 2      Method Type: STEPHANIE      -  Penicillin: R >8      -  Rifampin: S <=1 Should not be used as monotherapy      -  Erythromycin: R >4      -  Trimethoprim/Sulfamethoxazole: R >2/38  Organism: Enterococcus faecalis (09-26-24 @ 09:32)      -  Vancomycin: S 4      -  Ampicillin: S <=2 Predicts results to ampicillin/sulbactam, amoxacillin-clavulanate and  piperacillin-tazobactam.      Method Type: STEPHANIE    Culture - Tissue with Gram Stain (collected 09-20-24 @ 19:21)  Source: Bone Other  Gram Stain (09-21-24 @ 06:32):    No polymorphonuclear cells seen per low power field    No organisms seen per oil power field  Final Report (09-26-24 @ 09:24):    No growth    Culture - Tissue with Gram Stain (collected 09-20-24 @ 19:21)  Source: Tissue left foot  Gram Stain (09-22-24 @ 09:38):    No polymorphonuclear cells seen per low power field    No organisms seen per oil power field  Final Report (09-26-24 @ 09:21):    Growth in fluid media only Enterococcus faecalis  Organism: Enterococcus faecalis (09-26-24 @ 09:21)  Organism: Enterococcus faecalis (09-26-24 @ 09:21)      -  Vancomycin: S 4      -  Ampicillin: S <=2 Predicts results to ampicillin/sulbactam, amoxacillin-clavulanate and  piperacillin-tazobactam.      Method Type: STEPHANIE    Culture - Other (collected 09-17-24 @ 19:10)  Source: Wound Wound  Final Report (09-20-24 @ 13:25):    Numerous Enterobacter cloacae complex    Numerous Enterococcus faecalis    Few Coag Negative Staphylococcus "Susceptibilities not performed"  Organism: Enterobacter cloacae complex  Enterococcus faecalis (09-20-24 @ 13:25)  Organism: Enterococcus faecalis (09-20-24 @ 13:25)      -  Vancomycin: S 2      -  Ampicillin: S <=2 Predicts results to ampicillin/sulbactam, amoxacillin-clavulanate and  piperacillin-tazobactam.      Method Type: STEPHANIE  Organism: Enterobacter cloacae complex (09-20-24 @ 13:25)      -  Levofloxacin: S <=0.5      -  Tobramycin: S <=2      -  Aztreonam: S <=4      -  Gentamicin: S <=2      -  Cefazolin: R >16      -  Cefepime: S <=2      -  Piperacillin/Tazobactam: S <=8 Treatment with Pipercillin/Tazobactam is not recommended in severe infections casued by Klebsiella aerogenes, Enterobacter cloacae complex, and Citrobacter freundii complex.      -  Ciprofloxacin: S <=0.25      -  Imipenem: S <=1      -  Ceftriaxone: S <=1 Enterobacter cloacae, Klebsiella aerogenes, and Citrobacter freundii may develop resistance during prolonged therapy.      -  Ampicillin: R 16 These ampicillin results predict results for amoxicillin      Method Type: STEPHANIE      -  Meropenem: S <=1      -  Ampicillin/Sulbactam: R 8/4      -  Cefoxitin: R >16      -  Amoxicillin/Clavulanic Acid: R >16/8      -  Trimethoprim/Sulfamethoxazole: S <=0.5/9.5      -  Ertapenem: S <=0.5    Culture - Blood (collected 09-17-24 @ 18:13)  Source: .Blood Blood-Peripheral  Final Report (09-23-24 @ 01:00):    No growth at 5 days    Culture - Blood (collected 09-17-24 @ 18:13)  Source: .Blood Blood-Peripheral  Final Report (09-23-24 @ 01:00):    No growth at 5 days          Radiology: reviewed

## 2024-10-01 NOTE — PROGRESS NOTE ADULT - PROBLEM SELECTOR PLAN 1
increase lantus 40 units qhs  increase admelog 14 units 3x/day before meals  cont mod dose admelog corrective scale coverage qac/qhs  cont cons cho diet  goal bg 100-180 in hosp setting

## 2024-10-01 NOTE — PROGRESS NOTE ADULT - SUBJECTIVE AND OBJECTIVE BOX
NEPHROLOGY PROGRESS NOTE    CHIEF COMPLAINT:  CKD    HPI:  Renal function stable.      EXAM:  Vital Signs Last 24 Hrs  T(C): 36.6 (01 Oct 2024 14:03), Max: 38.2 (30 Sep 2024 17:13)  T(F): 97.8 (01 Oct 2024 14:03), Max: 100.7 (30 Sep 2024 17:13)  HR: 92 (01 Oct 2024 14:03) (92 - 99)  BP: 133/78 (01 Oct 2024 14:03) (126/71 - 166/89)  BP(mean): --  RR: 17 (01 Oct 2024 14:03) (16 - 17)  SpO2: 92% (01 Oct 2024 14:03) (92% - 95%)    Parameters below as of 01 Oct 2024 14:03  Patient On (Oxygen Delivery Method): room air      Conversant, in no apparent distress  Normal respiratory effort, lungs clear bilaterally  Heart RRR with no murmur, no peripheral edema    LABS                        9.0    7.86  )-----------( 194      ( 01 Oct 2024 08:06 )             27.2     10-01    134[L]  |  100  |  59[H]  ----------------------------<  208[H]  4.4   |  25  |  2.98[H]    Ca    9.4      01 Oct 2024 08:06    TPro  6.2  /  Alb  2.5[L]  /  TBili  0.5  /  DBili  x   /  AST  32  /  ALT  55  /  AlkPhos  115  10-01        Impression:  CKD 4, Diabetic nephropathy with proteinuria  Diabetes with foot ulcer/infection  Hypertension, control improved    Recommend:  Low K diet  No change anti hypertensives;  resume ACE/ARB on discharge  Avoid nephrotoxic meds as possible

## 2024-10-01 NOTE — PROGRESS NOTE ADULT - SUBJECTIVE AND OBJECTIVE BOX
Chief Complaint: Foot wound    Interval Events: No events overnight.    Review of Systems:  General: No fevers, chills, weight gain  Skin: No rashes, color changes  Cardiovascular: No chest pain, orthopnea  Respiratory: No shortness of breath, cough  Gastrointestinal: No nausea, abdominal pain  Genitourinary: No incontinence, pain with urination  Musculoskeletal: No pain, swelling, decreased range of motion  Neurological: No headache, weakness  Psychiatric: No depression, anxiety  Endocrine: No weight gain, increased thirst  All other systems are comprehensively negative.    Physical Exam:  Vital Signs Last 24 Hrs  T(C): 37.1 (01 Oct 2024 05:13), Max: 38.2 (30 Sep 2024 17:13)  T(F): 98.8 (01 Oct 2024 05:13), Max: 100.7 (30 Sep 2024 17:13)  HR: 93 (01 Oct 2024 05:13) (87 - 99)  BP: 166/89 (01 Oct 2024 05:13) (126/71 - 166/89)  BP(mean): --  RR: 17 (01 Oct 2024 05:13) (16 - 18)  SpO2: 95% (01 Oct 2024 05:13) (94% - 95%)  Parameters below as of 01 Oct 2024 05:13  Patient On (Oxygen Delivery Method): room air  General: NAD  HEENT: MMM  Neck: No JVD, no carotid bruit  Lungs: CTAB  CV: RRR, nl S1/S2, no M/R/G  Abdomen: S/NT/ND, +BS  Extremities: No LE edema, no cyanosis  Neuro: AAOx3, non-focal  Skin: No rash    Labs:    10-01    134[L]  |  100  |  59[H]  ----------------------------<  208[H]  4.4   |  25  |  2.98[H]    Ca    9.4      01 Oct 2024 08:06    TPro  6.2  /  Alb  2.5[L]  /  TBili  0.5  /  DBili  x   /  AST  32  /  ALT  55  /  AlkPhos  115  10-01                        9.0    7.86  )-----------( 194      ( 01 Oct 2024 08:06 )             27.2       ECG/Telemetry: Sinus rhythm

## 2024-10-01 NOTE — CASE MANAGEMENT PROGRESS NOTE - NSCMPROGRESSNOTE_GEN_ALL_CORE
RN/CM notified by podiatrist resident DR Varma that NO further surgical intervention for pt during this admission but that pt may need said intervention in the future if L foot amputation site worsens. IV Zosyn continues @ this time. Possible DC in am pending MD clearance. CM noted pt has Medicaid, agreeable with linkage to community resources, CM sent referral to [facility] to verify pt's eligibility with program. CM has reached out to Diabetes NP to finalize home DM meds upon DC. CM verified with pt's Medicaid carrier by calling [facility] transport [phone], spoke to rep Cooper, that pt has NO transport benefits @ this time. Both CM and MSW will continue to follow case and meet with pt again to discuss community resources available for him.   RN/CM notified by podiatrist resident DR Varma that NO further surgical intervention for pt during this admission but that pt may need said intervention in the future if L foot amputation site ( S/P 09/20/2024 s/p debridement of all non-viable soft tissue and bone left foot with hallux amputation)worsens. IV Zosyn continues @ this time. Possible DC in am pending MD clearance. CM noted pt has Medicaid, agreeable with linkage to community resources, CM sent referral to [facility] to verify pt's eligibility with program. CM has reached out to Diabetes NP to finalize home DM meds upon DC. CM verified with pt's Medicaid carrier by calling [facility] transport [phone], spoke to rep Cooper, that pt has NO transport benefits @ this time. Both CM and MSW will continue to follow case and meet with pt again to discuss community resources available for him.

## 2024-10-01 NOTE — CASE MANAGEMENT PROGRESS NOTE - NSCMPROGRESSNOTE_GEN_ALL_CORE
Pt. discussed during IDT rounds. Podiatry is following pt. will decide today if pt. will receive OR intervention or d/c home w/PO abx. CM remains available throughout hospital stay.

## 2024-10-01 NOTE — PROGRESS NOTE ADULT - SUBJECTIVE AND OBJECTIVE BOX
Patient is a 49y old  Male who presents with a chief complaint of infected toe (01 Oct 2024 09:33)      INTERVAL HPI/OVERNIGHT EVENTS:overnight events noted    Home Medications:  non compliant with meds for months:  (17 Sep 2024 19:54)      MEDICATIONS  (STANDING):  amLODIPine   Tablet 10 milliGRAM(s) Oral daily  carvedilol 6.25 milliGRAM(s) Oral every 12 hours  dextrose 5%. 1000 milliLiter(s) (50 mL/Hr) IV Continuous <Continuous>  dextrose 5%. 1000 milliLiter(s) (100 mL/Hr) IV Continuous <Continuous>  dextrose 50% Injectable 12.5 Gram(s) IV Push once  dextrose 50% Injectable 25 Gram(s) IV Push once  dextrose 50% Injectable 25 Gram(s) IV Push once  enoxaparin Injectable 40 milliGRAM(s) SubCutaneous every 24 hours  gabapentin 100 milliGRAM(s) Oral three times a day  glucagon  Injectable 1 milliGRAM(s) IntraMuscular once  influenza   Vaccine 0.5 milliLiter(s) IntraMuscular once  insulin glargine Injectable (LANTUS) 40 Unit(s) SubCutaneous at bedtime  insulin lispro (ADMELOG) corrective regimen sliding scale   SubCutaneous three times a day before meals  insulin lispro (ADMELOG) corrective regimen sliding scale   SubCutaneous at bedtime  insulin lispro Injectable (ADMELOG) 14 Unit(s) SubCutaneous three times a day before meals  lactobacillus acidophilus 1 Tablet(s) Oral two times a day with meals  mupirocin 2% Ointment 1 Application(s) Topical <User Schedule>  piperacillin/tazobactam IVPB.. 3.375 Gram(s) IV Intermittent every 8 hours    MEDICATIONS  (PRN):  acetaminophen     Tablet .. 650 milliGRAM(s) Oral every 6 hours PRN Temp greater or equal to 38C (100.4F), Mild Pain (1 - 3), Moderate Pain (4 - 6)  dextrose Oral Gel 15 Gram(s) Oral once PRN Blood Glucose LESS THAN 70 milliGRAM(s)/deciliter  diphenhydrAMINE 25 milliGRAM(s) Oral every 6 hours PRN Rash and/or Itching  labetalol Injectable 10 milliGRAM(s) IV Push every 8 hours PRN Systolic blood pressure >180  melatonin 3 milliGRAM(s) Oral at bedtime PRN Insomnia      Allergies    No Known Allergies    Intolerances        REVIEW OF SYSTEMS:  CONSTITUTIONAL: No fever, weight loss, or fatigue  EYES: No eye pain, visual disturbances, or discharge  ENMT:  No difficulty hearing, tinnitus, vertigo; No sinus or throat pain  NECK: No pain or stiffness  BREASTS: No pain, masses, or nipple discharge  RESPIRATORY: No cough, wheezing, chills or hemoptysis; No shortness of breath  CARDIOVASCULAR: No chest pain, palpitations, dizziness, or leg swelling  GASTROINTESTINAL: No abdominal or epigastric pain. No nausea, vomiting, or hematemesis; No diarrhea or constipation. No melena or hematochezia.  GENITOURINARY: No dysuria, frequency, hematuria, or incontinence  NEUROLOGICAL: No headaches, memory loss, loss of strength, numbness, or tremors  SKIN: No itching, burning, rashes, or lesions     Vital Signs Last 24 Hrs  T(C): 37.1 (01 Oct 2024 05:13), Max: 38.2 (30 Sep 2024 17:13)  T(F): 98.8 (01 Oct 2024 05:13), Max: 100.7 (30 Sep 2024 17:13)  HR: 93 (01 Oct 2024 05:13) (87 - 99)  BP: 166/89 (01 Oct 2024 05:13) (126/71 - 166/89)  BP(mean): --  RR: 17 (01 Oct 2024 05:13) (16 - 18)  SpO2: 95% (01 Oct 2024 05:13) (94% - 95%)    Parameters below as of 01 Oct 2024 05:13  Patient On (Oxygen Delivery Method): room air        PHYSICAL EXAM:  GENERAL: NAD, well-groomed, well-developed  HEAD:  Atraumatic, Normocephalic  EYES: EOMI, PERRLA, conjunctiva and sclera clear  ENMT: Moist mucous membranes,   NECK: Supple, No JVD, Normal thyroid  NERVOUS SYSTEM:  Alert & Oriented X3, Good concentration; Motor Strength 5/5 B/L upper and lower extremities; DTRs 2+ intact and symmetric  CHEST/LUNG: Clear to percussion bilaterally;   HEART: Regular rate and rhythm;  ABDOMEN: Soft, Nontender, Nondistended; Bowel sounds present  EXTREMITIES: foot in dressing , pulses intact  SKIN: No rashes or lesions    LABS:                        9.0    7.86  )-----------( 194      ( 01 Oct 2024 08:06 )             27.2     10-01    134[L]  |  100  |  59[H]  ----------------------------<  208[H]  4.4   |  25  |  2.98[H]    Ca    9.4      01 Oct 2024 08:06    TPro  6.2  /  Alb  2.5[L]  /  TBili  0.5  /  DBili  x   /  AST  32  /  ALT  55  /  AlkPhos  115  10-01      Urinalysis Basic - ( 01 Oct 2024 08:06 )    Color: x / Appearance: x / SG: x / pH: x  Gluc: 208 mg/dL / Ketone: x  / Bili: x / Urobili: x   Blood: x / Protein: x / Nitrite: x   Leuk Esterase: x / RBC: x / WBC x   Sq Epi: x / Non Sq Epi: x / Bacteria: x      CAPILLARY BLOOD GLUCOSE      POCT Blood Glucose.: 222 mg/dL (01 Oct 2024 08:18)  POCT Blood Glucose.: 300 mg/dL (30 Sep 2024 20:53)  POCT Blood Glucose.: 295 mg/dL (30 Sep 2024 16:50)  POCT Blood Glucose.: 229 mg/dL (30 Sep 2024 12:32)          I&O's Summary      RADIOLOGY & ADDITIONAL TESTS:    Imaging Personally Reviewed:  [x ] YES  [ ] NO    Consultant(s) Notes Reviewed:  [x ] YES  [ ] NO    Care Discussed with Consultants/Other Providers [x ] YES  [ ] NO

## 2024-10-01 NOTE — PROGRESS NOTE ADULT - ASSESSMENT
48YO M PMH DM2, HTN, HLD, NEUROPATHY, PVD, NON COMPLIANT WITH TT, ETOH ABUSE, admitted with infected DM left great toe infection  MRI + OM  Sp Left great toe amputation 9/20  OR bone cultures noted with E Faecalis: Amp S and Vanco S  Left foot amputation stump with some necrotic changes distal flap. Dorsal and plantar flap showing signs of  venous congestion.  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 Please call with any questions.  Nancy Rothman M.D.  [facility] Division of Infectious Diseases [phone]  For after 5 P.M. and weekends, please call [phone]   48YO M PMH DM2, HTN, HLD, NEUROPATHY, PVD, NON COMPLIANT WITH TT, ETOH ABUSE, admitted with infected DM left great toe infection  MRI + OM  Sp Left great toe amputation 9/20  OR bone cultures noted with E Faecalis: Amp S and Vanco S  Left foot amputation stump with some necrotic changes distal flap. Dorsal and plantar flap showing signs of  venous congestion.  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 Please call with any questions.  Nancy Rothman M.D.  [facility] Division of Infectious Diseases [phone]  For after 5 P.M. and weekends, please call [phone]

## 2024-10-02 LAB
GLUCOSE BLDC GLUCOMTR-MCNC: 189 MG/DL — HIGH (ref 70–99)
GLUCOSE BLDC GLUCOMTR-MCNC: 195 MG/DL — HIGH (ref 70–99)
GLUCOSE BLDC GLUCOMTR-MCNC: 257 MG/DL — HIGH (ref 70–99)
GLUCOSE BLDC GLUCOMTR-MCNC: 300 MG/DL — HIGH (ref 70–99)

## 2024-10-02 RX ORDER — GUAIFENESIN 100 MG/5ML
600 SOLUTION ORAL EVERY 12 HOURS
Refills: 0 | Status: DISCONTINUED | OUTPATIENT
Start: 2024-10-02 | End: 2024-10-04

## 2024-10-02 RX ADMIN — PIPERACILLIN SODIUM AND TAZOBACTAM SODIUM 25 GRAM(S): 12; 1.5 INJECTION, POWDER, LYOPHILIZED, FOR SOLUTION INTRAVENOUS at 14:37

## 2024-10-02 RX ADMIN — Medication 6.25 MILLIGRAM(S): at 05:02

## 2024-10-02 RX ADMIN — Medication 6: at 17:38

## 2024-10-02 RX ADMIN — Medication 25 MILLIGRAM(S): at 21:31

## 2024-10-02 RX ADMIN — Medication 650 MILLIGRAM(S): at 15:40

## 2024-10-02 RX ADMIN — Medication 3 MILLIGRAM(S): at 21:31

## 2024-10-02 RX ADMIN — Medication 14 UNIT(S): at 08:05

## 2024-10-02 RX ADMIN — Medication 650 MILLIGRAM(S): at 15:10

## 2024-10-02 RX ADMIN — Medication 2: at 21:32

## 2024-10-02 RX ADMIN — GUAIFENESIN 600 MILLIGRAM(S): 100 SOLUTION ORAL at 10:09

## 2024-10-02 RX ADMIN — Medication 1 TABLET(S): at 08:26

## 2024-10-02 RX ADMIN — Medication 1 TABLET(S): at 17:14

## 2024-10-02 RX ADMIN — Medication 10 MILLIGRAM(S): at 05:02

## 2024-10-02 RX ADMIN — Medication 6.25 MILLIGRAM(S): at 17:15

## 2024-10-02 RX ADMIN — Medication 14 UNIT(S): at 12:30

## 2024-10-02 RX ADMIN — Medication 14 UNIT(S): at 17:39

## 2024-10-02 RX ADMIN — INSULIN GLARGINE 40 UNIT(S): 300 INJECTION, SOLUTION SUBCUTANEOUS at 21:31

## 2024-10-02 RX ADMIN — Medication 2: at 08:05

## 2024-10-02 RX ADMIN — GABAPENTIN 100 MILLIGRAM(S): 800 TABLET, FILM COATED ORAL at 21:31

## 2024-10-02 RX ADMIN — GABAPENTIN 100 MILLIGRAM(S): 800 TABLET, FILM COATED ORAL at 14:37

## 2024-10-02 RX ADMIN — GABAPENTIN 100 MILLIGRAM(S): 800 TABLET, FILM COATED ORAL at 05:02

## 2024-10-02 RX ADMIN — Medication 2: at 12:30

## 2024-10-02 RX ADMIN — PIPERACILLIN SODIUM AND TAZOBACTAM SODIUM 25 GRAM(S): 12; 1.5 INJECTION, POWDER, LYOPHILIZED, FOR SOLUTION INTRAVENOUS at 05:02

## 2024-10-02 RX ADMIN — ENOXAPARIN SODIUM 40 MILLIGRAM(S): 150 INJECTION SUBCUTANEOUS at 21:31

## 2024-10-02 RX ADMIN — PIPERACILLIN SODIUM AND TAZOBACTAM SODIUM 25 GRAM(S): 12; 1.5 INJECTION, POWDER, LYOPHILIZED, FOR SOLUTION INTRAVENOUS at 21:30

## 2024-10-02 NOTE — PROGRESS NOTE ADULT - SUBJECTIVE AND OBJECTIVE BOX
Subjective: c/o pruritus.       MEDICATIONS  (STANDING):  amLODIPine   Tablet 10 milliGRAM(s) Oral daily  carvedilol 6.25 milliGRAM(s) Oral every 12 hours  dextrose 5%. 1000 milliLiter(s) (100 mL/Hr) IV Continuous <Continuous>  dextrose 5%. 1000 milliLiter(s) (50 mL/Hr) IV Continuous <Continuous>  dextrose 50% Injectable 25 Gram(s) IV Push once  dextrose 50% Injectable 12.5 Gram(s) IV Push once  dextrose 50% Injectable 25 Gram(s) IV Push once  enoxaparin Injectable 40 milliGRAM(s) SubCutaneous every 24 hours  gabapentin 100 milliGRAM(s) Oral three times a day  glucagon  Injectable 1 milliGRAM(s) IntraMuscular once  influenza   Vaccine 0.5 milliLiter(s) IntraMuscular once  insulin glargine Injectable (LANTUS) 40 Unit(s) SubCutaneous at bedtime  insulin lispro (ADMELOG) corrective regimen sliding scale   SubCutaneous three times a day before meals  insulin lispro (ADMELOG) corrective regimen sliding scale   SubCutaneous at bedtime  insulin lispro Injectable (ADMELOG) 14 Unit(s) SubCutaneous three times a day before meals  lactobacillus acidophilus 1 Tablet(s) Oral two times a day with meals  mupirocin 2% Ointment 1 Application(s) Topical <User Schedule>  piperacillin/tazobactam IVPB.. 3.375 Gram(s) IV Intermittent every 8 hours    MEDICATIONS  (PRN):  acetaminophen     Tablet .. 650 milliGRAM(s) Oral every 6 hours PRN Temp greater or equal to 38C (100.4F), Mild Pain (1 - 3), Moderate Pain (4 - 6)  dextrose Oral Gel 15 Gram(s) Oral once PRN Blood Glucose LESS THAN 70 milliGRAM(s)/deciliter  diphenhydrAMINE 25 milliGRAM(s) Oral every 6 hours PRN Rash and/or Itching  guaiFENesin  milliGRAM(s) Oral every 12 hours PRN Cough  labetalol Injectable 10 milliGRAM(s) IV Push every 8 hours PRN Systolic blood pressure >180  melatonin 3 milliGRAM(s) Oral at bedtime PRN Insomnia          T(C): 38 (10-02-24 @ 15:02), Max: 38.1 (10-02-24 @ 14:12)  HR: 96 (10-02-24 @ 14:12) (93 - 96)  BP: 129/74 (10-02-24 @ 14:12) (129/74 - 157/86)  RR: 18 (10-02-24 @ 14:12) (18 - 18)  SpO2: 94% (10-02-24 @ 14:12) (93% - 94%)  Wt(kg): --        I&O's Detail    01 Oct 2024 07:01  -  02 Oct 2024 07:00  --------------------------------------------------------  IN:    IV PiggyBack: 200 mL  Total IN: 200 mL    OUT:  Total OUT: 0 mL    Total NET: 200 mL               PHYSICAL EXAM:    GENERAL: NAD  NECK: Supple, no inc in JVP  CHEST/LUNG: Clear  HEART: S1S2  ABDOMEN: Soft, Nontender, Nondistended; Bowel sounds present  EXTREMITIES:  L foot dressed. Min edema.   NEURO: no asterixis      LABS:  CBC Full  -  ( 01 Oct 2024 08:06 )  WBC Count : 7.86 K/uL  RBC Count : 3.04 M/uL  Hemoglobin : 9.0 g/dL  Hematocrit : 27.2 %  Platelet Count - Automated : 194 K/uL  Mean Cell Volume : 89.5 fL  Mean Cell Hemoglobin : 29.6 pg  Mean Cell Hemoglobin Concentration : 33.1 g/dL  Auto Neutrophil # : 4.87 K/uL  Auto Lymphocyte # : 1.41 K/uL  Auto Monocyte # : 1.16 K/uL  Auto Eosinophil # : 0.25 K/uL  Auto Basophil # : 0.08 K/uL  Auto Neutrophil % : 62.0 %  Auto Lymphocyte % : 17.9 %  Auto Monocyte % : 14.8 %  Auto Eosinophil % : 3.2 %  Auto Basophil % : 1.0 %    10-01    134[L]  |  100  |  59[H]  ----------------------------<  208[H]  4.4   |  25  |  2.98[H]    Ca    9.4      01 Oct 2024 08:06    TPro  6.2  /  Alb  2.5[L]  /  TBili  0.5  /  DBili  x   /  AST  32  /  ALT  55  /  AlkPhos  115  10-01      Impression:  LUISA on 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 DDx: AIN, septic ATN. Waxing/waning  Poorly controlled DM, Hgb A1C >10  Uncontrolled HTN. Improved.   Diabetic foot ulcer    Recommendations:   Cont to keep off ARB  BMP tomorrow.    Oral hydration encouraged.   Follow diff for eosinophilia.

## 2024-10-02 NOTE — PROGRESS NOTE ADULT - SUBJECTIVE AND OBJECTIVE BOX
Chief Complaint: Foot wound    Interval Events: No events overnight.    Review of Systems:  General: No fevers, chills, weight gain  Skin: No rashes, color changes  Cardiovascular: No chest pain, orthopnea  Respiratory: No shortness of breath, cough  Gastrointestinal: No nausea, abdominal pain  Genitourinary: No incontinence, pain with urination  Musculoskeletal: No pain, swelling, decreased range of motion  Neurological: No headache, weakness  Psychiatric: No depression, anxiety  Endocrine: No weight gain, increased thirst  All other systems are comprehensively negative.    Physical Exam:  Vital Signs Last 24 Hrs  T(C): 37.3 (02 Oct 2024 05:10), Max: 37.3 (01 Oct 2024 16:59)  T(F): 99.2 (02 Oct 2024 05:10), Max: 99.2 (01 Oct 2024 16:59)  HR: 93 (02 Oct 2024 05:10) (92 - 93)  BP: 146/83 (02 Oct 2024 05:10) (133/78 - 157/86)  BP(mean): --  RR: 18 (02 Oct 2024 05:10) (17 - 18)  SpO2: 93% (02 Oct 2024 05:10) (92% - 93%)  Parameters below as of 02 Oct 2024 05:10  Patient On (Oxygen Delivery Method): room air  General: NAD  HEENT: MMM  Neck: No JVD, no carotid bruit  Lungs: CTAB  CV: RRR, nl S1/S2, no M/R/G  Abdomen: S/NT/ND, +BS  Extremities: No LE edema, no cyanosis  Neuro: AAOx3, non-focal  Skin: No rash    Labs:    10-01    134[L]  |  100  |  59[H]  ----------------------------<  208[H]  4.4   |  25  |  2.98[H]    Ca    9.4      01 Oct 2024 08:06    TPro  6.2  /  Alb  2.5[L]  /  TBili  0.5  /  DBili  x   /  AST  32  /  ALT  55  /  AlkPhos  115  10-01                        9.0    7.86  )-----------( 194      ( 01 Oct 2024 08:06 )             27.2       ECG/Telemetry: Sinus rhythm

## 2024-10-02 NOTE — CHART NOTE - NSCHARTNOTEFT_GEN_A_CORE
Assessment: 49-year-old male with past medical history of DM2, HTN, HLD, NEUROPATHY, PVD, NON COMPLIANT WITH TT, ETOH ABUSE, presents today due to left toe infection.  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  Patient experienced a blister to the left toe in which he eventually fell orders follow-up on its own.  Patient notes that the blister opened up and he developed some blood in the blister as well.  Patient notes that the blister scab has slightly fallen off but now his toe is red and painful.  Patient not currently on antibiotics.  Patient denies fever, injury, numbness, weakness, or any other complaints.  IN ED TEMP 99, /100, HR 89, had consult with podiatry and sidney started on Cipro and admitted for further management (17 Sep 2024 19:36)    10/2  Pt due for nutrition follow-up.  Pt admitted with diabetic foot ulcer to L foot (s/p OR 9/20, noted pending additional procedure 10/1).  Pt with hx DM2, A1c 10.6%; followed by diabetes specialist/NP and endo - noted med regimen adjusted (receiving lantus increased from 30u to 40u once daily, admelog increased from 10u to 14u before meals).  Pt continues on Con CHO diet with good appetite and intake (consuming >% of meals per documentation).  Pt with nutrition related questions regarding CHO portions and servings on tray- reinforced importance of consistent carbohydrate intake, especially while receiving nutritional insulin before meals; reinforced pairing a protein with carbohydrate at each meal/snack to aid in glycemic control; starchy vs. non starchy vegetables.  Given diabetic foot ulcer, Manish (7 gm arginine/7 gm glutamine/1.5 gm hmb) x1 packet provided bid to aid in wound healing.  RD to follow up and will continue to monitor pt's nutrition status/provide diet education reinforcement.     Factors impacting intake: [ x] none [ ] nausea  [ ] vomiting [ ] diarrhea [ ] constipation  [ ]chewing problems [ ] swallowing issues  [ ] other:     Diet Prescription: Diet, DASH/TLC:   Sodium & Cholesterol Restricted  Consistent Carbohydrate {No Snacks}  No Concentrated Potassium  Manish(7 Gm Arginine/7 Gm Glut/1.2 Gm HMB     Qty per Day:  2 (09-29-24 @ 12:44)    Intake: good    Daily   % Weight Change    Pertinent Medications: MEDICATIONS  (STANDING):  amLODIPine   Tablet 10 milliGRAM(s) Oral daily  carvedilol 6.25 milliGRAM(s) Oral every 12 hours  dextrose 5%. 1000 milliLiter(s) (100 mL/Hr) IV Continuous <Continuous>  dextrose 5%. 1000 milliLiter(s) (50 mL/Hr) IV Continuous <Continuous>  dextrose 50% Injectable 25 Gram(s) IV Push once  dextrose 50% Injectable 12.5 Gram(s) IV Push once  dextrose 50% Injectable 25 Gram(s) IV Push once  enoxaparin Injectable 40 milliGRAM(s) SubCutaneous every 24 hours  gabapentin 100 milliGRAM(s) Oral three times a day  glucagon  Injectable 1 milliGRAM(s) IntraMuscular once  influenza   Vaccine 0.5 milliLiter(s) IntraMuscular once  insulin glargine Injectable (LANTUS) 40 Unit(s) SubCutaneous at bedtime  insulin lispro (ADMELOG) corrective regimen sliding scale   SubCutaneous at bedtime  insulin lispro (ADMELOG) corrective regimen sliding scale   SubCutaneous three times a day before meals  insulin lispro Injectable (ADMELOG) 14 Unit(s) SubCutaneous three times a day before meals  lactobacillus acidophilus 1 Tablet(s) Oral two times a day with meals  mupirocin 2% Ointment 1 Application(s) Topical <User Schedule>  piperacillin/tazobactam IVPB.. 3.375 Gram(s) IV Intermittent every 8 hours    MEDICATIONS  (PRN):  acetaminophen     Tablet .. 650 milliGRAM(s) Oral every 6 hours PRN Temp greater or equal to 38C (100.4F), Mild Pain (1 - 3), Moderate Pain (4 - 6)  dextrose Oral Gel 15 Gram(s) Oral once PRN Blood Glucose LESS THAN 70 milliGRAM(s)/deciliter  diphenhydrAMINE 25 milliGRAM(s) Oral every 6 hours PRN Rash and/or Itching  guaiFENesin  milliGRAM(s) Oral every 12 hours PRN Cough  labetalol Injectable 10 milliGRAM(s) IV Push every 8 hours PRN Systolic blood pressure >180  melatonin 3 milliGRAM(s) Oral at bedtime PRN Insomnia    Pertinent Labs: 10-01 Na134 mmol/L[L] Glu 208 mg/dL[H] K+ 4.4 mmol/L Cr  2.98 mg/dL[H] BUN 59 mg/dL[H] 10-01 Alb 2.5 g/dL[L] 09-18 Chol 236 mg/dL[H] LDL --    HDL 42 mg/dL Trig 305 mg/dL[H]     CAPILLARY BLOOD GLUCOSE      POCT Blood Glucose.: 195 mg/dL (02 Oct 2024 08:02)  POCT Blood Glucose.: 232 mg/dL (01 Oct 2024 21:23)  POCT Blood Glucose.: 336 mg/dL (01 Oct 2024 16:40)  POCT Blood Glucose.: 222 mg/dL (01 Oct 2024 12:15)      Edema per flowsheets: +2 left foot  Skin: L diabetic foot ulcer    Estimated Needs:   [ x] no change since previous assessment  [ ] recalculated:     Previous Nutrition Diagnosis:   [ ] Inadequate Energy Intake [ ]Inadequate Oral Intake [ ] Excessive Energy Intake   [ ] Underweight [ ] Increased Nutrient Needs [ ] Overweight/Obesity [x] altered nutrition related lab values  [ ] Altered GI Function [ ] Unintended Weight Loss [ ] Food & Nutrition Related Knowledge Deficit [ ] Malnutrition     Nutrition Diagnosis is [ ] ongoing  [ ] resolved [ ] not applicable     New Nutrition Diagnosis: [ ] not applicable       Interventions:  Con CHO diet, Manish (7 gm arginine/7 gm glutamine/1.5 gm hmb) x1 packet bid, encourage HBV protein source, diet education reinforcement   Recommend  [ ] Change Diet To:  [ ] Nutrition Supplement  [ ] Nutrition Support  [ ] Other:     Monitoring and Evaluation:   [X ] Intake [ x ] Tolerance to diet prescription [ x ] weights [ x ] labs[ x ] follow up per protocol  [ ] other:

## 2024-10-02 NOTE — PROGRESS NOTE ADULT - ASSESSMENT
49-year-old male with past medical history of DM2, HTN, HLD, NEUROPATHY, PVD, NON COMPLIANT WITH TT, ETOH ABUSE, presents today due to left toe infection.  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  Patient experienced a blister to the left toe in which he eventually fell orders follow-up on its own.  Patient notes that the blister opened up and he developed some blood in the blister as well.  Patient notes that the blister scab has slightly fallen off but now his toe is red and painful.  Patient not currently on antibiotics.  Patient denies fever, injury, numbness, weakness, or any other complaints.   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), 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  C/w zosyn for now  vascular consult noted - no need for any vascular procedure   podiatry f up , noteed and case discussed with DR Davenport- No surg planned , will f up in Office      # DM2 with hyperglycemia, with neuropathy , nephropathy with likely PVD    ssi, consistent carb, Endo consult,  hba1c 10.2   Endo consult noted , started on lantus, 40 units qhsadmelog 14 units 3x/day before meals  # Ess HTN   amlodipine 5 mg, off losartan     DVT prophylaxis  ID , endo and podiatry consult  # LUISA with CKD 4   nephro consult,noted  likely Diabetic nephropathy with septic ATN  Hold ARB in short term and titrate other anti hypertensives as needed  Low K dietNo change anti hypertensives;  resume ACE/ARB on discharge  # HLD   low fat diet   # hyperkalemia  lokelma, resolved   nephro f up  # Anemia - w up done, likely of ch disease, multifactorial , hematology consult, worsening with infection, hematology consult noted  # DVT prophylaxis  DC plan in progress

## 2024-10-02 NOTE — PROGRESS NOTE ADULT - SUBJECTIVE AND OBJECTIVE BOX
Patient is a 49y old  Male who presents with a chief complaint of infected toe (01 Oct 2024 16:30)      INTERVAL HPI/OVERNIGHT EVENTS:overnight events noted    Home Medications:  non compliant with meds for months:  (17 Sep 2024 19:54)      MEDICATIONS  (STANDING):  amLODIPine   Tablet 10 milliGRAM(s) Oral daily  carvedilol 6.25 milliGRAM(s) Oral every 12 hours  dextrose 5%. 1000 milliLiter(s) (50 mL/Hr) IV Continuous <Continuous>  dextrose 5%. 1000 milliLiter(s) (100 mL/Hr) IV Continuous <Continuous>  dextrose 50% Injectable 12.5 Gram(s) IV Push once  dextrose 50% Injectable 25 Gram(s) IV Push once  dextrose 50% Injectable 25 Gram(s) IV Push once  enoxaparin Injectable 40 milliGRAM(s) SubCutaneous every 24 hours  gabapentin 100 milliGRAM(s) Oral three times a day  glucagon  Injectable 1 milliGRAM(s) IntraMuscular once  influenza   Vaccine 0.5 milliLiter(s) IntraMuscular once  insulin glargine Injectable (LANTUS) 40 Unit(s) SubCutaneous at bedtime  insulin lispro (ADMELOG) corrective regimen sliding scale   SubCutaneous three times a day before meals  insulin lispro (ADMELOG) corrective regimen sliding scale   SubCutaneous at bedtime  insulin lispro Injectable (ADMELOG) 14 Unit(s) SubCutaneous three times a day before meals  lactobacillus acidophilus 1 Tablet(s) Oral two times a day with meals  mupirocin 2% Ointment 1 Application(s) Topical <User Schedule>  piperacillin/tazobactam IVPB.. 3.375 Gram(s) IV Intermittent every 8 hours    MEDICATIONS  (PRN):  acetaminophen     Tablet .. 650 milliGRAM(s) Oral every 6 hours PRN Temp greater or equal to 38C (100.4F), Mild Pain (1 - 3), Moderate Pain (4 - 6)  dextrose Oral Gel 15 Gram(s) Oral once PRN Blood Glucose LESS THAN 70 milliGRAM(s)/deciliter  diphenhydrAMINE 25 milliGRAM(s) Oral every 6 hours PRN Rash and/or Itching  guaiFENesin  milliGRAM(s) Oral every 12 hours PRN Cough  labetalol Injectable 10 milliGRAM(s) IV Push every 8 hours PRN Systolic blood pressure >180  melatonin 3 milliGRAM(s) Oral at bedtime PRN Insomnia      Allergies    No Known Allergies    Intolerances        REVIEW OF SYSTEMS:  CONSTITUTIONAL: No fever, weight loss, or fatigue  EYES: No eye pain, visual disturbances, or discharge  ENMT:  No difficulty hearing, tinnitus, vertigo; mild sore throat  NECK: No pain or stiffness  BREASTS: No pain, masses, or nipple discharge  RESPIRATORY: No cough, wheezing, chills or hemoptysis; No shortness of breath  CARDIOVASCULAR: No chest pain, palpitations, dizziness, or leg swelling  GASTROINTESTINAL: No abdominal or epigastric pain. No nausea, vomiting, or hematemesis; No diarrhea or constipation. No melena or hematochezia.  GENITOURINARY: No dysuria, frequency, hematuria, or incontinence  NEUROLOGICAL: No headaches, memory loss, loss of strength, numbness, or tremors  SKIN:occ itching and rash  MUSCULOSKELETAL: No joint pain or swelling; No muscle, back, or extremity pain    Vital Signs Last 24 Hrs  T(C): 37.3 (02 Oct 2024 05:10), Max: 37.3 (01 Oct 2024 16:59)  T(F): 99.2 (02 Oct 2024 05:10), Max: 99.2 (01 Oct 2024 16:59)  HR: 93 (02 Oct 2024 05:10) (92 - 93)  BP: 146/83 (02 Oct 2024 05:10) (133/78 - 157/86)  BP(mean): --  RR: 18 (02 Oct 2024 05:10) (17 - 18)  SpO2: 93% (02 Oct 2024 05:10) (92% - 93%)    Parameters below as of 02 Oct 2024 05:10  Patient On (Oxygen Delivery Method): room air        PHYSICAL EXAM:  GENERAL: NAD, well-groomed, well-developed  HEAD:  Atraumatic, Normocephalic  EYES: EOMI, PERRLA, conjunctiva and sclera clear  ENMT: Moist mucous membranes,   NECK: Supple, No JVD, Normal thyroid  NERVOUS SYSTEM:  Alert & Oriented X3, Good concentration; Motor Strength 5/5 B/L upper and lower extremities; DTRs 2+ intact and symmetric  CHEST/LUNG: Clear to percussion bilaterally; No rales, rhonchi, wheezing, or rubs  HEART: Regular rate and rhythm; No murmurs, rubs, or gallops  ABDOMEN: Soft, Nontender, Nondistended; Bowel sounds present  EXTREMITIES: foot in dressing  SKIN: No rashes or lesions    LABS:                        9.0    7.86  )-----------( 194      ( 01 Oct 2024 08:06 )             27.2     10-01    134[L]  |  100  |  59[H]  ----------------------------<  208[H]  4.4   |  25  |  2.98[H]    Ca    9.4      01 Oct 2024 08:06    TPro  6.2  /  Alb  2.5[L]  /  TBili  0.5  /  DBili  x   /  AST  32  /  ALT  55  /  AlkPhos  115  10-01      Urinalysis Basic - ( 01 Oct 2024 08:06 )    Color: x / Appearance: x / SG: x / pH: x  Gluc: 208 mg/dL / Ketone: x  / Bili: x / Urobili: x   Blood: x / Protein: x / Nitrite: x   Leuk Esterase: x / RBC: x / WBC x   Sq Epi: x / Non Sq Epi: x / Bacteria: x      CAPILLARY BLOOD GLUCOSE      POCT Blood Glucose.: 195 mg/dL (02 Oct 2024 08:02)  POCT Blood Glucose.: 232 mg/dL (01 Oct 2024 21:23)  POCT Blood Glucose.: 336 mg/dL (01 Oct 2024 16:40)  POCT Blood Glucose.: 222 mg/dL (01 Oct 2024 12:15)          I&O's Summary    01 Oct 2024 07:01  -  02 Oct 2024 07:00  --------------------------------------------------------  IN: 200 mL / OUT: 0 mL / NET: 200 mL        RADIOLOGY & ADDITIONAL TESTS:    Imaging Personally Reviewed:  [ x] YES  [ ] NO    Consultant(s) Notes Reviewed:  [x ] YES  [ ] NO    Care Discussed with Consultants/Other Providers [ x] YES  [ ] NO

## 2024-10-02 NOTE — PROGRESS NOTE ADULT - SUBJECTIVE AND OBJECTIVE BOX
PROGRESS NOTE   Patient is a 49y old  Male who presents with a chief complaint of infected toe (02 Oct 2024 15:43)      HPI:  49-year-old male with past medical history of DM2, HTN, HLD, NEUROPATHY, PVD, NON COMPLIANT WITH TT, ETOH ABUSE, presents today due to left toe infection.  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  Patient experienced a blister to the left toe in which he eventually fell orders follow-up on its own.  Patient notes that the blister opened up and he developed some blood in the blister as well.  Patient notes that the blister scab has slightly fallen off but now his toe is red and painful.  Patient not currently on antibiotics.  Patient denies fever, injury, numbness, weakness, or any other complaints.   IN ED TEMP 99, /100, HR 89, had consult with podiatry and sidney started on Cipro and admitted for further management (17 Sep 2024 19:36)      Vital Signs Last 24 Hrs  T(C): 38 (02 Oct 2024 15:02), Max: 38.1 (02 Oct 2024 14:12)  T(F): 100.4 (02 Oct 2024 15:02), Max: 100.6 (02 Oct 2024 14:12)  HR: 96 (02 Oct 2024 14:12) (93 - 96)  BP: 129/74 (02 Oct 2024 14:12) (129/74 - 146/83)  BP(mean): --  RR: 18 (02 Oct 2024 14:12) (18 - 18)  SpO2: 94% (02 Oct 2024 14:12) (93% - 94%)    Parameters below as of 02 Oct 2024 14:12  Patient On (Oxygen Delivery Method): room air                              9.0    7.86  )-----------( 194      ( 01 Oct 2024 08:06 )             27.2               10-01    134[L]  |  100  |  59[H]  ----------------------------<  208[H]  4.4   |  25  |  2.98[H]    Ca    9.4      01 Oct 2024 08:06    TPro  6.2  /  Alb  2.5[L]  /  TBili  0.5  /  DBili  x   /  AST  32  /  ALT  55  /  AlkPhos  115  10-01    PHYSICAL EXAM  SUZANNA KAISER is a pleasant well-nourished, well developed 49y Male in no acute distress, alert awake, and oriented to person, place and time.   LE Focused:  Vascular: Dorsalis Pedis and Posterior Tibial pulses 2/4.  Capillary re-fill time less then 3 seconds digits 1-5 bilateral.    Neuro: Protective sensation diminished to the level of the digits bilateral.  MSK: Muscle strength 5/5 all major muscle groups bilateral.  Derm: POD#12. Incision site of the Left foot noted with necrotic changes to the most  distal aspect of the  flap. Dorsal and plantar flap showing signs of  venous congestion.  Guarded prognosis for the flap at this time. Upon elevation there is decreased venous congestion.  Will allow the region to cont to demarcate.      PROGRESS NOTE   Patient is a 49y old  Male who presents with a chief complaint of infected toe (02 Oct 2024 15:43)      HPI:  49-year-old male with past medical history of DM2, HTN, HLD, NEUROPATHY, PVD, NON COMPLIANT WITH TT, ETOH ABUSE, presents today due to left toe infection.  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  Patient experienced a blister to the left toe in which he eventually fell orders follow-up on its own.  Patient notes that the blister opened up and he developed some blood in the blister as well.  Patient notes that the blister scab has slightly fallen off but now his toe is red and painful.  Patient not currently on antibiotics.  Patient denies fever, injury, numbness, weakness, or any other complaints.   IN ED TEMP 99, /100, HR 89, had consult with podiatry and sidney started on Cipro and admitted for further management (17 Sep 2024 19:36)      Vital Signs Last 24 Hrs  T(C): 38 (02 Oct 2024 15:02), Max: 38.1 (02 Oct 2024 14:12)  T(F): 100.4 (02 Oct 2024 15:02), Max: 100.6 (02 Oct 2024 14:12)  HR: 96 (02 Oct 2024 14:12) (93 - 96)  BP: 129/74 (02 Oct 2024 14:12) (129/74 - 146/83)  BP(mean): --  RR: 18 (02 Oct 2024 14:12) (18 - 18)  SpO2: 94% (02 Oct 2024 14:12) (93% - 94%)    Parameters below as of 02 Oct 2024 14:12  Patient On (Oxygen Delivery Method): room air                              9.0    7.86  )-----------( 194      ( 01 Oct 2024 08:06 )             27.2               10-01    134[L]  |  100  |  59[H]  ----------------------------<  208[H]  4.4   |  25  |  2.98[H]    Ca    9.4      01 Oct 2024 08:06    TPro  6.2  /  Alb  2.5[L]  /  TBili  0.5  /  DBili  x   /  AST  32  /  ALT  55  /  AlkPhos  115  10-01    PHYSICAL EXAM  SUZANNA KAISER is a pleasant well-nourished, well developed 49y Male in no acute distress, alert awake, and oriented to person, place and time.   LE Focused:  Vascular: Dorsalis Pedis and Posterior Tibial pulses 2/4.  Capillary re-fill time less then 3 seconds digits 1-5 bilateral.    Neuro: Protective sensation diminished to the level of the digits bilateral.  MSK: Muscle strength 5/5 all major muscle groups bilateral.  Derm: POD#12. Incision site of the Left foot noted with  small region of necrotic changes to the most  distal aspect of the  flap. Dorsal and plantar flap showing signs of  venous congestion.  Guarded prognosis for the flap at this time. Upon elevation there is decreased venous congestion.  Will allow the region to cont to demarcate.  the region of concern is responding favorable to the current care plan and continues to improve

## 2024-10-02 NOTE — PROGRESS NOTE ADULT - PROBLEM SELECTOR PLAN 1
Patient seen and evaluated.  Pt is s/p debridement of all non-viable soft tissue and bone left foot with hallux amputation.  Surgical pathology : Demonstrates acute on chronic OM on prox clean margins.   Partial weight bearing on left foot.  Pt instructed to elevate his operative extremity on two pillows.  Pt instructed to limit standing and walking on the operative extremity.  Appreciate Vascular consult.   Continue IV Abx as per ID.  If dressings get wet or fall off, please change with:  1. xeroform  2. gauze  3. abd pad  4. kerlex  5. ace bandage  Patient will follow up with Dr. Salvatore Davenport 2-3 days after discharge. Please call [phone] for any question  Pt understands that additional surgical procedures may be required in the future   Will cont to monitor while in house.  Will discuss with all attendings.

## 2024-10-02 NOTE — PROGRESS NOTE ADULT - SUBJECTIVE AND OBJECTIVE BOX
ANUJASUZANNA HURLEY is a 49yMale , patient examined and chart reviewed.    INTERVAL HPI/ OVERNIGHT EVENTS:   Afebrile no events.    PAST MEDICAL & SURGICAL HISTORY:  Prediabetes  HTN (hypertension)  HLD (hyperlipidemia)  DM2 (diabetes mellitus, type 2)      For details regarding the patient's social history, family history, and other miscellaneous elements, please refer the initial infectious diseases consultation and/or the admitting history and physical examination for this admission.    ROS:  CONSTITUTIONAL:  no fever no chills  EYES:  Negative  blurry vision or double vision  CARDIOVASCULAR:  Negative for chest pain or palpitations  RESPIRATORY:  Negative for cough, wheezing, or SOB   GASTROINTESTINAL:  Negative for nausea, vomiting, diarrhea, constipation, or abdominal pain  GENITOURINARY:  Negative frequency, urgency or dysuria  NEUROLOGIC:  No headache, confusion, dizziness, lightheadedness  All other systems were reviewed and are negative     ALLERGIES  amoxicillin (Unknown)      Current inpatient medications :    ANTIBIOTICS/RELEVANT:  piperacillin/tazobactam IVPB.. 3.375 Gram(s) IV Intermittent every 8 hours    MEDICATIONS  (STANDING):  amLODIPine   Tablet 10 milliGRAM(s) Oral daily  carvedilol 6.25 milliGRAM(s) Oral every 12 hours  dextrose 5%. 1000 milliLiter(s) (100 mL/Hr) IV Continuous <Continuous>  dextrose 5%. 1000 milliLiter(s) (50 mL/Hr) IV Continuous <Continuous>  dextrose 50% Injectable 25 Gram(s) IV Push once  dextrose 50% Injectable 12.5 Gram(s) IV Push once  dextrose 50% Injectable 25 Gram(s) IV Push once  enoxaparin Injectable 40 milliGRAM(s) SubCutaneous every 24 hours  gabapentin 100 milliGRAM(s) Oral three times a day  glucagon  Injectable 1 milliGRAM(s) IntraMuscular once  influenza   Vaccine 0.5 milliLiter(s) IntraMuscular once  insulin glargine Injectable (LANTUS) 40 Unit(s) SubCutaneous at bedtime  insulin lispro (ADMELOG) corrective regimen sliding scale   SubCutaneous three times a day before meals  insulin lispro (ADMELOG) corrective regimen sliding scale   SubCutaneous at bedtime  insulin lispro Injectable (ADMELOG) 14 Unit(s) SubCutaneous three times a day before meals  lactobacillus acidophilus 1 Tablet(s) Oral two times a day with meals  mupirocin 2% Ointment 1 Application(s) Topical <User Schedule>      MEDICATIONS  (PRN):  acetaminophen     Tablet .. 650 milliGRAM(s) Oral every 6 hours PRN Temp greater or equal to 38C (100.4F), Mild Pain (1 - 3), Moderate Pain (4 - 6)  dextrose Oral Gel 15 Gram(s) Oral once PRN Blood Glucose LESS THAN 70 milliGRAM(s)/deciliter  diphenhydrAMINE 25 milliGRAM(s) Oral every 6 hours PRN Rash and/or Itching  guaiFENesin  milliGRAM(s) Oral every 12 hours PRN Cough  labetalol Injectable 10 milliGRAM(s) IV Push every 8 hours PRN Systolic blood pressure >180  melatonin 3 milliGRAM(s) Oral at bedtime PRN Insomnia      Objective:  Vital Signs Last 24 Hrs  T(C): 37.3 (02 Oct 2024 05:10), Max: 37.3 (01 Oct 2024 16:59)  T(F): 99.2 (02 Oct 2024 05:10), Max: 99.2 (01 Oct 2024 16:59)  HR: 93 (02 Oct 2024 05:10) (92 - 93)  BP: 146/83 (02 Oct 2024 05:10) (133/78 - 157/86)  RR: 18 (02 Oct 2024 05:10) (17 - 18)  SpO2: 93% (02 Oct 2024 05:10) (92% - 93%)    Parameters below as of 02 Oct 2024 05:10  Patient On (Oxygen Delivery Method): room air      Physical Exam:  General:  no acute distress  Neck: supple, trachea midline  Lungs: clear, no wheeze/rhonchi  Cardiovascular: regular rate and rhythm, S1 S2  Abdomen: soft, nontender,  bowel sounds normal  Neurological: alert and oriented x3  Skin: no rash  Extremities: Left foot amputation stump with some necrotic changes distal flap. Dorsal and plantar flap showing signs of  venous congestion.      LABS:                        9.0    7.86  )-----------( 194      ( 01 Oct 2024 08:06 )             27.2   10-01    134[L]  |  100  |  59[H]  ----------------------------<  208[H]  4.4   |  25  |  2.98[H]    Ca    9.4      01 Oct 2024 08:06    TPro  6.2  /  Alb  2.5[L]  /  TBili  0.5  /  DBili  x   /  AST  32  /  ALT  55  /  AlkPhos  115  10-01      MICROBIOLOGY:  Culture - Tissue with Gram Stain (collected 20 Sep 2024 19:21)  Source: Bone Other  Gram Stain (21 Sep 2024 06:32):    No polymorphonuclear cells seen per low power field    No organisms seen per oil power field  Preliminary Report (22 Sep 2024 08:47):    No growth to date    Culture - Fungal, Tissue (collected 20 Sep 2024 19:21)  Source: Bone Other  Preliminary Report (22 Sep 2024 07:58):    Testing in progress    Culture - Tissue with Gram Stain (09.20.24 @ 19:21)    Gram Stain:   No polymorphonuclear cells seen per low power field  No organisms seen per oil power field   -  Vancomycin: S 4   -  Ampicillin: S <=2 Predicts results to ampicillin/sulbactam, amoxacillin-clavulanate and  piperacillin-tazobactam.   Specimen Source: Bone Other   Culture Results:   Growth in fluid media only Enterococcus faecalis  Rare Staphylococcus epidermidis   Organism Identification: Enterococcus faecalis   Organism: Enterococcus faecalis   Method Type: STEPHANIE      Culture - Other (09.17.24 @ 19:10)   -  Amoxicillin/Clavulanic Acid: R >16/8   -  Ampicillin: R 16 These ampicillin results predict results for amoxicillin   -  Ampicillin: S <=2 Predicts results to ampicillin/sulbactam, amoxacillin-clavulanate and  piperacillin-tazobactam.   -  Ampicillin/Sulbactam: R 8/4   -  Aztreonam: S <=4   -  Cefazolin: R >16   -  Cefepime: S <=2   -  Cefoxitin: R >16   -  Ceftriaxone: S <=1 Enterobacter cloacae, Klebsiella aerogenes, and Citrobacter freundii may develop resistance during prolonged therapy.   -  Ciprofloxacin: S <=0.25   -  Ertapenem: S <=0.5   -  Gentamicin: S <=2   -  Imipenem: S <=1   -  Levofloxacin: S <=0.5   -  Meropenem: S <=1   -  Piperacillin/Tazobactam: S <=8 Treatment with Pipercillin/Tazobactam is not recommended in severe infections casued by Klebsiella aerogenes, Enterobacter cloacae complex, and Citrobacter freundii complex.   -  Tobramycin: S <=2   -  Trimethoprim/Sulfamethoxazole: S <=0.5/9.5   -  Vancomycin: S 2   Specimen Source: Wound Wound   Culture Results:   Numerous Enterobacter cloacae complex  Numerous Enterococcus faecalis  Few Coag Negative Staphylococcus "Susceptibilities not performed"   Organism Identification: Enterobacter cloacae complex  Enterococcus faecalis   Organism: Enterobacter cloacae complex   Organism: Enterococcus faecalis   Method Type: STEPHANIE   Method Type: STEPHANIE    Culture - Blood (collected 17 Sep 2024 18:13)  Source: .Blood Blood-Peripheral  Preliminary Report (19 Sep 2024 01:01):    No growth at 24 hours    Culture - Blood (collected 17 Sep 2024 18:13)  Source: .Blood Blood-Peripheral  Preliminary Report (19 Sep 2024 01:01):    No growth at 24 hours    RADIOLOGY & ADDITIONAL STUDIES:    ACC: [id number] EXAM:  MR FOOT LT   ORDERED BY: LISA ALY     PROCEDURE DATE:  09/19/2024          INTERPRETATION:  LEFT FOOT MRI    CLINICAL INDICATION: Diabetic foot ulcer, for evaluation of osteomyelitis.    COMPARISON: Radiographs dated 9/17/2024.    TECHNIQUE: Multiplanar, multisequence MRI was obtained of the left foot.    FINDINGS:    OSSEOUS: Acute on chronic osteomyelitis/septic arthritis at the proximal   interphalangeal joint of the second toe in the proper clinical setting.   Focal acute osteomyelitis along the plantar lateral cortex of the great   toe distal phalanx base with ill-defined overlying deep soft tissue   ulceration. Lisfranc interval is not widened on this non-weightbearing   examination.    TENDONS: Visualized flexor and extensor tendons are intact.    LIGAMENTS: Lisfranc ligament is intact.    GENERAL: No drainable encapsulated fluid collection. No significant   intermetatarsal bursal fluid distension. No interdigital neuroma. Mild to   moderate heterogeneous chronic muscular atrophy with superimposed patchy   denervation edema like signal.    IMPRESSION:    1.  Acute on chronic osteomyelitis/septic arthritis at the proximal   interphalangeal joint of left second toe in the proper clinical setting.  2.Focal acute osteomyelitis along the plantar lateral cortex of left   great toe distal phalanx base with overlying deep soft tissue ulceration.    Assessment :  50YO M PMH DM2, HTN, HLD, NEUROPATHY, PVD, NON COMPLIANT WITH TT, ETOH ABUSE, admitted with infected DM left great toe infection  MRI + OM  Sp Left great toe amputation 9/20  OR bone cultures noted with E Faecalis Enterobacter  Left foot amputation stump with some necrotic changes distal flap. Dorsal and plantar flap showing signs of  venous congestion.  Path with acute on chronic OM on prox clean margins  Per podiatry no plans for any further surgical intervention  Pt refused IV antibiotics due to work     Plan :   On Zosyn  Can change to po Augmentin 875mg bid till 11/01/24 on dc  Dm control  Trend temps and cbc  Pulm toileting  Stable from ID standpoint  Dc planning per primary team    Continue with present regiment.  Appropriate use of antibiotics and adverse effects reviewed.      > 35 minutes were spent in direct patient care reviewing notes, medications ,labs data/ imaging , discussion with multidisciplinary team.    Thank you for allowing me to participate in care of your patient .    Linsey Mack MD  Infectious Disease  [phone]

## 2024-10-02 NOTE — CASE MANAGEMENT PROGRESS NOTE - NSCMPROGRESSNOTE_GEN_ALL_CORE
RN/CM noted pt's case was discussed during Interdisciplinary rounds that as per podiatrist's evaluation yesterday, NO further surgical intervention for pt during this admission but that pt may need said intervention in the future if L foot amputation site ( S/P 09/20/2024 s/p debridement of all non-viable soft tissue and bone left foot with hallux amputation)worsens. PT eval for stair negotiation. IV Zosyn continues @ this time. Possible DC in am pending MD clearance. CM noted pt has Medicaid, agreeable with linkage to community resources, CM sent referral to [facility] to verify pt's eligibility with program. CM has reached out to Diabetes NP to finalize home DM meds upon DC. CM verified with pt's Medicaid carrier by calling [employer] [phone], spoke to rep Cooper, that pt has NO transport benefits @ this time. CM has reached out to podiatrist Salvatore Delgado [phone] who stated that pt has to set-up appt with his office for wound care administration and follow-up, podiatrist is NOT recommending home care services. Podiatrist accomplished form- [employer] 2015 Medicaid transport form request to assist pt in obtaining Medicaid transport benefits. Both CM and MSW reached out to  leadership to facilitate uploading above request with [employer] [phone]. Discussed all above with pt. MSW provided and discussed "UNITE " community resources and contact info with pt. Verified with pt that his community MD is DR Sonam Silva and pharmacy is [facility] on [address] [facility]. Discussed DC notice and pt's rights to appeal, pt @ this time declined signing DC notice and stated that he will await podiatrist input today regarding his DC. Pt requested assistance with transport upon DC. Both CM and MSW remain available.  RN/CM noted pt's case was discussed during Interdisciplinary rounds that as per podiatrist's evaluation yesterday, NO further surgical intervention for pt during this admission but that pt may need said intervention in the future if L foot amputation site ( S/P 09/20/2024 s/p debridement of all non-viable soft tissue and bone left foot with hallux amputation)worsens. PT eval for stair negotiation. IV Zosyn continues @ this time. Possible DC in am pending MD clearance. CM noted pt has Medicaid, agreeable with linkage to community resources, CM sent referral to [facility] to verify pt's eligibility with program. CM has reached out to Diabetes NP to finalize home DM meds upon DC. CM verified with pt's Medicaid carrier by calling [employer] [phone], spoke to rep Cooper, that pt has NO transport benefits @ this time. CM has reached out to podiatrist Salvatore Delgado [phone] who stated that pt has to set-up appt with his office for wound care administration and follow-up, podiatrist is NOT recommending home care services. Podiatrist accomplished form- [employer] 2015 Medicaid transport form request to assist pt in obtaining Medicaid transport benefits. Both CM and MSW reached out to  leadership to facilitate uploading above request with [employer] [phone]. Discussed all above with pt. MSW provided and discussed "UNITE [place]" community resources (SNAP application, SSI, etc) and contact info with pt. Verified with pt that his community MD is DR Sonam Silva and pharmacy is [facility] on [address] [place] [address]. Discussed DC notice and pt's rights to appeal, pt @ this time declined signing DC notice and stated that he will await podiatrist input today regarding his DC. Pt requested assistance with transport upon DC. Both CM and MSW remain available.

## 2024-10-03 ENCOUNTER — TRANSCRIPTION ENCOUNTER (OUTPATIENT)
Age: 49
End: 2024-10-03

## 2024-10-03 PROBLEM — E78.5 HYPERLIPIDEMIA, UNSPECIFIED: Chronic | Status: ACTIVE | Noted: 2024-09-17

## 2024-10-03 PROBLEM — I10 ESSENTIAL (PRIMARY) HYPERTENSION: Chronic | Status: ACTIVE | Noted: 2024-09-17

## 2024-10-03 PROBLEM — E11.9 TYPE 2 DIABETES MELLITUS WITHOUT COMPLICATIONS: Chronic | Status: ACTIVE | Noted: 2024-09-17

## 2024-10-03 LAB
ALBUMIN SERPL ELPH-MCNC: 2.5 G/DL — LOW (ref 3.3–5)
ALP SERPL-CCNC: 113 U/L — SIGNIFICANT CHANGE UP (ref 30–120)
ALT FLD-CCNC: 45 U/L — SIGNIFICANT CHANGE UP (ref 10–60)
ANION GAP SERPL CALC-SCNC: 5 MMOL/L — SIGNIFICANT CHANGE UP (ref 5–17)
AST SERPL-CCNC: 26 U/L — SIGNIFICANT CHANGE UP (ref 10–40)
BASOPHILS # BLD AUTO: 0.08 K/UL — SIGNIFICANT CHANGE UP (ref 0–0.2)
BASOPHILS NFR BLD AUTO: 1.1 % — SIGNIFICANT CHANGE UP (ref 0–2)
BILIRUB SERPL-MCNC: 0.6 MG/DL — SIGNIFICANT CHANGE UP (ref 0.2–1.2)
BUN SERPL-MCNC: 65 MG/DL — HIGH (ref 7–23)
CALCIUM SERPL-MCNC: 9 MG/DL — SIGNIFICANT CHANGE UP (ref 8.4–10.5)
CHLORIDE SERPL-SCNC: 101 MMOL/L — SIGNIFICANT CHANGE UP (ref 96–108)
CO2 SERPL-SCNC: 25 MMOL/L — SIGNIFICANT CHANGE UP (ref 22–31)
CREAT SERPL-MCNC: 3.01 MG/DL — HIGH (ref 0.5–1.3)
EGFR: 25 ML/MIN/1.73M2 — LOW
EOSINOPHIL # BLD AUTO: 0.35 K/UL — SIGNIFICANT CHANGE UP (ref 0–0.5)
EOSINOPHIL NFR BLD AUTO: 4.8 % — SIGNIFICANT CHANGE UP (ref 0–6)
GLUCOSE BLDC GLUCOMTR-MCNC: 140 MG/DL — HIGH (ref 70–99)
GLUCOSE BLDC GLUCOMTR-MCNC: 167 MG/DL — HIGH (ref 70–99)
GLUCOSE BLDC GLUCOMTR-MCNC: 184 MG/DL — HIGH (ref 70–99)
GLUCOSE BLDC GLUCOMTR-MCNC: 216 MG/DL — HIGH (ref 70–99)
GLUCOSE SERPL-MCNC: 168 MG/DL — HIGH (ref 70–99)
HCT VFR BLD CALC: 26 % — LOW (ref 39–50)
HGB BLD-MCNC: 8.5 G/DL — LOW (ref 13–17)
IMM GRANULOCYTES NFR BLD AUTO: 0.7 % — SIGNIFICANT CHANGE UP (ref 0–0.9)
LYMPHOCYTES # BLD AUTO: 1.08 K/UL — SIGNIFICANT CHANGE UP (ref 1–3.3)
LYMPHOCYTES # BLD AUTO: 14.8 % — SIGNIFICANT CHANGE UP (ref 13–44)
MCHC RBC-ENTMCNC: 28.8 PG — SIGNIFICANT CHANGE UP (ref 27–34)
MCHC RBC-ENTMCNC: 32.7 G/DL — SIGNIFICANT CHANGE UP (ref 32–36)
MCV RBC AUTO: 88.1 FL — SIGNIFICANT CHANGE UP (ref 80–100)
MONOCYTES # BLD AUTO: 1.17 K/UL — HIGH (ref 0–0.9)
MONOCYTES NFR BLD AUTO: 16 % — HIGH (ref 2–14)
NEUTROPHILS # BLD AUTO: 4.57 K/UL — SIGNIFICANT CHANGE UP (ref 1.8–7.4)
NEUTROPHILS NFR BLD AUTO: 62.6 % — SIGNIFICANT CHANGE UP (ref 43–77)
NRBC # BLD: 0 /100 WBCS — SIGNIFICANT CHANGE UP (ref 0–0)
PLATELET # BLD AUTO: 80 K/UL — LOW (ref 150–400)
POTASSIUM SERPL-MCNC: 4 MMOL/L — SIGNIFICANT CHANGE UP (ref 3.5–5.3)
POTASSIUM SERPL-SCNC: 4 MMOL/L — SIGNIFICANT CHANGE UP (ref 3.5–5.3)
PROT SERPL-MCNC: 6.3 G/DL — SIGNIFICANT CHANGE UP (ref 6–8.3)
RBC # BLD: 2.95 M/UL — LOW (ref 4.2–5.8)
RBC # FLD: 12.4 % — SIGNIFICANT CHANGE UP (ref 10.3–14.5)
SODIUM SERPL-SCNC: 131 MMOL/L — LOW (ref 135–145)
WBC # BLD: 7.3 K/UL — SIGNIFICANT CHANGE UP (ref 3.8–10.5)
WBC # FLD AUTO: 7.3 K/UL — SIGNIFICANT CHANGE UP (ref 3.8–10.5)

## 2024-10-03 RX ORDER — CARVEDILOL 3.125 MG
1 TABLET ORAL
Qty: 60 | Refills: 0
Start: 2024-10-03 | End: 2024-11-01

## 2024-10-03 RX ORDER — GUAIFENESIN 100 MG/5ML
1 SOLUTION ORAL
Qty: 20 | Refills: 0
Start: 2024-10-03 | End: 2024-10-12

## 2024-10-03 RX ORDER — AMLODIPINE BESYLATE 5 MG
1 TABLET ORAL
Qty: 30 | Refills: 0
Start: 2024-10-03 | End: 2024-11-01

## 2024-10-03 RX ORDER — LACTOBACILLUS ACIDOPHILUS 25MM CELL
2 CAPSULE ORAL
Qty: 60 | Refills: 0
Start: 2024-10-03 | End: 2024-11-01

## 2024-10-03 RX ORDER — INSULIN LISPRO 100/ML
16 VIAL (ML) SUBCUTANEOUS
Refills: 0 | Status: DISCONTINUED | OUTPATIENT
Start: 2024-10-03 | End: 2024-10-04

## 2024-10-03 RX ORDER — ACETAMINOPHEN 325 MG
2 TABLET ORAL
Qty: 0 | Refills: 0 | DISCHARGE
Start: 2024-10-03

## 2024-10-03 RX ORDER — GABAPENTIN 800 MG/1
1 TABLET, FILM COATED ORAL
Qty: 90 | Refills: 0
Start: 2024-10-03 | End: 2024-11-01

## 2024-10-03 RX ADMIN — Medication 1 TABLET(S): at 17:53

## 2024-10-03 RX ADMIN — GABAPENTIN 100 MILLIGRAM(S): 800 TABLET, FILM COATED ORAL at 05:20

## 2024-10-03 RX ADMIN — PIPERACILLIN SODIUM AND TAZOBACTAM SODIUM 25 GRAM(S): 12; 1.5 INJECTION, POWDER, LYOPHILIZED, FOR SOLUTION INTRAVENOUS at 21:23

## 2024-10-03 RX ADMIN — Medication 16 UNIT(S): at 17:29

## 2024-10-03 RX ADMIN — Medication 6.25 MILLIGRAM(S): at 17:54

## 2024-10-03 RX ADMIN — INSULIN GLARGINE 40 UNIT(S): 300 INJECTION, SOLUTION SUBCUTANEOUS at 21:24

## 2024-10-03 RX ADMIN — Medication 2: at 17:28

## 2024-10-03 RX ADMIN — Medication 14 UNIT(S): at 08:15

## 2024-10-03 RX ADMIN — ENOXAPARIN SODIUM 40 MILLIGRAM(S): 150 INJECTION SUBCUTANEOUS at 21:28

## 2024-10-03 RX ADMIN — Medication 6.25 MILLIGRAM(S): at 05:20

## 2024-10-03 RX ADMIN — Medication 10 MILLIGRAM(S): at 05:20

## 2024-10-03 RX ADMIN — MUPIROCIN 1 APPLICATION(S): 20 OINTMENT TOPICAL at 11:39

## 2024-10-03 RX ADMIN — PIPERACILLIN SODIUM AND TAZOBACTAM SODIUM 25 GRAM(S): 12; 1.5 INJECTION, POWDER, LYOPHILIZED, FOR SOLUTION INTRAVENOUS at 05:20

## 2024-10-03 RX ADMIN — Medication 3 MILLIGRAM(S): at 21:23

## 2024-10-03 RX ADMIN — Medication 25 MILLIGRAM(S): at 21:26

## 2024-10-03 RX ADMIN — Medication 2: at 08:15

## 2024-10-03 RX ADMIN — GABAPENTIN 100 MILLIGRAM(S): 800 TABLET, FILM COATED ORAL at 21:23

## 2024-10-03 RX ADMIN — Medication 16 UNIT(S): at 12:30

## 2024-10-03 RX ADMIN — GABAPENTIN 100 MILLIGRAM(S): 800 TABLET, FILM COATED ORAL at 14:25

## 2024-10-03 RX ADMIN — Medication 1 TABLET(S): at 08:16

## 2024-10-03 RX ADMIN — PIPERACILLIN SODIUM AND TAZOBACTAM SODIUM 25 GRAM(S): 12; 1.5 INJECTION, POWDER, LYOPHILIZED, FOR SOLUTION INTRAVENOUS at 14:25

## 2024-10-03 NOTE — DISCHARGE NOTE PROVIDER - HOSPITAL COURSE
49-year-old male with past medical history of DM2, HTN, HLD, NEUROPATHY, PVD, NON COMPLIANT WITH TT, ETOH ABUSE, presents today due to left toe infection.  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  Patient experienced a blister to the left toe in which he eventually fell orders follow-up on its own.  Patient notes that the blister opened up and he developed some blood in the blister as well.  Patient notes that the blister scab has slightly fallen off but now his toe is red and painful.  Patient not currently on antibiotics.  Patient denies fever, injury, numbness, weakness, or any other complaints.   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), 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  C/w zosyn  in [place], DC on augmentin till 11/1  vascular consult noted - no need for any vascular procedure   podiatry f up , noteed and case discussed with DR Davenport- No surg planned , will f up in Office        # DM2 with hyperglycemia, with neuropathy , nephropathy with likely PVD    ssi, consistent carb, Endo consult,  hba1c 10.2   Endo consult noted , started on lantus, 40 units qhsadmelog 14 units 3x/day before meals  # Ess HTN   amlodipine 5 mg, off losartan     DVT prophylaxis  ID , endo and podiatry consult  # LUISA with CKD 4   nephro consult,noted  likely Diabetic nephropathy with septic ATN  Hold ARB in short term and titrate other anti hypertensives as needed  Low K dietNo change anti hypertensives;  resume ACE/ARB on discharge  # HLD   low fat diet   # hyperkalemia  lokelma, resolved   nephro f up  # Anemia - w up done, likely of ch disease, multifactorial , hematology consult, worsening with infection, hematology consult noted  # DVT prophylaxis

## 2024-10-03 NOTE — PROGRESS NOTE ADULT - SUBJECTIVE AND OBJECTIVE BOX
CAPILLARY BLOOD GLUCOSE      POCT Blood Glucose.: 167 mg/dL (03 Oct 2024 07:58)  POCT Blood Glucose.: 300 mg/dL (02 Oct 2024 21:04)  POCT Blood Glucose.: 257 mg/dL (02 Oct 2024 17:20)  POCT Blood Glucose.: 189 mg/dL (02 Oct 2024 11:54)      Vital Signs Last 24 Hrs  T(C): 37.6 (03 Oct 2024 05:11), Max: 38.1 (02 Oct 2024 14:12)  T(F): 99.7 (03 Oct 2024 05:11), Max: 100.6 (02 Oct 2024 14:12)  HR: 91 (03 Oct 2024 05:11) (88 - 96)  BP: 145/82 (03 Oct 2024 05:11) (129/74 - 145/82)  BP(mean): --  RR: 18 (03 Oct 2024 05:11) (18 - 18)  SpO2: 98% (03 Oct 2024 05:11) (94% - 98%)    Parameters below as of 03 Oct 2024 05:11  Patient On (Oxygen Delivery Method): room air        General: WN/WD NAD  Respiratory: CTA B/L  CV: RRR, S1S2, no murmurs, rubs or gallops  Abdominal: Soft, NT, ND +BS, Last BM  Extremities: No edema, + peripheral pulses     10-03    131[L]  |  101  |  65[H]  ----------------------------<  168[H]  4.0   |  25  |  3.01[H]    Ca    9.0      03 Oct 2024 07:42    TPro  6.3  /  Alb  2.5[L]  /  TBili  0.6  /  DBili  x   /  AST  26  /  ALT  45  /  AlkPhos  113  10-03      dextrose 50% Injectable 25 Gram(s) IV Push once  dextrose 50% Injectable 12.5 Gram(s) IV Push once  dextrose 50% Injectable 25 Gram(s) IV Push once  dextrose Oral Gel 15 Gram(s) Oral once PRN  glucagon  Injectable 1 milliGRAM(s) IntraMuscular once  insulin glargine Injectable (LANTUS) 40 Unit(s) SubCutaneous at bedtime  insulin lispro (ADMELOG) corrective regimen sliding scale   SubCutaneous three times a day before meals  insulin lispro (ADMELOG) corrective regimen sliding scale   SubCutaneous at bedtime  insulin lispro Injectable (ADMELOG) 14 Unit(s) SubCutaneous three times a day before meals

## 2024-10-03 NOTE — PROGRESS NOTE ADULT - PROBLEM SELECTOR PLAN 1
Patient seen and evaluated.  Pt is s/p debridement of all non-viable soft tissue and bone left foot with hallux amputation.  Surgical pathology : Demonstrates acute on chronic OM on prox clean margins.   Partial weight bearing on left foot.  Pt instructed to limit standing and walking on the operative extremity.  Appreciate Vascular consult.   Continue Abx as per ID  If dressings get wet or fall off, please change with:  1. xeroform  2. gauze  3. abd pad  4. kerlex  5. ace bandage  Patient will follow up with Dr. Salvatore Davenport 2-3 days after discharge. Please call [phone] for any question  Pt understands that additional surgical procedures may be required in the future   Stable from podiatry standpoint   Will cont to monitor while in house.  Will discuss with all attendings.

## 2024-10-03 NOTE — PROGRESS NOTE ADULT - PROBLEM SELECTOR PLAN 1
cont lantus 40 units qhs  increase admelog 16 units 3x/day before meals  cont mod dose admelog corrective scale coverage qac/qhs  cont cons cho diet  goal bg 100-180 in hosp setting

## 2024-10-03 NOTE — PROGRESS NOTE ADULT - SUBJECTIVE AND OBJECTIVE BOX
Patient is a 49y old  Male who presents with a chief complaint of infected toe (23 Sep 2024 07:46)  Patient seen in follow up for CKD 4.        PAST MEDICAL HISTORY:  Prediabetes    HTN (hypertension)    HLD (hyperlipidemia)    DM2 (diabetes mellitus, type 2)      MEDICATIONS  (STANDING):  amLODIPine   Tablet 10 milliGRAM(s) Oral daily  carvedilol 6.25 milliGRAM(s) Oral every 12 hours  dextrose 5%. 1000 milliLiter(s) (100 mL/Hr) IV Continuous <Continuous>  dextrose 5%. 1000 milliLiter(s) (50 mL/Hr) IV Continuous <Continuous>  dextrose 50% Injectable 12.5 Gram(s) IV Push once  dextrose 50% Injectable 25 Gram(s) IV Push once  dextrose 50% Injectable 25 Gram(s) IV Push once  enoxaparin Injectable 40 milliGRAM(s) SubCutaneous every 24 hours  gabapentin 100 milliGRAM(s) Oral three times a day  glucagon  Injectable 1 milliGRAM(s) IntraMuscular once  influenza   Vaccine 0.5 milliLiter(s) IntraMuscular once  insulin glargine Injectable (LANTUS) 40 Unit(s) SubCutaneous at bedtime  insulin lispro (ADMELOG) corrective regimen sliding scale   SubCutaneous three times a day before meals  insulin lispro (ADMELOG) corrective regimen sliding scale   SubCutaneous at bedtime  insulin lispro Injectable (ADMELOG) 14 Unit(s) SubCutaneous three times a day before meals  lactobacillus acidophilus 1 Tablet(s) Oral two times a day with meals  mupirocin 2% Ointment 1 Application(s) Topical <User Schedule>  piperacillin/tazobactam IVPB.. 3.375 Gram(s) IV Intermittent every 8 hours    MEDICATIONS  (PRN):  acetaminophen     Tablet .. 650 milliGRAM(s) Oral every 6 hours PRN Temp greater or equal to 38C (100.4F), Mild Pain (1 - 3), Moderate Pain (4 - 6)  dextrose Oral Gel 15 Gram(s) Oral once PRN Blood Glucose LESS THAN 70 milliGRAM(s)/deciliter  diphenhydrAMINE 25 milliGRAM(s) Oral every 6 hours PRN Rash and/or Itching  guaiFENesin  milliGRAM(s) Oral every 12 hours PRN Cough  labetalol Injectable 10 milliGRAM(s) IV Push every 8 hours PRN Systolic blood pressure >180  melatonin 3 milliGRAM(s) Oral at bedtime PRN Insomnia    T(C): 37.6 (10-03-24 @ 05:11), Max: 38.1 (10-02-24 @ 14:12)  HR: 91 (10-03-24 @ 05:11) (88 - 96)  BP: 145/82 (10-03-24 @ 05:11) (129/74 - 157/86)  RR: 18 (10-03-24 @ 05:11)  SpO2: 98% (10-03-24 @ 05:11)  Wt(kg): --  I&O's Detail    02 Oct 2024 07:01  -  03 Oct 2024 07:00  --------------------------------------------------------  IN:    IV PiggyBack: 200 mL  Total IN: 200 mL    OUT:  Total OUT: 0 mL    Total NET: 200 mL              PHYSICAL EXAM:  General: No distress  Respiratory: b/l air entry  Cardiovascular: S1 S2  Gastrointestinal: soft  Extremities:  no edema, left foot dressing                             LABORATORY:                        8.5    7.30  )-----------( 80       ( 03 Oct 2024 07:42 )             26.0     10-03    131[L]  |  101  |  65[H]  ----------------------------<  168[H]  4.0   |  25  |  3.01[H]    Ca    9.0      03 Oct 2024 07:42    TPro  6.3  /  Alb  2.5[L]  /  TBili  0.6  /  DBili  x   /  AST  26  /  ALT  45  /  AlkPhos  113  10-03    Sodium: 131 mmol/L (10-03 @ 07:42)    Potassium: 4.0 mmol/L (10-03 @ 07:42)    Hemoglobin: 8.5 g/dL (10-03 @ 07:42)  Hemoglobin: 9.0 g/dL (10-01 @ 08:06)    Creatinine, Serum 3.01 (10-03 @ 07:42)  Creatinine, Serum 2.98 (10-01 @ 08:06)        LIVER FUNCTIONS - ( 03 Oct 2024 07:42 )  Alb: 2.5 g/dL / Pro: 6.3 g/dL / ALK PHOS: 113 U/L / ALT: 45 U/L / AST: 26 U/L / GGT: x           Urinalysis Basic - ( 03 Oct 2024 07:42 )    Color: x / Appearance: x / SG: x / pH: x  Gluc: 168 mg/dL / Ketone: x  / Bili: x / Urobili: x   Blood: x / Protein: x / Nitrite: x   Leuk Esterase: x / RBC: x / WBC x   Sq Epi: x / Non Sq Epi: x / Bacteria: x

## 2024-10-03 NOTE — PROGRESS NOTE ADULT - ASSESSMENT
CKD 4, Diabetic nephropathy  Diabetes, DFU   Hypertension    Renal indices at baseline. To continue current meds. Monitor blood sugar levels. Insulin coverage as needed. Dietary restriction.   Trend BP and titrate BP meds as needed Will follow electrolytes and renal function trend.   Avoid nephrotoxic meds as possible. Avoid ACEI, ARB, NSAIDs and IV contrast.  D/c planning.

## 2024-10-03 NOTE — CASE MANAGEMENT PROGRESS NOTE - NSCMPROGRESSNOTE_GEN_ALL_CORE
RN/CM noted pt's case was discussed during Interdisciplinary rounds that as per podiatrist's evaluation NO further surgical intervention for pt during this admission but that pt may need said intervention in the future if L foot amputation site ( S/P 09/20/2024 s/p debridement of all non-viable soft tissue and bone left foot with hallux amputation)worsens. Pt declined PT eval for stair negotiation. Verified with pt that his community MD is DR Sonam Silva and pharmacy is [facility] on [address] [place] [address]. CM noted pt has Medicaid, agreeable with linkage to community resources, CM sent referral to [facility] to verify pt's eligibility with program. CM has reached out to Diabetes NP to finalize home DM meds upon DC, NP has sent DM meds and supplies to pt's pharmacy. CM verified with pt's Medicaid carrier by calling [facility] [employer] [facility] [phone], spoke to rep Cooper, that pt has NO transport benefits @ this time. CM has reached out to podiatrist Salvatore Delgado [phone] who stated that pt has to set-up appt with his office for wound care administration and follow-up, podiatrist is NOT recommending home care services. AS per DR Varma, pt will need weekly appt with podiatrist for wound care. Podiatrist accomplished form- [facility] 2015 Medicaid transport form request to assist pt in obtaining Medicaid transport benefits. CM leadership facilitated above request with [employer] [facility] [phone]. Discussed all above with pt. MSW provided and discussed "UNITE [place]" community resources (SNAP application, SSI, etc) and contact info with pt.  Pt requested assistance with transport upon DC. Awaiting MD clearance, as pt ran temp yesterday afternoon. Both CM and MSW remain available.  RN/CM noted pt's case was discussed during Interdisciplinary rounds that as per podiatrist's evaluation NO further surgical intervention for pt during this admission but that pt may need said intervention in the future if L foot amputation site ( S/P 09/20/2024 s/p debridement of all non-viable soft tissue and bone left foot with hallux amputation)worsens. Pt declined PT eval for stair negotiation. Verified with pt that his community MD is DR Sonam Silva and pharmacy is [facility] on [address] [place] [address]. CM noted pt has Medicaid, agreeable with linkage to community resources, CM sent referral to [facility] to verify pt's eligibility with program. CM has reached out to Diabetes NP to finalize home DM meds upon DC, NP has sent DM meds and supplies to pt's pharmacy. CM verified with pt's Medicaid carrier by calling [employer] [phone], spoke to rep Cooper, that pt has NO transport benefits @ this time. KEENAN has reached out to podiatrist Salvatore Delgado [phone] who stated that pt has to set-up appt with his office for wound care administration and follow-up, podiatrist is NOT recommending home care services. AS per DR Varma, pt will need weekly appt with podiatrist for wound care. Podiatrist accomplished form- [facility] 2015 Medicaid transport form request to assist pt in obtaining Medicaid transport benefits. CM leadership facilitated above request with [employer] [phone]. Discussed all above with pt. MSW provided and discussed "UNITE US" community resources (SNAP application, SSI, etc) and contact info with pt.  Pt requested assistance with transport upon DC. Awaiting MD clearance, as pt ran temp yesterday afternoon. Both CM and MSW remain available.     ADDENDUM: RN/KEENAN notified by MD that pt will be cleared for transition home in AM. CM met with pt and provided with DC notice with DC FRI 10/04/2024- pt agreeable and requested assistance with DC transport. CM has reached out to CM leadership to facilitate assistance with DC transport. Pt requested for 1:30 PM pick-up to be arranged, CM will arrange said Taxi request with [employer] [phone] in am. Pt requested for surgical shoe which was provided to him. Pt also requested for crutches, CM relayed this to PT dept who stated they would need to eval pt for said durable medical equipment prior to dispensing it to him. Staff on unit apprised re: all above.   RN/CM noted pt's case was discussed during Interdisciplinary rounds that as per podiatrist's evaluation NO further surgical intervention for pt during this admission but that pt may need said intervention in the future if L foot amputation site ( S/P 09/20/2024 s/p debridement of all non-viable soft tissue and bone left foot with hallux amputation)worsens. Pt declined PT eval for stair negotiation. Verified with pt that his community MD is DR Sonam Silva and pharmacy is [facility] on [address] [place] [address]. CM noted pt has Medicaid, agreeable with linkage to community resources, CM sent referral to [facility] to verify pt's eligibility with program. CM has reached out to Diabetes NP to finalize home DM meds upon DC, NP has sent DM meds and supplies to pt's pharmacy. NP has provided pt with DM teachings and deemed him independent with insulin administration. CM verified with pt's Medicaid carrier by calling [employer] [phone], spoke to rep Cooper, that pt has NO transport benefits @ this time. KEENAN has reached out to podiatrist Salvatore Delgado [phone] who stated that pt has to set-up appt with his office for wound care administration and follow-up, podiatrist is NOT recommending home care services. AS per DR Varma, pt will need weekly appt with podiatrist for wound care. Podiatrist accomplished form- [facility] 2015 Medicaid transport form request to assist pt in obtaining Medicaid transport benefits. CM leadership facilitated above request with [employer] [phone]. Discussed all above with pt. MSW provided and discussed "UNITE US" community resources (SNAP application, SSI, etc) and contact info with pt.  Pt requested assistance with transport upon DC. Awaiting MD clearance, as pt ran temp yesterday afternoon. Both CM and MSW remain available.     ADDENDUM: RN/KEENAN notified by MD that pt will be cleared for transition home in AM. CM met with pt and provided with DC notice with DC FRI 10/04/2024- pt agreeable and requested assistance with DC transport. CM has reached out to CM leadership to facilitate assistance with DC transport. Pt requested for 1:30 PM pick-up to be arranged, CM will arrange said Taxi request with [employer] [phone] in am. Pt requested for surgical shoe which was provided to him. Pt also requested for crutches, CM relayed this to PT dept who stated they would need to eval pt for said durable medical equipment prior to dispensing it to him. Staff on unit apprised re: all above.

## 2024-10-03 NOTE — DISCHARGE NOTE PROVIDER - CARE PROVIDER_API CALL
Salvatore Davenport  Podiatric Medicine and Surgery  [address] [place] [address]  Phone: [phone]  Fax: [phone]  Follow Up Time:     Perlman, Craig Douglas  Endocrinology/Metab/Diabetes  [address]  [place] [address]  Phone: [phone]  Fax: [phone]  Follow Up Time:     Ricardo Masters  [facility]  [address] [place] [address]  Phone: [phone]  Fax: [phone]  Follow Up Time:

## 2024-10-03 NOTE — PROGRESS NOTE ADULT - SUBJECTIVE AND OBJECTIVE BOX
ANUJASUZANNA HURLEY is a 49yMale , patient examined and chart reviewed.    INTERVAL HPI/ OVERNIGHT EVENTS:   Low grade temps Tmax 100.6 no events.    PAST MEDICAL & SURGICAL HISTORY:  Prediabetes  HTN (hypertension)  HLD (hyperlipidemia)  DM2 (diabetes mellitus, type 2)      For details regarding the patient's social history, family history, and other miscellaneous elements, please refer the initial infectious diseases consultation and/or the admitting history and physical examination for this admission.    ROS:  CONSTITUTIONAL:  no fever no chills  EYES:  Negative  blurry vision or double vision  CARDIOVASCULAR:  Negative for chest pain or palpitations  RESPIRATORY:  Negative for cough, wheezing, or SOB   GASTROINTESTINAL:  Negative for nausea, vomiting, diarrhea, constipation, or abdominal pain  GENITOURINARY:  Negative frequency, urgency or dysuria  NEUROLOGIC:  No headache, confusion, dizziness, lightheadedness  All other systems were reviewed and are negative     ALLERGIES  amoxicillin (Unknown)      Current inpatient medications :    ANTIBIOTICS/RELEVANT:  piperacillin/tazobactam IVPB.. 3.375 Gram(s) IV Intermittent every 8 hours    MEDICATIONS  (STANDING):  amLODIPine   Tablet 10 milliGRAM(s) Oral daily  carvedilol 6.25 milliGRAM(s) Oral every 12 hours  dextrose 5%. 1000 milliLiter(s) (50 mL/Hr) IV Continuous <Continuous>  dextrose 5%. 1000 milliLiter(s) (100 mL/Hr) IV Continuous <Continuous>  dextrose 50% Injectable 12.5 Gram(s) IV Push once  dextrose 50% Injectable 25 Gram(s) IV Push once  dextrose 50% Injectable 25 Gram(s) IV Push once  enoxaparin Injectable 40 milliGRAM(s) SubCutaneous every 24 hours  gabapentin 100 milliGRAM(s) Oral three times a day  glucagon  Injectable 1 milliGRAM(s) IntraMuscular once  influenza   Vaccine 0.5 milliLiter(s) IntraMuscular once  insulin glargine Injectable (LANTUS) 40 Unit(s) SubCutaneous at bedtime  insulin lispro (ADMELOG) corrective regimen sliding scale   SubCutaneous at bedtime  insulin lispro (ADMELOG) corrective regimen sliding scale   SubCutaneous three times a day before meals  insulin lispro Injectable (ADMELOG) 16 Unit(s) SubCutaneous three times a day before meals  lactobacillus acidophilus 1 Tablet(s) Oral two times a day with meals  mupirocin 2% Ointment 1 Application(s) Topical <User Schedule>    MEDICATIONS  (PRN):  acetaminophen     Tablet .. 650 milliGRAM(s) Oral every 6 hours PRN Temp greater or equal to 38C (100.4F), Mild Pain (1 - 3), Moderate Pain (4 - 6)  dextrose Oral Gel 15 Gram(s) Oral once PRN Blood Glucose LESS THAN 70 milliGRAM(s)/deciliter  diphenhydrAMINE 25 milliGRAM(s) Oral every 6 hours PRN Rash and/or Itching  guaiFENesin  milliGRAM(s) Oral every 12 hours PRN Cough  labetalol Injectable 10 milliGRAM(s) IV Push every 8 hours PRN Systolic blood pressure >180  melatonin 3 milliGRAM(s) Oral at bedtime PRN Insomnia        Objective:  Vital Signs Last 24 Hrs  T(C): 37.6 (03 Oct 2024 05:11), Max: 38.1 (02 Oct 2024 14:12)  T(F): 99.7 (03 Oct 2024 05:11), Max: 100.6 (02 Oct 2024 14:12)  HR: 91 (03 Oct 2024 05:11) (88 - 96)  BP: 145/82 (03 Oct 2024 05:11) (129/74 - 145/82)  RR: 18 (03 Oct 2024 05:11) (18 - 18)  SpO2: 98% (03 Oct 2024 05:11) (94% - 98%)    Parameters below as of 03 Oct 2024 05:11  Patient On (Oxygen Delivery Method): room air      Physical Exam:  General:  no acute distress  Neck: supple, trachea midline  Lungs: clear, no wheeze/rhonchi  Cardiovascular: regular rate and rhythm, S1 S2  Abdomen: soft, nontender,  bowel sounds normal  Neurological: alert and oriented x3  Skin: + rash  Extremities: Left foot amputation stump with some necrotic changes distal flap. Dorsal and plantar flap showing signs of  venous congestion.      LABS:                        8.5    7.30  )-----------( 80       ( 03 Oct 2024 07:42 )             26.0   10-03    131[L]  |  101  |  65[H]  ----------------------------<  168[H]  4.0   |  25  |  3.01[H]    Ca    9.0      03 Oct 2024 07:42    TPro  6.3  /  Alb  2.5[L]  /  TBili  0.6  /  DBili  x   /  AST  26  /  ALT  45  /  AlkPhos  113  10-03    MICROBIOLOGY:  Culture - Tissue with Gram Stain (collected 20 Sep 2024 19:21)  Source: Bone Other  Gram Stain (21 Sep 2024 06:32):    No polymorphonuclear cells seen per low power field    No organisms seen per oil power field  Preliminary Report (22 Sep 2024 08:47):    No growth to date    Culture - Fungal, Tissue (collected 20 Sep 2024 19:21)  Source: Bone Other  Preliminary Report (22 Sep 2024 07:58):    Testing in progress    Culture - Tissue with Gram Stain (09.20.24 @ 19:21)    Gram Stain:   No polymorphonuclear cells seen per low power field  No organisms seen per oil power field   -  Vancomycin: S 4   -  Ampicillin: S <=2 Predicts results to ampicillin/sulbactam, amoxacillin-clavulanate and  piperacillin-tazobactam.   Specimen Source: Bone Other   Culture Results:   Growth in fluid media only Enterococcus faecalis  Rare Staphylococcus epidermidis   Organism Identification: Enterococcus faecalis   Organism: Enterococcus faecalis   Method Type: STEPHANIE      Culture - Other (09.17.24 @ 19:10)   -  Amoxicillin/Clavulanic Acid: R >16/8   -  Ampicillin: R 16 These ampicillin results predict results for amoxicillin   -  Ampicillin: S <=2 Predicts results to ampicillin/sulbactam, amoxacillin-clavulanate and  piperacillin-tazobactam.   -  Ampicillin/Sulbactam: R 8/4   -  Aztreonam: S <=4   -  Cefazolin: R >16   -  Cefepime: S <=2   -  Cefoxitin: R >16   -  Ceftriaxone: S <=1 Enterobacter cloacae, Klebsiella aerogenes, and Citrobacter freundii may develop resistance during prolonged therapy.   -  Ciprofloxacin: S <=0.25   -  Ertapenem: S <=0.5   -  Gentamicin: S <=2   -  Imipenem: S <=1   -  Levofloxacin: S <=0.5   -  Meropenem: S <=1   -  Piperacillin/Tazobactam: S <=8 Treatment with Pipercillin/Tazobactam is not recommended in severe infections casued by Klebsiella aerogenes, Enterobacter cloacae complex, and Citrobacter freundii complex.   -  Tobramycin: S <=2   -  Trimethoprim/Sulfamethoxazole: S <=0.5/9.5   -  Vancomycin: S 2   Specimen Source: Wound Wound   Culture Results:   Numerous Enterobacter cloacae complex  Numerous Enterococcus faecalis  Few Coag Negative Staphylococcus "Susceptibilities not performed"   Organism Identification: Enterobacter cloacae complex  Enterococcus faecalis   Organism: Enterobacter cloacae complex   Organism: Enterococcus faecalis   Method Type: STEPHANIE   Method Type: STEPHANIE    Culture - Blood (collected 17 Sep 2024 18:13)  Source: .Blood Blood-Peripheral  Preliminary Report (19 Sep 2024 01:01):    No growth at 24 hours    Culture - Blood (collected 17 Sep 2024 18:13)  Source: .Blood Blood-Peripheral  Preliminary Report (19 Sep 2024 01:01):    No growth at 24 hours    RADIOLOGY & ADDITIONAL STUDIES:    ACC: [id number] EXAM:  MR FOOT LT   ORDERED BY: LISA WEAVER     PROCEDURE DATE:  09/19/2024          INTERPRETATION:  LEFT FOOT MRI    CLINICAL INDICATION: Diabetic foot ulcer, for evaluation of osteomyelitis.    COMPARISON: Radiographs dated 9/17/2024.    TECHNIQUE: Multiplanar, multisequence MRI was obtained of the left foot.    FINDINGS:    OSSEOUS: Acute on chronic osteomyelitis/septic arthritis at the proximal   interphalangeal joint of the second toe in the proper clinical setting.   Focal acute osteomyelitis along the plantar lateral cortex of the great   toe distal phalanx base with ill-defined overlying deep soft tissue   ulceration. Lisfranc interval is not widened on this non-weightbearing   examination.    TENDONS: Visualized flexor and extensor tendons are intact.    LIGAMENTS: Lisfranc ligament is intact.    GENERAL: No drainable encapsulated fluid collection. No significant   intermetatarsal bursal fluid distension. No interdigital neuroma. Mild to   moderate heterogeneous chronic muscular atrophy with superimposed patchy   denervation edema like signal.    IMPRESSION:    1.  Acute on chronic osteomyelitis/septic arthritis at the proximal   interphalangeal joint of left second toe in the proper clinical setting.  2.Focal acute osteomyelitis along the plantar lateral cortex of left   great toe distal phalanx base with overlying deep soft tissue ulceration.    Assessment :  48YO M PMH DM2, HTN, HLD, NEUROPATHY, PVD, NON COMPLIANT WITH TT, ETOH ABUSE, admitted with infected DM left great toe infection  MRI + OM  Sp Left great toe amputation 9/20  OR bone cultures noted with E Faecalis Enterobacter  Left foot amputation stump with some necrotic changes distal flap. Dorsal and plantar flap showing signs of  venous congestion.  Path with acute on chronic OM on prox clean margins  Per podiatry no plans for any further surgical intervention  Pt refused IV antibiotics due to work   Rash ?drug reaction sec Zosyn  Pt states rash is getting better -n pruritus.     Plan :   On Zosyn  Can change to po Augmentin 875mg bid till 11/01/24 on dc  Dm control  Trend temps and cbc  Pulm toileting  Monitor rash - to call if rash gets worse.  Stable from ID standpoint  Dc planning per primary team    D/w Dr Weaver    Continue with present regiment.  Appropriate use of antibiotics and adverse effects reviewed.      > 35 minutes were spent in direct patient care reviewing notes, medications ,labs data/ imaging , discussion with multidisciplinary team.    Thank you for allowing me to participate in care of your patient .    Linsey Mack MD  Infectious Disease  [phone]

## 2024-10-03 NOTE — DISCHARGE NOTE PROVIDER - NSDCMRMEDTOKEN_GEN_ALL_CORE_FT
acetaminophen 325 mg oral tablet: 2 tab(s) orally every 6 hours As needed Temp greater or equal to 38C (100.4F), Mild Pain (1 - 3), Moderate Pain (4 - 6)  Acidophilus oral tablet: 2 tab(s) orally once a day  amLODIPine 10 mg oral tablet: 1 tab(s) orally once a day  amoxicillin-clavulanate 875 mg-125 mg oral tablet: 875 milligram(s) orally 2 times a day till 11/1  carvedilol 6.25 mg oral tablet: 1 tab(s) orally every 12 hours  Dexcom G7 : Use as directed  Dexcom G7 Sensors: Change every 10 days  gabapentin 100 mg oral capsule: 1 cap(s) orally 3 times a day  guaiFENesin 600 mg oral tablet, extended release: 1 tab(s) orally every 12 hours as needed for Cough  Insulin Pen Needles, 4mm: 1 application subcutaneously 4 times a day. ** Use with insulin pen **  Lantus Solostar Pen 100 units/mL subcutaneous solution: 20 unit(s) subcutaneous once a day (at bedtime) unit(s) subcutaneous once a day  NovoLOG FlexPen 100 units/mL injectable solution: 7 unit(s) subcutaneous 3 times a day (with meals) 9 units if glucose readings 151-200  11 units if glucose readings 201-250  13 units if glucose readings 251-300  15 units if glucose readings 301-350  17 units if glucose readings 351-400  19 units if glucose readings &gt;400 and call MD

## 2024-10-03 NOTE — CASE MANAGEMENT PROGRESS NOTE - NSCMPROGRESSNOTE_GEN_ALL_CORE
Called by KEENAN Maki to assist with submission of [facility] MAS Transport form 2015 via [facility] portal. Spoke with Inga @ [facility], Dr. Davenport does not have portal access. According to Inga, form can be submitted on behalf of Dr. Davenport who is in agreement as per call to Dr. Davenport. MAS form submitted via the portal on 10/3. KEENAN Maki made aware.

## 2024-10-03 NOTE — PROGRESS NOTE ADULT - PROBLEM SELECTOR PROBLEM 2
Osteomyelitis of ankle or foot, acute

## 2024-10-03 NOTE — PROGRESS NOTE ADULT - SUBJECTIVE AND OBJECTIVE BOX
Patient is a 49y old  Male who presents with a chief complaint of infected toe (03 Oct 2024 11:00)      INTERVAL HPI/OVERNIGHT EVENTS:overnight events noted    Home Medications:  non compliant with meds for months:  (17 Sep 2024 19:54)      MEDICATIONS  (STANDING):  amLODIPine   Tablet 10 milliGRAM(s) Oral daily  carvedilol 6.25 milliGRAM(s) Oral every 12 hours  dextrose 5%. 1000 milliLiter(s) (100 mL/Hr) IV Continuous <Continuous>  dextrose 5%. 1000 milliLiter(s) (50 mL/Hr) IV Continuous <Continuous>  dextrose 50% Injectable 25 Gram(s) IV Push once  dextrose 50% Injectable 12.5 Gram(s) IV Push once  dextrose 50% Injectable 25 Gram(s) IV Push once  enoxaparin Injectable 40 milliGRAM(s) SubCutaneous every 24 hours  gabapentin 100 milliGRAM(s) Oral three times a day  glucagon  Injectable 1 milliGRAM(s) IntraMuscular once  influenza   Vaccine 0.5 milliLiter(s) IntraMuscular once  insulin glargine Injectable (LANTUS) 40 Unit(s) SubCutaneous at bedtime  insulin lispro (ADMELOG) corrective regimen sliding scale   SubCutaneous three times a day before meals  insulin lispro (ADMELOG) corrective regimen sliding scale   SubCutaneous at bedtime  insulin lispro Injectable (ADMELOG) 16 Unit(s) SubCutaneous three times a day before meals  lactobacillus acidophilus 1 Tablet(s) Oral two times a day with meals  mupirocin 2% Ointment 1 Application(s) Topical <User Schedule>  piperacillin/tazobactam IVPB.. 3.375 Gram(s) IV Intermittent every 8 hours    MEDICATIONS  (PRN):  acetaminophen     Tablet .. 650 milliGRAM(s) Oral every 6 hours PRN Temp greater or equal to 38C (100.4F), Mild Pain (1 - 3), Moderate Pain (4 - 6)  dextrose Oral Gel 15 Gram(s) Oral once PRN Blood Glucose LESS THAN 70 milliGRAM(s)/deciliter  diphenhydrAMINE 25 milliGRAM(s) Oral every 6 hours PRN Rash and/or Itching  guaiFENesin  milliGRAM(s) Oral every 12 hours PRN Cough  labetalol Injectable 10 milliGRAM(s) IV Push every 8 hours PRN Systolic blood pressure >180  melatonin 3 milliGRAM(s) Oral at bedtime PRN Insomnia      Allergies    No Known Allergies    Intolerances        REVIEW OF SYSTEMS:  CONSTITUTIONAL: No fever, weight loss, or fatigue  EYES: No eye pain, visual disturbances, or discharge  ENMT:  No difficulty hearing, tinnitus, vertigo; No sinus or throat pain  NECK: No pain or stiffness  BREASTS: No pain, masses, or nipple discharge  RESPIRATORY: No cough, wheezing, chills or hemoptysis; No shortness of breath  CARDIOVASCULAR: No chest pain, palpitations, dizziness, or leg swelling  GASTROINTESTINAL: No abdominal or epigastric pain. No nausea, vomiting, or hematemesis; No diarrhea or constipation. No melena or hematochezia.  GENITOURINARY: No dysuria, frequency, hematuria, or incontinence  NEUROLOGICAL: No headaches, memory loss, loss of strength, numbness, or tremors  SKIN: No itching, burning, rashes, or lesions   LYMPH NODES: No enlarged glands  ENDOCRINE: No heat or cold intolerance; No hair loss  MUSCULOSKELETAL: No joint pain or swelling; No muscle, back, or extremity pain  PSYCHIATRIC: No depression, anxiety, mood swings, or difficulty sleeping  HEME/LYMPH: No easy bruising, or bleeding gums  ALLERGY AND IMMUNOLOGIC: No hives or eczema    Vital Signs Last 24 Hrs  T(C): 37.6 (03 Oct 2024 05:11), Max: 38.1 (02 Oct 2024 14:12)  T(F): 99.7 (03 Oct 2024 05:11), Max: 100.6 (02 Oct 2024 14:12)  HR: 91 (03 Oct 2024 05:11) (88 - 96)  BP: 145/82 (03 Oct 2024 05:11) (129/74 - 145/82)  BP(mean): --  RR: 18 (03 Oct 2024 05:11) (18 - 18)  SpO2: 98% (03 Oct 2024 05:11) (94% - 98%)    Parameters below as of 03 Oct 2024 05:11  Patient On (Oxygen Delivery Method): room air        PHYSICAL EXAM:  GENERAL: NAD, well-groomed, well-developed  HEAD:  Atraumatic, Normocephalic  EYES: EOMI, PERRLA, conjunctiva and sclera clear  ENMT: Moist mucous membranes,   NECK: Supple, No JVD, Normal thyroid  NERVOUS SYSTEM:  Alert & Oriented X3, Good concentration; Motor Strength 5/5 B/L upper and lower extremities; DTRs 2+ intact and symmetric  CHEST/LUNG: Clear to percussion bilaterally;   HEART: Regular rate and rhythm;   ABDOMEN: Soft, Nontender, Nondistended; Bowel sounds present  EXTREMITIES:foot in dressing  SKIN: No rashes or lesions    LABS:                        8.5    7.30  )-----------( 80       ( 03 Oct 2024 07:42 )             26.0     10-03    131[L]  |  101  |  65[H]  ----------------------------<  168[H]  4.0   |  25  |  3.01[H]    Ca    9.0      03 Oct 2024 07:42    TPro  6.3  /  Alb  2.5[L]  /  TBili  0.6  /  DBili  x   /  AST  26  /  ALT  45  /  AlkPhos  113  10-03      Urinalysis Basic - ( 03 Oct 2024 07:42 )    Color: x / Appearance: x / SG: x / pH: x  Gluc: 168 mg/dL / Ketone: x  / Bili: x / Urobili: x   Blood: x / Protein: x / Nitrite: x   Leuk Esterase: x / RBC: x / WBC x   Sq Epi: x / Non Sq Epi: x / Bacteria: x      CAPILLARY BLOOD GLUCOSE      POCT Blood Glucose.: 140 mg/dL (03 Oct 2024 11:56)  POCT Blood Glucose.: 167 mg/dL (03 Oct 2024 07:58)  POCT Blood Glucose.: 300 mg/dL (02 Oct 2024 21:04)  POCT Blood Glucose.: 257 mg/dL (02 Oct 2024 17:20)          I&O's Summary    02 Oct 2024 07:01  -  03 Oct 2024 07:00  --------------------------------------------------------  IN: 200 mL / OUT: 0 mL / NET: 200 mL        RADIOLOGY & ADDITIONAL TESTS:    Imaging Personally Reviewed:  [x ] YES  [ ] NO    Consultant(s) Notes Reviewed:  [ x] YES  [ ] NO    Care Discussed with Consultants/Other Providers [x ] YES  [ ] NO

## 2024-10-03 NOTE — DISCHARGE NOTE PROVIDER - NSDCCPCAREPLAN_GEN_ALL_CORE_FT
PRINCIPAL DISCHARGE DIAGNOSIS  Diagnosis: Diabetic foot infection  Assessment and Plan of Treatment: continue abx and keep DM2 in control, f up with podiatry      SECONDARY DISCHARGE DIAGNOSES  Diagnosis: Osteomyelitis  Assessment and Plan of Treatment: continue abx augmentin till 11/1

## 2024-10-03 NOTE — PROGRESS NOTE ADULT - SUBJECTIVE AND OBJECTIVE BOX
Chief Complaint: Foot wound    Interval Events: No events overnight.    Review of Systems:  General: No fevers, chills, weight gain  Skin: No rashes, color changes  Cardiovascular: No chest pain, orthopnea  Respiratory: No shortness of breath, cough  Gastrointestinal: No nausea, abdominal pain  Genitourinary: No incontinence, pain with urination  Musculoskeletal: No pain, swelling, decreased range of motion  Neurological: No headache, weakness  Psychiatric: No depression, anxiety  Endocrine: No weight gain, increased thirst  All other systems are comprehensively negative.    Physical Exam:  Vital Signs Last 24 Hrs  T(C): 37.6 (03 Oct 2024 05:11), Max: 38.1 (02 Oct 2024 14:12)  T(F): 99.7 (03 Oct 2024 05:11), Max: 100.6 (02 Oct 2024 14:12)  HR: 91 (03 Oct 2024 05:11) (88 - 96)  BP: 145/82 (03 Oct 2024 05:11) (129/74 - 145/82)  BP(mean): --  RR: 18 (03 Oct 2024 05:11) (18 - 18)  SpO2: 98% (03 Oct 2024 05:11) (94% - 98%)  Parameters below as of 03 Oct 2024 05:11  Patient On (Oxygen Delivery Method): room air  General: NAD  HEENT: MMM  Neck: No JVD, no carotid bruit  Lungs: CTAB  CV: RRR, nl S1/S2, no M/R/G  Abdomen: S/NT/ND, +BS  Extremities: No LE edema, no cyanosis  Neuro: AAOx3, non-focal  Skin: No rash    Labs:    10-03    131[L]  |  101  |  65[H]  ----------------------------<  168[H]  4.0   |  25  |  3.01[H]    Ca    9.0      03 Oct 2024 07:42    TPro  6.3  /  Alb  2.5[L]  /  TBili  0.6  /  DBili  x   /  AST  26  /  ALT  45  /  AlkPhos  113  10-03                        8.5    7.30  )-----------( 80       ( 03 Oct 2024 07:42 )             26.0       ECG/Telemetry: Sinus rhythm

## 2024-10-03 NOTE — PROGRESS NOTE ADULT - PROBLEM SELECTOR PROBLEM 1
Diabetic toe ulcer
Uncontrolled type 2 diabetes mellitus with hyperglycemia
Uncontrolled type 2 diabetes mellitus with hyperglycemia
Diabetic toe ulcer
Uncontrolled type 2 diabetes mellitus with hyperglycemia
Diabetic toe ulcer
Uncontrolled type 2 diabetes mellitus with hyperglycemia
Diabetic toe ulcer
Diabetic toe ulcer
Uncontrolled type 2 diabetes mellitus with hyperglycemia
Uncontrolled type 2 diabetes mellitus with hyperglycemia
Diabetic toe ulcer
Uncontrolled type 2 diabetes mellitus with hyperglycemia
Diabetic toe ulcer
Osteomyelitis of ankle or foot, acute
Diabetic toe ulcer
Uncontrolled type 2 diabetes mellitus with hyperglycemia

## 2024-10-03 NOTE — PROGRESS NOTE ADULT - SUBJECTIVE AND OBJECTIVE BOX
PROGRESS NOTE   Patient is a 49y old  Male who presents with a chief complaint of infected toe (03 Oct 2024 13:36)      HPI:  49-year-old male with past medical history of DM2, HTN, HLD, NEUROPATHY, PVD, NON COMPLIANT WITH TT, ETOH ABUSE, presents today due to left toe infection.  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  Patient experienced a blister to the left toe in which he eventually fell orders follow-up on its own.  Patient notes that the blister opened up and he developed some blood in the blister as well.  Patient notes that the blister scab has slightly fallen off but now his toe is red and painful.  Patient not currently on antibiotics.  Patient denies fever, injury, numbness, weakness, or any other complaints.   IN ED TEMP 99, /100, HR 89, had consult with podiatry and sidney started on Cipro and admitted for further management (17 Sep 2024 19:36)      Vital Signs Last 24 Hrs  T(C): 37.7 (03 Oct 2024 14:32), Max: 38 (02 Oct 2024 15:02)  T(F): 99.8 (03 Oct 2024 14:32), Max: 100.4 (02 Oct 2024 15:02)  HR: 91 (03 Oct 2024 14:32) (88 - 91)  BP: 117/73 (03 Oct 2024 14:32) (117/73 - 145/82)  BP(mean): --  RR: 16 (03 Oct 2024 14:32) (16 - 18)  SpO2: 94% (03 Oct 2024 14:32) (94% - 98%)    Parameters below as of 03 Oct 2024 14:32  Patient On (Oxygen Delivery Method): room air                              8.5    7.30  )-----------( 80       ( 03 Oct 2024 07:42 )             26.0               10-03    131[L]  |  101  |  65[H]  ----------------------------<  168[H]  4.0   |  25  |  3.01[H]    Ca    9.0      03 Oct 2024 07:42    TPro  6.3  /  Alb  2.5[L]  /  TBili  0.6  /  DBili  x   /  AST  26  /  ALT  45  /  AlkPhos  113  10-03      PHYSICAL EXAM  SUZANNA KAISER is a pleasant well-nourished, well developed 49y Male in no acute distress, alert awake, and oriented to person, place and time.   LE Focused:  Vascular: Dorsalis Pedis and Posterior Tibial pulses 2/4.  Capillary re-fill time less then 3 seconds digits 1-5 bilateral.    Neuro: Protective sensation diminished to the level of the digits bilateral.  MSK: Muscle strength 5/5 all major muscle groups bilateral.  Derm: POD#13. Incision site of the Left foot noted with  small region of necrotic changes to the most  distal aspect of the  flap. Dorsal and plantar flap showing signs of  venous congestion.  Guarded prognosis for the flap at this time. Upon elevation there is decreased venous congestion.  Will allow the region to cont to demarcate.  the region of concern is responding favorable to the current care plan and continues to improve

## 2024-10-03 NOTE — PROGRESS NOTE ADULT - ATTENDING COMMENTS
reviewed medical history, physical exam and care plan   note read and reviewed
reviewed medical history, physical exam and care plan  note read and modified
reviewed medical history, physical exam and care plan   note read and reviewed
reviewed medical history, physical exam and care plan   note read and reviewed

## 2024-10-04 ENCOUNTER — TRANSCRIPTION ENCOUNTER (OUTPATIENT)
Age: 49
End: 2024-10-04

## 2024-10-04 VITALS
DIASTOLIC BLOOD PRESSURE: 94 MMHG | OXYGEN SATURATION: 98 % | SYSTOLIC BLOOD PRESSURE: 127 MMHG | HEART RATE: 90 BPM | TEMPERATURE: 100 F | RESPIRATION RATE: 16 BRPM

## 2024-10-04 LAB
GLUCOSE BLDC GLUCOMTR-MCNC: 140 MG/DL — HIGH (ref 70–99)
GLUCOSE BLDC GLUCOMTR-MCNC: 169 MG/DL — HIGH (ref 70–99)

## 2024-10-04 PROCEDURE — 87040 BLOOD CULTURE FOR BACTERIA: CPT

## 2024-10-04 PROCEDURE — 76775 US EXAM ABDO BACK WALL LIM: CPT

## 2024-10-04 PROCEDURE — 82607 VITAMIN B-12: CPT

## 2024-10-04 PROCEDURE — 87070 CULTURE OTHR SPECIMN AEROBIC: CPT

## 2024-10-04 PROCEDURE — 84132 ASSAY OF SERUM POTASSIUM: CPT

## 2024-10-04 PROCEDURE — 83036 HEMOGLOBIN GLYCOSYLATED A1C: CPT

## 2024-10-04 PROCEDURE — 88311 DECALCIFY TISSUE: CPT

## 2024-10-04 PROCEDURE — 83550 IRON BINDING TEST: CPT

## 2024-10-04 PROCEDURE — 93925 LOWER EXTREMITY STUDY: CPT

## 2024-10-04 PROCEDURE — 84100 ASSAY OF PHOSPHORUS: CPT

## 2024-10-04 PROCEDURE — 87116 MYCOBACTERIA CULTURE: CPT

## 2024-10-04 PROCEDURE — 85025 COMPLETE CBC W/AUTO DIFF WBC: CPT

## 2024-10-04 PROCEDURE — 87077 CULTURE AEROBIC IDENTIFY: CPT

## 2024-10-04 PROCEDURE — 83605 ASSAY OF LACTIC ACID: CPT

## 2024-10-04 PROCEDURE — 83540 ASSAY OF IRON: CPT

## 2024-10-04 PROCEDURE — 87102 FUNGUS ISOLATION CULTURE: CPT

## 2024-10-04 PROCEDURE — 97161 PT EVAL LOW COMPLEX 20 MIN: CPT

## 2024-10-04 PROCEDURE — 83970 ASSAY OF PARATHORMONE: CPT

## 2024-10-04 PROCEDURE — 82728 ASSAY OF FERRITIN: CPT

## 2024-10-04 PROCEDURE — 82009 KETONE BODYS QUAL: CPT

## 2024-10-04 PROCEDURE — 96374 THER/PROPH/DIAG INJ IV PUSH: CPT

## 2024-10-04 PROCEDURE — 87186 SC STD MICRODIL/AGAR DIL: CPT

## 2024-10-04 PROCEDURE — 85027 COMPLETE CBC AUTOMATED: CPT

## 2024-10-04 PROCEDURE — 87640 STAPH A DNA AMP PROBE: CPT

## 2024-10-04 PROCEDURE — 88305 TISSUE EXAM BY PATHOLOGIST: CPT

## 2024-10-04 PROCEDURE — 84300 ASSAY OF URINE SODIUM: CPT

## 2024-10-04 PROCEDURE — 84156 ASSAY OF PROTEIN URINE: CPT

## 2024-10-04 PROCEDURE — 99285 EMERGENCY DEPT VISIT HI MDM: CPT

## 2024-10-04 PROCEDURE — 80061 LIPID PANEL: CPT

## 2024-10-04 PROCEDURE — 82310 ASSAY OF CALCIUM: CPT

## 2024-10-04 PROCEDURE — 87206 SMEAR FLUORESCENT/ACID STAI: CPT

## 2024-10-04 PROCEDURE — 87075 CULTR BACTERIA EXCEPT BLOOD: CPT

## 2024-10-04 PROCEDURE — 80053 COMPREHEN METABOLIC PANEL: CPT

## 2024-10-04 PROCEDURE — 36415 COLL VENOUS BLD VENIPUNCTURE: CPT

## 2024-10-04 PROCEDURE — 87015 SPECIMEN INFECT AGNT CONCNTJ: CPT

## 2024-10-04 PROCEDURE — 85610 PROTHROMBIN TIME: CPT

## 2024-10-04 PROCEDURE — 73630 X-RAY EXAM OF FOOT: CPT

## 2024-10-04 PROCEDURE — 81001 URINALYSIS AUTO W/SCOPE: CPT

## 2024-10-04 PROCEDURE — 82962 GLUCOSE BLOOD TEST: CPT

## 2024-10-04 PROCEDURE — 87641 MR-STAPH DNA AMP PROBE: CPT

## 2024-10-04 PROCEDURE — 82570 ASSAY OF URINE CREATININE: CPT

## 2024-10-04 PROCEDURE — 85652 RBC SED RATE AUTOMATED: CPT

## 2024-10-04 PROCEDURE — 93005 ELECTROCARDIOGRAM TRACING: CPT

## 2024-10-04 PROCEDURE — 85730 THROMBOPLASTIN TIME PARTIAL: CPT

## 2024-10-04 PROCEDURE — 82746 ASSAY OF FOLIC ACID SERUM: CPT

## 2024-10-04 PROCEDURE — 86060 ANTISTREPTOLYSIN O TITER: CPT

## 2024-10-04 PROCEDURE — 86160 COMPLEMENT ANTIGEN: CPT

## 2024-10-04 PROCEDURE — 84550 ASSAY OF BLOOD/URIC ACID: CPT

## 2024-10-04 RX ADMIN — GABAPENTIN 100 MILLIGRAM(S): 800 TABLET, FILM COATED ORAL at 05:58

## 2024-10-04 RX ADMIN — Medication 2: at 08:32

## 2024-10-04 RX ADMIN — Medication 10 MILLIGRAM(S): at 05:58

## 2024-10-04 RX ADMIN — PIPERACILLIN SODIUM AND TAZOBACTAM SODIUM 25 GRAM(S): 12; 1.5 INJECTION, POWDER, LYOPHILIZED, FOR SOLUTION INTRAVENOUS at 05:57

## 2024-10-04 RX ADMIN — Medication 6.25 MILLIGRAM(S): at 05:58

## 2024-10-04 RX ADMIN — GABAPENTIN 100 MILLIGRAM(S): 800 TABLET, FILM COATED ORAL at 13:01

## 2024-10-04 RX ADMIN — Medication 16 UNIT(S): at 08:31

## 2024-10-04 RX ADMIN — Medication 1 TABLET(S): at 08:35

## 2024-10-04 RX ADMIN — Medication 16 UNIT(S): at 13:00

## 2024-10-04 NOTE — DISCHARGE NOTE NURSING/CASE MANAGEMENT/SOCIAL WORK - PATIENT PORTAL LINK FT
You can access the FollowMyHealth Patient Portal offered by [facility] by registering at the following website: [email or web address]. By joining [facility]’s FollowMyHealth portal, you will also be able to view your health information using other applications (apps) compatible with our system.

## 2024-10-04 NOTE — CASE MANAGEMENT PROGRESS NOTE - NSCMPROGRESSNOTE_GEN_ALL_CORE
RN/CM noted pt's case was discussed during Interdisciplinary rounds NO further surgical intervention for pt during this admission as per podiatrist but that pt may need said intervention in the future if L foot amputation site ( S/P 09/20/2024 s/p debridement of all non-viable soft tissue and bone left foot with hallux amputation)worsens. Pt declined PT eval for stair negotiation. Verified with pt that his community MD is DR Sonam Silva and pharmacy is [facility] on [address] [place] [address]. CM noted pt has Medicaid, agreeable with linkage to community resources, CM sent referral to [facility] to verify pt's eligibility with program. CM has confirmed that diabetes NP has sent DM meds and supplies to pt's pharmacy. NP has provided pt with DM teachings and deemed him independent with insulin administration. RN on unit provided pt again with DM teachings- return demo with insulin administration. CM has verified with pt's Medicaid carrier by calling [employer] [phone], spoke to rep Cooper, that pt has NO transport benefits @ this time. KEENAN has reached out to podiatrist Salvatore Delgado [phone] who stated that pt has to set-up appt with his office for wound care administration and follow-up, podiatrist is NOT recommending home care services. AS per DR Varma, pt will need weekly appt with podiatrist for wound care. Podiatrist accomplished form- [facility] 2015 Medicaid transport form request to assist pt in obtaining Medicaid transport benefits.  leadership facilitated above request with [employer] [phone]. Discussed all above with pt. MSW provided and discussed "UNITE " community resources (SNAP application, SSI, etc) and contact info with pt. CM reinforced with pt importance of follow-up with MD follow-ups and compliance with med/treatment regimen.    RN/CM notified by MD that pt will be cleared for transition home today 10/04/2024.  CM met with pt and provided with DC notice with DC today FRI 10/04/2024- pt agreeable and requested assistance with DC transport. CM has reached out to  leadership to facilitate assistance with DC transport. Pt requested for 1:30 PM pick-up to be arranged for him. Pt requested for surgical shoe which was provided to him. Pt also requested for crutches, CM relayed this to PT dept who after evaluating pt, deemed him appropriate for crutches and was provided with said durable medical equipment. Staff on unit apprised re: all above.   RN/CM noted pt's case was discussed during Interdisciplinary rounds NO further surgical intervention for pt during this admission as per podiatrist but that pt may need said intervention in the future if L foot amputation site ( S/P 09/20/2024 s/p debridement of all non-viable soft tissue and bone left foot with hallux amputation)worsens. Pt declined PT eval for stair negotiation. Verified with pt that his community MD is DR Sonam Silva and pharmacy is [facility] on [address] [place] [address]. CM noted pt has Medicaid, agreeable with linkage to community resources, CM sent referral to [facility] to verify pt's eligibility with program. CM has confirmed that diabetes NP has sent DM meds and supplies to pt's pharmacy. NP has provided pt with DM teachings and deemed him independent with insulin administration. RN on unit provided pt again with DM teachings- return demo with insulin administration. CM has verified with pt's Medicaid carrier by calling [employer] [phone], spoke to rep Cooper, that pt has NO transport benefits @ this time. KEENAN has reached out to podiatrist Salvatore Delgado [phone] who stated that pt has to set-up appt with his office for wound care administration and follow-up, podiatrist is NOT recommending home care services. AS per DR Varma, pt will need weekly appt with podiatrist for wound care. Podiatrist accomplished form- [facility] 2015 Medicaid transport form request to assist pt in obtaining Medicaid transport benefits.  leadership facilitated above request with [employer] transport [phone]. Discussed all above with pt. MSW provided and discussed "UNITE " community resources (SNAP application, SSI, etc) and contact info with pt. CM reinforced with pt importance of follow-up with MD follow-ups and compliance with med/treatment regimen.    RN/CM notified by MD that pt will be cleared for transition home today 10/04/2024.  CM met with pt and provided with DC notice with DC today FRI 10/04/2024- pt agreeable and requested assistance with DC transport. CM has reached out to  leadership to facilitate assistance with DC transport. Pt requested for 1:30 PM pick-up to be arranged for him- sent request with Marisel Banks [phone]. Pt requested for surgical shoe which was provided to him. Pt also requested for crutches, CM relayed this to PT dept who after evaluating pt, deemed him appropriate for crutches and was provided with said durable medical equipment. Staff on unit apprised re: all above.

## 2024-10-04 NOTE — PROGRESS NOTE ADULT - SUBJECTIVE AND OBJECTIVE BOX
Chief Complaint: Foot wound    Interval Events: No events overnight.    Review of Systems:  General: No fevers, chills, weight gain  Skin: No rashes, color changes  Cardiovascular: No chest pain, orthopnea  Respiratory: No shortness of breath, cough  Gastrointestinal: No nausea, abdominal pain  Genitourinary: No incontinence, pain with urination  Musculoskeletal: No pain, swelling, decreased range of motion  Neurological: No headache, weakness  Psychiatric: No depression, anxiety  Endocrine: No weight gain, increased thirst  All other systems are comprehensively negative.    Physical Exam:  Vital Signs Last 24 Hrs  T(C): 36.8 (04 Oct 2024 05:16), Max: 37.7 (03 Oct 2024 14:32)  T(F): 98.2 (04 Oct 2024 05:16), Max: 99.8 (03 Oct 2024 14:32)  HR: 78 (04 Oct 2024 05:16) (78 - 91)  BP: 131/79 (04 Oct 2024 05:16) (117/73 - 131/79)  BP(mean): --  RR: 17 (04 Oct 2024 05:16) (16 - 17)  SpO2: 94% (04 Oct 2024 05:16) (94% - 94%)  Parameters below as of 04 Oct 2024 05:16  Patient On (Oxygen Delivery Method): room air  General: NAD  HEENT: MMM  Neck: No JVD, no carotid bruit  Lungs: CTAB  CV: RRR, nl S1/S2, no M/R/G  Abdomen: S/NT/ND, +BS  Extremities: No LE edema, no cyanosis  Neuro: AAOx3, non-focal  Skin: No rash    Labs:    10-03    131[L]  |  101  |  65[H]  ----------------------------<  168[H]  4.0   |  25  |  3.01[H]    Ca    9.0      03 Oct 2024 07:42    TPro  6.3  /  Alb  2.5[L]  /  TBili  0.6  /  DBili  x   /  AST  26  /  ALT  45  /  AlkPhos  113  10-03                        8.5    7.30  )-----------( 80       ( 03 Oct 2024 07:42 )             26.0       ECG/Telemetry: Sinus rhythm

## 2024-10-04 NOTE — DISCHARGE NOTE NURSING/CASE MANAGEMENT/SOCIAL WORK - NSDPDISTO_GEN_ALL_CORE
Home Problem: Patient Care Overview  Goal: Interprofessional Rounds/Family Conf  Outcome: Ongoing (interventions implemented as appropriate)

## 2024-10-04 NOTE — PROGRESS NOTE ADULT - SUBJECTIVE AND OBJECTIVE BOX
ANUJASUZANNA HURLEY is a 49yMale , patient examined and chart reviewed.    INTERVAL HPI/ OVERNIGHT EVENTS:   Afebrile. No events.    PAST MEDICAL & SURGICAL HISTORY:  Prediabetes  HTN (hypertension)  HLD (hyperlipidemia)  DM2 (diabetes mellitus, type 2)      For details regarding the patient's social history, family history, and other miscellaneous elements, please refer the initial infectious diseases consultation and/or the admitting history and physical examination for this admission.    ROS:  CONSTITUTIONAL:  no fever no chills  EYES:  Negative  blurry vision or double vision  CARDIOVASCULAR:  Negative for chest pain or palpitations  RESPIRATORY:  Negative for cough, wheezing, or SOB   GASTROINTESTINAL:  Negative for nausea, vomiting, diarrhea, constipation, or abdominal pain  GENITOURINARY:  Negative frequency, urgency or dysuria  NEUROLOGIC:  No headache, confusion, dizziness, lightheadedness  All other systems were reviewed and are negative     ALLERGIES  amoxicillin (Unknown)      Current inpatient medications :    ANTIBIOTICS/RELEVANT:  piperacillin/tazobactam IVPB.. 3.375 Gram(s) IV Intermittent every 8 hours    MEDICATIONS  (STANDING):  amLODIPine   Tablet 10 milliGRAM(s) Oral daily  carvedilol 6.25 milliGRAM(s) Oral every 12 hours  dextrose 5%. 1000 milliLiter(s) (50 mL/Hr) IV Continuous <Continuous>  dextrose 5%. 1000 milliLiter(s) (100 mL/Hr) IV Continuous <Continuous>  dextrose 50% Injectable 25 Gram(s) IV Push once  dextrose 50% Injectable 12.5 Gram(s) IV Push once  dextrose 50% Injectable 25 Gram(s) IV Push once  enoxaparin Injectable 40 milliGRAM(s) SubCutaneous every 24 hours  gabapentin 100 milliGRAM(s) Oral three times a day  glucagon  Injectable 1 milliGRAM(s) IntraMuscular once  influenza   Vaccine 0.5 milliLiter(s) IntraMuscular once  insulin glargine Injectable (LANTUS) 40 Unit(s) SubCutaneous at bedtime  insulin lispro (ADMELOG) corrective regimen sliding scale   SubCutaneous three times a day before meals  insulin lispro (ADMELOG) corrective regimen sliding scale   SubCutaneous at bedtime  insulin lispro Injectable (ADMELOG) 16 Unit(s) SubCutaneous three times a day before meals  lactobacillus acidophilus 1 Tablet(s) Oral two times a day with meals  mupirocin 2% Ointment 1 Application(s) Topical <User Schedule>    MEDICATIONS  (PRN):  acetaminophen     Tablet .. 650 milliGRAM(s) Oral every 6 hours PRN Temp greater or equal to 38C (100.4F), Mild Pain (1 - 3), Moderate Pain (4 - 6)  dextrose Oral Gel 15 Gram(s) Oral once PRN Blood Glucose LESS THAN 70 milliGRAM(s)/deciliter  diphenhydrAMINE 25 milliGRAM(s) Oral every 6 hours PRN Rash and/or Itching  guaiFENesin  milliGRAM(s) Oral every 12 hours PRN Cough  labetalol Injectable 10 milliGRAM(s) IV Push every 8 hours PRN Systolic blood pressure >180  melatonin 3 milliGRAM(s) Oral at bedtime PRN Insomnia      Objective:  Vital Signs Last 24 Hrs  T(C): 37.6 (04 Oct 2024 13:03), Max: 37.6 (03 Oct 2024 21:15)  T(F): 99.7 (04 Oct 2024 13:03), Max: 99.7 (04 Oct 2024 13:03)  HR: 90 (04 Oct 2024 13:03) (78 - 90)  BP: 127/94 (04 Oct 2024 13:03) (127/76 - 131/79)  RR: 16 (04 Oct 2024 13:03) (16 - 17)  SpO2: 98% (04 Oct 2024 13:03) (94% - 98%)    Parameters below as of 04 Oct 2024 13:03  Patient On (Oxygen Delivery Method): room air      Physical Exam:  General:  no acute distress  Neck: supple, trachea midline  Lungs: clear, no wheeze/rhonchi  Cardiovascular: regular rate and rhythm, S1 S2  Abdomen: soft, nontender,  bowel sounds normal  Neurological: alert and oriented x3  Skin: + rash  Extremities: Left foot amputation stump with some necrotic changes distal flap. Dorsal and plantar flap showing signs of  venous congestion.      LABS:                        8.5    7.30  )-----------( 80       ( 03 Oct 2024 07:42 )             26.0   10-03    131[L]  |  101  |  65[H]  ----------------------------<  168[H]  4.0   |  25  |  3.01[H]    Ca    9.0      03 Oct 2024 07:42    TPro  6.3  /  Alb  2.5[L]  /  TBili  0.6  /  DBili  x   /  AST  26  /  ALT  45  /  AlkPhos  113  10-03      MICROBIOLOGY:  Culture - Tissue with Gram Stain (collected 20 Sep 2024 19:21)  Source: Bone Other  Gram Stain (21 Sep 2024 06:32):    No polymorphonuclear cells seen per low power field    No organisms seen per oil power field  Preliminary Report (22 Sep 2024 08:47):    No growth to date    Culture - Fungal, Tissue (collected 20 Sep 2024 19:21)  Source: Bone Other  Preliminary Report (22 Sep 2024 07:58):    Testing in progress    Culture - Tissue with Gram Stain (09.20.24 @ 19:21)    Gram Stain:   No polymorphonuclear cells seen per low power field  No organisms seen per oil power field   -  Vancomycin: S 4   -  Ampicillin: S <=2 Predicts results to ampicillin/sulbactam, amoxacillin-clavulanate and  piperacillin-tazobactam.   Specimen Source: Bone Other   Culture Results:   Growth in fluid media only Enterococcus faecalis  Rare Staphylococcus epidermidis   Organism Identification: Enterococcus faecalis   Organism: Enterococcus faecalis   Method Type: STEPHANIE      Culture - Other (09.17.24 @ 19:10)   -  Amoxicillin/Clavulanic Acid: R >16/8   -  Ampicillin: R 16 These ampicillin results predict results for amoxicillin   -  Ampicillin: S <=2 Predicts results to ampicillin/sulbactam, amoxacillin-clavulanate and  piperacillin-tazobactam.   -  Ampicillin/Sulbactam: R 8/4   -  Aztreonam: S <=4   -  Cefazolin: R >16   -  Cefepime: S <=2   -  Cefoxitin: R >16   -  Ceftriaxone: S <=1 Enterobacter cloacae, Klebsiella aerogenes, and Citrobacter freundii may develop resistance during prolonged therapy.   -  Ciprofloxacin: S <=0.25   -  Ertapenem: S <=0.5   -  Gentamicin: S <=2   -  Imipenem: S <=1   -  Levofloxacin: S <=0.5   -  Meropenem: S <=1   -  Piperacillin/Tazobactam: S <=8 Treatment with Pipercillin/Tazobactam is not recommended in severe infections casued by Klebsiella aerogenes, Enterobacter cloacae complex, and Citrobacter freundii complex.   -  Tobramycin: S <=2   -  Trimethoprim/Sulfamethoxazole: S <=0.5/9.5   -  Vancomycin: S 2   Specimen Source: Wound Wound   Culture Results:   Numerous Enterobacter cloacae complex  Numerous Enterococcus faecalis  Few Coag Negative Staphylococcus "Susceptibilities not performed"   Organism Identification: Enterobacter cloacae complex  Enterococcus faecalis   Organism: Enterobacter cloacae complex   Organism: Enterococcus faecalis   Method Type: STEPHANIE   Method Type: STEPHANIE    Culture - Blood (collected 17 Sep 2024 18:13)  Source: .Blood Blood-Peripheral  Preliminary Report (19 Sep 2024 01:01):    No growth at 24 hours    Culture - Blood (collected 17 Sep 2024 18:13)  Source: .Blood Blood-Peripheral  Preliminary Report (19 Sep 2024 01:01):    No growth at 24 hours    RADIOLOGY & ADDITIONAL STUDIES:    ACC: [id number] EXAM:  MR FOOT LT   ORDERED BY: LISA ALY     PROCEDURE DATE:  09/19/2024          INTERPRETATION:  LEFT FOOT MRI    CLINICAL INDICATION: Diabetic foot ulcer, for evaluation of osteomyelitis.    COMPARISON: Radiographs dated 9/17/2024.    TECHNIQUE: Multiplanar, multisequence MRI was obtained of the left foot.    FINDINGS:    OSSEOUS: Acute on chronic osteomyelitis/septic arthritis at the proximal   interphalangeal joint of the second toe in the proper clinical setting.   Focal acute osteomyelitis along the plantar lateral cortex of the great   toe distal phalanx base with ill-defined overlying deep soft tissue   ulceration. Lisfranc interval is not widened on this non-weightbearing   examination.    TENDONS: Visualized flexor and extensor tendons are intact.    LIGAMENTS: Lisfranc ligament is intact.    GENERAL: No drainable encapsulated fluid collection. No significant   intermetatarsal bursal fluid distension. No interdigital neuroma. Mild to   moderate heterogeneous chronic muscular atrophy with superimposed patchy   denervation edema like signal.    IMPRESSION:    1.  Acute on chronic osteomyelitis/septic arthritis at the proximal   interphalangeal joint of left second toe in the proper clinical setting.  2.Focal acute osteomyelitis along the plantar lateral cortex of left   great toe distal phalanx base with overlying deep soft tissue ulceration.    Assessment :  48YO M PMH DM2, HTN, HLD, NEUROPATHY, PVD, NON COMPLIANT WITH TT, ETOH ABUSE, admitted with infected DM left great toe infection  MRI + OM  Sp Left great toe amputation 9/20  OR bone cultures noted with E Faecalis Enterobacter  Left foot amputation stump with some necrotic changes distal flap. Dorsal and plantar flap showing signs of  venous congestion.  Path with acute on chronic OM on prox clean margins  Per podiatry no plans for any further surgical intervention  Pt refused IV antibiotics due to work   Rash ?drug reaction sec Zosyn  Pt states rash is getting better -no pruritus  Clinically stable    Plan :   On Zosyn  Can change to po Augmentin 875mg bid till 11/01/24 on dc  Dm control  Trend temps and cbc  Pulm toileting  Monitor rash - to call if rash gets worse.  Stable from ID standpoint  Dc home today    Continue with present regiment.  Appropriate use of antibiotics and adverse effects reviewed.      > 35 minutes were spent in direct patient care reviewing notes, medications ,labs data/ imaging , discussion with multidisciplinary team.    Thank you for allowing me to participate in care of your patient .    Linsey Mack MD  Infectious Disease  [phone]

## 2024-10-04 NOTE — PROGRESS NOTE ADULT - ASSESSMENT
49-year-old male with past medical history of DM2, HTN, HLD, NEUROPATHY, PVD, NON COMPLIANT WITH TT, ETOH ABUSE, presents today due to left toe infection.  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  Patient experienced a blister to the left toe in which he eventually fell orders follow-up on its own.  Patient notes that the blister opened up and he developed some blood in the blister as well.  Patient notes that the blister scab has slightly fallen off but now his toe is red and painful.  Patient not currently on antibiotics.  Patient denies fever, injury, numbness, weakness, or any other complaints.   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), 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  C/w zosyn for now  vascular consult noted - no need for any vascular procedure   podiatry f up , noteed and case discussed with DR Davenport- No surg planned , will f up in Office      # DM2 with hyperglycemia, with neuropathy , nephropathy with likely PVD    ssi, consistent carb, Endo consult,  hba1c 10.2   Endo consult noted , started on lantus, 40 units qhsadmelog 14 units 3x/day before meals  # Ess HTN   amlodipine 5 mg, off losartan     DVT prophylaxis  ID , endo and podiatry consult  # LUISA with CKD 4   nephro consult,noted  likely Diabetic nephropathy with septic ATN  Hold ARB in short term and titrate other anti hypertensives as needed  Low K dietNo change anti hypertensives;  resume ACE/ARB on discharge  # HLD   low fat diet   # hyperkalemia  lokelma, resolved   nephro f up  # Anemia - w up done, likely of ch disease, multifactorial , hematology consulted, worsening with infection, hematology consult noted  # DVT prophylaxis  DC plan    diabetic teaching done

## 2024-10-04 NOTE — PROGRESS NOTE ADULT - PROVIDER SPECIALTY LIST ADULT
Cardiology
Endocrinology
Infectious Disease
Internal Medicine
Nephrology
Podiatry
Cardiology
Endocrinology
Infectious Disease
Internal Medicine
Nephrology
Nephrology
Podiatry
Cardiology
Endocrinology
Infectious Disease
Internal Medicine
Nephrology
Podiatry
Podiatry
Cardiology
Infectious Disease
Infectious Disease
Internal Medicine
Internal Medicine
Nephrology
Podiatry
Podiatry
Endocrinology
Endocrinology
Internal Medicine
Internal Medicine
Podiatry
Podiatry
Internal Medicine
Internal Medicine
Podiatry
Diabetes

## 2024-10-04 NOTE — PROGRESS NOTE ADULT - TIME BILLING
I have discussed care plan with patient ,expressed understanding of problems treatment and their effect and side effects, alternatives in detail,I have asked if they have any questions and concerns and appropriately addressed them to best of my ability  Reviewed all diagonostic tests, lab results and drug drug interactions, and medications

## 2024-10-04 NOTE — PROGRESS NOTE ADULT - REASON FOR ADMISSION
infected toe
infected toe with OM left hallux
infected toe

## 2024-10-04 NOTE — DISCHARGE NOTE NURSING/CASE MANAGEMENT/SOCIAL WORK - NSDCPEFALRISK_GEN_ALL_CORE
For information on Fall & Injury Prevention, visit: [email or web address] OR  [email or web address] OR  https://www.cdc.gov/steadi/patient.html

## 2024-10-04 NOTE — PROGRESS NOTE ADULT - SUBJECTIVE AND OBJECTIVE BOX
Patient is a 49y old  Male who presents with a chief complaint of infected toe (03 Oct 2024 14:37)      INTERVAL HPI/OVERNIGHT EVENTS:no ac event    Home Medications:  acetaminophen 325 mg oral tablet: 2 tab(s) orally every 6 hours As needed Temp greater or equal to 38C (100.4F), Mild Pain (1 - 3), Moderate Pain (4 - 6) (03 Oct 2024 13:11)      MEDICATIONS  (STANDING):  amLODIPine   Tablet 10 milliGRAM(s) Oral daily  carvedilol 6.25 milliGRAM(s) Oral every 12 hours  dextrose 5%. 1000 milliLiter(s) (100 mL/Hr) IV Continuous <Continuous>  dextrose 5%. 1000 milliLiter(s) (50 mL/Hr) IV Continuous <Continuous>  dextrose 50% Injectable 25 Gram(s) IV Push once  dextrose 50% Injectable 12.5 Gram(s) IV Push once  dextrose 50% Injectable 25 Gram(s) IV Push once  enoxaparin Injectable 40 milliGRAM(s) SubCutaneous every 24 hours  gabapentin 100 milliGRAM(s) Oral three times a day  glucagon  Injectable 1 milliGRAM(s) IntraMuscular once  influenza   Vaccine 0.5 milliLiter(s) IntraMuscular once  insulin glargine Injectable (LANTUS) 40 Unit(s) SubCutaneous at bedtime  insulin lispro (ADMELOG) corrective regimen sliding scale   SubCutaneous three times a day before meals  insulin lispro (ADMELOG) corrective regimen sliding scale   SubCutaneous at bedtime  insulin lispro Injectable (ADMELOG) 16 Unit(s) SubCutaneous three times a day before meals  lactobacillus acidophilus 1 Tablet(s) Oral two times a day with meals  mupirocin 2% Ointment 1 Application(s) Topical <User Schedule>  piperacillin/tazobactam IVPB.. 3.375 Gram(s) IV Intermittent every 8 hours    MEDICATIONS  (PRN):  acetaminophen     Tablet .. 650 milliGRAM(s) Oral every 6 hours PRN Temp greater or equal to 38C (100.4F), Mild Pain (1 - 3), Moderate Pain (4 - 6)  dextrose Oral Gel 15 Gram(s) Oral once PRN Blood Glucose LESS THAN 70 milliGRAM(s)/deciliter  diphenhydrAMINE 25 milliGRAM(s) Oral every 6 hours PRN Rash and/or Itching  guaiFENesin  milliGRAM(s) Oral every 12 hours PRN Cough  labetalol Injectable 10 milliGRAM(s) IV Push every 8 hours PRN Systolic blood pressure >180  melatonin 3 milliGRAM(s) Oral at bedtime PRN Insomnia      Allergies    No Known Allergies    Intolerances        REVIEW OF SYSTEMS:  CONSTITUTIONAL: No fever, weight loss, or fatigue  EYES: No eye pain, visual disturbances, or discharge  ENMT:  No difficulty hearing, tinnitus, vertigo; No sinus or throat pain  NECK: No pain or stiffness  BREASTS: No pain, masses, or nipple discharge  RESPIRATORY: No cough, wheezing, chills or hemoptysis; No shortness of breath  CARDIOVASCULAR: No chest pain, palpitations, dizziness, or leg swelling  GASTROINTESTINAL: No abdominal or epigastric pain. No nausea, vomiting, or hematemesis; No diarrhea or constipation. No melena or hematochezia.  GENITOURINARY: No dysuria, frequency, hematuria, or incontinence  NEUROLOGICAL: No headaches, memory loss, loss of strength, numbness, or tremors  SKIN: No itching, burning, rashes, or lesions   LYMPH NODES: No enlarged glands  ENDOCRINE: No heat or cold intolerance; No hair loss  MUSCULOSKELETAL: foot pain  Vital Signs Last 24 Hrs  T(C): 36.8 (04 Oct 2024 05:16), Max: 37.7 (03 Oct 2024 14:32)  T(F): 98.2 (04 Oct 2024 05:16), Max: 99.8 (03 Oct 2024 14:32)  HR: 78 (04 Oct 2024 05:16) (78 - 91)  BP: 131/79 (04 Oct 2024 05:16) (117/73 - 131/79)  BP(mean): --  RR: 17 (04 Oct 2024 05:16) (16 - 17)  SpO2: 94% (04 Oct 2024 05:16) (94% - 94%)    Parameters below as of 04 Oct 2024 05:16  Patient On (Oxygen Delivery Method): room air        PHYSICAL EXAM:  GENERAL:  well-groomed, well-developed  HEAD:  Atraumatic, Normocephalic  EYES: EOMI, PERRLA, conjunctiva and sclera clear  ENMT: Moist mucous membranes,   NECK: Supple, No JVD, Normal thyroid  NERVOUS SYSTEM:  Alert & Oriented X3, Good concentration; Motor Strength 5/5 B/L upper and lower extremities; DTRs 2+ intact and symmetric  CHEST/LUNG: Clear to percussion bilaterally; HEART: Regular rate and rhythm;   ABDOMEN: Soft, Nontender, Nondistended; Bowel sounds present  EXTREMITIES:  2+ Peripheral Pulses, No clubbing, cyanosis, or edema, foot in dressing  SKIN: No rashes or lesions    LABS:                        8.5    7.30  )-----------( 80       ( 03 Oct 2024 07:42 )             26.0     10-03    131[L]  |  101  |  65[H]  ----------------------------<  168[H]  4.0   |  25  |  3.01[H]    Ca    9.0      03 Oct 2024 07:42    TPro  6.3  /  Alb  2.5[L]  /  TBili  0.6  /  DBili  x   /  AST  26  /  ALT  45  /  AlkPhos  113  10-03      Urinalysis Basic - ( 03 Oct 2024 07:42 )    Color: x / Appearance: x / SG: x / pH: x  Gluc: 168 mg/dL / Ketone: x  / Bili: x / Urobili: x   Blood: x / Protein: x / Nitrite: x   Leuk Esterase: x / RBC: x / WBC x   Sq Epi: x / Non Sq Epi: x / Bacteria: x      CAPILLARY BLOOD GLUCOSE      POCT Blood Glucose.: 169 mg/dL (04 Oct 2024 08:02)  POCT Blood Glucose.: 216 mg/dL (03 Oct 2024 21:01)  POCT Blood Glucose.: 184 mg/dL (03 Oct 2024 17:14)          I&O's Summary    03 Oct 2024 07:01  -  04 Oct 2024 07:00  --------------------------------------------------------  IN: 870 mL / OUT: 0 mL / NET: 870 mL        RADIOLOGY & ADDITIONAL TESTS:    Imaging Personally Reviewed:  [ x] YES  [ ] NO    Consultant(s) Notes Reviewed:  [ x] YES  [ ] NO    Care Discussed with Consultants/Other Providers [ x] YES  [ ] NO

## 2024-10-04 NOTE — PROGRESS NOTE ADULT - ASSESSMENT
The patient is a 49 year old male with a history of HTN, HL, DM, PAD who presents with a toe infection.    Plan:  - ECG with sinus rhythm and no evidence of ischemia/infarction  - Off of losartan  - Continue amlodipine 10 mg daily  - Continue carvedilol 6.25 mg bid  - LE arterial duplex with no significant stenosis  - MRI foot with acute/chronic osteomyelitis  - Path margins with +osteomyelitis  - Podiatry and ID follow-up  - Discharge planning

## 2024-10-19 LAB
CULTURE RESULTS: SIGNIFICANT CHANGE UP
SPECIMEN SOURCE: SIGNIFICANT CHANGE UP

## 2024-11-01 ENCOUNTER — INPATIENT (INPATIENT)
Facility: HOSPITAL | Age: 49
LOS: 2 days | Discharge: LEFT BEFORE DISCHARGE | DRG: 639 | End: 2024-11-04
Attending: INTERNAL MEDICINE | Admitting: INTERNAL MEDICINE
Payer: MEDICAID

## 2024-11-01 VITALS
WEIGHT: 199.96 LBS | SYSTOLIC BLOOD PRESSURE: 180 MMHG | RESPIRATION RATE: 16 BRPM | TEMPERATURE: 98 F | OXYGEN SATURATION: 94 % | HEART RATE: 99 BPM | HEIGHT: 69 IN | DIASTOLIC BLOOD PRESSURE: 92 MMHG

## 2024-11-01 DIAGNOSIS — E11.621 TYPE 2 DIABETES MELLITUS WITH FOOT ULCER: ICD-10-CM

## 2024-11-01 DIAGNOSIS — E11.628 TYPE 2 DIABETES MELLITUS WITH OTHER SKIN COMPLICATIONS: ICD-10-CM

## 2024-11-01 LAB
ALBUMIN SERPL ELPH-MCNC: 3.2 G/DL — LOW (ref 3.3–5)
ALP SERPL-CCNC: 160 U/L — HIGH (ref 30–120)
ALT FLD-CCNC: 34 U/L — SIGNIFICANT CHANGE UP (ref 10–60)
AMPHET UR-MCNC: NEGATIVE — SIGNIFICANT CHANGE UP
ANION GAP SERPL CALC-SCNC: 10 MMOL/L — SIGNIFICANT CHANGE UP (ref 5–17)
APPEARANCE UR: CLEAR — SIGNIFICANT CHANGE UP
APTT BLD: 33.6 SEC — SIGNIFICANT CHANGE UP (ref 24.5–35.6)
AST SERPL-CCNC: 52 U/L — HIGH (ref 10–40)
BACTERIA # UR AUTO: NEGATIVE /HPF — SIGNIFICANT CHANGE UP
BARBITURATES UR SCN-MCNC: NEGATIVE — SIGNIFICANT CHANGE UP
BASOPHILS # BLD AUTO: 0.09 K/UL — SIGNIFICANT CHANGE UP (ref 0–0.2)
BASOPHILS NFR BLD AUTO: 1.4 % — SIGNIFICANT CHANGE UP (ref 0–2)
BENZODIAZ UR-MCNC: NEGATIVE — SIGNIFICANT CHANGE UP
BILIRUB SERPL-MCNC: 0.5 MG/DL — SIGNIFICANT CHANGE UP (ref 0.2–1.2)
BILIRUB UR-MCNC: NEGATIVE — SIGNIFICANT CHANGE UP
BLD GP AB SCN SERPL QL: SIGNIFICANT CHANGE UP
BUN SERPL-MCNC: 20 MG/DL — SIGNIFICANT CHANGE UP (ref 7–23)
CALCIUM SERPL-MCNC: 8.5 MG/DL — SIGNIFICANT CHANGE UP (ref 8.4–10.5)
CHLORIDE SERPL-SCNC: 100 MMOL/L — SIGNIFICANT CHANGE UP (ref 96–108)
CO2 SERPL-SCNC: 25 MMOL/L — SIGNIFICANT CHANGE UP (ref 22–31)
COCAINE METAB.OTHER UR-MCNC: NEGATIVE — SIGNIFICANT CHANGE UP
COLOR SPEC: YELLOW — SIGNIFICANT CHANGE UP
CREAT SERPL-MCNC: 1.89 MG/DL — HIGH (ref 0.5–1.3)
DIFF PNL FLD: ABNORMAL
EGFR: 43 ML/MIN/1.73M2 — LOW
EOSINOPHIL # BLD AUTO: 0.29 K/UL — SIGNIFICANT CHANGE UP (ref 0–0.5)
EOSINOPHIL NFR BLD AUTO: 4.6 % — SIGNIFICANT CHANGE UP (ref 0–6)
EPI CELLS # UR: SIGNIFICANT CHANGE UP
ETHANOL SERPL-MCNC: 332 MG/DL — HIGH (ref 0–3)
GLUCOSE BLDC GLUCOMTR-MCNC: 99 MG/DL — SIGNIFICANT CHANGE UP (ref 70–99)
GLUCOSE SERPL-MCNC: 104 MG/DL — HIGH (ref 70–99)
GLUCOSE UR QL: NEGATIVE MG/DL — SIGNIFICANT CHANGE UP
HCT VFR BLD CALC: 31.3 % — LOW (ref 39–50)
HGB BLD-MCNC: 10.4 G/DL — LOW (ref 13–17)
IMM GRANULOCYTES NFR BLD AUTO: 0.5 % — SIGNIFICANT CHANGE UP (ref 0–0.9)
INR BLD: 1.01 RATIO — SIGNIFICANT CHANGE UP (ref 0.85–1.16)
KETONES UR-MCNC: NEGATIVE MG/DL — SIGNIFICANT CHANGE UP
LACTATE SERPL-SCNC: 1.1 MMOL/L — SIGNIFICANT CHANGE UP (ref 0.7–2)
LEUKOCYTE ESTERASE UR-ACNC: NEGATIVE — SIGNIFICANT CHANGE UP
LYMPHOCYTES # BLD AUTO: 1.77 K/UL — SIGNIFICANT CHANGE UP (ref 1–3.3)
LYMPHOCYTES # BLD AUTO: 27.8 % — SIGNIFICANT CHANGE UP (ref 13–44)
MCHC RBC-ENTMCNC: 28.5 PG — SIGNIFICANT CHANGE UP (ref 27–34)
MCHC RBC-ENTMCNC: 33.2 G/DL — SIGNIFICANT CHANGE UP (ref 32–36)
MCV RBC AUTO: 85.8 FL — SIGNIFICANT CHANGE UP (ref 80–100)
METHADONE UR-MCNC: NEGATIVE — SIGNIFICANT CHANGE UP
MONOCYTES # BLD AUTO: 0.41 K/UL — SIGNIFICANT CHANGE UP (ref 0–0.9)
MONOCYTES NFR BLD AUTO: 6.4 % — SIGNIFICANT CHANGE UP (ref 2–14)
NEUTROPHILS # BLD AUTO: 3.78 K/UL — SIGNIFICANT CHANGE UP (ref 1.8–7.4)
NEUTROPHILS NFR BLD AUTO: 59.3 % — SIGNIFICANT CHANGE UP (ref 43–77)
NITRITE UR-MCNC: NEGATIVE — SIGNIFICANT CHANGE UP
NRBC # BLD: 0 /100 WBCS — SIGNIFICANT CHANGE UP (ref 0–0)
OPIATES UR-MCNC: NEGATIVE — SIGNIFICANT CHANGE UP
PCP SPEC-MCNC: SIGNIFICANT CHANGE UP
PCP UR-MCNC: NEGATIVE — SIGNIFICANT CHANGE UP
PH UR: 5.5 — SIGNIFICANT CHANGE UP (ref 5–8)
PLATELET # BLD AUTO: 126 K/UL — LOW (ref 150–400)
POTASSIUM SERPL-MCNC: 4.1 MMOL/L — SIGNIFICANT CHANGE UP (ref 3.5–5.3)
POTASSIUM SERPL-SCNC: 4.1 MMOL/L — SIGNIFICANT CHANGE UP (ref 3.5–5.3)
PROT SERPL-MCNC: 7.9 G/DL — SIGNIFICANT CHANGE UP (ref 6–8.3)
PROT UR-MCNC: 300 MG/DL
PROTHROM AB SERPL-ACNC: 11.9 SEC — SIGNIFICANT CHANGE UP (ref 9.9–13.4)
RBC # BLD: 3.65 M/UL — LOW (ref 4.2–5.8)
RBC # FLD: 13.6 % — SIGNIFICANT CHANGE UP (ref 10.3–14.5)
RBC CASTS # UR COMP ASSIST: 2 /HPF — SIGNIFICANT CHANGE UP (ref 0–4)
SODIUM SERPL-SCNC: 135 MMOL/L — SIGNIFICANT CHANGE UP (ref 135–145)
SP GR SPEC: 1 — SIGNIFICANT CHANGE UP (ref 1–1.03)
THC UR QL: NEGATIVE — SIGNIFICANT CHANGE UP
UROBILINOGEN FLD QL: 0.2 MG/DL — SIGNIFICANT CHANGE UP (ref 0.2–1)
WBC # BLD: 6.37 K/UL — SIGNIFICANT CHANGE UP (ref 3.8–10.5)
WBC # FLD AUTO: 6.37 K/UL — SIGNIFICANT CHANGE UP (ref 3.8–10.5)
WBC UR QL: 0 /HPF — SIGNIFICANT CHANGE UP (ref 0–5)

## 2024-11-01 PROCEDURE — 93010 ELECTROCARDIOGRAM REPORT: CPT

## 2024-11-01 PROCEDURE — 71045 X-RAY EXAM CHEST 1 VIEW: CPT | Mod: 26

## 2024-11-01 PROCEDURE — 99285 EMERGENCY DEPT VISIT HI MDM: CPT

## 2024-11-01 PROCEDURE — 73630 X-RAY EXAM OF FOOT: CPT | Mod: 26,LT

## 2024-11-01 PROCEDURE — 73700 CT LOWER EXTREMITY W/O DYE: CPT | Mod: 26,LT,MC

## 2024-11-01 RX ORDER — B-COMPLEX WITH VITAMIN C
1 VIAL (ML) INJECTION DAILY
Refills: 0 | Status: DISCONTINUED | OUTPATIENT
Start: 2024-11-01 | End: 2024-11-04

## 2024-11-01 RX ORDER — PIPERACILLIN AND TAZOBACTAM .5; 4 G/20ML; G/20ML
3.38 INJECTION, POWDER, LYOPHILIZED, FOR SOLUTION INTRAVENOUS ONCE
Refills: 0 | Status: COMPLETED | OUTPATIENT
Start: 2024-11-01 | End: 2024-11-01

## 2024-11-01 RX ORDER — LACTOBACILLUS ACIDOPHILUS 25MM CELL
1 CAPSULE ORAL
Refills: 0 | Status: DISCONTINUED | OUTPATIENT
Start: 2024-11-01 | End: 2024-11-04

## 2024-11-01 RX ORDER — INSULIN LISPRO 100/ML
VIAL (ML) SUBCUTANEOUS AT BEDTIME
Refills: 0 | Status: DISCONTINUED | OUTPATIENT
Start: 2024-11-02 | End: 2024-11-04

## 2024-11-01 RX ORDER — FOLIC ACID 1 MG/1
1 TABLET ORAL DAILY
Refills: 0 | Status: DISCONTINUED | OUTPATIENT
Start: 2024-11-01 | End: 2024-11-04

## 2024-11-01 RX ORDER — LORAZEPAM 2 MG
2 TABLET ORAL EVERY 4 HOURS
Refills: 0 | Status: DISCONTINUED | OUTPATIENT
Start: 2024-11-01 | End: 2024-11-02

## 2024-11-01 RX ORDER — INSULIN LISPRO 100/ML
VIAL (ML) SUBCUTANEOUS
Refills: 0 | Status: DISCONTINUED | OUTPATIENT
Start: 2024-11-01 | End: 2024-11-04

## 2024-11-01 RX ORDER — CARVEDILOL 25 MG/1
6.25 TABLET, FILM COATED ORAL EVERY 12 HOURS
Refills: 0 | Status: DISCONTINUED | OUTPATIENT
Start: 2024-11-01 | End: 2024-11-04

## 2024-11-01 RX ORDER — ACETAMINOPHEN 500 MG
650 TABLET ORAL EVERY 6 HOURS
Refills: 0 | Status: DISCONTINUED | OUTPATIENT
Start: 2024-11-01 | End: 2024-11-04

## 2024-11-01 RX ORDER — INSULIN GLARGINE,HUM.REC.ANLOG 100/ML
20 VIAL (ML) SUBCUTANEOUS AT BEDTIME
Refills: 0 | Status: DISCONTINUED | OUTPATIENT
Start: 2024-11-02 | End: 2024-11-04

## 2024-11-01 RX ORDER — HEPARIN SODIUM 10000 [USP'U]/ML
5000 INJECTION INTRAVENOUS; SUBCUTANEOUS EVERY 12 HOURS
Refills: 0 | Status: DISCONTINUED | OUTPATIENT
Start: 2024-11-01 | End: 2024-11-04

## 2024-11-01 RX ORDER — INSULIN LISPRO 100/ML
7 VIAL (ML) SUBCUTANEOUS
Refills: 0 | Status: DISCONTINUED | OUTPATIENT
Start: 2024-11-02 | End: 2024-11-04

## 2024-11-01 RX ORDER — VANCOMYCIN HYDROCHLORIDE 50 MG/ML
1000 KIT ORAL EVERY 12 HOURS
Refills: 0 | Status: DISCONTINUED | OUTPATIENT
Start: 2024-11-02 | End: 2024-11-03

## 2024-11-01 RX ORDER — GLUCAGON INJECTION, SOLUTION 1 MG/.2ML
1 INJECTION, SOLUTION SUBCUTANEOUS ONCE
Refills: 0 | Status: DISCONTINUED | OUTPATIENT
Start: 2024-11-01 | End: 2024-11-04

## 2024-11-01 RX ORDER — INSULIN LISPRO 100/ML
7 VIAL (ML) SUBCUTANEOUS
Refills: 0 | Status: DISCONTINUED | OUTPATIENT
Start: 2024-11-01 | End: 2024-11-04

## 2024-11-01 RX ORDER — PIPERACILLIN AND TAZOBACTAM .5; 4 G/20ML; G/20ML
3.38 INJECTION, POWDER, LYOPHILIZED, FOR SOLUTION INTRAVENOUS EVERY 8 HOURS
Refills: 0 | Status: DISCONTINUED | OUTPATIENT
Start: 2024-11-02 | End: 2024-11-04

## 2024-11-01 RX ORDER — VANCOMYCIN HYDROCHLORIDE 50 MG/ML
1000 KIT ORAL ONCE
Refills: 0 | Status: COMPLETED | OUTPATIENT
Start: 2024-11-01 | End: 2024-11-01

## 2024-11-01 RX ORDER — AMLODIPINE BESYLATE 10 MG
10 TABLET ORAL DAILY
Refills: 0 | Status: DISCONTINUED | OUTPATIENT
Start: 2024-11-01 | End: 2024-11-04

## 2024-11-01 RX ORDER — THIAMINE HCL 100 MG
100 TABLET ORAL DAILY
Refills: 0 | Status: COMPLETED | OUTPATIENT
Start: 2024-11-01 | End: 2024-11-04

## 2024-11-01 RX ORDER — GABAPENTIN 300 MG/1
100 CAPSULE ORAL THREE TIMES A DAY
Refills: 0 | Status: DISCONTINUED | OUTPATIENT
Start: 2024-11-01 | End: 2024-11-04

## 2024-11-01 RX ADMIN — PIPERACILLIN AND TAZOBACTAM 3.38 GRAM(S): .5; 4 INJECTION, POWDER, LYOPHILIZED, FOR SOLUTION INTRAVENOUS at 21:14

## 2024-11-01 RX ADMIN — PIPERACILLIN AND TAZOBACTAM 200 GRAM(S): .5; 4 INJECTION, POWDER, LYOPHILIZED, FOR SOLUTION INTRAVENOUS at 20:44

## 2024-11-01 RX ADMIN — VANCOMYCIN HYDROCHLORIDE 250 MILLIGRAM(S): KIT at 21:59

## 2024-11-01 RX ADMIN — VANCOMYCIN HYDROCHLORIDE 1000 MILLIGRAM(S): KIT at 22:59

## 2024-11-01 NOTE — CONSULT NOTE ADULT - PROBLEM SELECTOR RECOMMENDATION 9
Chart reviewed and Patient evaluated  Discussed diagnosis and treatment with patient  There is concern for open diabetic foot infection left foot with expose metatarsal head  Wound flushed with copious amounts of normal saline betadine mixture  Obtained wound culture to be sent to Pathology  Xray reviewed left foot, no obvious or acute fracture pathology  CT left foot reviewed, no obvious or acute fracture pathology  Applied dry sterile dressing  Rec with IV antibiotics As Per ID  Rec elevate left lower extremity  Rec admit to hospitalist team  NWB left lower extremity  Discussed importance of daily foot examinations and proper shoe gear and to importance of lower Fasting Blood Glucose levels.   Patient understands he is at risk for a further proximal amputation, and is at risk to lose part of the foot, all of the foot, bka, sepsis, loss of life.  Will allow demarcation of infective process  Podiatry will follow while in house.   will discuss care plan  with all  Attendings  Thank you for consult Chart reviewed and Patient evaluated  Discussed diagnosis and treatment with patient  There is concern for open diabetic foot infection left foot with expose metatarsal head  Wound flushed with copious amounts of normal saline betadine mixture  Obtained wound culture to be sent to Pathology  Xray reviewed left foot, no obvious or acute fracture pathology  CT left foot reviewed, no obvious or acute fracture pathology  Applied dry sterile dressing  Rec with IV antibiotics As Per ID  Rec elevate left lower extremity  Rec admit to hospitalist team  NWB left lower extremity  Discussed importance of daily foot examinations and proper shoe gear and to importance of lower Fasting Blood Glucose levels.   Patient understands he is at risk for a further proximal amputation, and is at risk to lose part of the foot, all of the foot, bka, sepsis, loss of life.  Will allow demarcation of infective process  will consider MRI to better evaluate for OM  Podiatry will follow while in house.   will discuss care plan  with all  Attendings  Thank you for consult

## 2024-11-01 NOTE — ED PROVIDER NOTE - CARE PLAN
1 Principal Discharge DX:	Diabetic ulcer of left foot   Principal Discharge DX:	Diabetic ulcer of left foot  Secondary Diagnosis:	Osteomyelitis of left foot

## 2024-11-01 NOTE — ED ADULT NURSE REASSESSMENT NOTE - NS ED NURSE REASSESS COMMENT FT1
pt previously stated he does not drink alcohol, when asked again pt states he had drank yesterday but does not know how many drinks  MD Jhon post

## 2024-11-01 NOTE — ED ADULT NURSE NOTE - CHIEF COMPLAINT QUOTE
left foot infection presents with no shoes
Simple: Patient demonstrates quick and easy understanding/Verbalized Understanding

## 2024-11-01 NOTE — H&P ADULT - ASSESSMENT
49-year-old male with past medical history of DM2, HTN, HLD, NEUROPATHY, PVD,ckd4, ETOH abuser,  NON COMPLIANT WITH TT, recently discharged after tt for , Diabetic foot ulcer with cellullitis ,ac on ch OM with sepstic arthritis amputation of of 2nd left toe, OM left great toe-,surg done by podiatry,9/20culture of wound E fecalis, path OM , with clear margins, pt was discharged on PO abx as pt refused IV, due to his job.came back to ED today ,Patient states that since being discharged, he has not followed up with podiatry.  He has been on amoxicillin twice daily and states he has been compliant with his antibiotic.  Patient noted today that his surgical site had developed a bad odor with some discharge.  He denies fever, pain, surrounding redness to his foot or lower extremity. Patient states last alcoholic drink was yesterday.  Patient arrived in ED with no shoes on.  PMD Dr. Masters, he admits to not seeing her for over 2 years.   IN ED afebrile, with No fever, no leucocytosis, has foul smelly discharge had Podiatry consult in ED-iabetic foot infection left foot with expose metatarsal headPt had ALcohol level of 332, and has elevated LFT.pt started on vanc zosyn and admitted  #Diabetic foot ulcer with cellullitis with ac on ch osteomyelitis   started on IV abx,ID and podiatry consult   ## DM2 with hyperglycemia, with neuropathy , nephropathy with likely PVD    ssi, consistent carb, Endo consult,    # Ess HTN   continue current tt    # DVT prophylaxis  ID , endo and podiatry consult  #  CKD 3   nephro consult  likely Diabetic nephropathy   # HLD   low fat diet  # ETOH abuse  cessation counselling,addiction medicine consult   folic acid, thiamine, lorazepam prn for withdrawl, presently high etoh level 49-year-old male with past medical history of DM2, HTN, HLD, NEUROPATHY, PVD,ckd4, ETOH abuser,  NON COMPLIANT WITH TT, recently discharged after tt for , Diabetic foot ulcer with cellullitis ,ac on ch OM with sepstic arthritis amputation of of 2nd left toe, OM left great toe-,surg done by podiatry,9/20culture of wound E fecalis, path OM , with clear margins, pt was discharged on PO abx as pt refused IV, due to his job.came back to ED today ,Patient states that since being discharged, he has not followed up with podiatry.  He has been on amoxicillin twice daily and states he has been compliant with his antibiotic.  Patient noted today that his surgical site had developed a bad odor with some discharge.  He denies fever, pain, surrounding redness to his foot or lower extremity. Patient states last alcoholic drink was yesterday.  Patient arrived in ED with no shoes on.  PMD Dr. Masters, he admits to not seeing her for over 2 years.   IN ED afebrile, with No fever, no leucocytosis, has foul smelly discharge had Podiatry consult in ED-iabetic foot infection left foot with expose metatarsal headPt had ALcohol level of 332, and has elevated LFT.pt started on vanc zosyn and admitted  #Diabetic foot ulcer with cellullitis with ac on ch osteomyelitis   started on IV abx,ID and podiatry consult   ## DM2 with hyperglycemia, with neuropathy , nephropathy with likely PVD    ssi, consistent carb, Endo consult,    # Ess HTN   continue current tt    # DVT prophylaxis  ID , endo and podiatry consult  #  CKD 3   nephro consult  likely Diabetic nephropathy   # HLD   low fat diet  # ETOH abuse  cessation counselling,addiction medicine consult   folic acid, thiamine, lorazepam prn for withdrawl, presently high etoh level

## 2024-11-01 NOTE — ED PROVIDER NOTE - OBJECTIVE STATEMENT
49-year-old male with a history of IDDM, HTN, HLD, neuropathy, EtOH abuse presents with left foot infection.  Patient was admitted to [facility] September 17th- October 4 for a diabetic foot ulcer with cellulitis.  MR of the foot revealed left first toe osteomyelitis and septic arthritis.  He had an amputation of his first toe on September 20th.  Patient states that since being discharged, he has not followed up with podiatry.  He has been on amoxicillin twice daily and states he has been compliant with his antibiotic.  Patient noted today that his surgical site had developed a bad odor with some discharge.  He denies fever, pain, surrounding redness to his foot or lower extremity. Last alcoholic drink was a few months ago.  PMD Dr. Masters, he admits to not seeing her for over 2 years. 49-year-old male with a history of IDDM, HTN, HLD, neuropathy, EtOH abuse presents with left foot infection.  Patient was admitted to [facility] September 17th- October 4 for a diabetic foot ulcer with cellulitis.  MR of the foot revealed left first toe osteomyelitis and septic arthritis.  He had an amputation of his first toe on September 20th.  Patient states that since being discharged, he has not followed up with podiatry.  He has been on amoxicillin twice daily and states he has been compliant with his antibiotic.  Patient noted today that his surgical site had developed a bad odor with some discharge.  He denies fever, pain, surrounding redness to his foot or lower extremity. Patient states last alcoholic drink was a few months ago.  PMD Dr. Masters, he admits to not seeing her for over 2 years. 49-year-old male with a history of IDDM, HTN, HLD, neuropathy, EtOH abuse presents with left foot infection.  Patient was admitted to [place] September 17th- October 4 for a diabetic foot ulcer with cellulitis.  MR of the foot revealed left first toe osteomyelitis and septic arthritis.  He had an amputation of his first toe on September 20th.  Patient states that since being discharged, he has not followed up with podiatry.  He has been on amoxicillin twice daily and states he has been compliant with his antibiotic.  Patient noted today that his surgical site had developed a bad odor with some discharge.  He denies fever, pain, surrounding redness to his foot or lower extremity. Patient states last alcoholic drink was a few months ago.  Patient arrived in ED with no shoes on.  PMD Dr. Masters, he admits to not seeing her for over 2 years. 49-year-old male with a history of IDDM, HTN, HLD, neuropathy, EtOH abuse presents with left foot infection.  Patient was admitted to [place] September 17th- October 4 for a diabetic foot ulcer with cellulitis.  MR of the foot revealed left first toe osteomyelitis and septic arthritis.  He had an amputation of his first toe on September 20th.  Patient states that since being discharged, he has not followed up with podiatry.  He has been on amoxicillin twice daily and states he has been compliant with his antibiotic.  Patient noted today that his surgical site had developed a bad odor with some discharge.  He denies fever, pain, surrounding redness to his foot or lower extremity. Patient states last alcoholic drink was yesterday.  Patient arrived in ED with no shoes on.  PMD Dr. Masters, he admits to not seeing her for over 2 years.

## 2024-11-01 NOTE — ED PROVIDER NOTE - MUSCULOSKELETAL, MLM
Spine appears normal, range of motion is not limited,  Left foot with deep ulceration at stump of hallux.  Wound dehiscence with exposed bone, +surrounding edema and erythema, +malodorous with scant discharge.  No surrounding streaking, no abscess.  +Pulses

## 2024-11-01 NOTE — ED PROVIDER NOTE - WR ORDER DATE AND TIME 2
[employer] message was sent to patient.  Left message on cell to call with any questions.   01-Nov-2024 19:49

## 2024-11-01 NOTE — CONSULT NOTE ADULT - SUBJECTIVE AND OBJECTIVE BOX
S : 49y year old Male seen at bedside for diabetic foot infection left foot    Chief Complaint : Patient is a 49y old  Male who presents with a chief complaint of left foot diabetic infection  HPI : ·  left foot infection presents with no shoes. Pt arrived with dirty socks and left foot big toe with dry blood. Pt stated it "amputated 4 weeks ago." Pt states it was infected.  Unable to give much details. Pt appears to be intoxicated.          Patient admits to  (-) Fevers, (-) Chills, (-) Nausea, (-) Vomiting, (-) Shortness of Breath      PMH: Prediabetes    HTN (hypertension)    HLD (hyperlipidemia)    DM2 (diabetes mellitus, type 2)      PSH:    Allergies:No Known Allergies      Labs:                          10.4   6.37  )-----------( 126      ( 01 Nov 2024 20:00 )             31.3     WBC Trend  6.37 Date (11-01 @ 20:00)      Chem  11-01    135  |  100  |  20  ----------------------------<  104[H]  4.1   |  25  |  1.89[H]    Ca    8.5      01 Nov 2024 20:00    TPro  7.9  /  Alb  3.2[L]  /  TBili  0.5  /  DBili  x   /  AST  52[H]  /  ALT  34  /  AlkPhos  160[H]  11-01          T(F): 98.2 (11-01-24 @ 18:43), Max: 98.2 (11-01-24 @ 18:43)  HR: 99 (11-01-24 @ 18:43) (99 - 99)  BP: 180/92 (11-01-24 @ 18:43) (180/92 - 180/92)  RR: 16 (11-01-24 @ 18:43) (16 - 16)  SpO2: 94% (11-01-24 @ 18:43) (94% - 94%)  Wt(kg): --    O:   General: Pleasant  male NAD & AOX3.    Integument:  Skin warm, dry bilateral.    Noted ulcer left 1st mpj with exposed metatarsal head, hx of left hallux amputation with dehiscence, + edema, + erythema, + probe to bone, - purulent drainage, + tracking  Vascular: Dorsalis Pedis and Posterior Tibial pulses 1/4.  Capillary re-fill time less then 3 seconds digits 1-5 bilateral.    Neuro: Protective sensation diminished to the level of the digits bilateral.  MSK: Muscle strength 5/5 all major muscle groups bilateral. Noted hx of left hallux amputation       S : 49y year old Male seen at bedside for diabetic foot infection left foot    Chief Complaint : Patient is a 49y old  Male who presents with a chief complaint of left foot diabetic infection  HPI : ·  left foot infection presents with no shoes. Pt arrived with dirty socks and left foot big toe with dry blood. Pt stated it "amputated 4 weeks ago." Pt states it was infected.  Unable to give much details. Pt appears to be intoxicated.          Patient admits to  (-) Fevers, (-) Chills, (-) Nausea, (-) Vomiting, (-) Shortness of Breath      PMH: Prediabetes    HTN (hypertension)    HLD (hyperlipidemia)    DM2 (diabetes mellitus, type 2)      PSH:    Allergies:No Known Allergies      Labs:                          10.4   6.37  )-----------( 126      ( 01 Nov 2024 20:00 )             31.3     WBC Trend  6.37 Date (11-01 @ 20:00)      Chem  11-01    135  |  100  |  20  ----------------------------<  104[H]  4.1   |  25  |  1.89[H]    Ca    8.5      01 Nov 2024 20:00    TPro  7.9  /  Alb  3.2[L]  /  TBili  0.5  /  DBili  x   /  AST  52[H]  /  ALT  34  /  AlkPhos  160[H]  11-01          T(F): 98.2 (11-01-24 @ 18:43), Max: 98.2 (11-01-24 @ 18:43)  HR: 99 (11-01-24 @ 18:43) (99 - 99)  BP: 180/92 (11-01-24 @ 18:43) (180/92 - 180/92)  RR: 16 (11-01-24 @ 18:43) (16 - 16)  SpO2: 94% (11-01-24 @ 18:43) (94% - 94%)  Wt(kg): --    O:   General: Pleasant  male NAD & AOX3.    Integument:  Skin warm, dry bilateral.    Noted ulcer left 1st mpj with exposed metatarsal head, hx of left hallux amputation with dehiscence, + edema, + erythema, + probe to bone, - purulent drainage, + tracking  Clinical concern for underling OM.   Vascular: Dorsalis Pedis and Posterior Tibial pulses 1/4.  Capillary re-fill time less then 3 seconds digits 1-5 bilateral.    Neuro: Protective sensation diminished to the level of the digits bilateral.  MSK: Muscle strength 5/5 all major muscle groups bilateral. Noted hx of left hallux amputation

## 2024-11-01 NOTE — H&P ADULT - HISTORY OF PRESENT ILLNESS
49-year-old male with past medical history of DM2, HTN, HLD, NEUROPATHY, PVD,ckd4, ETOH abuser,  NON COMPLIANT WITH TT, recently discharged after tt for , Diabetic foot ulcer with cellullitis ,ac on ch OM with sepstic arthritis amputation of of 2nd left toe, OM left great toe-,surg done by podiatry,9/20culture of wound E fecalis, path OM , with clear margins, pt was discharged on PO abx as pt refused IV, due to his job.came back to ED today ,Patient states that since being discharged, he has not followed up with podiatry.  He has been on amoxicillin twice daily and states he has been compliant with his antibiotic.  Patient noted today that his surgical site had developed a bad odor with some discharge.  He denies fever, pain, surrounding redness to his foot or lower extremity. Patient states last alcoholic drink was yesterday.  Patient arrived in ED with no shoes on.  PMD Dr. Masters, he admits to not seeing her for over 2 years.   IN ED afebrile, with No fever, no leucocytosis, has foul smelly discharge had Podiatry consult in ED-iabetic foot infection left foot with expose metatarsal headPt had ALcohol level of 332, and has elevated LFT.pt started on vanc zosyn and admitted

## 2024-11-01 NOTE — ED PROVIDER NOTE - DIFFERENTIAL DIAGNOSIS
Ddx includes but not limited to diabetic foot ulcer, non-healing wound, cellulitis, osteomyelitis, septic arthritis, trauma, gout Differential Diagnosis

## 2024-11-01 NOTE — ED ADULT TRIAGE NOTE - HEART RATE (BEATS/MIN)
89 FAMILY HISTORY:  Father  Still living? No  Family history of cerebrovascular accident (CVA), Age at diagnosis: Age Unknown  FH: hypertension, Age at diagnosis: Age Unknown    Mother  Still living? Unknown  FH: diabetes mellitus, Age at diagnosis: Age Unknown

## 2024-11-01 NOTE — ED ADULT NURSE REASSESSMENT NOTE - NS ED NURSE REASSESS COMMENT FT1
pt resting comfortably in bed denies difficulty breathing  o2 sat 88% while laying down on room air  o2 sat 96% on 2L NC  MD Ferreira aware

## 2024-11-01 NOTE — ED PROVIDER NOTE - PROGRESS NOTE DETAILS
Patient evaluated at bedside by podiatry resident Saman Mayo.  He recommends abx and observation for now.  Patient will likely need further resection of the left foot.

## 2024-11-01 NOTE — ED ADULT NURSE NOTE - OBJECTIVE STATEMENT
left foot infection presents with no shoes. Pt arrived with dirty socks and left foot big toe with dry blood. Pt stated it "amputated 4 weeks ago." Pt states it was infected.  Unable to give much details. Pt appears to be intoxicated.

## 2024-11-02 LAB
ALBUMIN SERPL ELPH-MCNC: 2.7 G/DL — LOW (ref 3.3–5)
ALBUMIN SERPL ELPH-MCNC: 2.8 G/DL — LOW (ref 3.3–5)
ALP SERPL-CCNC: 139 U/L — HIGH (ref 30–120)
ALP SERPL-CCNC: 140 U/L — HIGH (ref 30–120)
ALT FLD-CCNC: 25 U/L — SIGNIFICANT CHANGE UP (ref 10–60)
ALT FLD-CCNC: 25 U/L — SIGNIFICANT CHANGE UP (ref 10–60)
ANION GAP SERPL CALC-SCNC: 14 MMOL/L — SIGNIFICANT CHANGE UP (ref 5–17)
ANION GAP SERPL CALC-SCNC: 9 MMOL/L — SIGNIFICANT CHANGE UP (ref 5–17)
AST SERPL-CCNC: 42 U/L — HIGH (ref 10–40)
AST SERPL-CCNC: 42 U/L — HIGH (ref 10–40)
BASE EXCESS BLDV CALC-SCNC: -1.1 MMOL/L — SIGNIFICANT CHANGE UP (ref -2–3)
BASOPHILS # BLD AUTO: 0 K/UL — SIGNIFICANT CHANGE UP (ref 0–0.2)
BASOPHILS NFR BLD AUTO: 0 % — SIGNIFICANT CHANGE UP (ref 0–2)
BILIRUB DIRECT SERPL-MCNC: 0.2 MG/DL — SIGNIFICANT CHANGE UP (ref 0–0.3)
BILIRUB INDIRECT FLD-MCNC: 0.5 MG/DL — SIGNIFICANT CHANGE UP (ref 0.2–1)
BILIRUB SERPL-MCNC: 0.7 MG/DL — SIGNIFICANT CHANGE UP (ref 0.2–1.2)
BILIRUB SERPL-MCNC: 0.7 MG/DL — SIGNIFICANT CHANGE UP (ref 0.2–1.2)
BUN SERPL-MCNC: 17 MG/DL — SIGNIFICANT CHANGE UP (ref 7–23)
BUN SERPL-MCNC: 20 MG/DL — SIGNIFICANT CHANGE UP (ref 7–23)
CALCIUM SERPL-MCNC: 8.3 MG/DL — LOW (ref 8.4–10.5)
CALCIUM SERPL-MCNC: 8.8 MG/DL — SIGNIFICANT CHANGE UP (ref 8.4–10.5)
CHLORIDE SERPL-SCNC: 102 MMOL/L — SIGNIFICANT CHANGE UP (ref 96–108)
CHLORIDE SERPL-SCNC: 104 MMOL/L — SIGNIFICANT CHANGE UP (ref 96–108)
CO2 SERPL-SCNC: 22 MMOL/L — SIGNIFICANT CHANGE UP (ref 22–31)
CO2 SERPL-SCNC: 25 MMOL/L — SIGNIFICANT CHANGE UP (ref 22–31)
CREAT SERPL-MCNC: 1.92 MG/DL — HIGH (ref 0.5–1.3)
CREAT SERPL-MCNC: 1.98 MG/DL — HIGH (ref 0.5–1.3)
EGFR: 41 ML/MIN/1.73M2 — LOW
EGFR: 42 ML/MIN/1.73M2 — LOW
EOSINOPHIL # BLD AUTO: 0.06 K/UL — SIGNIFICANT CHANGE UP (ref 0–0.5)
EOSINOPHIL NFR BLD AUTO: 1 % — SIGNIFICANT CHANGE UP (ref 0–6)
ETHANOL SERPL-MCNC: 128 MG/DL — HIGH (ref 0–3)
GAS PNL BLDV: SIGNIFICANT CHANGE UP
GLUCOSE BLDC GLUCOMTR-MCNC: 119 MG/DL — HIGH (ref 70–99)
GLUCOSE BLDC GLUCOMTR-MCNC: 142 MG/DL — HIGH (ref 70–99)
GLUCOSE BLDC GLUCOMTR-MCNC: 151 MG/DL — HIGH (ref 70–99)
GLUCOSE BLDC GLUCOMTR-MCNC: 203 MG/DL — HIGH (ref 70–99)
GLUCOSE SERPL-MCNC: 107 MG/DL — HIGH (ref 70–99)
GLUCOSE SERPL-MCNC: 128 MG/DL — HIGH (ref 70–99)
HCO3 BLDV-SCNC: 23 MMOL/L — SIGNIFICANT CHANGE UP (ref 22–29)
HCT VFR BLD CALC: 30.1 % — LOW (ref 39–50)
HGB BLD-MCNC: 10 G/DL — LOW (ref 13–17)
LG PLATELETS BLD QL AUTO: SLIGHT — SIGNIFICANT CHANGE UP
LYMPHOCYTES # BLD AUTO: 0.18 K/UL — LOW (ref 1–3.3)
LYMPHOCYTES # BLD AUTO: 3 % — LOW (ref 13–44)
MAGNESIUM SERPL-MCNC: 2 MG/DL — SIGNIFICANT CHANGE UP (ref 1.6–2.6)
MANUAL SMEAR VERIFICATION: SIGNIFICANT CHANGE UP
MCHC RBC-ENTMCNC: 28.7 PG — SIGNIFICANT CHANGE UP (ref 27–34)
MCHC RBC-ENTMCNC: 33.2 G/DL — SIGNIFICANT CHANGE UP (ref 32–36)
MCV RBC AUTO: 86.5 FL — SIGNIFICANT CHANGE UP (ref 80–100)
MONOCYTES # BLD AUTO: 0.24 K/UL — SIGNIFICANT CHANGE UP (ref 0–0.9)
MONOCYTES NFR BLD AUTO: 4 % — SIGNIFICANT CHANGE UP (ref 2–14)
NEUTROPHILS # BLD AUTO: 5.45 K/UL — SIGNIFICANT CHANGE UP (ref 1.8–7.4)
NEUTROPHILS NFR BLD AUTO: 90 % — HIGH (ref 43–77)
NEUTS BAND # BLD: 2 % — SIGNIFICANT CHANGE UP (ref 0–8)
NRBC # BLD: 0 /100 WBCS — SIGNIFICANT CHANGE UP (ref 0–0)
NRBC # BLD: SIGNIFICANT CHANGE UP /100 WBCS (ref 0–0)
PCO2 BLDV: 35 MMHG — LOW (ref 42–55)
PH BLDV: 7.43 — SIGNIFICANT CHANGE UP (ref 7.32–7.43)
PHOSPHATE SERPL-MCNC: 3 MG/DL — SIGNIFICANT CHANGE UP (ref 2.5–4.5)
PLAT MORPH BLD: ABNORMAL
PLATELET # BLD AUTO: 97 K/UL — LOW (ref 150–400)
PO2 BLDV: 137 MMHG — HIGH (ref 25–45)
POTASSIUM SERPL-MCNC: 4 MMOL/L — SIGNIFICANT CHANGE UP (ref 3.5–5.3)
POTASSIUM SERPL-MCNC: 4.1 MMOL/L — SIGNIFICANT CHANGE UP (ref 3.5–5.3)
POTASSIUM SERPL-SCNC: 4 MMOL/L — SIGNIFICANT CHANGE UP (ref 3.5–5.3)
POTASSIUM SERPL-SCNC: 4.1 MMOL/L — SIGNIFICANT CHANGE UP (ref 3.5–5.3)
PROT SERPL-MCNC: 7 G/DL — SIGNIFICANT CHANGE UP (ref 6–8.3)
PROT SERPL-MCNC: 7.1 G/DL — SIGNIFICANT CHANGE UP (ref 6–8.3)
RBC # BLD: 3.48 M/UL — LOW (ref 4.2–5.8)
RBC # FLD: 13.6 % — SIGNIFICANT CHANGE UP (ref 10.3–14.5)
RBC BLD AUTO: NORMAL — SIGNIFICANT CHANGE UP
SAO2 % BLDV: 99.8 % — HIGH (ref 67–88)
SARS-COV-2 RNA SPEC QL NAA+PROBE: SIGNIFICANT CHANGE UP
SODIUM SERPL-SCNC: 136 MMOL/L — SIGNIFICANT CHANGE UP (ref 135–145)
SODIUM SERPL-SCNC: 140 MMOL/L — SIGNIFICANT CHANGE UP (ref 135–145)
TSH SERPL-MCNC: 1.43 UIU/ML — SIGNIFICANT CHANGE UP (ref 0.27–4.2)
VANCOMYCIN TROUGH SERPL-MCNC: 5.9 UG/ML — LOW (ref 10–20)
WBC # BLD: 5.92 K/UL — SIGNIFICANT CHANGE UP (ref 3.8–10.5)
WBC # FLD AUTO: 5.92 K/UL — SIGNIFICANT CHANGE UP (ref 3.8–10.5)

## 2024-11-02 PROCEDURE — 71250 CT THORAX DX C-: CPT | Mod: 26

## 2024-11-02 PROCEDURE — 74176 CT ABD & PELVIS W/O CONTRAST: CPT | Mod: 26

## 2024-11-02 RX ORDER — LORAZEPAM 2 MG
TABLET ORAL
Refills: 0 | Status: DISCONTINUED | OUTPATIENT
Start: 2024-11-04 | End: 2024-11-04

## 2024-11-02 RX ORDER — LORAZEPAM 2 MG
2 TABLET ORAL EVERY 4 HOURS
Refills: 0 | Status: DISCONTINUED | OUTPATIENT
Start: 2024-11-02 | End: 2024-11-03

## 2024-11-02 RX ORDER — LORAZEPAM 2 MG
1 TABLET ORAL EVERY 4 HOURS
Refills: 0 | Status: DISCONTINUED | OUTPATIENT
Start: 2024-11-04 | End: 2024-11-04

## 2024-11-02 RX ORDER — LORAZEPAM 2 MG
2 TABLET ORAL
Refills: 0 | Status: DISCONTINUED | OUTPATIENT
Start: 2024-11-02 | End: 2024-11-04

## 2024-11-02 RX ORDER — GUAIFENESIN 100 MG/5ML
600 LIQUID ORAL EVERY 12 HOURS
Refills: 0 | Status: DISCONTINUED | OUTPATIENT
Start: 2024-11-02 | End: 2024-11-04

## 2024-11-02 RX ORDER — ALBUTEROL 90 MCG
2.5 AEROSOL (GRAM) INHALATION EVERY 6 HOURS
Refills: 0 | Status: DISCONTINUED | OUTPATIENT
Start: 2024-11-02 | End: 2024-11-04

## 2024-11-02 RX ORDER — INFLUENZ VIR VAC TV P-SURF2003 15MCG/.5ML
0.5 SYRINGE (ML) INTRAMUSCULAR ONCE
Refills: 0 | Status: DISCONTINUED | OUTPATIENT
Start: 2024-11-02 | End: 2024-11-04

## 2024-11-02 RX ORDER — LORAZEPAM 2 MG
0.5 TABLET ORAL EVERY 4 HOURS
Refills: 0 | Status: DISCONTINUED | OUTPATIENT
Start: 2024-11-05 | End: 2024-11-04

## 2024-11-02 RX ORDER — FLUTICASONE PROPIONATE 50 UG/1
1 SPRAY, METERED NASAL
Refills: 0 | Status: DISCONTINUED | OUTPATIENT
Start: 2024-11-02 | End: 2024-11-04

## 2024-11-02 RX ORDER — LORAZEPAM 2 MG
1.5 TABLET ORAL EVERY 4 HOURS
Refills: 0 | Status: DISCONTINUED | OUTPATIENT
Start: 2024-11-03 | End: 2024-11-03

## 2024-11-02 RX ORDER — LORAZEPAM 2 MG
0.5 TABLET ORAL EVERY 12 HOURS
Refills: 0 | Status: CANCELLED | OUTPATIENT
Start: 2024-11-06 | End: 2024-11-04

## 2024-11-02 RX ADMIN — GABAPENTIN 100 MILLIGRAM(S): 300 CAPSULE ORAL at 13:33

## 2024-11-02 RX ADMIN — Medication 7 UNIT(S): at 08:07

## 2024-11-02 RX ADMIN — PIPERACILLIN AND TAZOBACTAM 25 GRAM(S): .5; 4 INJECTION, POWDER, LYOPHILIZED, FOR SOLUTION INTRAVENOUS at 21:35

## 2024-11-02 RX ADMIN — GUAIFENESIN 600 MILLIGRAM(S): 100 LIQUID ORAL at 17:11

## 2024-11-02 RX ADMIN — Medication 650 MILLIGRAM(S): at 13:25

## 2024-11-02 RX ADMIN — CARVEDILOL 6.25 MILLIGRAM(S): 25 TABLET, FILM COATED ORAL at 17:15

## 2024-11-02 RX ADMIN — Medication 1 TABLET(S): at 17:11

## 2024-11-02 RX ADMIN — CARVEDILOL 6.25 MILLIGRAM(S): 25 TABLET, FILM COATED ORAL at 05:48

## 2024-11-02 RX ADMIN — HEPARIN SODIUM 5000 UNIT(S): 10000 INJECTION INTRAVENOUS; SUBCUTANEOUS at 05:48

## 2024-11-02 RX ADMIN — GABAPENTIN 100 MILLIGRAM(S): 300 CAPSULE ORAL at 21:36

## 2024-11-02 RX ADMIN — FOLIC ACID 1 MILLIGRAM(S): 1 TABLET ORAL at 11:59

## 2024-11-02 RX ADMIN — Medication 1 TABLET(S): at 11:59

## 2024-11-02 RX ADMIN — VANCOMYCIN HYDROCHLORIDE 250 MILLIGRAM(S): KIT at 08:10

## 2024-11-02 RX ADMIN — Medication 1 TABLET(S): at 08:09

## 2024-11-02 RX ADMIN — Medication 2 MILLIGRAM(S): at 11:59

## 2024-11-02 RX ADMIN — Medication 10 MILLIGRAM(S): at 05:48

## 2024-11-02 RX ADMIN — Medication 2 MILLIGRAM(S): at 21:35

## 2024-11-02 RX ADMIN — Medication 2: at 17:01

## 2024-11-02 RX ADMIN — Medication 7 UNIT(S): at 12:41

## 2024-11-02 RX ADMIN — PIPERACILLIN AND TAZOBACTAM 25 GRAM(S): .5; 4 INJECTION, POWDER, LYOPHILIZED, FOR SOLUTION INTRAVENOUS at 13:33

## 2024-11-02 RX ADMIN — HEPARIN SODIUM 5000 UNIT(S): 10000 INJECTION INTRAVENOUS; SUBCUTANEOUS at 17:10

## 2024-11-02 RX ADMIN — Medication 100 MILLIGRAM(S): at 12:00

## 2024-11-02 RX ADMIN — FLUTICASONE PROPIONATE 1 SPRAY(S): 50 SPRAY, METERED NASAL at 17:23

## 2024-11-02 RX ADMIN — Medication 2 MILLIGRAM(S): at 17:10

## 2024-11-02 RX ADMIN — PIPERACILLIN AND TAZOBACTAM 25 GRAM(S): .5; 4 INJECTION, POWDER, LYOPHILIZED, FOR SOLUTION INTRAVENOUS at 05:51

## 2024-11-02 RX ADMIN — GABAPENTIN 100 MILLIGRAM(S): 300 CAPSULE ORAL at 05:47

## 2024-11-02 RX ADMIN — Medication 20 UNIT(S): at 21:54

## 2024-11-02 RX ADMIN — VANCOMYCIN HYDROCHLORIDE 250 MILLIGRAM(S): KIT at 21:35

## 2024-11-02 RX ADMIN — Medication 650 MILLIGRAM(S): at 12:42

## 2024-11-02 RX ADMIN — Medication 7 UNIT(S): at 17:01

## 2024-11-02 NOTE — CARE COORDINATION ASSESSMENT. - NSDCPLANSERVICES_GEN_ALL_CORE
DX: 49-year-old male with  DM2, HTN, HLD, NEUROPATHY, PVD,ckd4, ETOH abuse, Diabetic foot ulcer with cellullitis ,ac on ch OM with septic arthritis amputation of of 2nd left toe, OM left great toe-,surg done by podiatry,9/20 culture of wound E fecalis, path OM , with clear margins, pt was discharged on PO abx as pt refused IV, due to his job.came back to ED ,Patient states that since being discharged, he has not followed up with podiatry.      Patient receiving IV Zosyn.  Patient receiving IV Vancomycin.  Pending PT Consult.    Patient feeling nauseous CM made Primary RN aware. Pending COVID RESULTS. CM met with patient at bedside. Patient alert times 3. Patient states lives alone. Patient states he has nobody to call in case of emergency and both parents are living in Assisted Living. Patient stated owns a cane. Patient stated has no steps to enter in the house and bedroom is on the first floor.       CM verified:    PCP: Sonam Hou phone number [phone].  Pharmacy: [facility] phone number [phone].  Insurance: [employer] /Medicaid.  [place]: No.     CM explained about homecare services with a verbal understanding. Pending PT Consult to see patient recommendations.  Patient choice [facility] care agency [phone] will reach out to you within 24-72 hours of your discharge to schedule home care visit/eval appointment with you. Please call agency for any queries regarding home care services./Home Care

## 2024-11-02 NOTE — PATIENT CHOICE NOTE. - NSPTCHOICENOTES_GEN_ALL_CORE
[facility] care agency [phone] will reach out to you within 24-72 hours of your discharge to schedule home care visit/eval appointment with you. Please call agency for any queries regarding home care services.

## 2024-11-02 NOTE — CONSULT NOTE ADULT - SUBJECTIVE AND OBJECTIVE BOX
Date/Time Patient Seen:  		  Referring MD:   Data Reviewed	       Patient is a 49y old  Male who presents with a chief complaint of infected left foot (01 Nov 2024 21:59)      Subjective/HPI      History of Present Illness:  Reason for Admission: infected left foot  History of Present Illness:   49-year-old male with past medical history of DM2, HTN, HLD, NEUROPATHY, PVD,ckd4, ETOH abuser,  NON COMPLIANT WITH TT, recently discharged after tt for , Diabetic foot ulcer with cellullitis ,ac on ch OM with sepstic arthritis amputation of of 2nd left toe, OM left great toe-,surg done by podiatry,9/20culture of wound E fecalis, path OM , with clear margins, pt was discharged on PO abx as pt refused IV, due to his job.came back to ED today ,Patient states that since being discharged, he has not followed up with podiatry.  He has been on amoxicillin twice daily and states he has been compliant with his antibiotic.  Patient noted today that his surgical site had developed a bad odor with some discharge.  He denies fever, pain, surrounding redness to his foot or lower extremity. Patient states last alcoholic drink was yesterday.  Patient arrived in ED with no shoes on.  PMD Dr. Masters, he admits to not seeing her for over 2 years.   IN ED afebrile, with No fever, no leucocytosis, has foul smelly discharge had Podiatry consult in ED-iabetic foot infection left foot with expose metatarsal headPt had ALcohol level of 332, and has elevated LFT.pt started on vanc zosyn and admitted  PAST MEDICAL & SURGICAL HISTORY:  Prediabetes    HTN (hypertension)    HLD (hyperlipidemia)    DM2 (diabetes mellitus, type 2)          Medication list         MEDICATIONS  (STANDING):  amLODIPine   Tablet 10 milliGRAM(s) Oral daily  carvedilol 6.25 milliGRAM(s) Oral every 12 hours  dextrose 5%. 1000 milliLiter(s) (100 mL/Hr) IV Continuous <Continuous>  dextrose 5%. 1000 milliLiter(s) (50 mL/Hr) IV Continuous <Continuous>  dextrose 50% Injectable 25 Gram(s) IV Push once  dextrose 50% Injectable 25 Gram(s) IV Push once  dextrose 50% Injectable 12.5 Gram(s) IV Push once  folic acid 1 milliGRAM(s) Oral daily  gabapentin 100 milliGRAM(s) Oral three times a day  glucagon  Injectable 1 milliGRAM(s) IntraMuscular once  heparin   Injectable 5000 Unit(s) SubCutaneous every 12 hours  influenza   Vaccine 0.5 milliLiter(s) IntraMuscular once  insulin glargine Injectable (LANTUS) 20 Unit(s) SubCutaneous at bedtime  insulin lispro (ADMELOG) corrective regimen sliding scale   SubCutaneous three times a day before meals  insulin lispro (ADMELOG) corrective regimen sliding scale   SubCutaneous at bedtime  insulin lispro Injectable (ADMELOG) 7 Unit(s) SubCutaneous before breakfast  insulin lispro Injectable (ADMELOG) 7 Unit(s) SubCutaneous before lunch  insulin lispro Injectable (ADMELOG) 7 Unit(s) SubCutaneous before dinner  lactobacillus acidophilus 1 Tablet(s) Oral two times a day with meals  multivitamin 1 Tablet(s) Oral daily  piperacillin/tazobactam IVPB.. 3.375 Gram(s) IV Intermittent every 8 hours  thiamine 100 milliGRAM(s) Oral daily  vancomycin  IVPB 1000 milliGRAM(s) IV Intermittent every 12 hours    MEDICATIONS  (PRN):  acetaminophen     Tablet .. 650 milliGRAM(s) Oral every 6 hours PRN Temp greater or equal to 38C (100.4F), Mild Pain (1 - 3), Moderate Pain (4 - 6)  dextrose Oral Gel 15 Gram(s) Oral once PRN Blood Glucose LESS THAN 70 milliGRAM(s)/deciliter  LORazepam   Injectable 2 milliGRAM(s) IV Push every 4 hours PRN Anxiety      Allergies and Intolerances:        Allergies:  	No Known Allergies:     Home Medications:   * Patient Currently Takes Medications as of 03-Oct-2024 13:11 documented in Structured Notes  · 	amoxicillin-clavulanate 875 mg-125 mg oral tablet: 875 milligram(s) orally 2 times a day till 11/1  · 	amLODIPine 10 mg oral tablet: 1 tab(s) orally once a day  · 	guaiFENesin 600 mg oral tablet, extended release: 1 tab(s) orally every 12 hours as needed for Cough  · 	gabapentin 100 mg oral capsule: 1 cap(s) orally 3 times a day  · 	Acidophilus oral tablet: 2 tab(s) orally once a day  · 	carvedilol 6.25 mg oral tablet: 1 tab(s) orally every 12 hours  · 	Insulin Pen Needles, 4mm: 1 application subcutaneously 4 times a day. ** Use with insulin pen **  · 	Dexcom G7 Sensors: Change every 10 days  · 	Dexcom G7 : Use as directed  · 	acetaminophen 325 mg oral tablet: 2 tab(s) orally every 6 hours As needed Temp greater or equal to 38C (100.4F), Mild Pain (1 - 3), Moderate Pain (4 - 6)  · 	Lantus Solostar Pen 100 units/mL subcutaneous solution: 20 unit(s) subcutaneous once a day (at bedtime) unit(s) subcutaneous once a day  · 	NovoLOG FlexPen 100 units/mL injectable solution: 7 unit(s) subcutaneous 3 times a day (with meals) 9 units if glucose readings 151-200  	11 units if glucose readings 201-250  	13 units if glucose readings 251-300  	15 units if glucose readings 301-350  	17 units if glucose readings 351-400  	19 units if glucose readings >400 and call MD    .    Patient History:    Tobacco Screening:  · Core Measure Site	No    Risk Assessment:    Hepatitis C Test Questions:  · In accordance with [place] State Law, we offer every patient a Hepatitis C test. Would you like to be tested today?	Opt out         Vitals log        ICU Vital Signs Last 24 Hrs  T(C): 37.4 (02 Nov 2024 05:14), Max: 37.4 (02 Nov 2024 01:44)  T(F): 99.3 (02 Nov 2024 05:14), Max: 99.4 (02 Nov 2024 01:44)  HR: 116 (02 Nov 2024 05:14) (94 - 117)  BP: 140/72 (02 Nov 2024 05:14) (140/72 - 180/92)  BP(mean): --  ABP: --  ABP(mean): --  RR: 18 (02 Nov 2024 05:14) (16 - 20)  SpO2: 93% (02 Nov 2024 05:14) (88% - 96%)    O2 Parameters below as of 02 Nov 2024 05:14  Patient On (Oxygen Delivery Method): nasal cannula                 Input and Output:  I&O's Detail    01 Nov 2024 07:01  -  02 Nov 2024 07:00  --------------------------------------------------------  IN:    IV PiggyBack: 100 mL  Total IN: 100 mL    OUT:  Total OUT: 0 mL    Total NET: 100 mL          Lab Data                        10.0   5.92  )-----------( 97       ( 02 Nov 2024 06:30 )             30.1     11-01    135  |  100  |  20  ----------------------------<  104[H]  4.1   |  25  |  1.89[H]    Ca    8.5      01 Nov 2024 20:00    TPro  7.9  /  Alb  3.2[L]  /  TBili  0.5  /  DBili  x   /  AST  52[H]  /  ALT  34  /  AlkPhos  160[H]  11-01            Review of Systems	  pain  wound  poor compliance  all systems reviewed      Objective     Physical Examination  heart s1s2  lung dec BS  head nc  head at  foot wound        Pertinent Lab findings & Imaging      Fowler:  NO   Adequate UO     I&O's Detail    01 Nov 2024 07:01  -  02 Nov 2024 07:00  --------------------------------------------------------  IN:    IV PiggyBack: 100 mL  Total IN: 100 mL    OUT:  Total OUT: 0 mL    Total NET: 100 mL               Discussed with:     Cultures:	        Radiology      ACC: [id number] EXAM:  CT FOOT ONLY LT   ORDERED BY: YASMANY BAZZI     PROCEDURE DATE:  11/01/2024          INTERPRETATION:  CT FOOT LEFT    HISTORY:   Infection, concern for osteomyelitis or septic arthritis.   Prior 1st reputation. Stump with infection, Oder, and discharge.    TECHNIQUE: Contiguous axial imaging was performed through the left foot   without contrast. Coronal and sagittal reformatting was utilized.    COMPARISON:  Left x-rays dated 11/1/2024 and 9/21/2024.    FINDINGS:    OSSEOUS STRUCTURES    Fractures:  There is chronic impaction deformity of the 2nd middle   phalanx base.    Postoperative Changes: 1st toe amputation is again seen. Soft tissue   wound is present along the 1st metatarsal stump and there are erosive   changes of the metatarsal head and neck consistent with osteomyelitis.    JOINTS    Tibiotalar Joint:  Maintained.    Subtalar Joints:  Maintained.    Intertarsal Joints:  Appear maintained although patient motion artifact   is present.    Tarsometatarsal Joints:  There are tiny subchondral cysts of the 1st   metatarsal base.    Metatarsophalangeal Joints:  Maintained.    Interphalangeal Joints:  Otherwise maintained.    MYOTENDINOUS STRUCTURES  Mild generalized muscle atrophy with edematous type change suggests   diabetic neuropathy.    OTHER SOFT TISSUES  Moderate subcutaneous edema is present. No tracking gas or abscess is   seen. Soft tissue wound extends to the metatarsal head.    IMPRESSION:  1.  Amputated 1st toe is again seen with soft tissue wound extending to   the metatarsal head with underlying erosive changes consistent with   osteomyelitis.    --- End of Report ---            STEVIE WHEELER MD; Attending Radiologist  This document has been electronically signed. Nov 1 2024  9:16PM

## 2024-11-02 NOTE — PROGRESS NOTE ADULT - ASSESSMENT
49-year-old male with past medical history of DM2, HTN, HLD, NEUROPATHY, PVD,ckd4, ETOH abuser,  NON COMPLIANT WITH TT, recently discharged after tt for , Diabetic foot ulcer with cellullitis ,ac on ch OM with sepstic arthritis amputation of of 2nd left toe, OM left great toe-,surg done by podiatry,9/20culture of wound E fecalis, path OM , with clear margins, pt was discharged on PO abx as pt refused IV, due to his job.came back to ED today ,Patient states that since being discharged, he has not followed up with podiatry.  He has been on amoxicillin twice daily and states he has been compliant with his antibiotic.  Patient noted today that his surgical site had developed a bad odor with some discharge.  He denies fever, pain, surrounding redness to his foot or lower extremity. Patient states last alcoholic drink was yesterday.  Patient arrived in ED with no shoes on.  PMD Dr. Masters, he admits to not seeing her for over 2 years.   IN ED afebrile, with No fever, no leucocytosis, has foul smelly discharge had Podiatry consult in ED-iabetic foot infection left foot with expose metatarsal headPt had ALcohol level of 332, and has elevated LFT.pt started on vanc zosyn and admitted  #Diabetic foot ulcer with cellullitis with ac on ch osteomyelitis   started on IV abx,ID and podiatry consult noted, cont vanc zosyn   CT/MRI   ## DM2 with hyperglycemia, with neuropathy , nephropathy with likely PVD    ssi, consistent carb, Endo consult,    # Ess HTN   continue current tt  # HLD    # DVT prophylaxis  ID , endo and podiatry consult  #  CKD 3   nephro consult  likely Diabetic nephropathy   # HLD   low fat diet  # ETOH use disorder  cessation counselling,addiction medicine consult   folic acid, thiamine, lorazepam prn for withdrawl, presently high etoh level  #Obesity   # EDUAR

## 2024-11-02 NOTE — PATIENT PROFILE ADULT - FUNCTIONAL ASSESSMENT - BASIC MOBILITY 6.
4-calculated by average/Not able to assess (calculate score using [facility] averaging method)

## 2024-11-02 NOTE — CONSULT NOTE ADULT - ASSESSMENT
Diabetic left foot infection  Wound left foot
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    haseeb  etoh use disorder  sidney  dm  htn  hld  neuropathy  ckd  poor compliance  foot wound - infection    ciwa  ativan ATC and PRN  folic acid  thiamine  SBIRT documentation  tox screen reviewed  etoh level on admission noted  ABX for foot wound infection  ID and Podiatry eval  pain assessment  SW eval  counseling - education - emotional support  cvs rx regimen and BP control - HTN HLD  DM care  serial FS  dietary discretion  monitor VS and HD and Sat  
49-year-old male with  DM2, HTN, HLD, NEUROPATHY, PVD,ckd4, ETOH abuse, Diabetic foot ulcer with cellullitis ,ac on ch OM with septic arthritis amputation of of 2nd left toe, OM left great toe-,surg done by podiatry,9/20 culture of wound E fecalis, path OM , with clear margins, pt was discharged on PO abx as pt refused IV, due to his job.came back to ED ,Patient states that since being discharged, he has not followed up with podiatry.  He has been on amoxicillin twice daily and states he has been compliant with his antibiotic.  Patient noted that his surgical site had developed a bad odor with some discharge.  Left foot with exposed metatarsal head.    RECOMMENDATIONS  Left foot osteomyelitis with cellulitis  noted Culture - Tissue with Gram Stain (09.20.24 @ 19:21) with Enterococcus faecalis (amp S) and rare Staphylococcus epidermidis (MRSE)   Culture - Other (09.17.24 @ 19:10) Numerous Enterobacter cloacae complex  -  Piperacillin/Tazobactam: S <=8 Treatment with Pipercillin/Tazobactam is not recommended in severe infections caused by Klebsiella aerogenes, Enterobacter cloacae complex, and Citrobacter freundii complex.  Amputation of single toe 20-Sep-2024 17:35:36  Salvatore Davenport  Filleted toe flap 20-Sep-2024 17:35:44   -Zosyn and Vanco started 11/1-continue for now but may require further surgery (podiatry involved)  consider MRI    Thank you for consulting us and involving us in the management of this most interesting and challenging case.  We will follow along in the care of this patient. Please call us at [phone] or text me directly on my cell# at [phone] with any concerns.    Starting Tuesday Dr Mack will be assuming care of this patient so please contact him with any questions, concerns or new micro data.

## 2024-11-02 NOTE — CARE COORDINATION ASSESSMENT. - NSCAREPROVIDERS_GEN_ALL_CORE_FT
CARE PROVIDERS:  Accepting Physician: Mabel Weaver  Admitting: Mabel Weaver  Attending: Mabel Weaver  Consultant: Linsey Mack  Consultant: Bob Giang  Consultant: Vignesh Vázquez  Consultant: Salvatore Davenport  Consultant: Saman Mayo  Consultant: Harley Beckwith  ED Attending: Mackenzie Ferreira  ED Nurse: Prabha Nails  Nurse: Joseline Clark  Nurse: Danielle Del Castillo  Nurse: Prabha Nails  Nurse: Tomasa Helms  Outpatient Provider: Todd Canchola  Outpatient Provider: Walter Chawla  Override: Joseline Clark  PCA/Nursing Assistant: Maris Mcmullen  PCA/Nursing Assistant: Dioni Lund  Primary Team: Mabel Weaver  Registered Dietitian: Mary Carcamo  Registered Dietitian: Dea Osorio  Respiratory Therapy: Salvatore Lua  Respiratory Therapy: Aubrey Moore  : Marge Garcia// Supp. Assoc.: Mirna Gaffney

## 2024-11-02 NOTE — PROGRESS NOTE ADULT - PROBLEM SELECTOR PLAN 1
Chart reviewed and Patient evaluated  Discussed diagnosis and treatment with patient  There is concern for open diabetic foot infection left foot with expose metatarsal head  Wound culture results pending.  Xray reviewed left foot, no obvious or acute fracture pathology. Cortical erosion noted to distal aspect of 1st metatarsal head consistent with possible OM.  CT left foot reviewed, no obvious or acute fracture pathology.  Applied dry sterile dressing  Rec with IV antibiotics As Per ID  Rec elevate left lower extremity  NWB left lower extremity  Discussed importance of daily foot examinations and proper shoe gear and to importance of lower Fasting Blood Glucose levels.   Patient understands he is at risk for a further proximal amputation, and is at risk to lose part of the foot, all of the foot, bka, sepsis, loss of life.  Will allow demarcation of infective process  will consider MRI to better evaluate for OM  Podiatry will follow while in house.  Plan discussed with attending. Chart reviewed and Patient evaluated  Discussed diagnosis and treatment with patient  There is concern for open diabetic foot infection left foot with expose metatarsal head  Wound culture results pending.  Xray reviewed left foot, no obvious or acute fracture pathology. Cortical erosion noted to distal aspect of 1st metatarsal head consistent with possible OM.  CT left foot reviewed, no obvious or acute fracture pathology.  Applied dry sterile dressing  Rec with IV antibiotics As Per ID  Rec elevate left lower extremity  NWB left lower extremity  Discussed importance of daily foot examinations and proper shoe gear and to importance of lower Fasting Blood Glucose levels.   Patient understands he is at risk for a further proximal amputation, and is at risk to lose part of the foot, all of the foot, bka, sepsis, loss of life.  Will allow demarcation of infective process  Pt will likely require additional surgical debridements. Tentatively considering Tuesday for additional surgical procedures.  will consider MRI to better evaluate for OM  Podiatry will follow while in house.  Plan discussed with attending.

## 2024-11-02 NOTE — CARE COORDINATION ASSESSMENT. - PRO ARRIVE FROM
DX: 49-year-old male with  DM2, HTN, HLD, NEUROPATHY, PVD,ckd4, ETOH abuse, Diabetic foot ulcer with cellullitis ,ac on ch OM with septic arthritis amputation of of 2nd left toe, OM left great toe-,surg done by podiatry,9/20 culture of wound E fecalis, path OM , with clear margins, pt was discharged on PO abx as pt refused IV, due to his job.came back to ED ,Patient states that since being discharged, he has not followed up with podiatry.      Patient receiving IV Zosyn.  Patient receiving IV Vancomycin.  Pending PT Consult.    Patient feeling nauseous CM made Primary RN aware. Pending COVID RESULTS. CM met with patient at bedside. Patient alert times 3. Patient states lives alone. Patient states he has nobody to call in case of emergency and both parents are living in Assisted Living. Patient stated owns a cane. Patient stated has no steps to enter in the house and bedroom is on the first floor.       CM verified:    PCP: Sonam Hou phone number [phone].  Pharmacy: [facility] phone number [phone].  Insurance: [employer] /Medicaid.  [place]: No.     CM explained about homecare services with a verbal understanding. Pending PT Consult to see patient recommendations.  Patient choice [facility] care agency [phone] will reach out to you within 24-72 hours of your discharge to schedule home care visit/eval appointment with you. Please call agency for any queries regarding home care services./home

## 2024-11-02 NOTE — CONSULT NOTE ADULT - SUBJECTIVE AND OBJECTIVE BOX
[facility] DIVISION of INFECTIOUS DISEASE  Bob Giang MD PhD, Sary Staples MD, Nancy Rothman MD, Vaishnavi Rogers MD, Kameron Harrison MD  and providing coverage with Linsey Mack MD  Providing Infectious Disease Consultations at [facility], [facility], [place], [facility], [facility]'s    Office# [phone] to schedule follow up appointments  Answering Service for urgent calls or New Consults [phone]  Cell# to text for urgent issues Bob Giang [phone]     HPI:  49-year-old male with  DM2, HTN, HLD, NEUROPATHY, PVD,ckd4, ETOH abuse, Diabetic foot ulcer with cellullitis ,ac on ch OM with septic arthritis amputation of of 2nd left toe, OM left great toe-,surg done by podiatry,9/20 culture of wound E fecalis, path OM , with clear margins, pt was discharged on PO abx as pt refused IV, due to his job.came back to ED ,Patient states that since being discharged, he has not followed up with podiatry.  He has been on amoxicillin twice daily and states he has been compliant with his antibiotic.  Patient noted that his surgical site had developed a bad odor with some discharge.  Left foot with exposed metatarsal head.    PAST MEDICAL & SURGICAL HISTORY:  Prediabetes      HTN (hypertension)      HLD (hyperlipidemia)      DM2 (diabetes mellitus, type 2)          Antimicrobials  piperacillin/tazobactam IVPB.. 3.375 Gram(s) IV Intermittent every 8 hours  vancomycin  IVPB 1000 milliGRAM(s) IV Intermittent every 12 hours      Immunological  influenza   Vaccine 0.5 milliLiter(s) IntraMuscular once      Other  acetaminophen     Tablet .. 650 milliGRAM(s) Oral every 6 hours PRN  albuterol    0.083% 2.5 milliGRAM(s) Nebulizer every 6 hours PRN  amLODIPine   Tablet 10 milliGRAM(s) Oral daily  carvedilol 6.25 milliGRAM(s) Oral every 12 hours  dextrose 5%. 1000 milliLiter(s) IV Continuous <Continuous>  dextrose 5%. 1000 milliLiter(s) IV Continuous <Continuous>  dextrose 50% Injectable 25 Gram(s) IV Push once  dextrose 50% Injectable 25 Gram(s) IV Push once  dextrose 50% Injectable 12.5 Gram(s) IV Push once  dextrose Oral Gel 15 Gram(s) Oral once PRN  folic acid 1 milliGRAM(s) Oral daily  gabapentin 100 milliGRAM(s) Oral three times a day  glucagon  Injectable 1 milliGRAM(s) IntraMuscular once  heparin   Injectable 5000 Unit(s) SubCutaneous every 12 hours  insulin glargine Injectable (LANTUS) 20 Unit(s) SubCutaneous at bedtime  insulin lispro (ADMELOG) corrective regimen sliding scale   SubCutaneous three times a day before meals  insulin lispro (ADMELOG) corrective regimen sliding scale   SubCutaneous at bedtime  insulin lispro Injectable (ADMELOG) 7 Unit(s) SubCutaneous before dinner  insulin lispro Injectable (ADMELOG) 7 Unit(s) SubCutaneous before breakfast  insulin lispro Injectable (ADMELOG) 7 Unit(s) SubCutaneous before lunch  lactobacillus acidophilus 1 Tablet(s) Oral two times a day with meals  LORazepam     Tablet   Oral   LORazepam     Tablet 2 milliGRAM(s) Oral every 4 hours  LORazepam   Injectable 2 milliGRAM(s) IV Push every 5 minutes PRN  multivitamin 1 Tablet(s) Oral daily  thiamine 100 milliGRAM(s) Oral daily      Allergies    No Known Allergies    Intolerances        SOCIAL HISTORY:  no toxic habits reported      FAMILY HISTORY: noncontrib      ROS:    EYES:  Negative  blurry vision or double vision  GASTROINTESTINAL:  Negative for nausea, vomiting, diarrhea  -otherwise negative except for subjective    Vital Signs Last 24 Hrs  T(C): 37 (02 Nov 2024 09:03), Max: 37.4 (02 Nov 2024 01:44)  T(F): 98.6 (02 Nov 2024 09:03), Max: 99.4 (02 Nov 2024 01:44)  HR: 98 (02 Nov 2024 09:03) (94 - 117)  BP: 143/80 (02 Nov 2024 09:03) (140/72 - 180/92)  BP(mean): --  RR: 18 (02 Nov 2024 09:03) (16 - 20)  SpO2: 92% (02 Nov 2024 09:03) (88% - 96%)    Parameters below as of 02 Nov 2024 05:14  Patient On (Oxygen Delivery Method): nasal cannula        PE:  In no distress  HEENT:  NC, PERRL, sclerae anicteric, conjunctivae clear, EOMI.  Sinuses nontender, no nasal exudate.  No buccal or pharyngeal lesions, erythema or exudate  Neck:  Supple, no adenopathy  Lungs:  No accessory muscle use, bilaterally clear to auscultation  Cor:  distant  Abd:  Symmetric, normoactive BS.  Soft, nontender, no masses, guarding or rebound.  Liver and spleen not enlarged  Extrem:  No cyanosis, wrapped left foot  Neuro: grossly intact  Musc: moving all limbs freely, no focal deficits        LABS:                        10.0   5.92  )-----------( 97       ( 02 Nov 2024 06:30 )             30.1       WBC Count: 5.92 K/uL (11-02-24 @ 06:30)  WBC Count: 6.37 K/uL (11-01-24 @ 20:00)      11-02    140  |  104  |  17  ----------------------------<  107[H]  4.1   |  22  |  1.92[H]    Ca    8.3[L]      02 Nov 2024 06:30  Phos  3.0     11-02  Mg     2.0     11-02    TPro  7.1  /  Alb  2.8[L]  /  TBili  0.7  /  DBili  0.2  /  AST  42[H]  /  ALT  25  /  AlkPhos  140[H]  11-02      Creatinine: 1.92 mg/dL (11-02-24 @ 06:30)  Creatinine: 1.89 mg/dL (11-01-24 @ 20:00)      MICROBIOLOGY:    Culture - Other (09.17.24 @ 19:10)    -  Vancomycin: S 2   -  Tobramycin: S <=2   -  Trimethoprim/Sulfamethoxazole: S <=0.5/9.5   -  Piperacillin/Tazobactam: S <=8 Treatment with Pipercillin/Tazobactam is not recommended in severe infections casued by Klebsiella aerogenes, Enterobacter cloacae complex, and Citrobacter freundii complex.   -  Ertapenem: S <=0.5   -  Gentamicin: S <=2   -  Imipenem: S <=1   -  Levofloxacin: S <=0.5   -  Meropenem: S <=1   -  Ampicillin/Sulbactam: R 8/4   -  Aztreonam: S <=4   -  Cefazolin: R >16   -  Cefepime: S <=2   -  Cefoxitin: R >16   -  Ceftriaxone: S <=1 Enterobacter cloacae, Klebsiella aerogenes, and Citrobacter freundii may develop resistance during prolonged therapy.   -  Ciprofloxacin: S <=0.25   -  Amoxicillin/Clavulanic Acid: R >16/8   -  Ampicillin: R 16 These ampicillin results predict results for amoxicillin   -  Ampicillin: S <=2 Predicts results to ampicillin/sulbactam, amoxacillin-clavulanate and  piperacillin-tazobactam.   Specimen Source: Wound Wound   Culture Results:   Numerous Enterobacter cloacae complex  Numerous Enterococcus faecalis  Few Coag Negative Staphylococcus "Susceptibilities not performed"   Organism Identification: Enterobacter cloacae complex  Enterococcus faecalis   Organism: Enterobacter cloacae complex   Organism: Enterococcus faecalis   Method Type: STEPHANIE   Method Type: STEPHANIE        Culture - Tissue with Gram Stain (09.20.24 @ 19:21)    -  Vancomycin: S 4   -  Clindamycin: R >4   -  Erythromycin: R >4   -  Gentamicin: S <=1 Should not be used as monotherapy   -  Rifampin: S <=1 Should not be used as monotherapy   -  Tetracycline: S 2   -  Oxacillin: R >2   -  Penicillin: R >8   -  Trimethoprim/Sulfamethoxazole: R >2/38   -  Vancomycin: S 1   Gram Stain:   No polymorphonuclear cells seen per low power field  No organisms seen per oil power field   -  Ampicillin: S <=2 Predicts results to ampicillin/sulbactam, amoxacillin-clavulanate and  piperacillin-tazobactam.   Specimen Source: Bone Other   Culture Results:   Growth in fluid media only Enterococcus faecalis  Rare Staphylococcus epidermidis   Organism Identification: Enterococcus faecalis  Staphylococcus epidermidis   Organism: Enterococcus faecalis   Organism: Staphylococcus epidermidis   Method Type: STEPHANIE   Method Type: STEPHANIE        RADIOLOGY & ADDITIONAL STUDIES:    --

## 2024-11-02 NOTE — CARE COORDINATION ASSESSMENT. - OTHER PERTINENT DISCHARGE PLANNING INFORMATION:
49-year-old male with  DM2, HTN, HLD, NEUROPATHY, PVD,ckd4, ETOH abuse, Diabetic foot ulcer with cellullitis ,ac on ch OM with septic arthritis amputation of of 2nd left toe, OM left great toe-,surg done by podiatry,9/20 culture of wound E fecalis, path OM , with clear margins, pt was discharged on PO abx as pt refused IV, due to his job.came back to ED ,Patient states that since being discharged, he has not followed up with podiatry. Met patient at bedside.  Explained role of CM, verbalized understanding. Pt was made aware a CM will remain available through hospitalization.  Contact information given in discharge/ transitions resource folder.

## 2024-11-03 LAB
ALBUMIN SERPL ELPH-MCNC: 2.5 G/DL — LOW (ref 3.3–5)
ALP SERPL-CCNC: 133 U/L — HIGH (ref 30–120)
ALT FLD-CCNC: 21 U/L — SIGNIFICANT CHANGE UP (ref 10–60)
ANION GAP SERPL CALC-SCNC: 9 MMOL/L — SIGNIFICANT CHANGE UP (ref 5–17)
AST SERPL-CCNC: 37 U/L — SIGNIFICANT CHANGE UP (ref 10–40)
BASOPHILS # BLD AUTO: 0.04 K/UL — SIGNIFICANT CHANGE UP (ref 0–0.2)
BASOPHILS NFR BLD AUTO: 0.7 % — SIGNIFICANT CHANGE UP (ref 0–2)
BILIRUB SERPL-MCNC: 1 MG/DL — SIGNIFICANT CHANGE UP (ref 0.2–1.2)
BUN SERPL-MCNC: 22 MG/DL — SIGNIFICANT CHANGE UP (ref 7–23)
CALCIUM SERPL-MCNC: 8.7 MG/DL — SIGNIFICANT CHANGE UP (ref 8.4–10.5)
CHLORIDE SERPL-SCNC: 102 MMOL/L — SIGNIFICANT CHANGE UP (ref 96–108)
CO2 SERPL-SCNC: 25 MMOL/L — SIGNIFICANT CHANGE UP (ref 22–31)
CREAT SERPL-MCNC: 1.92 MG/DL — HIGH (ref 0.5–1.3)
CULTURE RESULTS: SIGNIFICANT CHANGE UP
EGFR: 42 ML/MIN/1.73M2 — LOW
EOSINOPHIL # BLD AUTO: 0.41 K/UL — SIGNIFICANT CHANGE UP (ref 0–0.5)
EOSINOPHIL NFR BLD AUTO: 6.7 % — HIGH (ref 0–6)
GLUCOSE BLDC GLUCOMTR-MCNC: 172 MG/DL — HIGH (ref 70–99)
GLUCOSE BLDC GLUCOMTR-MCNC: 201 MG/DL — HIGH (ref 70–99)
GLUCOSE BLDC GLUCOMTR-MCNC: 234 MG/DL — HIGH (ref 70–99)
GLUCOSE BLDC GLUCOMTR-MCNC: 235 MG/DL — HIGH (ref 70–99)
GLUCOSE SERPL-MCNC: 152 MG/DL — HIGH (ref 70–99)
HCT VFR BLD CALC: 32.3 % — LOW (ref 39–50)
HGB BLD-MCNC: 10.5 G/DL — LOW (ref 13–17)
IMM GRANULOCYTES NFR BLD AUTO: 1.3 % — HIGH (ref 0–0.9)
LYMPHOCYTES # BLD AUTO: 0.67 K/UL — LOW (ref 1–3.3)
LYMPHOCYTES # BLD AUTO: 10.9 % — LOW (ref 13–44)
MAGNESIUM SERPL-MCNC: 1.8 MG/DL — SIGNIFICANT CHANGE UP (ref 1.6–2.6)
MCHC RBC-ENTMCNC: 28.2 PG — SIGNIFICANT CHANGE UP (ref 27–34)
MCHC RBC-ENTMCNC: 32.5 G/DL — SIGNIFICANT CHANGE UP (ref 32–36)
MCV RBC AUTO: 86.6 FL — SIGNIFICANT CHANGE UP (ref 80–100)
MONOCYTES # BLD AUTO: 0.73 K/UL — SIGNIFICANT CHANGE UP (ref 0–0.9)
MONOCYTES NFR BLD AUTO: 11.9 % — SIGNIFICANT CHANGE UP (ref 2–14)
NEUTROPHILS # BLD AUTO: 4.2 K/UL — SIGNIFICANT CHANGE UP (ref 1.8–7.4)
NEUTROPHILS NFR BLD AUTO: 68.5 % — SIGNIFICANT CHANGE UP (ref 43–77)
NRBC # BLD: 0 /100 WBCS — SIGNIFICANT CHANGE UP (ref 0–0)
PHOSPHATE SERPL-MCNC: 2.2 MG/DL — LOW (ref 2.5–4.5)
PLATELET # BLD AUTO: 91 K/UL — LOW (ref 150–400)
POTASSIUM SERPL-MCNC: 4 MMOL/L — SIGNIFICANT CHANGE UP (ref 3.5–5.3)
POTASSIUM SERPL-SCNC: 4 MMOL/L — SIGNIFICANT CHANGE UP (ref 3.5–5.3)
PROT SERPL-MCNC: 6.8 G/DL — SIGNIFICANT CHANGE UP (ref 6–8.3)
RBC # BLD: 3.73 M/UL — LOW (ref 4.2–5.8)
RBC # FLD: 13.4 % — SIGNIFICANT CHANGE UP (ref 10.3–14.5)
SODIUM SERPL-SCNC: 136 MMOL/L — SIGNIFICANT CHANGE UP (ref 135–145)
SPECIMEN SOURCE: SIGNIFICANT CHANGE UP
WBC # BLD: 6.13 K/UL — SIGNIFICANT CHANGE UP (ref 3.8–10.5)
WBC # FLD AUTO: 6.13 K/UL — SIGNIFICANT CHANGE UP (ref 3.8–10.5)

## 2024-11-03 RX ORDER — DIBASIC SODIUM PHOSPHATE, MONOBASIC POTASSIUM PHOSPHATE AND MONOBASIC SODIUM PHOSPHATE 852; 155; 130 MG/1; MG/1; MG/1
1 TABLET ORAL DAILY
Refills: 0 | Status: DISCONTINUED | OUTPATIENT
Start: 2024-11-03 | End: 2024-11-04

## 2024-11-03 RX ADMIN — Medication 1 TABLET(S): at 11:33

## 2024-11-03 RX ADMIN — FOLIC ACID 1 MILLIGRAM(S): 1 TABLET ORAL at 11:33

## 2024-11-03 RX ADMIN — Medication 100 MILLIGRAM(S): at 11:33

## 2024-11-03 RX ADMIN — Medication 1.5 MILLIGRAM(S): at 05:12

## 2024-11-03 RX ADMIN — Medication 10 MILLIGRAM(S): at 05:13

## 2024-11-03 RX ADMIN — PIPERACILLIN AND TAZOBACTAM 25 GRAM(S): .5; 4 INJECTION, POWDER, LYOPHILIZED, FOR SOLUTION INTRAVENOUS at 05:12

## 2024-11-03 RX ADMIN — GABAPENTIN 100 MILLIGRAM(S): 300 CAPSULE ORAL at 13:46

## 2024-11-03 RX ADMIN — Medication 2: at 17:09

## 2024-11-03 RX ADMIN — Medication 2: at 12:39

## 2024-11-03 RX ADMIN — Medication 1: at 08:33

## 2024-11-03 RX ADMIN — Medication 20 UNIT(S): at 22:01

## 2024-11-03 RX ADMIN — Medication 7 UNIT(S): at 08:33

## 2024-11-03 RX ADMIN — CARVEDILOL 6.25 MILLIGRAM(S): 25 TABLET, FILM COATED ORAL at 05:12

## 2024-11-03 RX ADMIN — DIBASIC SODIUM PHOSPHATE, MONOBASIC POTASSIUM PHOSPHATE AND MONOBASIC SODIUM PHOSPHATE 1 PACKET(S): 852; 155; 130 TABLET ORAL at 17:12

## 2024-11-03 RX ADMIN — HEPARIN SODIUM 5000 UNIT(S): 10000 INJECTION INTRAVENOUS; SUBCUTANEOUS at 17:13

## 2024-11-03 RX ADMIN — Medication 1.5 MILLIGRAM(S): at 17:14

## 2024-11-03 RX ADMIN — Medication 2 MILLIGRAM(S): at 02:16

## 2024-11-03 RX ADMIN — GUAIFENESIN 600 MILLIGRAM(S): 100 LIQUID ORAL at 17:13

## 2024-11-03 RX ADMIN — VANCOMYCIN HYDROCHLORIDE 250 MILLIGRAM(S): KIT at 08:36

## 2024-11-03 RX ADMIN — Medication 650 MILLIGRAM(S): at 14:16

## 2024-11-03 RX ADMIN — GABAPENTIN 100 MILLIGRAM(S): 300 CAPSULE ORAL at 22:01

## 2024-11-03 RX ADMIN — Medication 650 MILLIGRAM(S): at 13:45

## 2024-11-03 RX ADMIN — HEPARIN SODIUM 5000 UNIT(S): 10000 INJECTION INTRAVENOUS; SUBCUTANEOUS at 05:13

## 2024-11-03 RX ADMIN — GUAIFENESIN 600 MILLIGRAM(S): 100 LIQUID ORAL at 05:13

## 2024-11-03 RX ADMIN — Medication 7 UNIT(S): at 17:10

## 2024-11-03 RX ADMIN — Medication 1 TABLET(S): at 08:37

## 2024-11-03 RX ADMIN — Medication 7 UNIT(S): at 12:39

## 2024-11-03 RX ADMIN — GABAPENTIN 100 MILLIGRAM(S): 300 CAPSULE ORAL at 05:13

## 2024-11-03 RX ADMIN — Medication 1 TABLET(S): at 17:12

## 2024-11-03 RX ADMIN — CARVEDILOL 6.25 MILLIGRAM(S): 25 TABLET, FILM COATED ORAL at 17:15

## 2024-11-03 RX ADMIN — PIPERACILLIN AND TAZOBACTAM 25 GRAM(S): .5; 4 INJECTION, POWDER, LYOPHILIZED, FOR SOLUTION INTRAVENOUS at 22:01

## 2024-11-03 RX ADMIN — PIPERACILLIN AND TAZOBACTAM 25 GRAM(S): .5; 4 INJECTION, POWDER, LYOPHILIZED, FOR SOLUTION INTRAVENOUS at 13:45

## 2024-11-03 NOTE — PROGRESS NOTE ADULT - ASSESSMENT
49-year-old male with  DM2, HTN, HLD, NEUROPATHY, PVD,ckd4, ETOH abuse, Diabetic foot ulcer with cellullitis ,ac on ch OM with septic arthritis amputation of of 2nd left toe, OM left great toe-,surg done by podiatry,9/20 culture of wound E fecalis, path OM , with clear margins, pt was discharged on PO abx as pt refused IV, due to his job.came back to ED ,Patient states that since being discharged, he has not followed up with podiatry.  He has been on amoxicillin twice daily and states he has been compliant with his antibiotic.  Patient noted that his surgical site had developed a bad odor with some discharge.  Left foot with exposed metatarsal head.    RECOMMENDATIONS  Left foot osteomyelitis with cellulitis  noted Culture - Tissue with Gram Stain (09.20.24 @ 19:21) with Enterococcus faecalis (amp S) and rare Staphylococcus epidermidis (MRSE)   Culture - Other (09.17.24 @ 19:10) Numerous Enterobacter cloacae complex  -  Piperacillin/Tazobactam: S <=8 Treatment with Pipercillin/Tazobactam is not recommended in severe infections caused by Klebsiella aerogenes, Enterobacter cloacae complex, and Citrobacter freundii complex.  Amputation of single toe 20-Sep-2024 17:35:36  Salvatore Davenport  Filleted toe flap 20-Sep-2024 17:35:44   -Zosyn and Vanco started 11/1-continue for now but may require further surgery (podiatry involved)  recommend MRI    Thank you for consulting us and involving us in the management of this most interesting and challenging case.  We will follow along in the care of this patient. Please call us at [phone] or text me directly on my cell# at [phone] with any concerns.    Starting Tuesday Dr Mack will be assuming care of this patient so please contact him with any questions, concerns or new micro data.       49-year-old male with  DM2, HTN, HLD, NEUROPATHY, PVD,ckd4, ETOH abuse, Diabetic foot ulcer with cellullitis ,ac on ch OM with septic arthritis amputation of of 2nd left toe, OM left great toe-,surg done by podiatry,9/20 culture of wound E fecalis, path OM , with clear margins, pt was discharged on PO abx as pt refused IV, due to his job.came back to ED ,Patient states that since being discharged, he has not followed up with podiatry.  He has been on amoxicillin twice daily and states he has been compliant with his antibiotic.  Patient noted that his surgical site had developed a bad odor with some discharge.  Left foot with exposed metatarsal head.    RECOMMENDATIONS  Left foot osteomyelitis with cellulitis  noted Culture - Tissue with Gram Stain (09.20.24 @ 19:21) with Enterococcus faecalis (amp S) and rare Staphylococcus epidermidis (MRSE)   Culture - Other (09.17.24 @ 19:10) Numerous Enterobacter cloacae complex  -  Piperacillin/Tazobactam: S <=8 Treatment with Pipercillin/Tazobactam is not recommended in severe infections caused by Klebsiella aerogenes, Enterobacter cloacae complex, and Citrobacter freundii complex.  Amputation of single toe 20-Sep-2024 17:35:36  Salvatore Davenport  Filleted toe flap 20-Sep-2024 17:35:44   -Zosyn and Vanco started 11/1-continue for now but may require further surgery (podiatry involved)  recommend MRI    Thank you for consulting us and involving us in the management of this most interesting and challenging case.  We will follow along in the care of this patient. Please call us at [phone] or text me directly on my cell# at [phone] with any concerns.    Starting tomorrow Dr Mack will be assuming care of this patient so please contact him with any questions, concerns or new micro data.

## 2024-11-03 NOTE — PROGRESS NOTE ADULT - ASSESSMENT
49-year-old male with past medical history of DM2, HTN, HLD, NEUROPATHY, PVD,ckd4, ETOH abuser,  NON COMPLIANT WITH TT, recently discharged after tt for , Diabetic foot ulcer with cellullitis ,ac on ch OM with sepstic arthritis amputation of of 2nd left toe, OM left great toe-,surg done by podiatry,9/20culture of wound E fecalis, path OM , with clear margins, pt was discharged on PO abx as pt refused IV, due to his job.came back to ED today ,Patient states that since being discharged, he has not followed up with podiatry.  He has been on amoxicillin twice daily and states he has been compliant with his antibiotic.  Patient noted today that his surgical site had developed a bad odor with some discharge.  He denies fever, pain, surrounding redness to his foot or lower extremity. Patient states last alcoholic drink was yesterday.  Patient arrived in ED with no shoes on.  PMD Dr. Masters, he admits to not seeing her for over 2 years.   IN ED afebrile, with No fever, no leucocytosis, has foul smelly discharge had Podiatry consult in ED-iabetic foot infection left foot with expose metatarsal headPt had ALcohol level of 332, and has elevated LFT.pt started on vanc zosyn and admitted  #Diabetic foot ulcer with cellullitis with ac on ch osteomyelitis left foot   started on IV abx,ID and podiatry consult noted, cont vanc zosyn   CT/MRI   ## DM2 with hyperglycemia, with neuropathy , nephropathy with likely PVD    ssi, consistent carb, Endo consult,    # Ess HTN   continue current tt  # HLD    # DVT prophylaxis  ID , endo and podiatry consult  #  CKD 3   nephro consult  likely Diabetic nephropathy   # HLD   low fat diet  # ETOH use disorder  cessation counselling,addiction medicine consult   folic acid, thiamine, lorazepam prn for withdrawl, presently high etoh level  #Obesity   # EDUAR

## 2024-11-03 NOTE — PROGRESS NOTE ADULT - ASSESSMENT
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    haseeb  etoh use disorder  sidney  dm  htn  hld  neuropathy  ckd  poor compliance  foot wound - infection    ct imaging done, report pending  vs noted  abx  ciwa    ciwa  ativan ATC and PRN  folic acid  thiamine  SBIRT documentation  tox screen reviewed  etoh level on admission noted  ABX for foot wound infection  ID and Podiatry eval  pain assessment  SW eval  counseling - education - emotional support  cvs rx regimen and BP control - HTN HLD  DM care  serial FS  dietary discretion  monitor VS and HD and Sat

## 2024-11-03 NOTE — PROGRESS NOTE ADULT - PROBLEM SELECTOR PLAN 1
Chart reviewed and Patient evaluated  Discussed diagnosis and treatment with patient  There is concern for open diabetic foot infection left foot with expose metatarsal head  Wound culture results pending.  Xray reviewed left foot, no obvious or acute fracture pathology. metatarsal head consistent with possible OM.  Applied dry sterile dressing  Continue IV antibiotics As Per ID  Rec elevate left lower extremity  NWB left lower extremity  Patient understands he is at risk for a further proximal amputation, and is at risk to lose part of the foot, all of the foot, bka, sepsis, loss of life.  Will allow demarcation of infective process  Pt will likely require additional surgical debridements. Tentatively considering Tuesday for additional surgical procedures.  Recs MRI left foot for further investigation   Requesting medical optimization prior to the surgery  Podiatry will follow while in house.  Plan discussed with attending.

## 2024-11-04 ENCOUNTER — TRANSCRIPTION ENCOUNTER (OUTPATIENT)
Age: 49
End: 2024-11-04

## 2024-11-04 VITALS
OXYGEN SATURATION: 95 % | SYSTOLIC BLOOD PRESSURE: 164 MMHG | HEART RATE: 93 BPM | RESPIRATION RATE: 18 BRPM | TEMPERATURE: 98 F | DIASTOLIC BLOOD PRESSURE: 93 MMHG

## 2024-11-04 LAB
ALBUMIN SERPL ELPH-MCNC: 2.7 G/DL — LOW (ref 3.3–5)
ALP SERPL-CCNC: 148 U/L — HIGH (ref 30–120)
ALT FLD-CCNC: 30 U/L — SIGNIFICANT CHANGE UP (ref 10–60)
ANION GAP SERPL CALC-SCNC: 7 MMOL/L — SIGNIFICANT CHANGE UP (ref 5–17)
AST SERPL-CCNC: 40 U/L — SIGNIFICANT CHANGE UP (ref 10–40)
BASOPHILS # BLD AUTO: 0.06 K/UL — SIGNIFICANT CHANGE UP (ref 0–0.2)
BASOPHILS NFR BLD AUTO: 1.3 % — SIGNIFICANT CHANGE UP (ref 0–2)
BILIRUB SERPL-MCNC: 0.7 MG/DL — SIGNIFICANT CHANGE UP (ref 0.2–1.2)
BUN SERPL-MCNC: 23 MG/DL — SIGNIFICANT CHANGE UP (ref 7–23)
CALCIUM SERPL-MCNC: 9 MG/DL — SIGNIFICANT CHANGE UP (ref 8.4–10.5)
CHLORIDE SERPL-SCNC: 102 MMOL/L — SIGNIFICANT CHANGE UP (ref 96–108)
CO2 SERPL-SCNC: 29 MMOL/L — SIGNIFICANT CHANGE UP (ref 22–31)
CREAT SERPL-MCNC: 2.27 MG/DL — HIGH (ref 0.5–1.3)
EGFR: 34 ML/MIN/1.73M2 — LOW
EOSINOPHIL # BLD AUTO: 0.5 K/UL — SIGNIFICANT CHANGE UP (ref 0–0.5)
EOSINOPHIL NFR BLD AUTO: 10.8 % — HIGH (ref 0–6)
GLUCOSE BLDC GLUCOMTR-MCNC: 183 MG/DL — HIGH (ref 70–99)
GLUCOSE SERPL-MCNC: 204 MG/DL — HIGH (ref 70–99)
HCT VFR BLD CALC: 32.2 % — LOW (ref 39–50)
HGB BLD-MCNC: 10.4 G/DL — LOW (ref 13–17)
IMM GRANULOCYTES NFR BLD AUTO: 0.4 % — SIGNIFICANT CHANGE UP (ref 0–0.9)
LYMPHOCYTES # BLD AUTO: 0.67 K/UL — LOW (ref 1–3.3)
LYMPHOCYTES # BLD AUTO: 14.5 % — SIGNIFICANT CHANGE UP (ref 13–44)
MCHC RBC-ENTMCNC: 28.3 PG — SIGNIFICANT CHANGE UP (ref 27–34)
MCHC RBC-ENTMCNC: 32.3 G/DL — SIGNIFICANT CHANGE UP (ref 32–36)
MCV RBC AUTO: 87.5 FL — SIGNIFICANT CHANGE UP (ref 80–100)
MONOCYTES # BLD AUTO: 0.53 K/UL — SIGNIFICANT CHANGE UP (ref 0–0.9)
MONOCYTES NFR BLD AUTO: 11.5 % — SIGNIFICANT CHANGE UP (ref 2–14)
NEUTROPHILS # BLD AUTO: 2.83 K/UL — SIGNIFICANT CHANGE UP (ref 1.8–7.4)
NEUTROPHILS NFR BLD AUTO: 61.5 % — SIGNIFICANT CHANGE UP (ref 43–77)
NRBC # BLD: 0 /100 WBCS — SIGNIFICANT CHANGE UP (ref 0–0)
PLATELET # BLD AUTO: 102 K/UL — LOW (ref 150–400)
POTASSIUM SERPL-MCNC: 4.1 MMOL/L — SIGNIFICANT CHANGE UP (ref 3.5–5.3)
POTASSIUM SERPL-SCNC: 4.1 MMOL/L — SIGNIFICANT CHANGE UP (ref 3.5–5.3)
PROT SERPL-MCNC: 7.5 G/DL — SIGNIFICANT CHANGE UP (ref 6–8.3)
RBC # BLD: 3.68 M/UL — LOW (ref 4.2–5.8)
RBC # FLD: 13.5 % — SIGNIFICANT CHANGE UP (ref 10.3–14.5)
SODIUM SERPL-SCNC: 138 MMOL/L — SIGNIFICANT CHANGE UP (ref 135–145)
WBC # BLD: 4.61 K/UL — SIGNIFICANT CHANGE UP (ref 3.8–10.5)
WBC # FLD AUTO: 4.61 K/UL — SIGNIFICANT CHANGE UP (ref 3.8–10.5)

## 2024-11-04 PROCEDURE — 83605 ASSAY OF LACTIC ACID: CPT

## 2024-11-04 PROCEDURE — 85610 PROTHROMBIN TIME: CPT

## 2024-11-04 PROCEDURE — 83735 ASSAY OF MAGNESIUM: CPT

## 2024-11-04 PROCEDURE — 85730 THROMBOPLASTIN TIME PARTIAL: CPT

## 2024-11-04 PROCEDURE — 96365 THER/PROPH/DIAG IV INF INIT: CPT

## 2024-11-04 PROCEDURE — 99285 EMERGENCY DEPT VISIT HI MDM: CPT | Mod: 25

## 2024-11-04 PROCEDURE — 74176 CT ABD & PELVIS W/O CONTRAST: CPT | Mod: MC

## 2024-11-04 PROCEDURE — 80076 HEPATIC FUNCTION PANEL: CPT

## 2024-11-04 PROCEDURE — 71250 CT THORAX DX C-: CPT | Mod: MC

## 2024-11-04 PROCEDURE — 86901 BLOOD TYPING SEROLOGIC RH(D): CPT

## 2024-11-04 PROCEDURE — 84443 ASSAY THYROID STIM HORMONE: CPT

## 2024-11-04 PROCEDURE — 93005 ELECTROCARDIOGRAM TRACING: CPT

## 2024-11-04 PROCEDURE — 73630 X-RAY EXAM OF FOOT: CPT

## 2024-11-04 PROCEDURE — 80202 ASSAY OF VANCOMYCIN: CPT

## 2024-11-04 PROCEDURE — 82803 BLOOD GASES ANY COMBINATION: CPT

## 2024-11-04 PROCEDURE — 87070 CULTURE OTHR SPECIMN AEROBIC: CPT

## 2024-11-04 PROCEDURE — 94640 AIRWAY INHALATION TREATMENT: CPT

## 2024-11-04 PROCEDURE — 80307 DRUG TEST PRSMV CHEM ANLYZR: CPT

## 2024-11-04 PROCEDURE — 71045 X-RAY EXAM CHEST 1 VIEW: CPT

## 2024-11-04 PROCEDURE — 87077 CULTURE AEROBIC IDENTIFY: CPT

## 2024-11-04 PROCEDURE — 36415 COLL VENOUS BLD VENIPUNCTURE: CPT

## 2024-11-04 PROCEDURE — 85025 COMPLETE CBC W/AUTO DIFF WBC: CPT

## 2024-11-04 PROCEDURE — 80048 BASIC METABOLIC PNL TOTAL CA: CPT

## 2024-11-04 PROCEDURE — 86900 BLOOD TYPING SEROLOGIC ABO: CPT

## 2024-11-04 PROCEDURE — 87040 BLOOD CULTURE FOR BACTERIA: CPT

## 2024-11-04 PROCEDURE — 81001 URINALYSIS AUTO W/SCOPE: CPT

## 2024-11-04 PROCEDURE — 86850 RBC ANTIBODY SCREEN: CPT

## 2024-11-04 PROCEDURE — 73700 CT LOWER EXTREMITY W/O DYE: CPT | Mod: MC

## 2024-11-04 PROCEDURE — 80053 COMPREHEN METABOLIC PANEL: CPT

## 2024-11-04 PROCEDURE — 84100 ASSAY OF PHOSPHORUS: CPT

## 2024-11-04 PROCEDURE — 87635 SARS-COV-2 COVID-19 AMP PRB: CPT

## 2024-11-04 PROCEDURE — 82962 GLUCOSE BLOOD TEST: CPT

## 2024-11-04 PROCEDURE — 96367 TX/PROPH/DG ADDL SEQ IV INF: CPT

## 2024-11-04 RX ORDER — THIAMINE HCL 100 MG
1 TABLET ORAL
Qty: 30 | Refills: 0
Start: 2024-11-04 | End: 2024-12-03

## 2024-11-04 RX ORDER — AMOXICILLIN AND CLAVULANATE POTASSIUM 600; 42.9 MG/5ML; MG/5ML
875 POWDER, FOR SUSPENSION ORAL
Qty: 20 | Refills: 0
Start: 2024-11-04 | End: 2024-11-13

## 2024-11-04 RX ADMIN — GUAIFENESIN 600 MILLIGRAM(S): 100 LIQUID ORAL at 05:32

## 2024-11-04 RX ADMIN — Medication 1 MILLIGRAM(S): at 06:31

## 2024-11-04 RX ADMIN — Medication 1 TABLET(S): at 07:42

## 2024-11-04 RX ADMIN — PIPERACILLIN AND TAZOBACTAM 25 GRAM(S): .5; 4 INJECTION, POWDER, LYOPHILIZED, FOR SOLUTION INTRAVENOUS at 05:32

## 2024-11-04 RX ADMIN — HEPARIN SODIUM 5000 UNIT(S): 10000 INJECTION INTRAVENOUS; SUBCUTANEOUS at 05:32

## 2024-11-04 RX ADMIN — Medication 1: at 07:43

## 2024-11-04 RX ADMIN — Medication 7 UNIT(S): at 07:42

## 2024-11-04 RX ADMIN — Medication 10 MILLIGRAM(S): at 05:32

## 2024-11-04 RX ADMIN — GABAPENTIN 100 MILLIGRAM(S): 300 CAPSULE ORAL at 05:32

## 2024-11-04 RX ADMIN — Medication 1 MILLIGRAM(S): at 10:41

## 2024-11-04 RX ADMIN — CARVEDILOL 6.25 MILLIGRAM(S): 25 TABLET, FILM COATED ORAL at 05:32

## 2024-11-04 NOTE — DISCHARGE NOTE PROVIDER - HOSPITAL COURSE
49-year-old male with past medical history of DM2, HTN, HLD, NEUROPATHY, PVD,ckd4, ETOH abuser,  NON COMPLIANT WITH TT, recently discharged after tt for , Diabetic foot ulcer with cellullitis ,ac on ch OM with sepstic arthritis amputation of of 2nd left toe, OM left great toe-,surg done by podiatry,9/20culture of wound E fecalis, path OM , with clear margins, pt was discharged on PO abx as pt refused IV, due to his job.came back to ED today ,Patient states that since being discharged, he has not followed up with podiatry.  He has been on amoxicillin twice daily and states he has been compliant with his antibiotic.  Patient noted today that his surgical site had developed a bad odor with some discharge.  He denies fever, pain, surrounding redness to his foot or lower extremity. Patient states last alcoholic drink was yesterday.  Patient arrived in ED with no shoes on.  PMD Dr. Masters, he admits to not seeing her for over 2 years.   IN ED afebrile, with No fever, no leucocytosis, has foul smelly discharge had Podiatry consult in ED-iabetic foot infection left foot with expose metatarsal headPt had ALcohol level of 332, and has elevated LFT.pt started on vanc zosyn and admitted  #Diabetic foot ulcer with cellullitis with ac on ch osteomyelitis left foot   started on IV abx,ID and podiatry consult noted, cont vanc zosyn   CT/MRI   ## DM2 with hyperglycemia, with neuropathy , nephropathy with likely PVD    ssi, consistent carb, Endo consult,    # Ess HTN   continue current tt  # HLD    # DVT prophylaxis  ID , endo and podiatry consult  #  CKD 3   nephro consult  likely Diabetic nephropathy   # HLD   low fat diet  # ETOH use disorder  cessation counselling,addiction medicine consult   folic acid, thiamine, lorazepam with CIWA protocol as per DR Vázquez  #Obesity   # EDUAR  # Anemia of ch disease  Patient wants to leave AMA due to some personal social issues, explained risk of loosing limb and sepsis,and lossof life.  If patient changes mind, Patient is intermediate risk for surg and is optimized for same

## 2024-11-04 NOTE — PROVIDER CONTACT NOTE (OTHER) - SITUATION
patient states his work equipment was sold out of a storage unit, and he needs to leave. patient states "My work equipment was sold out of a storage unit", and "I need to leave, and take care of stuff".

## 2024-11-04 NOTE — DISCHARGE NOTE PROVIDER - CARE PROVIDER_API CALL
Salvatore Davenport  Podiatric Medicine and Surgery  [address] [place] [address]  Phone: [phone]  Fax: [phone]  Follow Up Time:

## 2024-11-04 NOTE — PROGRESS NOTE ADULT - PROBLEM SELECTOR PLAN 1
Chart reviewed and Patient evaluated  Discussed diagnosis and treatment with patient  There is concern for open diabetic foot infection left foot with expose metatarsal head  Wound culture results show common nicholas  Xray reviewed left foot, no obvious or acute fracture pathology. metatarsal head consistent with possible OM.  Applied dry sterile dressing, packing removed  Rec continue IV antibiotics As Per ID  Rec elevate left lower extremity  NWB left lower extremity  Will cont to allow demarcation of infective process  Recs MRI left foot for further investigation   Patient states he is being evicted from his current living arrangements, and that he wants to leave the hospital to take care of his belongings prior surgical care.  Patient states he will return later in the week to continue his treatment, and understands he will be leaving AMA.  Patient understands he is high risk for a further infection, proximal amputation, and is at risk to lose part of the foot, all of the foot, bka, sepsis, loss of life.  Tentatively planned for surgical intervention tomorrow, NPO after midnight for debridement of all non-viable soft tissue and bone with deep soft tissue cultures all left foot, pending patient discharge decision  Plan will be discussed with attending.

## 2024-11-04 NOTE — DISCHARGE NOTE NURSING/CASE MANAGEMENT/SOCIAL WORK - FINANCIAL ASSISTANCE
[facility] provides services at a reduced cost to those who are determined to be eligible through [facility]’s financial assistance program. Information regarding [facility]’s financial assistance program can be found by going to [email or web address] or by calling [phone].

## 2024-11-04 NOTE — PROGRESS NOTE ADULT - PROVIDER SPECIALTY LIST ADULT
Internal Medicine
Addiction Medicine
Infectious Disease
Podiatry
Infectious Disease
Addiction Medicine
Internal Medicine
Podiatry
Internal Medicine
Podiatry

## 2024-11-04 NOTE — PROGRESS NOTE ADULT - SUBJECTIVE AND OBJECTIVE BOX
JOBSUZANNA is a 49yMale , patient examined and chart reviewed.     INTERVAL HPI/ OVERNIGHT EVENTS:   Afebrile Wants to sign out AMA to take care of personal issues.    PAST MEDICAL & SURGICAL HISTORY:  Prediabetes  HTN (hypertension)  HLD (hyperlipidemia)  DM2 (diabetes mellitus, type 2)        For details regarding the patient's social history, family history, and other miscellaneous elements, please refer the initial infectious diseases consultation and/or the admitting history and physical examination for this admission.      ROS:  CONSTITUTIONAL:  Negative fever or chills  EYES:  Negative  blurry vision or double vision  CARDIOVASCULAR:  Negative for chest pain or palpitations  RESPIRATORY:  Negative for cough, wheezing, or SOB   GASTROINTESTINAL:  Negative for nausea, vomiting, diarrhea, constipation, or abdominal pain  GENITOURINARY:  Negative frequency, urgency or dysuria  NEUROLOGIC:  No headache, confusion, dizziness, lightheadedness  All other systems were reviewed and are negative     No Known Allergies      Current inpatient medications :    ANTIBIOTICS/RELEVANT:  lactobacillus acidophilus 1 Tablet(s) Oral two times a day with meals  piperacillin/tazobactam IVPB.. 3.375 Gram(s) IV Intermittent every 8 hours      acetaminophen     Tablet .. 650 milliGRAM(s) Oral every 6 hours PRN  albuterol    0.083% 2.5 milliGRAM(s) Nebulizer every 6 hours PRN  amLODIPine   Tablet 10 milliGRAM(s) Oral daily  carvedilol 6.25 milliGRAM(s) Oral every 12 hours  dextrose 5%. 1000 milliLiter(s) IV Continuous <Continuous>  dextrose 5%. 1000 milliLiter(s) IV Continuous <Continuous>  dextrose 50% Injectable 25 Gram(s) IV Push once  dextrose 50% Injectable 25 Gram(s) IV Push once  dextrose 50% Injectable 12.5 Gram(s) IV Push once  dextrose Oral Gel 15 Gram(s) Oral once PRN  fluticasone propionate 50 MICROgram(s)/spray Nasal Spray 1 Spray(s) Both Nostrils two times a day PRN  folic acid 1 milliGRAM(s) Oral daily  gabapentin 100 milliGRAM(s) Oral three times a day  glucagon  Injectable 1 milliGRAM(s) IntraMuscular once  guaiFENesin  milliGRAM(s) Oral every 12 hours  heparin   Injectable 5000 Unit(s) SubCutaneous every 12 hours  insulin glargine Injectable (LANTUS) 20 Unit(s) SubCutaneous at bedtime  insulin lispro (ADMELOG) corrective regimen sliding scale   SubCutaneous three times a day before meals  insulin lispro (ADMELOG) corrective regimen sliding scale   SubCutaneous at bedtime  insulin lispro Injectable (ADMELOG) 7 Unit(s) SubCutaneous before dinner  insulin lispro Injectable (ADMELOG) 7 Unit(s) SubCutaneous before breakfast  insulin lispro Injectable (ADMELOG) 7 Unit(s) SubCutaneous before lunch  LORazepam     Tablet 1 milliGRAM(s) Oral every 4 hours  LORazepam     Tablet   Oral   LORazepam   Injectable 2 milliGRAM(s) IV Push every 5 minutes PRN  multivitamin 1 Tablet(s) Oral daily  potassium phosphate / sodium phosphate Powder (PHOS-NaK) 1 Packet(s) Oral daily  thiamine 100 milliGRAM(s) Oral daily      Objective:    11-03 @ 07:01  -  11-04 @ 07:00  --------------------------------------------------------  IN: 350 mL / OUT: 0 mL / NET: 350 mL      T(C): 36.8 (11-04-24 @ 10:40), Max: 38.3 (11-03-24 @ 13:29)  HR: 93 (11-04-24 @ 10:40) (86 - 101)  BP: 164/93 (11-04-24 @ 10:40) (153/85 - 171/96)  RR: 18 (11-04-24 @ 10:40) (18 - 18)  SpO2: 95% (11-04-24 @ 10:40) (93% - 95%)        Physical Exam:  General: no acute distress  Neck: supple, trachea midline  Lungs: clear, no wheeze/rhonchi  Cardiovascular: regular rate and rhythm, S1 S2  Abdomen: soft, nontender,  bowel sounds normal  Neurological: alert and oriented x3  Skin: no rash  Extremities: Left foot drsg c/d/i        LABS:                        10.4   4.61  )-----------( 102      ( 04 Nov 2024 07:40 )             32.2       11-04    138  |  102  |  23  ----------------------------<  204[H]  4.1   |  29  |  2.27[H]    Ca    9.0      04 Nov 2024 07:40  Phos  2.2     11-03  Mg     1.8     11-03    TPro  7.5  /  Alb  2.7[L]  /  TBili  0.7  /  DBili  x   /  AST  40  /  ALT  30  /  AlkPhos  148[H]  11-04      MICROBIOLOGY:    Culture - Wound Aerobic (collected 01 Nov 2024 20:56)  Source: .Other  Final Report (03 Nov 2024 20:58):    Commensal nicholas consistent with body site    Culture - Blood (collected 01 Nov 2024 20:00)  Source: .Blood BLOOD  Preliminary Report (04 Nov 2024 01:02):    No growth at 48 Hours    Culture - Blood (collected 01 Nov 2024 20:00)  Source: .Blood BLOOD  Preliminary Report (04 Nov 2024 01:02):    No growth at 48 Hours    RADIOLOGY & ADDITIONAL STUDIES:        Assessment :  49-year-old male with  DM2, HTN, HLD, NEUROPATHY, PVD,ckd4, ETOH abuse, Diabetic foot ulcer with cellullitis ,ac on ch OM 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  He has been on amoxicillin twice daily and states he has been compliant with his antibiotic.  Patient noted that his surgical site had developed a bad odor with some discharge.  Left foot with exposed metatarsal head.      Plan  Cont [facility]  Podiatry recommend surgical intervention   Pt wants to sign out AMA to take care of personal issues-- d/w at length as pt is high risk for limb loss and sepsis pt understand risks    D/w Dr Weaver    Continue with present regiment.  Appropriate use of antibiotics and adverse effects reviewed.      I have discussed the above plan of care with patient in detail. he expressed understanding of the  treatment plan . Risks, benefits and alternatives discussed in detail. I have asked if he has any questions or concerns and appropriately addressed them to the best of my ability .    > 35 minutes were spent in direct patient care reviewing notes, medications ,labs data/ imaging , discussion with multidisciplinary team.    Thank you for allowing me to participate in care of your patient .    Linsey Mack MD  Infectious Disease  [phone]
Date/Time Patient Seen:  		  Referring MD:   Data Reviewed	       Patient is a 49y old  Male who presents with a chief complaint of infected left foot (03 Nov 2024 16:00)      Subjective/HPI     PAST MEDICAL & SURGICAL HISTORY:  Prediabetes    HTN (hypertension)    HLD (hyperlipidemia)    DM2 (diabetes mellitus, type 2)          Medication list         MEDICATIONS  (STANDING):  amLODIPine   Tablet 10 milliGRAM(s) Oral daily  carvedilol 6.25 milliGRAM(s) Oral every 12 hours  dextrose 5%. 1000 milliLiter(s) (100 mL/Hr) IV Continuous <Continuous>  dextrose 5%. 1000 milliLiter(s) (50 mL/Hr) IV Continuous <Continuous>  dextrose 50% Injectable 25 Gram(s) IV Push once  dextrose 50% Injectable 25 Gram(s) IV Push once  dextrose 50% Injectable 12.5 Gram(s) IV Push once  folic acid 1 milliGRAM(s) Oral daily  gabapentin 100 milliGRAM(s) Oral three times a day  glucagon  Injectable 1 milliGRAM(s) IntraMuscular once  guaiFENesin  milliGRAM(s) Oral every 12 hours  heparin   Injectable 5000 Unit(s) SubCutaneous every 12 hours  influenza   Vaccine 0.5 milliLiter(s) IntraMuscular once  insulin glargine Injectable (LANTUS) 20 Unit(s) SubCutaneous at bedtime  insulin lispro (ADMELOG) corrective regimen sliding scale   SubCutaneous three times a day before meals  insulin lispro (ADMELOG) corrective regimen sliding scale   SubCutaneous at bedtime  insulin lispro Injectable (ADMELOG) 7 Unit(s) SubCutaneous before breakfast  insulin lispro Injectable (ADMELOG) 7 Unit(s) SubCutaneous before lunch  insulin lispro Injectable (ADMELOG) 7 Unit(s) SubCutaneous before dinner  lactobacillus acidophilus 1 Tablet(s) Oral two times a day with meals  LORazepam     Tablet 1 milliGRAM(s) Oral every 4 hours  LORazepam     Tablet   Oral   multivitamin 1 Tablet(s) Oral daily  piperacillin/tazobactam IVPB.. 3.375 Gram(s) IV Intermittent every 8 hours  potassium phosphate / sodium phosphate Powder (PHOS-NaK) 1 Packet(s) Oral daily  thiamine 100 milliGRAM(s) Oral daily    MEDICATIONS  (PRN):  acetaminophen     Tablet .. 650 milliGRAM(s) Oral every 6 hours PRN Temp greater or equal to 38C (100.4F), Mild Pain (1 - 3), Moderate Pain (4 - 6)  albuterol    0.083% 2.5 milliGRAM(s) Nebulizer every 6 hours PRN Shortness of Breath and/or Wheezing  dextrose Oral Gel 15 Gram(s) Oral once PRN Blood Glucose LESS THAN 70 milliGRAM(s)/deciliter  fluticasone propionate 50 MICROgram(s)/spray Nasal Spray 1 Spray(s) Both Nostrils two times a day PRN nasal congestion  LORazepam   Injectable 2 milliGRAM(s) IV Push every 5 minutes PRN for ciwa > 5         Vitals log        ICU Vital Signs Last 24 Hrs  T(C): 36.4 (04 Nov 2024 01:17), Max: 38.3 (03 Nov 2024 13:29)  T(F): 97.6 (04 Nov 2024 01:17), Max: 101 (03 Nov 2024 13:29)  HR: 91 (04 Nov 2024 01:17) (86 - 101)  BP: 159/78 (04 Nov 2024 01:17) (153/85 - 171/96)  BP(mean): --  ABP: --  ABP(mean): --  RR: 18 (04 Nov 2024 01:17) (18 - 18)  SpO2: 93% (04 Nov 2024 01:17) (93% - 95%)    O2 Parameters below as of 04 Nov 2024 01:17  Patient On (Oxygen Delivery Method): room air                 Input and Output:  I&O's Detail    02 Nov 2024 08:01  -  03 Nov 2024 07:00  --------------------------------------------------------  IN:    IV PiggyBack: 450 mL  Total IN: 450 mL    OUT:  Total OUT: 0 mL    Total NET: 450 mL      03 Nov 2024 07:01  -  04 Nov 2024 05:18  --------------------------------------------------------  IN:    IV PiggyBack: 350 mL  Total IN: 350 mL    OUT:  Total OUT: 0 mL    Total NET: 350 mL          Lab Data                        10.5   6.13  )-----------( 91       ( 03 Nov 2024 06:00 )             32.3     11-03    136  |  102  |  22  ----------------------------<  152[H]  4.0   |  25  |  1.92[H]    Ca    8.7      03 Nov 2024 06:00  Phos  2.2     11-03  Mg     1.8     11-03    TPro  6.8  /  Alb  2.5[L]  /  TBili  1.0  /  DBili  x   /  AST  37  /  ALT  21  /  AlkPhos  133[H]  11-03            Review of Systems	      Objective     Physical Examination    heart s1s2  lung dc bS  head nc  head at      Pertinent Lab findings & Imaging      Malcolm:  NO   Adequate UO     I&O's Detail    02 Nov 2024 08:01  -  03 Nov 2024 07:00  --------------------------------------------------------  IN:    IV PiggyBack: 450 mL  Total IN: 450 mL    OUT:  Total OUT: 0 mL    Total NET: 450 mL      03 Nov 2024 07:01  -  04 Nov 2024 05:18  --------------------------------------------------------  IN:    IV PiggyBack: 350 mL  Total IN: 350 mL    OUT:  Total OUT: 0 mL    Total NET: 350 mL               Discussed with:     Cultures:	        Radiology                            
Patient is a 49y old  Male who presents with a chief complaint of infected left foot (04 Nov 2024 10:18)      INTERVAL HPI/OVERNIGHT EVENTS:overnight events noted    Home Medications:  acetaminophen 325 mg oral tablet: 2 tab(s) orally every 6 hours As needed Temp greater or equal to 38C (100.4F), Mild Pain (1 - 3), Moderate Pain (4 - 6) (01 Nov 2024 22:34)      MEDICATIONS  (STANDING):  amLODIPine   Tablet 10 milliGRAM(s) Oral daily  carvedilol 6.25 milliGRAM(s) Oral every 12 hours  dextrose 5%. 1000 milliLiter(s) (50 mL/Hr) IV Continuous <Continuous>  dextrose 5%. 1000 milliLiter(s) (100 mL/Hr) IV Continuous <Continuous>  dextrose 50% Injectable 25 Gram(s) IV Push once  dextrose 50% Injectable 25 Gram(s) IV Push once  dextrose 50% Injectable 12.5 Gram(s) IV Push once  folic acid 1 milliGRAM(s) Oral daily  gabapentin 100 milliGRAM(s) Oral three times a day  glucagon  Injectable 1 milliGRAM(s) IntraMuscular once  guaiFENesin  milliGRAM(s) Oral every 12 hours  heparin   Injectable 5000 Unit(s) SubCutaneous every 12 hours  influenza   Vaccine 0.5 milliLiter(s) IntraMuscular once  insulin glargine Injectable (LANTUS) 20 Unit(s) SubCutaneous at bedtime  insulin lispro (ADMELOG) corrective regimen sliding scale   SubCutaneous three times a day before meals  insulin lispro (ADMELOG) corrective regimen sliding scale   SubCutaneous at bedtime  insulin lispro Injectable (ADMELOG) 7 Unit(s) SubCutaneous before dinner  insulin lispro Injectable (ADMELOG) 7 Unit(s) SubCutaneous before breakfast  insulin lispro Injectable (ADMELOG) 7 Unit(s) SubCutaneous before lunch  lactobacillus acidophilus 1 Tablet(s) Oral two times a day with meals  LORazepam     Tablet   Oral   LORazepam     Tablet 1 milliGRAM(s) Oral every 4 hours  multivitamin 1 Tablet(s) Oral daily  piperacillin/tazobactam IVPB.. 3.375 Gram(s) IV Intermittent every 8 hours  potassium phosphate / sodium phosphate Powder (PHOS-NaK) 1 Packet(s) Oral daily  thiamine 100 milliGRAM(s) Oral daily    MEDICATIONS  (PRN):  acetaminophen     Tablet .. 650 milliGRAM(s) Oral every 6 hours PRN Temp greater or equal to 38C (100.4F), Mild Pain (1 - 3), Moderate Pain (4 - 6)  albuterol    0.083% 2.5 milliGRAM(s) Nebulizer every 6 hours PRN Shortness of Breath and/or Wheezing  dextrose Oral Gel 15 Gram(s) Oral once PRN Blood Glucose LESS THAN 70 milliGRAM(s)/deciliter  fluticasone propionate 50 MICROgram(s)/spray Nasal Spray 1 Spray(s) Both Nostrils two times a day PRN nasal congestion  LORazepam   Injectable 2 milliGRAM(s) IV Push every 5 minutes PRN for ciwa > 5      Allergies    No Known Allergies    Intolerances        REVIEW OF SYSTEMS:  CONSTITUTIONAL: No fever, weight loss, or fatigue  EYES: No eye pain, visual disturbances, or discharge  ENMT:  No difficulty hearing, tinnitus, vertigo; No sinus or throat pain  NECK: No pain or stiffness  BREASTS: No pain, masses, or nipple discharge  RESPIRATORY: No cough, wheezing, chills or hemoptysis; No shortness of breath  CARDIOVASCULAR: No chest pain, palpitations, dizziness, or leg swelling  GASTROINTESTINAL: No abdominal or epigastric pain. No nausea, vomiting, or hematemesis; No diarrhea or constipation. No melena or hematochezia.  GENITOURINARY: No dysuria, frequency, hematuria, or incontinence  NEUROLOGICAL: No headaches, memory loss, loss of strength, numbness, or tremors  SKIN: No itching, burning, rashes, or lesions   MUSCULOSKELETAL:left foot pain and swelling with ulcer having discharge  Vital Signs Last 24 Hrs  T(C): 36.8 (04 Nov 2024 05:24), Max: 38.3 (03 Nov 2024 13:29)  T(F): 98.2 (04 Nov 2024 05:24), Max: 101 (03 Nov 2024 13:29)  HR: 91 (04 Nov 2024 05:24) (86 - 101)  BP: 166/94 (04 Nov 2024 05:24) (153/85 - 171/96)  BP(mean): --  RR: 18 (04 Nov 2024 05:24) (18 - 18)  SpO2: 95% (04 Nov 2024 05:24) (93% - 95%)    Parameters below as of 04 Nov 2024 05:24  Patient On (Oxygen Delivery Method): room air        PHYSICAL EXAM:  GENERAL: NAD, well-groomed, well-developed  HEAD:  Atraumatic, Normocephalic  EYES: EOMI, PERRLA, conjunctiva and sclera clear  ENMT: Moist mucous membranes,   NECK: Supple, No JVD, Normal thyroid  NERVOUS SYSTEM:  Alert & Oriented X3,non focal  CHEST/LUNG: Clear to percussion bilaterally;   HEART: Regular rate and rhythm;   ABDOMEN: Soft, Nontender, Nondistended; Bowel sounds present  EXTREMITIES:  left foot in dressing  SKIN: No rashes or lesions    LABS:                        10.4   4.61  )-----------( 102      ( 04 Nov 2024 07:40 )             32.2     11-04    138  |  102  |  23  ----------------------------<  204[H]  4.1   |  29  |  2.27[H]    Ca    9.0      04 Nov 2024 07:40  Phos  2.2     11-03  Mg     1.8     11-03    TPro  7.5  /  Alb  2.7[L]  /  TBili  0.7  /  DBili  x   /  AST  40  /  ALT  30  /  AlkPhos  148[H]  11-04      Urinalysis Basic - ( 04 Nov 2024 07:40 )    Color: x / Appearance: x / SG: x / pH: x  Gluc: 204 mg/dL / Ketone: x  / Bili: x / Urobili: x   Blood: x / Protein: x / Nitrite: x   Leuk Esterase: x / RBC: x / WBC x   Sq Epi: x / Non Sq Epi: x / Bacteria: x      CAPILLARY BLOOD GLUCOSE      POCT Blood Glucose.: 183 mg/dL (04 Nov 2024 07:34)  POCT Blood Glucose.: 201 mg/dL (03 Nov 2024 20:57)  POCT Blood Glucose.: 235 mg/dL (03 Nov 2024 16:33)  POCT Blood Glucose.: 234 mg/dL (03 Nov 2024 12:17)        Culture - Wound Aerobic (collected 11-01-24 @ 20:56)  Source: .Other  Final Report (11-03-24 @ 20:58):    Commensal nicholas consistent with body site    Urinalysis with Rflx Culture (collected 11-01-24 @ 20:20)    Culture - Blood (collected 11-01-24 @ 20:00)  Source: .Blood BLOOD  Preliminary Report (11-04-24 @ 01:02):    No growth at 48 Hours    Culture - Blood (collected 11-01-24 @ 20:00)  Source: .Blood BLOOD  Preliminary Report (11-04-24 @ 01:02):    No growth at 48 Hours        I&O's Summary    03 Nov 2024 07:01  -  04 Nov 2024 07:00  --------------------------------------------------------  IN: 350 mL / OUT: 0 mL / NET: 350 mL        RADIOLOGY & ADDITIONAL TESTS:    Imaging Personally Reviewed:  [x ] YES  [ ] NO    Consultant(s) Notes Reviewed:  [x ] YES  [ ] NO    Care Discussed with Consultants/Other Providers [x ] YES  [ ] NO
  PROGRESS NOTE   Patient is a 49y old  Male who presents with a chief complaint of infected left foot (03 Nov 2024 09:24)      HPI:  49-year-old male with past medical history of DM2, HTN, HLD, NEUROPATHY, PVD,ckd4, ETOH abuser,  NON COMPLIANT WITH TT, recently discharged after tt for , Diabetic foot ulcer with cellullitis ,ac on ch OM with sepstic arthritis amputation of of 2nd left toe, OM left great toe-,surg done by podiatry,9/20culture of wound E fecalis, path OM , with clear margins, pt was discharged on PO abx as pt refused IV, due to his job.came back to ED today ,Patient states that since being discharged, he has not followed up with podiatry.  He has been on amoxicillin twice daily and states he has been compliant with his antibiotic.  Patient noted today that his surgical site had developed a bad odor with some discharge.  He denies fever, pain, surrounding redness to his foot or lower extremity. Patient states last alcoholic drink was yesterday.  Patient arrived in ED with no shoes on.  PMD Dr. Masters, he admits to not seeing her for over 2 years.   IN ED afebrile, with No fever, no leucocytosis, has foul smelly discharge had Podiatry consult in ED-iabetic foot infection left foot with expose metatarsal headPt had ALcohol level of 332, and has elevated LFT.pt started on vanc zosyn and admitted (01 Nov 2024 21:59)      Vital Signs Last 24 Hrs  T(C): 38.3 (03 Nov 2024 13:29), Max: 38.3 (03 Nov 2024 13:29)  T(F): 101 (03 Nov 2024 13:29), Max: 101 (03 Nov 2024 13:29)  HR: 101 (03 Nov 2024 13:29) (91 - 106)  BP: 166/94 (03 Nov 2024 13:29) (152/83 - 180/100)  BP(mean): --  RR: 18 (03 Nov 2024 13:29) (17 - 18)  SpO2: 94% (03 Nov 2024 13:29) (92% - 95%)    Parameters below as of 03 Nov 2024 13:29  Patient On (Oxygen Delivery Method): room air                              10.5   6.13  )-----------( 91       ( 03 Nov 2024 06:00 )             32.3               11-03    136  |  102  |  22  ----------------------------<  152[H]  4.0   |  25  |  1.92[H]    Ca    8.7      03 Nov 2024 06:00  Phos  2.2     11-03  Mg     1.8     11-03    TPro  6.8  /  Alb  2.5[L]  /  TBili  1.0  /  DBili  x   /  AST  37  /  ALT  21  /  AlkPhos  133[H]  11-03      PHYSICAL EXAM  General: Pleasant  male NAD & AOX3.    Integument:  Skin warm, dry bilateral.    Noted ulcer left 1st mpj with exposed metatarsal head, hx of left hallux amputation with dehiscence, + edema, + erythema, + probe to bone, - purulent drainage, + tracking  Clinical concern for underling OM.   Vascular: Dorsalis Pedis and Posterior Tibial pulses 1/4.  Capillary re-fill time less then 3 seconds digits 1-5 bilateral.    Neuro: Protective sensation diminished to the level of the digits bilateral.  MSK: Muscle strength 5/5 all major muscle groups bilateral. Noted hx of left hallux amputation  
  PROGRESS NOTE   Patient is a 49y old  Male who presents with a chief complaint of infected left foot (04 Nov 2024 05:18)      HPI:  49-year-old male with past medical history of DM2, HTN, HLD, NEUROPATHY, PVD,ckd4, ETOH abuser,  NON COMPLIANT WITH TT, recently discharged after tt for , Diabetic foot ulcer with cellullitis ,ac on ch OM with sepstic arthritis amputation of of 2nd left toe, OM left great toe-,surg done by podiatry,9/20culture of wound E fecalis, path OM , with clear margins, pt was discharged on PO abx as pt refused IV, due to his job.came back to ED today ,Patient states that since being discharged, he has not followed up with podiatry.  He has been on amoxicillin twice daily and states he has been compliant with his antibiotic.  Patient noted today that his surgical site had developed a bad odor with some discharge.  He denies fever, pain, surrounding redness to his foot or lower extremity. Patient states last alcoholic drink was yesterday.  Patient arrived in ED with no shoes on.  PMD Dr. Masters, he admits to not seeing her for over 2 years.   IN ED afebrile, with No fever, no leucocytosis, has foul smelly discharge had Podiatry consult in ED-iabetic foot infection left foot with expose metatarsal headPt had ALcohol level of 332, and has elevated LFT.pt started on vanc zosyn and admitted (01 Nov 2024 21:59)      Vital Signs Last 24 Hrs  T(C): 36.8 (04 Nov 2024 05:24), Max: 38.3 (03 Nov 2024 13:29)  T(F): 98.2 (04 Nov 2024 05:24), Max: 101 (03 Nov 2024 13:29)  HR: 91 (04 Nov 2024 05:24) (86 - 101)  BP: 166/94 (04 Nov 2024 05:24) (153/85 - 171/96)  BP(mean): --  RR: 18 (04 Nov 2024 05:24) (18 - 18)  SpO2: 95% (04 Nov 2024 05:24) (93% - 95%)    Parameters below as of 04 Nov 2024 05:24  Patient On (Oxygen Delivery Method): room air                              10.4   4.61  )-----------( 102      ( 04 Nov 2024 07:40 )             32.2               11-04    138  |  102  |  23  ----------------------------<  204[H]  4.1   |  29  |  2.27[H]    Ca    9.0      04 Nov 2024 07:40  Phos  2.2     11-03  Mg     1.8     11-03    TPro  7.5  /  Alb  2.7[L]  /  TBili  0.7  /  DBili  x   /  AST  40  /  ALT  30  /  AlkPhos  148[H]  11-04      PHYSICAL EXAM  General: Pleasant  male NAD & AOX3.    Integument:  Skin warm, dry bilateral.    Noted ulcer left 1st mpj with exposed metatarsal head, hx of left hallux amputation with dehiscence, + edema, + erythema, + probe to bone, - purulent drainage, + tracking  Clinical concern for underling OM. Packing in place, removed today  Vascular: Dorsalis Pedis and Posterior Tibial pulses 1/4.  Capillary re-fill time less then 3 seconds digits 1-5 bilateral.    Neuro: Protective sensation diminished to the level of the digits bilateral.  MSK: Muscle strength 5/5 all major muscle groups bilateral. Noted hx of left hallux amputation  
[facility] DIV[facility] of INFECTIOUS DISEASE  Bob Giang MD PhD, Sary Staples MD, Nancy Rothman MD, Vaishnavi Rogers MD, Kameron Harrison MD  and providing coverage with Linsey Mack MD  Providing Infectious Disease Consultations at [facility], [facility], [facility]'s    Office# [phone] to schedule follow up appointments  Answering Service for urgent calls or New Consults [phone]  Cell# to text for urgent issues Bob Giang [phone]     infectious diseases progress note:    SUZANNA KAISER is a 49y y. o. Male patient    Overnight and events of the last 24hrs reviewed    Allergies    No Known Allergies    Intolerances        ANTIBIOTICS/RELEVANT:  antimicrobials  piperacillin/tazobactam IVPB.. 3.375 Gram(s) IV Intermittent every 8 hours  vancomycin  IVPB 1000 milliGRAM(s) IV Intermittent every 12 hours    immunologic:  influenza   Vaccine 0.5 milliLiter(s) IntraMuscular once    OTHER:  acetaminophen     Tablet .. 650 milliGRAM(s) Oral every 6 hours PRN  albuterol    0.083% 2.5 milliGRAM(s) Nebulizer every 6 hours PRN  amLODIPine   Tablet 10 milliGRAM(s) Oral daily  carvedilol 6.25 milliGRAM(s) Oral every 12 hours  dextrose 5%. 1000 milliLiter(s) IV Continuous <Continuous>  dextrose 5%. 1000 milliLiter(s) IV Continuous <Continuous>  dextrose 50% Injectable 25 Gram(s) IV Push once  dextrose 50% Injectable 25 Gram(s) IV Push once  dextrose 50% Injectable 12.5 Gram(s) IV Push once  dextrose Oral Gel 15 Gram(s) Oral once PRN  fluticasone propionate 50 MICROgram(s)/spray Nasal Spray 1 Spray(s) Both Nostrils two times a day PRN  folic acid 1 milliGRAM(s) Oral daily  gabapentin 100 milliGRAM(s) Oral three times a day  glucagon  Injectable 1 milliGRAM(s) IntraMuscular once  guaiFENesin  milliGRAM(s) Oral every 12 hours  heparin   Injectable 5000 Unit(s) SubCutaneous every 12 hours  insulin glargine Injectable (LANTUS) 20 Unit(s) SubCutaneous at bedtime  insulin lispro (ADMELOG) corrective regimen sliding scale   SubCutaneous three times a day before meals  insulin lispro (ADMELOG) corrective regimen sliding scale   SubCutaneous at bedtime  insulin lispro Injectable (ADMELOG) 7 Unit(s) SubCutaneous before breakfast  insulin lispro Injectable (ADMELOG) 7 Unit(s) SubCutaneous before lunch  insulin lispro Injectable (ADMELOG) 7 Unit(s) SubCutaneous before dinner  lactobacillus acidophilus 1 Tablet(s) Oral two times a day with meals  LORazepam     Tablet   Oral   LORazepam     Tablet 1.5 milliGRAM(s) Oral every 4 hours  LORazepam   Injectable 2 milliGRAM(s) IV Push every 5 minutes PRN  multivitamin 1 Tablet(s) Oral daily  thiamine 100 milliGRAM(s) Oral daily      Objective:  Vital Signs Last 24 Hrs  T(C): 37 (03 Nov 2024 04:56), Max: 37 (03 Nov 2024 04:56)  T(F): 98.6 (03 Nov 2024 04:56), Max: 98.6 (03 Nov 2024 04:56)  HR: 94 (03 Nov 2024 04:56) (91 - 106)  BP: 179/83 (03 Nov 2024 04:56) (152/83 - 180/100)  BP(mean): --  RR: 18 (03 Nov 2024 04:56) (17 - 18)  SpO2: 92% (03 Nov 2024 04:56) (84% - 95%)    Parameters below as of 03 Nov 2024 04:56  Patient On (Oxygen Delivery Method): room air        T(C): 37 (11-03-24 @ 04:56), Max: 37.4 (11-02-24 @ 01:44)  T(C): 37 (11-03-24 @ 04:56), Max: 37.4 (11-02-24 @ 01:44)  T(C): 37 (11-03-24 @ 04:56), Max: 37.4 (11-02-24 @ 01:44)    PHYSICAL EXAM:  HEENT: NC atraumatic  Neck: supple  Respiratory: no accessory muscle use, breathing comfortably  Cardiovascular: distant  Gastrointestinal: normal appearing, nondistended  Extremities: no clubbing, no cyanosis, foot wrapped        LABS:                          10.5   6.13  )-----------( 91       ( 03 Nov 2024 06:00 )             32.3       WBC  6.13 11-03 @ 06:00  5.92 11-02 @ 06:30  6.37 11-01 @ 20:00      11-03    136  |  102  |  22  ----------------------------<  152[H]  4.0   |  25  |  1.92[H]    Ca    8.7      03 Nov 2024 06:00  Phos  2.2     11-03  Mg     1.8     11-03    TPro  6.8  /  Alb  2.5[L]  /  TBili  1.0  /  DBili  x   /  AST  37  /  ALT  21  /  AlkPhos  133[H]  11-03      Creatinine: 1.92 mg/dL (11-03-24 @ 06:00)  Creatinine: 1.98 mg/dL (11-02-24 @ 11:59)  Creatinine: 1.92 mg/dL (11-02-24 @ 06:30)  Creatinine: 1.89 mg/dL (11-01-24 @ 20:00)      PT/INR - ( 01 Nov 2024 20:00 )   PT: 11.9 sec;   INR: 1.01 ratio         PTT - ( 01 Nov 2024 20:00 )  PTT:33.6 sec  Urinalysis Basic - ( 03 Nov 2024 06:00 )    Color: x / Appearance: x / SG: x / pH: x  Gluc: 152 mg/dL / Ketone: x  / Bili: x / Urobili: x   Blood: x / Protein: x / Nitrite: x   Leuk Esterase: x / RBC: x / WBC x   Sq Epi: x / Non Sq Epi: x / Bacteria: x            INFLAMMATORY MARKERS      MICROBIOLOGY:              RADIOLOGY & ADDITIONAL STUDIES:  
Patient is a 49y old  Male who presents with a chief complaint of infected left foot (02 Nov 2024 10:04)      INTERVAL HPI/OVERNIGHT EVENTS:overnight events noted    Home Medications:  acetaminophen 325 mg oral tablet: 2 tab(s) orally every 6 hours As needed Temp greater or equal to 38C (100.4F), Mild Pain (1 - 3), Moderate Pain (4 - 6) (01 Nov 2024 22:34)      MEDICATIONS  (STANDING):  amLODIPine   Tablet 10 milliGRAM(s) Oral daily  carvedilol 6.25 milliGRAM(s) Oral every 12 hours  dextrose 5%. 1000 milliLiter(s) (50 mL/Hr) IV Continuous <Continuous>  dextrose 5%. 1000 milliLiter(s) (100 mL/Hr) IV Continuous <Continuous>  dextrose 50% Injectable 25 Gram(s) IV Push once  dextrose 50% Injectable 25 Gram(s) IV Push once  dextrose 50% Injectable 12.5 Gram(s) IV Push once  folic acid 1 milliGRAM(s) Oral daily  gabapentin 100 milliGRAM(s) Oral three times a day  glucagon  Injectable 1 milliGRAM(s) IntraMuscular once  heparin   Injectable 5000 Unit(s) SubCutaneous every 12 hours  influenza   Vaccine 0.5 milliLiter(s) IntraMuscular once  insulin glargine Injectable (LANTUS) 20 Unit(s) SubCutaneous at bedtime  insulin lispro (ADMELOG) corrective regimen sliding scale   SubCutaneous three times a day before meals  insulin lispro (ADMELOG) corrective regimen sliding scale   SubCutaneous at bedtime  insulin lispro Injectable (ADMELOG) 7 Unit(s) SubCutaneous before dinner  insulin lispro Injectable (ADMELOG) 7 Unit(s) SubCutaneous before breakfast  insulin lispro Injectable (ADMELOG) 7 Unit(s) SubCutaneous before lunch  lactobacillus acidophilus 1 Tablet(s) Oral two times a day with meals  LORazepam     Tablet   Oral   LORazepam     Tablet 2 milliGRAM(s) Oral every 4 hours  multivitamin 1 Tablet(s) Oral daily  piperacillin/tazobactam IVPB.. 3.375 Gram(s) IV Intermittent every 8 hours  thiamine 100 milliGRAM(s) Oral daily  vancomycin  IVPB 1000 milliGRAM(s) IV Intermittent every 12 hours    MEDICATIONS  (PRN):  acetaminophen     Tablet .. 650 milliGRAM(s) Oral every 6 hours PRN Temp greater or equal to 38C (100.4F), Mild Pain (1 - 3), Moderate Pain (4 - 6)  albuterol    0.083% 2.5 milliGRAM(s) Nebulizer every 6 hours PRN Shortness of Breath and/or Wheezing  dextrose Oral Gel 15 Gram(s) Oral once PRN Blood Glucose LESS THAN 70 milliGRAM(s)/deciliter  LORazepam   Injectable 2 milliGRAM(s) IV Push every 5 minutes PRN for ciwa > 5      Allergies    No Known Allergies    Intolerances        REVIEW OF SYSTEMS:  CONSTITUTIONAL: No fever, weight loss, has fatigue  EYES: No eye pain, visual disturbances, or discharge  ENMT:  No difficulty hearing, tinnitus, vertigo; No sinus or throat pain  NECK: No pain or stiffness  BREASTS: No pain, masses, or nipple discharge  RESPIRATORY: No cough, wheezing, chills or hemoptysis; No shortness of breath  CARDIOVASCULAR: No chest pain, palpitations, dizziness, or leg swelling  GASTROINTESTINAL: No abdominal or epigastric pain. No nausea, vomiting, or hematemesis; No diarrhea or constipation. No melena or hematochezia.  GENITOURINARY: No dysuria, frequency, hematuria, or incontinence  NEUROLOGICAL: No headaches, memory loss, loss of strength, numbness, or tremors  SKIN: left foot apin with ulcer and discharge     Vital Signs Last 24 Hrs  T(C): 37 (02 Nov 2024 09:03), Max: 37.4 (02 Nov 2024 01:44)  T(F): 98.6 (02 Nov 2024 09:03), Max: 99.4 (02 Nov 2024 01:44)  HR: 98 (02 Nov 2024 09:03) (94 - 117)  BP: 143/80 (02 Nov 2024 09:03) (140/72 - 180/92)  BP(mean): --  RR: 18 (02 Nov 2024 09:03) (16 - 20)  SpO2: 92% (02 Nov 2024 09:03) (88% - 96%)    Parameters below as of 02 Nov 2024 05:14  Patient On (Oxygen Delivery Method): nasal cannula        PHYSICAL EXAM:  GENERAL: NAD, well-groomed, well-developed  HEAD:  Atraumatic, Normocephalic  EYES: EOMI, PERRLA, conjunctiva and sclera clear  ENMT: Moist mucous membranes,   NECK: Supple, No JVD, Normal thyroid  NERVOUS SYSTEM:  Alert & Oriented X3, non focal  CHEST/LUNG: Clear to percussion bilaterally;   HEART: Regular rate and rhythm; No murmurs, rubs, or gallops  ABDOMEN: Soft, Nontender, Nondistended; Bowel sounds present  EXTREMITIES:  2+ Peripheral Pulses, No clubbing, cyanosis, or edema  SKIN: No rashes or lesions    LABS:                        10.0   5.92  )-----------( 97       ( 02 Nov 2024 06:30 )             30.1     11-02    140  |  104  |  17  ----------------------------<  107[H]  4.1   |  22  |  1.92[H]    Ca    8.3[L]      02 Nov 2024 06:30  Phos  3.0     11-02  Mg     2.0     11-02    TPro  7.1  /  Alb  2.8[L]  /  TBili  0.7  /  DBili  0.2  /  AST  42[H]  /  ALT  25  /  AlkPhos  140[H]  11-02    PT/INR - ( 01 Nov 2024 20:00 )   PT: 11.9 sec;   INR: 1.01 ratio         PTT - ( 01 Nov 2024 20:00 )  PTT:33.6 sec  Urinalysis Basic - ( 02 Nov 2024 06:30 )    Color: x / Appearance: x / SG: x / pH: x  Gluc: 107 mg/dL / Ketone: x  / Bili: x / Urobili: x   Blood: x / Protein: x / Nitrite: x   Leuk Esterase: x / RBC: x / WBC x   Sq Epi: x / Non Sq Epi: x / Bacteria: x      CAPILLARY BLOOD GLUCOSE      POCT Blood Glucose.: 119 mg/dL (02 Nov 2024 07:54)  POCT Blood Glucose.: 99 mg/dL (01 Nov 2024 23:50)        Urinalysis with Rflx Culture (collected 11-01-24 @ 20:20)        I&O's Summary    01 Nov 2024 07:01  -  02 Nov 2024 07:00  --------------------------------------------------------  IN: 100 mL / OUT: 0 mL / NET: 100 mL        RADIOLOGY & ADDITIONAL TESTS:    Imaging Personally Reviewed:  [x ] YES  [ ] NO    Consultant(s) Notes Reviewed:  [x ] YES  [ ] NO    Care Discussed with Consultants/Other Providers [x ] YES  [ ] NO
Date/Time Patient Seen:  		  Referring MD:   Data Reviewed	       Patient is a 49y old  Male who presents with a chief complaint of infected left foot (02 Nov 2024 12:31)      Subjective/HPI     PAST MEDICAL & SURGICAL HISTORY:  Prediabetes    HTN (hypertension)    HLD (hyperlipidemia)    DM2 (diabetes mellitus, type 2)          Medication list         MEDICATIONS  (STANDING):  amLODIPine   Tablet 10 milliGRAM(s) Oral daily  carvedilol 6.25 milliGRAM(s) Oral every 12 hours  dextrose 5%. 1000 milliLiter(s) (100 mL/Hr) IV Continuous <Continuous>  dextrose 5%. 1000 milliLiter(s) (50 mL/Hr) IV Continuous <Continuous>  dextrose 50% Injectable 25 Gram(s) IV Push once  dextrose 50% Injectable 25 Gram(s) IV Push once  dextrose 50% Injectable 12.5 Gram(s) IV Push once  folic acid 1 milliGRAM(s) Oral daily  gabapentin 100 milliGRAM(s) Oral three times a day  glucagon  Injectable 1 milliGRAM(s) IntraMuscular once  guaiFENesin  milliGRAM(s) Oral every 12 hours  heparin   Injectable 5000 Unit(s) SubCutaneous every 12 hours  influenza   Vaccine 0.5 milliLiter(s) IntraMuscular once  insulin glargine Injectable (LANTUS) 20 Unit(s) SubCutaneous at bedtime  insulin lispro (ADMELOG) corrective regimen sliding scale   SubCutaneous three times a day before meals  insulin lispro (ADMELOG) corrective regimen sliding scale   SubCutaneous at bedtime  insulin lispro Injectable (ADMELOG) 7 Unit(s) SubCutaneous before dinner  insulin lispro Injectable (ADMELOG) 7 Unit(s) SubCutaneous before breakfast  insulin lispro Injectable (ADMELOG) 7 Unit(s) SubCutaneous before lunch  lactobacillus acidophilus 1 Tablet(s) Oral two times a day with meals  LORazepam     Tablet   Oral   LORazepam     Tablet 1.5 milliGRAM(s) Oral every 4 hours  multivitamin 1 Tablet(s) Oral daily  piperacillin/tazobactam IVPB.. 3.375 Gram(s) IV Intermittent every 8 hours  thiamine 100 milliGRAM(s) Oral daily  vancomycin  IVPB 1000 milliGRAM(s) IV Intermittent every 12 hours    MEDICATIONS  (PRN):  acetaminophen     Tablet .. 650 milliGRAM(s) Oral every 6 hours PRN Temp greater or equal to 38C (100.4F), Mild Pain (1 - 3), Moderate Pain (4 - 6)  albuterol    0.083% 2.5 milliGRAM(s) Nebulizer every 6 hours PRN Shortness of Breath and/or Wheezing  dextrose Oral Gel 15 Gram(s) Oral once PRN Blood Glucose LESS THAN 70 milliGRAM(s)/deciliter  fluticasone propionate 50 MICROgram(s)/spray Nasal Spray 1 Spray(s) Both Nostrils two times a day PRN nasal congestion  LORazepam   Injectable 2 milliGRAM(s) IV Push every 5 minutes PRN for ciwa > 5         Vitals log        ICU Vital Signs Last 24 Hrs  T(C): 37 (03 Nov 2024 04:56), Max: 37 (02 Nov 2024 09:03)  T(F): 98.6 (03 Nov 2024 04:56), Max: 98.6 (02 Nov 2024 09:03)  HR: 94 (03 Nov 2024 04:56) (91 - 106)  BP: 179/83 (03 Nov 2024 04:56) (143/80 - 180/100)  BP(mean): --  ABP: --  ABP(mean): --  RR: 18 (03 Nov 2024 04:56) (17 - 18)  SpO2: 92% (03 Nov 2024 04:56) (84% - 95%)    O2 Parameters below as of 03 Nov 2024 04:56  Patient On (Oxygen Delivery Method): room air                 Input and Output:  I&O's Detail    01 Nov 2024 07:01  -  02 Nov 2024 07:00  --------------------------------------------------------  IN:    IV PiggyBack: 100 mL  Total IN: 100 mL    OUT:  Total OUT: 0 mL    Total NET: 100 mL      02 Nov 2024 08:01  -  03 Nov 2024 05:16  --------------------------------------------------------  IN:    IV PiggyBack: 450 mL  Total IN: 450 mL    OUT:  Total OUT: 0 mL    Total NET: 450 mL          Lab Data                        10.0   5.92  )-----------( 97       ( 02 Nov 2024 06:30 )             30.1     11-02    136  |  102  |  20  ----------------------------<  128[H]  4.0   |  25  |  1.98[H]    Ca    8.8      02 Nov 2024 11:59  Phos  3.0     11-02  Mg     2.0     11-02    TPro  7.1  /  Alb  2.8[L]  /  TBili  0.7  /  DBili  0.2  /  AST  42[H]  /  ALT  25  /  AlkPhos  140[H]  11-02            Review of Systems	      Objective     Physical Examination    heart s1s2  lung dec BS  head nc  head at      Pertinent Lab findings & Imaging      Malcolm:  NO   Adequate UO     I&O's Detail    01 Nov 2024 07:01  -  02 Nov 2024 07:00  --------------------------------------------------------  IN:    IV PiggyBack: 100 mL  Total IN: 100 mL    OUT:  Total OUT: 0 mL    Total NET: 100 mL      02 Nov 2024 08:01  -  03 Nov 2024 05:16  --------------------------------------------------------  IN:    IV PiggyBack: 450 mL  Total IN: 450 mL    OUT:  Total OUT: 0 mL    Total NET: 450 mL               Discussed with:     Cultures:	        Radiology                            
  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  He has been on amoxicillin twice daily and states he has been compliant with his antibiotic.  Patient noted today that his surgical site had developed a bad odor with some discharge.  He denies fever, pain, surrounding redness to his foot or lower extremity. Patient states last alcoholic drink was yesterday.  Patient arrived in ED with no shoes on.  PMD Dr. Masters, he admits to not seeing her for over 2 years.   IN ED afebrile, with No fever, no leucocytosis, has foul smelly discharge had Podiatry consult in ED-iabetic foot infection left foot with expose metatarsal headPt had ALcohol level of 332, and has elevated LFT.pt started on vanc zosyn and admitted (01 Nov 2024 21:59)      Vital Signs Last 24 Hrs  T(C): 37 (02 Nov 2024 09:03), Max: 37.4 (02 Nov 2024 01:44)  T(F): 98.6 (02 Nov 2024 09:03), Max: 99.4 (02 Nov 2024 01:44)  HR: 98 (02 Nov 2024 09:03) (94 - 117)  BP: 143/80 (02 Nov 2024 09:03) (140/72 - 180/92)  BP(mean): --  RR: 18 (02 Nov 2024 09:03) (16 - 20)  SpO2: 92% (02 Nov 2024 09:03) (88% - 96%)    Parameters below as of 02 Nov 2024 05:14  Patient On (Oxygen Delivery Method): nasal cannula                              10.0   5.92  )-----------( 97       ( 02 Nov 2024 06:30 )             30.1               11-02    140  |  104  |  17  ----------------------------<  107[H]  4.1   |  22  |  1.92[H]    Ca    8.3[L]      02 Nov 2024 06:30  Phos  3.0     11-02  Mg     2.0     11-02    TPro  7.1  /  Alb  2.8[L]  /  TBili  0.7  /  DBili  0.2  /  AST  42[H]  /  ALT  25  /  AlkPhos  140[H]  11-02      PHYSICAL EXAM  General: Pleasant  male NAD & AOX3.    Integument:  Skin warm, dry bilateral.    Noted ulcer left 1st mpj with exposed metatarsal head, hx of left hallux amputation with dehiscence, + edema, + erythema, + probe to bone, - purulent drainage, + tracking  Clinical concern for underling OM.   Vascular: Dorsalis Pedis and Posterior Tibial pulses 1/4.  Capillary re-fill time less then 3 seconds digits 1-5 bilateral.    Neuro: Protective sensation diminished to the level of the digits bilateral.  MSK: Muscle strength 5/5 all major muscle groups bilateral. Noted hx of left hallux amputation
Patient is a 49y old  Male who presents with a chief complaint of infected left foot (03 Nov 2024 13:52)      INTERVAL HPI/OVERNIGHT EVENTS:overnight events noted    Home Medications:  acetaminophen 325 mg oral tablet: 2 tab(s) orally every 6 hours As needed Temp greater or equal to 38C (100.4F), Mild Pain (1 - 3), Moderate Pain (4 - 6) (01 Nov 2024 22:34)      MEDICATIONS  (STANDING):  amLODIPine   Tablet 10 milliGRAM(s) Oral daily  carvedilol 6.25 milliGRAM(s) Oral every 12 hours  dextrose 5%. 1000 milliLiter(s) (50 mL/Hr) IV Continuous <Continuous>  dextrose 5%. 1000 milliLiter(s) (100 mL/Hr) IV Continuous <Continuous>  dextrose 50% Injectable 25 Gram(s) IV Push once  dextrose 50% Injectable 25 Gram(s) IV Push once  dextrose 50% Injectable 12.5 Gram(s) IV Push once  folic acid 1 milliGRAM(s) Oral daily  gabapentin 100 milliGRAM(s) Oral three times a day  glucagon  Injectable 1 milliGRAM(s) IntraMuscular once  guaiFENesin  milliGRAM(s) Oral every 12 hours  heparin   Injectable 5000 Unit(s) SubCutaneous every 12 hours  influenza   Vaccine 0.5 milliLiter(s) IntraMuscular once  insulin glargine Injectable (LANTUS) 20 Unit(s) SubCutaneous at bedtime  insulin lispro (ADMELOG) corrective regimen sliding scale   SubCutaneous three times a day before meals  insulin lispro (ADMELOG) corrective regimen sliding scale   SubCutaneous at bedtime  insulin lispro Injectable (ADMELOG) 7 Unit(s) SubCutaneous before dinner  insulin lispro Injectable (ADMELOG) 7 Unit(s) SubCutaneous before breakfast  insulin lispro Injectable (ADMELOG) 7 Unit(s) SubCutaneous before lunch  lactobacillus acidophilus 1 Tablet(s) Oral two times a day with meals  LORazepam     Tablet 1.5 milliGRAM(s) Oral every 4 hours  LORazepam     Tablet   Oral   multivitamin 1 Tablet(s) Oral daily  piperacillin/tazobactam IVPB.. 3.375 Gram(s) IV Intermittent every 8 hours  thiamine 100 milliGRAM(s) Oral daily    MEDICATIONS  (PRN):  acetaminophen     Tablet .. 650 milliGRAM(s) Oral every 6 hours PRN Temp greater or equal to 38C (100.4F), Mild Pain (1 - 3), Moderate Pain (4 - 6)  albuterol    0.083% 2.5 milliGRAM(s) Nebulizer every 6 hours PRN Shortness of Breath and/or Wheezing  dextrose Oral Gel 15 Gram(s) Oral once PRN Blood Glucose LESS THAN 70 milliGRAM(s)/deciliter  fluticasone propionate 50 MICROgram(s)/spray Nasal Spray 1 Spray(s) Both Nostrils two times a day PRN nasal congestion  LORazepam   Injectable 2 milliGRAM(s) IV Push every 5 minutes PRN for ciwa > 5      Allergies    No Known Allergies    Intolerances        REVIEW OF SYSTEMS:  CONSTITUTIONAL: No fever, weight loss, or fatigue  EYES: No eye pain, visual disturbances, or discharge  ENMT:  No difficulty hearing, tinnitus, vertigo; No sinus or throat pain  NECK: No pain or stiffness  BREASTS: No pain, masses, or nipple discharge  RESPIRATORY: No cough, wheezing, chills or hemoptysis; No shortness of breath  CARDIOVASCULAR: No chest pain, palpitations, dizziness, or leg swelling  GASTROINTESTINAL: No abdominal or epigastric pain. No nausea, vomiting, or hematemesis; No diarrhea or constipation. No melena or hematochezia.  GENITOURINARY: No dysuria, frequency, hematuria, or incontinence  NEUROLOGICAL: No headaches, memory loss, loss of strength, numbness, or tremors  SKIN: No itching, burning, rashes, or lesions   LYMPH NODES: No enlarged glands  ENDOCRINE: No heat or cold intolerance; No hair loss  MUSCULOSKELETAL:left foot pain  Vital Signs Last 24 Hrs  T(C): 38.3 (03 Nov 2024 13:29), Max: 38.3 (03 Nov 2024 13:29)  T(F): 101 (03 Nov 2024 13:29), Max: 101 (03 Nov 2024 13:29)  HR: 101 (03 Nov 2024 13:29) (91 - 105)  BP: 166/94 (03 Nov 2024 13:29) (153/88 - 180/100)  BP(mean): --  RR: 18 (03 Nov 2024 13:29) (17 - 18)  SpO2: 94% (03 Nov 2024 13:29) (92% - 95%)    Parameters below as of 03 Nov 2024 13:29  Patient On (Oxygen Delivery Method): room air        PHYSICAL EXAM:  GENERAL: NAD, well-groomed, well-developed  HEAD:  Atraumatic, Normocephalic  EYES: EOMI, PERRLA, conjunctiva and sclera clear  ENMT: Moist mucous membranes,   NECK: Supple, No JVD, Normal thyroid  NERVOUS SYSTEM:  Alert & Oriented X3,non focal  CHEST/LUNG: Clear to percussion bilaterally;  HEART: Regular rate and rhythm;   ABDOMEN: Soft, Nontender, Nondistended; Bowel sounds present  EXTREMITIES: left foot in dressing  LYMPH: No lymphadenopathy noted  SKIN: No rashes or lesions    LABS:                        10.5   6.13  )-----------( 91       ( 03 Nov 2024 06:00 )             32.3     11-03    136  |  102  |  22  ----------------------------<  152[H]  4.0   |  25  |  1.92[H]    Ca    8.7      03 Nov 2024 06:00  Phos  2.2     11-03  Mg     1.8     11-03    TPro  6.8  /  Alb  2.5[L]  /  TBili  1.0  /  DBili  x   /  AST  37  /  ALT  21  /  AlkPhos  133[H]  11-03    PT/INR - ( 01 Nov 2024 20:00 )   PT: 11.9 sec;   INR: 1.01 ratio         PTT - ( 01 Nov 2024 20:00 )  PTT:33.6 sec  Urinalysis Basic - ( 03 Nov 2024 06:00 )    Color: x / Appearance: x / SG: x / pH: x  Gluc: 152 mg/dL / Ketone: x  / Bili: x / Urobili: x   Blood: x / Protein: x / Nitrite: x   Leuk Esterase: x / RBC: x / WBC x   Sq Epi: x / Non Sq Epi: x / Bacteria: x      CAPILLARY BLOOD GLUCOSE      POCT Blood Glucose.: 234 mg/dL (03 Nov 2024 12:17)  POCT Blood Glucose.: 172 mg/dL (03 Nov 2024 07:55)  POCT Blood Glucose.: 151 mg/dL (02 Nov 2024 21:07)  POCT Blood Glucose.: 203 mg/dL (02 Nov 2024 16:32)        Culture - Wound Aerobic (collected 11-01-24 @ 20:56)  Source: .Other  Preliminary Report (11-03-24 @ 11:04):    Commensal nicholas consistent with body site    Urinalysis with Rflx Culture (collected 11-01-24 @ 20:20)    Culture - Blood (collected 11-01-24 @ 20:00)  Source: .Blood BLOOD  Preliminary Report (11-03-24 @ 01:19):    No growth at 24 hours    Culture - Blood (collected 11-01-24 @ 20:00)  Source: .Blood BLOOD  Preliminary Report (11-03-24 @ 01:19):    No growth at 24 hours        I&O's Summary    02 Nov 2024 08:01  -  03 Nov 2024 07:00  --------------------------------------------------------  IN: 450 mL / OUT: 0 mL / NET: 450 mL        RADIOLOGY & ADDITIONAL TESTS:    Imaging Personally Reviewed:  [x ] YES  [ ] NO    Consultant(s) Notes Reviewed:  [x ] YES  [ ] NO    Care Discussed with Consultants/Other Providers [x ] YES  [ ] NO

## 2024-11-04 NOTE — PROGRESS NOTE ADULT - ATTENDING COMMENTS
reviewed medical history, physical exam and care plan   note read and reviewed

## 2024-11-04 NOTE — DISCHARGE NOTE PROVIDER - NSDCCPCAREPLAN_GEN_ALL_CORE_FT
PRINCIPAL DISCHARGE DIAGNOSIS  Diagnosis: Diabetic ulcer of left foot  Assessment and Plan of Treatment: needs surg, pt leaving AMA      SECONDARY DISCHARGE DIAGNOSES  Diagnosis: Osteomyelitis of left foot  Assessment and Plan of Treatment: needs abx and surg, Pt leaving AMA

## 2024-11-04 NOTE — DISCHARGE NOTE NURSING/CASE MANAGEMENT/SOCIAL WORK - NSDCPEFALRISK_GEN_ALL_CORE
For information on Fall & Injury Prevention, visit: [email or web address] OR  [email or web address] OR  https://www.cdc.gov/steadi/patient.html

## 2024-11-04 NOTE — PROGRESS NOTE ADULT - REASON FOR ADMISSION
infected left foot

## 2024-11-04 NOTE — PROGRESS NOTE ADULT - ASSESSMENT
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    haseeb  etoh use disorder  sidney  dm  htn  hld  neuropathy  ckd  poor compliance  foot wound - infection    vs noted,   on ABX  ID f/u  CT chest and abd - atelectasis - steatosis - portal HTN    ciwa  ativan ATC and PRN  folic acid  thiamine  SBIRT documentation  tox screen reviewed  etoh level on admission noted  ABX for foot wound infection  ID and Podiatry eval  pain assessment  SW eval  counseling - education - emotional support  cvs rx regimen and BP control - HTN HLD  DM care  serial FS  dietary discretion  monitor VS and HD and Sat

## 2024-11-04 NOTE — DISCHARGE NOTE PROVIDER - NSDCCAREPROVSEEN_GEN_ALL_CORE_FT
Owen, Mabel Saab, Ranjeet Davenport, Salvatore Giang, Bob Mack, Linsey Aviles, Edvin Vázquez, Vignesh Varma, Jayesh

## 2024-11-04 NOTE — PROGRESS NOTE ADULT - ASSESSMENT
49-year-old male with past medical history of DM2, HTN, HLD, NEUROPATHY, PVD,ckd4, ETOH abuser,  NON COMPLIANT WITH TT, recently discharged after tt for , Diabetic foot ulcer with cellullitis ,ac on ch OM with sepstic arthritis amputation of of 2nd left toe, OM left great toe-,surg done by podiatry,9/20culture of wound E fecalis, path OM , with clear margins, pt was discharged on PO abx as pt refused IV, due to his job.came back to ED today ,Patient states that since being discharged, he has not followed up with podiatry.  He has been on amoxicillin twice daily and states he has been compliant with his antibiotic.  Patient noted today that his surgical site had developed a bad odor with some discharge.  He denies fever, pain, surrounding redness to his foot or lower extremity. Patient states last alcoholic drink was yesterday.  Patient arrived in ED with no shoes on.  PMD Dr. Masters, he admits to not seeing her for over 2 years.   IN ED afebrile, with No fever, no leucocytosis, has foul smelly discharge had Podiatry consult in ED-iabetic foot infection left foot with expose metatarsal headPt had ALcohol level of 332, and has elevated LFT.pt started on vanc zosyn and admitted  #Diabetic foot ulcer with cellullitis with ac on ch osteomyelitis left foot   started on IV abx,ID and podiatry consult noted, cont vanc zosyn   CT/MRI   ## DM2 with hyperglycemia, with neuropathy , nephropathy with likely PVD    ssi, consistent carb, Endo consult,    # Ess HTN   continue current tt  # HLD    # DVT prophylaxis  ID , endo and podiatry consult  #  CKD 3   nephro consult  likely Diabetic nephropathy   # HLD   low fat diet  # ETOH use disorder  cessation counselling,addiction medicine consult   folic acid, thiamine, lorazepam with CIWA protocol as per DR Vázquez  #Obesity   # EDUAR   49-year-old male with past medical history of DM2, HTN, HLD, NEUROPATHY, PVD,ckd4, ETOH abuser,  NON COMPLIANT WITH TT, recently discharged after tt for , Diabetic foot ulcer with cellullitis ,ac on ch OM with sepstic arthritis amputation of of 2nd left toe, OM left great toe-,surg done by podiatry,9/20culture of wound E fecalis, path OM , with clear margins, pt was discharged on PO abx as pt refused IV, due to his job.came back to ED today ,Patient states that since being discharged, he has not followed up with podiatry.  He has been on amoxicillin twice daily and states he has been compliant with his antibiotic.  Patient noted today that his surgical site had developed a bad odor with some discharge.  He denies fever, pain, surrounding redness to his foot or lower extremity. Patient states last alcoholic drink was yesterday.  Patient arrived in ED with no shoes on.  PMD Dr. Masters, he admits to not seeing her for over 2 years.   IN ED afebrile, with No fever, no leucocytosis, has foul smelly discharge had Podiatry consult in ED-iabetic foot infection left foot with expose metatarsal headPt had ALcohol level of 332, and has elevated LFT.pt started on vanc zosyn and admitted  #Diabetic foot ulcer with cellullitis with ac on ch osteomyelitis left foot   started on IV abx,ID and podiatry consult noted, cont vanc zosyn   CT/MRI   ## DM2 with hyperglycemia, with neuropathy , nephropathy with likely PVD    ssi, consistent carb, Endo consult,    # Ess HTN   continue current tt  # HLD    # DVT prophylaxis  ID , endo and podiatry consult  #  CKD 3   nephro consult  likely Diabetic nephropathy   # HLD   low fat diet  # ETOH use disorder  cessation counselling,addiction medicine consult   folic acid, thiamine, lorazepam with CIWA protocol as per DR Vázquez  #Obesity   # EDUAR  Patient wants to leave AMA due to some personal social issues, explained risk of loosing limb and sepsis,and lossof life.  If patient changes mind, Patient is intermediate risk for surg and is optimized for same

## 2024-11-04 NOTE — DISCHARGE NOTE NURSING/CASE MANAGEMENT/SOCIAL WORK - PATIENT PORTAL LINK FT
You can access the FollowMyHealth Patient Portal offered by [facility] by registering at the following website: [email or web address]. By joining [facility]’s FollowMyHealth portal, you will also be able to view your health information using other applications (apps) compatible with our system.
0

## 2024-11-04 NOTE — CASE MANAGEMENT PROGRESS NOTE - NSCMPROGRESSNOTE_GEN_ALL_CORE
RN/CM noted that as per medical team, pt is opting to sign out AMA despite explanations as to its consequences- pt verbalized understanding and is still proceeding with signing out AMA. Both KEENAN and MSW met with pt, reviewed above and he confirmed that he is signing out AMA but requested DC transport- [employer] assistance as he stated he is out of work @ this time and he still has NOT heard about his MEDICAID transport approval. KEENAN reached out to [employer] Nelsy MATHEW and obtained approval to provide assistance with [employer] transport. KEENAN called [employer] [phone], spoke to Bc who gave quote price for $27.29 for transport from [facility] to [facility]'s [facility] [address] then to his home address [facility]. Taxi is scheduled for this pt for NEXT available ride- Staff on unit and Security staff by ED entrance made aware.

## 2024-11-04 NOTE — DISCHARGE NOTE PROVIDER - NSDCMRMEDTOKEN_GEN_ALL_CORE_FT
acetaminophen 325 mg oral tablet: 2 tab(s) orally every 6 hours As needed Temp greater or equal to 38C (100.4F), Mild Pain (1 - 3), Moderate Pain (4 - 6)  Acidophilus oral tablet: 2 tab(s) orally once a day  amLODIPine 10 mg oral tablet: 1 tab(s) orally once a day  amoxicillin-clavulanate 875 mg-125 mg oral tablet: 875 milligram(s) orally 2 times a day MDD: 2  carvedilol 6.25 mg oral tablet: 1 tab(s) orally every 12 hours  gabapentin 100 mg oral capsule: 1 cap(s) orally 3 times a day  Lantus Solostar Pen 100 units/mL subcutaneous solution: 20 unit(s) subcutaneous once a day (at bedtime) unit(s) subcutaneous once a day  NovoLOG FlexPen 100 units/mL injectable solution: 7 unit(s) subcutaneous 3 times a day (with meals) 9 units if glucose readings 151-200  11 units if glucose readings 201-250  13 units if glucose readings 251-300  15 units if glucose readings 301-350  17 units if glucose readings 351-400  19 units if glucose readings &gt;400 and call MD  thiamine 100 mg oral tablet: 1 tab(s) orally once a day

## 2024-11-07 ENCOUNTER — INPATIENT (INPATIENT)
Facility: HOSPITAL | Age: 49
LOS: 12 days | Discharge: HOME | DRG: 617 | End: 2024-11-20
Attending: INTERNAL MEDICINE | Admitting: INTERNAL MEDICINE
Payer: MEDICAID

## 2024-11-07 VITALS
TEMPERATURE: 98 F | HEIGHT: 69 IN | DIASTOLIC BLOOD PRESSURE: 99 MMHG | OXYGEN SATURATION: 90 % | WEIGHT: 199.96 LBS | SYSTOLIC BLOOD PRESSURE: 158 MMHG | HEART RATE: 101 BPM

## 2024-11-07 DIAGNOSIS — E11.628 TYPE 2 DIABETES MELLITUS WITH OTHER SKIN COMPLICATIONS: ICD-10-CM

## 2024-11-07 DIAGNOSIS — L08.9 LOCAL INFECTION OF THE SKIN AND SUBCUTANEOUS TISSUE, UNSPECIFIED: ICD-10-CM

## 2024-11-07 LAB
ALBUMIN SERPL ELPH-MCNC: 3.1 G/DL — LOW (ref 3.3–5)
ALP SERPL-CCNC: 147 U/L — HIGH (ref 30–120)
ALT FLD-CCNC: 48 U/L — SIGNIFICANT CHANGE UP (ref 10–60)
ANION GAP SERPL CALC-SCNC: 11 MMOL/L — SIGNIFICANT CHANGE UP (ref 5–17)
APPEARANCE UR: CLEAR — SIGNIFICANT CHANGE UP
APTT BLD: 31.9 SEC — SIGNIFICANT CHANGE UP (ref 24.5–35.6)
AST SERPL-CCNC: 52 U/L — HIGH (ref 10–40)
BASOPHILS # BLD AUTO: 0.11 K/UL — SIGNIFICANT CHANGE UP (ref 0–0.2)
BASOPHILS NFR BLD AUTO: 1.8 % — SIGNIFICANT CHANGE UP (ref 0–2)
BILIRUB SERPL-MCNC: 0.4 MG/DL — SIGNIFICANT CHANGE UP (ref 0.2–1.2)
BILIRUB UR-MCNC: NEGATIVE — SIGNIFICANT CHANGE UP
BUN SERPL-MCNC: 19 MG/DL — SIGNIFICANT CHANGE UP (ref 7–23)
CALCIUM SERPL-MCNC: 8.7 MG/DL — SIGNIFICANT CHANGE UP (ref 8.4–10.5)
CHLORIDE SERPL-SCNC: 97 MMOL/L — SIGNIFICANT CHANGE UP (ref 96–108)
CO2 SERPL-SCNC: 24 MMOL/L — SIGNIFICANT CHANGE UP (ref 22–31)
COLOR SPEC: YELLOW — SIGNIFICANT CHANGE UP
CREAT SERPL-MCNC: 2.12 MG/DL — HIGH (ref 0.5–1.3)
CULTURE RESULTS: SIGNIFICANT CHANGE UP
CULTURE RESULTS: SIGNIFICANT CHANGE UP
D DIMER BLD IA.RAPID-MCNC: 434 NG/ML DDU — HIGH
DIFF PNL FLD: ABNORMAL
EGFR: 37 ML/MIN/1.73M2 — LOW
EOSINOPHIL # BLD AUTO: 0.32 K/UL — SIGNIFICANT CHANGE UP (ref 0–0.5)
EOSINOPHIL NFR BLD AUTO: 5.1 % — SIGNIFICANT CHANGE UP (ref 0–6)
ETHANOL SERPL-MCNC: 387 MG/DL — HIGH (ref 0–3)
GLUCOSE BLDC GLUCOMTR-MCNC: 183 MG/DL — HIGH (ref 70–99)
GLUCOSE BLDC GLUCOMTR-MCNC: 189 MG/DL — HIGH (ref 70–99)
GLUCOSE SERPL-MCNC: 230 MG/DL — HIGH (ref 70–99)
GLUCOSE UR QL: 100 MG/DL
HCT VFR BLD CALC: 29.7 % — LOW (ref 39–50)
HGB BLD-MCNC: 9.5 G/DL — LOW (ref 13–17)
IMM GRANULOCYTES NFR BLD AUTO: 1.1 % — HIGH (ref 0–0.9)
INR BLD: 1.02 RATIO — SIGNIFICANT CHANGE UP (ref 0.85–1.16)
KETONES UR-MCNC: NEGATIVE MG/DL — SIGNIFICANT CHANGE UP
LACTATE SERPL-SCNC: 1.2 MMOL/L — SIGNIFICANT CHANGE UP (ref 0.7–2)
LEUKOCYTE ESTERASE UR-ACNC: NEGATIVE — SIGNIFICANT CHANGE UP
LYMPHOCYTES # BLD AUTO: 1.89 K/UL — SIGNIFICANT CHANGE UP (ref 1–3.3)
LYMPHOCYTES # BLD AUTO: 30.1 % — SIGNIFICANT CHANGE UP (ref 13–44)
MCHC RBC-ENTMCNC: 27.5 PG — SIGNIFICANT CHANGE UP (ref 27–34)
MCHC RBC-ENTMCNC: 32 G/DL — SIGNIFICANT CHANGE UP (ref 32–36)
MCV RBC AUTO: 85.8 FL — SIGNIFICANT CHANGE UP (ref 80–100)
MONOCYTES # BLD AUTO: 0.56 K/UL — SIGNIFICANT CHANGE UP (ref 0–0.9)
MONOCYTES NFR BLD AUTO: 8.9 % — SIGNIFICANT CHANGE UP (ref 2–14)
NEUTROPHILS # BLD AUTO: 3.32 K/UL — SIGNIFICANT CHANGE UP (ref 1.8–7.4)
NEUTROPHILS NFR BLD AUTO: 53 % — SIGNIFICANT CHANGE UP (ref 43–77)
NITRITE UR-MCNC: NEGATIVE — SIGNIFICANT CHANGE UP
NRBC # BLD: 0 /100 WBCS — SIGNIFICANT CHANGE UP (ref 0–0)
PH UR: 5.5 — SIGNIFICANT CHANGE UP (ref 5–8)
PLATELET # BLD AUTO: 103 K/UL — LOW (ref 150–400)
POTASSIUM SERPL-MCNC: 3.8 MMOL/L — SIGNIFICANT CHANGE UP (ref 3.5–5.3)
POTASSIUM SERPL-SCNC: 3.8 MMOL/L — SIGNIFICANT CHANGE UP (ref 3.5–5.3)
PROT SERPL-MCNC: 7.7 G/DL — SIGNIFICANT CHANGE UP (ref 6–8.3)
PROT UR-MCNC: 300 MG/DL
PROTHROM AB SERPL-ACNC: 12 SEC — SIGNIFICANT CHANGE UP (ref 9.9–13.4)
RBC # BLD: 3.46 M/UL — LOW (ref 4.2–5.8)
RBC # FLD: 13.4 % — SIGNIFICANT CHANGE UP (ref 10.3–14.5)
SODIUM SERPL-SCNC: 132 MMOL/L — LOW (ref 135–145)
SP GR SPEC: 1.01 — SIGNIFICANT CHANGE UP (ref 1–1.03)
SPECIMEN SOURCE: SIGNIFICANT CHANGE UP
SPECIMEN SOURCE: SIGNIFICANT CHANGE UP
UROBILINOGEN FLD QL: 0.2 MG/DL — SIGNIFICANT CHANGE UP (ref 0.2–1)
WBC # BLD: 6.27 K/UL — SIGNIFICANT CHANGE UP (ref 3.8–10.5)
WBC # FLD AUTO: 6.27 K/UL — SIGNIFICANT CHANGE UP (ref 3.8–10.5)

## 2024-11-07 PROCEDURE — 99285 EMERGENCY DEPT VISIT HI MDM: CPT

## 2024-11-07 PROCEDURE — 71045 X-RAY EXAM CHEST 1 VIEW: CPT | Mod: 26

## 2024-11-07 PROCEDURE — 93010 ELECTROCARDIOGRAM REPORT: CPT

## 2024-11-07 PROCEDURE — 73630 X-RAY EXAM OF FOOT: CPT | Mod: 26,LT

## 2024-11-07 RX ORDER — PIPERACILLIN SODIUM AND TAZOBACTAM SODIUM 4; .5 G/20ML; G/20ML
3.38 INJECTION, POWDER, LYOPHILIZED, FOR SOLUTION INTRAVENOUS ONCE
Refills: 0 | Status: COMPLETED | OUTPATIENT
Start: 2024-11-07 | End: 2024-11-07

## 2024-11-07 RX ORDER — VANCOMYCIN HCL 900 MCG/MG
1000 POWDER (GRAM) MISCELLANEOUS ONCE
Refills: 0 | Status: COMPLETED | OUTPATIENT
Start: 2024-11-07 | End: 2024-11-07

## 2024-11-07 RX ORDER — GLUCAGON INJECTION, SOLUTION 0.5 MG/.1ML
1 INJECTION, SOLUTION SUBCUTANEOUS ONCE
Refills: 0 | Status: DISCONTINUED | OUTPATIENT
Start: 2024-11-07 | End: 2024-11-20

## 2024-11-07 RX ORDER — LANOLIN ALCOHOL/MO/W.PET/CERES
100 CREAM (GRAM) TOPICAL DAILY
Refills: 0 | Status: DISCONTINUED | OUTPATIENT
Start: 2024-11-07 | End: 2024-11-20

## 2024-11-07 RX ORDER — INSULIN GLARGINE 100 [IU]/ML
20 INJECTION, SOLUTION SUBCUTANEOUS AT BEDTIME
Refills: 0 | Status: DISCONTINUED | OUTPATIENT
Start: 2024-11-07 | End: 2024-11-14

## 2024-11-07 RX ORDER — ACETAMINOPHEN 500MG 500 MG/1
650 TABLET, COATED ORAL EVERY 6 HOURS
Refills: 0 | Status: DISCONTINUED | OUTPATIENT
Start: 2024-11-07 | End: 2024-11-20

## 2024-11-07 RX ORDER — GABAPENTIN 300 MG/1
100 CAPSULE ORAL THREE TIMES A DAY
Refills: 0 | Status: DISCONTINUED | OUTPATIENT
Start: 2024-11-07 | End: 2024-11-20

## 2024-11-07 RX ORDER — CARVEDILOL 25 MG/1
6.25 TABLET, FILM COATED ORAL EVERY 12 HOURS
Refills: 0 | Status: DISCONTINUED | OUTPATIENT
Start: 2024-11-07 | End: 2024-11-20

## 2024-11-07 RX ORDER — SODIUM CHLORIDE 9 MG/ML
1000 INJECTION, SOLUTION INTRAMUSCULAR; INTRAVENOUS; SUBCUTANEOUS ONCE
Refills: 0 | Status: COMPLETED | OUTPATIENT
Start: 2024-11-07 | End: 2024-11-07

## 2024-11-07 RX ORDER — 0.9 % SODIUM CHLORIDE 0.9 %
1000 INTRAVENOUS SOLUTION INTRAVENOUS
Refills: 0 | Status: DISCONTINUED | OUTPATIENT
Start: 2024-11-07 | End: 2024-11-20

## 2024-11-07 RX ORDER — PIPERACILLIN SODIUM AND TAZOBACTAM SODIUM 4; .5 G/20ML; G/20ML
3.38 INJECTION, POWDER, LYOPHILIZED, FOR SOLUTION INTRAVENOUS EVERY 8 HOURS
Refills: 0 | Status: COMPLETED | OUTPATIENT
Start: 2024-11-07 | End: 2024-11-14

## 2024-11-07 RX ORDER — AMLODIPINE BESYLATE 10 MG/1
10 TABLET ORAL DAILY
Refills: 0 | Status: DISCONTINUED | OUTPATIENT
Start: 2024-11-07 | End: 2024-11-20

## 2024-11-07 RX ADMIN — PIPERACILLIN SODIUM AND TAZOBACTAM SODIUM 200 GRAM(S): 4; .5 INJECTION, POWDER, LYOPHILIZED, FOR SOLUTION INTRAVENOUS at 14:39

## 2024-11-07 RX ADMIN — GABAPENTIN 100 MILLIGRAM(S): 300 CAPSULE ORAL at 21:29

## 2024-11-07 RX ADMIN — PIPERACILLIN SODIUM AND TAZOBACTAM SODIUM 25 GRAM(S): 4; .5 INJECTION, POWDER, LYOPHILIZED, FOR SOLUTION INTRAVENOUS at 21:27

## 2024-11-07 RX ADMIN — INSULIN GLARGINE 20 UNIT(S): 100 INJECTION, SOLUTION SUBCUTANEOUS at 21:28

## 2024-11-07 RX ADMIN — Medication 250 MILLIGRAM(S): at 15:19

## 2024-11-07 RX ADMIN — Medication 1: at 19:24

## 2024-11-07 RX ADMIN — SODIUM CHLORIDE 1000 MILLILITER(S): 9 INJECTION, SOLUTION INTRAMUSCULAR; INTRAVENOUS; SUBCUTANEOUS at 16:00

## 2024-11-07 RX ADMIN — PIPERACILLIN SODIUM AND TAZOBACTAM SODIUM 3.38 GRAM(S): 4; .5 INJECTION, POWDER, LYOPHILIZED, FOR SOLUTION INTRAVENOUS at 15:09

## 2024-11-07 RX ADMIN — SODIUM CHLORIDE 1000 MILLILITER(S): 9 INJECTION, SOLUTION INTRAMUSCULAR; INTRAVENOUS; SUBCUTANEOUS at 14:39

## 2024-11-07 NOTE — ED PROVIDER NOTE - PROGRESS NOTE DETAILS
Discussed with orthopedic PA discussed with Saman, for Dr. Davenport, he will see the patient, for OR.  Should admit to medicine. Discussed with Dr. Weaver, will see patient to admit. Discussed with Dr. Weaver, will see patient to admit. Discussed with her regarding borderline hypoxia, although asymptomatic.  She will address, states the patient has a history of sleep apnea, has some chronic low O2 saturation.

## 2024-11-07 NOTE — PATIENT PROFILE ADULT - FALL HARM RISK - HARM RISK INTERVENTIONS
Assistance with ambulation/Assistance OOB with selected safe patient handling equipment/Communicate Risk of Fall with Harm to all staff/Discuss with provider need for PT consult/Monitor gait and stability/Reinforce activity limits and safety measures with patient and family/Tailored Fall Risk Interventions/Visual Cue: Yellow wristband and red socks/Bed in lowest position, wheels locked, appropriate side rails in place/Call bell, personal items and telephone in reach/Instruct patient to call for assistance before getting out of bed or chair/Non-slip footwear when patient is out of bed/[place] to call system/Physically safe environment - no spills, clutter or unnecessary equipment/Purposeful Proactive Rounding/Room/bathroom lighting operational, light cord in reach

## 2024-11-07 NOTE — CONSULT NOTE ADULT - PROBLEM SELECTOR RECOMMENDATION 9
Chart reviewed and Patient evaluated  Discussed diagnosis and treatment with patient  There is concern for open diabetic foot infection left foot with expose metatarsal head  Wound culture results previous visit show common nicholas  Xray reviewed left foot, no obvious or acute fracture pathology. metatarsal head consistent with possible OM.  Applied dry sterile dressing,   Rec IV antibiotics As Per ID  Rec elevate left lower extremity  NWB left lower extremity  Will to allow demarcation of infective process  Recs admit to hospitalist team  Patient understands he is high risk for a further infection, proximal amputation, and is at risk to lose part of the foot, all of the foot, bka, sepsis, loss of life.  Plan will be discussed with attending.  thank you for consult Chart reviewed and Patient evaluated  Discussed diagnosis and treatment with patient  There is concern for open diabetic foot infection left foot with expose metatarsal head  Wound culture results previous visit show common nicholas  Xray reviewed left foot, no obvious or acute fracture pathology. metatarsal head consistent with possible OM.  Applied dry sterile dressing,   Rec IV antibiotics As Per ID  Rec elevate left lower extremity  NWB left lower extremity  Patient will require surgical intervention, debridement of all non-viable soft tissue and bone left foot, tentatively planned for Tuesday 11/12,  Will allow demarcation of infective process  Request medical clearance prior to procedure  Recs admit to hospitalist team  Patient understands he is high risk for a further infection, proximal amputation, and is at risk to lose part of the foot, all of the foot, bka, sepsis, loss of life.  Plan will be discussed with attending.  thank you for consult

## 2024-11-07 NOTE — ED PROVIDER NOTE - CLINICAL SUMMARY MEDICAL DECISION MAKING FREE TEXT BOX
Left foot postoperative infection in an alcoholic.  Will check labs, x-ray, IV antibiotics, admission.  Patient with borderline O2 sat, asymptomatic.  Will check x-ray, D-dimer

## 2024-11-07 NOTE — ED ADULT NURSE NOTE - NSFALLHARMRISKINTERV_ED_ALL_ED
Assistance OOB with selected safe patient handling equipment if applicable/Assistance with ambulation/Communicate risk of Fall with Harm to all staff, patient, and family/Monitor gait and stability/Monitor for mental status changes and reorient to person, place, and time, as needed/Move patient closer to nursing station/within visual sight of ED staff/Provide patient with walking aids/Provide visual cue: red socks, yellow wristband, yellow gown, etc/Reinforce activity limits and safety measures with patient and family/Toileting schedule using arm’s reach rule for commode and bathroom/Use of alarms - bed, stretcher, chair and/or video monitoring/Bed in lowest position, wheels locked, appropriate side rails in place/Call bell, personal items and telephone in reach/Instruct patient to call for assistance before getting out of bed/chair/stretcher/Non-slip footwear applied when patient is off stretcher/[place] to call system/Physically safe environment - no spills, clutter or unnecessary equipment/Purposeful Proactive Rounding/Room/bathroom lighting operational, light cord in reach

## 2024-11-07 NOTE — ED PROVIDER NOTE - OBJECTIVE STATEMENT
49-year-old male with a history of hypertension, hyperlipidemia, type 2 diabetes, neuropathy, peripheral vascular disease, chronic kidney disease, EtOH abuse, reported history of being noncompliant status post recent left foot first toe amputation, complicated with cellulitis/foot ulcer.  The patient was admitted to the hospital on November 1, but left AMA on November 4.  The patient return for persistent infection, foul-smelling discharge.  Patient denies any chest pain, no shortness of breath.  No fever or chills.  No cough/URI.  No neck or back pain.  No numbness or tingling.  No focal weakness.  No aggravating or alleviating factors otherwise noted.  No other acute complaints. Patient states he sometimes drinks, states he did not drink today.

## 2024-11-07 NOTE — PATIENT PROFILE ADULT - NSPROHMDIABETMGMTSTRAT_GEN_A_NUR
activity/adequate rest/blood glucose testing/coping strategies activity/adequate rest/blood glucose testing/coping strategies/diet modification

## 2024-11-07 NOTE — ED ADULT NURSE NOTE - OBJECTIVE STATEMENT
pt arrives to ED with reports of odor from left foot. pt reports 1 month ago he had his left great toe amputated and over past couple days "its been looking bad and you can smell it". pt denies fever. +purulent drainage from wound. pt admits to drinking alcohol today. pt denies recent falls, reports he ambulates independently but has a cane for occasional use. arrives with ortho shoe on left foot. pt arrives to ED with reports of odor from left foot. pt reports 1 month ago he had his left great toe amputated and over past couple days "its been looking bad and you can smell it". pt denies fever. +purulent drainage from wound. pt admits to drinking alcohol today. pt denies recent falls, reports he ambulates independently but has a cane for occasional use. arrives with ortho shoe on left foot. pt with hx htn and DM reports he does not take any of his daily medications by choice.

## 2024-11-07 NOTE — H&P ADULT - HISTORY OF PRESENT ILLNESS
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 left hospital on11/4 against medical advice for his personal problem, The patient return for persistent infection, foul-smelling discharge.  Patient denies any chest pain, no shortness of breath.  No fever or chills.  No cough/URI.  No neck or back pain.  No numbness or tingling.  No focal weakness.  No aggravating or alleviating factors otherwise noted.  No other acute complaints. Patient states he sometimes drinks, states he did not drink today.  In ED afebrile, o2 sat 90 %, /99, ,patient is being admitted for further care, and podiatry and ID consult called

## 2024-11-07 NOTE — ED PROVIDER NOTE - PHYSICAL EXAMINATION
L foot : Positive foul-smelling discharge with edema and erythema to the medial aspect of the left foot, with some redness proximal to the midfoot.  No other signs of foot infection

## 2024-11-07 NOTE — ED ADULT TRIAGE NOTE - CHIEF COMPLAINT QUOTE
"My left foot is infected, they amputated my great toe on my left foot." Patient O2 sat noted to be 88-90% on room air , denies any SOB.

## 2024-11-07 NOTE — H&P ADULT - GLASGOW COMA SCALE: BEST MOTOR RESPONSE, MLM
Anesthesia ROS/Med Hx        Pulmonary Review:    Pt. positive for COPD   Pt. positive for asthma    Cardiovascular Review:    Pt. positive for hypertension  Pt. positive for hyperlipidemia    GI/HEPATIC/RENAL Review:    Pt. positive for GERD  Pt. positive for hepatitis- type C  Pt. positive for liver disease    End/Other Review:    Pt. positive for diabetes  Pt. positive for arthritis    Anesthesia Plan  ASA Status: 3  Anesthesia Type: General  Induction: Intravenous  Preferred Airway Type: ETT  Reviewed: Lab Results, Allergies, Problem List, Medications, Past Med History and NPO Status  The proposed anesthetic plan, including its risks and benefits, have been discussed with the Patient - along with the risks and benefits of alternatives.  Questions were encouraged and answered and the patient and/or representative agrees to proceed.  Blood Products: Not Anticipated      Physical Exam  Mallampati: III  TM Distance: >3 FB  Neck ROM: Full  Cardio Rhythm: Regular  Cardio Rate: Normal  Breath sounds clear to auscultation:  Yes       (M6) obeys commands

## 2024-11-07 NOTE — H&P ADULT - NSVTERISKREFERASSESS_GEN_ALL_CORE
Refer to the Assessment tab to view/cancel completed assessment. [FreeTextEntry1] : 47M with hyperparathyroidism. underwent screening renal bladder ultrasound. no stones noted. +calcification within prostate. 61 gram prostate.\par Has urinary frequency due to hydration. \par Denies urgency, hematuria, dysuria, or incomplete emptying. Nocturia x 1. good FOS. Not bothered by urinary symptoms. \par No family history prostate kidney bladder cancer. \par \par 11/2020 PSA 0.24

## 2024-11-07 NOTE — H&P ADULT - ASSESSMENT
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 left hospital on11/4 against medical advice for his personal problem, The patient return for persistent infection, foul-smelling discharge.  Patient denies any chest pain, no shortness of breath.  No fever or chills.  No cough/URI.  No neck or back pain.  No numbness or tingling.  No focal weakness.  No aggravating or alleviating factors otherwise noted.  No other acute complaints. Patient states he sometimes drinks, states he did not drink today.  In ED afebrile, o2 sat 90 %, /99, ,patient is being admitted for further care, and podiatry and ID consult called  admitted for  #Diabetic foot ulcer with cellullitis with ac on  osteomyelitis left foot   started on IV abx,ID and podiatry consult noted, cont  zosyn   CT/MRI   ## DM2 with hyperglycemia, with neuropathy , nephropathy with likely PVD    ssi, consistent carb, Endo consult,    # Ess HTN   continue current tt  # HLD   statin    # DVT prophylaxis  ID , endo and podiatry consult  #  CKD 3   nephro consult  likely Diabetic nephropathy   # HLD   low fat diet  # ETOH use disorder  cessation counselling,addiction medicine consult   folic acid, thiamine, lorazepam with CIWA protocol as per DR Vázquez  #Obesity   # EDUAR  o2 as needed  # Anemia of  disease   DVT pro  podiatry consult, ID , melony and podiatry consult

## 2024-11-07 NOTE — ED PROVIDER NOTE - DIFFERENTIAL DIAGNOSIS
Differential Diagnosis Rule out acute infection, electrolyte abnormality, leukocytosis, pneumonia/PE, other acute pathology

## 2024-11-08 DIAGNOSIS — E11.65 TYPE 2 DIABETES MELLITUS WITH HYPERGLYCEMIA: ICD-10-CM

## 2024-11-08 LAB
A1C WITH ESTIMATED AVERAGE GLUCOSE RESULT: 8.5 % — HIGH (ref 4–5.6)
ALBUMIN SERPL ELPH-MCNC: 2.9 G/DL — LOW (ref 3.3–5)
ALP SERPL-CCNC: 154 U/L — HIGH (ref 30–120)
ALT FLD-CCNC: 37 U/L — SIGNIFICANT CHANGE UP (ref 10–60)
ANION GAP SERPL CALC-SCNC: 11 MMOL/L — SIGNIFICANT CHANGE UP (ref 5–17)
AST SERPL-CCNC: 42 U/L — HIGH (ref 10–40)
BASOPHILS # BLD AUTO: 0.05 K/UL — SIGNIFICANT CHANGE UP (ref 0–0.2)
BASOPHILS NFR BLD AUTO: 0.8 % — SIGNIFICANT CHANGE UP (ref 0–2)
BILIRUB SERPL-MCNC: 0.7 MG/DL — SIGNIFICANT CHANGE UP (ref 0.2–1.2)
BUN SERPL-MCNC: 17 MG/DL — SIGNIFICANT CHANGE UP (ref 7–23)
CALCIUM SERPL-MCNC: 8.8 MG/DL — SIGNIFICANT CHANGE UP (ref 8.4–10.5)
CHLORIDE SERPL-SCNC: 105 MMOL/L — SIGNIFICANT CHANGE UP (ref 96–108)
CO2 SERPL-SCNC: 26 MMOL/L — SIGNIFICANT CHANGE UP (ref 22–31)
CREAT SERPL-MCNC: 2.02 MG/DL — HIGH (ref 0.5–1.3)
EGFR: 40 ML/MIN/1.73M2 — LOW
EOSINOPHIL # BLD AUTO: 0.24 K/UL — SIGNIFICANT CHANGE UP (ref 0–0.5)
EOSINOPHIL NFR BLD AUTO: 4 % — SIGNIFICANT CHANGE UP (ref 0–6)
ESTIMATED AVERAGE GLUCOSE: 197 MG/DL — HIGH (ref 68–114)
GLUCOSE BLDC GLUCOMTR-MCNC: 149 MG/DL — HIGH (ref 70–99)
GLUCOSE BLDC GLUCOMTR-MCNC: 160 MG/DL — HIGH (ref 70–99)
GLUCOSE BLDC GLUCOMTR-MCNC: 187 MG/DL — HIGH (ref 70–99)
GLUCOSE BLDC GLUCOMTR-MCNC: 238 MG/DL — HIGH (ref 70–99)
GLUCOSE SERPL-MCNC: 143 MG/DL — HIGH (ref 70–99)
HCT VFR BLD CALC: 31.1 % — LOW (ref 39–50)
HGB BLD-MCNC: 10.1 G/DL — LOW (ref 13–17)
IMM GRANULOCYTES NFR BLD AUTO: 0.7 % — SIGNIFICANT CHANGE UP (ref 0–0.9)
LYMPHOCYTES # BLD AUTO: 0.51 K/UL — LOW (ref 1–3.3)
LYMPHOCYTES # BLD AUTO: 8.5 % — LOW (ref 13–44)
MCHC RBC-ENTMCNC: 28.1 PG — SIGNIFICANT CHANGE UP (ref 27–34)
MCHC RBC-ENTMCNC: 32.5 G/DL — SIGNIFICANT CHANGE UP (ref 32–36)
MCV RBC AUTO: 86.4 FL — SIGNIFICANT CHANGE UP (ref 80–100)
MONOCYTES # BLD AUTO: 0.62 K/UL — SIGNIFICANT CHANGE UP (ref 0–0.9)
MONOCYTES NFR BLD AUTO: 10.4 % — SIGNIFICANT CHANGE UP (ref 2–14)
NEUTROPHILS # BLD AUTO: 4.52 K/UL — SIGNIFICANT CHANGE UP (ref 1.8–7.4)
NEUTROPHILS NFR BLD AUTO: 75.6 % — SIGNIFICANT CHANGE UP (ref 43–77)
NRBC # BLD: 0 /100 WBCS — SIGNIFICANT CHANGE UP (ref 0–0)
PLATELET # BLD AUTO: 100 K/UL — LOW (ref 150–400)
POTASSIUM SERPL-MCNC: 4 MMOL/L — SIGNIFICANT CHANGE UP (ref 3.5–5.3)
POTASSIUM SERPL-SCNC: 4 MMOL/L — SIGNIFICANT CHANGE UP (ref 3.5–5.3)
PROT SERPL-MCNC: 6.8 G/DL — SIGNIFICANT CHANGE UP (ref 6–8.3)
RBC # BLD: 3.6 M/UL — LOW (ref 4.2–5.8)
RBC # FLD: 13.4 % — SIGNIFICANT CHANGE UP (ref 10.3–14.5)
SODIUM SERPL-SCNC: 142 MMOL/L — SIGNIFICANT CHANGE UP (ref 135–145)
WBC # BLD: 5.98 K/UL — SIGNIFICANT CHANGE UP (ref 3.8–10.5)
WBC # FLD AUTO: 5.98 K/UL — SIGNIFICANT CHANGE UP (ref 3.8–10.5)

## 2024-11-08 RX ORDER — LOSARTAN POTASSIUM 100 MG/1
50 TABLET, FILM COATED ORAL DAILY
Refills: 0 | Status: DISCONTINUED | OUTPATIENT
Start: 2024-11-08 | End: 2024-11-18

## 2024-11-08 RX ORDER — GUAIFENESIN 400 MG
600 TABLET ORAL ONCE
Refills: 0 | Status: COMPLETED | OUTPATIENT
Start: 2024-11-08 | End: 2024-11-08

## 2024-11-08 RX ORDER — FAMOTIDINE 20 MG/1
20 TABLET, FILM COATED ORAL ONCE
Refills: 0 | Status: COMPLETED | OUTPATIENT
Start: 2024-11-08 | End: 2024-11-08

## 2024-11-08 RX ORDER — GUAIFENESIN 400 MG
100 TABLET ORAL EVERY 6 HOURS
Refills: 0 | Status: DISCONTINUED | OUTPATIENT
Start: 2024-11-08 | End: 2024-11-20

## 2024-11-08 RX ORDER — DIPHENHYDRAMINE HCL 25 MG
25 CAPSULE ORAL EVERY 6 HOURS
Refills: 0 | Status: DISCONTINUED | OUTPATIENT
Start: 2024-11-08 | End: 2024-11-20

## 2024-11-08 RX ADMIN — Medication 1 TABLET(S): at 08:08

## 2024-11-08 RX ADMIN — Medication 7 UNIT(S): at 12:20

## 2024-11-08 RX ADMIN — CARVEDILOL 6.25 MILLIGRAM(S): 25 TABLET, FILM COATED ORAL at 17:10

## 2024-11-08 RX ADMIN — AMLODIPINE BESYLATE 10 MILLIGRAM(S): 10 TABLET ORAL at 05:09

## 2024-11-08 RX ADMIN — PIPERACILLIN SODIUM AND TAZOBACTAM SODIUM 25 GRAM(S): 4; .5 INJECTION, POWDER, LYOPHILIZED, FOR SOLUTION INTRAVENOUS at 05:08

## 2024-11-08 RX ADMIN — INSULIN GLARGINE 20 UNIT(S): 100 INJECTION, SOLUTION SUBCUTANEOUS at 21:17

## 2024-11-08 RX ADMIN — Medication 25 MILLIGRAM(S): at 15:54

## 2024-11-08 RX ADMIN — Medication 25 MILLIGRAM(S): at 21:54

## 2024-11-08 RX ADMIN — PIPERACILLIN SODIUM AND TAZOBACTAM SODIUM 25 GRAM(S): 4; .5 INJECTION, POWDER, LYOPHILIZED, FOR SOLUTION INTRAVENOUS at 14:25

## 2024-11-08 RX ADMIN — LOSARTAN POTASSIUM 50 MILLIGRAM(S): 100 TABLET, FILM COATED ORAL at 14:47

## 2024-11-08 RX ADMIN — GABAPENTIN 100 MILLIGRAM(S): 300 CAPSULE ORAL at 21:15

## 2024-11-08 RX ADMIN — Medication 1 TABLET(S): at 18:02

## 2024-11-08 RX ADMIN — Medication 1: at 17:08

## 2024-11-08 RX ADMIN — Medication 600 MILLIGRAM(S): at 05:09

## 2024-11-08 RX ADMIN — Medication 100 MILLIGRAM(S): at 12:21

## 2024-11-08 RX ADMIN — FAMOTIDINE 20 MILLIGRAM(S): 20 TABLET, FILM COATED ORAL at 18:42

## 2024-11-08 RX ADMIN — GABAPENTIN 100 MILLIGRAM(S): 300 CAPSULE ORAL at 05:09

## 2024-11-08 RX ADMIN — CARVEDILOL 6.25 MILLIGRAM(S): 25 TABLET, FILM COATED ORAL at 05:09

## 2024-11-08 RX ADMIN — GABAPENTIN 100 MILLIGRAM(S): 300 CAPSULE ORAL at 14:24

## 2024-11-08 RX ADMIN — Medication 7 UNIT(S): at 17:08

## 2024-11-08 RX ADMIN — PIPERACILLIN SODIUM AND TAZOBACTAM SODIUM 25 GRAM(S): 4; .5 INJECTION, POWDER, LYOPHILIZED, FOR SOLUTION INTRAVENOUS at 21:15

## 2024-11-08 RX ADMIN — Medication 7 UNIT(S): at 08:05

## 2024-11-08 RX ADMIN — Medication 1: at 12:21

## 2024-11-08 NOTE — PROGRESS NOTE ADULT - SUBJECTIVE AND OBJECTIVE BOX
PROGRESS NOTE   Patient is a 49y old  Male who presents with a chief complaint of HPI:  49-year-old male with past medical history of DM2, HTN, HLD, NEUROPATHY, PVD,ckd4, ETOH abuser,  NON COMPLIANT WITH TT, recently discharged after tt for , Diabetic foot ulcer with cellullitis ,ac on  OM with sepstic arthritis amputation of of 2nd left toe, OM left great toe-,surg done by podiatry,9/20culture of wound E fecalis, path OM , with clear margins. left hospital on11/4 against medical advice for his personal problem, The patient return for persistent infection, foul-smelling discharge.  Patient denies any chest pain, no shortness of breath.  No fever or chills.  No cough/URI.  No neck or back pain.  No numbness or tingling.  No focal weakness.  No aggravating or alleviating factors otherwise noted.  No other acute complaints. Patient states he sometimes drinks, states he did not drink today.  In ED afebrile, o2 sat 90 %, /99, ,patient is being admitted for further care, and podiatry and ID consult called (07 Nov 2024 17:57)       (08 Nov 2024 09:46)      HPI:  49-year-old male with past medical history of DM2, HTN, HLD, NEUROPATHY, PVD,ckd4, ETOH abuser,  NON COMPLIANT WITH TT, recently discharged after tt for , Diabetic foot ulcer with cellullitis ,ac on  OM with sepstic arthritis amputation of of 2nd left toe, OM left great toe-,surg done by podiatry,9/20culture of wound E fecalis, path OM , with clear margins. left hospital on11/4 against medical advice for his personal problem, The patient return for persistent infection, foul-smelling discharge.  Patient denies any chest pain, no shortness of breath.  No fever or chills.  No cough/URI.  No neck or back pain.  No numbness or tingling.  No focal weakness.  No aggravating or alleviating factors otherwise noted.  No other acute complaints. Patient states he sometimes drinks, states he did not drink today.  In ED afebrile, o2 sat 90 %, /99, ,patient is being admitted for further care, and podiatry and ID consult called (07 Nov 2024 17:57)      Vital Signs Last 24 Hrs  T(C): 37.1 (08 Nov 2024 09:15), Max: 37.1 (08 Nov 2024 05:00)  T(F): 98.7 (08 Nov 2024 09:15), Max: 98.8 (08 Nov 2024 05:00)  HR: 100 (08 Nov 2024 09:15) (94 - 112)  BP: 163/91 (08 Nov 2024 09:15) (139/83 - 166/88)  BP(mean): --  RR: 18 (08 Nov 2024 09:15) (18 - 20)  SpO2: 94% (08 Nov 2024 09:15) (85% - 96%)    Parameters below as of 08 Nov 2024 09:15  Patient On (Oxygen Delivery Method): nasal cannula                              10.1   5.98  )-----------( 100      ( 08 Nov 2024 07:47 )             31.1               11-08    142  |  105  |  17  ----------------------------<  143[H]  4.0   |  26  |  2.02[H]    Ca    8.8      08 Nov 2024 07:47    TPro  6.8  /  Alb  2.9[L]  /  TBili  0.7  /  DBili  x   /  AST  42[H]  /  ALT  37  /  AlkPhos  154[H]  11-08      PHYSICAL EXAM  General: Pleasant  male NAD & AOX3.    Integument:  Skin warm, dry bilateral.    Noted ulcer left 1st mpj with exposed metatarsal head, hx of left hallux amputation with dehiscence, + edema, + erythema, + probe to bone, + purulent drainage, + tracking  Clinical concern for underling OM.   Vascular: Dorsalis Pedis and Posterior Tibial pulses 1/4.  Capillary re-fill time less then 3 seconds digits 1-5 bilateral.    Neuro: Protective sensation diminished to the level of the digits bilateral.  MSK: Muscle strength 5/5 all major muscle groups bilateral. Noted hx of left hallux amputation

## 2024-11-08 NOTE — DIETITIAN INITIAL EVALUATION ADULT - NS FNS DIET ORDER
Diet, Consistent Carbohydrate w/Evening Snack:   DASH/TLC {Sodium & Cholesterol Restricted} (11-07-24 @ 18:10)

## 2024-11-08 NOTE — PROGRESS NOTE ADULT - ASSESSMENT
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 left hospital on11/4 against medical advice for his personal problem, The patient return for persistent infection, foul-smelling discharge.  Patient denies any chest pain, no shortness of breath.  No fever or chills.  No cough/URI.  No neck or back pain.  No numbness or tingling.  No focal weakness.  No aggravating or alleviating factors otherwise noted.  No other acute complaints. Patient states he sometimes drinks, states he did not drink today.  In ED afebrile, o2 sat 90 %, /99, ,patient is being admitted for further care, and podiatry and ID consult called  admitted for  #Diabetic foot ulcer with cellullitis with ac on ch osteomyelitis left foot   started on IV abx,ID and podiatry consult noted, cont  zosyn   CT/MRI  podiatry consult noted   ulcer left 1st mpj with exposed metatarsal head, hx of left hallux amputation with dehiscence, + edema, + erythema, + probe to bone, + purulent drainage, + tracking  Clinical concern for underling OM.   Vascular: Dorsalis Pedis and Posterior Tibial pulses 1/4.  Capillary re-fill time less then 3 seconds digits 1-5 bilateral.    LYMPH: No lymphadenopathy noted   ## DM2 with hyperglycemia, with neuropathy , nephropathy with likely PVD    ssi, consistent carb, Endo consult,    # Ess HTN   continue current tt  # HLD   statin    # DVT prophylaxis  ID , endo and podiatry consult  #  CKD 3   nephro consult  likely Diabetic nephropathy   # HLD   low fat diet  # ETOH use disorder  cessation counselling,addiction medicine consult   folic acid, thiamine, lorazepam with CIWA protocol as per DR Vázquez  #Obesity   # EDUAR  o2 as needed  # Anemia of ch disease   DVT pro  podiatry consult, ID , melony and podiatry consult noted   for surg plan 11/12

## 2024-11-08 NOTE — DIETITIAN INITIAL EVALUATION ADULT - PERTINENT LABORATORY DATA
11-08    142  |  105  |  17  ----------------------------<  143[H]  4.0   |  26  |  2.02[H]    Ca    8.8      08 Nov 2024 07:47    TPro  6.8  /  Alb  2.9[L]  /  TBili  0.7  /  DBili  x   /  AST  42[H]  /  ALT  37  /  AlkPhos  154[H]  11-08  POCT Blood Glucose.: 149 mg/dL (11-08-24 @ 07:44)  A1C with Estimated Average Glucose Result: 8.5 % (11-07-24 @ 14:04)  A1C with Estimated Average Glucose Result: 10.6 % (09-18-24 @ 05:30)

## 2024-11-08 NOTE — DIETITIAN INITIAL EVALUATION ADULT - PERTINENT MEDS FT
MEDICATIONS  (STANDING):  amLODIPine   Tablet 10 milliGRAM(s) Oral daily  carvedilol 6.25 milliGRAM(s) Oral every 12 hours  dextrose 5%. 1000 milliLiter(s) (50 mL/Hr) IV Continuous <Continuous>  dextrose 5%. 1000 milliLiter(s) (100 mL/Hr) IV Continuous <Continuous>  dextrose 50% Injectable 25 Gram(s) IV Push once  dextrose 50% Injectable 12.5 Gram(s) IV Push once  dextrose 50% Injectable 25 Gram(s) IV Push once  gabapentin 100 milliGRAM(s) Oral three times a day  glucagon  Injectable 1 milliGRAM(s) IntraMuscular once  insulin glargine Injectable (LANTUS) 20 Unit(s) SubCutaneous at bedtime  insulin lispro (ADMELOG) corrective regimen sliding scale   SubCutaneous three times a day before meals  insulin lispro (ADMELOG) corrective regimen sliding scale   SubCutaneous at bedtime  insulin lispro Injectable (ADMELOG) 7 Unit(s) SubCutaneous before breakfast  insulin lispro Injectable (ADMELOG) 7 Unit(s) SubCutaneous before dinner  insulin lispro Injectable (ADMELOG) 7 Unit(s) SubCutaneous before lunch  lactobacillus acidophilus 1 Tablet(s) Oral two times a day with meals  piperacillin/tazobactam IVPB.. 3.375 Gram(s) IV Intermittent every 8 hours  thiamine 100 milliGRAM(s) Oral daily    MEDICATIONS  (PRN):  acetaminophen     Tablet .. 650 milliGRAM(s) Oral every 6 hours PRN Temp greater or equal to 38C (100.4F), Mild Pain (1 - 3)  dextrose Oral Gel 15 Gram(s) Oral once PRN Blood Glucose LESS THAN 70 milliGRAM(s)/deciliter

## 2024-11-08 NOTE — DIETITIAN INITIAL EVALUATION ADULT - ORAL INTAKE PTA/DIET HISTORY
Pt known to writer from previous admission in Sep. Reported trying his best to follow CHO diet at home, appetite/po intake was good. No vitamin/mineral or other nutrition supplements reported.

## 2024-11-08 NOTE — CONSULT NOTE ADULT - PROBLEM SELECTOR RECOMMENDATION 9
Type 2 A1c 8.5% adm foot infection  lantus 20 units @ HS  admelog 7 units TIDAC  DOROTHY ACHS  CC diet and accucheck ACHS  Recommend endocrine-Perlman onconsult  FU appt: TBA  DSC recommendations: return to home regimen and glucose monitoring, lifestyle and diet modifications  diabetes education provided as documented above  Diabetes support info and cell # [phone] given   Goal 100-180 mg/dL; 140-180 mg/dL in critical care areas

## 2024-11-08 NOTE — CONSULT NOTE ADULT - ASSESSMENT
Physical Exam:   Vital Signs Last 24 Hrs  T(C): 37.1 (08 Nov 2024 09:15), Max: 37.1 (08 Nov 2024 05:00)  T(F): 98.7 (08 Nov 2024 09:15), Max: 98.8 (08 Nov 2024 05:00)  HR: 100 (08 Nov 2024 09:15) (94 - 112)  BP: 163/91 (08 Nov 2024 09:15) (139/83 - 166/88)  BP(mean): --  RR: 18 (08 Nov 2024 09:15) (18 - 20)  SpO2: 94% (08 Nov 2024 09:15) (85% - 96%)    Parameters below as of 08 Nov 2024 09:15  Patient On (Oxygen Delivery Method): nasal cannula    CAPILLARY BLOOD GLUCOSE      POCT Blood Glucose.: 149 mg/dL (08 Nov 2024 07:44)  POCT Blood Glucose.: 183 mg/dL (07 Nov 2024 21:18)  POCT Blood Glucose.: 189 mg/dL (07 Nov 2024 19:03)        DIET: CC  >50%

## 2024-11-08 NOTE — DIETITIAN INITIAL EVALUATION ADULT - ADD RECOMMEND
1. Continue with current diet order  2. Provide ONS: Manish (7 gm arginine/7 gm glutamine/1.5 gm hmb) x1 packet bid  3. Encourage po intake as needed, allow double protein as needed  4. Reinforce diet edu as needed

## 2024-11-08 NOTE — DIETITIAN INITIAL EVALUATION ADULT - OTHER INFO
Visited patient in room, presents with good appetite/po intake, consuming >% of meals. Denies n/v/d/c, no BM noted. No reported difficulty chewing or swallowing. NKFA. Reported # in sep, current adm weight 200#, weight appears to be stable, will continue to monitor weight trends as able.    Pertinent medications/nutrition labs reviewed; -189 x24hr, a1c 8.5% with hx of DM, decreased from 10.6% in sep; Pt states been complaint with meds since 2 months ago; In house ordered for Lantus 20 units lispro sliding scale to aid in glucose management. Also receiving thiamine.     Reinforced diet edu. Recommend adding Manish (7 gm arginine/7 gm glutamine/1.5 gm hmb) x1 packet bid to aid in wound healing. Pt receptive, no nutrition related questions at this time. RD to continue to monitor nutrition status per protocol.

## 2024-11-08 NOTE — CONSULT NOTE ADULT - CONSTITUTIONAL
normal/well-groomed/no distress PAST SURGICAL HISTORY:  H/O  section     H/O hemorrhoidectomy     H/O tubal ligation     S/P percutaneous endoscopic gastrostomy (PEG) tube placement

## 2024-11-08 NOTE — CARE COORDINATION ASSESSMENT. - LIVING ARRANGEMENTS/HOME SAFETY CONCERNS
Patient states he is being evicted by the end of the month, SW made aware to meet with patient to discuss resources./concerns to be addressed

## 2024-11-08 NOTE — DIETITIAN INITIAL EVALUATION ADULT - NUTRITION DIAGNOSITC TERMINOLOGY #1
Altered Nutrition Related Lab Values
You can access the FollowMyHealth Patient Portal offered by [facility] by registering at the following website: [email or web address]. By joining [employer]’s FollowMyHealth portal, you will also be able to view your health information using other applications (apps) compatible with our system.

## 2024-11-08 NOTE — DIETITIAN INITIAL EVALUATION ADULT - REASON FOR ADMISSION
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 left hospital on11/4 against medical advice for his personal problem, The patient return for persistent infection, foul-smelling discharge.  Patient denies any chest pain, no shortness of breath.  No fever or chills.  No cough/URI.  No neck or back pain.  No numbness or tingling.  No focal weakness.  No aggravating or alleviating factors otherwise noted.  No other acute complaints. Patient states he sometimes drinks, states he did not drink today.  In ED afebrile, o2 sat 90 %, /99, ,patient is being admitted for further care, and podiatry and ID consult called (07 Nov 2024 17:57)

## 2024-11-08 NOTE — CARE COORDINATION ASSESSMENT. - NSDCPLANSERVICES_GEN_ALL_CORE
49-year-old male with  DM2, HTN, HLD, NEUROPATHY, PVD,ckd4, ETOH abuse, Diabetic foot ulcer with cellulitis ,ac on ch OM with septic arthritis amputation of  2nd left toe, OM left great toe-,surg done by podiatry,9/20 culture of wound E fecalis, path OM , with clear margins, pt was discharged on PO abx as pt refused IV, due to his job. Pt admitted on 11/3 for the same symptoms and signed out AMA due being evicted  and now returns with the same symptoms.   CM met with patient at bedside. Patient alert times 4. Patient states  he lives alone. Patient states he has nobody to call in case of emergency and both parents are living in Assisted Living. Patient stated owns a cane. Patient stated has no steps to enter in the house and bedroom is on the first floor. Patient is being evicted by the end of the month and has no place to go. SW is aware to meet with patient for any resources for the patient. Patient states he was seeing Dr Davenport after his september surgery until october. Anticipated needs unclear at this time as patient is scheduled for debridement of all non-viable soft tissue and bone left foot, tentatively planned for Tuesday 11/12.  CM explained about homecare services with a verbal understanding.   Patient choice [facility] care agency [phone], CM will send referral accordingly is appropriate.   PCP: Sonam Hou phone number [phone].  Pharmacy: [facility] phone number [phone].  Insurance: [employer] /Medicaid./Anticipated Needs Unclear at Present

## 2024-11-08 NOTE — CARE COORDINATION ASSESSMENT. - NSCAREPROVIDERS_GEN_ALL_CORE_FT
CARE PROVIDERS:  Accepting Physician: Mabel Weaver  Access Services: Clari Van  Administration: Antoni White  Administration: Mallory Wright  Administration: Francia Dennis  Admitting: Mabel Weaver  Attending: Mabel Weaver  Case Management: Shanthi Mccurdy  Consultant: Ranjeet Saab  Consultant: Linsey Mack  Consultant: Todd Canchola  Consultant: Jayesh Varma  Consultant: Saman Mayo  ED Attending: Darryl Barclay  ED Nurse: Lisa Quiroz  Infection Control: Leeann Edwards  Nurse: Riri Matute  Nurse: Romina Farah  Nurse: Paige Almaraz  Ordered: ServiceAccount, SCMMLM  Ordered: ServiceAccount, SCMMLM  Ordered: Doctor, Unknown  Outpatient Provider: Todd Canchola  Outpatient Provider: Walter Chawla  Override: Leilani Sosa  Physical Therapy: Daniel Marcos  Primary Team: Kathi Bess  Primary Team: Salvatore Granados  Registered Dietitian: Cayla Moreno  Respiratory Therapy: Salvatore Lua  : Marta Willett// Supp. Assoc.: Mirna Gaffney

## 2024-11-08 NOTE — PROGRESS NOTE ADULT - PROBLEM SELECTOR PLAN 1
Chart reviewed and Patient evaluated  Discussed diagnosis and treatment with patient  There is concern for open diabetic foot infection left foot with expose metatarsal head  Wound culture results previous visit show common nicholas  Xray reviewed left foot, no obvious or acute fracture pathology. metatarsal head consistent with possible OM.  Applied dry sterile dressing,   Rec cont IV antibiotics As Per ID  Rec elevate left lower extremity  PWB left lower extremity heel touch  Patient will require surgical intervention, debridement of all non-viable soft tissue and bone left foot, tentatively planned for Tuesday 11/12,  Will cont to allow demarcation of infective process  Request medical clearance prior to procedure  Patient understands he is high risk for a further infection, proximal amputation, and is at risk to lose part of the foot, all of the foot, bka, sepsis, loss of life.  Plan will be discussed with attending

## 2024-11-08 NOTE — PROGRESS NOTE ADULT - SUBJECTIVE AND OBJECTIVE BOX
Patient is a 49y old  Male who presents with a chief complaint of foot infection (08 Nov 2024 11:24)      INTERVAL HPI/OVERNIGHT EVENTS:overnight events noted    Home Medications:  acetaminophen 325 mg oral tablet: 2 tab(s) orally every 6 hours As needed Temp greater or equal to 38C (100.4F), Mild Pain (1 - 3), Moderate Pain (4 - 6) (01 Nov 2024 22:34)      MEDICATIONS  (STANDING):  amLODIPine   Tablet 10 milliGRAM(s) Oral daily  carvedilol 6.25 milliGRAM(s) Oral every 12 hours  dextrose 5%. 1000 milliLiter(s) (50 mL/Hr) IV Continuous <Continuous>  dextrose 5%. 1000 milliLiter(s) (100 mL/Hr) IV Continuous <Continuous>  dextrose 50% Injectable 12.5 Gram(s) IV Push once  dextrose 50% Injectable 25 Gram(s) IV Push once  dextrose 50% Injectable 25 Gram(s) IV Push once  gabapentin 100 milliGRAM(s) Oral three times a day  glucagon  Injectable 1 milliGRAM(s) IntraMuscular once  insulin glargine Injectable (LANTUS) 20 Unit(s) SubCutaneous at bedtime  insulin lispro (ADMELOG) corrective regimen sliding scale   SubCutaneous at bedtime  insulin lispro (ADMELOG) corrective regimen sliding scale   SubCutaneous three times a day before meals  insulin lispro Injectable (ADMELOG) 7 Unit(s) SubCutaneous before breakfast  insulin lispro Injectable (ADMELOG) 7 Unit(s) SubCutaneous before lunch  insulin lispro Injectable (ADMELOG) 7 Unit(s) SubCutaneous before dinner  lactobacillus acidophilus 1 Tablet(s) Oral two times a day with meals  piperacillin/tazobactam IVPB.. 3.375 Gram(s) IV Intermittent every 8 hours  thiamine 100 milliGRAM(s) Oral daily    MEDICATIONS  (PRN):  acetaminophen     Tablet .. 650 milliGRAM(s) Oral every 6 hours PRN Temp greater or equal to 38C (100.4F), Mild Pain (1 - 3)  dextrose Oral Gel 15 Gram(s) Oral once PRN Blood Glucose LESS THAN 70 milliGRAM(s)/deciliter      Allergies    No Known Allergies    Intolerances        REVIEW OF SYSTEMS:  CONSTITUTIONAL: No fever, weight loss, or fatigue  EYES: No eye pain, visual disturbances, or discharge  ENMT:  No difficulty hearing, tinnitus, vertigo; No sinus or throat pain  NECK: No pain or stiffness  BREASTS: No pain, masses, or nipple discharge  RESPIRATORY: No cough, wheezing, chills or hemoptysis; No shortness of breath  CARDIOVASCULAR: No chest pain, palpitations, dizziness, or leg swelling  GASTROINTESTINAL: No abdominal or epigastric pain. No nausea, vomiting, or hematemesis; No diarrhea or constipation. No melena or hematochezia.  GENITOURINARY: No dysuria, frequency, hematuria, or incontinence  NEUROLOGICAL: No headaches, memory loss, loss of strength, numbness, or tremors  SKIN: No itching, burning, rashes, or lesions   ENDOCRINE: No heat or cold intolerance; No hair loss  MUSCULOSKELETAL: foot pain and ulcers with discharge  PSYCHIATRIC: No depression, anxiety, mood swings, or difficulty sleeping  HEME/LYMPH: No easy bruising, or bleeding gums  ALLERGY AND IMMUNOLOGIC: No hives or eczema    Vital Signs Last 24 Hrs  T(C): 37.1 (08 Nov 2024 09:15), Max: 37.1 (08 Nov 2024 05:00)  T(F): 98.7 (08 Nov 2024 09:15), Max: 98.8 (08 Nov 2024 05:00)  HR: 100 (08 Nov 2024 09:15) (94 - 112)  BP: 163/91 (08 Nov 2024 09:15) (139/83 - 166/88)  BP(mean): --  RR: 18 (08 Nov 2024 09:15) (18 - 20)  SpO2: 94% (08 Nov 2024 09:15) (85% - 96%)    Parameters below as of 08 Nov 2024 09:15  Patient On (Oxygen Delivery Method): nasal cannula        PHYSICAL EXAM:  GENERAL: NAD, well-groomed, well-developed  HEAD:  Atraumatic, Normocephalic  EYES: EOMI, PERRLA, conjunctiva and sclera clear  ENMT: Moist mucous membranes,   NECK: Supple, No JVD, Normal thyroid  NERVOUS SYSTEM:  Alert & Oriented X3, non focal  CHEST/LUNG: Clear to percussion bilaterally;   HEART: Regular rate and rhythm;   ABDOMEN: Soft, Nontender, Nondistended; Bowel sounds present  EXTREMITIES: left foot in dressing -ulcer left 1st mpj with exposed metatarsal head, hx of left hallux amputation with dehiscence, + edema, + erythema, + probe to bone, + purulent drainage, + tracking  Clinical concern for underling OM.   Vascular: Dorsalis Pedis and Posterior Tibial pulses 1/4.  Capillary re-fill time less then 3 seconds digits 1-5 bilateral.    LYMPH: No lymphadenopathy noted  SKIN: No rashes or lesions    LABS:                        10.1   5.98  )-----------( 100      ( 08 Nov 2024 07:47 )             31.1     11-08    142  |  105  |  17  ----------------------------<  143[H]  4.0   |  26  |  2.02[H]    Ca    8.8      08 Nov 2024 07:47    TPro  6.8  /  Alb  2.9[L]  /  TBili  0.7  /  DBili  x   /  AST  42[H]  /  ALT  37  /  AlkPhos  154[H]  11-08    PT/INR - ( 07 Nov 2024 14:04 )   PT: 12.0 sec;   INR: 1.02 ratio         PTT - ( 07 Nov 2024 14:04 )  PTT:31.9 sec  Urinalysis Basic - ( 08 Nov 2024 07:47 )    Color: x / Appearance: x / SG: x / pH: x  Gluc: 143 mg/dL / Ketone: x  / Bili: x / Urobili: x   Blood: x / Protein: x / Nitrite: x   Leuk Esterase: x / RBC: x / WBC x   Sq Epi: x / Non Sq Epi: x / Bacteria: x      CAPILLARY BLOOD GLUCOSE      POCT Blood Glucose.: 149 mg/dL (08 Nov 2024 07:44)  POCT Blood Glucose.: 183 mg/dL (07 Nov 2024 21:18)  POCT Blood Glucose.: 189 mg/dL (07 Nov 2024 19:03)        Urinalysis with Rflx Culture (collected 11-07-24 @ 14:04)    Culture - Wound Aerobic (collected 11-01-24 @ 20:56)  Source: .Other  Final Report (11-03-24 @ 20:58):    Commensal nicholas consistent with body site    Urinalysis with Rflx Culture (collected 11-01-24 @ 20:20)    Culture - Blood (collected 11-01-24 @ 20:00)  Source: .Blood BLOOD  Final Report (11-07-24 @ 01:00):    No growth at 5 days    Culture - Blood (collected 11-01-24 @ 20:00)  Source: .Blood BLOOD  Final Report (11-07-24 @ 01:00):    No growth at 5 days        I&O's Summary    07 Nov 2024 07:01  -  08 Nov 2024 07:00  --------------------------------------------------------  IN: 340 mL / OUT: 0 mL / NET: 340 mL        RADIOLOGY & ADDITIONAL TESTS:    Imaging Personally Reviewed:  [x ] YES  [ ] NO    Consultant(s) Notes Reviewed:  [x ] YES  [ ] NO    Care Discussed with Consultants/Other Providers [ x] YES  [ ] NO

## 2024-11-09 LAB
ALBUMIN SERPL ELPH-MCNC: 2.7 G/DL — LOW (ref 3.3–5)
ALP SERPL-CCNC: 150 U/L — HIGH (ref 30–120)
ALT FLD-CCNC: 33 U/L — SIGNIFICANT CHANGE UP (ref 10–60)
ANION GAP SERPL CALC-SCNC: 7 MMOL/L — SIGNIFICANT CHANGE UP (ref 5–17)
AST SERPL-CCNC: 40 U/L — SIGNIFICANT CHANGE UP (ref 10–40)
BASOPHILS # BLD AUTO: 0.07 K/UL — SIGNIFICANT CHANGE UP (ref 0–0.2)
BASOPHILS NFR BLD AUTO: 1.2 % — SIGNIFICANT CHANGE UP (ref 0–2)
BILIRUB SERPL-MCNC: 0.8 MG/DL — SIGNIFICANT CHANGE UP (ref 0.2–1.2)
BUN SERPL-MCNC: 26 MG/DL — HIGH (ref 7–23)
CALCIUM SERPL-MCNC: 9.2 MG/DL — SIGNIFICANT CHANGE UP (ref 8.4–10.5)
CHLORIDE SERPL-SCNC: 102 MMOL/L — SIGNIFICANT CHANGE UP (ref 96–108)
CO2 SERPL-SCNC: 30 MMOL/L — SIGNIFICANT CHANGE UP (ref 22–31)
CREAT SERPL-MCNC: 2.16 MG/DL — HIGH (ref 0.5–1.3)
EGFR: 37 ML/MIN/1.73M2 — LOW
EOSINOPHIL # BLD AUTO: 0.31 K/UL — SIGNIFICANT CHANGE UP (ref 0–0.5)
EOSINOPHIL NFR BLD AUTO: 5.5 % — SIGNIFICANT CHANGE UP (ref 0–6)
GLUCOSE BLDC GLUCOMTR-MCNC: 174 MG/DL — HIGH (ref 70–99)
GLUCOSE BLDC GLUCOMTR-MCNC: 192 MG/DL — HIGH (ref 70–99)
GLUCOSE BLDC GLUCOMTR-MCNC: 192 MG/DL — HIGH (ref 70–99)
GLUCOSE BLDC GLUCOMTR-MCNC: 210 MG/DL — HIGH (ref 70–99)
GLUCOSE BLDC GLUCOMTR-MCNC: 216 MG/DL — HIGH (ref 70–99)
GLUCOSE SERPL-MCNC: 225 MG/DL — HIGH (ref 70–99)
HCT VFR BLD CALC: 32.8 % — LOW (ref 39–50)
HGB BLD-MCNC: 10.7 G/DL — LOW (ref 13–17)
IMM GRANULOCYTES NFR BLD AUTO: 0.7 % — SIGNIFICANT CHANGE UP (ref 0–0.9)
LYMPHOCYTES # BLD AUTO: 0.81 K/UL — LOW (ref 1–3.3)
LYMPHOCYTES # BLD AUTO: 14.4 % — SIGNIFICANT CHANGE UP (ref 13–44)
MCHC RBC-ENTMCNC: 28.3 PG — SIGNIFICANT CHANGE UP (ref 27–34)
MCHC RBC-ENTMCNC: 32.6 G/DL — SIGNIFICANT CHANGE UP (ref 32–36)
MCV RBC AUTO: 86.8 FL — SIGNIFICANT CHANGE UP (ref 80–100)
MONOCYTES # BLD AUTO: 0.63 K/UL — SIGNIFICANT CHANGE UP (ref 0–0.9)
MONOCYTES NFR BLD AUTO: 11.2 % — SIGNIFICANT CHANGE UP (ref 2–14)
NEUTROPHILS # BLD AUTO: 3.76 K/UL — SIGNIFICANT CHANGE UP (ref 1.8–7.4)
NEUTROPHILS NFR BLD AUTO: 67 % — SIGNIFICANT CHANGE UP (ref 43–77)
NRBC # BLD: 0 /100 WBCS — SIGNIFICANT CHANGE UP (ref 0–0)
PLATELET # BLD AUTO: 102 K/UL — LOW (ref 150–400)
POTASSIUM SERPL-MCNC: 4.7 MMOL/L — SIGNIFICANT CHANGE UP (ref 3.5–5.3)
POTASSIUM SERPL-SCNC: 4.7 MMOL/L — SIGNIFICANT CHANGE UP (ref 3.5–5.3)
PROT SERPL-MCNC: 7.2 G/DL — SIGNIFICANT CHANGE UP (ref 6–8.3)
RBC # BLD: 3.78 M/UL — LOW (ref 4.2–5.8)
RBC # FLD: 13.6 % — SIGNIFICANT CHANGE UP (ref 10.3–14.5)
SODIUM SERPL-SCNC: 139 MMOL/L — SIGNIFICANT CHANGE UP (ref 135–145)
WBC # BLD: 5.62 K/UL — SIGNIFICANT CHANGE UP (ref 3.8–10.5)
WBC # FLD AUTO: 5.62 K/UL — SIGNIFICANT CHANGE UP (ref 3.8–10.5)

## 2024-11-09 RX ORDER — MAGNESIUM, ALUMINUM HYDROXIDE 200-225/5
30 SUSPENSION, ORAL (FINAL DOSE FORM) ORAL ONCE
Refills: 0 | Status: COMPLETED | OUTPATIENT
Start: 2024-11-09 | End: 2024-11-09

## 2024-11-09 RX ADMIN — AMLODIPINE BESYLATE 10 MILLIGRAM(S): 10 TABLET ORAL at 05:45

## 2024-11-09 RX ADMIN — ACETAMINOPHEN 500MG 650 MILLIGRAM(S): 500 TABLET, COATED ORAL at 22:07

## 2024-11-09 RX ADMIN — PIPERACILLIN SODIUM AND TAZOBACTAM SODIUM 25 GRAM(S): 4; .5 INJECTION, POWDER, LYOPHILIZED, FOR SOLUTION INTRAVENOUS at 21:37

## 2024-11-09 RX ADMIN — Medication 7 UNIT(S): at 12:25

## 2024-11-09 RX ADMIN — Medication 25 MILLIGRAM(S): at 21:36

## 2024-11-09 RX ADMIN — INSULIN GLARGINE 20 UNIT(S): 100 INJECTION, SOLUTION SUBCUTANEOUS at 22:43

## 2024-11-09 RX ADMIN — Medication 1 TABLET(S): at 17:08

## 2024-11-09 RX ADMIN — GABAPENTIN 100 MILLIGRAM(S): 300 CAPSULE ORAL at 05:45

## 2024-11-09 RX ADMIN — Medication 7 UNIT(S): at 08:07

## 2024-11-09 RX ADMIN — Medication 2: at 12:25

## 2024-11-09 RX ADMIN — PIPERACILLIN SODIUM AND TAZOBACTAM SODIUM 25 GRAM(S): 4; .5 INJECTION, POWDER, LYOPHILIZED, FOR SOLUTION INTRAVENOUS at 13:21

## 2024-11-09 RX ADMIN — Medication 30 MILLILITER(S): at 21:36

## 2024-11-09 RX ADMIN — ACETAMINOPHEN 500MG 650 MILLIGRAM(S): 500 TABLET, COATED ORAL at 21:37

## 2024-11-09 RX ADMIN — PIPERACILLIN SODIUM AND TAZOBACTAM SODIUM 25 GRAM(S): 4; .5 INJECTION, POWDER, LYOPHILIZED, FOR SOLUTION INTRAVENOUS at 05:44

## 2024-11-09 RX ADMIN — Medication 100 MILLIGRAM(S): at 12:24

## 2024-11-09 RX ADMIN — GABAPENTIN 100 MILLIGRAM(S): 300 CAPSULE ORAL at 21:36

## 2024-11-09 RX ADMIN — Medication 1 TABLET(S): at 08:07

## 2024-11-09 RX ADMIN — Medication 7 UNIT(S): at 17:08

## 2024-11-09 RX ADMIN — Medication 1: at 17:07

## 2024-11-09 RX ADMIN — GABAPENTIN 100 MILLIGRAM(S): 300 CAPSULE ORAL at 13:21

## 2024-11-09 RX ADMIN — CARVEDILOL 6.25 MILLIGRAM(S): 25 TABLET, FILM COATED ORAL at 17:10

## 2024-11-09 RX ADMIN — LOSARTAN POTASSIUM 50 MILLIGRAM(S): 100 TABLET, FILM COATED ORAL at 05:45

## 2024-11-09 RX ADMIN — Medication 2: at 08:07

## 2024-11-09 RX ADMIN — CARVEDILOL 6.25 MILLIGRAM(S): 25 TABLET, FILM COATED ORAL at 05:45

## 2024-11-09 NOTE — PROGRESS NOTE ADULT - PROBLEM SELECTOR PLAN 1
Chart reviewed and Patient evaluated  Discussed diagnosis and treatment with patient  There is concern for open diabetic foot infection left foot with expose metatarsal head  Wound culture results previous visit show common nicholas  Xray reviewed left foot, no obvious or acute fracture pathology. metatarsal head consistent with possible OM.  Applied dry sterile dressing,   Rec cont IV antibiotics As Per ID  Rec elevate left lower extremity  PWB left lower extremity heel touch  Patient will require surgical intervention, debridement of all non-viable soft tissue and bone left foot, tentatively planned for Tuesday 11/12,  Will cont to allow demarcation of infective process  Request medical clearance prior to procedure  Patient understands he is high risk for a further infection, proximal amputation, and is at risk to lose part of the foot, all of the foot, bka, sepsis, loss of life.  Plan will be discussed with attending.

## 2024-11-09 NOTE — PROGRESS NOTE ADULT - SUBJECTIVE AND OBJECTIVE BOX
Patient is a 49y old  Male who presents with a chief complaint of foot infection (08 Nov 2024 14:14)  Patient seen in follow up for CKD.        PAST MEDICAL HISTORY:  Prediabetes    HTN (hypertension)    HLD (hyperlipidemia)    DM2 (diabetes mellitus, type 2)      MEDICATIONS  (STANDING):  amLODIPine   Tablet 10 milliGRAM(s) Oral daily  carvedilol 6.25 milliGRAM(s) Oral every 12 hours  dextrose 5%. 1000 milliLiter(s) (50 mL/Hr) IV Continuous <Continuous>  dextrose 5%. 1000 milliLiter(s) (100 mL/Hr) IV Continuous <Continuous>  dextrose 50% Injectable 25 Gram(s) IV Push once  dextrose 50% Injectable 12.5 Gram(s) IV Push once  dextrose 50% Injectable 25 Gram(s) IV Push once  gabapentin 100 milliGRAM(s) Oral three times a day  glucagon  Injectable 1 milliGRAM(s) IntraMuscular once  insulin glargine Injectable (LANTUS) 20 Unit(s) SubCutaneous at bedtime  insulin lispro (ADMELOG) corrective regimen sliding scale   SubCutaneous three times a day before meals  insulin lispro (ADMELOG) corrective regimen sliding scale   SubCutaneous at bedtime  insulin lispro Injectable (ADMELOG) 7 Unit(s) SubCutaneous before breakfast  insulin lispro Injectable (ADMELOG) 7 Unit(s) SubCutaneous before lunch  insulin lispro Injectable (ADMELOG) 7 Unit(s) SubCutaneous before dinner  lactobacillus acidophilus 1 Tablet(s) Oral two times a day with meals  losartan 50 milliGRAM(s) Oral daily  piperacillin/tazobactam IVPB.. 3.375 Gram(s) IV Intermittent every 8 hours  thiamine 100 milliGRAM(s) Oral daily    MEDICATIONS  (PRN):  acetaminophen     Tablet .. 650 milliGRAM(s) Oral every 6 hours PRN Temp greater or equal to 38C (100.4F), Mild Pain (1 - 3)  dextrose Oral Gel 15 Gram(s) Oral once PRN Blood Glucose LESS THAN 70 milliGRAM(s)/deciliter  diphenhydrAMINE 25 milliGRAM(s) Oral every 6 hours PRN Rash and/or Itching  guaiFENesin Oral Liquid (Sugar-Free) 100 milliGRAM(s) Oral every 6 hours PRN Cough    T(C): 36.7 (11-09-24 @ 09:20), Max: 37.1 (11-08-24 @ 05:00)  HR: 86 (11-09-24 @ 09:20) (84 - 112)  BP: 144/83 (11-09-24 @ 09:20) (139/74 - 178/100)  RR: 18 (11-09-24 @ 09:20) (18 - 19)  SpO2: 97% (11-09-24 @ 09:20) (90% - 97%)  Wt(kg): --  I&O's Detail    08 Nov 2024 07:01  -  09 Nov 2024 07:00  --------------------------------------------------------  IN:    IV PiggyBack: 200 mL  Total IN: 200 mL    OUT:  Total OUT: 0 mL    Total NET: 200 mL          PHYSICAL EXAM:  General: No distress  Respiratory: b/l air entry  Cardiovascular: S1 S2  Gastrointestinal: soft  Extremities:  edema, foot dressing                              10.1   5.98  )-----------( 100      ( 08 Nov 2024 07:47 )             31.1     11-08    142  |  105  |  17  ----------------------------<  143[H]  4.0   |  26  |  2.02[H]    Ca    8.8      08 Nov 2024 07:47    TPro  6.8  /  Alb  2.9[L]  /  TBili  0.7  /  DBili  x   /  AST  42[H]  /  ALT  37  /  AlkPhos  154[H]  11-08        LIVER FUNCTIONS - ( 08 Nov 2024 07:47 )  Alb: 2.9 g/dL / Pro: 6.8 g/dL / ALK PHOS: 154 U/L / ALT: 37 U/L / AST: 42 U/L / GGT: x           Urinalysis Basic - ( 08 Nov 2024 07:47 )    Color: x / Appearance: x / SG: x / pH: x  Gluc: 143 mg/dL / Ketone: x  / Bili: x / Urobili: x   Blood: x / Protein: x / Nitrite: x   Leuk Esterase: x / RBC: x / WBC x   Sq Epi: x / Non Sq Epi: x / Bacteria: x        Sodium, Serum: 142 (11-08 @ 07:47)  Sodium, Serum: 132 (11-07 @ 14:04)    Creatinine, Serum: 2.02 (11-08 @ 07:47)  Creatinine, Serum: 2.12 (11-07 @ 14:04)    Potassium, Serum: 4.0 (11-08 @ 07:47)  Potassium, Serum: 3.8 (11-07 @ 14:04)    Hemoglobin: 10.1 (11-08 @ 07:47)  Hemoglobin: 9.5 (11-07 @ 14:04)

## 2024-11-09 NOTE — PROGRESS NOTE ADULT - ASSESSMENT
CKD 3, Diabetic nephropathy  Diabetes, DFU   Hypertension    Renal indices at baseline. To continue current meds. Monitor blood sugar levels. Insulin coverage as needed. Dietary restriction.   Trend BP and titrate BP meds as needed Podiatry follow up. Avoid nephrotoxic meds as possible. Avoid ACEI, ARB, NSAIDs and IV contrast.   Will follow electrolytes and renal function trend.

## 2024-11-09 NOTE — PROGRESS NOTE ADULT - SUBJECTIVE AND OBJECTIVE BOX
ANUJASUZANNA HURLEY is a 49yMale , patient examined and chart reviewed.     INTERVAL HPI/ OVERNIGHT EVENTS:   Afebrile No events.    PAST MEDICAL & SURGICAL HISTORY:  Prediabetes  HTN (hypertension)  HLD (hyperlipidemia)  DM2 (diabetes mellitus, type 2)        For details regarding the patient's social history, family history, and other miscellaneous elements, please refer the initial infectious diseases consultation and/or the admitting history and physical examination for this admission.      ROS:  CONSTITUTIONAL:  Negative fever or chills  EYES:  Negative  blurry vision or double vision  CARDIOVASCULAR:  Negative for chest pain or palpitations  RESPIRATORY:  Negative for cough, wheezing, or SOB   GASTROINTESTINAL:  Negative for nausea, vomiting, diarrhea, constipation, or abdominal pain  GENITOURINARY:  Negative frequency, urgency or dysuria  NEUROLOGIC:  No headache, confusion, dizziness, lightheadedness  All other systems were reviewed and are negative     No Known Allergies      Current inpatient medications :    ANTIBIOTICS/RELEVANT:  piperacillin/tazobactam IVPB.. 3.375 Gram(s) IV Intermittent every 8 hours    MEDICATIONS  (STANDING):  amLODIPine   Tablet 10 milliGRAM(s) Oral daily  carvedilol 6.25 milliGRAM(s) Oral every 12 hours  dextrose 5%. 1000 milliLiter(s) (100 mL/Hr) IV Continuous <Continuous>  dextrose 5%. 1000 milliLiter(s) (50 mL/Hr) IV Continuous <Continuous>  dextrose 50% Injectable 12.5 Gram(s) IV Push once  dextrose 50% Injectable 25 Gram(s) IV Push once  dextrose 50% Injectable 25 Gram(s) IV Push once  gabapentin 100 milliGRAM(s) Oral three times a day  glucagon  Injectable 1 milliGRAM(s) IntraMuscular once  insulin glargine Injectable (LANTUS) 20 Unit(s) SubCutaneous at bedtime  insulin lispro (ADMELOG) corrective regimen sliding scale   SubCutaneous at bedtime  insulin lispro (ADMELOG) corrective regimen sliding scale   SubCutaneous three times a day before meals  insulin lispro Injectable (ADMELOG) 7 Unit(s) SubCutaneous before dinner  insulin lispro Injectable (ADMELOG) 7 Unit(s) SubCutaneous before breakfast  insulin lispro Injectable (ADMELOG) 7 Unit(s) SubCutaneous before lunch  lactobacillus acidophilus 1 Tablet(s) Oral two times a day with meals  losartan 50 milliGRAM(s) Oral daily  thiamine 100 milliGRAM(s) Oral daily    MEDICATIONS  (PRN):  acetaminophen     Tablet .. 650 milliGRAM(s) Oral every 6 hours PRN Temp greater or equal to 38C (100.4F), Mild Pain (1 - 3)  dextrose Oral Gel 15 Gram(s) Oral once PRN Blood Glucose LESS THAN 70 milliGRAM(s)/deciliter  diphenhydrAMINE 25 milliGRAM(s) Oral every 6 hours PRN Rash and/or Itching  guaiFENesin Oral Liquid (Sugar-Free) 100 milliGRAM(s) Oral every 6 hours PRN Cough    Objective:  Vital Signs Last 24 Hrs  T(C): 36.7 (09 Nov 2024 09:20), Max: 37.1 (08 Nov 2024 20:39)  T(F): 98.1 (09 Nov 2024 09:20), Max: 98.8 (08 Nov 2024 20:39)  HR: 86 (09 Nov 2024 09:20) (84 - 102)  BP: 144/83 (09 Nov 2024 09:20) (139/74 - 178/100)  RR: 18 (09 Nov 2024 09:20) (18 - 19)  SpO2: 97% (09 Nov 2024 09:20) (93% - 97%)    Parameters below as of 09 Nov 2024 09:20  Patient On (Oxygen Delivery Method): room air        Physical Exam:  General: no acute distress  Neck: supple, trachea midline  Lungs: clear, no wheeze/rhonchi  Cardiovascular: regular rate and rhythm, S1 S2  Abdomen: soft, nontender,  bowel sounds normal  Neurological: alert and oriented x3  Skin: no rash  Extremities: Left foot drsg c/d/i        LABS:                                 10.7   5.62  )-----------( 102      ( 09 Nov 2024 12:00 )             32.8   11-09    139  |  102  |  26[H]  ----------------------------<  225[H]  4.7   |  30  |  2.16[H]    Ca    9.2      09 Nov 2024 12:00    TPro  7.2  /  Alb  2.7[L]  /  TBili  0.8  /  DBili  x   /  AST  40  /  ALT  33  /  AlkPhos  150[H]  11-09    MICROBIOLOGY:  Culture - Wound Aerobic (collected 01 Nov 2024 20:56)  Source: .Other  Final Report (03 Nov 2024 20:58):    Commensal nicholas consistent with body site    Culture - Blood (collected 01 Nov 2024 20:00)  Source: .Blood BLOOD  Preliminary Report (04 Nov 2024 01:02):    No growth at 48 Hours    Culture - Blood (collected 01 Nov 2024 20:00)  Source: .Blood BLOOD  Preliminary Report (04 Nov 2024 01:02):    No growth at 48 Hours    RADIOLOGY & ADDITIONAL STUDIES:        Assessment :  49-year-old male with  DM2, HTN, HLD, NEUROPATHY, PVD,ckd4, ETOH abuse, Diabetic foot ulcer with cellullitis ,ac on ch OM 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  He has been on amoxicillin twice daily and states he has been compliant with his antibiotic.  Patient noted that his surgical site had developed a bad odor with some discharge.  Left foot with exposed metatarsal head. Was on admited on 11/3 but signed out AMA now readmitted for same.    Plan :   Cont Zosyn  To OR next Tuesday  Trend temps and cbc  Podiatry on case    Continue with present regiment.  Appropriate use of antibiotics and adverse effects reviewed.      > 35 minutes were spent in direct patient care reviewing notes, medications ,labs data/ imaging , discussion with multidisciplinary team.    Thank you for allowing me to participate in care of your patient .    Linsey Mack MD  Infectious Disease  [phone]

## 2024-11-09 NOTE — PROGRESS NOTE ADULT - SUBJECTIVE AND OBJECTIVE BOX
PROGRESS NOTE   Patient is a 49y old  Male who presents with a chief complaint of foot infection (09 Nov 2024 12:57)      HPI:  49-year-old male with past medical history of DM2, HTN, HLD, NEUROPATHY, PVD,ckd4, ETOH abuser,  NON COMPLIANT WITH TT, recently discharged after tt for , Diabetic foot ulcer with cellullitis ,ac on ch OM with sepstic arthritis amputation of of 2nd left toe, OM left great toe-,surg done by podiatry,9/20culture of wound E fecalis, path OM , with clear margins. left hospital on11/4 against medical advice for his personal problem, The patient return for persistent infection, foul-smelling discharge.  Patient denies any chest pain, no shortness of breath.  No fever or chills.  No cough/URI.  No neck or back pain.  No numbness or tingling.  No focal weakness.  No aggravating or alleviating factors otherwise noted.  No other acute complaints. Patient states he sometimes drinks, states he did not drink today.  In ED afebrile, o2 sat 90 %, /99, ,patient is being admitted for further care, and podiatry and ID consult called (07 Nov 2024 17:57)      Vital Signs Last 24 Hrs  T(C): 36.3 (09 Nov 2024 14:06), Max: 37.1 (08 Nov 2024 20:39)  T(F): 97.3 (09 Nov 2024 14:06), Max: 98.8 (08 Nov 2024 20:39)  HR: 87 (09 Nov 2024 14:06) (84 - 98)  BP: 156/88 (09 Nov 2024 14:06) (139/74 - 178/100)  BP(mean): --  RR: 18 (09 Nov 2024 14:06) (18 - 19)  SpO2: 97% (09 Nov 2024 14:06) (93% - 97%)    Parameters below as of 09 Nov 2024 14:06  Patient On (Oxygen Delivery Method): room air                              10.7   5.62  )-----------( 102      ( 09 Nov 2024 12:00 )             32.8               11-09    139  |  102  |  26[H]  ----------------------------<  225[H]  4.7   |  30  |  2.16[H]    Ca    9.2      09 Nov 2024 12:00    TPro  7.2  /  Alb  2.7[L]  /  TBili  0.8  /  DBili  x   /  AST  40  /  ALT  33  /  AlkPhos  150[H]  11-09      PHYSICAL EXAM  General: Pleasant  male NAD & AOX3.    Integument:  Skin warm, dry bilateral.    Noted ulcer left 1st mpj with exposed metatarsal head, hx of left hallux amputation with dehiscence, reduced edema, reduced erythema, + probe to bone, + purulent drainage, + tracking  Clinical concern for underling OM.   Vascular: Dorsalis Pedis and Posterior Tibial pulses 1/4.  Capillary re-fill time less then 3 seconds digits 1-5 bilateral.    Neuro: Protective sensation diminished to the level of the digits bilateral.  MSK: Muscle strength 5/5 all major muscle groups bilateral. Noted hx of left hallux amputation

## 2024-11-09 NOTE — PROGRESS NOTE ADULT - SUBJECTIVE AND OBJECTIVE BOX
Patient is a 49y old  Male who presents with a chief complaint of foot infection (09 Nov 2024 10:12)      INTERVAL HPI/OVERNIGHT EVENTS:overnight events noted    Home Medications:  acetaminophen 325 mg oral tablet: 2 tab(s) orally every 6 hours As needed Temp greater or equal to 38C (100.4F), Mild Pain (1 - 3), Moderate Pain (4 - 6) (01 Nov 2024 22:34)      MEDICATIONS  (STANDING):  amLODIPine   Tablet 10 milliGRAM(s) Oral daily  carvedilol 6.25 milliGRAM(s) Oral every 12 hours  dextrose 5%. 1000 milliLiter(s) (50 mL/Hr) IV Continuous <Continuous>  dextrose 5%. 1000 milliLiter(s) (100 mL/Hr) IV Continuous <Continuous>  dextrose 50% Injectable 25 Gram(s) IV Push once  dextrose 50% Injectable 12.5 Gram(s) IV Push once  dextrose 50% Injectable 25 Gram(s) IV Push once  gabapentin 100 milliGRAM(s) Oral three times a day  glucagon  Injectable 1 milliGRAM(s) IntraMuscular once  insulin glargine Injectable (LANTUS) 20 Unit(s) SubCutaneous at bedtime  insulin lispro (ADMELOG) corrective regimen sliding scale   SubCutaneous three times a day before meals  insulin lispro (ADMELOG) corrective regimen sliding scale   SubCutaneous at bedtime  insulin lispro Injectable (ADMELOG) 7 Unit(s) SubCutaneous before breakfast  insulin lispro Injectable (ADMELOG) 7 Unit(s) SubCutaneous before lunch  insulin lispro Injectable (ADMELOG) 7 Unit(s) SubCutaneous before dinner  lactobacillus acidophilus 1 Tablet(s) Oral two times a day with meals  losartan 50 milliGRAM(s) Oral daily  piperacillin/tazobactam IVPB.. 3.375 Gram(s) IV Intermittent every 8 hours  thiamine 100 milliGRAM(s) Oral daily    MEDICATIONS  (PRN):  acetaminophen     Tablet .. 650 milliGRAM(s) Oral every 6 hours PRN Temp greater or equal to 38C (100.4F), Mild Pain (1 - 3)  dextrose Oral Gel 15 Gram(s) Oral once PRN Blood Glucose LESS THAN 70 milliGRAM(s)/deciliter  diphenhydrAMINE 25 milliGRAM(s) Oral every 6 hours PRN Rash and/or Itching  guaiFENesin Oral Liquid (Sugar-Free) 100 milliGRAM(s) Oral every 6 hours PRN Cough      Allergies    No Known Allergies    Intolerances        REVIEW OF SYSTEMS:  CONSTITUTIONAL: No fever, weight loss, has fatigue  EYES: No eye pain, visual disturbances, or discharge  ENMT:  No difficulty hearing, tinnitus, vertigo; No sinus or throat pain  NECK: No pain or stiffness  BREASTS: No pain, masses, or nipple discharge  RESPIRATORY: No cough, wheezing, chills or hemoptysis; No shortness of breath  CARDIOVASCULAR: No chest pain, palpitations, dizziness, or leg swelling  GASTROINTESTINAL: No abdominal or epigastric pain. No nausea, vomiting, or hematemesis; No diarrhea or constipation. No melena or hematochezia.  GENITOURINARY: No dysuria, frequency, hematuria, or incontinence  NEUROLOGICAL: No headaches,  numbness, or tremors  SKIN: No itching, burning, rashes, or lesions   left foot with ulcer and foul smelly discharge  Vital Signs Last 24 Hrs  T(C): 36.7 (09 Nov 2024 09:20), Max: 37.1 (08 Nov 2024 20:39)  T(F): 98.1 (09 Nov 2024 09:20), Max: 98.8 (08 Nov 2024 20:39)  HR: 86 (09 Nov 2024 09:20) (84 - 102)  BP: 144/83 (09 Nov 2024 09:20) (139/74 - 178/100)  BP(mean): --  RR: 18 (09 Nov 2024 09:20) (18 - 19)  SpO2: 97% (09 Nov 2024 09:20) (93% - 97%)    Parameters below as of 09 Nov 2024 09:20  Patient On (Oxygen Delivery Method): room air        PHYSICAL EXAM:  GENERAL: , well-groomed, well-developed  HEAD:  Atraumatic, Normocephalic  EYES: EOMI, PERRLA, conjunctiva and sclera clear  ENMT: Moist mucous membranes,   NECK: Supple, No JVD, Normal thyroid  NERVOUS SYSTEM:  Alert & Oriented X3, non focal  CHEST/LUNG: Clear to percussion bilaterally;   HEART: Regular rate and rhythm; No murmurs, rubs, or gallops  ABDOMEN: Soft, Nontender, Nondistended; Bowel sounds present  EXTREMITIES:  2+ Peripheral Pulses, No clubbing, cyanosis, or edema  SKIN: No rashes or lesions    LABS:                        10.1   5.98  )-----------( 100      ( 08 Nov 2024 07:47 )             31.1     11-08    142  |  105  |  17  ----------------------------<  143[H]  4.0   |  26  |  2.02[H]    Ca    8.8      08 Nov 2024 07:47    TPro  6.8  /  Alb  2.9[L]  /  TBili  0.7  /  DBili  x   /  AST  42[H]  /  ALT  37  /  AlkPhos  154[H]  11-08    PT/INR - ( 07 Nov 2024 14:04 )   PT: 12.0 sec;   INR: 1.02 ratio         PTT - ( 07 Nov 2024 14:04 )  PTT:31.9 sec  Urinalysis Basic - ( 08 Nov 2024 07:47 )    Color: x / Appearance: x / SG: x / pH: x  Gluc: 143 mg/dL / Ketone: x  / Bili: x / Urobili: x   Blood: x / Protein: x / Nitrite: x   Leuk Esterase: x / RBC: x / WBC x   Sq Epi: x / Non Sq Epi: x / Bacteria: x      CAPILLARY BLOOD GLUCOSE      POCT Blood Glucose.: 210 mg/dL (09 Nov 2024 07:51)  POCT Blood Glucose.: 238 mg/dL (08 Nov 2024 20:49)  POCT Blood Glucose.: 187 mg/dL (08 Nov 2024 17:00)  POCT Blood Glucose.: 160 mg/dL (08 Nov 2024 12:19)        Culture - Blood (collected 11-07-24 @ 14:04)  Source: .Blood BLOOD  Preliminary Report (11-08-24 @ 19:01):    No growth at 24 hours    Culture - Blood (collected 11-07-24 @ 14:04)  Source: .Blood BLOOD  Preliminary Report (11-08-24 @ 19:01):    No growth at 24 hours    Urinalysis with Rflx Culture (collected 11-07-24 @ 14:04)        I&O's Summary    08 Nov 2024 07:01  -  09 Nov 2024 07:00  --------------------------------------------------------  IN: 200 mL / OUT: 0 mL / NET: 200 mL        RADIOLOGY & ADDITIONAL TESTS:    Imaging Personally Reviewed:  [x ] YES  [ ] NO    Consultant(s) Notes Reviewed:  [x ] YES  [ ] NO    Care Discussed with Consultants/Other Providers [x ] YES  [ ] NO

## 2024-11-10 LAB
ALBUMIN SERPL ELPH-MCNC: 2.5 G/DL — LOW (ref 3.3–5)
ALP SERPL-CCNC: 125 U/L — HIGH (ref 30–120)
ALT FLD-CCNC: 27 U/L — SIGNIFICANT CHANGE UP (ref 10–60)
ANION GAP SERPL CALC-SCNC: 8 MMOL/L — SIGNIFICANT CHANGE UP (ref 5–17)
AST SERPL-CCNC: 30 U/L — SIGNIFICANT CHANGE UP (ref 10–40)
BASOPHILS # BLD AUTO: 0.1 K/UL — SIGNIFICANT CHANGE UP (ref 0–0.2)
BASOPHILS NFR BLD AUTO: 1.9 % — SIGNIFICANT CHANGE UP (ref 0–2)
BILIRUB SERPL-MCNC: 0.8 MG/DL — SIGNIFICANT CHANGE UP (ref 0.2–1.2)
BUN SERPL-MCNC: 29 MG/DL — HIGH (ref 7–23)
CALCIUM SERPL-MCNC: 9.2 MG/DL — SIGNIFICANT CHANGE UP (ref 8.4–10.5)
CHLORIDE SERPL-SCNC: 101 MMOL/L — SIGNIFICANT CHANGE UP (ref 96–108)
CO2 SERPL-SCNC: 30 MMOL/L — SIGNIFICANT CHANGE UP (ref 22–31)
CREAT SERPL-MCNC: 2.14 MG/DL — HIGH (ref 0.5–1.3)
EGFR: 37 ML/MIN/1.73M2 — LOW
EOSINOPHIL # BLD AUTO: 0.34 K/UL — SIGNIFICANT CHANGE UP (ref 0–0.5)
EOSINOPHIL NFR BLD AUTO: 6.4 % — HIGH (ref 0–6)
GLUCOSE BLDC GLUCOMTR-MCNC: 171 MG/DL — HIGH (ref 70–99)
GLUCOSE BLDC GLUCOMTR-MCNC: 171 MG/DL — HIGH (ref 70–99)
GLUCOSE BLDC GLUCOMTR-MCNC: 173 MG/DL — HIGH (ref 70–99)
GLUCOSE BLDC GLUCOMTR-MCNC: 199 MG/DL — HIGH (ref 70–99)
GLUCOSE SERPL-MCNC: 178 MG/DL — HIGH (ref 70–99)
HCT VFR BLD CALC: 31.5 % — LOW (ref 39–50)
HGB BLD-MCNC: 10.1 G/DL — LOW (ref 13–17)
IMM GRANULOCYTES NFR BLD AUTO: 0.9 % — SIGNIFICANT CHANGE UP (ref 0–0.9)
LYMPHOCYTES # BLD AUTO: 1.01 K/UL — SIGNIFICANT CHANGE UP (ref 1–3.3)
LYMPHOCYTES # BLD AUTO: 19.1 % — SIGNIFICANT CHANGE UP (ref 13–44)
MCHC RBC-ENTMCNC: 27.9 PG — SIGNIFICANT CHANGE UP (ref 27–34)
MCHC RBC-ENTMCNC: 32.1 G/DL — SIGNIFICANT CHANGE UP (ref 32–36)
MCV RBC AUTO: 87 FL — SIGNIFICANT CHANGE UP (ref 80–100)
MONOCYTES # BLD AUTO: 0.72 K/UL — SIGNIFICANT CHANGE UP (ref 0–0.9)
MONOCYTES NFR BLD AUTO: 13.6 % — SIGNIFICANT CHANGE UP (ref 2–14)
NEUTROPHILS # BLD AUTO: 3.08 K/UL — SIGNIFICANT CHANGE UP (ref 1.8–7.4)
NEUTROPHILS NFR BLD AUTO: 58.1 % — SIGNIFICANT CHANGE UP (ref 43–77)
NRBC # BLD: 0 /100 WBCS — SIGNIFICANT CHANGE UP (ref 0–0)
PLATELET # BLD AUTO: 116 K/UL — LOW (ref 150–400)
POTASSIUM SERPL-MCNC: 3.8 MMOL/L — SIGNIFICANT CHANGE UP (ref 3.5–5.3)
POTASSIUM SERPL-SCNC: 3.8 MMOL/L — SIGNIFICANT CHANGE UP (ref 3.5–5.3)
PROT SERPL-MCNC: 6.7 G/DL — SIGNIFICANT CHANGE UP (ref 6–8.3)
RBC # BLD: 3.62 M/UL — LOW (ref 4.2–5.8)
RBC # FLD: 13.4 % — SIGNIFICANT CHANGE UP (ref 10.3–14.5)
SODIUM SERPL-SCNC: 139 MMOL/L — SIGNIFICANT CHANGE UP (ref 135–145)
WBC # BLD: 5.3 K/UL — SIGNIFICANT CHANGE UP (ref 3.8–10.5)
WBC # FLD AUTO: 5.3 K/UL — SIGNIFICANT CHANGE UP (ref 3.8–10.5)

## 2024-11-10 RX ORDER — ACETAMINOPHEN, DIPHENHYDRAMINE HCL, PHENYLEPHRINE HCL 325; 25; 5 MG/1; MG/1; MG/1
5 TABLET ORAL ONCE
Refills: 0 | Status: COMPLETED | OUTPATIENT
Start: 2024-11-10 | End: 2024-11-10

## 2024-11-10 RX ADMIN — INSULIN GLARGINE 20 UNIT(S): 100 INJECTION, SOLUTION SUBCUTANEOUS at 21:14

## 2024-11-10 RX ADMIN — ACETAMINOPHEN, DIPHENHYDRAMINE HCL, PHENYLEPHRINE HCL 5 MILLIGRAM(S): 325; 25; 5 TABLET ORAL at 22:14

## 2024-11-10 RX ADMIN — GABAPENTIN 100 MILLIGRAM(S): 300 CAPSULE ORAL at 05:36

## 2024-11-10 RX ADMIN — CARVEDILOL 6.25 MILLIGRAM(S): 25 TABLET, FILM COATED ORAL at 17:11

## 2024-11-10 RX ADMIN — Medication 1 TABLET(S): at 17:11

## 2024-11-10 RX ADMIN — Medication 7 UNIT(S): at 17:11

## 2024-11-10 RX ADMIN — Medication 25 MILLIGRAM(S): at 21:13

## 2024-11-10 RX ADMIN — Medication 1: at 17:10

## 2024-11-10 RX ADMIN — Medication 100 MILLIGRAM(S): at 12:24

## 2024-11-10 RX ADMIN — Medication 7 UNIT(S): at 12:22

## 2024-11-10 RX ADMIN — GABAPENTIN 100 MILLIGRAM(S): 300 CAPSULE ORAL at 14:05

## 2024-11-10 RX ADMIN — PIPERACILLIN SODIUM AND TAZOBACTAM SODIUM 25 GRAM(S): 4; .5 INJECTION, POWDER, LYOPHILIZED, FOR SOLUTION INTRAVENOUS at 14:05

## 2024-11-10 RX ADMIN — Medication 1: at 08:10

## 2024-11-10 RX ADMIN — PIPERACILLIN SODIUM AND TAZOBACTAM SODIUM 25 GRAM(S): 4; .5 INJECTION, POWDER, LYOPHILIZED, FOR SOLUTION INTRAVENOUS at 21:13

## 2024-11-10 RX ADMIN — Medication 1 TABLET(S): at 08:12

## 2024-11-10 RX ADMIN — GABAPENTIN 100 MILLIGRAM(S): 300 CAPSULE ORAL at 21:13

## 2024-11-10 RX ADMIN — ACETAMINOPHEN 500MG 650 MILLIGRAM(S): 500 TABLET, COATED ORAL at 21:13

## 2024-11-10 RX ADMIN — Medication 7 UNIT(S): at 08:11

## 2024-11-10 RX ADMIN — LOSARTAN POTASSIUM 50 MILLIGRAM(S): 100 TABLET, FILM COATED ORAL at 05:36

## 2024-11-10 RX ADMIN — PIPERACILLIN SODIUM AND TAZOBACTAM SODIUM 25 GRAM(S): 4; .5 INJECTION, POWDER, LYOPHILIZED, FOR SOLUTION INTRAVENOUS at 05:36

## 2024-11-10 RX ADMIN — CARVEDILOL 6.25 MILLIGRAM(S): 25 TABLET, FILM COATED ORAL at 05:36

## 2024-11-10 RX ADMIN — AMLODIPINE BESYLATE 10 MILLIGRAM(S): 10 TABLET ORAL at 05:36

## 2024-11-10 RX ADMIN — ACETAMINOPHEN 500MG 650 MILLIGRAM(S): 500 TABLET, COATED ORAL at 21:43

## 2024-11-10 RX ADMIN — Medication 1: at 12:22

## 2024-11-10 NOTE — PROGRESS NOTE ADULT - SUBJECTIVE AND OBJECTIVE BOX
Patient is a 49y old  Male who presents with a chief complaint of foot infection (10 Nov 2024 10:31)      INTERVAL HPI/OVERNIGHT EVENTS:overnight events noted    Home Medications:  acetaminophen 325 mg oral tablet: 2 tab(s) orally every 6 hours As needed Temp greater or equal to 38C (100.4F), Mild Pain (1 - 3), Moderate Pain (4 - 6) (01 Nov 2024 22:34)      MEDICATIONS  (STANDING):  amLODIPine   Tablet 10 milliGRAM(s) Oral daily  carvedilol 6.25 milliGRAM(s) Oral every 12 hours  dextrose 5%. 1000 milliLiter(s) (50 mL/Hr) IV Continuous <Continuous>  dextrose 5%. 1000 milliLiter(s) (100 mL/Hr) IV Continuous <Continuous>  dextrose 50% Injectable 12.5 Gram(s) IV Push once  dextrose 50% Injectable 25 Gram(s) IV Push once  dextrose 50% Injectable 25 Gram(s) IV Push once  gabapentin 100 milliGRAM(s) Oral three times a day  glucagon  Injectable 1 milliGRAM(s) IntraMuscular once  insulin glargine Injectable (LANTUS) 20 Unit(s) SubCutaneous at bedtime  insulin lispro (ADMELOG) corrective regimen sliding scale   SubCutaneous three times a day before meals  insulin lispro (ADMELOG) corrective regimen sliding scale   SubCutaneous at bedtime  insulin lispro Injectable (ADMELOG) 7 Unit(s) SubCutaneous before breakfast  insulin lispro Injectable (ADMELOG) 7 Unit(s) SubCutaneous before lunch  insulin lispro Injectable (ADMELOG) 7 Unit(s) SubCutaneous before dinner  lactobacillus acidophilus 1 Tablet(s) Oral two times a day with meals  losartan 50 milliGRAM(s) Oral daily  piperacillin/tazobactam IVPB.. 3.375 Gram(s) IV Intermittent every 8 hours  thiamine 100 milliGRAM(s) Oral daily    MEDICATIONS  (PRN):  acetaminophen     Tablet .. 650 milliGRAM(s) Oral every 6 hours PRN Temp greater or equal to 38C (100.4F), Mild Pain (1 - 3)  dextrose Oral Gel 15 Gram(s) Oral once PRN Blood Glucose LESS THAN 70 milliGRAM(s)/deciliter  diphenhydrAMINE 25 milliGRAM(s) Oral every 6 hours PRN Rash and/or Itching  guaiFENesin Oral Liquid (Sugar-Free) 100 milliGRAM(s) Oral every 6 hours PRN Cough      Allergies    No Known Allergies    Intolerances        REVIEW OF SYSTEMS:  CONSTITUTIONAL: No fever, weight loss, or fatigue  EYES: No eye pain, visual disturbances, or discharge  ENMT:  No difficulty hearing, tinnitus, vertigo; No sinus or throat pain  NECK: No pain or stiffness  BREASTS: No pain, masses, or nipple discharge  RESPIRATORY: No cough, wheezing, chills or hemoptysis; No shortness of breath  CARDIOVASCULAR: No chest pain, palpitations, dizziness, or leg swelling  GASTROINTESTINAL: No abdominal or epigastric pain. No nausea, vomiting, or hematemesis; No diarrhea or constipation. No melena or hematochezia.  GENITOURINARY: No dysuria, frequency, hematuria, or incontinence  NEUROLOGICAL: No headaches, memory loss, loss of strength, numbness, or tremors  SKIN: No itching, burning, rashes, or lesions   left foot pain    Vital Signs Last 24 Hrs  T(C): 36.7 (10 Nov 2024 05:06), Max: 37.1 (09 Nov 2024 20:29)  T(F): 98.1 (10 Nov 2024 05:06), Max: 98.7 (09 Nov 2024 20:29)  HR: 75 (10 Nov 2024 05:06) (75 - 94)  BP: 146/77 (10 Nov 2024 05:06) (127/83 - 176/94)  BP(mean): --  RR: 18 (10 Nov 2024 05:06) (18 - 19)  SpO2: 97% (10 Nov 2024 05:06) (94% - 97%)    Parameters below as of 10 Nov 2024 05:06  Patient On (Oxygen Delivery Method): room air        PHYSICAL EXAM:  GENERAL: NAD, well-groomed, well-developed  HEAD:  Atraumatic, Normocephalic  EYES: EOMI, PERRLA, conjunctiva and sclera clear  ENMT: Moist mucous membranes,   NECK: Supple, No JVD, Normal thyroid  NERVOUS SYSTEM:  Alert & Oriented X3, Good concentration; Motor Strength 5/5 B/L upper and lower extremities; DTRs 2+ intact and symmetric  CHEST/LUNG: Clear to percussion bilaterally; No rales, rhonchi, wheezing, or rubs  HEART: Regular rate and rhythm; No murmurs, rubs, or gallops  ABDOMEN: Soft, Nontender, Nondistended; Bowel sounds present  EXTREMITIES:  left foot in dressing  SKIN: No rashes or lesions    LABS:                        10.1   5.30  )-----------( 116      ( 10 Nov 2024 06:00 )             31.5     11-10    139  |  101  |  29[H]  ----------------------------<  178[H]  3.8   |  30  |  2.14[H]    Ca    9.2      10 Nov 2024 06:00    TPro  6.7  /  Alb  2.5[L]  /  TBili  0.8  /  DBili  x   /  AST  30  /  ALT  27  /  AlkPhos  125[H]  11-10      Urinalysis Basic - ( 10 Nov 2024 06:00 )    Color: x / Appearance: x / SG: x / pH: x  Gluc: 178 mg/dL / Ketone: x  / Bili: x / Urobili: x   Blood: x / Protein: x / Nitrite: x   Leuk Esterase: x / RBC: x / WBC x   Sq Epi: x / Non Sq Epi: x / Bacteria: x      CAPILLARY BLOOD GLUCOSE      POCT Blood Glucose.: 171 mg/dL (10 Nov 2024 11:43)  POCT Blood Glucose.: 173 mg/dL (10 Nov 2024 08:00)  POCT Blood Glucose.: 192 mg/dL (09 Nov 2024 22:39)  POCT Blood Glucose.: 192 mg/dL (09 Nov 2024 21:05)  POCT Blood Glucose.: 174 mg/dL (09 Nov 2024 16:58)  POCT Blood Glucose.: 216 mg/dL (09 Nov 2024 12:22)        Culture - Blood (collected 11-07-24 @ 14:04)  Source: .Blood BLOOD  Preliminary Report (11-09-24 @ 19:01):    No growth at 48 Hours    Culture - Blood (collected 11-07-24 @ 14:04)  Source: .Blood BLOOD  Preliminary Report (11-09-24 @ 19:01):    No growth at 48 Hours    Urinalysis with Rflx Culture (collected 11-07-24 @ 14:04)        I&O's Summary      RADIOLOGY & ADDITIONAL TESTS:    Imaging Personally Reviewed:  [x ] YES  [ ] NO    Consultant(s) Notes Reviewed:  [x ] YES  [ ] NO    Care Discussed with Consultants/Other Providers [x ] YES  [ ] NO

## 2024-11-10 NOTE — PROGRESS NOTE ADULT - SUBJECTIVE AND OBJECTIVE BOX
Patient is a 49y old  Male who presents with a chief complaint of foot infection (08 Nov 2024 14:14)  Patient seen in follow up for CKD.        PAST MEDICAL HISTORY:  Prediabetes    HTN (hypertension)    HLD (hyperlipidemia)    DM2 (diabetes mellitus, type 2)      MEDICATIONS  (STANDING):  amLODIPine   Tablet 10 milliGRAM(s) Oral daily  carvedilol 6.25 milliGRAM(s) Oral every 12 hours  dextrose 5%. 1000 milliLiter(s) (100 mL/Hr) IV Continuous <Continuous>  dextrose 5%. 1000 milliLiter(s) (50 mL/Hr) IV Continuous <Continuous>  dextrose 50% Injectable 12.5 Gram(s) IV Push once  dextrose 50% Injectable 25 Gram(s) IV Push once  dextrose 50% Injectable 25 Gram(s) IV Push once  gabapentin 100 milliGRAM(s) Oral three times a day  glucagon  Injectable 1 milliGRAM(s) IntraMuscular once  insulin glargine Injectable (LANTUS) 20 Unit(s) SubCutaneous at bedtime  insulin lispro (ADMELOG) corrective regimen sliding scale   SubCutaneous three times a day before meals  insulin lispro (ADMELOG) corrective regimen sliding scale   SubCutaneous at bedtime  insulin lispro Injectable (ADMELOG) 7 Unit(s) SubCutaneous before breakfast  insulin lispro Injectable (ADMELOG) 7 Unit(s) SubCutaneous before dinner  insulin lispro Injectable (ADMELOG) 7 Unit(s) SubCutaneous before lunch  lactobacillus acidophilus 1 Tablet(s) Oral two times a day with meals  losartan 50 milliGRAM(s) Oral daily  piperacillin/tazobactam IVPB.. 3.375 Gram(s) IV Intermittent every 8 hours  thiamine 100 milliGRAM(s) Oral daily    MEDICATIONS  (PRN):  acetaminophen     Tablet .. 650 milliGRAM(s) Oral every 6 hours PRN Temp greater or equal to 38C (100.4F), Mild Pain (1 - 3)  dextrose Oral Gel 15 Gram(s) Oral once PRN Blood Glucose LESS THAN 70 milliGRAM(s)/deciliter  diphenhydrAMINE 25 milliGRAM(s) Oral every 6 hours PRN Rash and/or Itching  guaiFENesin Oral Liquid (Sugar-Free) 100 milliGRAM(s) Oral every 6 hours PRN Cough    T(C): 36.7 (11-10-24 @ 05:06), Max: 37.1 (11-08-24 @ 20:39)  HR: 75 (11-10-24 @ 05:06) (75 - 102)  BP: 146/77 (11-10-24 @ 05:06) (127/83 - 178/100)  RR: 18 (11-10-24 @ 05:06)  SpO2: 97% (11-10-24 @ 05:06)  Wt(kg): --  I&O's Detail        PHYSICAL EXAM:  General: No distress  Respiratory: b/l air entry  Cardiovascular: S1 S2  Gastrointestinal: soft  Extremities:  edema, foot dressing        LABORATORY:                        10.1   5.30  )-----------( 116      ( 10 Nov 2024 06:00 )             31.5     11-10    139  |  101  |  29[H]  ----------------------------<  178[H]  3.8   |  30  |  2.14[H]    Ca    9.2      10 Nov 2024 06:00    TPro  6.7  /  Alb  2.5[L]  /  TBili  0.8  /  DBili  x   /  AST  30  /  ALT  27  /  AlkPhos  125[H]  11-10    Sodium: 139 mmol/L (11-10 @ 06:00)  Sodium: 139 mmol/L (11-09 @ 12:00)    Potassium: 3.8 mmol/L (11-10 @ 06:00)  Potassium: 4.7 mmol/L (11-09 @ 12:00)    Hemoglobin: 10.1 g/dL (11-10 @ 06:00)  Hemoglobin: 10.7 g/dL (11-09 @ 12:00)  Hemoglobin: 10.1 g/dL (11-08 @ 07:47)  Hemoglobin: 9.5 g/dL (11-07 @ 14:04)    Creatinine, Serum 2.14 (11-10 @ 06:00)  Creatinine, Serum 2.16 (11-09 @ 12:00)  Creatinine, Serum 2.02 (11-08 @ 07:47)  Creatinine, Serum 2.12 (11-07 @ 14:04)        LIVER FUNCTIONS - ( 10 Nov 2024 06:00 )  Alb: 2.5 g/dL / Pro: 6.7 g/dL / ALK PHOS: 125 U/L / ALT: 27 U/L / AST: 30 U/L / GGT: x           Urinalysis Basic - ( 10 Nov 2024 06:00 )    Color: x / Appearance: x / SG: x / pH: x  Gluc: 178 mg/dL / Ketone: x  / Bili: x / Urobili: x   Blood: x / Protein: x / Nitrite: x   Leuk Esterase: x / RBC: x / WBC x   Sq Epi: x / Non Sq Epi: x / Bacteria: x

## 2024-11-10 NOTE — PROGRESS NOTE ADULT - ASSESSMENT
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 left hospital on11/4 against medical advice for his personal problem, The patient return for persistent infection, foul-smelling discharge.  Patient denies any chest pain, no shortness of breath.  No fever or chills.  No cough/URI.  No neck or back pain.  No numbness or tingling.  No focal weakness.  No aggravating or alleviating factors otherwise noted.  No other acute complaints. Patient states he sometimes drinks, states he did not drink today.  In ED afebrile, o2 sat 90 %, /99, ,patient is being admitted for further care, and podiatry and ID consult called  admitted for  #Diabetic foot ulcer with cellullitis with ac on ch osteomyelitis left foot   started on IV abx,ID and podiatry consult noted, cont  zosyn   CT/MRI  podiatry consult noted   ulcer left 1st mpj with exposed metatarsal head, hx of left hallux amputation with dehiscence, + edema, + erythema, + probe to bone, + purulent drainage, + tracking  Clinical concern for underling OM.   Vascular: Dorsalis Pedis and Posterior Tibial pulses 1/4.  Capillary re-fill time less then 3 seconds digits 1-5 bilateral.    LYMPH: No lymphadenopathy noted   ## DM2 with hyperglycemia, with neuropathy , nephropathy with likely PVD    ssi, consistent carb, Endo consult,    # Ess HTN   continue current tt  # HLD   statin    # DVT prophylaxis  ID , endo and podiatry consult  #  CKD 3   nephro consult  likely Diabetic nephropathy   # HLD   low fat diet  # ETOH use disorder  cessation counselling,addiction medicine consult   folic acid, thiamine, lorazepam with CIWA protocol as per DR Vázquez  #Obesity   # EDUAR  o2 as needed  # Anemia of  disease   DVT pro  podiatry consult, ID , and podiatry consult noted   for surg plan 11/12  Patient hemodyanamically stable and optimized for podiatry surg.

## 2024-11-10 NOTE — PROGRESS NOTE ADULT - SUBJECTIVE AND OBJECTIVE BOX
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 left hospital on11/4 against medical advice for his personal problem, The patient return for persistent infection, foul-smelling discharge.  Patient denies any chest pain, no shortness of breath.  No fever or chills.  No cough/URI.  No neck or back pain.  No numbness or tingling.  No focal weakness.  No aggravating or alleviating factors otherwise noted.  No other acute complaints. Patient states he sometimes drinks, states he did not drink today.  In ED afebrile, o2 sat 90 %, /99, ,patient is being admitted for further care, and podiatry and ID consult called (07 Nov 2024 17:57)      Vital Signs Last 24 Hrs  T(C): 36.7 (10 Nov 2024 05:06), Max: 37.1 (09 Nov 2024 20:29)  T(F): 98.1 (10 Nov 2024 05:06), Max: 98.7 (09 Nov 2024 20:29)  HR: 75 (10 Nov 2024 05:06) (75 - 94)  BP: 146/77 (10 Nov 2024 05:06) (127/83 - 176/94)  BP(mean): --  RR: 18 (10 Nov 2024 05:06) (18 - 19)  SpO2: 97% (10 Nov 2024 05:06) (94% - 97%)    Parameters below as of 10 Nov 2024 05:06  Patient On (Oxygen Delivery Method): room air                              10.1   5.30  )-----------( 116      ( 10 Nov 2024 06:00 )             31.5               11-10    139  |  101  |  29[H]  ----------------------------<  178[H]  3.8   |  30  |  2.14[H]    Ca    9.2      10 Nov 2024 06:00    TPro  6.7  /  Alb  2.5[L]  /  TBili  0.8  /  DBili  x   /  AST  30  /  ALT  27  /  AlkPhos  125[H]  11-10    PHYSICAL EXAM  General: Pleasant  male NAD & AOX3.    Integument:  Skin warm, dry bilateral.    Noted ulcer left 1st mpj with exposed metatarsal head, hx of left hallux amputation with dehiscence, reduced edema, reduced erythema, + probe to bone, + purulent drainage, + tracking  Clinical concern for underling OM.   Vascular: Dorsalis Pedis and Posterior Tibial pulses 1/4.  Capillary re-fill time less then 3 seconds digits 1-5 bilateral.    Neuro: Protective sensation diminished to the level of the digits bilateral.  MSK: Muscle strength 5/5 all major muscle groups bilateral. Noted hx of left hallux amputation

## 2024-11-10 NOTE — PROGRESS NOTE ADULT - ASSESSMENT
CKD 3, Diabetic nephropathy  Diabetes, DFU   Hypertension    Renal indices at baseline. To continue current meds. Monitor blood sugar levels. Insulin coverage as needed. Dietary restriction.   Trend BP and titrate BP meds as needed Podiatry follow up. Avoid nephrotoxic meds as possible. Avoid ACEI, ARB, NSAIDs and IV contrast.   Will follow electrolytes and renal function trend. D/w patient regarding need for out patient nephrology follow up.

## 2024-11-10 NOTE — PROGRESS NOTE ADULT - PROBLEM SELECTOR PLAN 1
Chart reviewed and Patient evaluated  Discussed diagnosis and treatment with patient  There is concern for open diabetic foot infection left foot with expose metatarsal head  Wound culture results previous visit show common nicholas  CT reviewed from the recent visit  Applied dry sterile dressing,   Rec cont IV antibiotics As Per ID  Rec elevate left lower extremity  PWB left lower extremity heel touch  Patient will require surgical intervention, debridement of all non-viable soft tissue and bone left foot, tentatively planned for Tuesday 11/12,  Will cont to allow demarcation of infective process  Request medical clearance prior to procedure  Patient understands he is high risk for a further infection, proximal amputation, and is at risk to lose part of the foot, all of the foot, bka, sepsis, loss of life.  Plan will be discussed with attending.

## 2024-11-10 NOTE — CONSULT NOTE ADULT - ASSESSMENT
50y/o seen at [facility]  History HTN, high cholesterol, IDDM, neuropathy, CRI. ETOH-abuse (according to chart), non-compliant (according to chart), ?sleep apnea    Admitted 11/7/24 for possible osteomyelitis of left great toe  No cardiac history nor recent symptoms    Plan:  - EKG no evidence for ischemia nor infarction  - Continue Amlodipine-10mg OD                  Coreg-6.25mg BID                  Losartan-50mg OD  - On Zosyn  - Patient is considered low risk for proposed podiatry surgery    Patient is optimized to proceed without any additional cardiac testing  - Being followed by Podiatry, ID   48y/o seen at [facility]  History HTN, high cholesterol, IDDM, neuropathy, CRI. ETOH-abuse (according to chart), non-compliant (according to chart), ?sleep apnea    Admitted 11/7/24 for possible osteomyelitis of left great toe  No cardiac history nor recent symptoms    Plan:  - EKG no evidence for ischemia nor infarction  - Continue Amlodipine-10mg OD                  Coreg-6.25mg BID                  Losartan-50mg OD  - On Zosyn  - Patient is considered low risk for proposed podiatry surgery    Patient is optimized to proceed without any additional cardiac testing  - Being followed by Podiatry, ID, Renal

## 2024-11-11 LAB
GLUCOSE BLDC GLUCOMTR-MCNC: 148 MG/DL — HIGH (ref 70–99)
GLUCOSE BLDC GLUCOMTR-MCNC: 175 MG/DL — HIGH (ref 70–99)
GLUCOSE BLDC GLUCOMTR-MCNC: 198 MG/DL — HIGH (ref 70–99)
GLUCOSE BLDC GLUCOMTR-MCNC: 252 MG/DL — HIGH (ref 70–99)

## 2024-11-11 PROCEDURE — 93971 EXTREMITY STUDY: CPT | Mod: 26,LT

## 2024-11-11 RX ORDER — ACETAMINOPHEN, DIPHENHYDRAMINE HCL, PHENYLEPHRINE HCL 325; 25; 5 MG/1; MG/1; MG/1
5 TABLET ORAL AT BEDTIME
Refills: 0 | Status: DISCONTINUED | OUTPATIENT
Start: 2024-11-11 | End: 2024-11-20

## 2024-11-11 RX ADMIN — Medication 1 TABLET(S): at 10:31

## 2024-11-11 RX ADMIN — GABAPENTIN 100 MILLIGRAM(S): 300 CAPSULE ORAL at 05:33

## 2024-11-11 RX ADMIN — Medication 1: at 12:29

## 2024-11-11 RX ADMIN — PIPERACILLIN SODIUM AND TAZOBACTAM SODIUM 25 GRAM(S): 4; .5 INJECTION, POWDER, LYOPHILIZED, FOR SOLUTION INTRAVENOUS at 05:34

## 2024-11-11 RX ADMIN — CARVEDILOL 6.25 MILLIGRAM(S): 25 TABLET, FILM COATED ORAL at 17:54

## 2024-11-11 RX ADMIN — Medication 1: at 17:01

## 2024-11-11 RX ADMIN — CARVEDILOL 6.25 MILLIGRAM(S): 25 TABLET, FILM COATED ORAL at 05:33

## 2024-11-11 RX ADMIN — Medication 7 UNIT(S): at 12:29

## 2024-11-11 RX ADMIN — Medication 7 UNIT(S): at 08:15

## 2024-11-11 RX ADMIN — INSULIN GLARGINE 20 UNIT(S): 100 INJECTION, SOLUTION SUBCUTANEOUS at 21:08

## 2024-11-11 RX ADMIN — Medication 1 TABLET(S): at 17:01

## 2024-11-11 RX ADMIN — Medication 7 UNIT(S): at 17:01

## 2024-11-11 RX ADMIN — Medication 25 MILLIGRAM(S): at 21:08

## 2024-11-11 RX ADMIN — Medication 100 MILLIGRAM(S): at 12:29

## 2024-11-11 RX ADMIN — LOSARTAN POTASSIUM 50 MILLIGRAM(S): 100 TABLET, FILM COATED ORAL at 05:33

## 2024-11-11 RX ADMIN — PIPERACILLIN SODIUM AND TAZOBACTAM SODIUM 25 GRAM(S): 4; .5 INJECTION, POWDER, LYOPHILIZED, FOR SOLUTION INTRAVENOUS at 21:09

## 2024-11-11 RX ADMIN — AMLODIPINE BESYLATE 10 MILLIGRAM(S): 10 TABLET ORAL at 05:33

## 2024-11-11 RX ADMIN — ACETAMINOPHEN, DIPHENHYDRAMINE HCL, PHENYLEPHRINE HCL 5 MILLIGRAM(S): 325; 25; 5 TABLET ORAL at 21:08

## 2024-11-11 RX ADMIN — PIPERACILLIN SODIUM AND TAZOBACTAM SODIUM 25 GRAM(S): 4; .5 INJECTION, POWDER, LYOPHILIZED, FOR SOLUTION INTRAVENOUS at 14:03

## 2024-11-11 RX ADMIN — GABAPENTIN 100 MILLIGRAM(S): 300 CAPSULE ORAL at 14:03

## 2024-11-11 RX ADMIN — GABAPENTIN 100 MILLIGRAM(S): 300 CAPSULE ORAL at 21:08

## 2024-11-11 RX ADMIN — Medication 1: at 21:29

## 2024-11-11 NOTE — PROGRESS NOTE ADULT - PROBLEM SELECTOR PLAN 1
Chart reviewed and Patient evaluated  Discussed diagnosis and treatment with patient  There is concern for open diabetic foot infection left foot with expose metatarsal head  Wound culture results previous visit show common nicholas  CT reviewed from the recent visit  Applied dry sterile dressing,   Rec cont IV antibiotics As Per ID  Rec elevate left lower extremity  PWB left lower extremity heel touch  Patient will require surgical intervention, debridement of all non-viable soft tissue and bone left foot, tentatively planned for Tuesday 11/12,  Will cont to allow demarcation of infective process  Pt medically optimized per Primary team  NPO after midnight  Patient understands he is high risk for a further infection, proximal amputation, and is at risk to lose part of the foot, all of the foot, bka, sepsis, loss of life.  Will await OR cultures and pathology to determine correct treatment plan  Plan will be discussed with attending.

## 2024-11-11 NOTE — PROGRESS NOTE ADULT - ASSESSMENT
50y/o seen at [facility]  History HTN, high cholesterol, IDDM, neuropathy, CRI. ETOH-abuse (according to chart), non-compliant (according to chart), ?sleep apnea    Admitted 11/7/24 for possible osteomyelitis of left great toe  No cardiac history nor recent symptoms    11/11/24  Seen at [facility]  Sitting at edge of bed  No complaints offered    Plan:  - EKG no evidence for ischemia nor infarction  - Continue Amlodipine-10mg OD                  Coreg-6.25mg BID                  Losartan-50mg OD  - On Zosyn  - Patient is considered low risk for proposed podiatry surgery    Patient is optimized to proceed without any additional cardiac testing  - Being followed by Podiatry, ID, Renal

## 2024-11-11 NOTE — PROGRESS NOTE ADULT - SUBJECTIVE AND OBJECTIVE BOX
PROGRESS NOTE   Patient is a 49y old  Male who presents with a chief complaint of foot infection (11 Nov 2024 10:24)      HPI:  49-year-old male with past medical history of DM2, HTN, HLD, NEUROPATHY, PVD,ckd4, ETOH abuser,  NON COMPLIANT WITH TT, recently discharged after tt for , Diabetic foot ulcer with cellullitis ,ac on ch OM with sepstic arthritis amputation of of 2nd left toe, OM left great toe-,surg done by podiatry,9/20culture of wound E fecalis, path OM , with clear margins. left hospital on11/4 against medical advice for his personal problem, The patient return for persistent infection, foul-smelling discharge.  Patient denies any chest pain, no shortness of breath.  No fever or chills.  No cough/URI.  No neck or back pain.  No numbness or tingling.  No focal weakness.  No aggravating or alleviating factors otherwise noted.  No other acute complaints. Patient states he sometimes drinks, states he did not drink today.  In ED afebrile, o2 sat 90 %, /99, ,patient is being admitted for further care, and podiatry and ID consult called (07 Nov 2024 17:57)      Vital Signs Last 24 Hrs  T(C): 36.4 (11 Nov 2024 05:00), Max: 36.9 (10 Nov 2024 14:33)  T(F): 97.5 (11 Nov 2024 05:00), Max: 98.4 (10 Nov 2024 14:33)  HR: 77 (11 Nov 2024 05:00) (77 - 88)  BP: 128/77 (11 Nov 2024 05:00) (128/77 - 138/95)  BP(mean): --  RR: 17 (11 Nov 2024 05:00) (17 - 18)  SpO2: 92% (11 Nov 2024 05:00) (92% - 97%)    Parameters below as of 11 Nov 2024 05:00  Patient On (Oxygen Delivery Method): room air                              10.1   5.30  )-----------( 116      ( 10 Nov 2024 06:00 )             31.5               11-10    139  |  101  |  29[H]  ----------------------------<  178[H]  3.8   |  30  |  2.14[H]    Ca    9.2      10 Nov 2024 06:00    TPro  6.7  /  Alb  2.5[L]  /  TBili  0.8  /  DBili  x   /  AST  30  /  ALT  27  /  AlkPhos  125[H]  11-10        PHYSICAL EXAM  General: Pleasant  male NAD & AOX3.    Integument:  Skin warm, dry bilateral.    Noted ulcer left 1st mpj with exposed metatarsal head, hx of left hallux amputation with dehiscence, reduced edema, reduced erythema, + probe to bone, + purulent drainage, + tracking  Clinical concern for underling OM.   Vascular: Dorsalis Pedis and Posterior Tibial pulses 1/4.  Capillary re-fill time less then 3 seconds digits 1-5 bilateral.    Neuro: Protective sensation diminished to the level of the digits bilateral.  MSK: Muscle strength 5/5 all major muscle groups bilateral. Noted hx of left hallux amputation

## 2024-11-11 NOTE — PROGRESS NOTE ADULT - ASSESSMENT
CKD 3, Diabetic nephropathy  Diabetes, DFU   Hypertension    Renal indices at baseline. To continue current meds. Monitor blood sugar levels. Insulin coverage as needed. Dietary restriction.   Trend BP and titrate BP meds as needed. Avoid nephrotoxic meds as possible. Avoid ACEI, ARB, NSAIDs and IV contrast.   Podiatry follow up. Will follow electrolytes and renal function trend.

## 2024-11-11 NOTE — PROGRESS NOTE ADULT - SUBJECTIVE AND OBJECTIVE BOX
Patient is a 49y old  Male who presents with a chief complaint of foot infection (11 Nov 2024 13:26)      INTERVAL HPI/OVERNIGHT EVENTS:overnight events noted    Home Medications:  acetaminophen 325 mg oral tablet: 2 tab(s) orally every 6 hours As needed Temp greater or equal to 38C (100.4F), Mild Pain (1 - 3), Moderate Pain (4 - 6) (01 Nov 2024 22:34)      MEDICATIONS  (STANDING):  amLODIPine   Tablet 10 milliGRAM(s) Oral daily  carvedilol 6.25 milliGRAM(s) Oral every 12 hours  dextrose 5%. 1000 milliLiter(s) (50 mL/Hr) IV Continuous <Continuous>  dextrose 5%. 1000 milliLiter(s) (100 mL/Hr) IV Continuous <Continuous>  dextrose 50% Injectable 25 Gram(s) IV Push once  dextrose 50% Injectable 12.5 Gram(s) IV Push once  dextrose 50% Injectable 25 Gram(s) IV Push once  gabapentin 100 milliGRAM(s) Oral three times a day  glucagon  Injectable 1 milliGRAM(s) IntraMuscular once  insulin glargine Injectable (LANTUS) 20 Unit(s) SubCutaneous at bedtime  insulin lispro (ADMELOG) corrective regimen sliding scale   SubCutaneous three times a day before meals  insulin lispro (ADMELOG) corrective regimen sliding scale   SubCutaneous at bedtime  insulin lispro Injectable (ADMELOG) 7 Unit(s) SubCutaneous before breakfast  insulin lispro Injectable (ADMELOG) 7 Unit(s) SubCutaneous before lunch  insulin lispro Injectable (ADMELOG) 7 Unit(s) SubCutaneous before dinner  lactobacillus acidophilus 1 Tablet(s) Oral two times a day with meals  losartan 50 milliGRAM(s) Oral daily  piperacillin/tazobactam IVPB.. 3.375 Gram(s) IV Intermittent every 8 hours  thiamine 100 milliGRAM(s) Oral daily    MEDICATIONS  (PRN):  acetaminophen     Tablet .. 650 milliGRAM(s) Oral every 6 hours PRN Temp greater or equal to 38C (100.4F), Mild Pain (1 - 3)  dextrose Oral Gel 15 Gram(s) Oral once PRN Blood Glucose LESS THAN 70 milliGRAM(s)/deciliter  diphenhydrAMINE 25 milliGRAM(s) Oral every 6 hours PRN Rash and/or Itching  guaiFENesin Oral Liquid (Sugar-Free) 100 milliGRAM(s) Oral every 6 hours PRN Cough      Allergies    No Known Allergies    Intolerances        REVIEW OF SYSTEMS:  CONSTITUTIONAL: No fever, weight loss, or fatigue  EYES: No eye pain, visual disturbances, or discharge  ENMT:  No difficulty hearing, tinnitus, vertigo; No sinus or throat pain  NECK: No pain or stiffness  BREASTS: No pain, masses, or nipple discharge  RESPIRATORY: No cough, wheezing, chills or hemoptysis; No shortness of breath  CARDIOVASCULAR: No chest pain, palpitations, dizziness, or leg swelling  GASTROINTESTINAL: No abdominal or epigastric pain. No nausea, vomiting, or hematemesis; No diarrhea or constipation. No melena or hematochezia.  GENITOURINARY: No dysuria, frequency, hematuria, or incontinence  NEUROLOGICAL: No headaches, memory loss, loss of strength, numbness, or tremors  SKIN: No itching, burning, rashes, or lesions   ENDOCRINE: No heat or cold intolerance; No hair loss  MUSCULOSKELETAL: No joint pain or swelling; No muscle, back, or extremity pain    Vital Signs Last 24 Hrs  T(C): 36.7 (11 Nov 2024 13:35), Max: 36.9 (10 Nov 2024 14:33)  T(F): 98.1 (11 Nov 2024 13:35), Max: 98.4 (10 Nov 2024 14:33)  HR: 91 (11 Nov 2024 13:35) (77 - 91)  BP: 137/83 (11 Nov 2024 13:35) (128/77 - 138/95)  BP(mean): --  RR: 18 (11 Nov 2024 13:35) (17 - 18)  SpO2: 95% (11 Nov 2024 13:35) (92% - 97%)    Parameters below as of 11 Nov 2024 13:35  Patient On (Oxygen Delivery Method): room air        PHYSICAL EXAM:  GENERAL: NAD, well-groomed, well-developed  HEAD:  Atraumatic, Normocephalic  EYES: EOMI, PERRLA, conjunctiva and sclera clear  ENMT: Moist mucous membranes,   NECK: Supple, No JVD, Normal thyroid  NERVOUS SYSTEM:  Alert & Oriented X3, Good concentration; Motor Strength 5/5 B/L upper and lower extremities; DTRs 2+ intact and symmetric  CHEST/LUNG: Clear to percussion bilaterally; No rales, rhonchi, wheezing, or rubs  HEART: Regular rate and rhythm; No murmurs, rubs, or gallops  ABDOMEN: Soft, Nontender, Nondistended; Bowel sounds present  EXTREMITIES:  rt foot in dressing  LYMPH: No lymphadenopathy noted  SKIN: No rashes or lesions    LABS:                        10.1   5.30  )-----------( 116      ( 10 Nov 2024 06:00 )             31.5     11-10    139  |  101  |  29[H]  ----------------------------<  178[H]  3.8   |  30  |  2.14[H]    Ca    9.2      10 Nov 2024 06:00    TPro  6.7  /  Alb  2.5[L]  /  TBili  0.8  /  DBili  x   /  AST  30  /  ALT  27  /  AlkPhos  125[H]  11-10      Urinalysis Basic - ( 10 Nov 2024 06:00 )    Color: x / Appearance: x / SG: x / pH: x  Gluc: 178 mg/dL / Ketone: x  / Bili: x / Urobili: x   Blood: x / Protein: x / Nitrite: x   Leuk Esterase: x / RBC: x / WBC x   Sq Epi: x / Non Sq Epi: x / Bacteria: x      CAPILLARY BLOOD GLUCOSE      POCT Blood Glucose.: 175 mg/dL (11 Nov 2024 12:25)  POCT Blood Glucose.: 148 mg/dL (11 Nov 2024 07:56)  POCT Blood Glucose.: 171 mg/dL (10 Nov 2024 20:52)  POCT Blood Glucose.: 199 mg/dL (10 Nov 2024 16:45)        Culture - Blood (collected 11-07-24 @ 14:04)  Source: .Blood BLOOD  Preliminary Report (11-10-24 @ 19:01):    No growth at 72 Hours    Culture - Blood (collected 11-07-24 @ 14:04)  Source: .Blood BLOOD  Preliminary Report (11-10-24 @ 19:01):    No growth at 72 Hours    Urinalysis with Rflx Culture (collected 11-07-24 @ 14:04)        I&O's Summary    10 Nov 2024 07:01  -  11 Nov 2024 07:00  --------------------------------------------------------  IN: 200 mL / OUT: 400 mL / NET: -200 mL        RADIOLOGY & ADDITIONAL TESTS:    Imaging Personally Reviewed:  [x ] YES  [ ] NO    Consultant(s) Notes Reviewed:  [ x] YES  [ ] NO    Care Discussed with Consultants/Other Providers [ ]x YES  [ ] NO

## 2024-11-11 NOTE — PROGRESS NOTE ADULT - SUBJECTIVE AND OBJECTIVE BOX
Patient is a 49y Male with a known history of :  Diabetic foot infection [E11.628]    Type 2 diabetes mellitus with hyperglycemia [E11.65]      HPI:  49-year-old male with past medical history of DM2, HTN, HLD, NEUROPATHY, PVD,ckd4, ETOH abuser,  NON COMPLIANT WITH TT, recently discharged after tt for , Diabetic foot ulcer with cellullitis ,ac on ch OM with sepstic arthritis amputation of of 2nd left toe, OM left great toe-,surg done by podiatry,9/20culture of wound E fecalis, path OM , with clear margins. left hospital on11/4 against medical advice for his personal problem, The patient return for persistent infection, foul-smelling discharge.  Patient denies any chest pain, no shortness of breath.  No fever or chills.  No cough/URI.  No neck or back pain.  No numbness or tingling.  No focal weakness.  No aggravating or alleviating factors otherwise noted.  No other acute complaints. Patient states he sometimes drinks, states he did not drink today.  In ED afebrile, o2 sat 90 %, /99, ,patient is being admitted for further care, and podiatry and ID consult called (07 Nov 2024 17:57)      REVIEW OF SYSTEMS:    CONSTITUTIONAL: No fever, weight loss, or fatigue  EYES: No eye pain, visual disturbances, or discharge  ENMT:  No difficulty hearing, tinnitus, vertigo; No sinus or throat pain  NECK: No pain or stiffness  BREASTS: No pain, masses, or nipple discharge  RESPIRATORY: No cough, wheezing, chills or hemoptysis; No shortness of breath  CARDIOVASCULAR: No chest pain, palpitations, dizziness, or leg swelling  GASTROINTESTINAL: No abdominal or epigastric pain. No nausea, vomiting, or hematemesis; No diarrhea or constipation. No melena or hematochezia.  GENITOURINARY: No dysuria, frequency, hematuria, or incontinence  NEUROLOGICAL: No headaches, memory loss, loss of strength, numbness, or tremors  SKIN: No itching, burning, rashes, or lesions   LYMPH NODES: No enlarged glands  ENDOCRINE: No heat or cold intolerance; No hair loss  MUSCULOSKELETAL: No joint pain or swelling; No muscle, back, or extremity pain  PSYCHIATRIC: No depression, anxiety, mood swings, or difficulty sleeping  HEME/LYMPH: No easy bruising, or bleeding gums  ALLERGY AND IMMUNOLOGIC: No hives or eczema    MEDICATIONS  (STANDING):  amLODIPine   Tablet 10 milliGRAM(s) Oral daily  carvedilol 6.25 milliGRAM(s) Oral every 12 hours  dextrose 5%. 1000 milliLiter(s) (50 mL/Hr) IV Continuous <Continuous>  dextrose 5%. 1000 milliLiter(s) (100 mL/Hr) IV Continuous <Continuous>  dextrose 50% Injectable 25 Gram(s) IV Push once  dextrose 50% Injectable 12.5 Gram(s) IV Push once  dextrose 50% Injectable 25 Gram(s) IV Push once  gabapentin 100 milliGRAM(s) Oral three times a day  glucagon  Injectable 1 milliGRAM(s) IntraMuscular once  insulin glargine Injectable (LANTUS) 20 Unit(s) SubCutaneous at bedtime  insulin lispro (ADMELOG) corrective regimen sliding scale   SubCutaneous at bedtime  insulin lispro (ADMELOG) corrective regimen sliding scale   SubCutaneous three times a day before meals  insulin lispro Injectable (ADMELOG) 7 Unit(s) SubCutaneous before lunch  insulin lispro Injectable (ADMELOG) 7 Unit(s) SubCutaneous before breakfast  insulin lispro Injectable (ADMELOG) 7 Unit(s) SubCutaneous before dinner  lactobacillus acidophilus 1 Tablet(s) Oral two times a day with meals  losartan 50 milliGRAM(s) Oral daily  piperacillin/tazobactam IVPB.. 3.375 Gram(s) IV Intermittent every 8 hours  thiamine 100 milliGRAM(s) Oral daily    MEDICATIONS  (PRN):  acetaminophen     Tablet .. 650 milliGRAM(s) Oral every 6 hours PRN Temp greater or equal to 38C (100.4F), Mild Pain (1 - 3)  dextrose Oral Gel 15 Gram(s) Oral once PRN Blood Glucose LESS THAN 70 milliGRAM(s)/deciliter  diphenhydrAMINE 25 milliGRAM(s) Oral every 6 hours PRN Rash and/or Itching  guaiFENesin Oral Liquid (Sugar-Free) 100 milliGRAM(s) Oral every 6 hours PRN Cough      ALLERGIES: No Known Allergies      FAMILY HISTORY:      Social history:  Alochol:   Smoking:   Drug Use:   Marital Status:     PHYSICAL EXAMINATION:  -----------------------------  T(C): 36.4 (11-11-24 @ 05:00), Max: 36.9 (11-10-24 @ 14:33)  HR: 77 (11-11-24 @ 05:00) (77 - 88)  BP: 128/77 (11-11-24 @ 05:00) (128/77 - 138/95)  RR: 17 (11-11-24 @ 05:00) (17 - 18)  SpO2: 92% (11-11-24 @ 05:00) (92% - 97%)  Wt(kg): --    11-10 @ 07:01  -  11-11 @ 07:00  --------------------------------------------------------  IN:    IV PiggyBack: 200 mL  Total IN: 200 mL    OUT:    Voided (mL): 400 mL  Total OUT: 400 mL    Total NET: -200 mL            Constitutional: well developed, normal appearance, well groomed, well nourished, no deformities and no acute distress.   Eyes: the conjunctiva exhibited no abnormalities and the eyelids demonstrated no xanthelasmas.   HEENT: normal oral mucosa, no oral pallor and no oral cyanosis.   Neck: normal jugular venous A waves present, normal jugular venous V waves present and no jugular venous berrios A waves.   Pulmonary: no respiratory distress, normal respiratory rhythm and effort, no accessory muscle use and lungs were clear to auscultation bilaterally.   Cardiovascular: heart rate and rhythm were normal, normal S1 and S2 and no murmur, gallop, rub, heave or thrill are present.    Musculoskeletal: the gait could not be assessed.   Extremities: no clubbing of the fingernails, no localized cyanosis, no petechial hemorrhages and no ischemic changes. Left foot with bandage  Skin: normal skin color and pigmentation, no rash, no venous stasis, no skin lesions, no skin ulcer and no xanthoma was observed.   Psychiatric: oriented to person, place, and time, the affect was normal, the mood was normal and not feeling anxious.     LABS:   --------  11-10    139  |  101  |  29[H]  ----------------------------<  178[H]  3.8   |  30  |  2.14[H]    Ca    9.2      10 Nov 2024 06:00    TPro  6.7  /  Alb  2.5[L]  /  TBili  0.8  /  DBili  x   /  AST  30  /  ALT  27  /  AlkPhos  125[H]  11-10                         10.1   5.30  )-----------( 116      ( 10 Nov 2024 06:00 )             31.5                   Radiology:

## 2024-11-11 NOTE — PROGRESS NOTE ADULT - SUBJECTIVE AND OBJECTIVE BOX
ANUJASUAZNNA HURLEY is a 49yMale , patient examined and chart reviewed.     INTERVAL HPI/ OVERNIGHT EVENTS:   Afebrile No events.    PAST MEDICAL & SURGICAL HISTORY:  Prediabetes  HTN (hypertension)  HLD (hyperlipidemia)  DM2 (diabetes mellitus, type 2)        For details regarding the patient's social history, family history, and other miscellaneous elements, please refer the initial infectious diseases consultation and/or the admitting history and physical examination for this admission.      ROS:  CONSTITUTIONAL:  Negative fever or chills  EYES:  Negative  blurry vision or double vision  CARDIOVASCULAR:  Negative for chest pain or palpitations  RESPIRATORY:  Negative for cough, wheezing, or SOB   GASTROINTESTINAL:  Negative for nausea, vomiting, diarrhea, constipation, or abdominal pain  GENITOURINARY:  Negative frequency, urgency or dysuria  NEUROLOGIC:  No headache, confusion, dizziness, lightheadedness  All other systems were reviewed and are negative     No Known Allergies      Current inpatient medications :    ANTIBIOTICS/RELEVANT:  piperacillin/tazobactam IVPB.. 3.375 Gram(s) IV Intermittent every 8 hours    MEDICATIONS  (STANDING):  amLODIPine   Tablet 10 milliGRAM(s) Oral daily  carvedilol 6.25 milliGRAM(s) Oral every 12 hours  dextrose 5%. 1000 milliLiter(s) (100 mL/Hr) IV Continuous <Continuous>  dextrose 5%. 1000 milliLiter(s) (50 mL/Hr) IV Continuous <Continuous>  dextrose 50% Injectable 12.5 Gram(s) IV Push once  dextrose 50% Injectable 25 Gram(s) IV Push once  dextrose 50% Injectable 25 Gram(s) IV Push once  gabapentin 100 milliGRAM(s) Oral three times a day  glucagon  Injectable 1 milliGRAM(s) IntraMuscular once  insulin glargine Injectable (LANTUS) 20 Unit(s) SubCutaneous at bedtime  insulin lispro (ADMELOG) corrective regimen sliding scale   SubCutaneous three times a day before meals  insulin lispro (ADMELOG) corrective regimen sliding scale   SubCutaneous at bedtime  insulin lispro Injectable (ADMELOG) 7 Unit(s) SubCutaneous before lunch  insulin lispro Injectable (ADMELOG) 7 Unit(s) SubCutaneous before dinner  insulin lispro Injectable (ADMELOG) 7 Unit(s) SubCutaneous before breakfast  lactobacillus acidophilus 1 Tablet(s) Oral two times a day with meals  losartan 50 milliGRAM(s) Oral daily  thiamine 100 milliGRAM(s) Oral daily    MEDICATIONS  (PRN):  acetaminophen     Tablet .. 650 milliGRAM(s) Oral every 6 hours PRN Temp greater or equal to 38C (100.4F), Mild Pain (1 - 3)  dextrose Oral Gel 15 Gram(s) Oral once PRN Blood Glucose LESS THAN 70 milliGRAM(s)/deciliter  diphenhydrAMINE 25 milliGRAM(s) Oral every 6 hours PRN Rash and/or Itching  guaiFENesin Oral Liquid (Sugar-Free) 100 milliGRAM(s) Oral every 6 hours PRN Cough      Objective:  Vital Signs Last 24 Hrs  T(C): 36.7 (11 Nov 2024 13:35), Max: 36.7 (10 Nov 2024 20:31)  T(F): 98.1 (11 Nov 2024 13:35), Max: 98.1 (10 Nov 2024 20:31)  HR: 91 (11 Nov 2024 13:35) (77 - 91)  BP: 137/83 (11 Nov 2024 13:35) (128/77 - 137/83)  RR: 18 (11 Nov 2024 13:35) (17 - 18)  SpO2: 95% (11 Nov 2024 13:35) (92% - 95%)    Parameters below as of 11 Nov 2024 13:35  Patient On (Oxygen Delivery Method): room air      Physical Exam:  General: no acute distress  Neck: supple, trachea midline  Lungs: clear, no wheeze/rhonchi  Cardiovascular: regular rate and rhythm, S1 S2  Abdomen: soft, nontender,  bowel sounds normal  Neurological: alert and oriented x3  Skin: no rash  Extremities: Left foot drsg c/d/i        LABS:                        10.1   5.30  )-----------( 116      ( 10 Nov 2024 06:00 )             31.5   11-10    139  |  101  |  29[H]  ----------------------------<  178[H]  3.8   |  30  |  2.14[H]    Ca    9.2      10 Nov 2024 06:00    TPro  6.7  /  Alb  2.5[L]  /  TBili  0.8  /  DBili  x   /  AST  30  /  ALT  27  /  AlkPhos  125[H]  11-10    MICROBIOLOGY:  Culture - Wound Aerobic (collected 01 Nov 2024 20:56)  Source: .Other  Final Report (03 Nov 2024 20:58):    Commensal nicholas consistent with body site    Culture - Blood (collected 01 Nov 2024 20:00)  Source: .Blood BLOOD  Preliminary Report (04 Nov 2024 01:02):    No growth at 48 Hours    Culture - Blood (collected 01 Nov 2024 20:00)  Source: .Blood BLOOD  Preliminary Report (04 Nov 2024 01:02):    No growth at 48 Hours    RADIOLOGY & ADDITIONAL STUDIES:        Assessment :  49-year-old male with  DM2, HTN, HLD, NEUROPATHY, PVD,ckd4, ETOH abuse, Diabetic foot ulcer with cellullitis ,ac on ch OM 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  He has been on amoxicillin twice daily and states he has been compliant with his antibiotic.  Patient noted that his surgical site had developed a bad odor with some discharge.  Left foot with exposed metatarsal head. Was on admited on 11/3 but signed out AMA now readmitted for same.  OR tmrw    Plan :   Cont Zosyn  To OR tmrw  Fu OR cultures path  Trend temps and cbc  Podiatry on case  Stable from ID standpoint    Continue with present regiment.  Appropriate use of antibiotics and adverse effects reviewed.      > 35 minutes were spent in direct patient care reviewing notes, medications ,labs data/ imaging , discussion with multidisciplinary team.    Thank you for allowing me to participate in care of your patient .    Linsey Mack MD  Infectious Disease  [phone]

## 2024-11-11 NOTE — PROGRESS NOTE ADULT - ASSESSMENT
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 left hospital on11/4 against medical advice for his personal problem, The patient return for persistent infection, foul-smelling discharge.  Patient denies any chest pain, no shortness of breath.  No fever or chills.  No cough/URI.  No neck or back pain.  No numbness or tingling.  No focal weakness.  No aggravating or alleviating factors otherwise noted.  No other acute complaints. Patient states he sometimes drinks, states he did not drink today.  In ED afebrile, o2 sat 90 %, /99, ,patient is being admitted for further care, and podiatry and ID consult called  admitted for  #Diabetic foot ulcer with cellullitis with ac on ch osteomyelitis left foot   started on IV abx,ID and podiatry consult noted, cont  zosyn   CT/MRI  podiatry consult noted   ulcer left 1st mpj with exposed metatarsal head, hx of left hallux amputation with dehiscence, + edema, + erythema, + probe to bone, + purulent drainage, + tracking  Clinical concern for underling OM.   Vascular: Dorsalis Pedis and Posterior Tibial pulses 1/4.  Capillary re-fill time less then 3 seconds digits 1-5 bilateral.    LYMPH: No lymphadenopathy noted   ## DM2 with hyperglycemia, with neuropathy , nephropathy with likely PVD    ssi, consistent carb, Endo consult,    # Ess HTN   continue current tt  # HLD   statin    # DVT prophylaxis  ID , endo and podiatry consult  #  CKD 3   nephro consult  likely Diabetic nephropathy   # HLD   low fat diet  # ETOH use disorder  cessation counselling,addiction medicine consult   folic acid, thiamine, lorazepam with CIWA protocol as per DR Vázquez  #Obesity   # EDUAR  o2 as needed  # Anemia of ch disease   DVT pro   ID , and podiatry consult noted   for surg plan 11/12  Patient hemodyanamically stable and optimized for podiatry surg.LOw risk

## 2024-11-11 NOTE — PROGRESS NOTE ADULT - SUBJECTIVE AND OBJECTIVE BOX
Patient is a 49y old  Male who presents with a chief complaint of foot infection (08 Nov 2024 14:14)  Patient seen in follow up for CKD.        PAST MEDICAL HISTORY:  Prediabetes    HTN (hypertension)    HLD (hyperlipidemia)    DM2 (diabetes mellitus, type 2)      MEDICATIONS  (STANDING):  amLODIPine   Tablet 10 milliGRAM(s) Oral daily  carvedilol 6.25 milliGRAM(s) Oral every 12 hours  dextrose 5%. 1000 milliLiter(s) (100 mL/Hr) IV Continuous <Continuous>  dextrose 5%. 1000 milliLiter(s) (50 mL/Hr) IV Continuous <Continuous>  dextrose 50% Injectable 12.5 Gram(s) IV Push once  dextrose 50% Injectable 25 Gram(s) IV Push once  dextrose 50% Injectable 25 Gram(s) IV Push once  gabapentin 100 milliGRAM(s) Oral three times a day  glucagon  Injectable 1 milliGRAM(s) IntraMuscular once  insulin glargine Injectable (LANTUS) 20 Unit(s) SubCutaneous at bedtime  insulin lispro (ADMELOG) corrective regimen sliding scale   SubCutaneous at bedtime  insulin lispro (ADMELOG) corrective regimen sliding scale   SubCutaneous three times a day before meals  insulin lispro Injectable (ADMELOG) 7 Unit(s) SubCutaneous before breakfast  insulin lispro Injectable (ADMELOG) 7 Unit(s) SubCutaneous before lunch  insulin lispro Injectable (ADMELOG) 7 Unit(s) SubCutaneous before dinner  lactobacillus acidophilus 1 Tablet(s) Oral two times a day with meals  losartan 50 milliGRAM(s) Oral daily  piperacillin/tazobactam IVPB.. 3.375 Gram(s) IV Intermittent every 8 hours  thiamine 100 milliGRAM(s) Oral daily    MEDICATIONS  (PRN):  acetaminophen     Tablet .. 650 milliGRAM(s) Oral every 6 hours PRN Temp greater or equal to 38C (100.4F), Mild Pain (1 - 3)  dextrose Oral Gel 15 Gram(s) Oral once PRN Blood Glucose LESS THAN 70 milliGRAM(s)/deciliter  diphenhydrAMINE 25 milliGRAM(s) Oral every 6 hours PRN Rash and/or Itching  guaiFENesin Oral Liquid (Sugar-Free) 100 milliGRAM(s) Oral every 6 hours PRN Cough    T(C): 36.4 (11-11-24 @ 05:00), Max: 37.1 (11-09-24 @ 20:29)  HR: 77 (11-11-24 @ 05:00) (75 - 94)  BP: 128/77 (11-11-24 @ 05:00) (127/83 - 176/94)  RR: 17 (11-11-24 @ 05:00)  SpO2: 92% (11-11-24 @ 05:00)  Wt(kg): --  I&O's Detail    10 Nov 2024 07:01  -  11 Nov 2024 07:00  --------------------------------------------------------  IN:    IV PiggyBack: 200 mL  Total IN: 200 mL    OUT:    Voided (mL): 400 mL  Total OUT: 400 mL    Total NET: -200 mL            PHYSICAL EXAM:  General: No distress  Respiratory: b/l air entry  Cardiovascular: S1 S2  Gastrointestinal: soft  Extremities:  edema, foot dressing          LABORATORY:                        10.1   5.30  )-----------( 116      ( 10 Nov 2024 06:00 )             31.5     11-10    139  |  101  |  29[H]  ----------------------------<  178[H]  3.8   |  30  |  2.14[H]    Ca    9.2      10 Nov 2024 06:00    TPro  6.7  /  Alb  2.5[L]  /  TBili  0.8  /  DBili  x   /  AST  30  /  ALT  27  /  AlkPhos  125[H]  11-10    Sodium: 139 mmol/L (11-10 @ 06:00)  Sodium: 139 mmol/L (11-09 @ 12:00)    Potassium: 3.8 mmol/L (11-10 @ 06:00)  Potassium: 4.7 mmol/L (11-09 @ 12:00)    Hemoglobin: 10.1 g/dL (11-10 @ 06:00)  Hemoglobin: 10.7 g/dL (11-09 @ 12:00)    Creatinine, Serum 2.14 (11-10 @ 06:00)  Creatinine, Serum 2.16 (11-09 @ 12:00)        LIVER FUNCTIONS - ( 10 Nov 2024 06:00 )  Alb: 2.5 g/dL / Pro: 6.7 g/dL / ALK PHOS: 125 U/L / ALT: 27 U/L / AST: 30 U/L / GGT: x           Urinalysis Basic - ( 10 Nov 2024 06:00 )    Color: x / Appearance: x / SG: x / pH: x  Gluc: 178 mg/dL / Ketone: x  / Bili: x / Urobili: x   Blood: x / Protein: x / Nitrite: x   Leuk Esterase: x / RBC: x / WBC x   Sq Epi: x / Non Sq Epi: x / Bacteria: x

## 2024-11-12 ENCOUNTER — TRANSCRIPTION ENCOUNTER (OUTPATIENT)
Age: 49
End: 2024-11-12

## 2024-11-12 LAB
ALBUMIN SERPL ELPH-MCNC: 2.8 G/DL — LOW (ref 3.3–5)
ALP SERPL-CCNC: 115 U/L — SIGNIFICANT CHANGE UP (ref 30–120)
ALT FLD-CCNC: 37 U/L — SIGNIFICANT CHANGE UP (ref 10–60)
ANION GAP SERPL CALC-SCNC: 8 MMOL/L — SIGNIFICANT CHANGE UP (ref 5–17)
APPEARANCE UR: CLEAR — SIGNIFICANT CHANGE UP
AST SERPL-CCNC: 43 U/L — HIGH (ref 10–40)
BASOPHILS # BLD AUTO: 0.13 K/UL — SIGNIFICANT CHANGE UP (ref 0–0.2)
BASOPHILS NFR BLD AUTO: 2 % — SIGNIFICANT CHANGE UP (ref 0–2)
BILIRUB SERPL-MCNC: 0.5 MG/DL — SIGNIFICANT CHANGE UP (ref 0.2–1.2)
BILIRUB UR-MCNC: NEGATIVE — SIGNIFICANT CHANGE UP
BUN SERPL-MCNC: 47 MG/DL — HIGH (ref 7–23)
CALCIUM SERPL-MCNC: 9 MG/DL — SIGNIFICANT CHANGE UP (ref 8.4–10.5)
CHLORIDE SERPL-SCNC: 104 MMOL/L — SIGNIFICANT CHANGE UP (ref 96–108)
CO2 SERPL-SCNC: 27 MMOL/L — SIGNIFICANT CHANGE UP (ref 22–31)
COLOR SPEC: YELLOW — SIGNIFICANT CHANGE UP
CREAT SERPL-MCNC: 2.56 MG/DL — HIGH (ref 0.5–1.3)
CULTURE RESULTS: SIGNIFICANT CHANGE UP
CULTURE RESULTS: SIGNIFICANT CHANGE UP
DIFF PNL FLD: NEGATIVE — SIGNIFICANT CHANGE UP
EGFR: 30 ML/MIN/1.73M2 — LOW
EOSINOPHIL # BLD AUTO: 0.27 K/UL — SIGNIFICANT CHANGE UP (ref 0–0.5)
EOSINOPHIL NFR BLD AUTO: 4.2 % — SIGNIFICANT CHANGE UP (ref 0–6)
GLUCOSE BLDC GLUCOMTR-MCNC: 125 MG/DL — HIGH (ref 70–99)
GLUCOSE BLDC GLUCOMTR-MCNC: 159 MG/DL — HIGH (ref 70–99)
GLUCOSE BLDC GLUCOMTR-MCNC: 170 MG/DL — HIGH (ref 70–99)
GLUCOSE BLDC GLUCOMTR-MCNC: 317 MG/DL — HIGH (ref 70–99)
GLUCOSE SERPL-MCNC: 179 MG/DL — HIGH (ref 70–99)
GLUCOSE UR QL: NEGATIVE MG/DL — SIGNIFICANT CHANGE UP
HCT VFR BLD CALC: 31.3 % — LOW (ref 39–50)
HGB BLD-MCNC: 10.1 G/DL — LOW (ref 13–17)
IMM GRANULOCYTES NFR BLD AUTO: 0.9 % — SIGNIFICANT CHANGE UP (ref 0–0.9)
KETONES UR-MCNC: NEGATIVE MG/DL — SIGNIFICANT CHANGE UP
LEUKOCYTE ESTERASE UR-ACNC: NEGATIVE — SIGNIFICANT CHANGE UP
LYMPHOCYTES # BLD AUTO: 1.45 K/UL — SIGNIFICANT CHANGE UP (ref 1–3.3)
LYMPHOCYTES # BLD AUTO: 22.5 % — SIGNIFICANT CHANGE UP (ref 13–44)
MCHC RBC-ENTMCNC: 28.2 PG — SIGNIFICANT CHANGE UP (ref 27–34)
MCHC RBC-ENTMCNC: 32.3 G/DL — SIGNIFICANT CHANGE UP (ref 32–36)
MCV RBC AUTO: 87.4 FL — SIGNIFICANT CHANGE UP (ref 80–100)
MONOCYTES # BLD AUTO: 0.88 K/UL — SIGNIFICANT CHANGE UP (ref 0–0.9)
MONOCYTES NFR BLD AUTO: 13.7 % — SIGNIFICANT CHANGE UP (ref 2–14)
NEUTROPHILS # BLD AUTO: 3.65 K/UL — SIGNIFICANT CHANGE UP (ref 1.8–7.4)
NEUTROPHILS NFR BLD AUTO: 56.7 % — SIGNIFICANT CHANGE UP (ref 43–77)
NITRITE UR-MCNC: NEGATIVE — SIGNIFICANT CHANGE UP
NRBC # BLD: 0 /100 WBCS — SIGNIFICANT CHANGE UP (ref 0–0)
PH UR: 7.5 — SIGNIFICANT CHANGE UP (ref 5–8)
PLATELET # BLD AUTO: 132 K/UL — LOW (ref 150–400)
POTASSIUM SERPL-MCNC: 4.4 MMOL/L — SIGNIFICANT CHANGE UP (ref 3.5–5.3)
POTASSIUM SERPL-SCNC: 4.4 MMOL/L — SIGNIFICANT CHANGE UP (ref 3.5–5.3)
PROT SERPL-MCNC: 6.5 G/DL — SIGNIFICANT CHANGE UP (ref 6–8.3)
PROT UR-MCNC: 300 MG/DL
RBC # BLD: 3.58 M/UL — LOW (ref 4.2–5.8)
RBC # FLD: 13.6 % — SIGNIFICANT CHANGE UP (ref 10.3–14.5)
RBC CASTS # UR COMP ASSIST: 1 /HPF — SIGNIFICANT CHANGE UP (ref 0–4)
SODIUM SERPL-SCNC: 139 MMOL/L — SIGNIFICANT CHANGE UP (ref 135–145)
SP GR SPEC: 1.01 — SIGNIFICANT CHANGE UP (ref 1–1.03)
SPECIMEN SOURCE: SIGNIFICANT CHANGE UP
SPECIMEN SOURCE: SIGNIFICANT CHANGE UP
UROBILINOGEN FLD QL: 0.2 MG/DL — SIGNIFICANT CHANGE UP (ref 0.2–1)
WBC # BLD: 6.44 K/UL — SIGNIFICANT CHANGE UP (ref 3.8–10.5)
WBC # FLD AUTO: 6.44 K/UL — SIGNIFICANT CHANGE UP (ref 3.8–10.5)
WBC UR QL: 1 /HPF — SIGNIFICANT CHANGE UP (ref 0–5)

## 2024-11-12 PROCEDURE — 88307 TISSUE EXAM BY PATHOLOGIST: CPT | Mod: 26

## 2024-11-12 PROCEDURE — 88311 DECALCIFY TISSUE: CPT | Mod: 26

## 2024-11-12 PROCEDURE — 88305 TISSUE EXAM BY PATHOLOGIST: CPT | Mod: 26

## 2024-11-12 RX ORDER — HEPARIN SODIUM,PORCINE 1000/ML
5000 VIAL (ML) INJECTION EVERY 12 HOURS
Refills: 0 | Status: DISCONTINUED | OUTPATIENT
Start: 2024-11-13 | End: 2024-11-20

## 2024-11-12 RX ORDER — 0.9 % SODIUM CHLORIDE 0.9 %
1000 INTRAVENOUS SOLUTION INTRAVENOUS
Refills: 0 | Status: DISCONTINUED | OUTPATIENT
Start: 2024-11-12 | End: 2024-11-12

## 2024-11-12 RX ORDER — HYDROMORPHONE HYDROCHLORIDE 2 MG/1
0.5 TABLET ORAL
Refills: 0 | Status: DISCONTINUED | OUTPATIENT
Start: 2024-11-12 | End: 2024-11-12

## 2024-11-12 RX ORDER — ONDANSETRON HYDROCHLORIDE 4 MG/1
4 TABLET, FILM COATED ORAL ONCE
Refills: 0 | Status: DISCONTINUED | OUTPATIENT
Start: 2024-11-12 | End: 2024-11-12

## 2024-11-12 RX ORDER — HYDROMORPHONE HYDROCHLORIDE 2 MG/1
0.2 TABLET ORAL
Refills: 0 | Status: DISCONTINUED | OUTPATIENT
Start: 2024-11-12 | End: 2024-11-12

## 2024-11-12 RX ADMIN — ACETAMINOPHEN 500MG 650 MILLIGRAM(S): 500 TABLET, COATED ORAL at 22:04

## 2024-11-12 RX ADMIN — PIPERACILLIN SODIUM AND TAZOBACTAM SODIUM 25 GRAM(S): 4; .5 INJECTION, POWDER, LYOPHILIZED, FOR SOLUTION INTRAVENOUS at 13:40

## 2024-11-12 RX ADMIN — Medication 2: at 22:03

## 2024-11-12 RX ADMIN — AMLODIPINE BESYLATE 10 MILLIGRAM(S): 10 TABLET ORAL at 05:15

## 2024-11-12 RX ADMIN — INSULIN GLARGINE 20 UNIT(S): 100 INJECTION, SOLUTION SUBCUTANEOUS at 22:04

## 2024-11-12 RX ADMIN — CARVEDILOL 6.25 MILLIGRAM(S): 25 TABLET, FILM COATED ORAL at 05:15

## 2024-11-12 RX ADMIN — GABAPENTIN 100 MILLIGRAM(S): 300 CAPSULE ORAL at 05:15

## 2024-11-12 RX ADMIN — PIPERACILLIN SODIUM AND TAZOBACTAM SODIUM 25 GRAM(S): 4; .5 INJECTION, POWDER, LYOPHILIZED, FOR SOLUTION INTRAVENOUS at 05:15

## 2024-11-12 RX ADMIN — GABAPENTIN 100 MILLIGRAM(S): 300 CAPSULE ORAL at 22:04

## 2024-11-12 RX ADMIN — PIPERACILLIN SODIUM AND TAZOBACTAM SODIUM 25 GRAM(S): 4; .5 INJECTION, POWDER, LYOPHILIZED, FOR SOLUTION INTRAVENOUS at 22:03

## 2024-11-12 RX ADMIN — Medication 1: at 08:33

## 2024-11-12 RX ADMIN — CARVEDILOL 6.25 MILLIGRAM(S): 25 TABLET, FILM COATED ORAL at 19:09

## 2024-11-12 RX ADMIN — LOSARTAN POTASSIUM 50 MILLIGRAM(S): 100 TABLET, FILM COATED ORAL at 05:15

## 2024-11-12 RX ADMIN — Medication 100 MILLIGRAM(S): at 19:10

## 2024-11-12 RX ADMIN — Medication 1 TABLET(S): at 19:09

## 2024-11-12 RX ADMIN — Medication 25 MILLIGRAM(S): at 22:04

## 2024-11-12 RX ADMIN — ACETAMINOPHEN 500MG 650 MILLIGRAM(S): 500 TABLET, COATED ORAL at 22:34

## 2024-11-12 RX ADMIN — ACETAMINOPHEN, DIPHENHYDRAMINE HCL, PHENYLEPHRINE HCL 5 MILLIGRAM(S): 325; 25; 5 TABLET ORAL at 22:04

## 2024-11-12 RX ADMIN — Medication 1: at 12:27

## 2024-11-12 RX ADMIN — Medication 75 MILLILITER(S): at 17:40

## 2024-11-12 NOTE — PROGRESS NOTE ADULT - SUBJECTIVE AND OBJECTIVE BOX
PRE-OP NOTE    Pre-Op Diagnosis: ulcer with OM left first metatarsal   Planned Procedure:  partial first ray resection with deep soft tissue and bone cultures with flap coverage all left foot   Scheduled Date / Time: 1500  11/12/2024  Indication: osteomyelitis left foot   Labs: reviewed       Vitals: Vital Signs Last 24 Hrs  T(C): 36.3 (12 Nov 2024 05:00), Max: 37.5 (11 Nov 2024 21:02)  T(F): 97.4 (12 Nov 2024 05:00), Max: 99.5 (11 Nov 2024 21:02)  HR: 82 (12 Nov 2024 05:00) (82 - 91)  BP: 164/98 (12 Nov 2024 05:00) (137/83 - 164/98)  BP(mean): --  RR: 17 (12 Nov 2024 05:00) (17 - 18)  SpO2: 92% (12 Nov 2024 05:00) (92% - 95%)    Parameters below as of 12 Nov 2024 05:00  Patient On (Oxygen Delivery Method): room air      PE:   Official CXR reading: (On Chart)  Official EKG reading: (On Chart)  Type and Cross/Screen:   NPO after MN  Antibiotics: as per floor routine   Anesthesia evaluation (on chart)  Operative Consent (on chart)       Patient  had an opportunity to have all questions asked and answered Patient is aware of all risks, benefits, alternatives and recuperative period involved with the planned surgical procedure.  No assurances or guarantees were given to the patient.  Pt understands the severity of his condition and that he is at risk to lose part of the foot, all of the foot or even a below knee amputation. Pt understands that serial procedures may be required if symptoms persist. Pt consents to the proposed care plan at this time having had all of his questions asked and answered to his satisfaction.

## 2024-11-12 NOTE — PROGRESS NOTE ADULT - SUBJECTIVE AND OBJECTIVE BOX
ANUJASUZANNA HURLEY is a 49yMale , patient examined and chart reviewed.     INTERVAL HPI/ OVERNIGHT EVENTS:   Afebrile No events.  NAD.    PAST MEDICAL & SURGICAL HISTORY:  Prediabetes  HTN (hypertension)  HLD (hyperlipidemia)  DM2 (diabetes mellitus, type 2)        For details regarding the patient's social history, family history, and other miscellaneous elements, please refer the initial infectious diseases consultation and/or the admitting history and physical examination for this admission.      ROS:  CONSTITUTIONAL:  Negative fever or chills  EYES:  Negative  blurry vision or double vision  CARDIOVASCULAR:  Negative for chest pain or palpitations  RESPIRATORY:  Negative for cough, wheezing, or SOB   GASTROINTESTINAL:  Negative for nausea, vomiting, diarrhea, constipation, or abdominal pain  GENITOURINARY:  Negative frequency, urgency or dysuria  NEUROLOGIC:  No headache, confusion, dizziness, lightheadedness  All other systems were reviewed and are negative     No Known Allergies      Current inpatient medications :    ANTIBIOTICS/RELEVANT:  piperacillin/tazobactam IVPB.. 3.375 Gram(s) IV Intermittent every 8 hours    MEDICATIONS  (STANDING):  amLODIPine   Tablet 10 milliGRAM(s) Oral daily  carvedilol 6.25 milliGRAM(s) Oral every 12 hours  dextrose 5%. 1000 milliLiter(s) (50 mL/Hr) IV Continuous <Continuous>  dextrose 5%. 1000 milliLiter(s) (100 mL/Hr) IV Continuous <Continuous>  dextrose 50% Injectable 25 Gram(s) IV Push once  dextrose 50% Injectable 12.5 Gram(s) IV Push once  dextrose 50% Injectable 25 Gram(s) IV Push once  gabapentin 100 milliGRAM(s) Oral three times a day  glucagon  Injectable 1 milliGRAM(s) IntraMuscular once  insulin glargine Injectable (LANTUS) 20 Unit(s) SubCutaneous at bedtime  insulin lispro (ADMELOG) corrective regimen sliding scale   SubCutaneous three times a day before meals  insulin lispro (ADMELOG) corrective regimen sliding scale   SubCutaneous at bedtime  insulin lispro Injectable (ADMELOG) 7 Unit(s) SubCutaneous before breakfast  insulin lispro Injectable (ADMELOG) 7 Unit(s) SubCutaneous before lunch  insulin lispro Injectable (ADMELOG) 7 Unit(s) SubCutaneous before dinner  lactobacillus acidophilus 1 Tablet(s) Oral two times a day with meals  losartan 50 milliGRAM(s) Oral daily  thiamine 100 milliGRAM(s) Oral daily    MEDICATIONS  (PRN):  acetaminophen     Tablet .. 650 milliGRAM(s) Oral every 6 hours PRN Temp greater or equal to 38C (100.4F), Mild Pain (1 - 3)  dextrose Oral Gel 15 Gram(s) Oral once PRN Blood Glucose LESS THAN 70 milliGRAM(s)/deciliter  diphenhydrAMINE 25 milliGRAM(s) Oral every 6 hours PRN Rash and/or Itching  guaiFENesin Oral Liquid (Sugar-Free) 100 milliGRAM(s) Oral every 6 hours PRN Cough  melatonin 5 milliGRAM(s) Oral at bedtime PRN Insomnia      Objective:  Vital Signs Last 24 Hrs  T(C): 36.3 (12 Nov 2024 05:00), Max: 37.5 (11 Nov 2024 21:02)  T(F): 97.4 (12 Nov 2024 05:00), Max: 99.5 (11 Nov 2024 21:02)  HR: 82 (12 Nov 2024 05:00) (82 - 91)  BP: 164/98 (12 Nov 2024 05:00) (137/83 - 164/98)  RR: 17 (12 Nov 2024 05:00) (17 - 18)  SpO2: 92% (12 Nov 2024 05:00) (92% - 95%)    Parameters below as of 12 Nov 2024 05:00  Patient On (Oxygen Delivery Method): room air      Physical Exam:  General: no acute distress  Neck: supple, trachea midline  Lungs: clear, no wheeze/rhonchi  Cardiovascular: regular rate and rhythm, S1 S2  Abdomen: soft, nontender,  bowel sounds normal  Neurological: alert and oriented x3  Skin: no rash  Extremities: Left foot drsg c/d/i      LABS:                        10.1   6.44  )-----------( 132      ( 12 Nov 2024 07:56 )             31.3   11-12    139  |  104  |  47[H]  ----------------------------<  179[H]  4.4   |  27  |  2.56[H]    Ca    9.0      12 Nov 2024 07:56    TPro  6.5  /  Alb  2.8[L]  /  TBili  0.5  /  DBili  x   /  AST  43[H]  /  ALT  37  /  AlkPhos  115  11-12      MICROBIOLOGY:  Culture - Wound Aerobic (collected 01 Nov 2024 20:56)  Source: .Other  Final Report (03 Nov 2024 20:58):    Commensal nicholas consistent with body site    Culture - Blood (collected 01 Nov 2024 20:00)  Source: .Blood BLOOD  Preliminary Report (04 Nov 2024 01:02):    No growth at 48 Hours    Culture - Blood (collected 01 Nov 2024 20:00)  Source: .Blood BLOOD  Preliminary Report (04 Nov 2024 01:02):    No growth at 48 Hours    RADIOLOGY & ADDITIONAL STUDIES:        Assessment :  49-year-old male with  DM2, HTN, HLD, NEUROPATHY, PVD,ckd4, ETOH abuse, Diabetic foot ulcer with cellullitis ,ac on ch OM 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  He has been on amoxicillin twice daily and states he has been compliant with his antibiotic.  Patient noted that his surgical site had developed a bad odor with some discharge.  Left foot with exposed metatarsal head. Was on admited on 11/3 but signed out AMA now readmitted for same.  OR today    Plan :   Cont Zosyn  To OR today  Fu OR cultures path  Trend temps and cbc  Podiatry on case  Stable from ID standpoint    Continue with present regiment.  Appropriate use of antibiotics and adverse effects reviewed.      > 35 minutes were spent in direct patient care reviewing notes, medications ,labs data/ imaging , discussion with multidisciplinary team.    Thank you for allowing me to participate in care of your patient .    Linsey Mack MD  Infectious Disease  [phone]

## 2024-11-12 NOTE — PROGRESS NOTE ADULT - SUBJECTIVE AND OBJECTIVE BOX
N/A Patient is a 49y old  Male who presents with a chief complaint of foot infection  (12 Nov 2024 07:07)      INTERVAL HPI/OVERNIGHT EVENTS:overnight events noted    Home Medications:  acetaminophen 325 mg oral tablet: 2 tab(s) orally every 6 hours As needed Temp greater or equal to 38C (100.4F), Mild Pain (1 - 3), Moderate Pain (4 - 6) (01 Nov 2024 22:34)      MEDICATIONS  (STANDING):  amLODIPine   Tablet 10 milliGRAM(s) Oral daily  carvedilol 6.25 milliGRAM(s) Oral every 12 hours  dextrose 5%. 1000 milliLiter(s) (50 mL/Hr) IV Continuous <Continuous>  dextrose 5%. 1000 milliLiter(s) (100 mL/Hr) IV Continuous <Continuous>  dextrose 50% Injectable 12.5 Gram(s) IV Push once  dextrose 50% Injectable 25 Gram(s) IV Push once  dextrose 50% Injectable 25 Gram(s) IV Push once  gabapentin 100 milliGRAM(s) Oral three times a day  glucagon  Injectable 1 milliGRAM(s) IntraMuscular once  insulin glargine Injectable (LANTUS) 20 Unit(s) SubCutaneous at bedtime  insulin lispro (ADMELOG) corrective regimen sliding scale   SubCutaneous at bedtime  insulin lispro (ADMELOG) corrective regimen sliding scale   SubCutaneous three times a day before meals  insulin lispro Injectable (ADMELOG) 7 Unit(s) SubCutaneous before dinner  insulin lispro Injectable (ADMELOG) 7 Unit(s) SubCutaneous before breakfast  insulin lispro Injectable (ADMELOG) 7 Unit(s) SubCutaneous before lunch  lactobacillus acidophilus 1 Tablet(s) Oral two times a day with meals  losartan 50 milliGRAM(s) Oral daily  piperacillin/tazobactam IVPB.. 3.375 Gram(s) IV Intermittent every 8 hours  thiamine 100 milliGRAM(s) Oral daily    MEDICATIONS  (PRN):  acetaminophen     Tablet .. 650 milliGRAM(s) Oral every 6 hours PRN Temp greater or equal to 38C (100.4F), Mild Pain (1 - 3)  dextrose Oral Gel 15 Gram(s) Oral once PRN Blood Glucose LESS THAN 70 milliGRAM(s)/deciliter  diphenhydrAMINE 25 milliGRAM(s) Oral every 6 hours PRN Rash and/or Itching  guaiFENesin Oral Liquid (Sugar-Free) 100 milliGRAM(s) Oral every 6 hours PRN Cough  melatonin 5 milliGRAM(s) Oral at bedtime PRN Insomnia      Allergies    No Known Allergies    Intolerances        REVIEW OF SYSTEMS:  CONSTITUTIONAL: No fever, weight loss, or fatigue  EYES: No eye pain, visual disturbances, or discharge  ENMT:  No difficulty hearing, tinnitus, vertigo; No sinus or throat pain  NECK: No pain or stiffness  BREASTS: No pain, masses, or nipple discharge  RESPIRATORY: No cough, wheezing, chills or hemoptysis; No shortness of breath  CARDIOVASCULAR: No chest pain, palpitations, dizziness, or leg swelling  GASTROINTESTINAL: No abdominal or epigastric pain. No nausea, vomiting, or hematemesis; No diarrhea or constipation. No melena or hematochezia.  GENITOURINARY: No dysuria, frequency, hematuria, or incontinence  NEUROLOGICAL: No headaches, numbness, or tremors  SKIN: No itching, burning, rashes, or lesions     Vital Signs Last 24 Hrs  T(C): 36.3 (12 Nov 2024 05:00), Max: 37.5 (11 Nov 2024 21:02)  T(F): 97.4 (12 Nov 2024 05:00), Max: 99.5 (11 Nov 2024 21:02)  HR: 82 (12 Nov 2024 05:00) (82 - 91)  BP: 164/98 (12 Nov 2024 05:00) (137/83 - 164/98)  BP(mean): --  RR: 17 (12 Nov 2024 05:00) (17 - 18)  SpO2: 92% (12 Nov 2024 05:00) (92% - 95%)    Parameters below as of 12 Nov 2024 05:00  Patient On (Oxygen Delivery Method): room air        PHYSICAL EXAM:  GENERAL: NAD, well-groomed, well-developed  HEAD:  Atraumatic, Normocephalic  EYES: EOMI, PERRLA, conjunctiva and sclera clear  ENMT: Moist mucous membranes,   NECK: Supple, No JVD, Normal thyroid  NERVOUS SYSTEM:  Alert & Oriented X3, non focal  CHEST/LUNG: Clear to percussion bilaterally;   HEART: Regular rate and rhythm; No murmurs, rubs, or gallops  ABDOMEN: Soft, Nontender, Nondistended; Bowel sounds present  EXTREMITIES:  2+ Peripheral Pulses, No clubbing, cyanosis,healing ulcer      LABS:                        10.1   6.44  )-----------( 132      ( 12 Nov 2024 07:56 )             31.3     11-12    139  |  104  |  47[H]  ----------------------------<  179[H]  4.4   |  27  |  2.56[H]    Ca    9.0      12 Nov 2024 07:56    TPro  6.5  /  Alb  2.8[L]  /  TBili  0.5  /  DBili  x   /  AST  43[H]  /  ALT  37  /  AlkPhos  115  11-12      Urinalysis Basic - ( 12 Nov 2024 07:56 )    Color: x / Appearance: x / SG: x / pH: x  Gluc: 179 mg/dL / Ketone: x  / Bili: x / Urobili: x   Blood: x / Protein: x / Nitrite: x   Leuk Esterase: x / RBC: x / WBC x   Sq Epi: x / Non Sq Epi: x / Bacteria: x      CAPILLARY BLOOD GLUCOSE      POCT Blood Glucose.: 170 mg/dL (12 Nov 2024 07:46)  POCT Blood Glucose.: 252 mg/dL (11 Nov 2024 21:03)  POCT Blood Glucose.: 198 mg/dL (11 Nov 2024 16:57)  POCT Blood Glucose.: 175 mg/dL (11 Nov 2024 12:25)        Culture - Blood (collected 11-07-24 @ 14:04)  Source: .Blood BLOOD  Preliminary Report (11-11-24 @ 19:01):    No growth at 4 days    Culture - Blood (collected 11-07-24 @ 14:04)  Source: .Blood BLOOD  Preliminary Report (11-11-24 @ 19:01):    No growth at 4 days    Urinalysis with Rflx Culture (collected 11-07-24 @ 14:04)        I&O's Summary    11 Nov 2024 07:01  -  12 Nov 2024 07:00  --------------------------------------------------------  IN: 100 mL / OUT: 0 mL / NET: 100 mL        RADIOLOGY & ADDITIONAL TESTS:    Imaging Personally Reviewed:  [ x] YES  [ ] NO    Consultant(s) Notes Reviewed:  [x ] YES  [ ] NO    Care Discussed with Consultants/Other Providers [x ] YES  [ ] NO

## 2024-11-12 NOTE — PROGRESS NOTE ADULT - PROBLEM SELECTOR PLAN 1
pt is to be medically optimized by hospitalist team for planned procedures  Pt is to be npo from last night  Pt is to be brought to the OR for planned procedures  Pt is to be followed for post operative course in the hospital and managed by the hospitalist team  will await results of intra operative cultures to establish appropriate ABX regimen  will follow and discuss with all attendings  pt to cont IV ABX as per ID

## 2024-11-12 NOTE — PROGRESS NOTE ADULT - SUBJECTIVE AND OBJECTIVE BOX
NEPHROLOGY PROGRESS NOTE    CHIEF COMPLAINT:  CKD    HPI:  Renal function worse today.    EXAM:  Vital Signs Last 24 Hrs  T(C): 36.3 (12 Nov 2024 05:00), Max: 37.5 (11 Nov 2024 21:02)  T(F): 97.4 (12 Nov 2024 05:00), Max: 99.5 (11 Nov 2024 21:02)  HR: 82 (12 Nov 2024 05:00) (82 - 88)  BP: 164/98 (12 Nov 2024 05:00) (155/89 - 164/98)  BP(mean): --  RR: 17 (12 Nov 2024 05:00) (17 - 17)  SpO2: 92% (12 Nov 2024 05:00) (92% - 92%)    Parameters below as of 12 Nov 2024 05:00  Patient On (Oxygen Delivery Method): room air      I&O's Summary    11 Nov 2024 07:01  -  12 Nov 2024 07:00  --------------------------------------------------------  IN: 100 mL / OUT: 0 mL / NET: 100 mL      Daily Height in cm: 175.5 (12 Nov 2024 10:46)    Daily     Conversant, in no apparent distress  Normal respiratory effort, lungs clear bilaterally  Heart RRR with no murmur, no peripheral edema    LABS                        10.1   6.44  )-----------( 132      ( 12 Nov 2024 07:56 )             31.3     11-12    139  |  104  |  47[H]  ----------------------------<  179[H]  4.4   |  27  |  2.56[H]    Ca    9.0      12 Nov 2024 07:56    TPro  6.5  /  Alb  2.8[L]  /  TBili  0.5  /  DBili  x   /  AST  43[H]  /  ALT  37  /  AlkPhos  115  11-12    Vanco T 5.9      Impression:  CKD 3, Diabetic nephropathy with some worsening today, etiology not obvious;  eos% a little elevated may suggest ATIN;  at risk for PIGN  Diabetes, DFU   Hypertension, hectic control    Recommend:  Monitor daily BMP  No change to BP medications  Check UA, urine lyes, protein/Cr, C3/C4

## 2024-11-12 NOTE — PROGRESS NOTE ADULT - PROBLEM SELECTOR PLAN 1
post operative radiographs ordered   pt to be discharged from recovery room to [facility] under the care of the hospitalist team  will await intraoperative cultures to establish appropriate ABX regiment  will cont to follow and discuss with all attendings

## 2024-11-12 NOTE — PROGRESS NOTE ADULT - ASSESSMENT
The patient is a 49 year old male with a history of HTN, HL, DM, PAD, CKD, toe osteomyelitis who presents with toe infection.    Plan:  - ECG with sinus rhythm and no evidence of ischemia/infarction  - Off of losartan  - Continue amlodipine 10 mg daily  - Continue carvedilol 6.25 mg bid  - Continue losartan 50 mg daily  - Podiatry follow-up  - The patient is at intermediate risk for cardiac events for an intermediate risk surgery. He is optimized to proceed for surgery from a cardiac standpoint.

## 2024-11-12 NOTE — PRE-OP CHECKLIST - SELECT TESTS ORDERED
Ct foot. Doppler legs./BMP/CBC/CMP/PT/PTT/INR/CXR Ct foot. Doppler legs./BMP/CBC/CMP/PT/PTT/INR/Type and Screen/EKG/CXR

## 2024-11-12 NOTE — PROGRESS NOTE ADULT - SUBJECTIVE AND OBJECTIVE BOX
Chief Complaint: Toe infection    Interval Events: No events overnight.    Review of Systems:  General: No fevers, chills, weight gain  Skin: No rashes, color changes  Cardiovascular: No chest pain, orthopnea  Respiratory: No shortness of breath, cough  Gastrointestinal: No nausea, abdominal pain  Genitourinary: No incontinence, pain with urination  Musculoskeletal: No pain, swelling, decreased range of motion  Neurological: No headache, weakness  Psychiatric: No depression, anxiety  Endocrine: No weight gain, increased thirst  All other systems are comprehensively negative.    Physical Exam:  Vitals:        Vital Signs Last 24 Hrs  T(C): 36.3 (12 Nov 2024 05:00), Max: 37.5 (11 Nov 2024 21:02)  T(F): 97.4 (12 Nov 2024 05:00), Max: 99.5 (11 Nov 2024 21:02)  HR: 82 (12 Nov 2024 05:00) (82 - 91)  BP: 164/98 (12 Nov 2024 05:00) (137/83 - 164/98)  BP(mean): --  RR: 17 (12 Nov 2024 05:00) (17 - 18)  SpO2: 92% (12 Nov 2024 05:00) (92% - 95%)  Parameters below as of 12 Nov 2024 05:00  Patient On (Oxygen Delivery Method): room air  General: NAD  HEENT: MMM  Neck: No JVD, no carotid bruit  Lungs: CTAB  CV: RRR, nl S1/S2, no M/R/G  Abdomen: S/NT/ND, +BS  Extremities: No LE edema, no cyanosis  Neuro: AAOx3, non-focal  Skin: No rash    Labs:                        10.1   6.44  )-----------( 132      ( 12 Nov 2024 07:56 )             31.3     11-12    139  |  104  |  47[H]  ----------------------------<  179[H]  4.4   |  27  |  2.56[H]    Ca    9.0      12 Nov 2024 07:56    TPro  6.5  /  Alb  2.8[L]  /  TBili  0.5  /  DBili  x   /  AST  43[H]  /  ALT  37  /  AlkPhos  115  11-12            ECG/Telemetry:

## 2024-11-12 NOTE — PROGRESS NOTE ADULT - ASSESSMENT
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 left hospital on11/4 against medical advice for his personal problem, The patient return for persistent infection, foul-smelling discharge.  Patient denies any chest pain, no shortness of breath.  No fever or chills.  No cough/URI.  No neck or back pain.  No numbness or tingling.  No focal weakness.  No aggravating or alleviating factors otherwise noted.  No other acute complaints. Patient states he sometimes drinks, states he did not drink today.  In ED afebrile, o2 sat 90 %, /99, ,patient is being admitted for further care, and podiatry and ID consult called  admitted for  #Diabetic foot ulcer with cellullitis with ac on  osteomyelitis left foot   started on IV abx,ID and podiatry consult noted, cont  zosyn   CT- consistent with OM of first Toe left  podiatry consult noted   ulcer left 1st mpj with exposed metatarsal head, hx of left hallux amputation with dehiscence, + edema, + erythema, + probe to bone, + purulent drainage, + tracking  Clinical concern for underling OM.   Vascular: Dorsalis Pedis and Posterior Tibial pulses 1/4.  Capillary re-fill time less then 3 seconds digits 1-5 bilateral.    LYMPH: No lymphadenopathy noted   ## DM2 with hyperglycemia, with neuropathy , nephropathy with likely PVD    ssi, consistent carb, Endo consult,    # Ess HTN   continue current tt  # HLD   statin    # DVT prophylaxis  ID , endo and podiatry consult  #  CKD 3   nephro consult  likely Diabetic nephropathy   # HLD   low fat diet  # ETOH use disorder  cessation counselling,addiction medicine consult   folic acid, thiamine, lorazepam with CIWA protocol as per DR Vázquez  #Obesity   # EDUAR  o2 as needed  # Anemia of  disease   DVT pro   ID , and podiatry consult noted   for surg plan 11/12  Patient hemodyanamically stable and optimized for podiatry surg.LOw risk   patient for surg today

## 2024-11-13 LAB
ALBUMIN SERPL ELPH-MCNC: 3.1 G/DL — LOW (ref 3.3–5)
ALP SERPL-CCNC: 131 U/L — HIGH (ref 30–120)
ALT FLD-CCNC: 45 U/L — SIGNIFICANT CHANGE UP (ref 10–60)
ANION GAP SERPL CALC-SCNC: 11 MMOL/L — SIGNIFICANT CHANGE UP (ref 5–17)
AST SERPL-CCNC: 47 U/L — HIGH (ref 10–40)
BASOPHILS # BLD AUTO: 0.09 K/UL — SIGNIFICANT CHANGE UP (ref 0–0.2)
BASOPHILS NFR BLD AUTO: 0.6 % — SIGNIFICANT CHANGE UP (ref 0–2)
BILIRUB SERPL-MCNC: 0.7 MG/DL — SIGNIFICANT CHANGE UP (ref 0.2–1.2)
BUN SERPL-MCNC: 60 MG/DL — HIGH (ref 7–23)
C3 SERPL-MCNC: 105 MG/DL — SIGNIFICANT CHANGE UP (ref 81–157)
C4 SERPL-MCNC: 17 MG/DL — SIGNIFICANT CHANGE UP (ref 13–39)
CALCIUM SERPL-MCNC: 9.8 MG/DL — SIGNIFICANT CHANGE UP (ref 8.4–10.5)
CHLORIDE SERPL-SCNC: 99 MMOL/L — SIGNIFICANT CHANGE UP (ref 96–108)
CHLORIDE UR-SCNC: 79 MMOL/L — SIGNIFICANT CHANGE UP
CO2 SERPL-SCNC: 22 MMOL/L — SIGNIFICANT CHANGE UP (ref 22–31)
CREAT ?TM UR-MCNC: 50 MG/DL — SIGNIFICANT CHANGE UP
CREAT SERPL-MCNC: 2.95 MG/DL — HIGH (ref 0.5–1.3)
EGFR: 25 ML/MIN/1.73M2 — LOW
EOSINOPHIL # BLD AUTO: 0.01 K/UL — SIGNIFICANT CHANGE UP (ref 0–0.5)
EOSINOPHIL NFR BLD AUTO: 0.1 % — SIGNIFICANT CHANGE UP (ref 0–6)
GLUCOSE BLDC GLUCOMTR-MCNC: 286 MG/DL — HIGH (ref 70–99)
GLUCOSE BLDC GLUCOMTR-MCNC: 305 MG/DL — HIGH (ref 70–99)
GLUCOSE BLDC GLUCOMTR-MCNC: 384 MG/DL — HIGH (ref 70–99)
GLUCOSE BLDC GLUCOMTR-MCNC: 416 MG/DL — HIGH (ref 70–99)
GLUCOSE BLDC GLUCOMTR-MCNC: 424 MG/DL — HIGH (ref 70–99)
GLUCOSE SERPL-MCNC: 344 MG/DL — HIGH (ref 70–99)
GRAM STN FLD: SIGNIFICANT CHANGE UP
GRAM STN FLD: SIGNIFICANT CHANGE UP
HCT VFR BLD CALC: 34.6 % — LOW (ref 39–50)
HGB BLD-MCNC: 11.3 G/DL — LOW (ref 13–17)
IMM GRANULOCYTES NFR BLD AUTO: 0.6 % — SIGNIFICANT CHANGE UP (ref 0–0.9)
LYMPHOCYTES # BLD AUTO: 1.26 K/UL — SIGNIFICANT CHANGE UP (ref 1–3.3)
LYMPHOCYTES # BLD AUTO: 8.6 % — LOW (ref 13–44)
MCHC RBC-ENTMCNC: 28.4 PG — SIGNIFICANT CHANGE UP (ref 27–34)
MCHC RBC-ENTMCNC: 32.7 G/DL — SIGNIFICANT CHANGE UP (ref 32–36)
MCV RBC AUTO: 86.9 FL — SIGNIFICANT CHANGE UP (ref 80–100)
MONOCYTES # BLD AUTO: 1.43 K/UL — HIGH (ref 0–0.9)
MONOCYTES NFR BLD AUTO: 9.7 % — SIGNIFICANT CHANGE UP (ref 2–14)
NEUTROPHILS # BLD AUTO: 11.83 K/UL — HIGH (ref 1.8–7.4)
NEUTROPHILS NFR BLD AUTO: 80.4 % — HIGH (ref 43–77)
NIGHT BLUE STAIN TISS: SIGNIFICANT CHANGE UP
NRBC # BLD: 0 /100 WBCS — SIGNIFICANT CHANGE UP (ref 0–0)
PLATELET # BLD AUTO: 129 K/UL — LOW (ref 150–400)
POTASSIUM SERPL-MCNC: 4.6 MMOL/L — SIGNIFICANT CHANGE UP (ref 3.5–5.3)
POTASSIUM SERPL-SCNC: 4.6 MMOL/L — SIGNIFICANT CHANGE UP (ref 3.5–5.3)
PROT ?TM UR-MCNC: 210 MG/DL — HIGH (ref 0–12)
PROT SERPL-MCNC: 7.8 G/DL — SIGNIFICANT CHANGE UP (ref 6–8.3)
PROT/CREAT UR-RTO: 4.2 RATIO — HIGH (ref 0–0.2)
RBC # BLD: 3.98 M/UL — LOW (ref 4.2–5.8)
RBC # FLD: 13.4 % — SIGNIFICANT CHANGE UP (ref 10.3–14.5)
SODIUM SERPL-SCNC: 132 MMOL/L — LOW (ref 135–145)
SODIUM UR-SCNC: 105 MMOL/L — SIGNIFICANT CHANGE UP
SPECIMEN SOURCE: SIGNIFICANT CHANGE UP
WBC # BLD: 14.71 K/UL — HIGH (ref 3.8–10.5)
WBC # FLD AUTO: 14.71 K/UL — HIGH (ref 3.8–10.5)

## 2024-11-13 PROCEDURE — 99232 SBSQ HOSP IP/OBS MODERATE 35: CPT

## 2024-11-13 PROCEDURE — 73630 X-RAY EXAM OF FOOT: CPT | Mod: 26,LT

## 2024-11-13 RX ORDER — SODIUM CHLORIDE 9 MG/ML
1000 INJECTION, SOLUTION INTRAMUSCULAR; INTRAVENOUS; SUBCUTANEOUS
Refills: 0 | Status: DISCONTINUED | OUTPATIENT
Start: 2024-11-13 | End: 2024-11-15

## 2024-11-13 RX ADMIN — Medication 6: at 08:15

## 2024-11-13 RX ADMIN — ACETAMINOPHEN, DIPHENHYDRAMINE HCL, PHENYLEPHRINE HCL 5 MILLIGRAM(S): 325; 25; 5 TABLET ORAL at 21:17

## 2024-11-13 RX ADMIN — CARVEDILOL 6.25 MILLIGRAM(S): 25 TABLET, FILM COATED ORAL at 05:05

## 2024-11-13 RX ADMIN — PIPERACILLIN SODIUM AND TAZOBACTAM SODIUM 25 GRAM(S): 4; .5 INJECTION, POWDER, LYOPHILIZED, FOR SOLUTION INTRAVENOUS at 13:30

## 2024-11-13 RX ADMIN — Medication 7 UNIT(S): at 12:11

## 2024-11-13 RX ADMIN — Medication 5000 UNIT(S): at 17:07

## 2024-11-13 RX ADMIN — Medication 5: at 12:11

## 2024-11-13 RX ADMIN — Medication 2: at 21:15

## 2024-11-13 RX ADMIN — GABAPENTIN 100 MILLIGRAM(S): 300 CAPSULE ORAL at 13:30

## 2024-11-13 RX ADMIN — AMLODIPINE BESYLATE 10 MILLIGRAM(S): 10 TABLET ORAL at 05:05

## 2024-11-13 RX ADMIN — PIPERACILLIN SODIUM AND TAZOBACTAM SODIUM 25 GRAM(S): 4; .5 INJECTION, POWDER, LYOPHILIZED, FOR SOLUTION INTRAVENOUS at 21:17

## 2024-11-13 RX ADMIN — Medication 7 UNIT(S): at 17:05

## 2024-11-13 RX ADMIN — INSULIN GLARGINE 20 UNIT(S): 100 INJECTION, SOLUTION SUBCUTANEOUS at 21:14

## 2024-11-13 RX ADMIN — LOSARTAN POTASSIUM 50 MILLIGRAM(S): 100 TABLET, FILM COATED ORAL at 05:05

## 2024-11-13 RX ADMIN — SODIUM CHLORIDE 75 MILLILITER(S): 9 INJECTION, SOLUTION INTRAMUSCULAR; INTRAVENOUS; SUBCUTANEOUS at 13:30

## 2024-11-13 RX ADMIN — GABAPENTIN 100 MILLIGRAM(S): 300 CAPSULE ORAL at 05:05

## 2024-11-13 RX ADMIN — Medication 1 TABLET(S): at 17:06

## 2024-11-13 RX ADMIN — Medication 7 UNIT(S): at 08:16

## 2024-11-13 RX ADMIN — CARVEDILOL 6.25 MILLIGRAM(S): 25 TABLET, FILM COATED ORAL at 17:07

## 2024-11-13 RX ADMIN — Medication 1 TABLET(S): at 08:18

## 2024-11-13 RX ADMIN — PIPERACILLIN SODIUM AND TAZOBACTAM SODIUM 25 GRAM(S): 4; .5 INJECTION, POWDER, LYOPHILIZED, FOR SOLUTION INTRAVENOUS at 05:06

## 2024-11-13 RX ADMIN — GABAPENTIN 100 MILLIGRAM(S): 300 CAPSULE ORAL at 21:15

## 2024-11-13 RX ADMIN — ACETAMINOPHEN 500MG 650 MILLIGRAM(S): 500 TABLET, COATED ORAL at 21:46

## 2024-11-13 RX ADMIN — Medication 25 MILLIGRAM(S): at 21:15

## 2024-11-13 RX ADMIN — Medication 6: at 17:06

## 2024-11-13 RX ADMIN — ACETAMINOPHEN 500MG 650 MILLIGRAM(S): 500 TABLET, COATED ORAL at 21:16

## 2024-11-13 RX ADMIN — Medication 100 MILLIGRAM(S): at 12:13

## 2024-11-13 RX ADMIN — Medication 5000 UNIT(S): at 05:05

## 2024-11-13 NOTE — PHYSICAL THERAPY INITIAL EVALUATION ADULT - GAIT TRAINING, PT EVAL
Pt will ambulate with a RW for household dist. independently 3-5 days
1 session: pt amb 150 feet w/o AD Independently

## 2024-11-13 NOTE — PROGRESS NOTE ADULT - SUBJECTIVE AND OBJECTIVE BOX
Patient is a 49y old  Male who presents with a chief complaint of foot infection (13 Nov 2024 12:54)    updates: seen patient @ bedside, s/p OR 11/12, reviewed BG trends, was elevated this AM 400s, tolerating CC diet, states he did not eat overnight, trending down with current insulin dosages, will increase to mod ISS ACHS. reviewed affects of surgery and antibiotics on glycemic control. discussed insulin regimen to continue on discharge.    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 left hospital on11/4 against medical advice for his personal problem, The patient return for persistent infection, foul-smelling discharge.  Patient denies any chest pain, no shortness of breath.  No fever or chills.  No cough/URI.  No neck or back pain.  No numbness or tingling.  No focal weakness.  No aggravating or alleviating factors otherwise noted.  No other acute complaints. Patient states he sometimes drinks, states he did not drink today.  In ED afebrile, o2 sat 90 %, /99, ,patient is being admitted for further care, and podiatry and ID consult called (07 Nov 2024 17:57)      PAST MEDICAL & SURGICAL HISTORY:  Prediabetes      HTN (hypertension)      HLD (hyperlipidemia)      DM2 (diabetes mellitus, type 2)      Allergies    No Known Allergies    Intolerances        MEDICATIONS  (STANDING):  amLODIPine   Tablet 10 milliGRAM(s) Oral daily  carvedilol 6.25 milliGRAM(s) Oral every 12 hours  dextrose 5%. 1000 milliLiter(s) (100 mL/Hr) IV Continuous <Continuous>  dextrose 5%. 1000 milliLiter(s) (50 mL/Hr) IV Continuous <Continuous>  dextrose 50% Injectable 12.5 Gram(s) IV Push once  dextrose 50% Injectable 25 Gram(s) IV Push once  dextrose 50% Injectable 25 Gram(s) IV Push once  gabapentin 100 milliGRAM(s) Oral three times a day  glucagon  Injectable 1 milliGRAM(s) IntraMuscular once  heparin   Injectable 5000 Unit(s) SubCutaneous every 12 hours  insulin glargine Injectable (LANTUS) 20 Unit(s) SubCutaneous at bedtime  insulin lispro (ADMELOG) corrective regimen sliding scale   SubCutaneous at bedtime  insulin lispro (ADMELOG) corrective regimen sliding scale   SubCutaneous three times a day before meals  insulin lispro Injectable (ADMELOG) 7 Unit(s) SubCutaneous before breakfast  insulin lispro Injectable (ADMELOG) 7 Unit(s) SubCutaneous before lunch  insulin lispro Injectable (ADMELOG) 7 Unit(s) SubCutaneous before dinner  lactobacillus acidophilus 1 Tablet(s) Oral two times a day with meals  losartan 50 milliGRAM(s) Oral daily  piperacillin/tazobactam IVPB.. 3.375 Gram(s) IV Intermittent every 8 hours  sodium chloride 0.9%. 1000 milliLiter(s) (75 mL/Hr) IV Continuous <Continuous>  thiamine 100 milliGRAM(s) Oral daily

## 2024-11-13 NOTE — PHYSICAL THERAPY INITIAL EVALUATION ADULT - CRITERIA FOR SKILLED THERAPEUTIC INTERVENTIONS
impairments found/anticipated equipment needs at discharge/anticipated discharge recommendation
impairments found

## 2024-11-13 NOTE — PHYSICAL THERAPY INITIAL EVALUATION ADULT - ADDITIONAL COMMENTS
apt no TITO no steps inside. Pt has cane and crutches.
apt no TITO no steps inside. Pt has cane and crutches.

## 2024-11-13 NOTE — PROGRESS NOTE ADULT - ASSESSMENT
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 left hospital on11/4 against medical advice for his personal problem, The patient return for persistent infection, foul-smelling discharge.  Patient denies any chest pain, no shortness of breath.  No fever or chills.  No cough/URI.  No neck or back pain.  No numbness or tingling.  No focal weakness.  No aggravating or alleviating factors otherwise noted.  No other acute complaints. Patient states he sometimes drinks, states he did not drink today.  In ED afebrile, o2 sat 90 %, /99, ,patient is being admitted for further care, and podiatry and ID consult called  admitted for  #Diabetic foot ulcer with cellullitis with ac on  osteomyelitis left foot   started on IV abx,ID and podiatry consult noted, cont  zosyn   CT- consistent with OM of first Toe left  podiatry consult noted   ulcer left 1st mpj with exposed metatarsal head, hx of left hallux amputation with dehiscence, + edema, + erythema, + probe to bone, + purulent drainage, + tracking  Clinical concern for underling OM.   Vascular: Dorsalis Pedis and Posterior Tibial pulses 1/4.  Capillary re-fill time less then 3 seconds digits 1-5 bilateral.    LYMPH: No lymphadenopathy noted  on 11/12 had amputation , debulking and flap on left foot by Dr Finnegan- post op diag- OM ch  cont abx as per ID   ## DM2 with hyperglycemia, with neuropathy , nephropathy with likely PVD    ssi, consistent carb, Endo consult,    # Ess HTN   continue current tt  # HLD   statin    # DVT prophylaxis  ID , endo and podiatry consult  #  CKD 3   nephro consult  likely Diabetic nephropathy   # HLD   low fat diet  # ETOH use disorder  cessation counselling,addiction medicine consult   folic acid, thiamine, lorazepam with CIWA protocol as per DR Vázquez  #Obesity   # EDUAR  o2 as needed  # Anemia of ch disease   DVT pro   ID , and podiatry consult noted   for surg plan 11/12  Patient was hemodyanamically stable and optimized for podiatry surg.LOw risk   had surg on 11/12

## 2024-11-13 NOTE — PROGRESS NOTE ADULT - ASSESSMENT
Physical Exam:   Vital Signs Last 24 Hrs  T(C): 36.9 (13 Nov 2024 14:02), Max: 36.9 (13 Nov 2024 14:02)  T(F): 98.5 (13 Nov 2024 14:02), Max: 98.5 (13 Nov 2024 14:02)  HR: 92 (13 Nov 2024 14:02) (77 - 92)  BP: 142/82 (13 Nov 2024 14:02) (97/61 - 173/90)  BP(mean): 84 (12 Nov 2024 17:49) (67 - 84)  RR: 18 (13 Nov 2024 14:02) (12 - 22)  SpO2: 95% (13 Nov 2024 14:02) (91% - 100%)    Parameters below as of 13 Nov 2024 14:02  Patient On (Oxygen Delivery Method): room air      CAPILLARY BLOOD GLUCOSE      POCT Blood Glucose.: 286 mg/dL (13 Nov 2024 16:36)  POCT Blood Glucose.: 384 mg/dL (13 Nov 2024 11:38)  POCT Blood Glucose.: 424 mg/dL (13 Nov 2024 07:57)  POCT Blood Glucose.: 416 mg/dL (13 Nov 2024 07:50)  POCT Blood Glucose.: 317 mg/dL (12 Nov 2024 21:42)  POCT Blood Glucose.: 125 mg/dL (12 Nov 2024 17:33)        DIET: CC  >50%

## 2024-11-13 NOTE — PHYSICAL THERAPY INITIAL EVALUATION ADULT - RANGE OF MOTION EXAMINATION, REHAB EVAL
Left foot n/t/no ROM deficits were identified
bilateral upper extremity ROM was WFL (within functional limits)/bilateral lower extremity ROM was WFL (within functional limits)

## 2024-11-13 NOTE — PROGRESS NOTE ADULT - SUBJECTIVE AND OBJECTIVE BOX
JOB SUZANNA is a 49yMale , patient examined and chart reviewed.     INTERVAL HPI/ OVERNIGHT EVENTS:   Sp Amputation, foot, first ray Debulking, flap, foot 12-Nov-2024  Afebrile NAD.    PAST MEDICAL & SURGICAL HISTORY:  Prediabetes  HTN (hypertension)  HLD (hyperlipidemia)  DM2 (diabetes mellitus, type 2)    For details regarding the patient's social history, family history, and other miscellaneous elements, please refer the initial infectious diseases consultation and/or the admitting history and physical examination for this admission.    ROS:  CONSTITUTIONAL:  Negative fever or chills  EYES:  Negative  blurry vision or double vision  CARDIOVASCULAR:  Negative for chest pain or palpitations  RESPIRATORY:  Negative for cough, wheezing, or SOB   GASTROINTESTINAL:  Negative for nausea, vomiting, diarrhea, constipation, or abdominal pain  GENITOURINARY:  Negative frequency, urgency or dysuria  NEUROLOGIC:  No headache, confusion, dizziness, lightheadedness  All other systems were reviewed and are negative     No Known Allergies      Current inpatient medications :    ANTIBIOTICS/RELEVANT:  piperacillin/tazobactam IVPB.. 3.375 Gram(s) IV Intermittent every 8 hours    MEDICATIONS  (STANDING):  amLODIPine   Tablet 10 milliGRAM(s) Oral daily  carvedilol 6.25 milliGRAM(s) Oral every 12 hours  dextrose 5%. 1000 milliLiter(s) (50 mL/Hr) IV Continuous <Continuous>  dextrose 5%. 1000 milliLiter(s) (100 mL/Hr) IV Continuous <Continuous>  dextrose 50% Injectable 25 Gram(s) IV Push once  dextrose 50% Injectable 12.5 Gram(s) IV Push once  dextrose 50% Injectable 25 Gram(s) IV Push once  gabapentin 100 milliGRAM(s) Oral three times a day  glucagon  Injectable 1 milliGRAM(s) IntraMuscular once  heparin   Injectable 5000 Unit(s) SubCutaneous every 12 hours  insulin glargine Injectable (LANTUS) 20 Unit(s) SubCutaneous at bedtime  insulin lispro (ADMELOG) corrective regimen sliding scale   SubCutaneous at bedtime  insulin lispro (ADMELOG) corrective regimen sliding scale   SubCutaneous three times a day before meals  insulin lispro Injectable (ADMELOG) 7 Unit(s) SubCutaneous before breakfast  insulin lispro Injectable (ADMELOG) 7 Unit(s) SubCutaneous before lunch  insulin lispro Injectable (ADMELOG) 7 Unit(s) SubCutaneous before dinner  lactobacillus acidophilus 1 Tablet(s) Oral two times a day with meals  losartan 50 milliGRAM(s) Oral daily  thiamine 100 milliGRAM(s) Oral daily    MEDICATIONS  (PRN):  acetaminophen     Tablet .. 650 milliGRAM(s) Oral every 6 hours PRN Temp greater or equal to 38C (100.4F), Mild Pain (1 - 3)  dextrose Oral Gel 15 Gram(s) Oral once PRN Blood Glucose LESS THAN 70 milliGRAM(s)/deciliter  diphenhydrAMINE 25 milliGRAM(s) Oral every 6 hours PRN Rash and/or Itching  guaiFENesin Oral Liquid (Sugar-Free) 100 milliGRAM(s) Oral every 6 hours PRN Cough  melatonin 5 milliGRAM(s) Oral at bedtime PRN Insomnia      Objective:  Vital Signs Last 24 Hrs  T(C): 36.3 (13 Nov 2024 04:30), Max: 37.1 (12 Nov 2024 14:20)  T(F): 97.4 (13 Nov 2024 04:30), Max: 98.7 (12 Nov 2024 14:20)  HR: 86 (13 Nov 2024 04:30) (77 - 86)  BP: 173/90 (13 Nov 2024 04:30) (97/61 - 173/90)  BP(mean): 84 (12 Nov 2024 17:49) (67 - 84)  RR: 18 (13 Nov 2024 04:30) (12 - 22)  SpO2: 96% (13 Nov 2024 04:30) (91% - 100%)    Parameters below as of 13 Nov 2024 04:30  Patient On (Oxygen Delivery Method): room air      Physical Exam:  General: no acute distress  Neck: supple, trachea midline  Lungs: clear, no wheeze/rhonchi  Cardiovascular: regular rate and rhythm, S1 S2  Abdomen: soft, nontender,  bowel sounds normal  Neurological: alert and oriented x3  Skin: no rash  Extremities: Left foot drsg c/d/i      LABS:                        10.1   6.44  )-----------( 132      ( 12 Nov 2024 07:56 )             31.3   11-12    139  |  104  |  47[H]  ----------------------------<  179[H]  4.4   |  27  |  2.56[H]    Ca    9.0      12 Nov 2024 07:56    TPro  6.5  /  Alb  2.8[L]  /  TBili  0.5  /  DBili  x   /  AST  43[H]  /  ALT  37  /  AlkPhos  115  11-12      MICROBIOLOGY:  Culture - Tissue with Gram Stain (collected 12 Nov 2024 18:21)  Source: Bone  Gram Stain (13 Nov 2024 05:39):    No polymorphonuclear leukocytes seen per low power field    No organisms seen per oil power field    Culture - Tissue with Gram Stain (collected 12 Nov 2024 18:21)  Source: Tissue  Gram Stain (13 Nov 2024 05:39):    No polymorphonuclear leukocytes seen per low power field    No organisms seen per oil power field    Culture - Wound Aerobic (collected 01 Nov 2024 20:56)  Source: .Other  Final Report (03 Nov 2024 20:58):    Commensal nicholas consistent with body site    Culture - Blood (collected 01 Nov 2024 20:00)  Source: .Blood BLOOD  Preliminary Report (04 Nov 2024 01:02):    No growth at 48 Hours    Culture - Blood (collected 01 Nov 2024 20:00)  Source: .Blood BLOOD  Preliminary Report (04 Nov 2024 01:02):    No growth at 48 Hours    RADIOLOGY & ADDITIONAL STUDIES:      Assessment :  49-year-old male with  DM2, HTN, HLD, NEUROPATHY, PVD,ckd4, ETOH abuse, Diabetic foot ulcer with cellullitis ,ac on ch OM 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  He has been on amoxicillin twice daily and states he has been compliant with his antibiotic.  Patient noted that his surgical site had developed a bad odor with some discharge.  Left foot with exposed metatarsal head.    Sp Amputation, foot, first ray Debulking, flap, foot 12-Nov-2024    Plan :   Cont Zosyn  Fu OR cultures path  Trend temps and cbc  Podiatry on case  Stable from ID standpoint    Continue with present regiment.  Appropriate use of antibiotics and adverse effects reviewed.      > 35 minutes were spent in direct patient care reviewing notes, medications ,labs data/ imaging , discussion with multidisciplinary team.    Thank you for allowing me to participate in care of your patient .    Linsey Mack MD  Infectious Disease  [phone]

## 2024-11-13 NOTE — PHYSICAL THERAPY INITIAL EVALUATION ADULT - MANUAL MUSCLE TESTING RESULTS, REHAB EVAL
left ankle foot not tested due to dressing/PWB/no strength deficits were identified
grossly 5/5 Left foot n/t/grossly assessed due to

## 2024-11-13 NOTE — PROGRESS NOTE ADULT - ASSESSMENT
The patient is a 49 year old male with a history of HTN, HL, DM, PAD, CKD, toe osteomyelitis who presents with toe infection.    Plan:  - ECG with sinus rhythm and no evidence of ischemia/infarction  - Off of losartan  - Continue amlodipine 10 mg daily  - Continue carvedilol 6.25 mg bid  - Continue losartan 50 mg daily  - BP a bit labile - monitor. Can increase carvedilol if needed.  - Podiatry follow-up

## 2024-11-13 NOTE — PROGRESS NOTE ADULT - SUBJECTIVE AND OBJECTIVE BOX
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 left hospital on11/4 against medical advice for his personal problem, The patient return for persistent infection, foul-smelling discharge.  Patient denies any chest pain, no shortness of breath.  No fever or chills.  No cough/URI.  No neck or back pain.  No numbness or tingling.  No focal weakness.  No aggravating or alleviating factors otherwise noted.  No other acute complaints. Patient states he sometimes drinks, states he did not drink today.  In ED afebrile, o2 sat 90 %, /99, ,patient is being admitted for further care, and podiatry and ID consult called (07 Nov 2024 17:57)      Vital Signs Last 24 Hrs  T(C): 36.3 (13 Nov 2024 04:30), Max: 37.1 (12 Nov 2024 14:20)  T(F): 97.4 (13 Nov 2024 04:30), Max: 98.7 (12 Nov 2024 14:20)  HR: 86 (13 Nov 2024 04:30) (77 - 86)  BP: 173/90 (13 Nov 2024 04:30) (97/61 - 173/90)  BP(mean): 84 (12 Nov 2024 17:49) (67 - 84)  RR: 18 (13 Nov 2024 04:30) (12 - 22)  SpO2: 96% (13 Nov 2024 04:30) (91% - 100%)    Parameters below as of 13 Nov 2024 04:30  Patient On (Oxygen Delivery Method): room air                              10.1   6.44  )-----------( 132      ( 12 Nov 2024 07:56 )             31.3               11-12    139  |  104  |  47[H]  ----------------------------<  179[H]  4.4   |  27  |  2.56[H]    Ca    9.0      12 Nov 2024 07:56    TPro  6.5  /  Alb  2.8[L]  /  TBili  0.5  /  DBili  x   /  AST  43[H]  /  ALT  37  /  AlkPhos  115  11-12      PHYSICAL EXAM  POD#1 left foot s/p surgical Debridement of all non-viable soft tissue and bone, debridement of deep space infection of left foot with left 1st partial ray resection and packing all left foot. Dressing clean, dry and intact with controlled hemostasis at this time. Noted bleeding strikethrough dressing . Drains intact.  The sutures are intact and the flap is viable at this time.  There is decreased erythema, edema and calor noted. Patient has guarded prognosis . Will cont to allow any further demarcation to occur and will closely monitor.    Sterile dressing change performed today with advancement of packing under sterile technique.   Vascular: Dorsalis Pedis and Posterior Tibial pulses palpable 2/4 bilaterally.  Capillary re-fill time less then 3 seconds digits 1-5 bilateral.  Mild-moderate edema bilaterally up to the ankle left foot   Neuro: Protective sensation diminished to the level of the digits bilateral.   MSK: Muscle strength 5/5 all major muscle groups bilateral.       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 left hospital on11/4 against medical advice for his personal problem, The patient return for persistent infection, foul-smelling discharge.  Patient denies any chest pain, no shortness of breath.  No fever or chills.  No cough/URI.  No neck or back pain.  No numbness or tingling.  No focal weakness.  No aggravating or alleviating factors otherwise noted.  No other acute complaints. Patient states he sometimes drinks, states he did not drink today.  In ED afebrile, o2 sat 90 %, /99, ,patient is being admitted for further care, and podiatry and ID consult called (07 Nov 2024 17:57)      Vital Signs Last 24 Hrs  T(C): 36.3 (13 Nov 2024 04:30), Max: 37.1 (12 Nov 2024 14:20)  T(F): 97.4 (13 Nov 2024 04:30), Max: 98.7 (12 Nov 2024 14:20)  HR: 86 (13 Nov 2024 04:30) (77 - 86)  BP: 173/90 (13 Nov 2024 04:30) (97/61 - 173/90)  BP(mean): 84 (12 Nov 2024 17:49) (67 - 84)  RR: 18 (13 Nov 2024 04:30) (12 - 22)  SpO2: 96% (13 Nov 2024 04:30) (91% - 100%)    Parameters below as of 13 Nov 2024 04:30  Patient On (Oxygen Delivery Method): room air                              10.1   6.44  )-----------( 132      ( 12 Nov 2024 07:56 )             31.3               11-12    139  |  104  |  47[H]  ----------------------------<  179[H]  4.4   |  27  |  2.56[H]    Ca    9.0      12 Nov 2024 07:56    TPro  6.5  /  Alb  2.8[L]  /  TBili  0.5  /  DBili  x   /  AST  43[H]  /  ALT  37  /  AlkPhos  115  11-12      PHYSICAL EXAM  POD#1 left foot s/p surgical Debridement of all non-viable soft tissue and bone, debridement of deep space infection of left foot with left 1st partial ray resection and packing all left foot. Dressing clean, dry and intact with controlled hemostasis at this time. Noted bleeding strikethrough dressing . Drains intact.  The sutures are intact and the flap is viable at this time.  There is decreased erythema, edema and calor noted. Patient has guarded prognosis . Will cont to allow any further demarcation to occur and will closely monitor.  Pt  is responding favorable to the current care plan.  Sterile dressing change performed today with advancement of packing under sterile technique.   Vascular: Dorsalis Pedis and Posterior Tibial pulses palpable 2/4 bilaterally.  Capillary re-fill time less then 3 seconds digits 1-5 bilateral.  Mild-moderate edema bilaterally up to the ankle left foot   Neuro: Protective sensation diminished to the level of the digits bilateral.   MSK: Muscle strength 5/5 all major muscle groups bilateral.

## 2024-11-13 NOTE — PROGRESS NOTE ADULT - PROBLEM SELECTOR PLAN 1
Recommendation: Type 2 A1c 8.5% adm foot infection  lantus 20 units @ HS  admelog 7 units TIDAC  increase to M ISS ACHS  CC diet and accucheck ACHS  Recommend endocrine-Perlman onconsult  FU appt: TBA  DSC recommendations: return to home regimen lantus 20 units @ HS and novolog 7 units TIDAC + ISS and glucose monitoring, lifestyle and diet modifications  diabetes education provided as documented above  Diabetes support info and cell # [phone] given   Goal 100-180 mg/dL; 140-180 mg/dL in critical care areas.

## 2024-11-13 NOTE — PHYSICAL THERAPY INITIAL EVALUATION ADULT - STANDING BALANCE: STATIC
for prior authorization on: 4/15/2022    Medication: lansoprazole (PREVACID) 30 MG capsule  Dosage: Take 1 capsule by mouth daily  Quantity requested:  [address]    Pharmacy for prescription has been selected.      
good balance
good balance

## 2024-11-13 NOTE — PROGRESS NOTE ADULT - PROBLEM SELECTOR PLAN 1
Patient seen and evaluated  Discussed diagnosis and treatment with patient.  Patient is s/p surgical debridement of all non-viable soft tissue and bone left foot with partial 1st ray resection and packing all left foot . DOS 11/12/2024   Post op wound stable, sutures intact coapted. Prognosis is guarded at this time due to severity of the condition   Pending OR WCx and bone pathology   Continue IV Abx and follow ID recommendations    Dressing changed today with advancement of packing. Keep the postop dressing clean/dry/intact   Non weight bearing on left foot   Recs elevation on left lower extremities   Pt may require serial surgical debridements/amputation and possible BKA if symptoms persist.    radiographs reviewed and noted all operative regions in good position   Pt understands the severity of his condition and  that he is at risk to lose part of the left foot, all the foot or below the knee amputation. All questions were asked and answered for patient satisfaction    Medical management as per primary team   Will cont to monitor and discuss all options with patient and all attendings.

## 2024-11-13 NOTE — PROGRESS NOTE ADULT - SUBJECTIVE AND OBJECTIVE BOX
Patient is a 49y old  Male who presents with a chief complaint of foot infection with OM left foot (2024 17:09)      INTERVAL HPI/OVERNIGHT EVENTS:overnight events noted    Home Medications:  acetaminophen 325 mg oral tablet: 2 tab(s) orally every 6 hours As needed Temp greater or equal to 38C (100.4F), Mild Pain (1 - 3), Moderate Pain (4 - 6) (2024 22:34)      MEDICATIONS  (STANDING):  amLODIPine   Tablet 10 milliGRAM(s) Oral daily  carvedilol 6.25 milliGRAM(s) Oral every 12 hours  dextrose 5%. 1000 milliLiter(s) (100 mL/Hr) IV Continuous <Continuous>  dextrose 5%. 1000 milliLiter(s) (50 mL/Hr) IV Continuous <Continuous>  dextrose 50% Injectable 12.5 Gram(s) IV Push once  dextrose 50% Injectable 25 Gram(s) IV Push once  dextrose 50% Injectable 25 Gram(s) IV Push once  gabapentin 100 milliGRAM(s) Oral three times a day  glucagon  Injectable 1 milliGRAM(s) IntraMuscular once  heparin   Injectable 5000 Unit(s) SubCutaneous every 12 hours  insulin glargine Injectable (LANTUS) 20 Unit(s) SubCutaneous at bedtime  insulin lispro (ADMELOG) corrective regimen sliding scale   SubCutaneous three times a day before meals  insulin lispro (ADMELOG) corrective regimen sliding scale   SubCutaneous at bedtime  insulin lispro Injectable (ADMELOG) 7 Unit(s) SubCutaneous before breakfast  insulin lispro Injectable (ADMELOG) 7 Unit(s) SubCutaneous before dinner  insulin lispro Injectable (ADMELOG) 7 Unit(s) SubCutaneous before lunch  lactobacillus acidophilus 1 Tablet(s) Oral two times a day with meals  losartan 50 milliGRAM(s) Oral daily  piperacillin/tazobactam IVPB.. 3.375 Gram(s) IV Intermittent every 8 hours  thiamine 100 milliGRAM(s) Oral daily    MEDICATIONS  (PRN):  acetaminophen     Tablet .. 650 milliGRAM(s) Oral every 6 hours PRN Temp greater or equal to 38C (100.4F), Mild Pain (1 - 3)  dextrose Oral Gel 15 Gram(s) Oral once PRN Blood Glucose LESS THAN 70 milliGRAM(s)/deciliter  diphenhydrAMINE 25 milliGRAM(s) Oral every 6 hours PRN Rash and/or Itching  guaiFENesin Oral Liquid (Sugar-Free) 100 milliGRAM(s) Oral every 6 hours PRN Cough  melatonin 5 milliGRAM(s) Oral at bedtime PRN Insomnia      Allergies    No Known Allergies    Intolerances        REVIEW OF SYSTEMS:  CONSTITUTIONAL: No fever, weight loss, or fatigue  EYES: No eye pain, visual disturbances, or discharge  ENMT:  No difficulty hearing, tinnitus, vertigo; No sinus or throat pain  NECK: No pain or stiffness  BREASTS: No pain, masses, or nipple discharge  RESPIRATORY: No cough, wheezing, chills or hemoptysis; No shortness of breath  CARDIOVASCULAR: No chest pain, palpitations, dizziness, or leg swelling  GASTROINTESTINAL: No abdominal or epigastric pain. No nausea, vomiting, or hematemesis; No diarrhea or constipation. No melena or hematochezia.  GENITOURINARY: No dysuria, frequency, hematuria, or incontinence  NEUROLOGICAL: No headaches, memory loss, loss of strength, numbness, or tremors  SKIN: No itching, burning, rashes, or lesions   LYMPH NODES: No enlarged glands  ENDOCRINE: No heat or cold intolerance; No hair loss  MUSCULOSKELETAL: No joint pain or swelling; No muscle, back, or extremity pain  PSYCHIATRIC: No depression, anxiety, mood swings, or difficulty sleeping  HEME/LYMPH: No easy bruising, or bleeding gums  ALLERGY AND IMMUNOLOGIC: No hives or eczema    Vital Signs Last 24 Hrs  T(C): 36.3 (2024 04:30), Max: 37.1 (2024 14:20)  T(F): 97.4 (2024 04:30), Max: 98.7 (2024 14:20)  HR: 86 (2024 04:30) (77 - 88)  BP: 173/90 (2024 04:30) (97/61 - 173/90)  BP(mean): 84 (2024 17:49) (67 - 84)  RR: 18 (2024 04:30) (12 - 22)  SpO2: 96% (2024 04:30) (91% - 100%)    Parameters below as of 2024 04:30  Patient On (Oxygen Delivery Method): room air        PHYSICAL EXAM:  GENERAL: NAD, well-groomed, well-developed  HEAD:  Atraumatic, Normocephalic  EYES: EOMI, PERRLA, conjunctiva and sclera clear  ENMT: Moist mucous membranes,   NECK: Supple, No JVD, Normal thyroid  NERVOUS SYSTEM:  Alert & Oriented X3, Good concentration; Motor Strength 5/5 B/L upper and lower extremities; DTRs 2+ intact and symmetric  CHEST/LUNG: Clear to percussion bilaterally; No rales, rhonchi, wheezing, or rubs  HEART: Regular rate and rhythm; No murmurs, rubs, or gallops  ABDOMEN: Soft, Nontender, Nondistended; Bowel sounds present  EXTREMITIES:  left foot in dressing  LYMPH: No lymphadenopathy noted  SKIN: No rashes or lesions    LABS:                        10.1   6.44  )-----------( 132      ( 2024 07:56 )             31.3         139  |  104  |  47[H]  ----------------------------<  179[H]  4.4   |  27  |  2.56[H]    Ca    9.0      2024 07:56    TPro  6.5  /  Alb  2.8[L]  /  TBili  0.5  /  DBili  x   /  AST  43[H]  /  ALT  37  /  AlkPhos  115        Urinalysis Basic - ( 2024 18:46 )    Color: Yellow / Appearance: Clear / S.012 / pH: x  Gluc: x / Ketone: Negative mg/dL  / Bili: Negative / Urobili: 0.2 mg/dL   Blood: x / Protein: 300 mg/dL / Nitrite: Negative   Leuk Esterase: Negative / RBC: 1 /HPF / WBC 1 /HPF   Sq Epi: x / Non Sq Epi: x / Bacteria: x      CAPILLARY BLOOD GLUCOSE      POCT Blood Glucose.: 424 mg/dL (2024 07:57)  POCT Blood Glucose.: 416 mg/dL (2024 07:50)  POCT Blood Glucose.: 317 mg/dL (2024 21:42)  POCT Blood Glucose.: 125 mg/dL (2024 17:33)  POCT Blood Glucose.: 159 mg/dL (2024 11:55)        Culture - Tissue with Gram Stain (collected 24 @ 18:21)  Source: Bone  Gram Stain (24 @ 05:39):    No polymorphonuclear leukocytes seen per low power field    No organisms seen per oil power field    Culture - Tissue with Gram Stain (collected 24 @ 18:21)  Source: Tissue  Gram Stain (24 @ 05:39):    No polymorphonuclear leukocytes seen per low power field    No organisms seen per oil power field    Culture - Blood (collected 24 @ 14:04)  Source: .Blood BLOOD  Final Report (24 @ 19:00):    No growth at 5 days    Culture - Blood (collected 24 @ 14:04)  Source: .Blood BLOOD  Final Report (24 @ 19:00):    No growth at 5 days    Urinalysis with Rflx Culture (collected 24 @ 14:04)        I&O's Summary    2024 07:01  -  2024 07:00  --------------------------------------------------------  IN: 815 mL / OUT: 10 mL / NET: 805 mL        RADIOLOGY & ADDITIONAL TESTS:    Imaging Personally Reviewed:  [x ] YES  [ ] NO    Consultant(s) Notes Reviewed:  [x ] YES  [ ] NO    Care Discussed with Consultants/Other Providers [ x] YES  [ ] NO

## 2024-11-13 NOTE — PHYSICAL THERAPY INITIAL EVALUATION ADULT - PERTINENT HX OF CURRENT PROBLEM, REHAB EVAL
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 left hospital on11/4 against medical advice for his personal problem, The patient return for persistent infection, foul-smelling discharge.  Patient denies any chest pain, no shortness of breath.  No fever or chills.  No cough/URI.  No neck or back pain.  No numbness or tingling.  No focal weakness.  No aggravating or alleviating factors otherwise noted.  No other acute complaints. Patient states he sometimes drinks, states he did not drink today.  In ED afebrile, o2 sat 90 %, /99, ,patient is being admitted for further care, and podiatry and ID consult called
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 left hospital on11/4 against medical advice for his personal problem, The patient return for persistent infection, foul-smelling discharge.  Patient denies any chest pain, no shortness of breath.  No fever or chills.  No cough/URI.  No neck or back pain.  No numbness or tingling.  No focal weakness.  No aggravating or alleviating factors otherwise noted.  No other acute complaints. Patient states he sometimes drinks, states he did not drink today.  In ED afebrile, o2 sat 90 %, /99, ,patient is being admitted for further care, and podiatry and ID consult called

## 2024-11-13 NOTE — PROGRESS NOTE ADULT - SUBJECTIVE AND OBJECTIVE BOX
Subjective: mild pain in L foot.       MEDICATIONS  (STANDING):  amLODIPine   Tablet 10 milliGRAM(s) Oral daily  carvedilol 6.25 milliGRAM(s) Oral every 12 hours  dextrose 5%. 1000 milliLiter(s) (50 mL/Hr) IV Continuous <Continuous>  dextrose 5%. 1000 milliLiter(s) (100 mL/Hr) IV Continuous <Continuous>  dextrose 50% Injectable 12.5 Gram(s) IV Push once  dextrose 50% Injectable 25 Gram(s) IV Push once  dextrose 50% Injectable 25 Gram(s) IV Push once  gabapentin 100 milliGRAM(s) Oral three times a day  glucagon  Injectable 1 milliGRAM(s) IntraMuscular once  heparin   Injectable 5000 Unit(s) SubCutaneous every 12 hours  insulin glargine Injectable (LANTUS) 20 Unit(s) SubCutaneous at bedtime  insulin lispro (ADMELOG) corrective regimen sliding scale   SubCutaneous at bedtime  insulin lispro (ADMELOG) corrective regimen sliding scale   SubCutaneous three times a day before meals  insulin lispro Injectable (ADMELOG) 7 Unit(s) SubCutaneous before dinner  insulin lispro Injectable (ADMELOG) 7 Unit(s) SubCutaneous before breakfast  insulin lispro Injectable (ADMELOG) 7 Unit(s) SubCutaneous before lunch  lactobacillus acidophilus 1 Tablet(s) Oral two times a day with meals  losartan 50 milliGRAM(s) Oral daily  piperacillin/tazobactam IVPB.. 3.375 Gram(s) IV Intermittent every 8 hours  thiamine 100 milliGRAM(s) Oral daily    MEDICATIONS  (PRN):  acetaminophen     Tablet .. 650 milliGRAM(s) Oral every 6 hours PRN Temp greater or equal to 38C (100.4F), Mild Pain (1 - 3)  dextrose Oral Gel 15 Gram(s) Oral once PRN Blood Glucose LESS THAN 70 milliGRAM(s)/deciliter  diphenhydrAMINE 25 milliGRAM(s) Oral every 6 hours PRN Rash and/or Itching  guaiFENesin Oral Liquid (Sugar-Free) 100 milliGRAM(s) Oral every 6 hours PRN Cough  melatonin 5 milliGRAM(s) Oral at bedtime PRN Insomnia          T(C): 36.3 (11-13-24 @ 04:30), Max: 37.1 (11-12-24 @ 14:20)  HR: 86 (11-13-24 @ 04:30) (77 - 86)  BP: 173/90 (11-13-24 @ 04:30) (97/61 - 173/90)  RR: 18 (11-13-24 @ 04:30) (12 - 22)  SpO2: 96% (11-13-24 @ 04:30) (91% - 100%)  Wt(kg): --        I&O's Detail    12 Nov 2024 07:01  -  13 Nov 2024 07:00  --------------------------------------------------------  IN:    Lactated Ringers: 575 mL    Oral Fluid: 240 mL  Total IN: 815 mL    OUT:    Blood Loss (mL): 10 mL  Total OUT: 10 mL    Total NET: 805 mL               PHYSICAL EXAM:    GENERAL: NAD  NECK: Supple, no inc in JVP  CHEST/LUNG: Clear  HEART: S1S2  ABDOMEN: Soft, Nontender, Nondistended; Bowel sounds present  EXTREMITIES:  trace edema. L foot dressed  NEURO: no asterixis      LABS:  CBC Full  -  ( 13 Nov 2024 11:45 )  WBC Count : 14.71 K/uL  RBC Count : 3.98 M/uL  Hemoglobin : 11.3 g/dL  Hematocrit : 34.6 %  Platelet Count - Automated : 129 K/uL  Mean Cell Volume : 86.9 fL  Mean Cell Hemoglobin : 28.4 pg  Mean Cell Hemoglobin Concentration : 32.7 g/dL  Auto Neutrophil # : 11.83 K/uL  Auto Lymphocyte # : 1.26 K/uL  Auto Monocyte # : 1.43 K/uL  Auto Eosinophil # : 0.01 K/uL  Auto Basophil # : 0.09 K/uL  Auto Neutrophil % : 80.4 %  Auto Lymphocyte % : 8.6 %  Auto Monocyte % : 9.7 %  Auto Eosinophil % : 0.1 %  Auto Basophil % : 0.6 %    11-13    132[L]  |  99  |  60[H]  ----------------------------<  344[H]  4.6   |  22  |  2.95[H]    Ca    9.8      13 Nov 2024 11:45    TPro  7.8  /  Alb  3.1[L]  /  TBili  0.7  /  DBili  x   /  AST  47[H]  /  ALT  45  /  AlkPhos  131[H]  11-13      Impression:  CKD 3, Diabetic nephropathy with some worsening last few days.  At risk for PIGN, ATIN  Diabetes, DFU, recurrent  Hypertension  HypoNa -- pseudo    Recommend:  Start saline  Consider discontinuation of the ARB if Cr rises some more  No change to BP medications  Check complements, ASO titer  Tight glycemic control.   Daily BMP

## 2024-11-13 NOTE — PHYSICAL THERAPY INITIAL EVALUATION ADULT - TRANSFER TRAINING, PT EVAL
Pt will perform sit <-> stand transfer with RW independently 3-5 days
1 session: independent with transfers w/o AD

## 2024-11-13 NOTE — CHART NOTE - NSCHARTNOTEFT_GEN_A_CORE
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 left hospital on11/4 against medical advice for his personal problem, The patient return for persistent infection, foul-smelling discharge.  Patient denies any chest pain, no shortness of breath.  No fever or chills.  No cough/URI.  No neck or back pain.  No numbness or tingling.  No focal weakness.  No aggravating or alleviating factors otherwise noted.  No other acute complaints. Patient states he sometimes drinks, states he did not drink today.  In ED afebrile, o2 sat 90 %, /99, ,patient is being admitted for further care, and podiatry and ID consult called    11/13  Pt seen for nutrition follow-up. Visited patient in room, presents with good appetite/po intake, consuming >75% of meals. Denies n/v/d/c, last BM 11/12. Pertinent medications/nutrition labs reviewed; -424 x24hr, a1c 8.5% with hx of DM; In house ordered for Lantus 20 units, lispro sliding scale 7 units before meals to aid in glucose management. Also ordered for thiamine. plan to c/w current diet ordered, c/w Manish (7 gm arginine/7 gm glutamine/1.5 gm hmb) x1 packet bid to aid in wound healing. RD to continue to monitor nutrition status per protocol.     Factors impacting intake: [ x] none [ ] nausea  [ ] vomiting [ ] diarrhea [ ] constipation  [ ]chewing problems [ ] swallowing issues  [ ] other:     Diet Prescription: Diet, Consistent Carbohydrate w/Evening Snack:   DASH/TLC {Sodium & Cholesterol Restricted} (11-07-24 @ 18:10)    Intake: good    Daily   % Weight Change    Pertinent Medications: MEDICATIONS  (STANDING):  amLODIPine   Tablet 10 milliGRAM(s) Oral daily  carvedilol 6.25 milliGRAM(s) Oral every 12 hours  dextrose 5%. 1000 milliLiter(s) (100 mL/Hr) IV Continuous <Continuous>  dextrose 5%. 1000 milliLiter(s) (50 mL/Hr) IV Continuous <Continuous>  dextrose 50% Injectable 12.5 Gram(s) IV Push once  dextrose 50% Injectable 25 Gram(s) IV Push once  dextrose 50% Injectable 25 Gram(s) IV Push once  gabapentin 100 milliGRAM(s) Oral three times a day  glucagon  Injectable 1 milliGRAM(s) IntraMuscular once  heparin   Injectable 5000 Unit(s) SubCutaneous every 12 hours  insulin glargine Injectable (LANTUS) 20 Unit(s) SubCutaneous at bedtime  insulin lispro (ADMELOG) corrective regimen sliding scale   SubCutaneous three times a day before meals  insulin lispro (ADMELOG) corrective regimen sliding scale   SubCutaneous at bedtime  insulin lispro Injectable (ADMELOG) 7 Unit(s) SubCutaneous before breakfast  insulin lispro Injectable (ADMELOG) 7 Unit(s) SubCutaneous before dinner  insulin lispro Injectable (ADMELOG) 7 Unit(s) SubCutaneous before lunch  lactobacillus acidophilus 1 Tablet(s) Oral two times a day with meals  losartan 50 milliGRAM(s) Oral daily  piperacillin/tazobactam IVPB.. 3.375 Gram(s) IV Intermittent every 8 hours  thiamine 100 milliGRAM(s) Oral daily    MEDICATIONS  (PRN):  acetaminophen     Tablet .. 650 milliGRAM(s) Oral every 6 hours PRN Temp greater or equal to 38C (100.4F), Mild Pain (1 - 3)  dextrose Oral Gel 15 Gram(s) Oral once PRN Blood Glucose LESS THAN 70 milliGRAM(s)/deciliter  diphenhydrAMINE 25 milliGRAM(s) Oral every 6 hours PRN Rash and/or Itching  guaiFENesin Oral Liquid (Sugar-Free) 100 milliGRAM(s) Oral every 6 hours PRN Cough  melatonin 5 milliGRAM(s) Oral at bedtime PRN Insomnia    Pertinent Labs: 11-12 Na139 mmol/L Glu 179 mg/dL[H] K+ 4.4 mmol/L Cr  2.56 mg/dL[H] BUN 47 mg/dL[H] 11-12 Alb 2.8 g/dL[L]     CAPILLARY BLOOD GLUCOSE      POCT Blood Glucose.: 424 mg/dL (13 Nov 2024 07:57)  POCT Blood Glucose.: 416 mg/dL (13 Nov 2024 07:50)  POCT Blood Glucose.: 317 mg/dL (12 Nov 2024 21:42)  POCT Blood Glucose.: 125 mg/dL (12 Nov 2024 17:33)  POCT Blood Glucose.: 159 mg/dL (12 Nov 2024 11:55)      Edema per flowsheets: +1 left foot  Skin: diabetic ulcer to foot      Estimated Needs:   [x ] no change since previous assessment  [ ] recalculated:     Previous Nutrition Diagnosis:   [ ] Inadequate Energy Intake [ ]Inadequate Oral Intake [ ] Excessive Energy Intake   [ ] Underweight [ ] Increased Nutrient Needs [ ] Overweight/Obesity [x ] Altered Nutrition related lab values  [ ] Altered GI Function [ ] Unintended Weight Loss [ ] Food & Nutrition Related Knowledge Deficit [ ] Malnutrition     Nutrition Diagnosis is [x ] ongoing  [ ] resolved [ ] not applicable     New Nutrition Diagnosis: [x ] not applicable       Interventions: plan to c/w current diet ordered, c/w Manish (7 gm arginine/7 gm glutamine/1.5 gm hmb) x1 packet bid to aid in wound healing. RD to continue to monitor nutrition status per protocol.   Recommend  [ ] Change Diet To:  [ ] Nutrition Supplement  [x ] Nutrition Support: allow double protein  [ ] Other:     Monitoring and Evaluation:   [X ] Intake [ x ] Tolerance to diet prescription [ x ] weights [ x ] labs[ x ] follow up per protocol  [ ] other:

## 2024-11-13 NOTE — PROGRESS NOTE ADULT - SUBJECTIVE AND OBJECTIVE BOX
Chief Complaint: Toe infection    Interval Events: No events overnight.    Review of Systems:  General: No fevers, chills, weight gain  Skin: No rashes, color changes  Cardiovascular: No chest pain, orthopnea  Respiratory: No shortness of breath, cough  Gastrointestinal: No nausea, abdominal pain  Genitourinary: No incontinence, pain with urination  Musculoskeletal: No pain, swelling, decreased range of motion  Neurological: No headache, weakness  Psychiatric: No depression, anxiety  Endocrine: No weight gain, increased thirst  All other systems are comprehensively negative.    Physical Exam:  Vital Signs Last 24 Hrs  T(C): 36.3 (13 Nov 2024 04:30), Max: 37.1 (12 Nov 2024 14:20)  T(F): 97.4 (13 Nov 2024 04:30), Max: 98.7 (12 Nov 2024 14:20)  HR: 86 (13 Nov 2024 04:30) (77 - 88)  BP: 173/90 (13 Nov 2024 04:30) (97/61 - 173/90)  BP(mean): 84 (12 Nov 2024 17:49) (67 - 84)  RR: 18 (13 Nov 2024 04:30) (12 - 22)  SpO2: 96% (13 Nov 2024 04:30) (91% - 100%)  Parameters below as of 13 Nov 2024 04:30  Patient On (Oxygen Delivery Method): room air  General: NAD  HEENT: MMM  Neck: No JVD, no carotid bruit  Lungs: CTAB  CV: RRR, nl S1/S2, no M/R/G  Abdomen: S/NT/ND, +BS  Extremities: No LE edema, no cyanosis  Neuro: AAOx3, non-focal  Skin: No rash    Labs:    11-12    139  |  104  |  47[H]  ----------------------------<  179[H]  4.4   |  27  |  2.56[H]    Ca    9.0      12 Nov 2024 07:56    TPro  6.5  /  Alb  2.8[L]  /  TBili  0.5  /  DBili  x   /  AST  43[H]  /  ALT  37  /  AlkPhos  115  11-12                        10.1   6.44  )-----------( 132      ( 12 Nov 2024 07:56 )             31.3

## 2024-11-13 NOTE — CASE MANAGEMENT PROGRESS NOTE - NSCMPROGRESSNOTE_GEN_ALL_CORE
Discussed during IDT.  Pt. s/p day 1 OR for debridement. Awaiting OR cultures. NWB on left foot. May need crutches upon d/c. Ambulated w/walker 5ft w/PT. CM remains available throughout stay.

## 2024-11-14 LAB
ALBUMIN SERPL ELPH-MCNC: 2.8 G/DL — LOW (ref 3.3–5)
ALP SERPL-CCNC: 98 U/L — SIGNIFICANT CHANGE UP (ref 30–120)
ALT FLD-CCNC: 38 U/L — SIGNIFICANT CHANGE UP (ref 10–60)
ANION GAP SERPL CALC-SCNC: 11 MMOL/L — SIGNIFICANT CHANGE UP (ref 5–17)
APTT BLD: 31.3 SEC — SIGNIFICANT CHANGE UP (ref 24.5–35.6)
ASO AB SER QL: 136 IU/ML — SIGNIFICANT CHANGE UP (ref 0–199)
AST SERPL-CCNC: 32 U/L — SIGNIFICANT CHANGE UP (ref 10–40)
BASOPHILS # BLD AUTO: 0.19 K/UL — SIGNIFICANT CHANGE UP (ref 0–0.2)
BASOPHILS NFR BLD AUTO: 1.9 % — SIGNIFICANT CHANGE UP (ref 0–2)
BILIRUB SERPL-MCNC: 0.4 MG/DL — SIGNIFICANT CHANGE UP (ref 0.2–1.2)
BUN SERPL-MCNC: 63 MG/DL — HIGH (ref 7–23)
C3 SERPL-MCNC: 113 MG/DL — SIGNIFICANT CHANGE UP (ref 81–157)
C4 SERPL-MCNC: 17 MG/DL — SIGNIFICANT CHANGE UP (ref 13–39)
CALCIUM SERPL-MCNC: 8.9 MG/DL — SIGNIFICANT CHANGE UP (ref 8.4–10.5)
CHLORIDE SERPL-SCNC: 104 MMOL/L — SIGNIFICANT CHANGE UP (ref 96–108)
CO2 SERPL-SCNC: 23 MMOL/L — SIGNIFICANT CHANGE UP (ref 22–31)
CREAT SERPL-MCNC: 2.7 MG/DL — HIGH (ref 0.5–1.3)
EGFR: 28 ML/MIN/1.73M2 — LOW
EOSINOPHIL # BLD AUTO: 0.19 K/UL — SIGNIFICANT CHANGE UP (ref 0–0.5)
EOSINOPHIL NFR BLD AUTO: 1.9 % — SIGNIFICANT CHANGE UP (ref 0–6)
GLUCOSE BLDC GLUCOMTR-MCNC: 224 MG/DL — HIGH (ref 70–99)
GLUCOSE BLDC GLUCOMTR-MCNC: 233 MG/DL — HIGH (ref 70–99)
GLUCOSE BLDC GLUCOMTR-MCNC: 235 MG/DL — HIGH (ref 70–99)
GLUCOSE BLDC GLUCOMTR-MCNC: 330 MG/DL — HIGH (ref 70–99)
GLUCOSE SERPL-MCNC: 238 MG/DL — HIGH (ref 70–99)
GRAM STN FLD: ABNORMAL
GRAM STN FLD: ABNORMAL
HCT VFR BLD CALC: 29.2 % — LOW (ref 39–50)
HCT VFR BLD CALC: 29.2 % — LOW (ref 39–50)
HGB BLD-MCNC: 9.3 G/DL — LOW (ref 13–17)
HGB BLD-MCNC: 9.5 G/DL — LOW (ref 13–17)
IMM GRANULOCYTES NFR BLD AUTO: 0.4 % — SIGNIFICANT CHANGE UP (ref 0–0.9)
INR BLD: 1.1 RATIO — SIGNIFICANT CHANGE UP (ref 0.85–1.16)
LYMPHOCYTES # BLD AUTO: 1.82 K/UL — SIGNIFICANT CHANGE UP (ref 1–3.3)
LYMPHOCYTES # BLD AUTO: 18.2 % — SIGNIFICANT CHANGE UP (ref 13–44)
MCHC RBC-ENTMCNC: 28 PG — SIGNIFICANT CHANGE UP (ref 27–34)
MCHC RBC-ENTMCNC: 28.7 PG — SIGNIFICANT CHANGE UP (ref 27–34)
MCHC RBC-ENTMCNC: 31.8 G/DL — LOW (ref 32–36)
MCHC RBC-ENTMCNC: 32.5 G/DL — SIGNIFICANT CHANGE UP (ref 32–36)
MCV RBC AUTO: 88 FL — SIGNIFICANT CHANGE UP (ref 80–100)
MCV RBC AUTO: 88.2 FL — SIGNIFICANT CHANGE UP (ref 80–100)
MONOCYTES # BLD AUTO: 0.96 K/UL — HIGH (ref 0–0.9)
MONOCYTES NFR BLD AUTO: 9.6 % — SIGNIFICANT CHANGE UP (ref 2–14)
NEUTROPHILS # BLD AUTO: 6.78 K/UL — SIGNIFICANT CHANGE UP (ref 1.8–7.4)
NEUTROPHILS NFR BLD AUTO: 68 % — SIGNIFICANT CHANGE UP (ref 43–77)
NRBC # BLD: 0 /100 WBCS — SIGNIFICANT CHANGE UP (ref 0–0)
NRBC # BLD: 0 /100 WBCS — SIGNIFICANT CHANGE UP (ref 0–0)
PLATELET # BLD AUTO: 121 K/UL — LOW (ref 150–400)
PLATELET # BLD AUTO: 133 K/UL — LOW (ref 150–400)
POTASSIUM SERPL-MCNC: 4.4 MMOL/L — SIGNIFICANT CHANGE UP (ref 3.5–5.3)
POTASSIUM SERPL-SCNC: 4.4 MMOL/L — SIGNIFICANT CHANGE UP (ref 3.5–5.3)
PROT SERPL-MCNC: 6.5 G/DL — SIGNIFICANT CHANGE UP (ref 6–8.3)
PROTHROM AB SERPL-ACNC: 12.8 SEC — SIGNIFICANT CHANGE UP (ref 9.9–13.4)
RBC # BLD: 3.31 M/UL — LOW (ref 4.2–5.8)
RBC # BLD: 3.32 M/UL — LOW (ref 4.2–5.8)
RBC # FLD: 13.7 % — SIGNIFICANT CHANGE UP (ref 10.3–14.5)
RBC # FLD: 13.8 % — SIGNIFICANT CHANGE UP (ref 10.3–14.5)
SODIUM SERPL-SCNC: 138 MMOL/L — SIGNIFICANT CHANGE UP (ref 135–145)
WBC # BLD: 9.44 K/UL — SIGNIFICANT CHANGE UP (ref 3.8–10.5)
WBC # BLD: 9.98 K/UL — SIGNIFICANT CHANGE UP (ref 3.8–10.5)
WBC # FLD AUTO: 9.44 K/UL — SIGNIFICANT CHANGE UP (ref 3.8–10.5)
WBC # FLD AUTO: 9.98 K/UL — SIGNIFICANT CHANGE UP (ref 3.8–10.5)

## 2024-11-14 PROCEDURE — 99232 SBSQ HOSP IP/OBS MODERATE 35: CPT

## 2024-11-14 RX ORDER — INSULIN GLARGINE 100 [IU]/ML
24 INJECTION, SOLUTION SUBCUTANEOUS AT BEDTIME
Refills: 0 | Status: DISCONTINUED | OUTPATIENT
Start: 2024-11-14 | End: 2024-11-15

## 2024-11-14 RX ORDER — VANCOMYCIN HCL 900 MCG/MG
1500 POWDER (GRAM) MISCELLANEOUS EVERY 12 HOURS
Refills: 0 | Status: DISCONTINUED | OUTPATIENT
Start: 2024-11-14 | End: 2024-11-16

## 2024-11-14 RX ADMIN — Medication 4: at 08:15

## 2024-11-14 RX ADMIN — Medication 1 TABLET(S): at 17:11

## 2024-11-14 RX ADMIN — Medication 1 TABLET(S): at 08:16

## 2024-11-14 RX ADMIN — AMLODIPINE BESYLATE 10 MILLIGRAM(S): 10 TABLET ORAL at 05:17

## 2024-11-14 RX ADMIN — GABAPENTIN 100 MILLIGRAM(S): 300 CAPSULE ORAL at 21:10

## 2024-11-14 RX ADMIN — Medication 100 MILLIGRAM(S): at 12:18

## 2024-11-14 RX ADMIN — GABAPENTIN 100 MILLIGRAM(S): 300 CAPSULE ORAL at 05:17

## 2024-11-14 RX ADMIN — Medication 300 MILLIGRAM(S): at 17:11

## 2024-11-14 RX ADMIN — Medication 8 UNIT(S): at 17:10

## 2024-11-14 RX ADMIN — PIPERACILLIN SODIUM AND TAZOBACTAM SODIUM 25 GRAM(S): 4; .5 INJECTION, POWDER, LYOPHILIZED, FOR SOLUTION INTRAVENOUS at 13:50

## 2024-11-14 RX ADMIN — ACETAMINOPHEN, DIPHENHYDRAMINE HCL, PHENYLEPHRINE HCL 5 MILLIGRAM(S): 325; 25; 5 TABLET ORAL at 21:10

## 2024-11-14 RX ADMIN — Medication 25 MILLIGRAM(S): at 21:10

## 2024-11-14 RX ADMIN — GABAPENTIN 100 MILLIGRAM(S): 300 CAPSULE ORAL at 14:34

## 2024-11-14 RX ADMIN — Medication 4: at 17:10

## 2024-11-14 RX ADMIN — Medication 7 UNIT(S): at 08:15

## 2024-11-14 RX ADMIN — LOSARTAN POTASSIUM 50 MILLIGRAM(S): 100 TABLET, FILM COATED ORAL at 05:18

## 2024-11-14 RX ADMIN — Medication 2: at 21:11

## 2024-11-14 RX ADMIN — Medication 4: at 12:30

## 2024-11-14 RX ADMIN — INSULIN GLARGINE 24 UNIT(S): 100 INJECTION, SOLUTION SUBCUTANEOUS at 21:11

## 2024-11-14 RX ADMIN — PIPERACILLIN SODIUM AND TAZOBACTAM SODIUM 25 GRAM(S): 4; .5 INJECTION, POWDER, LYOPHILIZED, FOR SOLUTION INTRAVENOUS at 05:18

## 2024-11-14 RX ADMIN — CARVEDILOL 6.25 MILLIGRAM(S): 25 TABLET, FILM COATED ORAL at 17:12

## 2024-11-14 RX ADMIN — CARVEDILOL 6.25 MILLIGRAM(S): 25 TABLET, FILM COATED ORAL at 05:17

## 2024-11-14 RX ADMIN — Medication 7 UNIT(S): at 12:21

## 2024-11-14 RX ADMIN — Medication 5000 UNIT(S): at 05:17

## 2024-11-14 NOTE — PROGRESS NOTE ADULT - SUBJECTIVE AND OBJECTIVE BOX
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 left hospital on11/4 against medical advice for his personal problem, The patient return for persistent infection, foul-smelling discharge.  Patient denies any chest pain, no shortness of breath.  No fever or chills.  No cough/URI.  No neck or back pain.  No numbness or tingling.  No focal weakness.  No aggravating or alleviating factors otherwise noted.  No other acute complaints. Patient states he sometimes drinks, states he did not drink today.  In ED afebrile, o2 sat 90 %, /99, ,patient is being admitted for further care, and podiatry and ID consult called (07 Nov 2024 17:57)      Vital Signs Last 24 Hrs  T(C): 37.1 (14 Nov 2024 13:17), Max: 37.1 (13 Nov 2024 20:51)  T(F): 98.7 (14 Nov 2024 13:17), Max: 98.7 (13 Nov 2024 20:51)  HR: 89 (14 Nov 2024 13:17) (73 - 92)  BP: 141/76 (14 Nov 2024 13:17) (137/87 - 148/89)  BP(mean): --  RR: 18 (14 Nov 2024 13:17) (17 - 18)  SpO2: 94% (14 Nov 2024 13:17) (93% - 96%)    Parameters below as of 14 Nov 2024 05:03  Patient On (Oxygen Delivery Method): room air                              9.3    9.98  )-----------( 121      ( 14 Nov 2024 08:31 )             29.2               11-14    138  |  104  |  63[H]  ----------------------------<  238[H]  4.4   |  23  |  2.70[H]    Ca    8.9      14 Nov 2024 08:31    TPro  6.5  /  Alb  2.8[L]  /  TBili  0.4  /  DBili  x   /  AST  32  /  ALT  38  /  AlkPhos  98  11-14      PPHYSICAL EXAM  POD#2 left foot s/p surgical Debridement of all non-viable soft tissue and bone, debridement of deep space infection of left foot with left 1st partial ray resection and packing all left foot. Strikethrough bleeding in dressing, noted active bleed. Drains intact.  The sutures are intact and the flap is viable at this time.  There is decreased erythema, edema and calor noted. Patient has guarded prognosis . Will cont to allow any further demarcation to occur and will closely monitor.  Pt  is responding favorable to the current care plan.  Sterile dressing change performed today with application of packing under sterile technique.   Vascular: Dorsalis Pedis and Posterior Tibial pulses palpable 2/4 bilaterally.  Capillary re-fill time less then 3 seconds digits 1-5 bilateral.  Mild-moderate edema bilaterally up to the ankle left foot   Neuro: Protective sensation diminished to the level of the digits bilateral.   MSK: Muscle strength 5/5 all major muscle groups bilateral.

## 2024-11-14 NOTE — PROGRESS NOTE ADULT - SUBJECTIVE AND OBJECTIVE BOX
Patient is a 49y old  Male who presents with a chief complaint of foot infection (14 Nov 2024 14:01)      INTERVAL HPI/OVERNIGHT EVENTS:  Overnight ---  Pt was seen today at bedside, no acute complaints. Denies fever, chills, abdominal pain, cough , sob, chest pain.     MEDICATIONS  (STANDING):  amLODIPine   Tablet 10 milliGRAM(s) Oral daily  carvedilol 6.25 milliGRAM(s) Oral every 12 hours  dextrose 5%. 1000 milliLiter(s) (50 mL/Hr) IV Continuous <Continuous>  dextrose 5%. 1000 milliLiter(s) (100 mL/Hr) IV Continuous <Continuous>  dextrose 50% Injectable 12.5 Gram(s) IV Push once  dextrose 50% Injectable 25 Gram(s) IV Push once  gabapentin 100 milliGRAM(s) Oral three times a day  glucagon  Injectable 1 milliGRAM(s) IntraMuscular once  heparin   Injectable 5000 Unit(s) SubCutaneous every 12 hours  insulin glargine Injectable (LANTUS) 24 Unit(s) SubCutaneous at bedtime  insulin lispro (ADMELOG) corrective regimen sliding scale   SubCutaneous three times a day before meals  insulin lispro (ADMELOG) corrective regimen sliding scale   SubCutaneous at bedtime  insulin lispro Injectable (ADMELOG) 8 Unit(s) SubCutaneous three times a day before meals  lactobacillus acidophilus 1 Tablet(s) Oral two times a day with meals  losartan 50 milliGRAM(s) Oral daily  sodium chloride 0.9%. 1000 milliLiter(s) (75 mL/Hr) IV Continuous <Continuous>  thiamine 100 milliGRAM(s) Oral daily    MEDICATIONS  (PRN):  acetaminophen     Tablet .. 650 milliGRAM(s) Oral every 6 hours PRN Temp greater or equal to 38C (100.4F), Mild Pain (1 - 3)  dextrose Oral Gel 15 Gram(s) Oral once PRN Blood Glucose LESS THAN 70 milliGRAM(s)/deciliter  diphenhydrAMINE 25 milliGRAM(s) Oral every 6 hours PRN Rash and/or Itching  guaiFENesin Oral Liquid (Sugar-Free) 100 milliGRAM(s) Oral every 6 hours PRN Cough  melatonin 5 milliGRAM(s) Oral at bedtime PRN Insomnia      Allergies    No Known Allergies    Intolerances        REVIEW OF SYSTEMS:  CONSTITUTIONAL: No fever, weight loss, or fatigue  EYES: No eye pain, visual disturbances, or discharge  ENMT:  No difficulty hearing, tinnitus, vertigo; No sinus or throat pain  NECK: No pain or stiffness  RESPIRATORY: No cough, wheezing, chills or hemoptysis; No shortness of breath  CARDIOVASCULAR: No chest pain, palpitations, lightheadedness, or leg swelling  GASTROINTESTINAL: No abdominal or epigastric pain. No nausea, vomiting, or hematemesis; No diarrhea or constipation. No melena or hematochezia.  GENITOURINARY: No dysuria, frequency, hematuria, or incontinence  NEUROLOGICAL: No headaches, memory loss, vertigo, loss of strength, numbness, or tremors  SKIN: No itching, burning, rashes, or lesions   MUSCULOSKELETAL: No joint pain or swelling; No muscle, back, or extremity pain  PSYCHIATRIC: No depression, anxiety, or mood swings        Vital Signs Last 24 Hrs  T(C): 37.1 (14 Nov 2024 13:17), Max: 37.1 (13 Nov 2024 20:51)  T(F): 98.7 (14 Nov 2024 13:17), Max: 98.7 (13 Nov 2024 20:51)  HR: 89 (14 Nov 2024 13:17) (73 - 89)  BP: 141/76 (14 Nov 2024 13:17) (137/87 - 148/89)  BP(mean): --  RR: 18 (14 Nov 2024 13:17) (17 - 18)  SpO2: 94% (14 Nov 2024 13:17) (93% - 96%)    Parameters below as of 14 Nov 2024 05:03  Patient On (Oxygen Delivery Method): room air        PHYSICAL EXAM:  GENERAL: NAD, well-groomed, well-developed  HEAD:  Atraumatic, Normocephalic  EYES: EOMI, PERRLA, conjunctiva and sclera clear  ENMT: Moist mucous membranes,   NECK: Supple, No JVD, Normal thyroid  NERVOUS SYSTEM:  Alert & Oriented X 3  CHEST/LUNG: Clear to auscultation bilaterally; No rales, rhonchi, wheezing, or rubs  HEART: Regular rate and rhythm; No murmurs, rubs, or gallops  ABDOMEN: Soft, Nontender, Nondistended; Bowel sounds present  EXTREMITIES:  2+ Peripheral Pulses, No clubbing, cyanosis, or edema  SKIN: No rashes or lesions    LABS:                        9.3    9.98  )-----------( 121      ( 14 Nov 2024 08:31 )             29.2     14 Nov 2024 08:31    138    |  104    |  63     ----------------------------<  238    4.4     |  23     |  2.70     Ca    8.9        14 Nov 2024 08:31    TPro  6.5    /  Alb  2.8    /  TBili  0.4    /  DBili  x      /  AST  32     /  ALT  38     /  AlkPhos  98     14 Nov 2024 08:31      Urinalysis Basic - ( 14 Nov 2024 08:31 )    Color: x / Appearance: x / SG: x / pH: x  Gluc: 238 mg/dL / Ketone: x  / Bili: x / Urobili: x   Blood: x / Protein: x / Nitrite: x   Leuk Esterase: x / RBC: x / WBC x   Sq Epi: x / Non Sq Epi: x / Bacteria: x      CAPILLARY BLOOD GLUCOSE      POCT Blood Glucose.: 233 mg/dL (14 Nov 2024 11:59)  POCT Blood Glucose.: 235 mg/dL (14 Nov 2024 08:09)  POCT Blood Glucose.: 305 mg/dL (13 Nov 2024 21:02)  POCT Blood Glucose.: 286 mg/dL (13 Nov 2024 16:36)         Patient is a 49y old  Male who presents with a chief complaint of foot infection (14 Nov 2024 14:01)      INTERVAL HPI/OVERNIGHT EVENTS:  Overnight nothing significant happened.  Pt was seen today at bedside, no acute complaints. Denies fever, chills, abdominal pain, cough , sob, chest pain.     MEDICATIONS  (STANDING):  amLODIPine   Tablet 10 milliGRAM(s) Oral daily  carvedilol 6.25 milliGRAM(s) Oral every 12 hours  dextrose 5%. 1000 milliLiter(s) (50 mL/Hr) IV Continuous <Continuous>  dextrose 5%. 1000 milliLiter(s) (100 mL/Hr) IV Continuous <Continuous>  dextrose 50% Injectable 12.5 Gram(s) IV Push once  dextrose 50% Injectable 25 Gram(s) IV Push once  gabapentin 100 milliGRAM(s) Oral three times a day  glucagon  Injectable 1 milliGRAM(s) IntraMuscular once  heparin   Injectable 5000 Unit(s) SubCutaneous every 12 hours  insulin glargine Injectable (LANTUS) 24 Unit(s) SubCutaneous at bedtime  insulin lispro (ADMELOG) corrective regimen sliding scale   SubCutaneous three times a day before meals  insulin lispro (ADMELOG) corrective regimen sliding scale   SubCutaneous at bedtime  insulin lispro Injectable (ADMELOG) 8 Unit(s) SubCutaneous three times a day before meals  lactobacillus acidophilus 1 Tablet(s) Oral two times a day with meals  losartan 50 milliGRAM(s) Oral daily  sodium chloride 0.9%. 1000 milliLiter(s) (75 mL/Hr) IV Continuous <Continuous>  thiamine 100 milliGRAM(s) Oral daily    MEDICATIONS  (PRN):  acetaminophen     Tablet .. 650 milliGRAM(s) Oral every 6 hours PRN Temp greater or equal to 38C (100.4F), Mild Pain (1 - 3)  dextrose Oral Gel 15 Gram(s) Oral once PRN Blood Glucose LESS THAN 70 milliGRAM(s)/deciliter  diphenhydrAMINE 25 milliGRAM(s) Oral every 6 hours PRN Rash and/or Itching  guaiFENesin Oral Liquid (Sugar-Free) 100 milliGRAM(s) Oral every 6 hours PRN Cough  melatonin 5 milliGRAM(s) Oral at bedtime PRN Insomnia      Allergies    No Known Allergies    Intolerances        REVIEW OF SYSTEMS:  CONSTITUTIONAL: No fever, weight loss, or fatigue  EYES: No eye pain, visual disturbances, or discharge  ENMT:  No difficulty hearing, tinnitus, vertigo; No sinus or throat pain  NECK: No pain or stiffness  RESPIRATORY: No cough, wheezing, chills or hemoptysis; No shortness of breath  CARDIOVASCULAR: No chest pain, palpitations, lightheadedness, or leg swelling  GASTROINTESTINAL: No abdominal or epigastric pain. No nausea, vomiting, or hematemesis; No diarrhea or constipation. No melena or hematochezia.  GENITOURINARY: No dysuria, frequency, hematuria, or incontinence  NEUROLOGICAL: No headaches, memory loss, vertigo, loss of strength, numbness, or tremors  SKIN: No itching, burning, rashes, or lesions   MUSCULOSKELETAL: No joint pain or swelling; No muscle, back, or extremity pain  PSYCHIATRIC: No depression, anxiety, or mood swings        Vital Signs Last 24 Hrs  T(C): 37.1 (14 Nov 2024 13:17), Max: 37.1 (13 Nov 2024 20:51)  T(F): 98.7 (14 Nov 2024 13:17), Max: 98.7 (13 Nov 2024 20:51)  HR: 89 (14 Nov 2024 13:17) (73 - 89)  BP: 141/76 (14 Nov 2024 13:17) (137/87 - 148/89)  BP(mean): --  RR: 18 (14 Nov 2024 13:17) (17 - 18)  SpO2: 94% (14 Nov 2024 13:17) (93% - 96%)    Parameters below as of 14 Nov 2024 05:03  Patient On (Oxygen Delivery Method): room air        PHYSICAL EXAM:  GENERAL: NAD, well-groomed, well-developed  HEAD:  Atraumatic, Normocephalic  EYES: EOMI, PERRLA, conjunctiva and sclera clear  ENMT: Moist mucous membranes,   NECK: Supple, No JVD, Normal thyroid  NERVOUS SYSTEM:  Alert & Oriented X 3  CHEST/LUNG: Clear to auscultation bilaterally; No rales, rhonchi, wheezing, or rubs  HEART: Regular rate and rhythm; No murmurs, rubs, or gallops  ABDOMEN: Soft, Nontender, Nondistended; Bowel sounds present  EXTREMITIES:  2+ Peripheral Pulses, No clubbing, cyanosis, or edema, left foot covered in bandage   SKIN: No rashes or lesions    LABS:                        9.3    9.98  )-----------( 121      ( 14 Nov 2024 08:31 )             29.2     14 Nov 2024 08:31    138    |  104    |  63     ----------------------------<  238    4.4     |  23     |  2.70     Ca    8.9        14 Nov 2024 08:31    TPro  6.5    /  Alb  2.8    /  TBili  0.4    /  DBili  x      /  AST  32     /  ALT  38     /  AlkPhos  98     14 Nov 2024 08:31      Urinalysis Basic - ( 14 Nov 2024 08:31 )    Color: x / Appearance: x / SG: x / pH: x  Gluc: 238 mg/dL / Ketone: x  / Bili: x / Urobili: x   Blood: x / Protein: x / Nitrite: x   Leuk Esterase: x / RBC: x / WBC x   Sq Epi: x / Non Sq Epi: x / Bacteria: x      CAPILLARY BLOOD GLUCOSE      POCT Blood Glucose.: 233 mg/dL (14 Nov 2024 11:59)  POCT Blood Glucose.: 235 mg/dL (14 Nov 2024 08:09)  POCT Blood Glucose.: 305 mg/dL (13 Nov 2024 21:02)  POCT Blood Glucose.: 286 mg/dL (13 Nov 2024 16:36)

## 2024-11-14 NOTE — PROGRESS NOTE ADULT - SUBJECTIVE AND OBJECTIVE BOX
Subjective: no complaints.       MEDICATIONS  (STANDING):  amLODIPine   Tablet 10 milliGRAM(s) Oral daily  carvedilol 6.25 milliGRAM(s) Oral every 12 hours  dextrose 5%. 1000 milliLiter(s) (100 mL/Hr) IV Continuous <Continuous>  dextrose 5%. 1000 milliLiter(s) (50 mL/Hr) IV Continuous <Continuous>  dextrose 50% Injectable 12.5 Gram(s) IV Push once  dextrose 50% Injectable 25 Gram(s) IV Push once  dextrose 50% Injectable 25 Gram(s) IV Push once  gabapentin 100 milliGRAM(s) Oral three times a day  glucagon  Injectable 1 milliGRAM(s) IntraMuscular once  heparin   Injectable 5000 Unit(s) SubCutaneous every 12 hours  insulin glargine Injectable (LANTUS) 20 Unit(s) SubCutaneous at bedtime  insulin lispro (ADMELOG) corrective regimen sliding scale   SubCutaneous at bedtime  insulin lispro (ADMELOG) corrective regimen sliding scale   SubCutaneous three times a day before meals  insulin lispro Injectable (ADMELOG) 7 Unit(s) SubCutaneous before dinner  insulin lispro Injectable (ADMELOG) 7 Unit(s) SubCutaneous before lunch  insulin lispro Injectable (ADMELOG) 7 Unit(s) SubCutaneous before breakfast  lactobacillus acidophilus 1 Tablet(s) Oral two times a day with meals  losartan 50 milliGRAM(s) Oral daily  piperacillin/tazobactam IVPB.. 3.375 Gram(s) IV Intermittent every 8 hours  sodium chloride 0.9%. 1000 milliLiter(s) (75 mL/Hr) IV Continuous <Continuous>  thiamine 100 milliGRAM(s) Oral daily    MEDICATIONS  (PRN):  acetaminophen     Tablet .. 650 milliGRAM(s) Oral every 6 hours PRN Temp greater or equal to 38C (100.4F), Mild Pain (1 - 3)  dextrose Oral Gel 15 Gram(s) Oral once PRN Blood Glucose LESS THAN 70 milliGRAM(s)/deciliter  diphenhydrAMINE 25 milliGRAM(s) Oral every 6 hours PRN Rash and/or Itching  guaiFENesin Oral Liquid (Sugar-Free) 100 milliGRAM(s) Oral every 6 hours PRN Cough  melatonin 5 milliGRAM(s) Oral at bedtime PRN Insomnia          T(C): 36.4 (11-14-24 @ 05:03), Max: 37.1 (11-13-24 @ 20:51)  HR: 73 (11-14-24 @ 05:03) (73 - 92)  BP: 148/89 (11-14-24 @ 05:03) (137/87 - 148/89)  RR: 17 (11-14-24 @ 05:03) (17 - 18)  SpO2: 93% (11-14-24 @ 05:03) (93% - 96%)  Wt(kg): --        I&O's Detail    13 Nov 2024 07:01  -  14 Nov 2024 07:00  --------------------------------------------------------  IN:    IV PiggyBack: 200 mL    sodium chloride 0.9%: 1050 mL  Total IN: 1250 mL    OUT:    Voided (mL): 900 mL  Total OUT: 900 mL    Total NET: 350 mL               PHYSICAL EXAM:    GENERAL: NAD  NECK: Supple, no inc in JVP  CHEST/LUNG: Clear  HEART: S1S2  ABDOMEN: Soft, Nontender, Nondistended; Bowel sounds present  EXTREMITIES:  trace edema. L foot dressed  NEURO: no asterixis        LABS:  CBC Full  -  ( 13 Nov 2024 11:45 )  WBC Count : 14.71 K/uL  RBC Count : 3.98 M/uL  Hemoglobin : 11.3 g/dL  Hematocrit : 34.6 %  Platelet Count - Automated : 129 K/uL  Mean Cell Volume : 86.9 fL  Mean Cell Hemoglobin : 28.4 pg  Mean Cell Hemoglobin Concentration : 32.7 g/dL  Auto Neutrophil # : 11.83 K/uL  Auto Lymphocyte # : 1.26 K/uL  Auto Monocyte # : 1.43 K/uL  Auto Eosinophil # : 0.01 K/uL  Auto Basophil # : 0.09 K/uL  Auto Neutrophil % : 80.4 %  Auto Lymphocyte % : 8.6 %  Auto Monocyte % : 9.7 %  Auto Eosinophil % : 0.1 %  Auto Basophil % : 0.6 %    11-13    132[L]  |  99  |  60[H]  ----------------------------<  344[H]  4.6   |  22  |  2.95[H]    Ca    9.8      13 Nov 2024 11:45    TPro  7.8  /  Alb  3.1[L]  /  TBili  0.7  /  DBili  x   /  AST  47[H]  /  ALT  45  /  AlkPhos  131[H]  11-13        Impression:  CKD 3, Diabetic nephropathy with some worsening last few days.  At risk for PIGN, ATIN  Diabetes, DFU, recurrent  Hypertension  HypoNa -- pseudo    Recommend:  Follow response to saline  Consider discontinuation of the ARB if Cr rises some more  Follow complements, ASO titer  Tight glycemic control.   Daily BMP

## 2024-11-14 NOTE — SOCIAL WORK PROGRESS NOTE - NSSWPROGRESSNOTE_GEN_ALL_CORE
Per discussion w/ inpatient interdisciplinary treatment team this AM, patient is not yet medically cleared for transition to next level of care as OR cultures are still pending. Unit RN Niss & this  met w/ patient @ bedside on [facility] for discussion of potential discharge plan.  explained that a referral can be made to sub-acute rehab if patient needs to be discharged on IV antibiotics, but that an authorization would need to be secured from [employer], and not as many rehabs are in-network w/ [employer] as are w/ straight Medicare or Managed Medicare.  identified that a referral can be made today, however rehabs may not be willing to make an admission decision until they know exactly which IV antibiotics are needed on discharge. Patient agreed to await results of OR cultures to determine a more definitive discharge plan. Will continue to follow.

## 2024-11-14 NOTE — PROGRESS NOTE ADULT - ASSESSMENT
#Diabetic foot ulcer with cellullitis with ac on ch osteomyelitis left foot  CT- consistent with OM of first Toe left  Podiatry consult noted   On 11/12 had amputation , debulking and flap on left foot by Dr Finnegan  Cont abx as per ID  Follow up cultures   Monitor fever and WBC curve      DM2 with hyperglycemia, with neuropathy , nephropathy with likely PVD    FS + DOROTHY  Increase Lantus to 24 units   Increase lispro to 8 units 3 times a day       HTN  Continue Losartan 50 mg , amlodipine 10 mg, coreg 6.25 bid    CKD 3 likely Diabetic nephropathy   Nephro following      ETOH use disorder   Cessation counselling   Folic acid, thiamine, lorazepam with CIWA protocol      Obesity    EDUAR   O2 as needed    Anemia of ch disease  Monitor CBC     DVT prophylaxis   heparin  #Diabetic foot ulcer with cellulitis with acute on chronic osteomyelitis left foot  CT- consistent with OM of first Toe left  Podiatry consult noted   On 11/12 had amputation , debulking and flap on left foot by Dr Finnegan  Cont abx as per ID  Follow up cultures   Monitor fever and WBC curve      DM2 with hyperglycemia, with neuropathy , nephropathy with likely PVD    FS + DOROTHY  Increase Lantus to 24 units   Increase lispro to 8 units 3 times a day       HTN  Continue Losartan 50 mg , amlodipine 10 mg, coreg 6.25 bid    CKD 3 likely Diabetic nephropathy   Nephro following      ETOH use disorder   Cessation counselling   Folic acid, thiamine, lorazepam with CIWA protocol      Obesity    EDUAR   O2 as needed    Anemia of ch disease  Monitor CBC     DVT prophylaxis   heparin

## 2024-11-14 NOTE — PROGRESS NOTE ADULT - SUBJECTIVE AND OBJECTIVE BOX
Chief Complaint: Toe infection    Interval Events: No events overnight.    Review of Systems:  General: No fevers, chills, weight gain  Skin: No rashes, color changes  Cardiovascular: No chest pain, orthopnea  Respiratory: No shortness of breath, cough  Gastrointestinal: No nausea, abdominal pain  Genitourinary: No incontinence, pain with urination  Musculoskeletal: No pain, swelling, decreased range of motion  Neurological: No headache, weakness  Psychiatric: No depression, anxiety  Endocrine: No weight gain, increased thirst  All other systems are comprehensively negative.    Physical Exam:  Vital Signs Last 24 Hrs  T(C): 36.4 (14 Nov 2024 05:03), Max: 37.1 (13 Nov 2024 20:51)  T(F): 97.5 (14 Nov 2024 05:03), Max: 98.7 (13 Nov 2024 20:51)  HR: 73 (14 Nov 2024 05:03) (73 - 92)  BP: 148/89 (14 Nov 2024 05:03) (137/87 - 148/89)  BP(mean): --  RR: 17 (14 Nov 2024 05:03) (17 - 18)  SpO2: 93% (14 Nov 2024 05:03) (93% - 96%)  Parameters below as of 14 Nov 2024 05:03  Patient On (Oxygen Delivery Method): room air  General: NAD  HEENT: MMM  Neck: No JVD, no carotid bruit  Lungs: CTAB  CV: RRR, nl S1/S2, no M/R/G  Abdomen: S/NT/ND, +BS  Extremities: No LE edema, no cyanosis  Neuro: AAOx3, non-focal  Skin: No rash    Labs:    11-14    138  |  104  |  63[H]  ----------------------------<  238[H]  4.4   |  23  |  2.70[H]    Ca    8.9      14 Nov 2024 08:31    TPro  6.5  /  Alb  2.8[L]  /  TBili  0.4  /  DBili  x   /  AST  32  /  ALT  38  /  AlkPhos  98  11-14                        9.3    9.98  )-----------( 121      ( 14 Nov 2024 08:31 )             29.2

## 2024-11-14 NOTE — PROGRESS NOTE ADULT - SUBJECTIVE AND OBJECTIVE BOX
ANUJASUZANNA HURLEY is a 49yMale , patient examined and chart reviewed.     INTERVAL HPI/ OVERNIGHT EVENTS:  Afebrile NAD.  Bleeding at surgical site.    PAST MEDICAL & SURGICAL HISTORY:  Prediabetes  HTN (hypertension)  HLD (hyperlipidemia)  DM2 (diabetes mellitus, type 2)    For details regarding the patient's social history, family history, and other miscellaneous elements, please refer the initial infectious diseases consultation and/or the admitting history and physical examination for this admission.    ROS:  CONSTITUTIONAL:  Negative fever or chills  EYES:  Negative  blurry vision or double vision  CARDIOVASCULAR:  Negative for chest pain or palpitations  RESPIRATORY:  Negative for cough, wheezing, or SOB   GASTROINTESTINAL:  Negative for nausea, vomiting, diarrhea, constipation, or abdominal pain  GENITOURINARY:  Negative frequency, urgency or dysuria  NEUROLOGIC:  No headache, confusion, dizziness, lightheadedness  All other systems were reviewed and are negative     No Known Allergies      Current inpatient medications :    ANTIBIOTICS/RELEVANT:  piperacillin/tazobactam IVPB.. 3.375 Gram(s) IV Intermittent every 8 hours    MEDICATIONS  (STANDING):  amLODIPine   Tablet 10 milliGRAM(s) Oral daily  carvedilol 6.25 milliGRAM(s) Oral every 12 hours  dextrose 5%. 1000 milliLiter(s) (50 mL/Hr) IV Continuous <Continuous>  dextrose 5%. 1000 milliLiter(s) (100 mL/Hr) IV Continuous <Continuous>  dextrose 50% Injectable 12.5 Gram(s) IV Push once  dextrose 50% Injectable 25 Gram(s) IV Push once  gabapentin 100 milliGRAM(s) Oral three times a day  glucagon  Injectable 1 milliGRAM(s) IntraMuscular once  heparin   Injectable 5000 Unit(s) SubCutaneous every 12 hours  insulin glargine Injectable (LANTUS) 24 Unit(s) SubCutaneous at bedtime  insulin lispro (ADMELOG) corrective regimen sliding scale   SubCutaneous three times a day before meals  insulin lispro (ADMELOG) corrective regimen sliding scale   SubCutaneous at bedtime  insulin lispro Injectable (ADMELOG) 8 Unit(s) SubCutaneous three times a day before meals  lactobacillus acidophilus 1 Tablet(s) Oral two times a day with meals  losartan 50 milliGRAM(s) Oral daily  sodium chloride 0.9%. 1000 milliLiter(s) (75 mL/Hr) IV Continuous <Continuous>  thiamine 100 milliGRAM(s) Oral daily    MEDICATIONS  (PRN):  acetaminophen     Tablet .. 650 milliGRAM(s) Oral every 6 hours PRN Temp greater or equal to 38C (100.4F), Mild Pain (1 - 3)  dextrose Oral Gel 15 Gram(s) Oral once PRN Blood Glucose LESS THAN 70 milliGRAM(s)/deciliter  diphenhydrAMINE 25 milliGRAM(s) Oral every 6 hours PRN Rash and/or Itching  guaiFENesin Oral Liquid (Sugar-Free) 100 milliGRAM(s) Oral every 6 hours PRN Cough  melatonin 5 milliGRAM(s) Oral at bedtime PRN Insomnia      Objective:  Vital Signs Last 24 Hrs  T(C): 36.3 (13 Nov 2024 04:30), Max: 37.1 (12 Nov 2024 14:20)  T(F): 97.4 (13 Nov 2024 04:30), Max: 98.7 (12 Nov 2024 14:20)  HR: 86 (13 Nov 2024 04:30) (77 - 86)  BP: 173/90 (13 Nov 2024 04:30) (97/61 - 173/90)  BP(mean): 84 (12 Nov 2024 17:49) (67 - 84)  RR: 18 (13 Nov 2024 04:30) (12 - 22)  SpO2: 96% (13 Nov 2024 04:30) (91% - 100%)    Parameters below as of 13 Nov 2024 04:30  Patient On (Oxygen Delivery Method): room air      Physical Exam:  General: no acute distress  Neck: supple, trachea midline  Lungs: clear, no wheeze/rhonchi  Cardiovascular: regular rate and rhythm, S1 S2  Abdomen: soft, nontender,  bowel sounds normal  Neurological: alert and oriented x3  Skin: no rash  Extremities: Left foot drsg c/d/i      LABS:                        10.1   6.44  )-----------( 132      ( 12 Nov 2024 07:56 )             31.3   11-12    139  |  104  |  47[H]  ----------------------------<  179[H]  4.4   |  27  |  2.56[H]    Ca    9.0      12 Nov 2024 07:56    TPro  6.5  /  Alb  2.8[L]  /  TBili  0.5  /  DBili  x   /  AST  43[H]  /  ALT  37  /  AlkPhos  115  11-12      MICROBIOLOGY:  Culture - Tissue with Gram Stain (collected 12 Nov 2024 18:21)  Source: Bone  Gram Stain (14 Nov 2024 14:18):    No polymorphonuclear leukocytes seen per low power field    No organisms seen per oil power field  Preliminary Report (14 Nov 2024 14:18):    Rare Staphylococcus epidermidis    Culture - Tissue with Gram Stain (collected 12 Nov 2024 18:21)  Source: Tissue  Gram Stain (14 Nov 2024 14:21):    No polymorphonuclear leukocytes seen per low power field    No organisms seen per oil power field  Preliminary Report (14 Nov 2024 14:21):    Rare Staphylococcus simulans    Culture - Wound Aerobic (collected 12 Nov 2024 18:21)  Source: .Other  Preliminary Report (14 Nov 2024 13:25):    Rare Staphylococcus simulans "Susceptibilities not performed"    Culture - Wound Aerobic/Anaerobic (collected 12 Nov 2024 18:21)  Source: .Surgical Swab  Preliminary Report (14 Nov 2024 13:07):    Commensal nicholas consistent with body site    Culture - Blood (collected 01 Nov 2024 20:00)  Source: .Blood BLOOD  Preliminary Report (04 Nov 2024 01:02):    No growth at 48 Hours    Culture - Blood (collected 01 Nov 2024 20:00)  Source: .Blood BLOOD  Preliminary Report (04 Nov 2024 01:02):    No growth at 48 Hours    RADIOLOGY & ADDITIONAL STUDIES:      Assessment :  49-year-old male with  DM2, HTN, HLD, NEUROPATHY, PVD,ckd4, ETOH abuse, Diabetic foot ulcer with cellullitis ,ac on ch OM 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  He has been on amoxicillin twice daily and states he has been compliant with his antibiotic.  Patient noted that his surgical site had developed a bad odor with some discharge.  Left foot with exposed metatarsal head.    Sp Amputation, foot, first ray Debulking, flap, foot 12-Nov-2024  OR cultures with CNS  Bleeding at surgical site  Podiatry follow up noted- may need to go back to OR    Plan :   Change Zosyn to Vanc  Fu OR cultures path  Trend temps and cbc  Podiatry on case  Stable from ID standpoint    Continue with present regiment.  Appropriate use of antibiotics and adverse effects reviewed.      > 35 minutes were spent in direct patient care reviewing notes, medications ,labs data/ imaging , discussion with multidisciplinary team.    Thank you for allowing me to participate in care of your patient .    Linsey Mack MD  Infectious Disease  [phone]

## 2024-11-14 NOTE — PROGRESS NOTE ADULT - PROBLEM SELECTOR PLAN 1
Patient seen and evaluated  Discussed diagnosis and treatment with patient.  Patient is s/p surgical debridement of all non-viable soft tissue and bone left foot with partial 1st ray resection and packing all left foot . DOS 11/12/2024   Upon dressing change, arterial bleed was noted.  Packing was removed, followed by pressure to surgical site, and re-application of 1 inch packing under sterile technique.  Prognosis is guarded at this time due to severity of the condition   Pending OR WCx and bone pathology   Continue IV Abx and follow ID recommendations    Keep the postop dressing clean/dry/intact   Non weight bearing on left foot   Recs elevation on left lower extremities   Pt may require serial surgical debridements/amputation and possible BKA if symptoms persist.    radiographs reviewed and noted all operative regions in good position   Pt understands the severity of his condition and  that he is at risk to lose part of the left foot, all the foot or below the knee amputation. All questions were asked and answered for patient satisfaction    Medical management as per primary team   Will cont to monitor and discuss all options with patient and all attendings. Patient seen and evaluated  Discussed diagnosis and treatment with patient.  Patient is s/p surgical debridement of all non-viable soft tissue and bone left foot with partial 1st ray resection and packing all left foot . DOS 11/12/2024   Upon dressing change, arterial bleed was noted.  Packing was removed, followed by pressure to surgical site, and re-application of 1 inch packing under sterile technique, noted was controlled hemostasis. Will leave the packing for 2 days.  If patient has continued uncontrollable hemostasis, will plan to go to OR for surgical exploration to address uncontrolled hemostasis.  Prognosis is guarded at this time due to severity of the condition   CBC, PT/PTT ordered  Pending OR WCx and bone pathology   Continue IV Abx and follow ID recommendations    Keep the postop dressing clean/dry/intact   Non weight bearing on left foot   Recs elevation on left lower extremities   Pt may require serial surgical debridements/amputation and possible BKA if symptoms persist.    radiographs reviewed and noted all operative regions in good position   Pt understands the severity of his condition and  that he is at risk to lose part of the left foot, all the foot or below the knee amputation. All questions were asked and answered for patient satisfaction    Medical management as per primary team   Will cont to monitor and discuss all options with patient and all attendings.

## 2024-11-15 LAB
-  CLINDAMYCIN: SIGNIFICANT CHANGE UP
-  CLINDAMYCIN: SIGNIFICANT CHANGE UP
-  ERYTHROMYCIN: SIGNIFICANT CHANGE UP
-  ERYTHROMYCIN: SIGNIFICANT CHANGE UP
-  GENTAMICIN: SIGNIFICANT CHANGE UP
-  GENTAMICIN: SIGNIFICANT CHANGE UP
-  OXACILLIN: SIGNIFICANT CHANGE UP
-  OXACILLIN: SIGNIFICANT CHANGE UP
-  PENICILLIN: SIGNIFICANT CHANGE UP
-  RIFAMPIN: SIGNIFICANT CHANGE UP
-  RIFAMPIN: SIGNIFICANT CHANGE UP
-  TETRACYCLINE: SIGNIFICANT CHANGE UP
-  TETRACYCLINE: SIGNIFICANT CHANGE UP
-  TRIMETHOPRIM/SULFAMETHOXAZOLE: SIGNIFICANT CHANGE UP
-  TRIMETHOPRIM/SULFAMETHOXAZOLE: SIGNIFICANT CHANGE UP
-  VANCOMYCIN: SIGNIFICANT CHANGE UP
-  VANCOMYCIN: SIGNIFICANT CHANGE UP
ANION GAP SERPL CALC-SCNC: 9 MMOL/L — SIGNIFICANT CHANGE UP (ref 5–17)
BASOPHILS # BLD AUTO: 0.12 K/UL — SIGNIFICANT CHANGE UP (ref 0–0.2)
BASOPHILS NFR BLD AUTO: 1.7 % — SIGNIFICANT CHANGE UP (ref 0–2)
BUN SERPL-MCNC: 58 MG/DL — HIGH (ref 7–23)
CALCIUM SERPL-MCNC: 9 MG/DL — SIGNIFICANT CHANGE UP (ref 8.4–10.5)
CHLORIDE SERPL-SCNC: 104 MMOL/L — SIGNIFICANT CHANGE UP (ref 96–108)
CO2 SERPL-SCNC: 25 MMOL/L — SIGNIFICANT CHANGE UP (ref 22–31)
CREAT SERPL-MCNC: 2.55 MG/DL — HIGH (ref 0.5–1.3)
CULTURE RESULTS: ABNORMAL
EGFR: 30 ML/MIN/1.73M2 — LOW
EOSINOPHIL # BLD AUTO: 0.24 K/UL — SIGNIFICANT CHANGE UP (ref 0–0.5)
EOSINOPHIL NFR BLD AUTO: 3.3 % — SIGNIFICANT CHANGE UP (ref 0–6)
GLUCOSE BLDC GLUCOMTR-MCNC: 196 MG/DL — HIGH (ref 70–99)
GLUCOSE BLDC GLUCOMTR-MCNC: 203 MG/DL — HIGH (ref 70–99)
GLUCOSE BLDC GLUCOMTR-MCNC: 224 MG/DL — HIGH (ref 70–99)
GLUCOSE BLDC GLUCOMTR-MCNC: 373 MG/DL — HIGH (ref 70–99)
GLUCOSE SERPL-MCNC: 204 MG/DL — HIGH (ref 70–99)
HCT VFR BLD CALC: 27.5 % — LOW (ref 39–50)
HGB BLD-MCNC: 8.7 G/DL — LOW (ref 13–17)
IMM GRANULOCYTES NFR BLD AUTO: 0.7 % — SIGNIFICANT CHANGE UP (ref 0–0.9)
LYMPHOCYTES # BLD AUTO: 1.52 K/UL — SIGNIFICANT CHANGE UP (ref 1–3.3)
LYMPHOCYTES # BLD AUTO: 21.1 % — SIGNIFICANT CHANGE UP (ref 13–44)
MCHC RBC-ENTMCNC: 28.1 PG — SIGNIFICANT CHANGE UP (ref 27–34)
MCHC RBC-ENTMCNC: 31.6 G/DL — LOW (ref 32–36)
MCV RBC AUTO: 88.7 FL — SIGNIFICANT CHANGE UP (ref 80–100)
METHOD TYPE: SIGNIFICANT CHANGE UP
METHOD TYPE: SIGNIFICANT CHANGE UP
MONOCYTES # BLD AUTO: 0.9 K/UL — SIGNIFICANT CHANGE UP (ref 0–0.9)
MONOCYTES NFR BLD AUTO: 12.5 % — SIGNIFICANT CHANGE UP (ref 2–14)
NEUTROPHILS # BLD AUTO: 4.39 K/UL — SIGNIFICANT CHANGE UP (ref 1.8–7.4)
NEUTROPHILS NFR BLD AUTO: 60.7 % — SIGNIFICANT CHANGE UP (ref 43–77)
NRBC # BLD: 0 /100 WBCS — SIGNIFICANT CHANGE UP (ref 0–0)
PLATELET # BLD AUTO: 119 K/UL — LOW (ref 150–400)
POTASSIUM SERPL-MCNC: 4.7 MMOL/L — SIGNIFICANT CHANGE UP (ref 3.5–5.3)
POTASSIUM SERPL-SCNC: 4.7 MMOL/L — SIGNIFICANT CHANGE UP (ref 3.5–5.3)
RBC # BLD: 3.1 M/UL — LOW (ref 4.2–5.8)
RBC # FLD: 13.6 % — SIGNIFICANT CHANGE UP (ref 10.3–14.5)
SODIUM SERPL-SCNC: 138 MMOL/L — SIGNIFICANT CHANGE UP (ref 135–145)
SPECIMEN SOURCE: SIGNIFICANT CHANGE UP
WBC # BLD: 7.22 K/UL — SIGNIFICANT CHANGE UP (ref 3.8–10.5)
WBC # FLD AUTO: 7.22 K/UL — SIGNIFICANT CHANGE UP (ref 3.8–10.5)

## 2024-11-15 PROCEDURE — 99232 SBSQ HOSP IP/OBS MODERATE 35: CPT

## 2024-11-15 RX ORDER — INSULIN GLARGINE 100 [IU]/ML
28 INJECTION, SOLUTION SUBCUTANEOUS AT BEDTIME
Refills: 0 | Status: DISCONTINUED | OUTPATIENT
Start: 2024-11-15 | End: 2024-11-16

## 2024-11-15 RX ADMIN — CARVEDILOL 6.25 MILLIGRAM(S): 25 TABLET, FILM COATED ORAL at 05:20

## 2024-11-15 RX ADMIN — ACETAMINOPHEN, DIPHENHYDRAMINE HCL, PHENYLEPHRINE HCL 5 MILLIGRAM(S): 325; 25; 5 TABLET ORAL at 23:07

## 2024-11-15 RX ADMIN — Medication 2: at 17:15

## 2024-11-15 RX ADMIN — INSULIN GLARGINE 28 UNIT(S): 100 INJECTION, SOLUTION SUBCUTANEOUS at 21:05

## 2024-11-15 RX ADMIN — Medication 1 TABLET(S): at 17:16

## 2024-11-15 RX ADMIN — Medication 300 MILLIGRAM(S): at 17:14

## 2024-11-15 RX ADMIN — ACETAMINOPHEN 500MG 650 MILLIGRAM(S): 500 TABLET, COATED ORAL at 05:50

## 2024-11-15 RX ADMIN — Medication 300 MILLIGRAM(S): at 05:24

## 2024-11-15 RX ADMIN — GABAPENTIN 100 MILLIGRAM(S): 300 CAPSULE ORAL at 13:52

## 2024-11-15 RX ADMIN — LOSARTAN POTASSIUM 50 MILLIGRAM(S): 100 TABLET, FILM COATED ORAL at 05:20

## 2024-11-15 RX ADMIN — ACETAMINOPHEN 500MG 650 MILLIGRAM(S): 500 TABLET, COATED ORAL at 05:20

## 2024-11-15 RX ADMIN — Medication 25 MILLIGRAM(S): at 23:07

## 2024-11-15 RX ADMIN — Medication 8 UNIT(S): at 12:28

## 2024-11-15 RX ADMIN — AMLODIPINE BESYLATE 10 MILLIGRAM(S): 10 TABLET ORAL at 05:20

## 2024-11-15 RX ADMIN — Medication 100 MILLIGRAM(S): at 12:28

## 2024-11-15 RX ADMIN — CARVEDILOL 6.25 MILLIGRAM(S): 25 TABLET, FILM COATED ORAL at 17:16

## 2024-11-15 RX ADMIN — Medication 3: at 21:06

## 2024-11-15 RX ADMIN — Medication 9 UNIT(S): at 17:15

## 2024-11-15 RX ADMIN — Medication 1 TABLET(S): at 08:17

## 2024-11-15 RX ADMIN — Medication 4: at 12:27

## 2024-11-15 RX ADMIN — Medication 8 UNIT(S): at 08:18

## 2024-11-15 RX ADMIN — GABAPENTIN 100 MILLIGRAM(S): 300 CAPSULE ORAL at 05:20

## 2024-11-15 RX ADMIN — Medication 4: at 08:17

## 2024-11-15 RX ADMIN — GABAPENTIN 100 MILLIGRAM(S): 300 CAPSULE ORAL at 21:05

## 2024-11-15 NOTE — PROGRESS NOTE ADULT - SUBJECTIVE AND OBJECTIVE BOX
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 left hospital on11/4 against medical advice for his personal problem, The patient return for persistent infection, foul-smelling discharge.  Patient denies any chest pain, no shortness of breath.  No fever or chills.  No cough/URI.  No neck or back pain.  No numbness or tingling.  No focal weakness.  No aggravating or alleviating factors otherwise noted.  No other acute complaints. Patient states he sometimes drinks, states he did not drink today.  In ED afebrile, o2 sat 90 %, /99, ,patient is being admitted for further care, and podiatry and ID consult called (07 Nov 2024 17:57)      Vital Signs Last 24 Hrs  T(C): 36.6 (15 Nov 2024 05:09), Max: 37.1 (14 Nov 2024 13:17)  T(F): 97.9 (15 Nov 2024 05:09), Max: 98.7 (14 Nov 2024 13:17)  HR: 80 (15 Nov 2024 05:09) (80 - 89)  BP: 134/81 (15 Nov 2024 05:09) (122/62 - 154/88)  BP(mean): 110 (14 Nov 2024 20:47) (110 - 110)  RR: 18 (15 Nov 2024 05:09) (17 - 18)  SpO2: 92% (15 Nov 2024 05:09) (92% - 98%)    Parameters below as of 15 Nov 2024 05:09  Patient On (Oxygen Delivery Method): room air                              8.7    7.22  )-----------( 119      ( 15 Nov 2024 07:50 )             27.5               11-15    138  |  104  |  58[H]  ----------------------------<  204[H]  4.7   |  25  |  2.55[H]    Ca    9.0      15 Nov 2024 07:50    TPro  6.5  /  Alb  2.8[L]  /  TBili  0.4  /  DBili  x   /  AST  32  /  ALT  38  /  AlkPhos  98  11-14    PPHYSICAL EXAM  POD#3 left foot s/p surgical Debridement of all non-viable soft tissue and bone, debridement of deep space infection of left foot with left 1st partial ray resection and packing all left foot. Strikethrough bleeding in dressing, noted no active bleed. Drains intact.  The sutures are intact and the flap is viable at this time.  There is decreased erythema, edema and calor noted. Patient has guarded prognosis . Will cont to allow any further demarcation to occur and will closely monitor.  Pt  is responding favorable to the current care plan.  Sterile dressing change performed today    Vascular: Dorsalis Pedis and Posterior Tibial pulses palpable 2/4 bilaterally.  Capillary re-fill time less then 3 seconds digits 1-5 bilateral.  Mild-moderate edema bilaterally up to the ankle left foot   Neuro: Protective sensation diminished to the level of the digits bilateral.   MSK: Muscle strength 5/5 all major muscle groups bilateral.   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 left hospital on11/4 against medical advice for his personal problem, The patient return for persistent infection, foul-smelling discharge.  Patient denies any chest pain, no shortness of breath.  No fever or chills.  No cough/URI.  No neck or back pain.  No numbness or tingling.  No focal weakness.  No aggravating or alleviating factors otherwise noted.  No other acute complaints. Patient states he sometimes drinks, states he did not drink today.  In ED afebrile, o2 sat 90 %, /99, ,patient is being admitted for further care, and podiatry and ID consult called (07 Nov 2024 17:57)      Vital Signs Last 24 Hrs  T(C): 36.6 (15 Nov 2024 05:09), Max: 37.1 (14 Nov 2024 13:17)  T(F): 97.9 (15 Nov 2024 05:09), Max: 98.7 (14 Nov 2024 13:17)  HR: 80 (15 Nov 2024 05:09) (80 - 89)  BP: 134/81 (15 Nov 2024 05:09) (122/62 - 154/88)  BP(mean): 110 (14 Nov 2024 20:47) (110 - 110)  RR: 18 (15 Nov 2024 05:09) (17 - 18)  SpO2: 92% (15 Nov 2024 05:09) (92% - 98%)    Parameters below as of 15 Nov 2024 05:09  Patient On (Oxygen Delivery Method): room air                              8.7    7.22  )-----------( 119      ( 15 Nov 2024 07:50 )             27.5               11-15    138  |  104  |  58[H]  ----------------------------<  204[H]  4.7   |  25  |  2.55[H]    Ca    9.0      15 Nov 2024 07:50    TPro  6.5  /  Alb  2.8[L]  /  TBili  0.4  /  DBili  x   /  AST  32  /  ALT  38  /  AlkPhos  98  11-14    PPHYSICAL EXAM  POD#3 left foot s/p surgical Debridement of all non-viable soft tissue and bone, debridement of deep space infection of left foot with left 1st partial ray resection and packing all left foot. mild  Strikethrough bleeding in dressing, noted no active bleed. Drains intact.  The sutures are intact and the flap is viable at this time.  There is decreased erythema, edema and calor noted. Patient has guarded prognosis . Will cont to allow any further demarcation to occur and will closely monitor.  Pt  is responding favorable to the current care plan.  Sterile dressing change performed today    Vascular: Dorsalis Pedis and Posterior Tibial pulses palpable 2/4 bilaterally.  Capillary re-fill time less then 3 seconds digits 1-5 bilateral.  Mild-moderate edema bilaterally up to the ankle left foot   Neuro: Protective sensation diminished to the level of the digits bilateral.   MSK: Muscle strength 5/5 all major muscle groups bilateral.

## 2024-11-15 NOTE — CASE MANAGEMENT PROGRESS NOTE - NSCMPROGRESSNOTE_GEN_ALL_CORE
RN/CM noted pt's case discussed during Interdisciplinary rounds, pt remains acute- IV VANCO 1500 mg Q 12H until 12/26. S/P 11/12/24-Amputation, foot, first ray/ Debulking, flap, foot. As per podiatrist, pt was bleeding profusely on 11/14-Will leave the packing for 2 days; if patient has continued uncontrollable hemostasis, will plan to go to OR for surgical exploration to address uncontrolled hemostasis. MSW following case for possible rehab placement. CM remains available.

## 2024-11-15 NOTE — PROGRESS NOTE ADULT - SUBJECTIVE AND OBJECTIVE BOX
Patient is a 49y old  Male who presents with a chief complaint of foot infection (15 Nov 2024 11:43)      INTERVAL HPI/OVERNIGHT EVENTS:  Overnight events noted.  Pt was seen today at bedside, no acute complaints. Denies fever, chills, abdominal pain, cough , sob, chest pain.     MEDICATIONS  (STANDING):  amLODIPine   Tablet 10 milliGRAM(s) Oral daily  carvedilol 6.25 milliGRAM(s) Oral every 12 hours  dextrose 5%. 1000 milliLiter(s) (50 mL/Hr) IV Continuous <Continuous>  dextrose 5%. 1000 milliLiter(s) (100 mL/Hr) IV Continuous <Continuous>  dextrose 50% Injectable 25 Gram(s) IV Push once  dextrose 50% Injectable 12.5 Gram(s) IV Push once  gabapentin 100 milliGRAM(s) Oral three times a day  glucagon  Injectable 1 milliGRAM(s) IntraMuscular once  heparin   Injectable 5000 Unit(s) SubCutaneous every 12 hours  insulin glargine Injectable (LANTUS) 24 Unit(s) SubCutaneous at bedtime  insulin lispro (ADMELOG) corrective regimen sliding scale   SubCutaneous at bedtime  insulin lispro (ADMELOG) corrective regimen sliding scale   SubCutaneous three times a day before meals  insulin lispro Injectable (ADMELOG) 8 Unit(s) SubCutaneous three times a day before meals  lactobacillus acidophilus 1 Tablet(s) Oral two times a day with meals  losartan 50 milliGRAM(s) Oral daily  sodium chloride 0.9%. 1000 milliLiter(s) (75 mL/Hr) IV Continuous <Continuous>  thiamine 100 milliGRAM(s) Oral daily  vancomycin  IVPB 1500 milliGRAM(s) IV Intermittent every 12 hours    MEDICATIONS  (PRN):  acetaminophen     Tablet .. 650 milliGRAM(s) Oral every 6 hours PRN Temp greater or equal to 38C (100.4F), Mild Pain (1 - 3)  dextrose Oral Gel 15 Gram(s) Oral once PRN Blood Glucose LESS THAN 70 milliGRAM(s)/deciliter  diphenhydrAMINE 25 milliGRAM(s) Oral every 6 hours PRN Rash and/or Itching  guaiFENesin Oral Liquid (Sugar-Free) 100 milliGRAM(s) Oral every 6 hours PRN Cough  melatonin 5 milliGRAM(s) Oral at bedtime PRN Insomnia      Allergies    No Known Allergies    Intolerances        REVIEW OF SYSTEMS:  CONSTITUTIONAL: No fever, weight loss, or fatigue  EYES: No eye pain, visual disturbances, or discharge  ENMT:  No difficulty hearing, tinnitus, vertigo; No sinus or throat pain  NECK: No pain or stiffness  RESPIRATORY: No cough, wheezing, chills or hemoptysis; No shortness of breath  CARDIOVASCULAR: No chest pain, palpitations, lightheadedness, or leg swelling  GASTROINTESTINAL: No abdominal or epigastric pain. No nausea, vomiting, or hematemesis; No diarrhea or constipation. No melena or hematochezia.  GENITOURINARY: No dysuria, frequency, hematuria, or incontinence  NEUROLOGICAL: No headaches, memory loss, vertigo, loss of strength, numbness, or tremors  SKIN: No itching, burning, rashes, or lesions   MUSCULOSKELETAL: No joint pain or swelling; No muscle, back, or extremity pain  PSYCHIATRIC: No depression, anxiety, or mood swings        Vital Signs Last 24 Hrs  T(C): 36.6 (15 Nov 2024 05:09), Max: 37.1 (14 Nov 2024 13:17)  T(F): 97.9 (15 Nov 2024 05:09), Max: 98.7 (14 Nov 2024 13:17)  HR: 80 (15 Nov 2024 05:09) (80 - 89)  BP: 134/81 (15 Nov 2024 05:09) (122/62 - 154/88)  BP(mean): 110 (14 Nov 2024 20:47) (110 - 110)  RR: 18 (15 Nov 2024 05:09) (17 - 18)  SpO2: 92% (15 Nov 2024 05:09) (92% - 98%)    Parameters below as of 15 Nov 2024 05:09  Patient On (Oxygen Delivery Method): room air        PHYSICAL EXAM:  GENERAL: NAD, well-groomed, well-developed  HEAD:  Atraumatic, Normocephalic  EYES: EOMI, PERRLA, conjunctiva and sclera clear  ENMT: Moist mucous membranes,   NECK: Supple, No JVD, Normal thyroid  NERVOUS SYSTEM:  Alert & Oriented X 3  CHEST/LUNG: Clear to auscultation bilaterally; No rales, rhonchi, wheezing, or rubs  HEART: Regular rate and rhythm; No murmurs, rubs, or gallops  ABDOMEN: Soft, Nontender, Nondistended; Bowel sounds present  EXTREMITIES:  2+ Peripheral Pulses, No clubbing, cyanosis, or edema, left foot covered in bandage   SKIN: No rashes or lesions    LABS:                        8.7    7.22  )-----------( 119      ( 15 Nov 2024 07:50 )             27.5     15 Nov 2024 07:50    138    |  104    |  58     ----------------------------<  204    4.7     |  25     |  2.55     Ca    9.0        15 Nov 2024 07:50      PT/INR - ( 14 Nov 2024 16:51 )   PT: 12.8 sec;   INR: 1.10 ratio         PTT - ( 14 Nov 2024 16:51 )  PTT:31.3 sec  Urinalysis Basic - ( 15 Nov 2024 07:50 )    Color: x / Appearance: x / SG: x / pH: x  Gluc: 204 mg/dL / Ketone: x  / Bili: x / Urobili: x   Blood: x / Protein: x / Nitrite: x   Leuk Esterase: x / RBC: x / WBC x   Sq Epi: x / Non Sq Epi: x / Bacteria: x      CAPILLARY BLOOD GLUCOSE      POCT Blood Glucose.: 203 mg/dL (15 Nov 2024 12:00)  POCT Blood Glucose.: 224 mg/dL (15 Nov 2024 08:05)  POCT Blood Glucose.: 330 mg/dL (14 Nov 2024 21:01)  POCT Blood Glucose.: 224 mg/dL (14 Nov 2024 16:50)

## 2024-11-15 NOTE — PROGRESS NOTE ADULT - SUBJECTIVE AND OBJECTIVE BOX
NEPHROLOGY PROGRESS NOTE    CHIEF COMPLAINT:  CKD    HPI:  Renal function stable.  BP controlled.     EXAM:  Vital Signs Last 24 Hrs  T(C): 36.6 (15 Nov 2024 05:09), Max: 36.8 (14 Nov 2024 16:44)  T(F): 97.9 (15 Nov 2024 05:09), Max: 98.3 (14 Nov 2024 20:47)  HR: 80 (15 Nov 2024 05:09) (80 - 85)  BP: 134/81 (15 Nov 2024 05:09) (122/62 - 154/88)  BP(mean): 110 (14 Nov 2024 20:47) (110 - 110)  RR: 18 (15 Nov 2024 05:09) (17 - 18)  SpO2: 92% (15 Nov 2024 05:09) (92% - 98%)    Parameters below as of 15 Nov 2024 05:09  Patient On (Oxygen Delivery Method): room air      I&O's Summary    14 Nov 2024 07:01  -  15 Nov 2024 07:00  --------------------------------------------------------  IN: 500 mL / OUT: 0 mL / NET: 500 mL    Conversant, in no apparent distress  Normal respiratory effort, lungs clear bilaterally  Heart RRR with no murmur, no peripheral edema    LABS                        8.7    7.22  )-----------( 119      ( 15 Nov 2024 07:50 )             27.5     11-15    138  |  104  |  58[H]  ----------------------------<  204[H]  4.7   |  25  |  2.55[H]    Ca    9.0      15 Nov 2024 07:50    TPro  6.5  /  Alb  2.8[L]  /  TBili  0.4  /  DBili  x   /  AST  32  /  ALT  38  /  AlkPhos  98  11-14    C3/C4 - wnl      Impression:  LUISA CKD 3, Diabetic nephropathy, function has stabilized with trial of IVF  Diabetes, DFU, recurrent  Hypertension, better    Recommend:  DC IVF  Continue ARB  Tight glycemic control       NEPHROLOGY PROGRESS NOTE    CHIEF COMPLAINT:  CKD    HPI:  Renal function stable.  BP controlled.     EXAM:  Vital Signs Last 24 Hrs  T(C): 36.6 (15 Nov 2024 05:09), Max: 36.8 (14 Nov 2024 16:44)  T(F): 97.9 (15 Nov 2024 05:09), Max: 98.3 (14 Nov 2024 20:47)  HR: 80 (15 Nov 2024 05:09) (80 - 85)  BP: 134/81 (15 Nov 2024 05:09) (122/62 - 154/88)  BP(mean): 110 (14 Nov 2024 20:47) (110 - 110)  RR: 18 (15 Nov 2024 05:09) (17 - 18)  SpO2: 92% (15 Nov 2024 05:09) (92% - 98%)    Parameters below as of 15 Nov 2024 05:09  Patient On (Oxygen Delivery Method): room air      I&O's Summary    14 Nov 2024 07:01  -  15 Nov 2024 07:00  --------------------------------------------------------  IN: 500 mL / OUT: 0 mL / NET: 500 mL    Conversant, in no apparent distress  Normal respiratory effort, lungs clear bilaterally  Heart RRR with no murmur, no peripheral edema    LABS                        8.7    7.22  )-----------( 119      ( 15 Nov 2024 07:50 )             27.5     11-15    138  |  104  |  58[H]  ----------------------------<  204[H]  4.7   |  25  |  2.55[H]    Ca    9.0      15 Nov 2024 07:50    TPro  6.5  /  Alb  2.8[L]  /  TBili  0.4  /  DBili  x   /  AST  32  /  ALT  38  /  AlkPhos  98  11-14    C3/C4 - wnl      Impression:  LUISA CKD 3, Diabetic nephropathy, function has stabilized with trial of IVF  Diabetes, DFU, recurrent  Hypertension, better    Recommend:  DC IVF  Continue ARB  Tight glycemic control  Follow vanco levels closely

## 2024-11-15 NOTE — PROGRESS NOTE ADULT - SUBJECTIVE AND OBJECTIVE BOX
Chief Complaint: Toe infection    Interval Events: No events overnight.    Review of Systems:  General: No fevers, chills, weight gain  Skin: No rashes, color changes  Cardiovascular: No chest pain, orthopnea  Respiratory: No shortness of breath, cough  Gastrointestinal: No nausea, abdominal pain  Genitourinary: No incontinence, pain with urination  Musculoskeletal: No pain, swelling, decreased range of motion  Neurological: No headache, weakness  Psychiatric: No depression, anxiety  Endocrine: No weight gain, increased thirst  All other systems are comprehensively negative.    Physical Exam:  Vital Signs Last 24 Hrs  T(C): 36.6 (15 Nov 2024 05:09), Max: 37.1 (14 Nov 2024 13:17)  T(F): 97.9 (15 Nov 2024 05:09), Max: 98.7 (14 Nov 2024 13:17)  HR: 80 (15 Nov 2024 05:09) (80 - 89)  BP: 134/81 (15 Nov 2024 05:09) (122/62 - 154/88)  BP(mean): 110 (14 Nov 2024 20:47) (110 - 110)  RR: 18 (15 Nov 2024 05:09) (17 - 18)  SpO2: 92% (15 Nov 2024 05:09) (92% - 98%)  Parameters below as of 15 Nov 2024 05:09  Patient On (Oxygen Delivery Method): room air  General: NAD  HEENT: MMM  Neck: No JVD, no carotid bruit  Lungs: CTAB  CV: RRR, nl S1/S2, no M/R/G  Abdomen: S/NT/ND, +BS  Extremities: No LE edema, no cyanosis  Neuro: AAOx3, non-focal  Skin: No rash    Labs:    11-15    138  |  104  |  58[H]  ----------------------------<  204[H]  4.7   |  25  |  2.55[H]    Ca    9.0      15 Nov 2024 07:50    TPro  6.5  /  Alb  2.8[L]  /  TBili  0.4  /  DBili  x   /  AST  32  /  ALT  38  /  AlkPhos  98  11-14                          8.7    7.22  )-----------( 119      ( 15 Nov 2024 07:50 )             27.5

## 2024-11-15 NOTE — PROGRESS NOTE ADULT - ASSESSMENT
Diabetic foot ulcer with cellulitis with acute on chronic osteomyelitis left foot  CT- consistent with OM of first Toe left  Podiatry consult noted   On 11/12 had amputation , debulking and flap on left foot by Dr Finnegan  Cont abx as per ID  Follow up final cultures   Monitor fever and WBC curve      DM2 with hyperglycemia, with neuropathy , nephropathy with likely PVD    FS + DOROTHY  Increase Lantus to 28 units   Increase lispro to 9 units 3 times a day       HTN  Continue Losartan 50 mg , amlodipine 10 mg, coreg 6.25 bid    CKD 3 likely Diabetic nephropathy   Nephro following      ETOH use disorder   Cessation counselling   Folic acid, thiamine, lorazepam with CIWA protocol      Obesity    EDUAR   O2 as needed    Anemia of ch disease  Monitor CBC     DVT prophylaxis   heparin

## 2024-11-15 NOTE — PROGRESS NOTE ADULT - SUBJECTIVE AND OBJECTIVE BOX
ANUJASUZANNA HURLEY is a 49yMale , patient examined and chart reviewed.     INTERVAL HPI/ OVERNIGHT EVENTS:  Afebrile NAD.  No further bleeding at surgical site.    PAST MEDICAL & SURGICAL HISTORY:  Prediabetes  HTN (hypertension)  HLD (hyperlipidemia)  DM2 (diabetes mellitus, type 2)    For details regarding the patient's social history, family history, and other miscellaneous elements, please refer the initial infectious diseases consultation and/or the admitting history and physical examination for this admission.    ROS:  CONSTITUTIONAL:  Negative fever or chills  EYES:  Negative  blurry vision or double vision  CARDIOVASCULAR:  Negative for chest pain or palpitations  RESPIRATORY:  Negative for cough, wheezing, or SOB   GASTROINTESTINAL:  Negative for nausea, vomiting, diarrhea, constipation, or abdominal pain  GENITOURINARY:  Negative frequency, urgency or dysuria  NEUROLOGIC:  No headache, confusion, dizziness, lightheadedness  All other systems were reviewed and are negative     No Known Allergies      Current inpatient medications :    ANTIBIOTICS/RELEVANT:  vancomycin  IVPB 1500 milliGRAM(s) IV Intermittent every 12 hours    MEDICATIONS  (STANDING):  amLODIPine   Tablet 10 milliGRAM(s) Oral daily  carvedilol 6.25 milliGRAM(s) Oral every 12 hours  dextrose 5%. 1000 milliLiter(s) (100 mL/Hr) IV Continuous <Continuous>  dextrose 5%. 1000 milliLiter(s) (50 mL/Hr) IV Continuous <Continuous>  dextrose 50% Injectable 12.5 Gram(s) IV Push once  dextrose 50% Injectable 25 Gram(s) IV Push once  gabapentin 100 milliGRAM(s) Oral three times a day  glucagon  Injectable 1 milliGRAM(s) IntraMuscular once  heparin   Injectable 5000 Unit(s) SubCutaneous every 12 hours  insulin glargine Injectable (LANTUS) 28 Unit(s) SubCutaneous at bedtime  insulin lispro (ADMELOG) corrective regimen sliding scale   SubCutaneous at bedtime  insulin lispro (ADMELOG) corrective regimen sliding scale   SubCutaneous three times a day before meals  insulin lispro Injectable (ADMELOG) 9 Unit(s) SubCutaneous three times a day before meals  lactobacillus acidophilus 1 Tablet(s) Oral two times a day with meals  losartan 50 milliGRAM(s) Oral daily  thiamine 100 milliGRAM(s) Oral daily    MEDICATIONS  (PRN):  acetaminophen     Tablet .. 650 milliGRAM(s) Oral every 6 hours PRN Temp greater or equal to 38C (100.4F), Mild Pain (1 - 3)  dextrose Oral Gel 15 Gram(s) Oral once PRN Blood Glucose LESS THAN 70 milliGRAM(s)/deciliter  diphenhydrAMINE 25 milliGRAM(s) Oral every 6 hours PRN Rash and/or Itching  guaiFENesin Oral Liquid (Sugar-Free) 100 milliGRAM(s) Oral every 6 hours PRN Cough  melatonin 5 milliGRAM(s) Oral at bedtime PRN Insomnia        Objective:  Vital Signs Last 24 Hrs  T(C): 36.7 (15 Nov 2024 14:47), Max: 36.8 (14 Nov 2024 20:47)  T(F): 98 (15 Nov 2024 14:47), Max: 98.3 (14 Nov 2024 20:47)  HR: 78 (15 Nov 2024 14:47) (78 - 85)  BP: 144/73 (15 Nov 2024 14:47) (134/81 - 154/88)  BP(mean): 110 (14 Nov 2024 20:47) (110 - 110)  RR: 17 (15 Nov 2024 14:47) (17 - 18)  SpO2: 94% (15 Nov 2024 14:47) (92% - 94%)    Parameters below as of 15 Nov 2024 05:09  Patient On (Oxygen Delivery Method): room air      Physical Exam:  General: no acute distress  Neck: supple, trachea midline  Lungs: clear, no wheeze/rhonchi  Cardiovascular: regular rate and rhythm, S1 S2  Abdomen: soft, nontender,  bowel sounds normal  Neurological: alert and oriented x3  Skin: no rash  Extremities: Left foot drsg c/d/i      LABS:                        8.7    7.22  )-----------( 119      ( 15 Nov 2024 07:50 )             27.5   11-15    138  |  104  |  58[H]  ----------------------------<  204[H]  4.7   |  25  |  2.55[H]    Ca    9.0      15 Nov 2024 07:50    TPro  6.5  /  Alb  2.8[L]  /  TBili  0.4  /  DBili  x   /  AST  32  /  ALT  38  /  AlkPhos  98  11-14        MICROBIOLOGY:  Culture - Tissue with Gram Stain (collected 12 Nov 2024 18:21)  Source: Bone  Gram Stain (14 Nov 2024 14:18):    No polymorphonuclear leukocytes seen per low power field    No organisms seen per oil power field  Preliminary Report (14 Nov 2024 14:18):    Rare Staphylococcus epidermidis    Culture - Tissue with Gram Stain (collected 12 Nov 2024 18:21)  Source: Tissue  Gram Stain (14 Nov 2024 14:21):    No polymorphonuclear leukocytes seen per low power field    No organisms seen per oil power field  Preliminary Report (14 Nov 2024 14:21):    Rare Staphylococcus simulans    Culture - Wound Aerobic (collected 12 Nov 2024 18:21)  Source: .Other  Preliminary Report (14 Nov 2024 13:25):    Rare Staphylococcus simulans "Susceptibilities not performed"    Culture - Wound Aerobic/Anaerobic (collected 12 Nov 2024 18:21)  Source: .Surgical Swab  Preliminary Report (14 Nov 2024 13:07):    Commensal nicholas consistent with body site    Culture - Blood (collected 01 Nov 2024 20:00)  Source: .Blood BLOOD  Preliminary Report (04 Nov 2024 01:02):    No growth at 48 Hours    Culture - Blood (collected 01 Nov 2024 20:00)  Source: .Blood BLOOD  Preliminary Report (04 Nov 2024 01:02):    No growth at 48 Hours    RADIOLOGY & ADDITIONAL STUDIES:      Assessment :  49-year-old male with  DM2, HTN, HLD, NEUROPATHY, PVD,ckd4, ETOH abuse, Diabetic foot ulcer with cellullitis ,ac on ch OM 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  He has been on amoxicillin twice daily and states he has been compliant with his antibiotic.  Patient noted that his surgical site had developed a bad odor with some discharge.  Left foot with exposed metatarsal head.    Sp Amputation, foot, first ray Debulking, flap, foot 12-Nov-2024  OR cultures with CNS  Bleeding at surgical site resolved  Path at clean margins with mild chronic OM    Plan :   Cont Vancomycin  Trend temps and cbc  Podiatry on case  Stable from ID standpoint    Continue with present regiment.  Appropriate use of antibiotics and adverse effects reviewed.      > 35 minutes were spent in direct patient care reviewing notes, medications ,labs data/ imaging , discussion with multidisciplinary team.    Thank you for allowing me to participate in care of your patient .    Linsey Mack MD  Infectious Disease  [phone]

## 2024-11-15 NOTE — PROGRESS NOTE ADULT - PROBLEM SELECTOR PLAN 1
Patient seen and evaluated  Discussed diagnosis and treatment with patient.  Patient is s/p surgical debridement of all non-viable soft tissue and bone left foot with partial 1st ray resection and packing all left foot . DOS 11/12/2024   Will leave the packing until Sunday.  If patient has continued uncontrollable hemostasis, will plan to go to OR for surgical exploration to address uncontrolled hemostasis.  Prognosis is guarded at this time due to severity of the condition   CBC, PT/PTT wnl  Pending OR WCx and bone pathology   Continue IV Abx and follow ID recommendations    Keep the postop dressing clean/dry/intact   Non weight bearing on left foot   Recs elevation on left lower extremities   Pt may require serial surgical debridements/amputation and possible BKA if symptoms persist.    radiographs reviewed and noted all operative regions in good position   Pt understands the severity of his condition and  that he is at risk to lose part of the left foot, all the foot or below the knee amputation. All questions were asked and answered for patient satisfaction    Medical management as per primary team   Will cont to monitor and discuss all options with patient and all attendings. Patient seen and evaluated  Discussed diagnosis and treatment with patient.  Patient is s/p surgical debridement of all non-viable soft tissue and bone left foot with partial 1st ray resection and packing all left foot . DOS 11/12/2024   Will leave the packing until Sunday.  If patient develops  uncontrollable hemostasis, will plan to go to OR for surgical exploration to address uncontrolled hemostasis.  Prognosis is guarded at this time due to severity of the condition   CBC, PT/PTT wnl  Pending OR WCx and bone pathology   Continue IV Abx and follow ID recommendations    Keep the postop dressing clean/dry/intact   Non weight bearing on left foot   Recs elevation on left lower extremities   Pt may require serial surgical debridements/amputation and possible BKA if symptoms persist.    radiographs reviewed and noted all operative regions in good position   Pt understands the severity of his condition and  that he is at risk to lose part of the left foot, all the foot or below the knee amputation. All questions were asked and answered for patient satisfaction    Medical management as per primary team   Will cont to monitor and discuss all options with patient and all attendings.

## 2024-11-15 NOTE — PROGRESS NOTE ADULT - ASSESSMENT
The patient is a 49 year old male with a history of HTN, HL, DM, PAD, CKD, toe osteomyelitis who presents with toe infection.    Plan:  - ECG with sinus rhythm and no evidence of ischemia/infarction  - Continue amlodipine 10 mg daily  - Continue carvedilol 6.25 mg bid  - Continue losartan 50 mg daily  - Podiatry follow-up

## 2024-11-16 LAB
ANION GAP SERPL CALC-SCNC: 13 MMOL/L — SIGNIFICANT CHANGE UP (ref 5–17)
BASOPHILS # BLD AUTO: 0.15 K/UL — SIGNIFICANT CHANGE UP (ref 0–0.2)
BASOPHILS NFR BLD AUTO: 1.9 % — SIGNIFICANT CHANGE UP (ref 0–2)
BUN SERPL-MCNC: 67 MG/DL — HIGH (ref 7–23)
CALCIUM SERPL-MCNC: 10 MG/DL — SIGNIFICANT CHANGE UP (ref 8.4–10.5)
CHLORIDE SERPL-SCNC: 103 MMOL/L — SIGNIFICANT CHANGE UP (ref 96–108)
CO2 SERPL-SCNC: 22 MMOL/L — SIGNIFICANT CHANGE UP (ref 22–31)
CREAT SERPL-MCNC: 2.55 MG/DL — HIGH (ref 0.5–1.3)
EGFR: 30 ML/MIN/1.73M2 — LOW
EOSINOPHIL # BLD AUTO: 0.33 K/UL — SIGNIFICANT CHANGE UP (ref 0–0.5)
EOSINOPHIL NFR BLD AUTO: 4.1 % — SIGNIFICANT CHANGE UP (ref 0–6)
GLUCOSE BLDC GLUCOMTR-MCNC: 216 MG/DL — HIGH (ref 70–99)
GLUCOSE BLDC GLUCOMTR-MCNC: 225 MG/DL — HIGH (ref 70–99)
GLUCOSE BLDC GLUCOMTR-MCNC: 262 MG/DL — HIGH (ref 70–99)
GLUCOSE BLDC GLUCOMTR-MCNC: 285 MG/DL — HIGH (ref 70–99)
GLUCOSE SERPL-MCNC: 231 MG/DL — HIGH (ref 70–99)
HCT VFR BLD CALC: 31.3 % — LOW (ref 39–50)
HGB BLD-MCNC: 10 G/DL — LOW (ref 13–17)
IMM GRANULOCYTES NFR BLD AUTO: 0.7 % — SIGNIFICANT CHANGE UP (ref 0–0.9)
LYMPHOCYTES # BLD AUTO: 1.58 K/UL — SIGNIFICANT CHANGE UP (ref 1–3.3)
LYMPHOCYTES # BLD AUTO: 19.7 % — SIGNIFICANT CHANGE UP (ref 13–44)
MCHC RBC-ENTMCNC: 28 PG — SIGNIFICANT CHANGE UP (ref 27–34)
MCHC RBC-ENTMCNC: 31.9 G/DL — LOW (ref 32–36)
MCV RBC AUTO: 87.7 FL — SIGNIFICANT CHANGE UP (ref 80–100)
MONOCYTES # BLD AUTO: 1.19 K/UL — HIGH (ref 0–0.9)
MONOCYTES NFR BLD AUTO: 14.9 % — HIGH (ref 2–14)
NEUTROPHILS # BLD AUTO: 4.7 K/UL — SIGNIFICANT CHANGE UP (ref 1.8–7.4)
NEUTROPHILS NFR BLD AUTO: 58.7 % — SIGNIFICANT CHANGE UP (ref 43–77)
NRBC # BLD: 0 /100 WBCS — SIGNIFICANT CHANGE UP (ref 0–0)
PLATELET # BLD AUTO: 137 K/UL — LOW (ref 150–400)
POTASSIUM SERPL-MCNC: 4.7 MMOL/L — SIGNIFICANT CHANGE UP (ref 3.5–5.3)
POTASSIUM SERPL-SCNC: 4.7 MMOL/L — SIGNIFICANT CHANGE UP (ref 3.5–5.3)
RBC # BLD: 3.57 M/UL — LOW (ref 4.2–5.8)
RBC # FLD: 13.4 % — SIGNIFICANT CHANGE UP (ref 10.3–14.5)
SODIUM SERPL-SCNC: 138 MMOL/L — SIGNIFICANT CHANGE UP (ref 135–145)
VANCOMYCIN TROUGH SERPL-MCNC: 22.6 UG/ML — HIGH (ref 10–20)
WBC # BLD: 8.01 K/UL — SIGNIFICANT CHANGE UP (ref 3.8–10.5)
WBC # FLD AUTO: 8.01 K/UL — SIGNIFICANT CHANGE UP (ref 3.8–10.5)

## 2024-11-16 PROCEDURE — 99232 SBSQ HOSP IP/OBS MODERATE 35: CPT

## 2024-11-16 RX ORDER — INSULIN GLARGINE 100 [IU]/ML
32 INJECTION, SOLUTION SUBCUTANEOUS AT BEDTIME
Refills: 0 | Status: DISCONTINUED | OUTPATIENT
Start: 2024-11-16 | End: 2024-11-17

## 2024-11-16 RX ORDER — 0.9 % SODIUM CHLORIDE 0.9 %
1000 INTRAVENOUS SOLUTION INTRAVENOUS
Refills: 0 | Status: DISCONTINUED | OUTPATIENT
Start: 2024-11-16 | End: 2024-11-20

## 2024-11-16 RX ORDER — LINAGLIPTIN 5 MG/1
5 TABLET, FILM COATED ORAL DAILY
Refills: 0 | Status: DISCONTINUED | OUTPATIENT
Start: 2024-11-16 | End: 2024-11-18

## 2024-11-16 RX ADMIN — Medication 5000 UNIT(S): at 17:02

## 2024-11-16 RX ADMIN — LOSARTAN POTASSIUM 50 MILLIGRAM(S): 100 TABLET, FILM COATED ORAL at 05:07

## 2024-11-16 RX ADMIN — Medication 10 UNIT(S): at 17:03

## 2024-11-16 RX ADMIN — Medication 10 UNIT(S): at 12:27

## 2024-11-16 RX ADMIN — GABAPENTIN 100 MILLIGRAM(S): 300 CAPSULE ORAL at 13:18

## 2024-11-16 RX ADMIN — LINAGLIPTIN 5 MILLIGRAM(S): 5 TABLET, FILM COATED ORAL at 17:02

## 2024-11-16 RX ADMIN — Medication 4: at 17:03

## 2024-11-16 RX ADMIN — Medication 4: at 12:27

## 2024-11-16 RX ADMIN — Medication 1 TABLET(S): at 08:15

## 2024-11-16 RX ADMIN — Medication 6: at 08:11

## 2024-11-16 RX ADMIN — CARVEDILOL 6.25 MILLIGRAM(S): 25 TABLET, FILM COATED ORAL at 17:02

## 2024-11-16 RX ADMIN — Medication 1 TABLET(S): at 17:02

## 2024-11-16 RX ADMIN — ACETAMINOPHEN, DIPHENHYDRAMINE HCL, PHENYLEPHRINE HCL 5 MILLIGRAM(S): 325; 25; 5 TABLET ORAL at 21:48

## 2024-11-16 RX ADMIN — AMLODIPINE BESYLATE 10 MILLIGRAM(S): 10 TABLET ORAL at 05:07

## 2024-11-16 RX ADMIN — Medication 25 MILLIGRAM(S): at 21:48

## 2024-11-16 RX ADMIN — Medication 300 MILLIGRAM(S): at 06:06

## 2024-11-16 RX ADMIN — GABAPENTIN 100 MILLIGRAM(S): 300 CAPSULE ORAL at 05:07

## 2024-11-16 RX ADMIN — INSULIN GLARGINE 32 UNIT(S): 100 INJECTION, SOLUTION SUBCUTANEOUS at 21:41

## 2024-11-16 RX ADMIN — CARVEDILOL 6.25 MILLIGRAM(S): 25 TABLET, FILM COATED ORAL at 05:07

## 2024-11-16 RX ADMIN — GABAPENTIN 100 MILLIGRAM(S): 300 CAPSULE ORAL at 21:40

## 2024-11-16 RX ADMIN — Medication 1: at 21:41

## 2024-11-16 RX ADMIN — Medication 9 UNIT(S): at 08:12

## 2024-11-16 RX ADMIN — Medication 100 MILLIGRAM(S): at 11:20

## 2024-11-16 NOTE — PROGRESS NOTE ADULT - SUBJECTIVE AND OBJECTIVE BOX
ANUJASUZANNA HURLEY is a 49yMale , patient examined and chart reviewed.     INTERVAL HPI/ OVERNIGHT EVENTS:  Afebrile NAD.      PAST MEDICAL & SURGICAL HISTORY:  Prediabetes  HTN (hypertension)  HLD (hyperlipidemia)  DM2 (diabetes mellitus, type 2)    For details regarding the patient's social history, family history, and other miscellaneous elements, please refer the initial infectious diseases consultation and/or the admitting history and physical examination for this admission.    ROS:  CONSTITUTIONAL:  Negative fever or chills  EYES:  Negative  blurry vision or double vision  CARDIOVASCULAR:  Negative for chest pain or palpitations  RESPIRATORY:  Negative for cough, wheezing, or SOB   GASTROINTESTINAL:  Negative for nausea, vomiting, diarrhea, constipation, or abdominal pain  GENITOURINARY:  Negative frequency, urgency or dysuria  NEUROLOGIC:  No headache, confusion, dizziness, lightheadedness  All other systems were reviewed and are negative     No Known Allergies      Current inpatient medications :    ANTIBIOTICS/RELEVANT:  vancomycin  IVPB 1500 milliGRAM(s) IV Intermittent every 12 hours    MEDICATIONS  (STANDING):  amLODIPine   Tablet 10 milliGRAM(s) Oral daily  carvedilol 6.25 milliGRAM(s) Oral every 12 hours  dextrose 5%. 1000 milliLiter(s) (50 mL/Hr) IV Continuous <Continuous>  dextrose 5%. 1000 milliLiter(s) (50 mL/Hr) IV Continuous <Continuous>  dextrose 5%. 1000 milliLiter(s) (100 mL/Hr) IV Continuous <Continuous>  dextrose 50% Injectable 25 Gram(s) IV Push once  dextrose 50% Injectable 12.5 Gram(s) IV Push once  gabapentin 100 milliGRAM(s) Oral three times a day  glucagon  Injectable 1 milliGRAM(s) IntraMuscular once  heparin   Injectable 5000 Unit(s) SubCutaneous every 12 hours  insulin glargine Injectable (LANTUS) 32 Unit(s) SubCutaneous at bedtime  insulin lispro (ADMELOG) corrective regimen sliding scale   SubCutaneous three times a day before meals  insulin lispro (ADMELOG) corrective regimen sliding scale   SubCutaneous at bedtime  insulin lispro Injectable (ADMELOG) 10 Unit(s) SubCutaneous three times a day before meals  lactobacillus acidophilus 1 Tablet(s) Oral two times a day with meals  linagliptin 5 milliGRAM(s) Oral daily  losartan 50 milliGRAM(s) Oral daily  thiamine 100 milliGRAM(s) Oral daily    MEDICATIONS  (PRN):  acetaminophen     Tablet .. 650 milliGRAM(s) Oral every 6 hours PRN Temp greater or equal to 38C (100.4F), Mild Pain (1 - 3)  dextrose Oral Gel 15 Gram(s) Oral once PRN Blood Glucose LESS THAN 70 milliGRAM(s)/deciliter  dextrose Oral Gel 15 Gram(s) Oral once PRN Blood Glucose LESS THAN 70 milliGRAM(s)/deciliter  diphenhydrAMINE 25 milliGRAM(s) Oral every 6 hours PRN Rash and/or Itching  guaiFENesin Oral Liquid (Sugar-Free) 100 milliGRAM(s) Oral every 6 hours PRN Cough  melatonin 5 milliGRAM(s) Oral at bedtime PRN Insomnia        Objective:  Vital Signs Last 24 Hrs  T(C): 37.1 (16 Nov 2024 17:06), Max: 37.2 (15 Nov 2024 20:33)  T(F): 98.8 (16 Nov 2024 17:06), Max: 99 (15 Nov 2024 20:33)  HR: 95 (16 Nov 2024 17:06) (87 - 97)  BP: 160/91 (16 Nov 2024 17:06) (119/74 - 160/91)  RR: 18 (16 Nov 2024 17:06) (18 - 18)  SpO2: 95% (16 Nov 2024 17:06) (91% - 96%)    Parameters below as of 16 Nov 2024 17:06  Patient On (Oxygen Delivery Method): room air      Physical Exam:  General: no acute distress  Neck: supple, trachea midline  Lungs: clear, no wheeze/rhonchi  Cardiovascular: regular rate and rhythm, S1 S2  Abdomen: soft, nontender,  bowel sounds normal  Neurological: alert and oriented x3  Skin: no rash  Extremities: Left foot drsg c/d/i      LABS:                                 10.0   8.01  )-----------( 137      ( 16 Nov 2024 06:00 )             31.3   11-16    138  |  103  |  67[H]  ----------------------------<  231[H]  4.7   |  22  |  2.55[H]    Ca    10.0      16 Nov 2024 06:00    MICROBIOLOGY:  Culture - Tissue with Gram Stain (11.12.24 @ 18:21)    -  Trimethoprim/Sulfamethoxazole: R >2/38   -  Vancomycin: S 1   -  Clindamycin: R >4   -  Erythromycin: R >4   -  Gentamicin: S <=4 Should not be used as monotherapy   -  Oxacillin: R >2   -  Penicillin: R >2   -  Rifampin: S <=1 Should not be used as monotherapy   -  Tetracycline: S <=4   Gram Stain:   No polymorphonuclear leukocytes seen per low power field  No organisms seen per oil power field   Specimen Source: Bone   Culture Results:   Rare Staphylococcus epidermidis  Few Corynebacterium resistens "Susceptibilities not performed"   Organism Identification: Staphylococcus epidermidis   Organism: Staphylococcus epidermidis   Method Type: STEPHANIE    Culture - Tissue with Gram Stain (collected 12 Nov 2024 18:21)  Source: Tissue  Gram Stain (14 Nov 2024 14:21):    No polymorphonuclear leukocytes seen per low power field    No organisms seen per oil power field  Preliminary Report (14 Nov 2024 14:21):    Rare Staphylococcus simulans    Culture - Wound Aerobic (collected 12 Nov 2024 18:21)  Source: .Other  Preliminary Report (14 Nov 2024 13:25):    Rare Staphylococcus simulans "Susceptibilities not performed"    Culture - Wound Aerobic/Anaerobic (collected 12 Nov 2024 18:21)  Source: .Surgical Swab  Preliminary Report (14 Nov 2024 13:07):    Commensal nicholas consistent with body site    Culture - Blood (collected 01 Nov 2024 20:00)  Source: .Blood BLOOD  Preliminary Report (04 Nov 2024 01:02):    No growth at 48 Hours    Culture - Blood (collected 01 Nov 2024 20:00)  Source: .Blood BLOOD  Preliminary Report (04 Nov 2024 01:02):    No growth at 48 Hours    RADIOLOGY & ADDITIONAL STUDIES:      Assessment :  49-year-old male with  DM2, HTN, HLD, NEUROPATHY, PVD,ckd4, ETOH abuse, Diabetic foot ulcer with cellullitis ,ac on ch OM 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  He has been on amoxicillin twice daily and states he has been compliant with his antibiotic.  Patient noted that his surgical site had developed a bad odor with some discharge.  Left foot with exposed metatarsal head.    Sp Amputation, foot, first ray Debulking, flap, foot 12-Nov-2024  OR cultures with CNS  Bleeding at surgical site resolved  Path at clean margins with mild chronic OM  Vanc level high    Plan :   Hold Vancomycin for now and repeat Vanc level in am  Trend temps and cbc  Podiatry on case  Stable from ID standpoint    Continue with present regiment.  Appropriate use of antibiotics and adverse effects reviewed.      > 35 minutes were spent in direct patient care reviewing notes, medications ,labs data/ imaging , discussion with multidisciplinary team.    Thank you for allowing me to participate in care of your patient .    Linsey Mack MD  Infectious Disease  [phone]

## 2024-11-16 NOTE — PROGRESS NOTE ADULT - PROBLEM SELECTOR PLAN 1
Patient seen and evaluated  Discussed diagnosis and treatment with patient.  Patient is s/p surgical debridement of all non-viable soft tissue and bone left foot with partial 1st ray resection and packing all left foot . DOS 11/12/2024   Will leave the packing until Sunday, 11/17/24.  If patient develops  uncontrollable bleeding, will plan to go to OR for surgical exploration to address uncontrolled bleeding.  Prognosis is guarded at this time due to severity of the condition.   CBC, PT/PTT wnl  OR WCx demonstrates staph infection, acid fast and fungal cultures still pending.   Surgical bone pathology demonstrates mild chronic OM in proximal margin.  Continue IV Abx and follow ID recommendations    Keep the postop dressing clean/dry/intact   Non weight bearing on left foot   Recs elevation on left lower extremities   Pt may require serial surgical debridements/amputation and possible BKA if symptoms persist.    radiographs reviewed and noted all operative regions in good position   Pt understands the severity of his condition and  that he is at risk to lose part of the left foot, all the foot or below the knee amputation. All questions were asked and answered for patient satisfaction    Medical management as per primary team   Will cont to monitor and discuss all options with patient and all attendings.

## 2024-11-16 NOTE — PROGRESS NOTE ADULT - SUBJECTIVE AND OBJECTIVE BOX
Chief Complaint: Toe infection    Interval Events: No events overnight.    Review of Systems:  General: No fevers, chills, weight gain  Skin: No rashes, color changes  Cardiovascular: No chest pain, orthopnea  Respiratory: No shortness of breath, cough  Gastrointestinal: No nausea, abdominal pain  Genitourinary: No incontinence, pain with urination  Musculoskeletal: No pain, swelling, decreased range of motion  Neurological: No headache, weakness  Psychiatric: No depression, anxiety  Endocrine: No weight gain, increased thirst  All other systems are comprehensively negative.    Physical Exam:  Vital Signs Last 24 Hrs  T(C): 37.1 (16 Nov 2024 05:00), Max: 37.2 (15 Nov 2024 20:33)  T(F): 98.7 (16 Nov 2024 05:00), Max: 99 (15 Nov 2024 20:33)  HR: 87 (16 Nov 2024 05:00) (78 - 97)  BP: 124/76 (16 Nov 2024 05:00) (124/76 - 145/82)  BP(mean): --  RR: 18 (16 Nov 2024 05:00) (17 - 18)  SpO2: 91% (16 Nov 2024 05:00) (91% - 94%)  Parameters below as of 16 Nov 2024 05:00  Patient On (Oxygen Delivery Method): room air  General: NAD  HEENT: MMM  Neck: No JVD, no carotid bruit  Lungs: CTAB  CV: RRR, nl S1/S2, no M/R/G  Abdomen: S/NT/ND, +BS  Extremities: No LE edema, no cyanosis  Neuro: AAOx3, non-focal  Skin: No rash    Labs:    11-16    138  |  103  |  67[H]  ----------------------------<  231[H]  4.7   |  22  |  2.55[H]    Ca    10.0      16 Nov 2024 06:00                          10.0   8.01  )-----------( 137      ( 16 Nov 2024 06:00 )             31.3

## 2024-11-16 NOTE — PROGRESS NOTE ADULT - ASSESSMENT
Diabetic foot ulcer with cellulitis with acute on chronic osteomyelitis left foot  CT- consistent with OM of first Toe left  Podiatry consult noted   On 11/12 had amputation , debulking and flap on left foot by Dr Finnegan  Cont abx as per ID  Cultures positive for staphylococcus   Monitor fever and WBC curve     DM2 with hyperglycemia, with neuropathy , nephropathy with likely PVD   FS + DOROTHY  Increase Lantus to32 units   Increase lispro to 10 units 3 times a day '  Start Linagliptin 5 mg daily       HTN  Continue Losartan 50 mg , amlodipine 10 mg, coreg 6.25 bid    CKD 3 likely Diabetic nephropathy   Nephro following      ETOH use disorder   Cessation counselling   Folic acid, thiamine     Obesity    EDUAR   O2 as needed    Anemia of ch disease  Monitor CBC     DVT prophylaxis   heparin

## 2024-11-16 NOTE — PROGRESS NOTE ADULT - SUBJECTIVE AND OBJECTIVE BOX
Patient is a 49y old  Male who presents with a chief complaint of foot infection (15 Nov 2024 14:03)      INTERVAL HPI/OVERNIGHT EVENTS:  Overnight events noted .   Pt was seen today at bedside, no acute complaints. Denies fever, chills, abdominal pain, cough , sob, chest pain.     MEDICATIONS  (STANDING):  amLODIPine   Tablet 10 milliGRAM(s) Oral daily  carvedilol 6.25 milliGRAM(s) Oral every 12 hours  dextrose 5%. 1000 milliLiter(s) (50 mL/Hr) IV Continuous <Continuous>  dextrose 5%. 1000 milliLiter(s) (100 mL/Hr) IV Continuous <Continuous>  dextrose 5%. 1000 milliLiter(s) (50 mL/Hr) IV Continuous <Continuous>  dextrose 50% Injectable 25 Gram(s) IV Push once  dextrose 50% Injectable 12.5 Gram(s) IV Push once  gabapentin 100 milliGRAM(s) Oral three times a day  glucagon  Injectable 1 milliGRAM(s) IntraMuscular once  heparin   Injectable 5000 Unit(s) SubCutaneous every 12 hours  insulin glargine Injectable (LANTUS) 32 Unit(s) SubCutaneous at bedtime  insulin lispro (ADMELOG) corrective regimen sliding scale   SubCutaneous three times a day before meals  insulin lispro (ADMELOG) corrective regimen sliding scale   SubCutaneous at bedtime  insulin lispro Injectable (ADMELOG) 10 Unit(s) SubCutaneous three times a day before meals  lactobacillus acidophilus 1 Tablet(s) Oral two times a day with meals  losartan 50 milliGRAM(s) Oral daily  thiamine 100 milliGRAM(s) Oral daily  vancomycin  IVPB 1500 milliGRAM(s) IV Intermittent every 12 hours    MEDICATIONS  (PRN):  acetaminophen     Tablet .. 650 milliGRAM(s) Oral every 6 hours PRN Temp greater or equal to 38C (100.4F), Mild Pain (1 - 3)  dextrose Oral Gel 15 Gram(s) Oral once PRN Blood Glucose LESS THAN 70 milliGRAM(s)/deciliter  dextrose Oral Gel 15 Gram(s) Oral once PRN Blood Glucose LESS THAN 70 milliGRAM(s)/deciliter  diphenhydrAMINE 25 milliGRAM(s) Oral every 6 hours PRN Rash and/or Itching  guaiFENesin Oral Liquid (Sugar-Free) 100 milliGRAM(s) Oral every 6 hours PRN Cough  melatonin 5 milliGRAM(s) Oral at bedtime PRN Insomnia      Allergies    No Known Allergies    Intolerances        REVIEW OF SYSTEMS:  CONSTITUTIONAL: No fever, weight loss, or fatigue  EYES: No eye pain, visual disturbances, or discharge  ENMT:  No difficulty hearing, tinnitus, vertigo; No sinus or throat pain  NECK: No pain or stiffness  RESPIRATORY: No cough, wheezing, chills or hemoptysis; No shortness of breath  CARDIOVASCULAR: No chest pain, palpitations, lightheadedness, or leg swelling  GASTROINTESTINAL: No abdominal or epigastric pain. No nausea, vomiting, or hematemesis; No diarrhea or constipation. No melena or hematochezia.  GENITOURINARY: No dysuria, frequency, hematuria, or incontinence  NEUROLOGICAL: No headaches, memory loss, vertigo, loss of strength, numbness, or tremors  SKIN: No itching, burning, rashes, or lesions   MUSCULOSKELETAL: No joint pain or swelling; No muscle, back, or extremity pain  PSYCHIATRIC: No depression, anxiety, or mood swings        Vital Signs Last 24 Hrs  T(C): 37 (16 Nov 2024 11:01), Max: 37.2 (15 Nov 2024 20:33)  T(F): 98.6 (16 Nov 2024 11:01), Max: 99 (15 Nov 2024 20:33)  HR: 87 (16 Nov 2024 11:01) (78 - 97)  BP: 139/87 (16 Nov 2024 11:01) (124/76 - 145/82)  BP(mean): --  RR: 18 (16 Nov 2024 11:01) (17 - 18)  SpO2: 95% (16 Nov 2024 11:01) (91% - 95%)    Parameters below as of 16 Nov 2024 11:01  Patient On (Oxygen Delivery Method): room air        PHYSICAL EXAM:  GENERAL: NAD, well-groomed, well-developed  HEAD:  Atraumatic, Normocephalic  EYES: EOMI, PERRLA, conjunctiva and sclera clear  ENMT: Moist mucous membranes,   NECK: Supple, No JVD, Normal thyroid  NERVOUS SYSTEM:  Alert & Oriented X 3  CHEST/LUNG: Clear to auscultation bilaterally; No rales, rhonchi, wheezing, or rubs  HEART: Regular rate and rhythm; No murmurs, rubs, or gallops  ABDOMEN: Soft, Nontender, Nondistended; Bowel sounds present  EXTREMITIES:  2+ Peripheral Pulses, No clubbing, cyanosis, or edema, left foot covered in bandage   SKIN: No rashes or lesions    LABS:                        10.0   8.01  )-----------( 137      ( 16 Nov 2024 06:00 )             31.3     16 Nov 2024 06:00    138    |  103    |  67     ----------------------------<  231    4.7     |  22     |  2.55     Ca    10.0       16 Nov 2024 06:00      PT/INR - ( 14 Nov 2024 16:51 )   PT: 12.8 sec;   INR: 1.10 ratio         PTT - ( 14 Nov 2024 16:51 )  PTT:31.3 sec  Urinalysis Basic - ( 16 Nov 2024 06:00 )    Color: x / Appearance: x / SG: x / pH: x  Gluc: 231 mg/dL / Ketone: x  / Bili: x / Urobili: x   Blood: x / Protein: x / Nitrite: x   Leuk Esterase: x / RBC: x / WBC x   Sq Epi: x / Non Sq Epi: x / Bacteria: x      CAPILLARY BLOOD GLUCOSE      POCT Blood Glucose.: 225 mg/dL (16 Nov 2024 12:19)  POCT Blood Glucose.: 262 mg/dL (16 Nov 2024 07:42)  POCT Blood Glucose.: 373 mg/dL (15 Nov 2024 20:59)  POCT Blood Glucose.: 196 mg/dL (15 Nov 2024 16:48)

## 2024-11-16 NOTE — PROGRESS NOTE ADULT - SUBJECTIVE AND OBJECTIVE BOX
PROGRESS NOTE   Patient is a 49y old  Male who presents with a chief complaint of foot infection (16 Nov 2024 12:58)      HPI:  49-year-old male with past medical history of DM2, HTN, HLD, NEUROPATHY, PVD,ckd4, ETOH abuser,  NON COMPLIANT WITH TT, recently discharged after tt for , Diabetic foot ulcer with cellullitis ,ac on ch OM with sepstic arthritis amputation of of 2nd left toe, OM left great toe-,surg done by podiatry,9/20culture of wound E fecalis, path OM , with clear margins. left hospital on11/4 against medical advice for his personal problem, The patient return for persistent infection, foul-smelling discharge.  Patient denies any chest pain, no shortness of breath.  No fever or chills.  No cough/URI.  No neck or back pain.  No numbness or tingling.  No focal weakness.  No aggravating or alleviating factors otherwise noted.  No other acute complaints. Patient states he sometimes drinks, states he did not drink today.  In ED afebrile, o2 sat 90 %, /99, ,patient is being admitted for further care, and podiatry and ID consult called (07 Nov 2024 17:57)      Vital Signs Last 24 Hrs  T(C): 37.1 (16 Nov 2024 14:31), Max: 37.2 (15 Nov 2024 20:33)  T(F): 98.8 (16 Nov 2024 14:31), Max: 99 (15 Nov 2024 20:33)  HR: 90 (16 Nov 2024 14:31) (87 - 97)  BP: 119/74 (16 Nov 2024 14:31) (119/74 - 145/82)  BP(mean): --  RR: 18 (16 Nov 2024 14:31) (18 - 18)  SpO2: 96% (16 Nov 2024 14:31) (91% - 96%)    Parameters below as of 16 Nov 2024 14:31  Patient On (Oxygen Delivery Method): room air                              10.0   8.01  )-----------( 137      ( 16 Nov 2024 06:00 )             31.3               11-16    138  |  103  |  67[H]  ----------------------------<  231[H]  4.7   |  22  |  2.55[H]    Ca    10.0      16 Nov 2024 06:00        PHYSICAL EXAM  POD#4 left foot s/p surgical Debridement of all non-viable soft tissue and bone, debridement of deep space infection of left foot with left 1st partial ray resection and packing all left foot. mild  Strikethrough bleeding in dressing, noted no active bleed. Drains intact.  The sutures are intact and the flap is viable at this time.  There is decreased erythema, edema and calor noted. Patient has guarded prognosis . Will cont to allow any further demarcation to occur and will closely monitor.  Pt  is responding favorable to the current care plan.  Sterile dressing change performed today    Vascular: Dorsalis Pedis and Posterior Tibial pulses palpable 2/4 bilaterally.  Capillary re-fill time less then 3 seconds digits 1-5 bilateral.  Mild-moderate edema bilaterally up to the ankle left foot   Neuro: Protective sensation diminished to the level of the digits bilateral.   MSK: Muscle strength 5/5 all major muscle groups bilateral.

## 2024-11-17 ENCOUNTER — TRANSCRIPTION ENCOUNTER (OUTPATIENT)
Age: 49
End: 2024-11-17

## 2024-11-17 LAB
ANION GAP SERPL CALC-SCNC: 11 MMOL/L — SIGNIFICANT CHANGE UP (ref 5–17)
BLD GP AB SCN SERPL QL: SIGNIFICANT CHANGE UP
BUN SERPL-MCNC: 71 MG/DL — HIGH (ref 7–23)
CALCIUM SERPL-MCNC: 9.7 MG/DL — SIGNIFICANT CHANGE UP (ref 8.4–10.5)
CHLORIDE SERPL-SCNC: 104 MMOL/L — SIGNIFICANT CHANGE UP (ref 96–108)
CO2 SERPL-SCNC: 21 MMOL/L — LOW (ref 22–31)
CREAT SERPL-MCNC: 2.69 MG/DL — HIGH (ref 0.5–1.3)
EGFR: 28 ML/MIN/1.73M2 — LOW
GLUCOSE BLDC GLUCOMTR-MCNC: 210 MG/DL — HIGH (ref 70–99)
GLUCOSE BLDC GLUCOMTR-MCNC: 224 MG/DL — HIGH (ref 70–99)
GLUCOSE BLDC GLUCOMTR-MCNC: 245 MG/DL — HIGH (ref 70–99)
GLUCOSE BLDC GLUCOMTR-MCNC: 263 MG/DL — HIGH (ref 70–99)
GLUCOSE BLDC GLUCOMTR-MCNC: 269 MG/DL — HIGH (ref 70–99)
GLUCOSE SERPL-MCNC: 242 MG/DL — HIGH (ref 70–99)
HCT VFR BLD CALC: 28.1 % — LOW (ref 39–50)
HGB BLD-MCNC: 9.1 G/DL — LOW (ref 13–17)
MCHC RBC-ENTMCNC: 27.8 PG — SIGNIFICANT CHANGE UP (ref 27–34)
MCHC RBC-ENTMCNC: 32.4 G/DL — SIGNIFICANT CHANGE UP (ref 32–36)
MCV RBC AUTO: 85.9 FL — SIGNIFICANT CHANGE UP (ref 80–100)
NRBC # BLD: 0 /100 WBCS — SIGNIFICANT CHANGE UP (ref 0–0)
PLATELET # BLD AUTO: 168 K/UL — SIGNIFICANT CHANGE UP (ref 150–400)
POTASSIUM SERPL-MCNC: 4.7 MMOL/L — SIGNIFICANT CHANGE UP (ref 3.5–5.3)
POTASSIUM SERPL-SCNC: 4.7 MMOL/L — SIGNIFICANT CHANGE UP (ref 3.5–5.3)
RBC # BLD: 3.27 M/UL — LOW (ref 4.2–5.8)
RBC # FLD: 13.4 % — SIGNIFICANT CHANGE UP (ref 10.3–14.5)
SODIUM SERPL-SCNC: 136 MMOL/L — SIGNIFICANT CHANGE UP (ref 135–145)
VANCOMYCIN TROUGH SERPL-MCNC: 24.4 UG/ML — HIGH (ref 10–20)
WBC # BLD: 9.01 K/UL — SIGNIFICANT CHANGE UP (ref 3.8–10.5)
WBC # FLD AUTO: 9.01 K/UL — SIGNIFICANT CHANGE UP (ref 3.8–10.5)

## 2024-11-17 PROCEDURE — 73630 X-RAY EXAM OF FOOT: CPT | Mod: 26,LT

## 2024-11-17 PROCEDURE — 99232 SBSQ HOSP IP/OBS MODERATE 35: CPT

## 2024-11-17 PROCEDURE — 88304 TISSUE EXAM BY PATHOLOGIST: CPT | Mod: 26

## 2024-11-17 DEVICE — CLIP APPLIER COVIDIEN SURGICLIP III 9" SM: Type: IMPLANTABLE DEVICE | Site: LEFT | Status: FUNCTIONAL

## 2024-11-17 DEVICE — K-WIRE MICROAIRE (SMOOTH) SINGLE TROCAR 1.6MM X 9": Type: IMPLANTABLE DEVICE | Site: LEFT | Status: FUNCTIONAL

## 2024-11-17 DEVICE — IMP SYS BIO-COMP ACHILLES SPEEDBRIDGE: Type: IMPLANTABLE DEVICE | Site: LEFT | Status: FUNCTIONAL

## 2024-11-17 RX ORDER — TRANEXAMIC ACID 100 MG/ML
948 INJECTION INTRAVENOUS ONCE
Refills: 0 | Status: COMPLETED | OUTPATIENT
Start: 2024-11-17 | End: 2024-11-17

## 2024-11-17 RX ORDER — SODIUM CHLORIDE 9 MG/ML
1000 INJECTION, SOLUTION INTRAMUSCULAR; INTRAVENOUS; SUBCUTANEOUS
Refills: 0 | Status: DISCONTINUED | OUTPATIENT
Start: 2024-11-17 | End: 2024-11-17

## 2024-11-17 RX ORDER — HYDROMORPHONE HYDROCHLORIDE 2 MG/1
0.2 TABLET ORAL
Refills: 0 | Status: DISCONTINUED | OUTPATIENT
Start: 2024-11-17 | End: 2024-11-18

## 2024-11-17 RX ORDER — CEFAZOLIN SODIUM 10 G
2000 VIAL (EA) INJECTION ONCE
Refills: 0 | Status: COMPLETED | OUTPATIENT
Start: 2024-11-17 | End: 2024-11-17

## 2024-11-17 RX ORDER — HYDROMORPHONE HYDROCHLORIDE 2 MG/1
0.5 TABLET ORAL
Refills: 0 | Status: DISCONTINUED | OUTPATIENT
Start: 2024-11-17 | End: 2024-11-18

## 2024-11-17 RX ORDER — ONDANSETRON HYDROCHLORIDE 4 MG/1
4 TABLET, FILM COATED ORAL ONCE
Refills: 0 | Status: DISCONTINUED | OUTPATIENT
Start: 2024-11-17 | End: 2024-11-18

## 2024-11-17 RX ORDER — INSULIN GLARGINE 100 [IU]/ML
36 INJECTION, SOLUTION SUBCUTANEOUS AT BEDTIME
Refills: 0 | Status: DISCONTINUED | OUTPATIENT
Start: 2024-11-17 | End: 2024-11-18

## 2024-11-17 RX ADMIN — Medication 1: at 21:29

## 2024-11-17 RX ADMIN — LOSARTAN POTASSIUM 50 MILLIGRAM(S): 100 TABLET, FILM COATED ORAL at 05:29

## 2024-11-17 RX ADMIN — INSULIN GLARGINE 36 UNIT(S): 100 INJECTION, SOLUTION SUBCUTANEOUS at 21:28

## 2024-11-17 RX ADMIN — Medication 12 UNIT(S): at 18:52

## 2024-11-17 RX ADMIN — Medication 6: at 11:44

## 2024-11-17 RX ADMIN — Medication 12 UNIT(S): at 11:45

## 2024-11-17 RX ADMIN — Medication 25 MILLIGRAM(S): at 21:28

## 2024-11-17 RX ADMIN — Medication 1 TABLET(S): at 08:27

## 2024-11-17 RX ADMIN — GABAPENTIN 100 MILLIGRAM(S): 300 CAPSULE ORAL at 14:19

## 2024-11-17 RX ADMIN — CARVEDILOL 6.25 MILLIGRAM(S): 25 TABLET, FILM COATED ORAL at 05:29

## 2024-11-17 RX ADMIN — AMLODIPINE BESYLATE 10 MILLIGRAM(S): 10 TABLET ORAL at 05:29

## 2024-11-17 RX ADMIN — LINAGLIPTIN 5 MILLIGRAM(S): 5 TABLET, FILM COATED ORAL at 11:43

## 2024-11-17 RX ADMIN — GABAPENTIN 100 MILLIGRAM(S): 300 CAPSULE ORAL at 21:28

## 2024-11-17 RX ADMIN — ACETAMINOPHEN, DIPHENHYDRAMINE HCL, PHENYLEPHRINE HCL 5 MILLIGRAM(S): 325; 25; 5 TABLET ORAL at 21:28

## 2024-11-17 RX ADMIN — GABAPENTIN 100 MILLIGRAM(S): 300 CAPSULE ORAL at 05:29

## 2024-11-17 RX ADMIN — CARVEDILOL 6.25 MILLIGRAM(S): 25 TABLET, FILM COATED ORAL at 18:52

## 2024-11-17 RX ADMIN — Medication 5000 UNIT(S): at 05:30

## 2024-11-17 RX ADMIN — Medication 100 MILLIGRAM(S): at 11:43

## 2024-11-17 RX ADMIN — Medication 4: at 08:21

## 2024-11-17 RX ADMIN — Medication 4: at 18:51

## 2024-11-17 RX ADMIN — Medication 1 TABLET(S): at 18:52

## 2024-11-17 NOTE — PROVIDER CONTACT NOTE (CHANGE IN STATUS NOTIFICATION) - RECOMMENDATIONS
Per Dr. Davenport, reinforce pressure, hold heparin, possible OR this evening  Dr. Davenport en route and notifying his residents  As above

## 2024-11-17 NOTE — PROGRESS NOTE ADULT - SUBJECTIVE AND OBJECTIVE BOX
PRE-OP NOTE    Pre-Op Diagnosis:  uncontrolled hemostasis left foot   Planned Procedure:  surgical exploration with irrigation and hemostasis management  Scheduled Date / Time: 11/17/2024  Indication:  post operative bleeding left foot after patient walked on his extremity against medical advice  Labs:                       9.1    9.01  )-----------( 168      ( 17 Nov 2024 16:09 )             28.1   11-17    136  |  104  |  71[H]  ----------------------------<  242[H]  4.7   |  21[L]  |  2.69[H]    Ca    9.7      17 Nov 2024 06:15      Vitals: Vital Signs Last 24 Hrs  T(C): 36.8 (17 Nov 2024 15:30), Max: 37.2 (17 Nov 2024 14:00)  T(F): 98.2 (17 Nov 2024 15:30), Max: 99 (17 Nov 2024 14:00)  HR: 98 (17 Nov 2024 15:30) (87 - 98)  BP: 134/77 (17 Nov 2024 15:30) (134/77 - 162/85)  BP(mean): --  RR: 18 (17 Nov 2024 15:30) (18 - 18)  SpO2: 98% (17 Nov 2024 15:30) (94% - 98%)    Parameters below as of 17 Nov 2024 15:30  Patient On (Oxygen Delivery Method): room air      PE:   Official CXR reading: (On Chart)  Official EKG reading: (On Chart)  Type and Cross/Screen:  performed   NPO after MN  Antibiotics:  as per ID   Anesthesia evaluation (on chart)  Operative Consent (on chart)       Patient  had an opportunity to have all questions asked and answered Patient is aware of all risks, benefits, alternatives and recuperative period involved with the planned surgical procedure.  No assurances or guarantees were given to the patient.  Pt is aware of the severity of his condition and that he is at risk to lose part of the foot , all of the foot or even a below knee amputation. Pt may require serial procedures if symptoms persist

## 2024-11-17 NOTE — PROGRESS NOTE ADULT - PROBLEM SELECTOR PLAN 1
post operative radiographs ordered left foot  Pt is to be discharged from recovery room to [facility] under the care of the hospitalist team  pt is to elevate extremity  Pt is to be non weight bearing left foot with crutches   will cont to follow and discuss with all attendings

## 2024-11-17 NOTE — PROGRESS NOTE ADULT - SUBJECTIVE AND OBJECTIVE BOX
NEPHROLOGY PROGRESS NOTE    CHIEF COMPLAINT:  CKD    HPI:  Renal function still not at baseline.  BP trending higher today.      EXAM:  Vital Signs Last 24 Hrs  T(C): 36.8 (17 Nov 2024 04:59), Max: 37.1 (16 Nov 2024 14:31)  T(F): 98.3 (17 Nov 2024 04:59), Max: 98.8 (16 Nov 2024 14:31)  HR: 90 (17 Nov 2024 04:59) (90 - 95)  BP: 162/85 (17 Nov 2024 04:59) (119/74 - 162/85)  BP(mean): --  RR: 18 (17 Nov 2024 04:59) (18 - 18)  SpO2: 95% (17 Nov 2024 04:59) (94% - 96%)    Parameters below as of 17 Nov 2024 04:59  Patient On (Oxygen Delivery Method): room air      I&O's Summary    16 Nov 2024 07:01  -  17 Nov 2024 07:00  --------------------------------------------------------  IN: 500 mL / OUT: 0 mL / NET: 500 mL      Conversant, in no apparent distress  Normal respiratory effort, lungs clear bilaterally  Heart RRR with no murmur, no peripheral edema    LABS                        10.0   8.01  )-----------( 137      ( 16 Nov 2024 06:00 )             31.3     11-17    136  |  104  |  71[H]  ----------------------------<  242[H]  4.7   |  21[L]  |  2.69[H]    Ca    9.7      17 Nov 2024 06:15      [place] T 24.4      Impression:  LUISA CKD 3, Diabetic nephropathy, ? vanco nephrotoxicity  Diabetes with foot wound  Hypertension, variable control     Recommend:  May continue ARB and monitor BP  Tight glycemic control  Vanco stopped  Monitor daily BMP

## 2024-11-17 NOTE — PROGRESS NOTE ADULT - PROBLEM SELECTOR PLAN 1
pt is to be brought to the OR for surgical exploration, irrigation and hemostasis management all left foot   Pt is to be admitted to [facility] upon discharge from the recovery room under the care of the hospitalist team.   will cont to follow and discuss with all attendings

## 2024-11-17 NOTE — BRIEF OPERATIVE NOTE - NSICDXBRIEFPOSTOP_GEN_ALL_CORE_FT
POST-OP DIAGNOSIS:  Chronic osteomyelitis 12-Nov-2024 17:08:34  Salvatore Davenport  DM foot ulcer 12-Nov-2024 17:08:40  Salvatore Davenport  Bleeding from wound 17-Nov-2024 17:56:02  Salvatore Davenport  
POST-OP DIAGNOSIS:  Chronic osteomyelitis 12-Nov-2024 17:08:34  Salvatore Davenport  DM foot ulcer 12-Nov-2024 17:08:40  Salvatore Davenport

## 2024-11-17 NOTE — PROGRESS NOTE ADULT - SUBJECTIVE AND OBJECTIVE BOX
PROGRESS NOTE   Patient is a 49y old  Male who presents with a chief complaint of foot infection (17 Nov 2024 08:24)      HPI:  49-year-old male with past medical history of DM2, HTN, HLD, NEUROPATHY, PVD,ckd4, ETOH abuser,  NON COMPLIANT WITH TT, recently discharged after tt for , Diabetic foot ulcer with cellullitis ,ac on ch OM with sepstic arthritis amputation of of 2nd left toe, OM left great toe-,surg done by podiatry,9/20culture of wound E fecalis, path OM , with clear margins. left hospital on11/4 against medical advice for his personal problem, The patient return for persistent infection, foul-smelling discharge.  Patient denies any chest pain, no shortness of breath.  No fever or chills.  No cough/URI.  No neck or back pain.  No numbness or tingling.  No focal weakness.  No aggravating or alleviating factors otherwise noted.  No other acute complaints. Patient states he sometimes drinks, states he did not drink today.  In ED afebrile, o2 sat 90 %, /99, ,patient is being admitted for further care, and podiatry and ID consult called (07 Nov 2024 17:57)      Vital Signs Last 24 Hrs  T(C): 36.8 (17 Nov 2024 04:59), Max: 37.1 (16 Nov 2024 14:31)  T(F): 98.3 (17 Nov 2024 04:59), Max: 98.8 (16 Nov 2024 14:31)  HR: 90 (17 Nov 2024 04:59) (90 - 95)  BP: 162/85 (17 Nov 2024 04:59) (119/74 - 162/85)  BP(mean): --  RR: 18 (17 Nov 2024 04:59) (18 - 18)  SpO2: 95% (17 Nov 2024 04:59) (94% - 96%)    Parameters below as of 17 Nov 2024 04:59  Patient On (Oxygen Delivery Method): room air                              10.0   8.01  )-----------( 137      ( 16 Nov 2024 06:00 )             31.3               11-17    136  |  104  |  71[H]  ----------------------------<  242[H]  4.7   |  21[L]  |  2.69[H]    Ca    9.7      17 Nov 2024 06:15        PHYSICAL EXAM  POD#5 left foot s/p surgical Debridement of all non-viable soft tissue and bone, debridement of deep space infection of left foot with left 1st partial ray resection and packing removed today all left foot. No noted strikethrough in dressing, noted no active bleed. Drains intact.  The sutures are intact and the flap is viable at this time.  There is decreased erythema, edema and calor noted. Patient has guarded prognosis . Will cont to allow any further demarcation to occur and will closely monitor.  Pt  is responding favorable to the current care plan.  Sterile dressing change performed today    Vascular: Dorsalis Pedis and Posterior Tibial pulses palpable 2/4 bilaterally.  Capillary re-fill time less then 3 seconds digits 1-5 bilateral.  Mild-moderate edema bilaterally up to the ankle left foot   Neuro: Protective sensation diminished to the level of the digits bilateral.   MSK: Muscle strength 5/5 all major muscle groups bilateral.

## 2024-11-17 NOTE — PROGRESS NOTE ADULT - ASSESSMENT
The patient is a 49 year old male with a history of HTN, HL, DM, PAD, CKD, toe osteomyelitis who presents with toe infection.    Plan:  - ECG with sinus rhythm and no evidence of ischemia/infarction  - Continue amlodipine 10 mg daily  - Continue carvedilol 6.25 mg bid  - Continue losartan 50 mg daily  - Podiatry and ID follow-up  - IV antibiotics

## 2024-11-17 NOTE — PROGRESS NOTE ADULT - SUBJECTIVE AND OBJECTIVE BOX
Patient is a 49y old  Male who presents with a chief complaint of foot infection (16 Nov 2024 17:29)      INTERVAL HPI/OVERNIGHT EVENTS:  Overnight nothing significant happened.   Pt was seen today at bedside, no acute complaints. Denies fever, chills, abdominal pain, cough , sob, chest pain.     MEDICATIONS  (STANDING):  amLODIPine   Tablet 10 milliGRAM(s) Oral daily  carvedilol 6.25 milliGRAM(s) Oral every 12 hours  dextrose 5%. 1000 milliLiter(s) (50 mL/Hr) IV Continuous <Continuous>  dextrose 5%. 1000 milliLiter(s) (50 mL/Hr) IV Continuous <Continuous>  dextrose 5%. 1000 milliLiter(s) (100 mL/Hr) IV Continuous <Continuous>  dextrose 50% Injectable 25 Gram(s) IV Push once  dextrose 50% Injectable 12.5 Gram(s) IV Push once  gabapentin 100 milliGRAM(s) Oral three times a day  glucagon  Injectable 1 milliGRAM(s) IntraMuscular once  heparin   Injectable 5000 Unit(s) SubCutaneous every 12 hours  insulin glargine Injectable (LANTUS) 36 Unit(s) SubCutaneous at bedtime  insulin lispro (ADMELOG) corrective regimen sliding scale   SubCutaneous at bedtime  insulin lispro (ADMELOG) corrective regimen sliding scale   SubCutaneous three times a day before meals  insulin lispro Injectable (ADMELOG) 12 Unit(s) SubCutaneous three times a day before meals  lactobacillus acidophilus 1 Tablet(s) Oral two times a day with meals  linagliptin 5 milliGRAM(s) Oral daily  losartan 50 milliGRAM(s) Oral daily  thiamine 100 milliGRAM(s) Oral daily    MEDICATIONS  (PRN):  acetaminophen     Tablet .. 650 milliGRAM(s) Oral every 6 hours PRN Temp greater or equal to 38C (100.4F), Mild Pain (1 - 3)  dextrose Oral Gel 15 Gram(s) Oral once PRN Blood Glucose LESS THAN 70 milliGRAM(s)/deciliter  dextrose Oral Gel 15 Gram(s) Oral once PRN Blood Glucose LESS THAN 70 milliGRAM(s)/deciliter  diphenhydrAMINE 25 milliGRAM(s) Oral every 6 hours PRN Rash and/or Itching  guaiFENesin Oral Liquid (Sugar-Free) 100 milliGRAM(s) Oral every 6 hours PRN Cough  melatonin 5 milliGRAM(s) Oral at bedtime PRN Insomnia      Allergies    No Known Allergies    Intolerances        REVIEW OF SYSTEMS:  CONSTITUTIONAL: No fever, weight loss, or fatigue  EYES: No eye pain, visual disturbances, or discharge  ENMT:  No difficulty hearing, tinnitus, vertigo; No sinus or throat pain  NECK: No pain or stiffness  RESPIRATORY: No cough, wheezing, chills or hemoptysis; No shortness of breath  CARDIOVASCULAR: No chest pain, palpitations, lightheadedness, or leg swelling  GASTROINTESTINAL: No abdominal or epigastric pain. No nausea, vomiting, or hematemesis; No diarrhea or constipation. No melena or hematochezia.  GENITOURINARY: No dysuria, frequency, hematuria, or incontinence  NEUROLOGICAL: No headaches, memory loss, vertigo, loss of strength, numbness, or tremors  SKIN: No itching, burning, rashes, or lesions   MUSCULOSKELETAL: No joint pain or swelling; No muscle, back, or extremity pain  PSYCHIATRIC: No depression, anxiety, or mood swings        Vital Signs Last 24 Hrs  T(C): 36.8 (17 Nov 2024 04:59), Max: 37.1 (16 Nov 2024 14:31)  T(F): 98.3 (17 Nov 2024 04:59), Max: 98.8 (16 Nov 2024 14:31)  HR: 90 (17 Nov 2024 04:59) (87 - 95)  BP: 162/85 (17 Nov 2024 04:59) (119/74 - 162/85)  BP(mean): --  RR: 18 (17 Nov 2024 04:59) (18 - 18)  SpO2: 95% (17 Nov 2024 04:59) (94% - 96%)    Parameters below as of 17 Nov 2024 04:59  Patient On (Oxygen Delivery Method): room air        PHYSICAL EXAM:  GENERAL: NAD, well-groomed, well-developed  HEAD:  Atraumatic, Normocephalic  EYES: EOMI, PERRLA, conjunctiva and sclera clear  ENMT: Moist mucous membranes,   NECK: Supple, No JVD, Normal thyroid  NERVOUS SYSTEM:  Alert & Oriented X 3  CHEST/LUNG: Clear to auscultation bilaterally; No rales, rhonchi, wheezing, or rubs  HEART: Regular rate and rhythm; No murmurs, rubs, or gallops  ABDOMEN: Soft, Nontender, Nondistended; Bowel sounds present  EXTREMITIES:  2+ Peripheral Pulses, No clubbing, cyanosis, or edema, left foot covered in bandage   SKIN: No rashes or lesions    LABS:    17 Nov 2024 06:15    136    |  104    |  71     ----------------------------<  242    4.7     |  21     |  2.69     Ca    9.7        17 Nov 2024 06:15        Urinalysis Basic - ( 17 Nov 2024 06:15 )    Color: x / Appearance: x / SG: x / pH: x  Gluc: 242 mg/dL / Ketone: x  / Bili: x / Urobili: x   Blood: x / Protein: x / Nitrite: x   Leuk Esterase: x / RBC: x / WBC x   Sq Epi: x / Non Sq Epi: x / Bacteria: x      CAPILLARY BLOOD GLUCOSE      POCT Blood Glucose.: 245 mg/dL (17 Nov 2024 07:57)  POCT Blood Glucose.: 285 mg/dL (16 Nov 2024 21:02)  POCT Blood Glucose.: 216 mg/dL (16 Nov 2024 16:49)  POCT Blood Glucose.: 225 mg/dL (16 Nov 2024 12:19)

## 2024-11-17 NOTE — PROGRESS NOTE ADULT - ASSESSMENT
Diabetic foot ulcer with cellulitis with acute on chronic osteomyelitis left foot  CT- consistent with OM of first Toe left  Podiatry following    On 11/12 had amputation , debulking and flap on left foot by Dr Finnegan  Cont abx as per ID  Monitor vanco level   Cultures positive for staphylococcus   Monitor fever and WBC curve     DM2 with hyperglycemia, with neuropathy , nephropathy with likely PVD   FS + DOROTHY  Increase Lantus to 36 units   Increase lispro to 12 units 3 times a day '  Continue Linagliptin 5 mg daily       HTN  Continue Losartan 50 mg , amlodipine 10 mg, coreg 6.25 bid    CKD 3 likely Diabetic nephropathy   Nephro following      ETOH use disorder   Cessation counselling   Folic acid, thiamine     Obesity    EDUAR   O2 as needed    Anemia of ch disease  Monitor CBC     DVT prophylaxis   heparin

## 2024-11-17 NOTE — BRIEF OPERATIVE NOTE - NSICDXBRIEFPREOP_GEN_ALL_CORE_FT
PRE-OP DIAGNOSIS:  Chronic osteomyelitis 12-Nov-2024 17:08:17  Salvatore Davenport  DM foot ulcer 12-Nov-2024 17:08:24  Salvatore Davenport  Bleeding from wound 17-Nov-2024 17:55:43  Salvatore Davenport  
PRE-OP DIAGNOSIS:  Chronic osteomyelitis 12-Nov-2024 17:08:17  Salvatore Davenport  DM foot ulcer 12-Nov-2024 17:08:24  Salvatore Davenport

## 2024-11-17 NOTE — PROGRESS NOTE ADULT - SUBJECTIVE AND OBJECTIVE BOX
JOB SUZANNA is a 49yMale , patient examined and chart reviewed.     INTERVAL HPI/ OVERNIGHT EVENTS:  Events noted. Went back to OR as developed profuse bleeding from surgical wound.  Afebrile.    PAST MEDICAL & SURGICAL HISTORY:  Prediabetes  HTN (hypertension)  HLD (hyperlipidemia)  DM2 (diabetes mellitus, type 2)    For details regarding the patient's social history, family history, and other miscellaneous elements, please refer the initial infectious diseases consultation and/or the admitting history and physical examination for this admission.    ROS:  CONSTITUTIONAL:  Negative fever or chills  EYES:  Negative  blurry vision or double vision  CARDIOVASCULAR:  Negative for chest pain or palpitations  RESPIRATORY:  Negative for cough, wheezing, or SOB   GASTROINTESTINAL:  Negative for nausea, vomiting, diarrhea, constipation, or abdominal pain  GENITOURINARY:  Negative frequency, urgency or dysuria  NEUROLOGIC:  No headache, confusion, dizziness, lightheadedness  All other systems were reviewed and are negative     No Known Allergies      Current inpatient medications :    ANTIBIOTICS/RELEVANT:    MEDICATIONS  (STANDING):  amLODIPine   Tablet 10 milliGRAM(s) Oral daily  carvedilol 6.25 milliGRAM(s) Oral every 12 hours  dextrose 5%. 1000 milliLiter(s) (100 mL/Hr) IV Continuous <Continuous>  dextrose 5%. 1000 milliLiter(s) (50 mL/Hr) IV Continuous <Continuous>  dextrose 5%. 1000 milliLiter(s) (50 mL/Hr) IV Continuous <Continuous>  dextrose 50% Injectable 25 Gram(s) IV Push once  dextrose 50% Injectable 12.5 Gram(s) IV Push once  gabapentin 100 milliGRAM(s) Oral three times a day  glucagon  Injectable 1 milliGRAM(s) IntraMuscular once  heparin   Injectable 5000 Unit(s) SubCutaneous every 12 hours  insulin glargine Injectable (LANTUS) 36 Unit(s) SubCutaneous at bedtime  insulin lispro (ADMELOG) corrective regimen sliding scale   SubCutaneous three times a day before meals  insulin lispro (ADMELOG) corrective regimen sliding scale   SubCutaneous at bedtime  insulin lispro Injectable (ADMELOG) 12 Unit(s) SubCutaneous three times a day before meals  lactobacillus acidophilus 1 Tablet(s) Oral two times a day with meals  linagliptin 5 milliGRAM(s) Oral daily  losartan 50 milliGRAM(s) Oral daily  thiamine 100 milliGRAM(s) Oral daily    MEDICATIONS  (PRN):  acetaminophen     Tablet .. 650 milliGRAM(s) Oral every 6 hours PRN Temp greater or equal to 38C (100.4F), Mild Pain (1 - 3)  dextrose Oral Gel 15 Gram(s) Oral once PRN Blood Glucose LESS THAN 70 milliGRAM(s)/deciliter  dextrose Oral Gel 15 Gram(s) Oral once PRN Blood Glucose LESS THAN 70 milliGRAM(s)/deciliter  diphenhydrAMINE 25 milliGRAM(s) Oral every 6 hours PRN Rash and/or Itching  guaiFENesin Oral Liquid (Sugar-Free) 100 milliGRAM(s) Oral every 6 hours PRN Cough  HYDROmorphone  Injectable 0.2 milliGRAM(s) IV Push every 10 minutes PRN Moderate Pain (4 - 6)  HYDROmorphone  Injectable 0.5 milliGRAM(s) IV Push every 10 minutes PRN Severe Pain (7 - 10)  melatonin 5 milliGRAM(s) Oral at bedtime PRN Insomnia  ondansetron Injectable 4 milliGRAM(s) IV Push once PRN Nausea and/or Vomiting      Objective:  Vital Signs Last 24 Hrs  T(C): 36.7 (17 Nov 2024 20:38), Max: 37.2 (17 Nov 2024 14:00)  T(F): 98 (17 Nov 2024 20:38), Max: 99 (17 Nov 2024 14:00)  HR: 93 (17 Nov 2024 20:38) (87 - 98)  BP: 110/74 (17 Nov 2024 20:38) (108/71 - 162/85)  RR: 19 (17 Nov 2024 20:38) (18 - 19)  SpO2: 95% (17 Nov 2024 20:38) (93% - 99%)    Parameters below as of 17 Nov 2024 20:38  Patient On (Oxygen Delivery Method): room air      Physical Exam:  General: no acute distress  Neck: supple, trachea midline  Lungs: clear, no wheeze/rhonchi  Cardiovascular: regular rate and rhythm, S1 S2  Abdomen: soft, nontender,  bowel sounds normal  Neurological: alert and oriented x3  Skin: no rash  Extremities: Left foot drsg c/d/i      LABS:                        9.1    9.01  )-----------( 168      ( 17 Nov 2024 16:09 )             28.1   11-17    136  |  104  |  71[H]  ----------------------------<  242[H]  4.7   |  21[L]  |  2.69[H]    Ca    9.7      17 Nov 2024 06:15      Vancomycin Level, Trough (11.17.24 @ 06:15)    Vancomycin Level, Trough: 24.4      MICROBIOLOGY:  Culture - Tissue with Gram Stain (11.12.24 @ 18:21    -  Trimethoprim/Sulfamethoxazole: R >2/38   -  Vancomycin: S 1   -  Clindamycin: R >4   -  Erythromycin: R >4   -  Gentamicin: S <=4 Should not be used as monotherapy   -  Oxacillin: R >2   -  Penicillin: R >2   -  Rifampin: S <=1 Should not be used as monotherapy   -  Tetracycline: S <=4   Gram Stain:   No polymorphonuclear leukocytes seen per low power field  No organisms seen per oil power field   Specimen Source: Bone   Culture Results:   Rare Staphylococcus epidermidis  Few Corynebacterium resistens "Susceptibilities not performed"   Organism Identification: Staphylococcus epidermidis   Organism: Staphylococcus epidermidis   Method Type: STEPHANIE    Culture - Tissue with Gram Stain (collected 12 Nov 2024 18:21)  Source: Tissue  Gram Stain (14 Nov 2024 14:21):    No polymorphonuclear leukocytes seen per low power field    No organisms seen per oil power field  Preliminary Report (14 Nov 2024 14:21):    Rare Staphylococcus simulans    Culture - Wound Aerobic (collected 12 Nov 2024 18:21)  Source: .Other  Preliminary Report (14 Nov 2024 13:25):    Rare Staphylococcus simulans "Susceptibilities not performed"    Culture - Wound Aerobic/Anaerobic (collected 12 Nov 2024 18:21)  Source: .Surgical Swab  Preliminary Report (14 Nov 2024 13:07):    Commensal nicholas consistent with body site    Culture - Blood (collected 01 Nov 2024 20:00)  Source: .Blood BLOOD  Preliminary Report (04 Nov 2024 01:02):    No growth at 48 Hours    Culture - Blood (collected 01 Nov 2024 20:00)  Source: .Blood BLOOD  Preliminary Report (04 Nov 2024 01:02):    No growth at 48 Hours    RADIOLOGY & ADDITIONAL STUDIES:      Assessment :  49-year-old male with  DM2, HTN, HLD, NEUROPATHY, PVD,ckd4, ETOH abuse, Diabetic foot ulcer with cellullitis ,ac on ch OM 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  He has been on amoxicillin twice daily and states he has been compliant with his antibiotic.  Patient noted that his surgical site had developed a bad odor with some discharge.  Left foot with exposed metatarsal head.    Sp Amputation, foot, first ray Debulking, flap, foot 12-Nov-2024  OR cultures with CNS  Bleeding at surgical site resolved  Path at clean margins with mild chronic OM  Events noted today. Went back to OR as developed profuse bleeding from surgical wound.  Vanc level high    Plan :   Hold Vancomycin for now and repeat Vanc level in am  Trend temps and cbc  Podiatry on case  Stable from ID standpoint    Continue with present regiment.  Appropriate use of antibiotics and adverse effects reviewed.      > 35 minutes were spent in direct patient care reviewing notes, medications ,labs data/ imaging , discussion with multidisciplinary team.    Thank you for allowing me to participate in care of your patient .    Linsey Mack MD  Infectious Disease  [phone]

## 2024-11-17 NOTE — PROVIDER CONTACT NOTE (CHANGE IN STATUS NOTIFICATION) - SITUATION
Patient yelled "Nurse!" into hallway, found to have large amt sanguineous drainage through Ace wrap dressing on left foot with pool of blood on floor.

## 2024-11-17 NOTE — PROGRESS NOTE ADULT - SUBJECTIVE AND OBJECTIVE BOX
Chief Complaint: Toe infection    Interval Events: No events overnight.    Review of Systems:  General: No fevers, chills, weight gain  Skin: No rashes, color changes  Cardiovascular: No chest pain, orthopnea  Respiratory: No shortness of breath, cough  Gastrointestinal: No nausea, abdominal pain  Genitourinary: No incontinence, pain with urination  Musculoskeletal: No pain, swelling, decreased range of motion  Neurological: No headache, weakness  Psychiatric: No depression, anxiety  Endocrine: No weight gain, increased thirst  All other systems are comprehensively negative.    Physical Exam:  Vital Signs Last 24 Hrs  T(C): 36.8 (17 Nov 2024 04:59), Max: 37.1 (16 Nov 2024 14:31)  T(F): 98.3 (17 Nov 2024 04:59), Max: 98.8 (16 Nov 2024 14:31)  HR: 90 (17 Nov 2024 04:59) (87 - 95)  BP: 162/85 (17 Nov 2024 04:59) (119/74 - 162/85)  BP(mean): --  RR: 18 (17 Nov 2024 04:59) (18 - 18)  SpO2: 95% (17 Nov 2024 04:59) (94% - 96%)  Parameters below as of 17 Nov 2024 04:59  Patient On (Oxygen Delivery Method): room air  General: NAD  HEENT: MMM  Neck: No JVD, no carotid bruit  Lungs: CTAB  CV: RRR, nl S1/S2, no M/R/G  Abdomen: S/NT/ND, +BS  Extremities: No LE edema, no cyanosis  Neuro: AAOx3, non-focal  Skin: No rash    Labs:    11-17    136  |  104  |  71[H]  ----------------------------<  242[H]  4.7   |  21[L]  |  2.69[H]    Ca    9.7      17 Nov 2024 06:15                          10.0   8.01  )-----------( 137      ( 16 Nov 2024 06:00 )             31.3

## 2024-11-17 NOTE — CHART NOTE - NSCHARTNOTEFT_GEN_A_CORE
Was called for uncontrolled hemostasis. Surgical incision found active arterial bleed, re-application of 1 inch packing under sterile technique, noted was controlled hemostasis. Plan to take pt to OR for surgical exploration.  Anesthesia notified of emergent situation.  CBC, type and screen ordered.  Understand patient is not NPO, but due to severity of condition, will proceed with OR procedure.     Patient  had an opportunity to have all questions asked and answered Patient is aware of all risks, benefits, alternatives and recuperative period involved with the planned surgical procedure.  No assurances or guarantees were given to the patient.  Pt  is aware that serial procedures maybe required if symptoms persist.   Pt consents to the proposed care plan at this time having all of his questions asked and answered to his satisfaction.   Pt understands the severity of his condition and that he is at risk to lose part of the foot all of the foot or even a below knee amputation.   Prognosis is guarded Was called for uncontrolled hemostasis. Surgical incision found with  active bleed, re-application of 1 inch packing under sterile technique, noted was controlled hemostasis. Plan to take pt to OR for surgical exploration.  Anesthesia notified of emergent situation.  CBC, type and screen ordered.  Understand patient is not NPO, but due to severity of condition, will proceed with OR procedure under local anesthesia. Pt was walking on the surgical site against medical advice. Pt noticed bleeding and called nursing. I and my resident were made aware of the change in condition and returned to the the hospital rapidly to respond.      Patient  had an opportunity to have all questions asked and answered Patient is aware of all risks, benefits, alternatives and recuperative period involved with the planned surgical procedure.  No assurances or guarantees were given to the patient.  Pt  is aware that serial procedures maybe required if symptoms persist.   Pt consents to the proposed care plan at this time having all of his questions asked and answered to his satisfaction.   Pt understands the severity of his condition and that he is at risk to lose part of the foot all of the foot or even a below knee amputation.   Prognosis is guarded

## 2024-11-17 NOTE — BRIEF OPERATIVE NOTE - NSICDXBRIEFPROCEDURE_GEN_ALL_CORE_FT
PROCEDURES:  Amputation, foot, first ray 12-Nov-2024 17:07:29  Salvatore Davenport  Debulking, flap, foot 12-Nov-2024 17:08:04  Salvatore Davenport  
PROCEDURES:  Amputation, foot, first ray 12-Nov-2024 17:07:29  Salvatore Davenport  Debulking, flap, foot 12-Nov-2024 17:08:04  Salvatore Davenport  Exploration, wound, foot 17-Nov-2024 17:55:15  Salvatore Davenport

## 2024-11-17 NOTE — PROGRESS NOTE ADULT - SUBJECTIVE AND OBJECTIVE BOX
POST OP NOTE    SUZANNA [facility]IS[facility]  MRN-[id number]    Date:11/17/2024  Surgeon: Salvatore Davenport DPM   Assistants: Saman Mayo DPM resident   Procedure: surgical exploration of wound with irrigation and hemostasis of vessel with vascular clips   Pathology: soft  tissue   EBL: 2    Patient tolerated both anesthesia and  procedures well and was transported from the operating room to the recovery room with vital signs stable and neurovascular status intact.  Patient transferred to PACU in stable condition.       PE / Post Op Check:       GEN: NAD, AAOx3, resting comfortably with pain controlled      LE Focused: CFT shows adequate perfusion b/l with no signs of ischemic compromise.  No strikethrough is appreciated on surgical dressings.  Dressings were dry, clean, and intact.  No neuromuscular deficits appreciated.

## 2024-11-17 NOTE — PROGRESS NOTE ADULT - PROBLEM SELECTOR PLAN 1
Patient seen and evaluated  Discussed diagnosis and treatment with patient.  Patient is s/p surgical debridement of all non-viable soft tissue and bone left foot with partial 1st ray resection and packing all left foot . DOS 11/12/2024   Packing removed today, controlled hemostasis.  Under sterile technique and 3-0 Nylon, retention suture was placed at the area of the drain, skin edges reapproximated   OR WCx demonstrates staph infection,   Surgical bone pathology demonstrates mild chronic OM in proximal margin.  Per ID, pt will go home with PO abx  Keep the postop dressing clean/dry/intact   Non weight bearing on left foot   Recs elevation on left lower extremities   Pt may require serial surgical debridements/amputation and possible BKA if symptoms persist.    radiographs reviewed and noted all operative regions in good position   Pt understands the severity of his condition and  that he is at risk to lose part of the left foot, all the foot or below the knee amputation. All questions were asked and answered for patient satisfaction    Medical management as per primary team   Pt stable for dsc from podiatry  Will discuss with all attendings Patient seen and evaluated  Discussed diagnosis and treatment with patient.  Patient is s/p surgical debridement of all non-viable soft tissue and bone left foot with partial 1st ray resection and packing all left foot . DOS 11/12/2024   Packing removed today, controlled hemostasis.    OR WCx demonstrates staph infection,   Surgical bone pathology demonstrates mild chronic OM in proximal margin.  Per ID, pt will go home with PO abx  Keep the postop dressing clean/dry/intact   Non weight bearing on left foot   Recs elevation on left lower extremities   Pt may require serial surgical debridements/amputation and possible BKA if symptoms persist.    radiographs reviewed and noted all operative regions in good position   Pt understands the severity of his condition and  that he is at risk to lose part of the left foot, all the foot or below the knee amputation. All questions were asked and answered for patient satisfaction    Medical management as per primary team   Pt stable for dsc from podiatry  Will discuss with all attendings

## 2024-11-18 LAB
ANION GAP SERPL CALC-SCNC: 11 MMOL/L — SIGNIFICANT CHANGE UP (ref 5–17)
BUN SERPL-MCNC: 83 MG/DL — HIGH (ref 7–23)
CALCIUM SERPL-MCNC: 9.4 MG/DL — SIGNIFICANT CHANGE UP (ref 8.4–10.5)
CHLORIDE SERPL-SCNC: 104 MMOL/L — SIGNIFICANT CHANGE UP (ref 96–108)
CO2 SERPL-SCNC: 22 MMOL/L — SIGNIFICANT CHANGE UP (ref 22–31)
CREAT SERPL-MCNC: 3.16 MG/DL — HIGH (ref 0.5–1.3)
CULTURE RESULTS: ABNORMAL
CULTURE RESULTS: ABNORMAL
CULTURE RESULTS: SIGNIFICANT CHANGE UP
EGFR: 23 ML/MIN/1.73M2 — LOW
GLUCOSE BLDC GLUCOMTR-MCNC: 232 MG/DL — HIGH (ref 70–99)
GLUCOSE BLDC GLUCOMTR-MCNC: 245 MG/DL — HIGH (ref 70–99)
GLUCOSE BLDC GLUCOMTR-MCNC: 246 MG/DL — HIGH (ref 70–99)
GLUCOSE BLDC GLUCOMTR-MCNC: 282 MG/DL — HIGH (ref 70–99)
GLUCOSE SERPL-MCNC: 249 MG/DL — HIGH (ref 70–99)
HCT VFR BLD CALC: 23.6 % — LOW (ref 39–50)
HGB BLD-MCNC: 7.6 G/DL — LOW (ref 13–17)
MCHC RBC-ENTMCNC: 28.5 PG — SIGNIFICANT CHANGE UP (ref 27–34)
MCHC RBC-ENTMCNC: 32.2 G/DL — SIGNIFICANT CHANGE UP (ref 32–36)
MCV RBC AUTO: 88.4 FL — SIGNIFICANT CHANGE UP (ref 80–100)
NRBC # BLD: 0 /100 WBCS — SIGNIFICANT CHANGE UP (ref 0–0)
ORGANISM # SPEC MICROSCOPIC CNT: ABNORMAL
PLATELET # BLD AUTO: 157 K/UL — SIGNIFICANT CHANGE UP (ref 150–400)
POTASSIUM SERPL-MCNC: 4.8 MMOL/L — SIGNIFICANT CHANGE UP (ref 3.5–5.3)
POTASSIUM SERPL-SCNC: 4.8 MMOL/L — SIGNIFICANT CHANGE UP (ref 3.5–5.3)
RBC # BLD: 2.67 M/UL — LOW (ref 4.2–5.8)
RBC # FLD: 13.6 % — SIGNIFICANT CHANGE UP (ref 10.3–14.5)
SODIUM SERPL-SCNC: 137 MMOL/L — SIGNIFICANT CHANGE UP (ref 135–145)
SPECIMEN SOURCE: SIGNIFICANT CHANGE UP
VANCOMYCIN FLD-MCNC: 14.4 UG/ML — SIGNIFICANT CHANGE UP
WBC # BLD: 8.96 K/UL — SIGNIFICANT CHANGE UP (ref 3.8–10.5)
WBC # FLD AUTO: 8.96 K/UL — SIGNIFICANT CHANGE UP (ref 3.8–10.5)

## 2024-11-18 PROCEDURE — 99232 SBSQ HOSP IP/OBS MODERATE 35: CPT

## 2024-11-18 RX ORDER — DOXYCYCLINE HYCLATE 150 MG/1
100 TABLET, COATED ORAL EVERY 12 HOURS
Refills: 0 | Status: DISCONTINUED | OUTPATIENT
Start: 2024-11-18 | End: 2024-11-20

## 2024-11-18 RX ORDER — INSULIN GLARGINE 100 [IU]/ML
40 INJECTION, SOLUTION SUBCUTANEOUS AT BEDTIME
Refills: 0 | Status: DISCONTINUED | OUTPATIENT
Start: 2024-11-18 | End: 2024-11-19

## 2024-11-18 RX ADMIN — Medication 12 UNIT(S): at 08:12

## 2024-11-18 RX ADMIN — LOSARTAN POTASSIUM 50 MILLIGRAM(S): 100 TABLET, FILM COATED ORAL at 05:31

## 2024-11-18 RX ADMIN — Medication 100 MILLIGRAM(S): at 12:22

## 2024-11-18 RX ADMIN — GABAPENTIN 100 MILLIGRAM(S): 300 CAPSULE ORAL at 21:50

## 2024-11-18 RX ADMIN — Medication 4: at 08:11

## 2024-11-18 RX ADMIN — Medication 13 UNIT(S): at 16:59

## 2024-11-18 RX ADMIN — GABAPENTIN 100 MILLIGRAM(S): 300 CAPSULE ORAL at 05:31

## 2024-11-18 RX ADMIN — Medication 1 TABLET(S): at 17:01

## 2024-11-18 RX ADMIN — DOXYCYCLINE HYCLATE 100 MILLIGRAM(S): 150 TABLET, COATED ORAL at 17:09

## 2024-11-18 RX ADMIN — CARVEDILOL 6.25 MILLIGRAM(S): 25 TABLET, FILM COATED ORAL at 05:31

## 2024-11-18 RX ADMIN — Medication 1 TABLET(S): at 08:12

## 2024-11-18 RX ADMIN — GABAPENTIN 100 MILLIGRAM(S): 300 CAPSULE ORAL at 13:02

## 2024-11-18 RX ADMIN — CARVEDILOL 6.25 MILLIGRAM(S): 25 TABLET, FILM COATED ORAL at 17:09

## 2024-11-18 RX ADMIN — INSULIN GLARGINE 40 UNIT(S): 100 INJECTION, SOLUTION SUBCUTANEOUS at 21:50

## 2024-11-18 RX ADMIN — Medication 6: at 12:20

## 2024-11-18 RX ADMIN — AMLODIPINE BESYLATE 10 MILLIGRAM(S): 10 TABLET ORAL at 05:31

## 2024-11-18 RX ADMIN — Medication 4: at 16:59

## 2024-11-18 RX ADMIN — ACETAMINOPHEN, DIPHENHYDRAMINE HCL, PHENYLEPHRINE HCL 5 MILLIGRAM(S): 325; 25; 5 TABLET ORAL at 21:50

## 2024-11-18 RX ADMIN — Medication 13 UNIT(S): at 12:22

## 2024-11-18 RX ADMIN — Medication 25 MILLIGRAM(S): at 21:50

## 2024-11-18 NOTE — CHART NOTE - NSCHARTNOTEFT_GEN_A_CORE
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 left hospital on11/4 against medical advice for his personal problem, The patient return for persistent infection, foul-smelling discharge.  Patient denies any chest pain, no shortness of breath.  No fever or chills.  No cough/URI.  No neck or back pain.  No numbness or tingling.  No focal weakness.  No aggravating or alleviating factors otherwise noted.  No other acute complaints. Patient states he sometimes drinks, states he did not drink today.  In ED afebrile, o2 sat 90 %, /99, ,patient is being admitted for further care, and podiatry and ID consult called    11/18  Pt seen for nutrition follow-up. Pt s/p OR yesterday for bleed to foot ulcer. Pending amputation to foot/lower leg? Visited patient in room, presents with good appetite/po intake, consuming >75% of CCHO/DASH meals. Drinking Manish BID as provided. Denies n/v/d/c. Pertinent medications/nutrition labs reviewed; -260 x24hr (downtrended from >400 at prior f/u), a1c 8.5% with hx of DM; In house ordered for Lantus (increased from 20 to 36 units), lispro (increased from 7 to 12 units + SS TID before meals and bedtime) to aid in glucose management. Also ordered for Tradjenta, thiamine, lactobacillus. plan to c/w current diet ordered, c/w Manish (7 gm arginine/7 gm glutamine/1.5 gm hmb) x1 packet bid to aid in wound healing. RD to continue to monitor nutrition status per protocol.     Factors impacting intake: [ x] none [ ] nausea  [ ] vomiting [ ] diarrhea [ ] constipation  [ ]chewing problems [ ] swallowing issues  [ ] other:     Diet Prescription: Diet, Consistent Carbohydrate w/Evening Snack:   DASH/TLC {Sodium & Cholesterol Restricted} (11-07-24 @ 18:10)    Intake: excellent    Daily   % Weight Change    Pertinent Medications: MEDICATIONS  (STANDING):  amLODIPine   Tablet 10 milliGRAM(s) Oral daily  carvedilol 6.25 milliGRAM(s) Oral every 12 hours  dextrose 5%. 1000 milliLiter(s) (100 mL/Hr) IV Continuous <Continuous>  dextrose 5%. 1000 milliLiter(s) (50 mL/Hr) IV Continuous <Continuous>  dextrose 5%. 1000 milliLiter(s) (50 mL/Hr) IV Continuous <Continuous>  dextrose 50% Injectable 25 Gram(s) IV Push once  dextrose 50% Injectable 12.5 Gram(s) IV Push once  doxycycline monohydrate Capsule 100 milliGRAM(s) Oral every 12 hours  gabapentin 100 milliGRAM(s) Oral three times a day  glucagon  Injectable 1 milliGRAM(s) IntraMuscular once  heparin   Injectable 5000 Unit(s) SubCutaneous every 12 hours  insulin glargine Injectable (LANTUS) 40 Unit(s) SubCutaneous at bedtime  insulin lispro (ADMELOG) corrective regimen sliding scale   SubCutaneous at bedtime  insulin lispro (ADMELOG) corrective regimen sliding scale   SubCutaneous three times a day before meals  insulin lispro Injectable (ADMELOG) 13 Unit(s) SubCutaneous three times a day before meals  lactobacillus acidophilus 1 Tablet(s) Oral two times a day with meals  thiamine 100 milliGRAM(s) Oral daily    MEDICATIONS  (PRN):  acetaminophen     Tablet .. 650 milliGRAM(s) Oral every 6 hours PRN Temp greater or equal to 38C (100.4F), Mild Pain (1 - 3)  dextrose Oral Gel 15 Gram(s) Oral once PRN Blood Glucose LESS THAN 70 milliGRAM(s)/deciliter  dextrose Oral Gel 15 Gram(s) Oral once PRN Blood Glucose LESS THAN 70 milliGRAM(s)/deciliter  diphenhydrAMINE 25 milliGRAM(s) Oral every 6 hours PRN Rash and/or Itching  guaiFENesin Oral Liquid (Sugar-Free) 100 milliGRAM(s) Oral every 6 hours PRN Cough  melatonin 5 milliGRAM(s) Oral at bedtime PRN Insomnia    Pertinent Labs: 11-18 Na137 mmol/L Glu 249 mg/dL[H] K+ 4.8 mmol/L Cr  3.16 mg/dL[H] BUN 83 mg/dL[H] 11-14 Alb 2.8 g/dL[L]     CAPILLARY BLOOD GLUCOSE      POCT Blood Glucose.: 282 mg/dL (18 Nov 2024 12:01)  POCT Blood Glucose.: 232 mg/dL (18 Nov 2024 07:52)  POCT Blood Glucose.: 269 mg/dL (17 Nov 2024 20:56)  POCT Blood Glucose.: 210 mg/dL (17 Nov 2024 17:58)  POCT Blood Glucose.: 224 mg/dL (17 Nov 2024 15:24)      Edema per flowsheets: +1 L foot/leg  Skin:  diabetic ulcer to L foot      Estimated Needs:   [x ] no change since previous assessment  [ ] recalculated:     Previous Nutrition Diagnosis:   [ ] Inadequate Energy Intake [ ]Inadequate Oral Intake [ ] Excessive Energy Intake   [ ] Underweight [ ] Increased Nutrient Needs [ ] Overweight/Obesity [x ] Altered Nutrition related lab values  [ ] Altered GI Function [ ] Unintended Weight Loss [ ] Food & Nutrition Related Knowledge Deficit [ ] Malnutrition     Nutrition Diagnosis is [x ] ongoing  [ ] resolved [ ] not applicable     New Nutrition Diagnosis: [x ] not applicable       Interventions: plan to c/w current diet ordered, c/w Manish (7 gm arginine/7 gm glutamine/1.5 gm hmb) x1 packet bid to aid in wound healing. RD to continue to monitor nutrition status per protocol.   Recommend  [ ] Change Diet To:  [ ] Nutrition Supplement  [x ] Nutrition Support: allow double protein  * provided manish for d/c    Monitoring and Evaluation:   [X ] Intake [ x ] Tolerance to diet prescription [ x ] weights [ x ] labs[ x ] follow up per protocol  [ ] other:

## 2024-11-18 NOTE — PROGRESS NOTE ADULT - ASSESSMENT
LUISA on CKD 3, Diabetic nephropathy: Nephrotoxic ATN  Diabetes, DFU, S/P FOOT SURGERY 11/12/2024  Hypertension    Renal indices worsening. Off IV vanco. creatinine should plateau soon. To continue current meds. Monitor blood sugar levels. Insulin coverage as needed. Dietary restriction.   Trend BP and titrate BP meds as needed. Avoid nephrotoxic meds as possible. Avoid ACEI, ARB, NSAIDs and IV contrast. Podiatry follow up. Will follow electrolytes and renal function trend.

## 2024-11-18 NOTE — PROGRESS NOTE ADULT - SUBJECTIVE AND OBJECTIVE BOX
ANUJASUZANNA HURLEY is a 49yMale , patient examined and chart reviewed.     INTERVAL HPI/ OVERNIGHT EVENTS:  NAD. No events overnight.  Afebrile.    PAST MEDICAL & SURGICAL HISTORY:  Prediabetes  HTN (hypertension)  HLD (hyperlipidemia)  DM2 (diabetes mellitus, type 2)    For details regarding the patient's social history, family history, and other miscellaneous elements, please refer the initial infectious diseases consultation and/or the admitting history and physical examination for this admission.    ROS:  CONSTITUTIONAL:  Negative fever or chills  EYES:  Negative  blurry vision or double vision  CARDIOVASCULAR:  Negative for chest pain or palpitations  RESPIRATORY:  Negative for cough, wheezing, or SOB   GASTROINTESTINAL:  Negative for nausea, vomiting, diarrhea, constipation, or abdominal pain  GENITOURINARY:  Negative frequency, urgency or dysuria  NEUROLOGIC:  No headache, confusion, dizziness, lightheadedness  All other systems were reviewed and are negative     No Known Allergies      Current inpatient medications :    ANTIBIOTICS/RELEVANT:    MEDICATIONS  (STANDING):  amLODIPine   Tablet 10 milliGRAM(s) Oral daily  carvedilol 6.25 milliGRAM(s) Oral every 12 hours  dextrose 5%. 1000 milliLiter(s) (50 mL/Hr) IV Continuous <Continuous>  dextrose 5%. 1000 milliLiter(s) (50 mL/Hr) IV Continuous <Continuous>  dextrose 5%. 1000 milliLiter(s) (100 mL/Hr) IV Continuous <Continuous>  dextrose 50% Injectable 25 Gram(s) IV Push once  dextrose 50% Injectable 12.5 Gram(s) IV Push once  gabapentin 100 milliGRAM(s) Oral three times a day  glucagon  Injectable 1 milliGRAM(s) IntraMuscular once  heparin   Injectable 5000 Unit(s) SubCutaneous every 12 hours  insulin glargine Injectable (LANTUS) 40 Unit(s) SubCutaneous at bedtime  insulin lispro (ADMELOG) corrective regimen sliding scale   SubCutaneous at bedtime  insulin lispro (ADMELOG) corrective regimen sliding scale   SubCutaneous three times a day before meals  insulin lispro Injectable (ADMELOG) 13 Unit(s) SubCutaneous three times a day before meals  lactobacillus acidophilus 1 Tablet(s) Oral two times a day with meals  losartan 50 milliGRAM(s) Oral daily  thiamine 100 milliGRAM(s) Oral daily    MEDICATIONS  (PRN):  acetaminophen     Tablet .. 650 milliGRAM(s) Oral every 6 hours PRN Temp greater or equal to 38C (100.4F), Mild Pain (1 - 3)  dextrose Oral Gel 15 Gram(s) Oral once PRN Blood Glucose LESS THAN 70 milliGRAM(s)/deciliter  dextrose Oral Gel 15 Gram(s) Oral once PRN Blood Glucose LESS THAN 70 milliGRAM(s)/deciliter  diphenhydrAMINE 25 milliGRAM(s) Oral every 6 hours PRN Rash and/or Itching  guaiFENesin Oral Liquid (Sugar-Free) 100 milliGRAM(s) Oral every 6 hours PRN Cough  melatonin 5 milliGRAM(s) Oral at bedtime PRN Insomnia        Objective:  Vital Signs Last 24 Hrs  T(C): 36.6 (18 Nov 2024 05:30), Max: 37.2 (17 Nov 2024 14:00)  T(F): 97.9 (18 Nov 2024 05:30), Max: 99 (17 Nov 2024 14:00)  HR: 87 (18 Nov 2024 05:30) (87 - 98)  BP: 119/69 (18 Nov 2024 05:30) (108/71 - 153/91)  RR: 18 (18 Nov 2024 05:30) (18 - 19)  SpO2: 93% (18 Nov 2024 05:30) (93% - 99%)    Parameters below as of 18 Nov 2024 05:30  Patient On (Oxygen Delivery Method): room air      Physical Exam:  General: no acute distress  Neck: supple, trachea midline  Lungs: clear, no wheeze/rhonchi  Cardiovascular: regular rate and rhythm, S1 S2  Abdomen: soft, nontender,  bowel sounds normal  Neurological: alert and oriented x3  Skin: no rash  Extremities: Left foot drsg c/d/i      LABS:                                   9.1    9.01  )-----------( 168      ( 17 Nov 2024 16:09 )             28.1   11-18    137  |  104  |  83[H]  ----------------------------<  249[H]  4.8   |  22  |  3.16[H]    Ca    9.4      18 Nov 2024 07:39    Vancomycin Level, Random (11.18.24 @ 07:39)    Vancomycin Level, Random: 14.4 ug/ml    MICROBIOLOGY:  Culture - Tissue with Gram Stain (11.12.24 @ 18:21    -  Trimethoprim/Sulfamethoxazole: R >2/38   -  Vancomycin: S 1   -  Clindamycin: R >4   -  Erythromycin: R >4   -  Gentamicin: S <=4 Should not be used as monotherapy   -  Oxacillin: R >2   -  Penicillin: R >2   -  Rifampin: S <=1 Should not be used as monotherapy   -  Tetracycline: S <=4   Gram Stain:   No polymorphonuclear leukocytes seen per low power field  No organisms seen per oil power field   Specimen Source: Bone   Culture Results:   Rare Staphylococcus epidermidis  Few Corynebacterium resistens "Susceptibilities not performed"   Organism Identification: Staphylococcus epidermidis   Organism: Staphylococcus epidermidis   Method Type: STEPHANIE    Culture - Tissue with Gram Stain (collected 12 Nov 2024 18:21)  Source: Tissue  Gram Stain (14 Nov 2024 14:21):    No polymorphonuclear leukocytes seen per low power field    No organisms seen per oil power field  Preliminary Report (14 Nov 2024 14:21):    Rare Staphylococcus simulans    Culture - Wound Aerobic (collected 12 Nov 2024 18:21)  Source: .Other  Preliminary Report (14 Nov 2024 13:25):    Rare Staphylococcus simulans "Susceptibilities not performed"    Culture - Wound Aerobic/Anaerobic (collected 12 Nov 2024 18:21)  Source: .Surgical Swab  Preliminary Report (14 Nov 2024 13:07):    Commensal nicholas consistent with body site    Culture - Blood (collected 01 Nov 2024 20:00)  Source: .Blood BLOOD  Preliminary Report (04 Nov 2024 01:02):    No growth at 48 Hours    Culture - Blood (collected 01 Nov 2024 20:00)  Source: .Blood BLOOD  Preliminary Report (04 Nov 2024 01:02):    No growth at 48 Hours    RADIOLOGY & ADDITIONAL STUDIES:      Assessment :  49-year-old male with  DM2, HTN, HLD, NEUROPATHY, PVD,ckd4, ETOH abuse, Diabetic foot ulcer with cellullitis ,ac on ch OM 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  He has been on amoxicillin twice daily and states he has been compliant with his antibiotic.  Patient noted that his surgical site had developed a bad odor with some discharge.  Left foot with exposed metatarsal head.    Sp Amputation, foot, first ray Debulking, flap, foot 12-Nov-2024  OR cultures with CNS  Bleeding at surgical site resolved  Path at clean margins with mild chronic OM  Events noted 11/17. Went back to OR as developed profuse bleeding from surgical wound.  Vanc level 14K today  LUISA CKD    Plan :   Start po doxycycline 100mg bid x 14 days  Trend temps and cbc, renal fx  Podiatry on case  Stable from ID standpoint    Continue with present regiment.  Appropriate use of antibiotics and adverse effects reviewed.      > 35 minutes were spent in direct patient care reviewing notes, medications ,labs data/ imaging , discussion with multidisciplinary team.    Thank you for allowing me to participate in care of your patient .    Linsey Mack MD  Infectious Disease  [phone]

## 2024-11-18 NOTE — PROGRESS NOTE ADULT - SUBJECTIVE AND OBJECTIVE BOX
Patient is a 49y old  Male who presents with a chief complaint of foot infection (18 Nov 2024 09:10)      INTERVAL HPI/OVERNIGHT EVENTS:  Overnight RRT was called  for bleeding from the surgical site. Pt was taken to OR urgently. Exploration of the wound was done and hemostasis obtained.   Pt was seen today at bedside, no acute complaints. Denies fever, chills, abdominal pain, cough , sob, chest pain.     MEDICATIONS  (STANDING):  amLODIPine   Tablet 10 milliGRAM(s) Oral daily  carvedilol 6.25 milliGRAM(s) Oral every 12 hours  dextrose 5%. 1000 milliLiter(s) (50 mL/Hr) IV Continuous <Continuous>  dextrose 5%. 1000 milliLiter(s) (50 mL/Hr) IV Continuous <Continuous>  dextrose 5%. 1000 milliLiter(s) (100 mL/Hr) IV Continuous <Continuous>  dextrose 50% Injectable 25 Gram(s) IV Push once  dextrose 50% Injectable 12.5 Gram(s) IV Push once  gabapentin 100 milliGRAM(s) Oral three times a day  glucagon  Injectable 1 milliGRAM(s) IntraMuscular once  heparin   Injectable 5000 Unit(s) SubCutaneous every 12 hours  insulin glargine Injectable (LANTUS) 40 Unit(s) SubCutaneous at bedtime  insulin lispro (ADMELOG) corrective regimen sliding scale   SubCutaneous at bedtime  insulin lispro (ADMELOG) corrective regimen sliding scale   SubCutaneous three times a day before meals  insulin lispro Injectable (ADMELOG) 13 Unit(s) SubCutaneous three times a day before meals  lactobacillus acidophilus 1 Tablet(s) Oral two times a day with meals  losartan 50 milliGRAM(s) Oral daily  thiamine 100 milliGRAM(s) Oral daily    MEDICATIONS  (PRN):  acetaminophen     Tablet .. 650 milliGRAM(s) Oral every 6 hours PRN Temp greater or equal to 38C (100.4F), Mild Pain (1 - 3)  dextrose Oral Gel 15 Gram(s) Oral once PRN Blood Glucose LESS THAN 70 milliGRAM(s)/deciliter  dextrose Oral Gel 15 Gram(s) Oral once PRN Blood Glucose LESS THAN 70 milliGRAM(s)/deciliter  diphenhydrAMINE 25 milliGRAM(s) Oral every 6 hours PRN Rash and/or Itching  guaiFENesin Oral Liquid (Sugar-Free) 100 milliGRAM(s) Oral every 6 hours PRN Cough  melatonin 5 milliGRAM(s) Oral at bedtime PRN Insomnia      Allergies    No Known Allergies    Intolerances        REVIEW OF SYSTEMS:  CONSTITUTIONAL: No fever, weight loss, or fatigue  EYES: No eye pain, visual disturbances, or discharge  ENMT:  No difficulty hearing, tinnitus, vertigo; No sinus or throat pain  NECK: No pain or stiffness  RESPIRATORY: No cough, wheezing, chills or hemoptysis; No shortness of breath  CARDIOVASCULAR: No chest pain, palpitations, lightheadedness, or leg swelling  GASTROINTESTINAL: No abdominal or epigastric pain. No nausea, vomiting, or hematemesis; No diarrhea or constipation. No melena or hematochezia.  GENITOURINARY: No dysuria, frequency, hematuria, or incontinence  NEUROLOGICAL: No headaches, memory loss, vertigo, loss of strength, numbness, or tremors  SKIN: No itching, burning, rashes, or lesions   MUSCULOSKELETAL: No joint pain or swelling; No muscle, back, or extremity pain  PSYCHIATRIC: No depression, anxiety, or mood swings        Vital Signs Last 24 Hrs  T(C): 36.6 (18 Nov 2024 05:30), Max: 37.2 (17 Nov 2024 14:00)  T(F): 97.9 (18 Nov 2024 05:30), Max: 99 (17 Nov 2024 14:00)  HR: 87 (18 Nov 2024 05:30) (87 - 98)  BP: 119/69 (18 Nov 2024 05:30) (108/71 - 153/91)  BP(mean): --  RR: 18 (18 Nov 2024 05:30) (18 - 19)  SpO2: 93% (18 Nov 2024 05:30) (93% - 99%)    Parameters below as of 18 Nov 2024 05:30  Patient On (Oxygen Delivery Method): room air        PHYSICAL EXAM:  GENERAL: NAD, well-groomed, well-developed  HEAD:  Atraumatic, Normocephalic  EYES: EOMI, PERRLA, conjunctiva and sclera clear  ENMT: Moist mucous membranes,   NECK: Supple, No JVD, Normal thyroid  NERVOUS SYSTEM:  Alert & Oriented X 3  CHEST/LUNG: Clear to auscultation bilaterally; No rales, rhonchi, wheezing, or rubs  HEART: Regular rate and rhythm; No murmurs, rubs, or gallops  ABDOMEN: Soft, Nontender, Nondistended; Bowel sounds present  EXTREMITIES:  2+ Peripheral Pulses, No clubbing, cyanosis, or edema  SKIN: No rashes or lesions    LABS:                        9.1    9.01  )-----------( 168      ( 17 Nov 2024 16:09 )             28.1     18 Nov 2024 07:39    137    |  104    |  83     ----------------------------<  249    4.8     |  22     |  3.16     Ca    9.4        18 Nov 2024 07:39        Urinalysis Basic - ( 18 Nov 2024 07:39 )    Color: x / Appearance: x / SG: x / pH: x  Gluc: 249 mg/dL / Ketone: x  / Bili: x / Urobili: x   Blood: x / Protein: x / Nitrite: x   Leuk Esterase: x / RBC: x / WBC x   Sq Epi: x / Non Sq Epi: x / Bacteria: x      CAPILLARY BLOOD GLUCOSE      POCT Blood Glucose.: 232 mg/dL (18 Nov 2024 07:52)  POCT Blood Glucose.: 269 mg/dL (17 Nov 2024 20:56)  POCT Blood Glucose.: 210 mg/dL (17 Nov 2024 17:58)  POCT Blood Glucose.: 224 mg/dL (17 Nov 2024 15:24)  POCT Blood Glucose.: 263 mg/dL (17 Nov 2024 11:39)      RADIOLOGY & ADDITIONAL TESTS:    Imaging Personally Reviewed:      EKG:    Patient is a 49y old  Male who presents with a chief complaint of foot infection (18 Nov 2024 09:10)      INTERVAL HPI/OVERNIGHT EVENTS:  Overnight RRT was called  for bleeding from the surgical site. Pt was taken to OR urgently. Exploration of the wound was done and hemostasis obtained.   Pt was seen today at bedside, no acute complaints. Denies fever, chills, abdominal pain, cough , sob, chest pain.     MEDICATIONS  (STANDING):  amLODIPine   Tablet 10 milliGRAM(s) Oral daily  carvedilol 6.25 milliGRAM(s) Oral every 12 hours  dextrose 5%. 1000 milliLiter(s) (50 mL/Hr) IV Continuous <Continuous>  dextrose 5%. 1000 milliLiter(s) (50 mL/Hr) IV Continuous <Continuous>  dextrose 5%. 1000 milliLiter(s) (100 mL/Hr) IV Continuous <Continuous>  dextrose 50% Injectable 25 Gram(s) IV Push once  dextrose 50% Injectable 12.5 Gram(s) IV Push once  gabapentin 100 milliGRAM(s) Oral three times a day  glucagon  Injectable 1 milliGRAM(s) IntraMuscular once  heparin   Injectable 5000 Unit(s) SubCutaneous every 12 hours  insulin glargine Injectable (LANTUS) 40 Unit(s) SubCutaneous at bedtime  insulin lispro (ADMELOG) corrective regimen sliding scale   SubCutaneous at bedtime  insulin lispro (ADMELOG) corrective regimen sliding scale   SubCutaneous three times a day before meals  insulin lispro Injectable (ADMELOG) 13 Unit(s) SubCutaneous three times a day before meals  lactobacillus acidophilus 1 Tablet(s) Oral two times a day with meals  losartan 50 milliGRAM(s) Oral daily  thiamine 100 milliGRAM(s) Oral daily    MEDICATIONS  (PRN):  acetaminophen     Tablet .. 650 milliGRAM(s) Oral every 6 hours PRN Temp greater or equal to 38C (100.4F), Mild Pain (1 - 3)  dextrose Oral Gel 15 Gram(s) Oral once PRN Blood Glucose LESS THAN 70 milliGRAM(s)/deciliter  dextrose Oral Gel 15 Gram(s) Oral once PRN Blood Glucose LESS THAN 70 milliGRAM(s)/deciliter  diphenhydrAMINE 25 milliGRAM(s) Oral every 6 hours PRN Rash and/or Itching  guaiFENesin Oral Liquid (Sugar-Free) 100 milliGRAM(s) Oral every 6 hours PRN Cough  melatonin 5 milliGRAM(s) Oral at bedtime PRN Insomnia      Allergies    No Known Allergies    Intolerances        REVIEW OF SYSTEMS:  CONSTITUTIONAL: No fever, weight loss, or fatigue  EYES: No eye pain, visual disturbances, or discharge  ENMT:  No difficulty hearing, tinnitus, vertigo; No sinus or throat pain  NECK: No pain or stiffness  RESPIRATORY: No cough, wheezing, chills or hemoptysis; No shortness of breath  CARDIOVASCULAR: No chest pain, palpitations, lightheadedness, or leg swelling  GASTROINTESTINAL: No abdominal or epigastric pain. No nausea, vomiting, or hematemesis; No diarrhea or constipation. No melena or hematochezia.  GENITOURINARY: No dysuria, frequency, hematuria, or incontinence  NEUROLOGICAL: No headaches, memory loss, vertigo, loss of strength, numbness, or tremors  SKIN: No itching, burning, rashes, or lesions   MUSCULOSKELETAL: No joint pain or swelling; No muscle, back, or extremity pain  PSYCHIATRIC: No depression, anxiety, or mood swings        Vital Signs Last 24 Hrs  T(C): 36.6 (18 Nov 2024 05:30), Max: 37.2 (17 Nov 2024 14:00)  T(F): 97.9 (18 Nov 2024 05:30), Max: 99 (17 Nov 2024 14:00)  HR: 87 (18 Nov 2024 05:30) (87 - 98)  BP: 119/69 (18 Nov 2024 05:30) (108/71 - 153/91)  BP(mean): --  RR: 18 (18 Nov 2024 05:30) (18 - 19)  SpO2: 93% (18 Nov 2024 05:30) (93% - 99%)    Parameters below as of 18 Nov 2024 05:30  Patient On (Oxygen Delivery Method): room air        PHYSICAL EXAM:  GENERAL: NAD, well-groomed, well-developed  HEAD:  Atraumatic, Normocephalic  EYES: EOMI, PERRLA, conjunctiva and sclera clear  ENMT: Moist mucous membranes,   NECK: Supple, No JVD, Normal thyroid  NERVOUS SYSTEM:  Alert & Oriented X 3  CHEST/LUNG: Clear to auscultation bilaterally; No rales, rhonchi, wheezing, or rubs  HEART: Regular rate and rhythm; No murmurs, rubs, or gallops  ABDOMEN: Soft, Nontender, Nondistended; Bowel sounds present  EXTREMITIES:  2+ Peripheral Pulses, No clubbing, cyanosis, or edema. left foot covered in bandage   SKIN: No rashes or lesions    LABS:                        9.1    9.01  )-----------( 168      ( 17 Nov 2024 16:09 )             28.1     18 Nov 2024 07:39    137    |  104    |  83     ----------------------------<  249    4.8     |  22     |  3.16     Ca    9.4        18 Nov 2024 07:39        Urinalysis Basic - ( 18 Nov 2024 07:39 )    Color: x / Appearance: x / SG: x / pH: x  Gluc: 249 mg/dL / Ketone: x  / Bili: x / Urobili: x   Blood: x / Protein: x / Nitrite: x   Leuk Esterase: x / RBC: x / WBC x   Sq Epi: x / Non Sq Epi: x / Bacteria: x      CAPILLARY BLOOD GLUCOSE      POCT Blood Glucose.: 232 mg/dL (18 Nov 2024 07:52)  POCT Blood Glucose.: 269 mg/dL (17 Nov 2024 20:56)  POCT Blood Glucose.: 210 mg/dL (17 Nov 2024 17:58)  POCT Blood Glucose.: 224 mg/dL (17 Nov 2024 15:24)  POCT Blood Glucose.: 263 mg/dL (17 Nov 2024 11:39)      RADIOLOGY & ADDITIONAL TESTS:    Imaging Personally Reviewed:      EKG:

## 2024-11-18 NOTE — CASE MANAGEMENT PROGRESS NOTE - NSCMPROGRESSNOTE_GEN_ALL_CORE
RN/CM noted pt's case discussed during Interdisciplinary rounds, pt remains acute- S/P return to OR 11/17 2ndary to profuse bleeding of site (S/P 11/12/24-Amputation, foot, first ray/ Debulking, flap, foot). IV Vanco cont. DC pending hospital course. CM remains available and continues to follow case.

## 2024-11-18 NOTE — PROGRESS NOTE ADULT - ASSESSMENT
The patient is a 49 year old male with a history of HTN, HL, DM, PAD, CKD, toe osteomyelitis who presents with toe infection.    Plan:  - ECG with sinus rhythm and no evidence of ischemia/infarction  - Continue amlodipine 10 mg daily  - Continue carvedilol 6.25 mg bid  - Continue losartan 50 mg daily  - Podiatry and ID follow-up  - Underwent emergent surgery for bleeding yesterday  - IV antibiotics

## 2024-11-18 NOTE — PROGRESS NOTE ADULT - SUBJECTIVE AND OBJECTIVE BOX
Chief Complaint: Toe infection    Interval Events: No events overnight.    Review of Systems:  General: No fevers, chills, weight gain  Skin: No rashes, color changes  Cardiovascular: No chest pain, orthopnea  Respiratory: No shortness of breath, cough  Gastrointestinal: No nausea, abdominal pain  Genitourinary: No incontinence, pain with urination  Musculoskeletal: No pain, swelling, decreased range of motion  Neurological: No headache, weakness  Psychiatric: No depression, anxiety  Endocrine: No weight gain, increased thirst  All other systems are comprehensively negative.    Physical Exam:  Vital Signs Last 24 Hrs  T(C): 36.6 (18 Nov 2024 05:30), Max: 37.2 (17 Nov 2024 14:00)  T(F): 97.9 (18 Nov 2024 05:30), Max: 99 (17 Nov 2024 14:00)  HR: 87 (18 Nov 2024 05:30) (87 - 98)  BP: 119/69 (18 Nov 2024 05:30) (108/71 - 153/91)  BP(mean): --  RR: 18 (18 Nov 2024 05:30) (18 - 19)  SpO2: 93% (18 Nov 2024 05:30) (93% - 99%)  Parameters below as of 18 Nov 2024 05:30  Patient On (Oxygen Delivery Method): room air  General: NAD  HEENT: MMM  Neck: No JVD, no carotid bruit  Lungs: CTAB  CV: RRR, nl S1/S2, no M/R/G  Abdomen: S/NT/ND, +BS  Extremities: No LE edema, no cyanosis  Neuro: AAOx3, non-focal  Skin: No rash    Labs:    11-18    137  |  104  |  83[H]  ----------------------------<  249[H]  4.8   |  22  |  3.16[H]    Ca    9.4      18 Nov 2024 07:39                          9.1    9.01  )-----------( 168      ( 17 Nov 2024 16:09 )             28.1

## 2024-11-18 NOTE — PROGRESS NOTE ADULT - ASSESSMENT
Diabetic foot ulcer with cellulitis with acute on chronic osteomyelitis left foot  CT- consistent with OM of first Toe left  Podiatry following    On 11/12 had amputation , debulking and flap on left foot by Dr Finnegan  Overnight RRT was called for active bleeding from the wound, pt was taken to OR and hemostasis obtained   Cont abx as per ID  Monitor vanco level   Cultures positive for staphylococcus   Monitor fever and WBC curve     DM2 with hyperglycemia, with neuropathy , nephropathy with likely PVD   FS + DOROTHY  Increase Lantus to 40 units   Increase lispro to 13 units 3 times a day '  Discontinue Linagliptin   Monitor blood sugar       HTN  Continue Losartan 50 mg , amlodipine 10 mg, coreg 6.25 bid    CKD 3 likely Diabetic nephropathy   Nephro following      ETOH use disorder   Cessation counselling   Folic acid, thiamine     Obesity    EDUAR   O2 as needed    Anemia of ch disease  Monitor CBC     DVT prophylaxis   heparin    Diabetic foot ulcer with cellulitis with acute on chronic osteomyelitis left foot  CT- consistent with OM of first Toe left  Podiatry following    On 11/12 had amputation , debulking and flap on left foot by Dr Finnegan  Overnight RRT was called for active bleeding from the wound, pt was taken to OR and hemostasis obtained   Cont abx as per ID  S/P vanco  Start doxycyline 100 mg twice daily    Cultures positive for staphylococcus   Monitor fever and WBC curve     DM2 with hyperglycemia, with neuropathy , nephropathy with likely PVD   FS + DOROTHY  Increase Lantus to 40 units   Increase lispro to 13 units 3 times a day '  Discontinue Linagliptin   Monitor blood sugar   Endocrine consult called       HTN  Continue Losartan 50 mg , amlodipine 10 mg, coreg 6.25 bid    CKD 3 likely Diabetic nephropathy   Nephro following   Monitor BMP      ETOH use disorder   Cessation counselling   Folic acid, thiamine     Obesity    EDUAR   O2 as needed    Anemia of ch disease  Monitor CBC     DVT prophylaxis   heparin

## 2024-11-18 NOTE — PROGRESS NOTE ADULT - SUBJECTIVE AND OBJECTIVE BOX
PROGRESS NOTE   Patient is a 49y old  Male who presents with a chief complaint of foot infection (17 Nov 2024 18:08)      HPI:  49-year-old male with past medical history of DM2, HTN, HLD, NEUROPATHY, PVD,ckd4, ETOH abuser,  NON COMPLIANT WITH TT, recently discharged after tt for , Diabetic foot ulcer with cellullitis ,ac on ch OM with sepstic arthritis amputation of of 2nd left toe, OM left great toe-,surg done by podiatry,9/20culture of wound E fecalis, path OM , with clear margins. left hospital on11/4 against medical advice for his personal problem, The patient return for persistent infection, foul-smelling discharge.  Patient denies any chest pain, no shortness of breath.  No fever or chills.  No cough/URI.  No neck or back pain.  No numbness or tingling.  No focal weakness.  No aggravating or alleviating factors otherwise noted.  No other acute complaints. Patient states he sometimes drinks, states he did not drink today.  In ED afebrile, o2 sat 90 %, /99, ,patient is being admitted for further care, and podiatry and ID consult called (07 Nov 2024 17:57)      Vital Signs Last 24 Hrs  T(C): 36.6 (18 Nov 2024 05:30), Max: 37.2 (17 Nov 2024 14:00)  T(F): 97.9 (18 Nov 2024 05:30), Max: 99 (17 Nov 2024 14:00)  HR: 87 (18 Nov 2024 05:30) (87 - 98)  BP: 119/69 (18 Nov 2024 05:30) (108/71 - 153/91)  BP(mean): --  RR: 18 (18 Nov 2024 05:30) (18 - 19)  SpO2: 93% (18 Nov 2024 05:30) (93% - 99%)    Parameters below as of 18 Nov 2024 05:30  Patient On (Oxygen Delivery Method): room air                              9.1    9.01  )-----------( 168      ( 17 Nov 2024 16:09 )             28.1               11-18    137  |  104  |  83[H]  ----------------------------<  249[H]  4.8   |  22  |  3.16[H]    Ca    9.4      18 Nov 2024 07:39        PHYSICAL EXAM  Procedure on 11/12/2024 left foot s/p surgical Debridement of all non-viable soft tissue and bone, debridement of deep space infection of left foot with left 1st partial ray resection and packing removed today all left foot.   POD#1 surgical exploration of left foot for active bleed.  No noted strikethrough in dressing, noted no active bleed. Will allow dressings to stay untouched one more day.   Patient has guarded prognosis . Will cont to allow any further demarcation to occur and will closely monitor.    Vascular: Dorsalis Pedis and Posterior Tibial pulses palpable 2/4 bilaterally.  Capillary re-fill time less then 3 seconds digits 1-5 bilateral.  Mild-moderate edema bilaterally up to the ankle left foot   Neuro: Protective sensation diminished to the level of the digits bilateral.   MSK: Muscle strength 5/5 all major muscle groups bilateral.

## 2024-11-18 NOTE — PROGRESS NOTE ADULT - PROBLEM SELECTOR PLAN 1
Patient seen and evaluated  Discussed diagnosis and treatment with patient.  Patient is s/p surgical debridement of all non-viable soft tissue and bone left foot with partial 1st ray resection and packing all left foot . DOS 11/12/2024   OR WCx demonstrates staph infection,   Surgical bone pathology demonstrates mild chronic OM in proximal margin.  Patient is s/p surgical exploration of left foot for active bleed DOS 11/17/2024  Dressing clean, dry, intact.  Plan to advance packing tomorrow  Per ID, pt will go home with PO abx  Keep the postop dressing clean/dry/intact   Non weight bearing on left foot   Recs elevation on left lower extremities   Pt may require serial surgical debridements/amputation and possible BKA if symptoms persist.    radiographs reviewed and noted all operative regions in good position   Pt understands the severity of his condition and  that he is at risk to lose part of the left foot, all the foot or below the knee amputation. All questions were asked and answered for patient satisfaction    Medical management as per primary team   Will discuss with all attendings.

## 2024-11-18 NOTE — PROGRESS NOTE ADULT - SUBJECTIVE AND OBJECTIVE BOX
[facility]                                                       Dr. Canchola, Dr. Aviles, Dr. Garner, Dr. Rosado, Dr. Bess, Dr. Burnett                                      [facility], [facility], [address]                                                 [address] [address]                                      Ph: [phone]  Fax: [phone]                                         Ph: [phone]  Fax: [phone]      Patient is a 49y old  Male who presents with a chief complaint of foot infection (18 Nov 2024 10:18)    Patient seen in follow up for LUISA on CKD       PAST MEDICAL HISTORY:  Prediabetes    HTN (hypertension)    HLD (hyperlipidemia)    DM2 (diabetes mellitus, type 2)      MEDICATIONS  (STANDING):  amLODIPine   Tablet 10 milliGRAM(s) Oral daily  carvedilol 6.25 milliGRAM(s) Oral every 12 hours  dextrose 5%. 1000 milliLiter(s) (50 mL/Hr) IV Continuous <Continuous>  dextrose 5%. 1000 milliLiter(s) (50 mL/Hr) IV Continuous <Continuous>  dextrose 5%. 1000 milliLiter(s) (100 mL/Hr) IV Continuous <Continuous>  dextrose 50% Injectable 25 Gram(s) IV Push once  dextrose 50% Injectable 12.5 Gram(s) IV Push once  gabapentin 100 milliGRAM(s) Oral three times a day  glucagon  Injectable 1 milliGRAM(s) IntraMuscular once  heparin   Injectable 5000 Unit(s) SubCutaneous every 12 hours  insulin glargine Injectable (LANTUS) 40 Unit(s) SubCutaneous at bedtime  insulin lispro (ADMELOG) corrective regimen sliding scale   SubCutaneous at bedtime  insulin lispro (ADMELOG) corrective regimen sliding scale   SubCutaneous three times a day before meals  insulin lispro Injectable (ADMELOG) 13 Unit(s) SubCutaneous three times a day before meals  lactobacillus acidophilus 1 Tablet(s) Oral two times a day with meals  losartan 50 milliGRAM(s) Oral daily  thiamine 100 milliGRAM(s) Oral daily    MEDICATIONS  (PRN):  acetaminophen     Tablet .. 650 milliGRAM(s) Oral every 6 hours PRN Temp greater or equal to 38C (100.4F), Mild Pain (1 - 3)  dextrose Oral Gel 15 Gram(s) Oral once PRN Blood Glucose LESS THAN 70 milliGRAM(s)/deciliter  dextrose Oral Gel 15 Gram(s) Oral once PRN Blood Glucose LESS THAN 70 milliGRAM(s)/deciliter  diphenhydrAMINE 25 milliGRAM(s) Oral every 6 hours PRN Rash and/or Itching  guaiFENesin Oral Liquid (Sugar-Free) 100 milliGRAM(s) Oral every 6 hours PRN Cough  melatonin 5 milliGRAM(s) Oral at bedtime PRN Insomnia    T(C): 36.6 (11-18-24 @ 05:30), Max: 37.2 (11-17-24 @ 14:00)  HR: 87 (11-18-24 @ 05:30) (87 - 98)  BP: 119/69 (11-18-24 @ 05:30) (108/71 - 162/85)  RR: 18 (11-18-24 @ 05:30) (18 - 19)  SpO2: 93% (11-18-24 @ 05:30) (93% - 99%)  Wt(kg): --  I&O's Detail    17 Nov 2024 07:01  -  18 Nov 2024 07:00  --------------------------------------------------------  IN:    sodium chloride 0.9%: 300 mL  Total IN: 300 mL    OUT:    Voided (mL): 600 mL  Total OUT: 600 mL    Total NET: -300 mL          PHYSICAL EXAM:  General: No distress  Respiratory: b/l air entry  Cardiovascular: S1 S2  Gastrointestinal: soft  Extremities:  no edema, left foot dressing                              9.1    9.01  )-----------( 168      ( 17 Nov 2024 16:09 )             28.1     11-18    137  |  104  |  83[H]  ----------------------------<  249[H]  4.8   |  22  |  3.16[H]    Ca    9.4      18 Nov 2024 07:39            Urinalysis Basic - ( 18 Nov 2024 07:39 )    Color: x / Appearance: x / SG: x / pH: x  Gluc: 249 mg/dL / Ketone: x  / Bili: x / Urobili: x   Blood: x / Protein: x / Nitrite: x   Leuk Esterase: x / RBC: x / WBC x   Sq Epi: x / Non Sq Epi: x / Bacteria: x        Sodium, Serum: 137 (11-18 @ 07:39)  Sodium, Serum: 136 (11-17 @ 06:15)  Sodium, Serum: 138 (11-16 @ 06:00)  Sodium, Serum: 138 (11-15 @ 07:50)    Creatinine, Serum: 3.16 (11-18 @ 07:39)  Creatinine, Serum: 2.69 (11-17 @ 06:15)  Creatinine, Serum: 2.55 (11-16 @ 06:00)  Creatinine, Serum: 2.55 (11-15 @ 07:50)    Potassium, Serum: 4.8 (11-18 @ 07:39)  Potassium, Serum: 4.7 (11-17 @ 06:15)  Potassium, Serum: 4.7 (11-16 @ 06:00)  Potassium, Serum: 4.7 (11-15 @ 07:50)    Hemoglobin: 9.1 (11-17 @ 16:09)  Hemoglobin: 10.0 (11-16 @ 06:00)  Hemoglobin: 8.7 (11-15 @ 07:50)  Hemoglobin: 9.5 (11-14 @ 16:51)

## 2024-11-19 LAB
ALBUMIN SERPL ELPH-MCNC: 2.7 G/DL — LOW (ref 3.3–5)
ALP SERPL-CCNC: 112 U/L — SIGNIFICANT CHANGE UP (ref 30–120)
ALT FLD-CCNC: 44 U/L — SIGNIFICANT CHANGE UP (ref 10–60)
ANION GAP SERPL CALC-SCNC: 11 MMOL/L — SIGNIFICANT CHANGE UP (ref 5–17)
AST SERPL-CCNC: 40 U/L — SIGNIFICANT CHANGE UP (ref 10–40)
BILIRUB SERPL-MCNC: 0.4 MG/DL — SIGNIFICANT CHANGE UP (ref 0.2–1.2)
BUN SERPL-MCNC: 80 MG/DL — HIGH (ref 7–23)
CALCIUM SERPL-MCNC: 9.1 MG/DL — SIGNIFICANT CHANGE UP (ref 8.4–10.5)
CHLORIDE SERPL-SCNC: 106 MMOL/L — SIGNIFICANT CHANGE UP (ref 96–108)
CO2 SERPL-SCNC: 21 MMOL/L — LOW (ref 22–31)
CREAT SERPL-MCNC: 3 MG/DL — HIGH (ref 0.5–1.3)
EGFR: 25 ML/MIN/1.73M2 — LOW
GLUCOSE BLDC GLUCOMTR-MCNC: 248 MG/DL — HIGH (ref 70–99)
GLUCOSE BLDC GLUCOMTR-MCNC: 273 MG/DL — HIGH (ref 70–99)
GLUCOSE BLDC GLUCOMTR-MCNC: 276 MG/DL — HIGH (ref 70–99)
GLUCOSE BLDC GLUCOMTR-MCNC: 278 MG/DL — HIGH (ref 70–99)
GLUCOSE SERPL-MCNC: 248 MG/DL — HIGH (ref 70–99)
HCT VFR BLD CALC: 21.4 % — LOW (ref 39–50)
HCT VFR BLD CALC: 28.3 % — LOW (ref 39–50)
HGB BLD-MCNC: 6.8 G/DL — CRITICAL LOW (ref 13–17)
HGB BLD-MCNC: 9.3 G/DL — LOW (ref 13–17)
MCHC RBC-ENTMCNC: 27.8 PG — SIGNIFICANT CHANGE UP (ref 27–34)
MCHC RBC-ENTMCNC: 28.4 PG — SIGNIFICANT CHANGE UP (ref 27–34)
MCHC RBC-ENTMCNC: 31.8 G/DL — LOW (ref 32–36)
MCHC RBC-ENTMCNC: 32.9 G/DL — SIGNIFICANT CHANGE UP (ref 32–36)
MCV RBC AUTO: 86.3 FL — SIGNIFICANT CHANGE UP (ref 80–100)
MCV RBC AUTO: 87.3 FL — SIGNIFICANT CHANGE UP (ref 80–100)
NRBC # BLD: 0 /100 WBCS — SIGNIFICANT CHANGE UP (ref 0–0)
NRBC # BLD: 0 /100 WBCS — SIGNIFICANT CHANGE UP (ref 0–0)
PLATELET # BLD AUTO: 158 K/UL — SIGNIFICANT CHANGE UP (ref 150–400)
PLATELET # BLD AUTO: 168 K/UL — SIGNIFICANT CHANGE UP (ref 150–400)
POTASSIUM SERPL-MCNC: 5.1 MMOL/L — SIGNIFICANT CHANGE UP (ref 3.5–5.3)
POTASSIUM SERPL-SCNC: 5.1 MMOL/L — SIGNIFICANT CHANGE UP (ref 3.5–5.3)
PROT SERPL-MCNC: 6 G/DL — SIGNIFICANT CHANGE UP (ref 6–8.3)
RBC # BLD: 2.45 M/UL — LOW (ref 4.2–5.8)
RBC # BLD: 3.28 M/UL — LOW (ref 4.2–5.8)
RBC # FLD: 13.3 % — SIGNIFICANT CHANGE UP (ref 10.3–14.5)
RBC # FLD: 13.5 % — SIGNIFICANT CHANGE UP (ref 10.3–14.5)
SODIUM SERPL-SCNC: 138 MMOL/L — SIGNIFICANT CHANGE UP (ref 135–145)
VANCOMYCIN FLD-MCNC: 11.3 UG/ML — SIGNIFICANT CHANGE UP
WBC # BLD: 10.28 K/UL — SIGNIFICANT CHANGE UP (ref 3.8–10.5)
WBC # BLD: 7.58 K/UL — SIGNIFICANT CHANGE UP (ref 3.8–10.5)
WBC # FLD AUTO: 10.28 K/UL — SIGNIFICANT CHANGE UP (ref 3.8–10.5)
WBC # FLD AUTO: 7.58 K/UL — SIGNIFICANT CHANGE UP (ref 3.8–10.5)

## 2024-11-19 PROCEDURE — 99233 SBSQ HOSP IP/OBS HIGH 50: CPT

## 2024-11-19 RX ORDER — INSULIN GLARGINE 100 [IU]/ML
45 INJECTION, SOLUTION SUBCUTANEOUS AT BEDTIME
Refills: 0 | Status: DISCONTINUED | OUTPATIENT
Start: 2024-11-19 | End: 2024-11-20

## 2024-11-19 RX ADMIN — ACETAMINOPHEN 500MG 650 MILLIGRAM(S): 500 TABLET, COATED ORAL at 15:21

## 2024-11-19 RX ADMIN — Medication 1 TABLET(S): at 08:16

## 2024-11-19 RX ADMIN — Medication 25 MILLIGRAM(S): at 21:18

## 2024-11-19 RX ADMIN — Medication 6: at 08:16

## 2024-11-19 RX ADMIN — ACETAMINOPHEN 500MG 650 MILLIGRAM(S): 500 TABLET, COATED ORAL at 16:21

## 2024-11-19 RX ADMIN — Medication 1 TABLET(S): at 18:32

## 2024-11-19 RX ADMIN — INSULIN GLARGINE 45 UNIT(S): 100 INJECTION, SOLUTION SUBCUTANEOUS at 21:20

## 2024-11-19 RX ADMIN — CARVEDILOL 6.25 MILLIGRAM(S): 25 TABLET, FILM COATED ORAL at 09:07

## 2024-11-19 RX ADMIN — Medication 15 UNIT(S): at 17:03

## 2024-11-19 RX ADMIN — ACETAMINOPHEN, DIPHENHYDRAMINE HCL, PHENYLEPHRINE HCL 5 MILLIGRAM(S): 325; 25; 5 TABLET ORAL at 21:18

## 2024-11-19 RX ADMIN — Medication 6: at 12:08

## 2024-11-19 RX ADMIN — Medication 15 UNIT(S): at 12:08

## 2024-11-19 RX ADMIN — GABAPENTIN 100 MILLIGRAM(S): 300 CAPSULE ORAL at 06:17

## 2024-11-19 RX ADMIN — Medication 13 UNIT(S): at 08:17

## 2024-11-19 RX ADMIN — Medication 6: at 17:03

## 2024-11-19 RX ADMIN — DOXYCYCLINE HYCLATE 100 MILLIGRAM(S): 150 TABLET, COATED ORAL at 17:02

## 2024-11-19 RX ADMIN — CARVEDILOL 6.25 MILLIGRAM(S): 25 TABLET, FILM COATED ORAL at 17:02

## 2024-11-19 RX ADMIN — GABAPENTIN 100 MILLIGRAM(S): 300 CAPSULE ORAL at 21:18

## 2024-11-19 RX ADMIN — Medication 100 MILLIGRAM(S): at 12:07

## 2024-11-19 RX ADMIN — GABAPENTIN 100 MILLIGRAM(S): 300 CAPSULE ORAL at 13:18

## 2024-11-19 RX ADMIN — DOXYCYCLINE HYCLATE 100 MILLIGRAM(S): 150 TABLET, COATED ORAL at 06:17

## 2024-11-19 RX ADMIN — AMLODIPINE BESYLATE 10 MILLIGRAM(S): 10 TABLET ORAL at 09:07

## 2024-11-19 NOTE — CONSULT NOTE ADULT - CONSULT REASON
Diabetic foot infection
Infected DM foot
dm2 uncontrolled
CKD
foot infection
49y A1C with Estimated Average Glucose Result: 8.5 % (11-07-24 @ 14:04)  A1C with Estimated Average Glucose Result: 10.6 % (09-18-24 @ 05:30)   diabetes mellitus uncontrolled type 2
detailed exam

## 2024-11-19 NOTE — CASE MANAGEMENT PROGRESS NOTE - NSCMPROGRESSNOTE_GEN_ALL_CORE
RN/CM noted pt's case discussed during Interdisciplinary rounds, pt remains acute- with low H&H requiring blood transfusion. Pt is S/P return to OR 11/17 2ndary to profuse bleeding of site (S/P 11/12/24-Amputation, foot, first ray/ Debulking, flap, foot). Pt now on PO ABT. DC pending hospital course. CM remains available and continues to follow case.  RN/CM noted pt's case discussed during Interdisciplinary rounds, pt remains acute- with low H&H requiring blood transfusion. Pt is S/P return to OR 11/17 2ndary to profuse bleeding of site (S/P 11/12/24-Amputation, foot, first ray/ Debulking, flap, foot). Pt now on PO ABT. Both this CM and MSW attempted to meet with pt today but pt was sound asleep, will re-attempt again. DC pending hospital course. CM remains available and continues to follow case.

## 2024-11-19 NOTE — CONSULT NOTE ADULT - PROBLEM SELECTOR RECOMMENDATION 9
increase lantus 45 units qhs  increase admelog 15 units 3x/day before meals  cont mod dose admelog corrective scale coverage qac/qhs  cont cons cho diet  goal bg 100-180 in hosp setting

## 2024-11-19 NOTE — CONSULT NOTE ADULT - CONSULT REQUESTED DATE/TIME
08-Nov-2024 14:14
10-Nov-2024 14:14
08-Nov-2024 12:23
19-Nov-2024 07:55
07-Nov-2024 18:03
08-Nov-2024 11:25

## 2024-11-19 NOTE — PROGRESS NOTE ADULT - SUBJECTIVE AND OBJECTIVE BOX
Patient is a 49y old  Male who presents with a chief complaint of foot infection (19 Nov 2024 11:07)      INTERVAL HPI/OVERNIGHT EVENTS:  Overnight nothing significant happened. Today hb dropped to 6.8   Pt was seen today at bedside, no acute complaints. Denies fever, chills, abdominal pain, cough , sob, chest pain.   1 Unit PRBC ordered.     MEDICATIONS  (STANDING):  amLODIPine   Tablet 10 milliGRAM(s) Oral daily  carvedilol 6.25 milliGRAM(s) Oral every 12 hours  dextrose 5%. 1000 milliLiter(s) (50 mL/Hr) IV Continuous <Continuous>  dextrose 5%. 1000 milliLiter(s) (100 mL/Hr) IV Continuous <Continuous>  dextrose 5%. 1000 milliLiter(s) (50 mL/Hr) IV Continuous <Continuous>  dextrose 50% Injectable 25 Gram(s) IV Push once  dextrose 50% Injectable 12.5 Gram(s) IV Push once  doxycycline monohydrate Capsule 100 milliGRAM(s) Oral every 12 hours  gabapentin 100 milliGRAM(s) Oral three times a day  glucagon  Injectable 1 milliGRAM(s) IntraMuscular once  heparin   Injectable 5000 Unit(s) SubCutaneous every 12 hours  insulin glargine Injectable (LANTUS) 45 Unit(s) SubCutaneous at bedtime  insulin lispro (ADMELOG) corrective regimen sliding scale   SubCutaneous at bedtime  insulin lispro (ADMELOG) corrective regimen sliding scale   SubCutaneous three times a day before meals  insulin lispro Injectable (ADMELOG) 15 Unit(s) SubCutaneous three times a day before meals  lactobacillus acidophilus 1 Tablet(s) Oral two times a day with meals  thiamine 100 milliGRAM(s) Oral daily    MEDICATIONS  (PRN):  acetaminophen     Tablet .. 650 milliGRAM(s) Oral every 6 hours PRN Temp greater or equal to 38C (100.4F), Mild Pain (1 - 3)  dextrose Oral Gel 15 Gram(s) Oral once PRN Blood Glucose LESS THAN 70 milliGRAM(s)/deciliter  dextrose Oral Gel 15 Gram(s) Oral once PRN Blood Glucose LESS THAN 70 milliGRAM(s)/deciliter  diphenhydrAMINE 25 milliGRAM(s) Oral every 6 hours PRN Rash and/or Itching  guaiFENesin Oral Liquid (Sugar-Free) 100 milliGRAM(s) Oral every 6 hours PRN Cough  melatonin 5 milliGRAM(s) Oral at bedtime PRN Insomnia      Allergies    No Known Allergies    Intolerances        REVIEW OF SYSTEMS:  CONSTITUTIONAL: No fever, weight loss, or fatigue  EYES: No eye pain, visual disturbances, or discharge  ENMT:  No difficulty hearing, tinnitus, vertigo; No sinus or throat pain  NECK: No pain or stiffness  RESPIRATORY: No cough, wheezing, chills or hemoptysis; No shortness of breath  CARDIOVASCULAR: No chest pain, palpitations, lightheadedness, or leg swelling  GASTROINTESTINAL: No abdominal or epigastric pain. No nausea, vomiting, or hematemesis; No diarrhea or constipation. No melena or hematochezia.  GENITOURINARY: No dysuria, frequency, hematuria, or incontinence  NEUROLOGICAL: No headaches, memory loss, vertigo, loss of strength, numbness, or tremors  SKIN: No itching, burning, rashes, or lesions   MUSCULOSKELETAL: No joint pain or swelling; No muscle, back, or extremity pain  PSYCHIATRIC: No depression, anxiety, or mood swings        Vital Signs Last 24 Hrs  T(C): 36.5 (19 Nov 2024 11:45), Max: 37.5 (18 Nov 2024 13:19)  T(F): 97.7 (19 Nov 2024 11:45), Max: 99.5 (18 Nov 2024 13:19)  HR: 90 (19 Nov 2024 11:45) (84 - 97)  BP: 136/73 (19 Nov 2024 11:45) (105/63 - 136/73)  BP(mean): --  RR: 18 (19 Nov 2024 11:45) (17 - 18)  SpO2: 96% (19 Nov 2024 11:45) (92% - 96%)    Parameters below as of 19 Nov 2024 11:45  Patient On (Oxygen Delivery Method): room air        PHYSICAL EXAM:  GENERAL: NAD, well-groomed, well-developed  HEAD:  Atraumatic, Normocephalic  EYES: EOMI, PERRLA, conjunctiva and sclera clear  ENMT: Moist mucous membranes,   NECK: Supple, No JVD, Normal thyroid  NERVOUS SYSTEM:  Alert & Oriented X 3  CHEST/LUNG: Clear to auscultation bilaterally; No rales, rhonchi, wheezing, or rubs  HEART: Regular rate and rhythm; No murmurs, rubs, or gallops  ABDOMEN: Soft, Nontender, Nondistended; Bowel sounds present  EXTREMITIES:  2+ Peripheral Pulses, No clubbing, cyanosis, or edema, left foot covered in bandage   SKIN: No rashes or lesions    LABS:                        6.8    7.58  )-----------( 158      ( 19 Nov 2024 10:17 )             21.4     19 Nov 2024 08:03    138    |  106    |  80     ----------------------------<  248    5.1     |  21     |  3.00     Ca    9.1        19 Nov 2024 08:03    TPro  6.0    /  Alb  2.7    /  TBili  0.4    /  DBili  x      /  AST  40     /  ALT  44     /  AlkPhos  112    19 Nov 2024 08:03      Urinalysis Basic - ( 19 Nov 2024 08:03 )    Color: x / Appearance: x / SG: x / pH: x  Gluc: 248 mg/dL / Ketone: x  / Bili: x / Urobili: x   Blood: x / Protein: x / Nitrite: x   Leuk Esterase: x / RBC: x / WBC x   Sq Epi: x / Non Sq Epi: x / Bacteria: x      CAPILLARY BLOOD GLUCOSE      POCT Blood Glucose.: 273 mg/dL (19 Nov 2024 11:38)  POCT Blood Glucose.: 278 mg/dL (19 Nov 2024 07:41)  POCT Blood Glucose.: 246 mg/dL (18 Nov 2024 21:18)  POCT Blood Glucose.: 245 mg/dL (18 Nov 2024 16:50)

## 2024-11-19 NOTE — PROGRESS NOTE ADULT - SUBJECTIVE AND OBJECTIVE BOX
Chief Complaint: Toe infection    Interval Events: No events overnight.    Review of Systems:  General: No fevers, chills, weight gain  Skin: No rashes, color changes  Cardiovascular: No chest pain, orthopnea  Respiratory: No shortness of breath, cough  Gastrointestinal: No nausea, abdominal pain  Genitourinary: No incontinence, pain with urination  Musculoskeletal: No pain, swelling, decreased range of motion  Neurological: No headache, weakness  Psychiatric: No depression, anxiety  Endocrine: No weight gain, increased thirst  All other systems are comprehensively negative.    Physical Exam:  Vital Signs Last 24 Hrs  T(C): 36.8 (19 Nov 2024 12:48), Max: 37.5 (18 Nov 2024 13:19)  T(F): 98.2 (19 Nov 2024 12:48), Max: 99.5 (18 Nov 2024 13:19)  HR: 92 (19 Nov 2024 12:48) (84 - 97)  BP: 142/82 (19 Nov 2024 12:48) (105/63 - 142/82)  BP(mean): --  RR: 18 (19 Nov 2024 12:48) (17 - 18)  SpO2: 95% (19 Nov 2024 12:48) (92% - 96%)  Parameters below as of 19 Nov 2024 12:48  Patient On (Oxygen Delivery Method): room air  General: NAD  HEENT: MMM  Neck: No JVD, no carotid bruit  Lungs: CTAB  CV: RRR, nl S1/S2, no M/R/G  Abdomen: S/NT/ND, +BS  Extremities: No LE edema, no cyanosis  Neuro: AAOx3, non-focal  Skin: No rash    Labs:    11-19    138  |  106  |  80[H]  ----------------------------<  248[H]  5.1   |  21[L]  |  3.00[H]    Ca    9.1      19 Nov 2024 08:03    TPro  6.0  /  Alb  2.7[L]  /  TBili  0.4  /  DBili  x   /  AST  40  /  ALT  44  /  AlkPhos  112  11-19                        6.8    7.58  )-----------( 158      ( 19 Nov 2024 10:17 )             21.4

## 2024-11-19 NOTE — CONSULT NOTE ADULT - SUBJECTIVE AND OBJECTIVE BOX
HPI:  49-year-old male with  DM2, HTN, HLD, NEUROPATHY, PVD,ckd4, ETOH abuse, Diabetic foot ulcer with cellullitis ,ac on ch OM 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  He has been on amoxicillin twice daily and states he has been compliant with his antibiotic.  Patient noted that his surgical site had developed a bad odor with some discharge.  Left foot with exposed metatarsal head. Was on admited on 11/3 but signed out AMA now readmitted for same.    Infectious Disease consult was called to evaluate pt and for antibiotic management      Past Medical & Surgical Hx:  PAST MEDICAL & SURGICAL HISTORY:  Prediabetes  HTN (hypertension)  HLD (hyperlipidemia)  DM2 (diabetes mellitus, type 2)      Social History--  EtOH: denies  Tobacco: denies   Drug Use: denies      FAMILY HISTORY:  Noncontributory    Allergies  No Known Allergies    Intolerances  NONE      Home Medications:  acetaminophen 325 mg oral tablet: 2 tab(s) orally every 6 hours As needed Temp greater or equal to 38C (100.4F), Mild Pain (1 - 3), Moderate Pain (4 - 6) (01 Nov 2024 22:34)      Current Inpatient Medications :    ANTIBIOTICS:   piperacillin/tazobactam IVPB.. 3.375 Gram(s) IV Intermittent every 8 hours      OTHER RELEVANT MEDICATIONS :  acetaminophen     Tablet .. 650 milliGRAM(s) Oral every 6 hours PRN  amLODIPine   Tablet 10 milliGRAM(s) Oral daily  carvedilol 6.25 milliGRAM(s) Oral every 12 hours  dextrose 5%. 1000 milliLiter(s) IV Continuous <Continuous>  dextrose 5%. 1000 milliLiter(s) IV Continuous <Continuous>  dextrose 50% Injectable 12.5 Gram(s) IV Push once  dextrose 50% Injectable 25 Gram(s) IV Push once  dextrose 50% Injectable 25 Gram(s) IV Push once  dextrose Oral Gel 15 Gram(s) Oral once PRN  gabapentin 100 milliGRAM(s) Oral three times a day  glucagon  Injectable 1 milliGRAM(s) IntraMuscular once  insulin glargine Injectable (LANTUS) 20 Unit(s) SubCutaneous at bedtime  insulin lispro (ADMELOG) corrective regimen sliding scale   SubCutaneous at bedtime  insulin lispro (ADMELOG) corrective regimen sliding scale   SubCutaneous three times a day before meals  insulin lispro Injectable (ADMELOG) 7 Unit(s) SubCutaneous before breakfast  insulin lispro Injectable (ADMELOG) 7 Unit(s) SubCutaneous before dinner  insulin lispro Injectable (ADMELOG) 7 Unit(s) SubCutaneous before lunch  thiamine 100 milliGRAM(s) Oral daily        ROS:  CONSTITUTIONAL:  Negative fever or chills  EYES:  Negative  blurry vision or double vision  CARDIOVASCULAR:  Negative for chest pain or palpitations  RESPIRATORY:  Negative for cough, wheezing, or SOB   GASTROINTESTINAL:  Negative for nausea, vomiting, diarrhea, constipation, or abdominal pain  GENITOURINARY:  Negative frequency, urgency , dysuria or hematuria   NEUROLOGIC:  No headache, confusion, dizziness, lightheadedness  All other systems were reviewed and are negative          I&O's Detail    07 Nov 2024 07:01  -  08 Nov 2024 07:00  --------------------------------------------------------  IN:    IV PiggyBack: 100 mL    Oral Fluid: 240 mL  Total IN: 340 mL    OUT:  Total OUT: 0 mL    Total NET: 340 mL      Physical Exam:  Vital Signs Last 24 Hrs  T(C): 37.1 (08 Nov 2024 09:15), Max: 37.1 (08 Nov 2024 05:00)  T(F): 98.7 (08 Nov 2024 09:15), Max: 98.8 (08 Nov 2024 05:00)  HR: 100 (08 Nov 2024 09:15) (94 - 112)  BP: 163/91 (08 Nov 2024 09:15) (139/83 - 166/88)  RR: 18 (08 Nov 2024 09:15) (18 - 20)  SpO2: 94% (08 Nov 2024 09:15) (85% - 96%)    Parameters below as of 08 Nov 2024 09:15  Patient On (Oxygen Delivery Method): nasal cannula      Height (cm): 175.3 (11-08 @ 11:24)  Weight (kg): 90.7 (11-08 @ 11:24)  BMI (kg/m2): 29.5 (11-08 @ 11:24)  BSA (m2): 2.07 (11-08 @ 11:24)    General:  no acute distress  Neck: supple, trachea midline  Lungs: clear, no wheeze/rhonchi  Cardiovascular: regular rate and rhythm, S1 S2  Abdomen: soft, nontender, ND, bowel sounds normal  Neurological:  alert and oriented x3  Skin: no rash  Extremities: Left foot drsg c/d/i    Labs:                         10.1   5.98  )-----------( 100      ( 08 Nov 2024 07:47 )             31.1   11-08    142  |  105  |  17  ----------------------------<  143[H]  4.0   |  26  |  2.02[H]    Ca    8.8      08 Nov 2024 07:47    TPro  6.8  /  Alb  2.9[L]  /  TBili  0.7  /  DBili  x   /  AST  42[H]  /  ALT  37  /  AlkPhos  154[H]  11-08      RECENT CULTURES:      RADIOLOGY & ADDITIONAL STUDIES:    Assessment :   49-year-old male with  DM2, HTN, HLD, NEUROPATHY, PVD,ckd4, ETOH abuse, Diabetic foot ulcer with cellullitis ,ac on ch OM 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  He has been on amoxicillin twice daily and states he has been compliant with his antibiotic.  Patient noted that his surgical site had developed a bad odor with some discharge.  Left foot with exposed metatarsal head. Was on admited on 11/3 but signed out AMA now readmitted for same.    Plan :   Cont Zosyn  To OR next Tuesday  Trend temps and cbc  Podiatry on case    D/w Dr Weaver    Continue with present regiment .  Approptiate use of antibiotics and adverse effects reviewed.      I have discussed the above plan of care with patient in detail. He expressed understanding of the treatment plan . Risks, benefits and alternatives discussed in detail. I have asked if he has  any questions or concerns and appropriately addressed them to the best of my ability .      > 45 minutes spent in direct patient care reviewing  the notes, lab data/ imaging , discussion with multidisciplinary team. All questions were addressed and answered to the best of my capacity .    Thank you for allowing me to participate in the care of your patient .      Linsey Mack MD  Infectious Disease  [phone]
CARDIOLOGY CONSULT NOTE    Patient is a 49y Male with a known history of :  Diabetic foot infection [E11.628]    Type 2 diabetes mellitus with hyperglycemia [E11.65]      HPI:  49-year-old male with past medical history of DM2, HTN, HLD, NEUROPATHY, PVD,ckd4, ETOH abuser,  NON COMPLIANT WITH TT, recently discharged after tt for , Diabetic foot ulcer with cellullitis ,ac on ch OM with sepstic arthritis amputation of of 2nd left toe, OM left great toe-,surg done by podiatry,culture of wound E fecalis, path OM , with clear margins. left hospital on against medical advice for his personal problem, The patient return for persistent infection, foul-smelling discharge.  Patient denies any chest pain, no shortness of breath.  No fever or chills.  No cough/URI.  No neck or back pain.  No numbness or tingling.  No focal weakness.  No aggravating or alleviating factors otherwise noted.  No other acute complaints. Patient states he sometimes drinks, states he did not drink today.  In ED afebrile, o2 sat 90 %, /99, ,patient is being admitted for further care, and podiatry and ID consult called (2024 17:57)      REVIEW OF SYSTEMS:    CONSTITUTIONAL: No fever, weight loss, or fatigue  EYES: No eye pain, visual disturbances, or discharge  ENMT:  No difficulty hearing, tinnitus, vertigo; No sinus or throat pain  NECK: No pain or stiffness  BREASTS: No pain, masses, or nipple discharge  RESPIRATORY: No cough, wheezing, chills or hemoptysis; No shortness of breath  CARDIOVASCULAR: No chest pain, palpitations, dizziness, or leg swelling  GASTROINTESTINAL: No abdominal or epigastric pain. No nausea, vomiting, or hematemesis; No diarrhea or constipation. No melena or hematochezia.  GENITOURINARY: No dysuria, frequency, hematuria, or incontinence  NEUROLOGICAL: No headaches, memory loss, loss of strength, numbness, or tremors  SKIN: No itching, burning, rashes, or lesions   LYMPH NODES: No enlarged glands  ENDOCRINE: No heat or cold intolerance; No hair loss  MUSCULOSKELETAL: No joint pain or swelling; No muscle, back, or extremity pain  PSYCHIATRIC: No depression, anxiety, mood swings, or difficulty sleeping  HEME/LYMPH: No easy bruising, or bleeding gums  ALLERGY AND IMMUNOLOGIC: No hives or eczema    MEDICATIONS  (STANDING):  amLODIPine   Tablet 10 milliGRAM(s) Oral daily  carvedilol 6.25 milliGRAM(s) Oral every 12 hours  dextrose 5%. 1000 milliLiter(s) (50 mL/Hr) IV Continuous <Continuous>  dextrose 5%. 1000 milliLiter(s) (100 mL/Hr) IV Continuous <Continuous>  dextrose 50% Injectable 12.5 Gram(s) IV Push once  dextrose 50% Injectable 25 Gram(s) IV Push once  dextrose 50% Injectable 25 Gram(s) IV Push once  gabapentin 100 milliGRAM(s) Oral three times a day  glucagon  Injectable 1 milliGRAM(s) IntraMuscular once  insulin glargine Injectable (LANTUS) 20 Unit(s) SubCutaneous at bedtime  insulin lispro (ADMELOG) corrective regimen sliding scale   SubCutaneous three times a day before meals  insulin lispro (ADMELOG) corrective regimen sliding scale   SubCutaneous at bedtime  insulin lispro Injectable (ADMELOG) 7 Unit(s) SubCutaneous before breakfast  insulin lispro Injectable (ADMELOG) 7 Unit(s) SubCutaneous before dinner  insulin lispro Injectable (ADMELOG) 7 Unit(s) SubCutaneous before lunch  lactobacillus acidophilus 1 Tablet(s) Oral two times a day with meals  losartan 50 milliGRAM(s) Oral daily  piperacillin/tazobactam IVPB.. 3.375 Gram(s) IV Intermittent every 8 hours  thiamine 100 milliGRAM(s) Oral daily    MEDICATIONS  (PRN):  acetaminophen     Tablet .. 650 milliGRAM(s) Oral every 6 hours PRN Temp greater or equal to 38C (100.4F), Mild Pain (1 - 3)  dextrose Oral Gel 15 Gram(s) Oral once PRN Blood Glucose LESS THAN 70 milliGRAM(s)/deciliter  diphenhydrAMINE 25 milliGRAM(s) Oral every 6 hours PRN Rash and/or Itching  guaiFENesin Oral Liquid (Sugar-Free) 100 milliGRAM(s) Oral every 6 hours PRN Cough      ALLERGIES: No Known Allergies      FAMILY HISTORY:      Social History:  Alochol:   Smoking:   Drug Use:   Marital Status:     I&O's Detail      PHYSICAL EXAMINATION:  -----------------------------  T(C): 36.7 (11-10-24 @ 05:06), Max: 37.1 (24 @ 20:29)  HR: 75 (11-10-24 @ 05:06) (75 - 94)  BP: 146/77 (11-10-24 @ 05:06) (127/83 - 176/94)  RR: 18 (11-10-24 @ 05:06) (18 - 19)  SpO2: 97% (11-10-24 @ 05:06) (94% - 97%)  Wt(kg): --        Constitutional: well developed, normal appearance, well groomed, well nourished, no deformities and no acute distress.   Eyes: the conjunctiva exhibited no abnormalities and the eyelids demonstrated no xanthelasmas.   HEENT: normal oral mucosa, no oral pallor and no oral cyanosis.   Neck: normal jugular venous A waves present, normal jugular venous V waves present and no jugular venous berrios A waves.   Pulmonary: no respiratory distress, normal respiratory rhythm and effort, no accessory muscle use and lungs were clear to auscultation bilaterally.   Cardiovascular: heart rate and rhythm were normal, normal S1 and S2 and no murmur, gallop, rub, heave or thrill are present.   Musculoskeletal: the gait could not be assessed.   Extremities: no clubbing of the fingernails, no localized cyanosis, no petechial hemorrhages and no ischemic changes. B/L trace edema  Skin: normal skin color and pigmentation, no rash, no venous stasis, no skin lesions, no skin ulcer and no xanthoma was observed.   Psychiatric: oriented to person, place, and time, the affect was normal, the mood was normal and not feeling anxious.     LABS:   --------  11-10    139  |  101  |  29[H]  ----------------------------<  178[H]  3.8   |  30  |  2.14[H]    Ca    9.2      10 Nov 2024 06:00    TPro  6.7  /  Alb  2.5[L]  /  TBili  0.8  /  DBili  x   /  AST  30  /  ALT  27  /  AlkPhos  125[H]  11-10                         10.1   5.30  )-----------( 116      ( 10 Nov 2024 06:00 )             31.5                   RADIOLOGY:  -----------------        EC24 Sinus tachcardia-101/minute-no acute changes
NEPHROLOGY CONSULTATION    CHIEF COMPLAINT:  CKD      HPI:  Readmitted with diabetic foot wound.  Will need more debridement.  Renal function appears at baseline, improved from prior admits.  BP is elevated.  He feels like he is retaining more fluid in his legs lately.        PAST MEDICAL & SURGICAL HISTORY:  Prediabetes    HTN (hypertension)    HLD (hyperlipidemia)    DM2 (diabetes mellitus, type 2)          Home Medications:  acetaminophen 325 mg oral tablet: 2 tab(s) orally every 6 hours As needed Temp greater or equal to 38C (100.4F), Mild Pain (1 - 3), Moderate Pain (4 - 6) (01 Nov 2024 22:34)      MEDICATIONS  (STANDING):  amLODIPine   Tablet 10 milliGRAM(s) Oral daily  carvedilol 6.25 milliGRAM(s) Oral every 12 hours  dextrose 5%. 1000 milliLiter(s) (100 mL/Hr) IV Continuous <Continuous>  dextrose 5%. 1000 milliLiter(s) (50 mL/Hr) IV Continuous <Continuous>  dextrose 50% Injectable 12.5 Gram(s) IV Push once  dextrose 50% Injectable 25 Gram(s) IV Push once  dextrose 50% Injectable 25 Gram(s) IV Push once  gabapentin 100 milliGRAM(s) Oral three times a day  glucagon  Injectable 1 milliGRAM(s) IntraMuscular once  insulin glargine Injectable (LANTUS) 20 Unit(s) SubCutaneous at bedtime  insulin lispro (ADMELOG) corrective regimen sliding scale   SubCutaneous at bedtime  insulin lispro (ADMELOG) corrective regimen sliding scale   SubCutaneous three times a day before meals  insulin lispro Injectable (ADMELOG) 7 Unit(s) SubCutaneous before breakfast  insulin lispro Injectable (ADMELOG) 7 Unit(s) SubCutaneous before dinner  insulin lispro Injectable (ADMELOG) 7 Unit(s) SubCutaneous before lunch  lactobacillus acidophilus 1 Tablet(s) Oral two times a day with meals  piperacillin/tazobactam IVPB.. 3.375 Gram(s) IV Intermittent every 8 hours  thiamine 100 milliGRAM(s) Oral daily      PHYSICAL EXAMINATION:  /91    Conversant, no apparent distress  PERRLA, pink conjunctivae, no ptosis  Good dentition, no pharyngeal erythema  Neck non tender, no mass, no thyromegaly or nodules  Normal respiratory effort, lungs clear to auscultation  Heart with RRR, no murmurs or rubs, 2+ peripheral edema  Abdomen soft, no masses, no organomegaly  Skin no rashes, normal turgor and temperature  Appropriate affect, AO x 3    LABS:                        10.1   5.98  )-----------( 100      ( 08 Nov 2024 07:47 )             31.1     11-08    142  |  105  |  17  ----------------------------<  143[H]  4.0   |  26  |  2.02[H]    Ca    8.8      08 Nov 2024 07:47    TPro  6.8  /  Alb  2.9[L]  /  TBili  0.7  /  DBili  x   /  AST  42[H]  /  ALT  37  /  AlkPhos  154[H]  11-08      Urinalysis Basic - ( 08 Nov 2024 07:47 )  300 protein, small blood, 5 RBC      RADIOLOGY:  Chest X-Ray personally reviewed and shows underinflation with crowded vascular markings      ASSESSMENT:  1.  CKD stage 3b due to diabetic nephropathy with proteinuria  2.  Chronic hypertension, poorly controlled;  fluid excess  3.  Infected diabetic foot ulcer    PLAN:  Resume losartan 50 mg PO QD  Low sodium diet  Consider low dose loop in coming days depending on renal functional trend on ARB  Eventual SGLT2 inhibitor                
  Patient is a 49y old  Male who presents with a chief complaint of foot infection (18 Nov 2024 11:30)      Reason For Consult: dm2 uncontrolled    HPI:  49-year-old male with past medical history of DM2, HTN, HLD, NEUROPATHY, PVD,ckd4, ETOH abuser,  NON COMPLIANT WITH TT, recently discharged after tt for , Diabetic foot ulcer with cellullitis ,ac on ch OM with sepstic arthritis amputation of of 2nd left toe, OM left great toe-,surg done by podiatry,9/20culture of wound E fecalis, path OM , with clear margins. left hospital on11/4 against medical advice for his personal problem, The patient return for persistent infection, foul-smelling discharge.  Patient denies any chest pain, no shortness of breath.  No fever or chills.  No cough/URI.  No neck or back pain.  No numbness or tingling.  No focal weakness.  No aggravating or alleviating factors otherwise noted.  No other acute complaints. Patient states he sometimes drinks, states he did not drink today.  In ED afebrile, o2 sat 90 %, /99, ,patient is being admitted for further care, and podiatry and ID consult called (07 Nov 2024 17:57)      PAST MEDICAL & SURGICAL HISTORY:  Prediabetes      HTN (hypertension)      HLD (hyperlipidemia)      DM2 (diabetes mellitus, type 2)          FAMILY HISTORY:        Social History:    MEDICATIONS  (STANDING):  amLODIPine   Tablet 10 milliGRAM(s) Oral daily  carvedilol 6.25 milliGRAM(s) Oral every 12 hours  dextrose 5%. 1000 milliLiter(s) (50 mL/Hr) IV Continuous <Continuous>  dextrose 5%. 1000 milliLiter(s) (100 mL/Hr) IV Continuous <Continuous>  dextrose 5%. 1000 milliLiter(s) (50 mL/Hr) IV Continuous <Continuous>  dextrose 50% Injectable 25 Gram(s) IV Push once  dextrose 50% Injectable 12.5 Gram(s) IV Push once  doxycycline monohydrate Capsule 100 milliGRAM(s) Oral every 12 hours  gabapentin 100 milliGRAM(s) Oral three times a day  glucagon  Injectable 1 milliGRAM(s) IntraMuscular once  heparin   Injectable 5000 Unit(s) SubCutaneous every 12 hours  insulin glargine Injectable (LANTUS) 40 Unit(s) SubCutaneous at bedtime  insulin lispro (ADMELOG) corrective regimen sliding scale   SubCutaneous three times a day before meals  insulin lispro (ADMELOG) corrective regimen sliding scale   SubCutaneous at bedtime  insulin lispro Injectable (ADMELOG) 13 Unit(s) SubCutaneous three times a day before meals  lactobacillus acidophilus 1 Tablet(s) Oral two times a day with meals  thiamine 100 milliGRAM(s) Oral daily    MEDICATIONS  (PRN):  acetaminophen     Tablet .. 650 milliGRAM(s) Oral every 6 hours PRN Temp greater or equal to 38C (100.4F), Mild Pain (1 - 3)  dextrose Oral Gel 15 Gram(s) Oral once PRN Blood Glucose LESS THAN 70 milliGRAM(s)/deciliter  dextrose Oral Gel 15 Gram(s) Oral once PRN Blood Glucose LESS THAN 70 milliGRAM(s)/deciliter  diphenhydrAMINE 25 milliGRAM(s) Oral every 6 hours PRN Rash and/or Itching  guaiFENesin Oral Liquid (Sugar-Free) 100 milliGRAM(s) Oral every 6 hours PRN Cough  melatonin 5 milliGRAM(s) Oral at bedtime PRN Insomnia        T(C): 36.9 (11-19-24 @ 05:16), Max: 37.5 (11-18-24 @ 13:19)  HR: 84 (11-19-24 @ 05:16) (84 - 97)  BP: 105/63 (11-19-24 @ 05:16) (105/63 - 134/76)  RR: 17 (11-19-24 @ 05:16) (17 - 18)  SpO2: 92% (11-19-24 @ 05:16) (92% - 95%)  Wt(kg): --    PHYSICAL EXAM:  GENERAL: NAD, well-groomed, well-developed  HEAD:  Atraumatic, Normocephalic  NECK: Supple, No JVD, Normal thyroid  CHEST/LUNG: Clear to percussion bilaterally; No rales, rhonchi, wheezing, or rubs  HEART: Regular rate and rhythm; No murmurs, rubs, or gallops  ABDOMEN: Soft, Nontender, Nondistended; Bowel sounds present  EXTREMITIES: le foot dsg intact    CAPILLARY BLOOD GLUCOSE      POCT Blood Glucose.: 278 mg/dL (19 Nov 2024 07:41)  POCT Blood Glucose.: 246 mg/dL (18 Nov 2024 21:18)  POCT Blood Glucose.: 245 mg/dL (18 Nov 2024 16:50)  POCT Blood Glucose.: 282 mg/dL (18 Nov 2024 12:01)                            7.6    8.96  )-----------( 157      ( 18 Nov 2024 16:00 )             23.6       CMP:  11-18 @ 07:39  SGPT --  Albumin --   Alk Phos --   Anion Gap 11   SGOT --   Total Bili --   BUN 83   Calcium Total 9.4   CO2 22   Chloride 104   Creatinine 3.16   eGFR if AA --   eGFR if non AA --   Glucose 249   Potassium 4.8   Protein --   Sodium 137      Thyroid Function Tests:      Diabetes Tests:       Radiology:               
S : 49y year old Male seen at bedside for diabetic foot infection    Chief Complaint : Patient is a 49y old  Male who presents with a chief complaint of diabetic foot infection  HPI : 49-year-old male with a history of hypertension, hyperlipidemia, type 2 diabetes, neuropathy, peripheral vascular disease, chronic kidney disease, EtOH abuse, reported history of being noncompliant status post recent left foot first toe amputation, complicated with cellulitis/foot ulcer.  The patient was admitted to the hospital on November 1, but left AMA on November 4.  The patient return for persistent infection, foul-smelling discharge.  Patient denies any chest pain, no shortness of breath.  No fever or chills.  No cough/URI.  No neck or back pain.  No numbness or tingling.  No focal weakness.  No aggravating or alleviating factors otherwise noted.  No other acute complaints. Patient states he sometimes drinks, states he did not drink today.    Patient admits to  (-) Fevers, (-) Chills, (-) Nausea, (-) Vomiting, (-) Shortness of Breath      PMH: Prediabetes    HTN (hypertension)    HLD (hyperlipidemia)    DM2 (diabetes mellitus, type 2)      PSH:    Allergies:No Known Allergies      Labs:                          9.5    6.27  )-----------( 103      ( 07 Nov 2024 14:04 )             29.7     WBC Trend  6.27 Date (11-07 @ 14:04)  4.61 Date (11-04 @ 07:40)  6.13 Date (11-03 @ 06:00)  5.92 Date (11-02 @ 06:30)  6.37 Date (11-01 @ 20:00)      Chem  11-07    132[L]  |  97  |  19  ----------------------------<  230[H]  3.8   |  24  |  2.12[H]    Ca    8.7      07 Nov 2024 14:04    TPro  7.7  /  Alb  3.1[L]  /  TBili  0.4  /  DBili  x   /  AST  52[H]  /  ALT  48  /  AlkPhos  147[H]  11-07          T(F): 97.9 (11-07-24 @ 16:00), Max: 98.4 (11-07-24 @ 13:35)  HR: 96 (11-07-24 @ 16:00) (94 - 101)  BP: 145/81 (11-07-24 @ 16:00) (139/83 - 158/99)  RR: 19 (11-07-24 @ 16:00) (19 - 20)  SpO2: 94% (11-07-24 @ 16:00) (85% - 94%)  Wt(kg): --    PHYSICAL EXAM  General: Pleasant  male NAD & AOX3.    Integument:  Skin warm, dry bilateral.    Noted ulcer left 1st mpj with exposed metatarsal head, hx of left hallux amputation with dehiscence, + edema, + erythema, + probe to bone, + purulent drainage, + tracking  Clinical concern for underling OM.   Vascular: Dorsalis Pedis and Posterior Tibial pulses 1/4.  Capillary re-fill time less then 3 seconds digits 1-5 bilateral.    Neuro: Protective sensation diminished to the level of the digits bilateral.  MSK: Muscle strength 5/5 all major muscle groups bilateral. Noted hx of left hallux amputation    
Patient is a 49y old  Male who presents with a chief complaint of foot infection (08 Nov 2024 11:50)    Type: 2 DM, known from previous admissions DX 10 years known complications neuropathy, DFU, no Endocrine PCP Dr Ricardo Mastres, a1c 8.5% down from 10.6%, Rx home Lantus 20 units @ HS and novolog 7 units TIDAC + ISS, pt states has not used in last week.  has been using freestyle elva CGM, but reports needs to be replaced, hasnt used in 2 weeks. explained importance of daily BG monitoring and insulin administration. pt states he made dietary changes, reviewed CC diet, importance of glycemic control for wound healing. verbal education and handouts provided  diabetes education provided- A1c measure and BG targets  fasting, <180 2 hours postmeal. medication MOA and considerations/side effects, inhospital BGM frequency and insulin administration, s/s of hyperglycemia/hypoglycemia and management, glycemic control and preventing complications, consistent carb diet, balanced plate method, consistent meal planning. sick day management, provider f/u    HPI:  49-year-old male with past medical history of DM2, HTN, HLD, NEUROPATHY, PVD,ckd4, ETOH abuser,  NON COMPLIANT WITH TT, recently discharged after tt for , Diabetic foot ulcer with cellullitis ,ac on ch OM with sepstic arthritis amputation of of 2nd left toe, OM left great toe-,surg done by podiatry,9/20culture of wound E fecalis, path OM , with clear margins. left hospital on11/4 against medical advice for his personal problem, The patient return for persistent infection, foul-smelling discharge.  Patient denies any chest pain, no shortness of breath.  No fever or chills.  No cough/URI.  No neck or back pain.  No numbness or tingling.  No focal weakness.  No aggravating or alleviating factors otherwise noted.  No other acute complaints. Patient states he sometimes drinks, states he did not drink today.  In ED afebrile, o2 sat 90 %, /99, ,patient is being admitted for further care, and podiatry and ID consult called (07 Nov 2024 17:57)      PAST MEDICAL & SURGICAL HISTORY:  Prediabetes      HTN (hypertension)      HLD (hyperlipidemia)      DM2 (diabetes mellitus, type 2)    Allergies    No Known Allergies    Intolerances        MEDICATIONS  (STANDING):  amLODIPine   Tablet 10 milliGRAM(s) Oral daily  carvedilol 6.25 milliGRAM(s) Oral every 12 hours  dextrose 5%. 1000 milliLiter(s) (100 mL/Hr) IV Continuous <Continuous>  dextrose 5%. 1000 milliLiter(s) (50 mL/Hr) IV Continuous <Continuous>  dextrose 50% Injectable 12.5 Gram(s) IV Push once  dextrose 50% Injectable 25 Gram(s) IV Push once  dextrose 50% Injectable 25 Gram(s) IV Push once  gabapentin 100 milliGRAM(s) Oral three times a day  glucagon  Injectable 1 milliGRAM(s) IntraMuscular once  insulin glargine Injectable (LANTUS) 20 Unit(s) SubCutaneous at bedtime  insulin lispro (ADMELOG) corrective regimen sliding scale   SubCutaneous three times a day before meals  insulin lispro (ADMELOG) corrective regimen sliding scale   SubCutaneous at bedtime  insulin lispro Injectable (ADMELOG) 7 Unit(s) SubCutaneous before breakfast  insulin lispro Injectable (ADMELOG) 7 Unit(s) SubCutaneous before lunch  insulin lispro Injectable (ADMELOG) 7 Unit(s) SubCutaneous before dinner  lactobacillus acidophilus 1 Tablet(s) Oral two times a day with meals  piperacillin/tazobactam IVPB.. 3.375 Gram(s) IV Intermittent every 8 hours  thiamine 100 milliGRAM(s) Oral daily

## 2024-11-19 NOTE — PROGRESS NOTE ADULT - PROBLEM SELECTOR PLAN 1
Patient seen and evaluated  Discussed diagnosis and treatment with patient.  Patient is s/p surgical debridement of all non-viable soft tissue and bone left foot with partial 1st ray resection and packing all left foot . DOS 11/12/2024   OR WCx demonstrates staph infection,   Surgical bone pathology demonstrates mild chronic OM in proximal margin.  Patient is s/p surgical exploration of left foot for active bleed DOS 11/17/2024  Dressing clean, dry, intact.  Packing removed today  Per ID, pt will go home with PO abx  Keep the postop dressing clean/dry/intact   Non weight bearing on left foot   Recs elevation on left lower extremities   Pt may require serial surgical debridements/amputation and possible BKA if symptoms persist.    radiographs reviewed and noted all operative regions in good position   Pt understands the severity of his condition and  that he is at risk to lose part of the left foot, all the foot or below the knee amputation. All questions were asked and answered for patient satisfaction    Medical management as per primary team   Will discuss with all attendings.

## 2024-11-19 NOTE — PROGRESS NOTE ADULT - SUBJECTIVE AND OBJECTIVE BOX
NEPHROLOGY PROGRESS NOTE    CHIEF COMPLAINT:  LUISA/CKD    HPI:  Renal function stabilized.  BP low 100's this AM, now higher.  Getting PRBC.  No further blood loss.    EXAM:  Vital Signs Last 24 Hrs  T(C): 36.8 (19 Nov 2024 13:12), Max: 37.5 (19 Nov 2024 09:33)  T(F): 98.2 (19 Nov 2024 13:12), Max: 99.5 (19 Nov 2024 09:33)  HR: 92 (19 Nov 2024 13:12) (84 - 97)  BP: 146/- (19 Nov 2024 13:12) (105/63 - 146/-)  BP(mean): --  RR: 18 (19 Nov 2024 13:12) (17 - 18)  SpO2: 96% (19 Nov 2024 13:12) (92% - 96%)    Parameters below as of 19 Nov 2024 13:12  Patient On (Oxygen Delivery Method): room air    Conversant, in no apparent distress  Normal respiratory effort, lungs clear bilaterally  Heart RRR with no murmur, no peripheral edema    LABS                        6.8    7.58  )-----------( 158      ( 19 Nov 2024 10:17 )             21.4     11-19    138  |  106  |  80[H]  ----------------------------<  248[H]  5.1   |  21[L]  |  3.00[H]    Ca    9.1      19 Nov 2024 08:03    TPro  6.0  /  Alb  2.7[L]  /  TBili  0.4  /  DBili  x   /  AST  40  /  ALT  44  /  AlkPhos  112  11-19      Impression:  LUISA CKD 3 due to ischemic/nephrotoxic ATN, stabilized  Diabetes with foot wound  Hypertension, variable control   Blood loss anemia    Recommend:  Hold ARB  BMP in AM

## 2024-11-19 NOTE — PROGRESS NOTE ADULT - ASSESSMENT
Diabetic foot ulcer with cellulitis with acute on chronic osteomyelitis left foot  CT- consistent with OM of first Toe left  Podiatry following    On 11/12 had amputation , debulking and flap on left foot by Dr Finnegan  Pt was taken to OR and hemostasis obtained after RRT was called on Sunday   Hb dropped today and 1 unit PRBC ordered   Cont abx as per ID  S/P vanco  Continue  doxycyline 100 mg twice daily    Cultures positive for staphylococcus   Monitor fever and WBC curve     Acute on chronic anemia   HB 6.8   1 unit PRBC ordered   Monitor CBC   Monitor for bleeding    DM2 with hyperglycemia, with neuropathy , nephropathy with likely PVD   FS + DOROTHY  Increase Lantus to 45 units   Increase lispro to 15 units 3 times a day   Goal blood glucose 100-180 in hospital setting.  Monitor blood sugar   Endocrine following       HTN  Hold Losartan 50 mg   Continue  amlodipine 10 mg, coreg 6.25 bid    LUISA on CKD 3 likely Diabetic nephropathy   Nephro following   Monitor BMP      ETOH use disorder   Cessation counselling   Folic acid, thiamine     Obesity    EDUAR   O2 as needed    DVT prophylaxis   heparin

## 2024-11-19 NOTE — PROGRESS NOTE ADULT - SUBJECTIVE AND OBJECTIVE BOX
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 left hospital on11/4 against medical advice for his personal problem, The patient return for persistent infection, foul-smelling discharge.  Patient denies any chest pain, no shortness of breath.  No fever or chills.  No cough/URI.  No neck or back pain.  No numbness or tingling.  No focal weakness.  No aggravating or alleviating factors otherwise noted.  No other acute complaints. Patient states he sometimes drinks, states he did not drink today.  In ED afebrile, o2 sat 90 %, /99, ,patient is being admitted for further care, and podiatry and ID consult called (07 Nov 2024 17:57)      Vital Signs Last 24 Hrs  T(C): 37.5 (19 Nov 2024 09:33), Max: 37.5 (18 Nov 2024 13:19)  T(F): 99.5 (19 Nov 2024 09:33), Max: 99.5 (18 Nov 2024 13:19)  HR: 92 (19 Nov 2024 09:33) (84 - 97)  BP: 129/81 (19 Nov 2024 09:33) (105/63 - 134/76)  BP(mean): --  RR: 17 (19 Nov 2024 09:33) (17 - 18)  SpO2: 95% (19 Nov 2024 09:33) (92% - 95%)    Parameters below as of 19 Nov 2024 09:33  Patient On (Oxygen Delivery Method): room air                              6.8    7.58  )-----------( 158      ( 19 Nov 2024 10:17 )             21.4               11-19    138  |  106  |  80[H]  ----------------------------<  248[H]  5.1   |  21[L]  |  3.00[H]    Ca    9.1      19 Nov 2024 08:03    TPro  6.0  /  Alb  2.7[L]  /  TBili  0.4  /  DBili  x   /  AST  40  /  ALT  44  /  AlkPhos  112  11-19      PHYSICAL EXAM  Procedure on 11/12/2024 left foot s/p surgical Debridement of all non-viable soft tissue and bone, debridement of deep space infection of left foot with left 1st partial ray resection all left foot  POD#2 surgical exploration of left foot for active bleed.  No noted strikethrough in dressing, noted no active bleed.   Patient has guarded prognosis . Will cont to allow any further demarcation to occur and will closely monitor.    Vascular: Dorsalis Pedis and Posterior Tibial pulses palpable 2/4 bilaterally.  Capillary re-fill time less then 3 seconds digits 1-5 bilateral.  Mild-moderate edema bilaterally up to the ankle left foot   Neuro: Protective sensation diminished to the level of the digits bilateral.   MSK: Muscle strength 5/5 all major muscle groups bilateral.

## 2024-11-19 NOTE — PROGRESS NOTE ADULT - ASSESSMENT
The patient is a 49 year old male with a history of HTN, HL, DM, PAD, CKD, toe osteomyelitis who presents with toe infection.    Plan:  - ECG with sinus rhythm and no evidence of ischemia/infarction  - Continue amlodipine 10 mg daily  - Continue carvedilol 6.25 mg bid  - Continue losartan 50 mg daily  - Podiatry and ID follow-up  - On oral antibiotics

## 2024-11-19 NOTE — PROGRESS NOTE ADULT - SUBJECTIVE AND OBJECTIVE BOX
JOBSUZANNA is a 49yMale , patient examined and chart reviewed.     INTERVAL HPI/ OVERNIGHT EVENTS:  Drop in H/H - getting PRBC transfusion.  Afebrile.    PAST MEDICAL & SURGICAL HISTORY:  Prediabetes  HTN (hypertension)  HLD (hyperlipidemia)  DM2 (diabetes mellitus, type 2)    For details regarding the patient's social history, family history, and other miscellaneous elements, please refer the initial infectious diseases consultation and/or the admitting history and physical examination for this admission.    ROS:  CONSTITUTIONAL:  Negative fever or chills  EYES:  Negative  blurry vision or double vision  CARDIOVASCULAR:  Negative for chest pain or palpitations  RESPIRATORY:  Negative for cough, wheezing, or SOB   GASTROINTESTINAL:  Negative for nausea, vomiting, diarrhea, constipation, or abdominal pain  GENITOURINARY:  Negative frequency, urgency or dysuria  NEUROLOGIC:  No headache, confusion, dizziness, lightheadedness  All other systems were reviewed and are negative     No Known Allergies      Current inpatient medications :    ANTIBIOTICS/RELEVANT:  doxycycline monohydrate Capsule 100 milliGRAM(s) Oral every 12 hours    MEDICATIONS  (STANDING):  amLODIPine   Tablet 10 milliGRAM(s) Oral daily  carvedilol 6.25 milliGRAM(s) Oral every 12 hours  dextrose 5%. 1000 milliLiter(s) (50 mL/Hr) IV Continuous <Continuous>  dextrose 5%. 1000 milliLiter(s) (50 mL/Hr) IV Continuous <Continuous>  dextrose 5%. 1000 milliLiter(s) (100 mL/Hr) IV Continuous <Continuous>  dextrose 50% Injectable 25 Gram(s) IV Push once  dextrose 50% Injectable 12.5 Gram(s) IV Push oncegabapentin 100 milliGRAM(s) Oral three times a day  glucagon  Injectable 1 milliGRAM(s) IntraMuscular once  heparin   Injectable 5000 Unit(s) SubCutaneous every 12 hours  insulin glargine Injectable (LANTUS) 45 Unit(s) SubCutaneous at bedtime  insulin lispro (ADMELOG) corrective regimen sliding scale   SubCutaneous at bedtime  insulin lispro (ADMELOG) corrective regimen sliding scale   SubCutaneous three times a day before meals  insulin lispro Injectable (ADMELOG) 15 Unit(s) SubCutaneous three times a day before meals  lactobacillus acidophilus 1 Tablet(s) Oral two times a day with meals  thiamine 100 milliGRAM(s) Oral daily    MEDICATIONS  (PRN):  acetaminophen     Tablet .. 650 milliGRAM(s) Oral every 6 hours PRN Temp greater or equal to 38C (100.4F), Mild Pain (1 - 3)  dextrose Oral Gel 15 Gram(s) Oral once PRN Blood Glucose LESS THAN 70 milliGRAM(s)/deciliter  dextrose Oral Gel 15 Gram(s) Oral once PRN Blood Glucose LESS THAN 70 milliGRAM(s)/deciliter  diphenhydrAMINE 25 milliGRAM(s) Oral every 6 hours PRN Rash and/or Itching  guaiFENesin Oral Liquid (Sugar-Free) 100 milliGRAM(s) Oral every 6 hours PRN Cough  melatonin 5 milliGRAM(s) Oral at bedtime PRN Insomnia        Objective:  Vital Signs Last 24 Hrs  T(C): 36.5 (19 Nov 2024 11:45), Max: 37.5 (18 Nov 2024 13:19)  T(F): 97.7 (19 Nov 2024 11:45), Max: 99.5 (18 Nov 2024 13:19)  HR: 90 (19 Nov 2024 11:45) (84 - 97)  BP: 136/73 (19 Nov 2024 11:45) (105/63 - 136/73)  RR: 18 (19 Nov 2024 11:45) (17 - 18)  SpO2: 96% (19 Nov 2024 11:45) (92% - 96%)    Parameters below as of 19 Nov 2024 11:45  Patient On (Oxygen Delivery Method): room air      Physical Exam:  General: no acute distress  Neck: supple, trachea midline  Lungs: clear, no wheeze/rhonchi  Cardiovascular: regular rate and rhythm, S1 S2  Abdomen: soft, nontender,  bowel sounds normal  Neurological: alert and oriented x3  Skin: no rash  Extremities: Left foot drsg c/d/i      LABS:                        6.8    7.58  )-----------( 158      ( 19 Nov 2024 10:17 )             21.4   11-19    138  |  106  |  80[H]  ----------------------------<  248[H]  5.1   |  21[L]  |  3.00[H]    Ca    9.1      19 Nov 2024 08:03    TPro  6.0  /  Alb  2.7[L]  /  TBili  0.4  /  DBili  x   /  AST  40  /  ALT  44  /  AlkPhos  112  11-19    MICROBIOLOGY:  Culture - Tissue with Gram Stain (11.12.24 @ 18:21    -  Trimethoprim/Sulfamethoxazole: R >2/38   -  Vancomycin: S 1   -  Clindamycin: R >4   -  Erythromycin: R >4   -  Gentamicin: S <=4 Should not be used as monotherapy   -  Oxacillin: R >2   -  Penicillin: R >2   -  Rifampin: S <=1 Should not be used as monotherapy   -  Tetracycline: S <=4   Gram Stain:   No polymorphonuclear leukocytes seen per low power field  No organisms seen per oil power field   Specimen Source: Bone   Culture Results:   Rare Staphylococcus epidermidis  Few Corynebacterium resistens "Susceptibilities not performed"   Organism Identification: Staphylococcus epidermidis   Organism: Staphylococcus epidermidis   Method Type: STEPHANIE    Culture - Tissue with Gram Stain (collected 12 Nov 2024 18:21)  Source: Tissue  Gram Stain (14 Nov 2024 14:21):    No polymorphonuclear leukocytes seen per low power field    No organisms seen per oil power field  Preliminary Report (14 Nov 2024 14:21):    Rare Staphylococcus simulans    Culture - Wound Aerobic (collected 12 Nov 2024 18:21)  Source: .Other  Preliminary Report (14 Nov 2024 13:25):    Rare Staphylococcus simulans "Susceptibilities not performed"    Culture - Wound Aerobic/Anaerobic (collected 12 Nov 2024 18:21)  Source: .Surgical Swab  Preliminary Report (14 Nov 2024 13:07):    Commensal nicholas consistent with body site    Culture - Blood (collected 01 Nov 2024 20:00)  Source: .Blood BLOOD  Preliminary Report (04 Nov 2024 01:02):    No growth at 48 Hours    Culture - Blood (collected 01 Nov 2024 20:00)  Source: .Blood BLOOD  Preliminary Report (04 Nov 2024 01:02):    No growth at 48 Hours    RADIOLOGY & ADDITIONAL STUDIES:      Assessment :  49-year-old male with  DM2, HTN, HLD, NEUROPATHY, PVD,ckd4, ETOH abuse, Diabetic foot ulcer with cellullitis ,ac on ch OM 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  He has been on amoxicillin twice daily and states he has been compliant with his antibiotic.  Patient noted that his surgical site had developed a bad odor with some discharge.  Left foot with exposed metatarsal head.    Sp Amputation, foot, first ray Debulking, flap, foot 12-Nov-2024  OR cultures with CNS  Bleeding at surgical site resolved  Path at clean margins with mild chronic OM  Events noted 11/17. Went back to OR as developed profuse bleeding from surgical wound.  No further bleed   LUISA CKD better  Anemia     Plan :   Cont po doxycycline 100mg bid x 14 days  Trend temps and cbc, renal fx  Transfuse as needed  Podiatry on case  Stable from ID standpoint    Continue with present regiment.  Appropriate use of antibiotics and adverse effects reviewed.      > 35 minutes were spent in direct patient care reviewing notes, medications ,labs data/ imaging , discussion with multidisciplinary team.    Thank you for allowing me to participate in care of your patient .    Linsey Mack MD  Infectious Disease  [phone]

## 2024-11-19 NOTE — CONSULT NOTE ADULT - PROBLEM/RECOMMENDATION-1
DISPLAY PLAN FREE TEXT
PROCEDURES:  Incision and drainage, deep abscess 30-Apr-2019 13:43:05 drainage abscess and excisional debrideemnt right labia and clitoris Junior Fermin

## 2024-11-19 NOTE — CONSULT NOTE ADULT - PROBLEM SELECTOR PROBLEM 1
Type 2 diabetes mellitus with hyperglycemia
Diabetic foot infection
Type 2 diabetes mellitus with hyperglycemia

## 2024-11-20 ENCOUNTER — TRANSCRIPTION ENCOUNTER (OUTPATIENT)
Age: 49
End: 2024-11-20

## 2024-11-20 ENCOUNTER — INPATIENT (INPATIENT)
Facility: HOSPITAL | Age: 49
LOS: 3 days | Discharge: HOME | DRG: 314 | End: 2024-11-24
Attending: INTERNAL MEDICINE | Admitting: STUDENT IN AN ORGANIZED HEALTH CARE EDUCATION/TRAINING PROGRAM
Payer: MEDICAID

## 2024-11-20 VITALS
TEMPERATURE: 100 F | HEART RATE: 95 BPM | HEIGHT: 69 IN | RESPIRATION RATE: 18 BRPM | SYSTOLIC BLOOD PRESSURE: 106 MMHG | WEIGHT: 199.96 LBS | DIASTOLIC BLOOD PRESSURE: 66 MMHG | OXYGEN SATURATION: 99 %

## 2024-11-20 VITALS
RESPIRATION RATE: 18 BRPM | HEART RATE: 90 BPM | SYSTOLIC BLOOD PRESSURE: 155 MMHG | DIASTOLIC BLOOD PRESSURE: 80 MMHG | TEMPERATURE: 98 F | OXYGEN SATURATION: 97 %

## 2024-11-20 DIAGNOSIS — E11.9 TYPE 2 DIABETES MELLITUS WITHOUT COMPLICATIONS: ICD-10-CM

## 2024-11-20 DIAGNOSIS — Z29.9 ENCOUNTER FOR PROPHYLACTIC MEASURES, UNSPECIFIED: ICD-10-CM

## 2024-11-20 DIAGNOSIS — D64.9 ANEMIA, UNSPECIFIED: ICD-10-CM

## 2024-11-20 DIAGNOSIS — N18.4 CHRONIC KIDNEY DISEASE, STAGE 4 (SEVERE): ICD-10-CM

## 2024-11-20 DIAGNOSIS — M86.179 OTHER ACUTE OSTEOMYELITIS, UNSPECIFIED ANKLE AND FOOT: ICD-10-CM

## 2024-11-20 DIAGNOSIS — D62 ACUTE POSTHEMORRHAGIC ANEMIA: ICD-10-CM

## 2024-11-20 DIAGNOSIS — T14.8XXA OTHER INJURY OF UNSPECIFIED BODY REGION, INITIAL ENCOUNTER: ICD-10-CM

## 2024-11-20 DIAGNOSIS — I10 ESSENTIAL (PRIMARY) HYPERTENSION: ICD-10-CM

## 2024-11-20 LAB
ANION GAP SERPL CALC-SCNC: 11 MMOL/L — SIGNIFICANT CHANGE UP (ref 5–17)
BASOPHILS # BLD AUTO: 0.15 K/UL — SIGNIFICANT CHANGE UP (ref 0–0.2)
BASOPHILS NFR BLD AUTO: 1.7 % — SIGNIFICANT CHANGE UP (ref 0–2)
BUN SERPL-MCNC: 74 MG/DL — HIGH (ref 7–23)
CALCIUM SERPL-MCNC: 9.3 MG/DL — SIGNIFICANT CHANGE UP (ref 8.4–10.5)
CHLORIDE SERPL-SCNC: 106 MMOL/L — SIGNIFICANT CHANGE UP (ref 96–108)
CO2 SERPL-SCNC: 22 MMOL/L — SIGNIFICANT CHANGE UP (ref 22–31)
CREAT SERPL-MCNC: 2.87 MG/DL — HIGH (ref 0.5–1.3)
EGFR: 26 ML/MIN/1.73M2 — LOW
EOSINOPHIL # BLD AUTO: 0.29 K/UL — SIGNIFICANT CHANGE UP (ref 0–0.5)
EOSINOPHIL NFR BLD AUTO: 3.2 % — SIGNIFICANT CHANGE UP (ref 0–6)
GLUCOSE BLDC GLUCOMTR-MCNC: 225 MG/DL — HIGH (ref 70–99)
GLUCOSE BLDC GLUCOMTR-MCNC: 239 MG/DL — HIGH (ref 70–99)
GLUCOSE BLDC GLUCOMTR-MCNC: 301 MG/DL — HIGH (ref 70–99)
GLUCOSE SERPL-MCNC: 219 MG/DL — HIGH (ref 70–99)
HCT VFR BLD CALC: 25.4 % — LOW (ref 39–50)
HGB BLD-MCNC: 8.1 G/DL — LOW (ref 13–17)
IMM GRANULOCYTES NFR BLD AUTO: 1.1 % — HIGH (ref 0–0.9)
LYMPHOCYTES # BLD AUTO: 1.92 K/UL — SIGNIFICANT CHANGE UP (ref 1–3.3)
LYMPHOCYTES # BLD AUTO: 21.4 % — SIGNIFICANT CHANGE UP (ref 13–44)
MCHC RBC-ENTMCNC: 27.9 PG — SIGNIFICANT CHANGE UP (ref 27–34)
MCHC RBC-ENTMCNC: 31.9 G/DL — LOW (ref 32–36)
MCV RBC AUTO: 87.6 FL — SIGNIFICANT CHANGE UP (ref 80–100)
MONOCYTES # BLD AUTO: 1.21 K/UL — HIGH (ref 0–0.9)
MONOCYTES NFR BLD AUTO: 13.5 % — SIGNIFICANT CHANGE UP (ref 2–14)
NEUTROPHILS # BLD AUTO: 5.29 K/UL — SIGNIFICANT CHANGE UP (ref 1.8–7.4)
NEUTROPHILS NFR BLD AUTO: 59.1 % — SIGNIFICANT CHANGE UP (ref 43–77)
NRBC # BLD: 0 /100 WBCS — SIGNIFICANT CHANGE UP (ref 0–0)
PLATELET # BLD AUTO: 184 K/UL — SIGNIFICANT CHANGE UP (ref 150–400)
POTASSIUM SERPL-MCNC: 5.1 MMOL/L — SIGNIFICANT CHANGE UP (ref 3.5–5.3)
POTASSIUM SERPL-SCNC: 5.1 MMOL/L — SIGNIFICANT CHANGE UP (ref 3.5–5.3)
RBC # BLD: 2.9 M/UL — LOW (ref 4.2–5.8)
RBC # FLD: 13.5 % — SIGNIFICANT CHANGE UP (ref 10.3–14.5)
SODIUM SERPL-SCNC: 139 MMOL/L — SIGNIFICANT CHANGE UP (ref 135–145)
WBC # BLD: 8.96 K/UL — SIGNIFICANT CHANGE UP (ref 3.8–10.5)
WBC # FLD AUTO: 8.96 K/UL — SIGNIFICANT CHANGE UP (ref 3.8–10.5)

## 2024-11-20 PROCEDURE — 99223 1ST HOSP IP/OBS HIGH 75: CPT

## 2024-11-20 PROCEDURE — 86060 ANTISTREPTOLYSIN O TITER: CPT

## 2024-11-20 PROCEDURE — 88307 TISSUE EXAM BY PATHOLOGIST: CPT

## 2024-11-20 PROCEDURE — 85379 FIBRIN DEGRADATION QUANT: CPT

## 2024-11-20 PROCEDURE — 83605 ASSAY OF LACTIC ACID: CPT

## 2024-11-20 PROCEDURE — 88305 TISSUE EXAM BY PATHOLOGIST: CPT

## 2024-11-20 PROCEDURE — 86160 COMPLEMENT ANTIGEN: CPT

## 2024-11-20 PROCEDURE — C1889: CPT

## 2024-11-20 PROCEDURE — 36415 COLL VENOUS BLD VENIPUNCTURE: CPT

## 2024-11-20 PROCEDURE — 86923 COMPATIBILITY TEST ELECTRIC: CPT

## 2024-11-20 PROCEDURE — 86850 RBC ANTIBODY SCREEN: CPT

## 2024-11-20 PROCEDURE — 85610 PROTHROMBIN TIME: CPT

## 2024-11-20 PROCEDURE — 80053 COMPREHEN METABOLIC PANEL: CPT

## 2024-11-20 PROCEDURE — 99239 HOSP IP/OBS DSCHRG MGMT >30: CPT

## 2024-11-20 PROCEDURE — 88304 TISSUE EXAM BY PATHOLOGIST: CPT

## 2024-11-20 PROCEDURE — 88311 DECALCIFY TISSUE: CPT

## 2024-11-20 PROCEDURE — 97164 PT RE-EVAL EST PLAN CARE: CPT

## 2024-11-20 PROCEDURE — 99285 EMERGENCY DEPT VISIT HI MDM: CPT | Mod: 25

## 2024-11-20 PROCEDURE — 85027 COMPLETE CBC AUTOMATED: CPT

## 2024-11-20 PROCEDURE — 84300 ASSAY OF URINE SODIUM: CPT

## 2024-11-20 PROCEDURE — 87102 FUNGUS ISOLATION CULTURE: CPT

## 2024-11-20 PROCEDURE — 93005 ELECTROCARDIOGRAM TRACING: CPT

## 2024-11-20 PROCEDURE — 87077 CULTURE AEROBIC IDENTIFY: CPT

## 2024-11-20 PROCEDURE — 82962 GLUCOSE BLOOD TEST: CPT

## 2024-11-20 PROCEDURE — 87116 MYCOBACTERIA CULTURE: CPT

## 2024-11-20 PROCEDURE — 84156 ASSAY OF PROTEIN URINE: CPT

## 2024-11-20 PROCEDURE — 86900 BLOOD TYPING SEROLOGIC ABO: CPT

## 2024-11-20 PROCEDURE — 80048 BASIC METABOLIC PNL TOTAL CA: CPT

## 2024-11-20 PROCEDURE — 87075 CULTR BACTERIA EXCEPT BLOOD: CPT

## 2024-11-20 PROCEDURE — 96375 TX/PRO/DX INJ NEW DRUG ADDON: CPT

## 2024-11-20 PROCEDURE — 86901 BLOOD TYPING SEROLOGIC RH(D): CPT

## 2024-11-20 PROCEDURE — 97161 PT EVAL LOW COMPLEX 20 MIN: CPT

## 2024-11-20 PROCEDURE — 93971 EXTREMITY STUDY: CPT

## 2024-11-20 PROCEDURE — 87186 SC STD MICRODIL/AGAR DIL: CPT

## 2024-11-20 PROCEDURE — P9016: CPT

## 2024-11-20 PROCEDURE — 99285 EMERGENCY DEPT VISIT HI MDM: CPT

## 2024-11-20 PROCEDURE — 81001 URINALYSIS AUTO W/SCOPE: CPT

## 2024-11-20 PROCEDURE — 80307 DRUG TEST PRSMV CHEM ANLYZR: CPT

## 2024-11-20 PROCEDURE — 85730 THROMBOPLASTIN TIME PARTIAL: CPT

## 2024-11-20 PROCEDURE — 80202 ASSAY OF VANCOMYCIN: CPT

## 2024-11-20 PROCEDURE — 87040 BLOOD CULTURE FOR BACTERIA: CPT

## 2024-11-20 PROCEDURE — 96365 THER/PROPH/DIAG IV INF INIT: CPT

## 2024-11-20 PROCEDURE — 82570 ASSAY OF URINE CREATININE: CPT

## 2024-11-20 PROCEDURE — 71045 X-RAY EXAM CHEST 1 VIEW: CPT

## 2024-11-20 PROCEDURE — 87206 SMEAR FLUORESCENT/ACID STAI: CPT

## 2024-11-20 PROCEDURE — 96361 HYDRATE IV INFUSION ADD-ON: CPT

## 2024-11-20 PROCEDURE — 87070 CULTURE OTHR SPECIMN AEROBIC: CPT

## 2024-11-20 PROCEDURE — 87015 SPECIMEN INFECT AGNT CONCNTJ: CPT

## 2024-11-20 PROCEDURE — 83036 HEMOGLOBIN GLYCOSYLATED A1C: CPT

## 2024-11-20 PROCEDURE — 36430 TRANSFUSION BLD/BLD COMPNT: CPT

## 2024-11-20 PROCEDURE — 82436 ASSAY OF URINE CHLORIDE: CPT

## 2024-11-20 PROCEDURE — 73630 X-RAY EXAM OF FOOT: CPT

## 2024-11-20 PROCEDURE — 85025 COMPLETE CBC W/AUTO DIFF WBC: CPT

## 2024-11-20 RX ORDER — GLUCAGON INJECTION, SOLUTION 0.5 MG/.1ML
1 INJECTION, SOLUTION SUBCUTANEOUS ONCE
Refills: 0 | Status: DISCONTINUED | OUTPATIENT
Start: 2024-11-20 | End: 2024-11-24

## 2024-11-20 RX ORDER — CARVEDILOL 25 MG/1
6.25 TABLET, FILM COATED ORAL EVERY 12 HOURS
Refills: 0 | Status: DISCONTINUED | OUTPATIENT
Start: 2024-11-20 | End: 2024-11-24

## 2024-11-20 RX ORDER — INSULIN GLARGINE 100 [IU]/ML
45 INJECTION, SOLUTION SUBCUTANEOUS
Qty: 5 | Refills: 1
Start: 2024-11-20 | End: 2025-01-18

## 2024-11-20 RX ORDER — 0.9 % SODIUM CHLORIDE 0.9 %
1000 INTRAVENOUS SOLUTION INTRAVENOUS
Refills: 0 | Status: DISCONTINUED | OUTPATIENT
Start: 2024-11-20 | End: 2024-11-24

## 2024-11-20 RX ORDER — INSULIN GLARGINE 100 [IU]/ML
40 INJECTION, SOLUTION SUBCUTANEOUS AT BEDTIME
Refills: 0 | Status: DISCONTINUED | OUTPATIENT
Start: 2024-11-20 | End: 2024-11-24

## 2024-11-20 RX ORDER — FERROUS SULFATE 325(65) MG
325 TABLET ORAL DAILY
Refills: 0 | Status: DISCONTINUED | OUTPATIENT
Start: 2024-11-20 | End: 2024-11-24

## 2024-11-20 RX ORDER — DOXYCYCLINE HYCLATE 150 MG/1
100 TABLET, COATED ORAL EVERY 12 HOURS
Refills: 0 | Status: DISCONTINUED | OUTPATIENT
Start: 2024-11-20 | End: 2024-11-24

## 2024-11-20 RX ORDER — AMLODIPINE BESYLATE 10 MG/1
10 TABLET ORAL DAILY
Refills: 0 | Status: DISCONTINUED | OUTPATIENT
Start: 2024-11-20 | End: 2024-11-24

## 2024-11-20 RX ORDER — LANOLIN ALCOHOL/MO/W.PET/CERES
100 CREAM (GRAM) TOPICAL DAILY
Refills: 0 | Status: DISCONTINUED | OUTPATIENT
Start: 2024-11-20 | End: 2024-11-24

## 2024-11-20 RX ORDER — ACETAMINOPHEN 500MG 500 MG/1
650 TABLET, COATED ORAL EVERY 6 HOURS
Refills: 0 | Status: DISCONTINUED | OUTPATIENT
Start: 2024-11-20 | End: 2024-11-24

## 2024-11-20 RX ORDER — FERROUS SULFATE 325(65) MG
1 TABLET ORAL
Qty: 30 | Refills: 0
Start: 2024-11-20 | End: 2024-12-19

## 2024-11-20 RX ORDER — GABAPENTIN 300 MG/1
100 CAPSULE ORAL THREE TIMES A DAY
Refills: 0 | Status: DISCONTINUED | OUTPATIENT
Start: 2024-11-20 | End: 2024-11-24

## 2024-11-20 RX ORDER — DOXYCYCLINE HYCLATE 150 MG/1
1 TABLET, COATED ORAL
Qty: 28 | Refills: 0
Start: 2024-11-20 | End: 2024-12-03

## 2024-11-20 RX ORDER — INSULIN ASPART 100 [IU]/ML
15 INJECTION, SOLUTION INTRAVENOUS; SUBCUTANEOUS
Qty: 5 | Refills: 0
Start: 2024-11-20 | End: 2024-12-19

## 2024-11-20 RX ORDER — ACETAMINOPHEN, DIPHENHYDRAMINE HCL, PHENYLEPHRINE HCL 325; 25; 5 MG/1; MG/1; MG/1
3 TABLET ORAL AT BEDTIME
Refills: 0 | Status: DISCONTINUED | OUTPATIENT
Start: 2024-11-20 | End: 2024-11-24

## 2024-11-20 RX ADMIN — Medication 4: at 12:30

## 2024-11-20 RX ADMIN — Medication 15 UNIT(S): at 12:31

## 2024-11-20 RX ADMIN — AMLODIPINE BESYLATE 10 MILLIGRAM(S): 10 TABLET ORAL at 05:41

## 2024-11-20 RX ADMIN — Medication 100 MILLIGRAM(S): at 12:30

## 2024-11-20 RX ADMIN — INSULIN GLARGINE 40 UNIT(S): 100 INJECTION, SOLUTION SUBCUTANEOUS at 23:44

## 2024-11-20 RX ADMIN — Medication 4: at 08:10

## 2024-11-20 RX ADMIN — Medication 2: at 23:04

## 2024-11-20 RX ADMIN — GABAPENTIN 100 MILLIGRAM(S): 300 CAPSULE ORAL at 05:40

## 2024-11-20 RX ADMIN — ACETAMINOPHEN, DIPHENHYDRAMINE HCL, PHENYLEPHRINE HCL 3 MILLIGRAM(S): 325; 25; 5 TABLET ORAL at 23:43

## 2024-11-20 RX ADMIN — DOXYCYCLINE HYCLATE 100 MILLIGRAM(S): 150 TABLET, COATED ORAL at 05:40

## 2024-11-20 RX ADMIN — CARVEDILOL 6.25 MILLIGRAM(S): 25 TABLET, FILM COATED ORAL at 05:40

## 2024-11-20 RX ADMIN — GABAPENTIN 100 MILLIGRAM(S): 300 CAPSULE ORAL at 13:29

## 2024-11-20 RX ADMIN — Medication 1 TABLET(S): at 08:07

## 2024-11-20 RX ADMIN — Medication 15 UNIT(S): at 08:10

## 2024-11-20 NOTE — CASE MANAGEMENT PROGRESS NOTE - NSCMPROGRESSNOTE_GEN_ALL_CORE
RN/CM noted pt being cleared for transition home today 11/20/2024 on PO ABT. CM met with pt, he is agreeable with DC today, DC notice provided. Pt declined need for skilled home care services, pt will follow-up with podiatrist Dr Salvatore Davenport [phone] for wound care. Pt requested assistance with DC transport, CM reached out to  leadership and obtained approval for trip: [employer]  @ 4:30 PM today WED 11/20/24 from [place] ED entrance to be transported to [facility] [address] [facility] then to his home at [address] [place] [address] ([facility]). MSW has facilitated obtaining community PCP appt with DR Emmanuel Ngo on 12/09/2024 @ 2:30 PM- pt made aware. As per pt's request, Hospital attending DR Villalobos provided pt with a note confirming that pt has been hospitalized and treated @ [facility]. Staff on unit apprised re: all above.

## 2024-11-20 NOTE — PROGRESS NOTE ADULT - SUBJECTIVE AND OBJECTIVE BOX
ANUJASUZANNA HURLEY is a 49yMale , patient examined and chart reviewed.     INTERVAL HPI/ OVERNIGHT EVENTS:  Feels well. No events.  Afebrile.    PAST MEDICAL & SURGICAL HISTORY:  Prediabetes  HTN (hypertension)  HLD (hyperlipidemia)  DM2 (diabetes mellitus, type 2)    For details regarding the patient's social history, family history, and other miscellaneous elements, please refer the initial infectious diseases consultation and/or the admitting history and physical examination for this admission.    ROS:  CONSTITUTIONAL:  Negative fever or chills  EYES:  Negative  blurry vision or double vision  CARDIOVASCULAR:  Negative for chest pain or palpitations  RESPIRATORY:  Negative for cough, wheezing, or SOB   GASTROINTESTINAL:  Negative for nausea, vomiting, diarrhea, constipation, or abdominal pain  GENITOURINARY:  Negative frequency, urgency or dysuria  NEUROLOGIC:  No headache, confusion, dizziness, lightheadedness  All other systems were reviewed and are negative     No Known Allergies      Current inpatient medications :    ANTIBIOTICS/RELEVANT:  doxycycline monohydrate Capsule 100 milliGRAM(s) Oral every 12 hours    MEDICATIONS  (STANDING):  amLODIPine   Tablet 10 milliGRAM(s) Oral daily  carvedilol 6.25 milliGRAM(s) Oral every 12 hours  dextrose 5%. 1000 milliLiter(s) (50 mL/Hr) IV Continuous <Continuous>  dextrose 5%. 1000 milliLiter(s) (100 mL/Hr) IV Continuous <Continuous>  dextrose 5%. 1000 milliLiter(s) (50 mL/Hr) IV Continuous <Continuous>  dextrose 50% Injectable 25 Gram(s) IV Push once  dextrose 50% Injectable 12.5 Gram(s) IV Push once  gabapentin 100 milliGRAM(s) Oral three times a day  glucagon  Injectable 1 milliGRAM(s) IntraMuscular once  heparin   Injectable 5000 Unit(s) SubCutaneous every 12 hours  insulin glargine Injectable (LANTUS) 45 Unit(s) SubCutaneous at bedtime  insulin lispro (ADMELOG) corrective regimen sliding scale   SubCutaneous at bedtime  insulin lispro (ADMELOG) corrective regimen sliding scale   SubCutaneous three times a day before meals  insulin lispro Injectable (ADMELOG) 15 Unit(s) SubCutaneous three times a day before meals  lactobacillus acidophilus 1 Tablet(s) Oral two times a day with meals  thiamine 100 milliGRAM(s) Oral daily    MEDICATIONS  (PRN):  acetaminophen     Tablet .. 650 milliGRAM(s) Oral every 6 hours PRN Temp greater or equal to 38C (100.4F), Mild Pain (1 - 3)  dextrose Oral Gel 15 Gram(s) Oral once PRN Blood Glucose LESS THAN 70 milliGRAM(s)/deciliter  dextrose Oral Gel 15 Gram(s) Oral once PRN Blood Glucose LESS THAN 70 milliGRAM(s)/deciliter  diphenhydrAMINE 25 milliGRAM(s) Oral every 6 hours PRN Rash and/or Itching  guaiFENesin Oral Liquid (Sugar-Free) 100 milliGRAM(s) Oral every 6 hours PRN Cough  melatonin 5 milliGRAM(s) Oral at bedtime PRN Insomnia      Objective:  Vital Signs Last 24 Hrs  T(C): 36.7 (20 Nov 2024 16:18), Max: 37.4 (20 Nov 2024 14:24)  T(F): 98.1 (20 Nov 2024 16:18), Max: 99.4 (20 Nov 2024 14:24)  HR: 90 (20 Nov 2024 16:18) (88 - 94)  BP: 155/80 (20 Nov 2024 16:18) (122/72 - 155/80)  RR: 18 (20 Nov 2024 16:18) (16 - 18)  SpO2: 97% (20 Nov 2024 16:18) (94% - 97%)    Parameters below as of 20 Nov 2024 16:18  Patient On (Oxygen Delivery Method): room air    Physical Exam:  General: no acute distress  Neck: supple, trachea midline  Lungs: clear, no wheeze/rhonchi  Cardiovascular: regular rate and rhythm, S1 S2  Abdomen: soft, nontender,  bowel sounds normal  Neurological: alert and oriented x3  Skin: no rash  Extremities: Left foot drsg c/d/i      LABS:                        8.1    8.96  )-----------( 184      ( 20 Nov 2024 08:01 )             25.4   11-20    139  |  106  |  74[H]  ----------------------------<  219[H]  5.1   |  22  |  2.87[H]    Ca    9.3      20 Nov 2024 08:01    TPro  6.0  /  Alb  2.7[L]  /  TBili  0.4  /  DBili  x   /  AST  40  /  ALT  44  /  AlkPhos  112  11-19    MICROBIOLOGY:  Culture - Tissue with Gram Stain (11.12.24 @ 18:21    -  Trimethoprim/Sulfamethoxazole: R >2/38   -  Vancomycin: S 1   -  Clindamycin: R >4   -  Erythromycin: R >4   -  Gentamicin: S <=4 Should not be used as monotherapy   -  Oxacillin: R >2   -  Penicillin: R >2   -  Rifampin: S <=1 Should not be used as monotherapy   -  Tetracycline: S <=4   Gram Stain:   No polymorphonuclear leukocytes seen per low power field  No organisms seen per oil power field   Specimen Source: Bone   Culture Results:   Rare Staphylococcus epidermidis  Few Corynebacterium resistens "Susceptibilities not performed"   Organism Identification: Staphylococcus epidermidis   Organism: Staphylococcus epidermidis   Method Type: STEPHANIE    Culture - Tissue with Gram Stain (collected 12 Nov 2024 18:21)  Source: Tissue  Gram Stain (14 Nov 2024 14:21):    No polymorphonuclear leukocytes seen per low power field    No organisms seen per oil power field  Preliminary Report (14 Nov 2024 14:21):    Rare Staphylococcus simulans    Culture - Wound Aerobic (collected 12 Nov 2024 18:21)  Source: .Other  Preliminary Report (14 Nov 2024 13:25):    Rare Staphylococcus simulans "Susceptibilities not performed"    Culture - Wound Aerobic/Anaerobic (collected 12 Nov 2024 18:21)  Source: .Surgical Swab  Preliminary Report (14 Nov 2024 13:07):    Commensal nicholas consistent with body site    Culture - Blood (collected 01 Nov 2024 20:00)  Source: .Blood BLOOD  Preliminary Report (04 Nov 2024 01:02):    No growth at 48 Hours    Culture - Blood (collected 01 Nov 2024 20:00)  Source: .Blood BLOOD  Preliminary Report (04 Nov 2024 01:02):    No growth at 48 Hours    RADIOLOGY & ADDITIONAL STUDIES:      Assessment :  49-year-old male with  DM2, HTN, HLD, NEUROPATHY, PVD,ckd4, ETOH abuse, Diabetic foot ulcer with cellullitis ,ac on ch OM 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  He has been on amoxicillin twice daily and states he has been compliant with his antibiotic.  Patient noted that his surgical site had developed a bad odor with some discharge.  Left foot with exposed metatarsal head.    Sp Amputation, foot, first ray Debulking, flap, foot 12-Nov-2024  OR cultures with CNS  Bleeding at surgical site resolved  Path at clean margins with mild chronic OM  Events noted 11/17. Went back to OR as developed profuse bleeding from surgical wound.  No further bleed   LUISA CKD better  Anemia sp transfusion  Clinically better    Plan :   Cont po doxycycline 100mg bid x 14 days  Trend temps and cbc, renal fx  Fu Podiatry closely outpt  Stable from ID standpoint  Dc home today    Continue with present regiment.  Appropriate use of antibiotics and adverse effects reviewed.      > 35 minutes were spent in direct patient care reviewing notes, medications ,labs data/ imaging , discussion with multidisciplinary team.    Thank you for allowing me to participate in care of your patient .    Linsey Mack MD  Infectious Disease  [phone]

## 2024-11-20 NOTE — ED ADULT NURSE REASSESSMENT NOTE - NS ED NURSE REASSESS COMMENT FT1
pt seen and examined by hospitalist. MD aware pt has no IV access and has not allowed staff to obtain blood specimens

## 2024-11-20 NOTE — DISCHARGE NOTE NURSING/CASE MANAGEMENT/SOCIAL WORK - PATIENT PORTAL LINK FT
You can access the FollowMyHealth Patient Portal offered by [facility] by registering at the following website: [email or web address]. By joining [employer]’s FollowMyHealth portal, you will also be able to view your health information using other applications (apps) compatible with our system.

## 2024-11-20 NOTE — DISCHARGE NOTE PROVIDER - HOSPITAL COURSE
49-year-old male with past medical history of DM2, HTN, HLD, NEUROPATHY, PVD, CKD stage 4, ETOH abuser,  NON COMPLIANT WITH TT, recently discharged after tt for , Diabetic foot ulcer with cellullitis ,ac on ch OM with sepstic arthritis amputation of of 2nd left toe, OM left great toe-,surg done by podiatry,9/20culture of wound E fecalis, path OM , with clear margins. left hospital on11/4 against medical advice for his personal problem, The patient return for persistent infection, foul-smelling discharge.  Patient denies any chest pain, no shortness of breath.  No fever or chills.  No cough/URI.  No neck or back pain.  No numbness or tingling.  No focal weakness.  No aggravating or alleviating factors otherwise noted.  No other acute complaints. Patient states he sometimes drinks, states he did not drink today.  In ED afebrile, o2 sat 90 %, /99, ,patient is being admitted for further care, and podiatry and ID consult called        Diabetic foot ulcer with cellulitis with acute on chronic osteomyelitis left foot  CT- consistent with OM of first Toe left  Podiatry following    On 11/12 had amputation , debulking and flap on left foot by Dr Finnegan  Pt was taken to OR and hemostasis obtained after RRT was called on Sunday   Hb dropped today and 1 unit PRBC ordered   Cont abx as per ID  S/P vanco  Continue  doxycyline 100 mg twice daily    Cultures positive for staphylococcus   Monitor fever and WBC curve     Acute on chronic anemia   HB 6.8   1 unit PRBC ordered   Monitor CBC   Monitor for bleeding    DM2 with hyperglycemia, with neuropathy , nephropathy with likely PVD   FS + DOROTHY  Increase Lantus to 45 units   Increase lispro to 15 units 3 times a day   Goal blood glucose 100-180 in hospital setting.  Monitor blood sugar   Endocrine following       HTN  Hold Losartan 50 mg   Continue  amlodipine 10 mg, coreg 6.25 bid    LUISA on CKD 3 likely Diabetic nephropathy   Nephro following   Monitor BMP      ETOH use disorder   Cessation counselling   Folic acid, thiamine     Obesity    EDUAR   O2 as needed    DVT prophylaxis   heparin    49-year-old male with past medical history of DM2, HTN, HLD, NEUROPATHY, PVD, CKD stage 4, ETOH abuser,  NON COMPLIANT WITH TT, recently discharged after tt for , Diabetic foot ulcer with cellullitis ,ac on ch OM with sepstic arthritis amputation of of 2nd left toe, OM left great toe-,surg done by podiatry,9/20culture of wound E fecalis, path OM , with clear margins. left hospital on11/4 against medical advice for his personal problem, The patient return for persistent infection, foul-smelling discharge.  Patient denies any chest pain, no shortness of breath.  No fever or chills.  No cough/URI.  No neck or back pain.  No numbness or tingling.  No focal weakness.  No aggravating or alleviating factors otherwise noted.  No other acute complaints. Patient states he sometimes drinks, states he did not drink today.  In ED afebrile, o2 sat 90 %, /99, ,patient is being admitted for further care, and podiatry and ID consult called      Diabetic foot ulcer with cellulitis with acute on chronic osteomyelitis left foot  CT- consistent with OM of first Toe left  Podiatry consulted. On 11/12 had amputation, debulking and flap on left foot by Dr Finnegan  Postop bleed with RRT called 11/17, pt was taken to OR and hemostasis obtained  anemia, s/p 1 unit pRBC   Cultures positive for staphylococcus   S/P vanco, switched to doxycyline 100 mg twice daily for 14 day course    Acute on chronic anemia   HB 6.8   1 unit pRBC ordered, appears stable on recheck  continue home PO iron    DM2 with hyperglycemia, with neuropathy , nephropathy with likely PVD   FS + DOROTHY  Increase Lantus to 45 units   Increase lispro to 15 units 3 times a day   Goal blood glucose 100-180 in hospital setting.  Monitor blood sugar   Endocrine following       HTN  Hold Losartan 50 mg   Continue  amlodipine 10 mg, coreg 6.25 bid    LUISA on CKD 3 likely Diabetic nephropathy   Nephro following   Monitor BMP      ETOH use disorder   Cessation counselling   Folic acid, thiamine     Obesity    EDUAR   O2 as needed    DVT prophylaxis   heparin    49-year-old male with past medical history of DM2, HTN, HLD, NEUROPATHY, PVD, CKD stage 4, ETOH abuser,  NON COMPLIANT WITH TT, recently discharged after tt for , Diabetic foot ulcer with cellullitis ,ac on ch OM with sepstic arthritis amputation of of 2nd left toe, OM left great toe-,surg done by podiatry,9/20culture of wound E fecalis, path OM , with clear margins. left hospital on11/4 against medical advice for his personal problem, The patient return for persistent infection, foul-smelling discharge.  Patient denies any chest pain, no shortness of breath.  No fever or chills.  No cough/URI.  No neck or back pain.  No numbness or tingling.  No focal weakness.  No aggravating or alleviating factors otherwise noted.  No other acute complaints. Patient states he sometimes drinks, states he did not drink today.  In ED afebrile, o2 sat 90 %, /99, ,patient is being admitted for further care, and podiatry and ID consult called      Diabetic foot ulcer with cellulitis with acute on chronic osteomyelitis left foot  CT- consistent with OM of first Toe left  Podiatry consulted. On 11/12 had amputation, debulking and flap on left foot by Dr Finnegan  Postop bleed with RRT called 11/17, pt was taken to OR and hemostasis obtained  anemia, s/p 1 unit pRBC   Cultures positive for staphylococcus   S/P vanco, switched to doxycyline 100 mg twice daily for 14 day course    Acute on chronic anemia   HB 6.8   1 unit pRBC ordered, appears stable on recheck  continue home PO iron    DM2 with hyperglycemia, with neuropathy , nephropathy with likely PVD   A1c 8.5%  Endocrine consulted. Will need outpatient followup  Increase Lantus to 45 units   Increase lispro to 15 units 3 times a day   Goal blood glucose 100-180 in hospital setting.    HTN  Hold Losartan 50 mg   Continue  amlodipine 10 mg, coreg 6.25 bid    LUISA on CKD 3 likely Diabetic nephropathy   Nephro following     ETOH use disorder  Cessation counselling  Folic acid, thiamine    Obesity   EDUAR  defer to outpatient followup     49-year-old male with past medical history of DM2, HTN, HLD, NEUROPATHY, PVD, CKD stage 4, ETOH abuser,  NON COMPLIANT WITH TT, recently discharged after tt for , Diabetic foot ulcer with cellullitis ,ac on ch OM with sepstic arthritis amputation of of 2nd left toe, OM left great toe-,surg done by podiatry,9/20culture of wound E fecalis, path OM , with clear margins. left hospital on11/4 against medical advice for his personal problem, The patient return for persistent infection, foul-smelling discharge.  Patient denies any chest pain, no shortness of breath.  No fever or chills.  No cough/URI.  No neck or back pain.  No numbness or tingling.  No focal weakness.  No aggravating or alleviating factors otherwise noted.  No other acute complaints. Patient states he sometimes drinks, states he did not drink today.  In ED afebrile, o2 sat 90 %, /99, ,patient is being admitted for further care, and podiatry and ID consult called      Diabetic foot ulcer with cellulitis with acute on chronic osteomyelitis left foot  CT- consistent with OM of first Toe left  Podiatry consulted. On 11/12 had amputation, debulking and flap on left foot by Dr Finnegan  Postop bleed with RRT called 11/17, pt was taken to OR and hemostasis obtained  anemia, s/p 1 unit pRBC   Cultures positive for staphylococcus   S/P vanco, switched to doxycyline 100 mg twice daily for 14 day course    Acute on chronic anemia   HB 6.8   1 unit pRBC ordered, appears stable on recheck  continue home PO iron    DM2 with hyperglycemia, with neuropathy , nephropathy with likely PVD   A1c 8.5%  Endocrine consulted. Will need outpatient followup  Increase Lantus to 45 units   Increase lispro to 15 units 3 times a day   Goal blood glucose 100-180 in hospital setting.    HTN  Hold Losartan 50 mg   Continue amlodipine 10 mg, coreg 6.25 bid    LUISA on CKD 3 likely Diabetic nephropathy   appears to be at new baseline  holding ARB  Nephro following     ETOH use disorder  Cessation counselling  Folic acid, thiamine    Obesity   EDUAR  defer to outpatient followup

## 2024-11-20 NOTE — ED PROVIDER NOTE - CARE PLAN
1 Principal Discharge DX:	Bleeding from wound  Secondary Diagnosis:	Osteomyelitis of ankle or foot, acute

## 2024-11-20 NOTE — PROGRESS NOTE ADULT - PROBLEM SELECTOR PROBLEM 1
Diabetic foot infection
Type 2 diabetes mellitus with hyperglycemia

## 2024-11-20 NOTE — CHART NOTE - NSCHARTNOTEFT_GEN_A_CORE
To whom it may concern,    Mr. Angel Holly was admitted to [facility] from 11/7/24 to 1/20/24. He had foot surgery and is unable to bear weight on his foot.    Mr. Holly will be unable to return to work until cleared by Dr. Davenport at his next clinic appointment, likely within the next 2 weeks. After that he may be able to return to light work without walking long distances or heavy lifting, but at this time it is unclear how long his recovery will take. If you have any questions you may call [facility] at [phone]. Thank you.                                        --Dr. Kong Villalobos MD, MPH                                    Department of Hospitalist Medicine.
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
## 2024-11-20 NOTE — CONSULT NOTE ADULT - PROBLEM SELECTOR RECOMMENDATION 9
Discussed diagnosis and treatment with patient.  Patient is s/p surgical debridement of all non-viable soft tissue and bone left foot with partial 1st ray resection and packing all left foot . DOS 11/12/2024   s/p surgical exploration of left foot for active bleed DOS 11/17/2024  Upon dressing change, no arterial bleed was noted.  Ordered to perform re-application of 1/2 inch packing under sterile technique. Will leave the packing for 2 days.  If patient has continued uncontrollable hemostasis, will plan to go to OR for surgical exploration to address uncontrolled hemostasis.  Prognosis is guarded at this time due to severity of the condition     Recommend Hematology consult for recurrent bleeding control    Continue IV Abx and follow ID recommendations    Non weight bearing on left foot   Recs elevation on left lower extremities   Pt may require serial surgical debridements/amputation and possible BKA if symptoms persist.    Pt understands the severity of his condition and  that he is at risk to lose part of the left foot, all the foot or below the knee amputation. All questions were asked and answered for patient satisfaction    Medical management as per primary team   Will cont to monitor and discuss all options with patient and all attendings.  Thank you for the consult

## 2024-11-20 NOTE — ED PROVIDER NOTE - PROGRESS NOTE DETAILS
Dr. Varma at bedside spoke with podiatry resident, Dr. Norman Varma. will see patient in ED Dr. Varma at bedside, discussed case with attending podiatrist. recommends admission to observe and consider hematology consult patient has been refusing blood work. states "he just had it this morning". difficult with staff patient agrees to blood work and admission. spoke with attending hospitalist, Dr. Granados. and accepts patient for admission

## 2024-11-20 NOTE — H&P ADULT - PROBLEM SELECTOR PLAN 3
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   ARB was discontinued in previous admission    plan  encourage PO intake as patient refuses IV placement and blood draws  Follow AM CMP

## 2024-11-20 NOTE — PROGRESS NOTE ADULT - PROBLEM SELECTOR PLAN 1
Patient seen and evaluated  Discussed diagnosis and treatment with patient.  Patient is s/p surgical debridement of all non-viable soft tissue and bone left foot with partial 1st ray resection and packing all left foot . DOS 11/12/2024   OR WCx demonstrates staph infection,   Surgical bone pathology demonstrates mild chronic OM in proximal margin.  Patient is s/p surgical exploration of left foot for active bleed DOS 11/17/2024  Dressing clean, dry, intact.  Packing removed today  Per ID, pt will go home with PO abx  Keep the postop dressing clean/dry/intact   Non weight bearing on left foot   Recs elevation on left lower extremities   Pt may require serial surgical debridements/amputation and possible BKA if symptoms persist.    radiographs reviewed and noted all operative regions in good position   Pt understands the severity of his condition and  that he is at risk to lose part of the left foot, all the foot or below the knee amputation. All questions were asked and answered for patient satisfaction    Medical management as per primary team   Patient advised to f/u with Dr. Salvatore Davenport in [place] 3-5 days post discharge from hospital.  Please call [phone] to make an appointment.  Please keep dressings clean, dry, and intact until f/u in clinic.    Will discuss with all attendings.

## 2024-11-20 NOTE — PROGRESS NOTE ADULT - ATTENDING COMMENTS
reviewed medical history, physical exam and care plan   note read and reviewed

## 2024-11-20 NOTE — H&P ADULT - ASSESSMENT
49-year-old male with history of diabetes mellitus, hypertension, hyperlipidemia, neuropathy, peripheral vascular disease, chronic kidney disease, and EtOH abuser brought in by ambulance for bleeding from left foot that occurred prior to arrival. Admitted for hematological workup to assess the cause of recurrent bleeds he is having from L foot, even after hemostasis was achieved in OR.

## 2024-11-20 NOTE — DISCHARGE NOTE NURSING/CASE MANAGEMENT/SOCIAL WORK - NSDCFUADDAPPT_GEN_ALL_CORE_FT
New primary care appointment scheduled for Monday, 12/09/2024, @ 2:30 PM, w/ Dr. Huber eBnitez MD, @ [facility], located @ [address] [place] [address], [place] [address], phone [phone]. Please arrive 15 minutes early & bring your photo ID card and insurance cards.

## 2024-11-20 NOTE — PROGRESS NOTE ADULT - ASSESSMENT
The patient is a 49 year old male with a history of HTN, HL, DM, PAD, CKD, toe osteomyelitis who presents with toe infection.    Plan:  - ECG with sinus rhythm and no evidence of ischemia/infarction  - Continue amlodipine 10 mg daily  - Continue carvedilol 6.25 mg bid  - Continue losartan 50 mg daily  - Podiatry and ID follow-up  - On oral antibiotics  - Discharge planning

## 2024-11-20 NOTE — ED ADULT NURSE REASSESSMENT NOTE - NS ED NURSE REASSESS COMMENT FT1
received report and assumed care of pt at change of shift. podiatrist at bedside presently. bloody bandages removed. not active bleeding noted at this time to left foot. pt for possible admission to hospital

## 2024-11-20 NOTE — ED ADULT TRIAGE NOTE - CHIEF COMPLAINT QUOTE
discharged from [place] today and went home and possibly stepped on foot and now bleeding. s.p foot surgery

## 2024-11-20 NOTE — H&P ADULT - PROBLEM SELECTOR PLAN 6
SCDs in setting acute blood loss anemia  Regular Diet w/ Consistent Carbohydrate   Ambulate w/ Crutches  PT consult/SW consult/ Case Management consult

## 2024-11-20 NOTE — DISCHARGE NOTE NURSING/CASE MANAGEMENT/SOCIAL WORK - FINANCIAL ASSISTANCE
[facility] provides services at a reduced cost to those who are determined to be eligible through [facility]’s financial assistance program. Information regarding [facility]’s financial assistance program can be found by going to [email or web address] or by calling [phone].

## 2024-11-20 NOTE — DISCHARGE NOTE PROVIDER - CARE PROVIDER_API CALL
Salvatore Davenport  Podiatric Medicine and Surgery  [address] [place] [address]  Phone: [phone]  Fax: [phone]  Established Patient  Scheduled Appointment: 11/22/2024
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
## 2024-11-20 NOTE — H&P ADULT - NSHPPHYSICALEXAM_GEN_ALL_CORE
T(C): 36.7 (11-20-24 @ 23:37), Max: 37.7 (11-20-24 @ 18:17)  HR: 92 (11-20-24 @ 23:37) (88 - 97)  BP: 104/58 (11-20-24 @ 23:37) (100/70 - 155/80)  RR: 18 (11-20-24 @ 23:37) (16 - 18)  SpO2: 95% (11-20-24 @ 23:37) (94% - 99%)    GENERAL: patient appears irritated, nonagreeable with blood work  LUNGS: good air entry bilaterally, clear to auscultation, symmetric breath sounds, no wheezing or rhonchi appreciated  HEART: soft S1/S2, regular rate and rhythm, no murmurs noted, no lower extremity edema  GASTROINTESTINAL: abdomen is soft, nontender, nondistended, normoactive bowel sounds, no palpable masses  MUSCULOSKELETAL: L foot in ACE wrap, no current bleeding appreciated.   NEUROLOGIC: awake, alert, oriented x3, good muscle tone in 4 extremities, no obvious sensory deficits

## 2024-11-20 NOTE — PROGRESS NOTE ADULT - PROVIDER SPECIALTY LIST ADULT
Cardiology
Hospitalist
Infectious Disease
Infectious Disease
Internal Medicine
Nephrology
Podiatry
Podiatry
Cardiology
Diabetes
Hospitalist
Infectious Disease
Internal Medicine
Nephrology
Podiatry
Cardiology
Cardiology
Hospitalist
Infectious Disease
Internal Medicine
Nephrology
Podiatry
Hospitalist
Nephrology
Podiatry
Internal Medicine
Podiatry
Internal Medicine
Internal Medicine
Podiatry

## 2024-11-20 NOTE — DISCHARGE NOTE PROVIDER - NSDCFUADDAPPT_GEN_ALL_CORE_FT
New primary care appointment scheduled for Monday, 12/09/2024, @ 2:30 PM, w/ Dr. Huber Benitez MD, @ [facility], located @ [address] [place] [address], [place] [address], phone [phone]. Please arrive 15 minutes early & bring your photo ID card and insurance cards.

## 2024-11-20 NOTE — ED PROVIDER NOTE - DIFFERENTIAL DIAGNOSIS
Differentials include but not limited to postop bleeding, anemia, osteomyelitis Differential Diagnosis

## 2024-11-20 NOTE — H&P ADULT - PROBLEM SELECTOR PLAN 2
patient had another episode of bleeding from L toe after being discharged  likely in setting of placing pressure on foot, and exacerbating wound causing bleeding  LFTs on Nov 19 th wnl    plan  will send AM labs with Anemia workup, PT/INR/PTT, vWF  Hematology consulted; fu recs given repeat episdoes   likely in setting of components of CKD, Chronic Alcohol abuse ( plt in low end of normal), and trauma  transfuse to keep Hgb>7. patient had another episode of bleeding from L toe after being discharged  likely in setting of placing pressure on foot, and exacerbating wound causing bleeding  LFTs on Nov 19 th wnl    plan  will send AM labs with Anemia workup, PT/INR/PTT, vWF  Hematology consulted; fu recs given repeat episdoes of bleeding-> assess for any undiagnosed bleeding disorderes?  likely in setting of components of CKD, Chronic Alcohol abuse ( plt in low end of normal), and trauma  transfuse to keep Hgb>7. patient had another episode of bleeding from L toe after being discharged  likely in setting of placing pressure on foot, and exacerbating wound causing bleeding  LFTs on Nov 19 th wnl    plan  will send AM labs with Anemia workup, PT/INR/PTT, vWF  Day team to consult Hematology; unable to reach answering service overnight; fu recs given repeat episdoes of bleeding-> assess for any undiagnosed bleeding disorderes?  likely in setting of components of CKD, Chronic Alcohol abuse ( plt in low end of normal), and trauma  transfuse to keep Hgb>7.

## 2024-11-20 NOTE — PROGRESS NOTE ADULT - REASON FOR ADMISSION
foot infection
foot infection with OM left
foot infection
foot infection with uncontrolled hemostasis left
foot infection
foot infection with OM left foot
foot infection
foot infection
foot infection with OM left foot
foot infection

## 2024-11-20 NOTE — PROGRESS NOTE ADULT - SUBJECTIVE AND OBJECTIVE BOX
PROGRESS NOTE   Patient is a 49y old  Male who presents with a chief complaint of foot infection (20 Nov 2024 07:52)      HPI:  49-year-old male with past medical history of DM2, HTN, HLD, NEUROPATHY, PVD,ckd4, ETOH abuser,  NON COMPLIANT WITH TT, recently discharged after tt for , Diabetic foot ulcer with cellullitis ,ac on ch OM with sepstic arthritis amputation of of 2nd left toe, OM left great toe-,surg done by podiatry,9/20culture of wound E fecalis, path OM , with clear margins. left hospital on11/4 against medical advice for his personal problem, The patient return for persistent infection, foul-smelling discharge.  Patient denies any chest pain, no shortness of breath.  No fever or chills.  No cough/URI.  No neck or back pain.  No numbness or tingling.  No focal weakness.  No aggravating or alleviating factors otherwise noted.  No other acute complaints. Patient states he sometimes drinks, states he did not drink today.  In ED afebrile, o2 sat 90 %, /99, ,patient is being admitted for further care, and podiatry and ID consult called (07 Nov 2024 17:57)      Vital Signs Last 24 Hrs  T(C): 36.4 (20 Nov 2024 05:28), Max: 36.8 (19 Nov 2024 12:48)  T(F): 97.6 (20 Nov 2024 05:28), Max: 98.3 (19 Nov 2024 15:37)  HR: 90 (20 Nov 2024 05:28) (90 - 92)  BP: 154/87 (20 Nov 2024 05:28) (136/73 - 156/82)  BP(mean): --  RR: 18 (20 Nov 2024 05:28) (16 - 18)  SpO2: 95% (20 Nov 2024 05:28) (95% - 96%)    Parameters below as of 20 Nov 2024 05:28  Patient On (Oxygen Delivery Method): room air                              8.1    8.96  )-----------( 184      ( 20 Nov 2024 08:01 )             25.4               11-20    139  |  106  |  74[H]  ----------------------------<  219[H]  5.1   |  22  |  2.87[H]    Ca    9.3      20 Nov 2024 08:01    TPro  6.0  /  Alb  2.7[L]  /  TBili  0.4  /  DBili  x   /  AST  40  /  ALT  44  /  AlkPhos  112  11-19    PHYSICAL EXAM  Procedure on 11/12/2024 left foot s/p surgical Debridement of all non-viable soft tissue and bone, debridement of deep space infection of left foot with left 1st partial ray resection all left foot  POD#3 surgical exploration of left foot for active bleed.  No noted strikethrough in dressing, noted no active bleed.   Patient has guarded prognosis . Will cont to allow any further demarcation to occur and will closely monitor.    Vascular: Dorsalis Pedis and Posterior Tibial pulses palpable 2/4 bilaterally.  Capillary re-fill time less then 3 seconds digits 1-5 bilateral.  Mild-moderate edema bilaterally up to the ankle left foot   Neuro: Protective sensation diminished to the level of the digits bilateral.   MSK: Muscle strength 5/5 all major muscle groups bilateral.

## 2024-11-20 NOTE — H&P ADULT - PROBLEM SELECTOR PLAN 4
on 45 units lantus at night  and 15 units lispro TID    plan  continue 40 units long acting and 10 units TID short acting  LDSS

## 2024-11-20 NOTE — PROGRESS NOTE ADULT - SUBJECTIVE AND OBJECTIVE BOX
Subjective: no complaints. Received blood yest.       MEDICATIONS  (STANDING):  amLODIPine   Tablet 10 milliGRAM(s) Oral daily  carvedilol 6.25 milliGRAM(s) Oral every 12 hours  dextrose 5%. 1000 milliLiter(s) (50 mL/Hr) IV Continuous <Continuous>  dextrose 5%. 1000 milliLiter(s) (50 mL/Hr) IV Continuous <Continuous>  dextrose 5%. 1000 milliLiter(s) (100 mL/Hr) IV Continuous <Continuous>  dextrose 50% Injectable 25 Gram(s) IV Push once  dextrose 50% Injectable 12.5 Gram(s) IV Push once  doxycycline monohydrate Capsule 100 milliGRAM(s) Oral every 12 hours  gabapentin 100 milliGRAM(s) Oral three times a day  glucagon  Injectable 1 milliGRAM(s) IntraMuscular once  heparin   Injectable 5000 Unit(s) SubCutaneous every 12 hours  insulin glargine Injectable (LANTUS) 45 Unit(s) SubCutaneous at bedtime  insulin lispro (ADMELOG) corrective regimen sliding scale   SubCutaneous three times a day before meals  insulin lispro (ADMELOG) corrective regimen sliding scale   SubCutaneous at bedtime  insulin lispro Injectable (ADMELOG) 15 Unit(s) SubCutaneous three times a day before meals  lactobacillus acidophilus 1 Tablet(s) Oral two times a day with meals  thiamine 100 milliGRAM(s) Oral daily    MEDICATIONS  (PRN):  acetaminophen     Tablet .. 650 milliGRAM(s) Oral every 6 hours PRN Temp greater or equal to 38C (100.4F), Mild Pain (1 - 3)  dextrose Oral Gel 15 Gram(s) Oral once PRN Blood Glucose LESS THAN 70 milliGRAM(s)/deciliter  dextrose Oral Gel 15 Gram(s) Oral once PRN Blood Glucose LESS THAN 70 milliGRAM(s)/deciliter  diphenhydrAMINE 25 milliGRAM(s) Oral every 6 hours PRN Rash and/or Itching  guaiFENesin Oral Liquid (Sugar-Free) 100 milliGRAM(s) Oral every 6 hours PRN Cough  melatonin 5 milliGRAM(s) Oral at bedtime PRN Insomnia          T(C): 36.4 (11-20-24 @ 05:28), Max: 37.5 (11-19-24 @ 09:33)  HR: 90 (11-20-24 @ 05:28) (90 - 92)  BP: 154/87 (11-20-24 @ 05:28) (129/81 - 156/82)  RR: 18 (11-20-24 @ 05:28) (16 - 18)  SpO2: 95% (11-20-24 @ 05:28) (95% - 96%)  Wt(kg): --        I&O's Detail    19 Nov 2024 07:01  -  20 Nov 2024 07:00  --------------------------------------------------------  IN:  Total IN: 0 mL    OUT:    Voided (mL): 2 mL  Total OUT: 2 mL    Total NET: -2 mL               PHYSICAL EXAM:    GENERAL: NAD  NECK: Supple, no inc in JVP  CHEST/LUNG: Clear  HEART: S1S2  ABDOMEN: Soft, Nontender, Nondistended; Bowel sounds present  EXTREMITIES:  L foot heavily dressed. No ankles edema  NEURO: no asterixis      LABS:  CBC Full  -  ( 19 Nov 2024 18:16 )  WBC Count : 10.28 K/uL  RBC Count : 3.28 M/uL  Hemoglobin : 9.3 g/dL  Hematocrit : 28.3 %  Platelet Count - Automated : 168 K/uL  Mean Cell Volume : 86.3 fL  Mean Cell Hemoglobin : 28.4 pg  Mean Cell Hemoglobin Concentration : 32.9 g/dL  Auto Neutrophil # : x  Auto Lymphocyte # : x  Auto Monocyte # : x  Auto Eosinophil # : x  Auto Basophil # : x  Auto Neutrophil % : x  Auto Lymphocyte % : x  Auto Monocyte % : x  Auto Eosinophil % : x  Auto Basophil % : x    11-19    138  |  106  |  80[H]  ----------------------------<  248[H]  5.1   |  21[L]  |  3.00[H]    Ca    9.1      19 Nov 2024 08:03    TPro  6.0  /  Alb  2.7[L]  /  TBili  0.4  /  DBili  x   /  AST  40  /  ALT  44  /  AlkPhos  112  11-19        Impression:  LUISA 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  Stabilized  Diabetes with chronic foot wound  Hypertension, variable control   Blood loss anemia    Recommend:  Cont to hold ARB  Follow repeat BMP today and tomorrow if to remain in house.

## 2024-11-20 NOTE — ED ADULT NURSE NOTE - DRUG PRE-SCREENING (DAST -1)
Expected Date Of Service: 11/05/2020 Billing Type: Third-Party Bill Bill For Surgical Tray: no Performing Laboratory: -152 Statement Selected

## 2024-11-20 NOTE — H&P ADULT - NSHPREVIEWOFSYSTEMS_GEN_ALL_CORE
CONSTITUTIONAL: denies fever, chills, fatigue, weakness  SKIN: bleeding from surgical site that prompted ED visit; however has since stopped  CARDIOVASCULAR: denies chest pain, chest pressure, palpitations  RESPIRATORY: denies shortness of breath, sputum production  GASTROINTESTINAL: denies nausea, vomiting, diarrhea, abdominal pain  GENITOURINARY: denies dysuria, discharge  NEUROLOGICAL: denies numbness, headache, focal weakness  MUSCULOSKELETAL: denies NEW joint pain, muscle aches  HEMATOLOGIC: endorses bleeding of L toe,  which has since stopped attributes it to putting pressure on it today

## 2024-11-20 NOTE — ED ADULT NURSE NOTE - CAS EDN DISCHARGE ASSESSMENT
----- Message from Bekah ANSARI sent at 11/20/2024  8:45 AM EST -----  Regarding: [facility] Appointment Request  [facility] Appointment Request    Patient needs appointment for E[facility] Appointment Type: Existing Condition Follow Up./ bp medication concern     Patient Requested Dates(s):this Monday , Tuesday Wednesday  anytime the  week of 16th December sooner would be better   Patient Requested Time:anytime   Provider Name:Talya Kendrick MD    Reason for Appointment Request: Established Patient - Available appointments did not meet patient need/ need soonest  --------------------------------------------------------------------------------------------------------------------------    Relationship to Patient: Self     Call Back Information: OK to leave message on voicemail/text message   Preferred Call Back Number: Phone [phone]   Alert and oriented to person, place and time

## 2024-11-20 NOTE — PROGRESS NOTE ADULT - SUBJECTIVE AND OBJECTIVE BOX
Chief Complaint: Toe infection    Interval Events: No events overnight.    Review of Systems:  General: No fevers, chills, weight gain  Skin: No rashes, color changes  Cardiovascular: No chest pain, orthopnea  Respiratory: No shortness of breath, cough  Gastrointestinal: No nausea, abdominal pain  Genitourinary: No incontinence, pain with urination  Musculoskeletal: No pain, swelling, decreased range of motion  Neurological: No headache, weakness  Psychiatric: No depression, anxiety  Endocrine: No weight gain, increased thirst  All other systems are comprehensively negative.    Physical Exam:  Vital Signs Last 24 Hrs  T(C): 36.4 (20 Nov 2024 05:28), Max: 37.5 (19 Nov 2024 09:33)  T(F): 97.6 (20 Nov 2024 05:28), Max: 99.5 (19 Nov 2024 09:33)  HR: 90 (20 Nov 2024 05:28) (90 - 92)  BP: 154/87 (20 Nov 2024 05:28) (129/81 - 156/82)  BP(mean): --  RR: 18 (20 Nov 2024 05:28) (16 - 18)  SpO2: 95% (20 Nov 2024 05:28) (95% - 96%)  Parameters below as of 20 Nov 2024 05:28  Patient On (Oxygen Delivery Method): room air  General: NAD  HEENT: MMM  Neck: No JVD, no carotid bruit  Lungs: CTAB  CV: RRR, nl S1/S2, no M/R/G  Abdomen: S/NT/ND, +BS  Extremities: No LE edema, no cyanosis  Neuro: AAOx3, non-focal  Skin: No rash    Labs:    11-20    139  |  106  |  74[H]  ----------------------------<  219[H]  5.1   |  22  |  2.87[H]    Ca    9.3      20 Nov 2024 08:01    TPro  6.0  /  Alb  2.7[L]  /  TBili  0.4  /  DBili  x   /  AST  40  /  ALT  44  /  AlkPhos  112  11-19                        8.1    8.96  )-----------( 184      ( 20 Nov 2024 08:01 )             25.4

## 2024-11-20 NOTE — DISCHARGE NOTE PROVIDER - ATTENDING DISCHARGE PHYSICAL EXAMINATION:
PHYSICAL EXAM:  GENERAL: NAD, well-groomed, well-developed  HEAD:  Atraumatic, Normocephalic  EYES: EOMI, PERRLA, conjunctiva and sclera clear  ENMT: Moist mucous membranes, Good dentition, No lesions  NECK: Supple, No JVD appreciated  NERVOUS SYSTEM:  Alert & Oriented X3, Good concentration; All 4 extremities mobile, no gross sensory deficits.   CHEST/LUNG: No increased work of breathing, no wheezing appreciated  HEART: No limb edema appreciated  ABDOMEN: Nondistended  EXTREMITIES: L foot in bandage. No clubbing, cyanosis, or edema appreciated  LYMPH: No lymphadenopathy noted  SKIN: No rashes or lesions appreciated on visible skin

## 2024-11-20 NOTE — DISCHARGE NOTE NURSING/CASE MANAGEMENT/SOCIAL WORK - MODE OF TRANSPORTATION
Hospital  spoke w/ Cristian @ p [phone] who scheduled one 1-way taxi trip for discharge under invoice #[id number] w/ transport provider [employer] @ p [phone]. Of note, hospital  asked if the taxi could stop @ patient's pharmacy on the way home, but trip  identified that the taxi company cannot make any stops on the way to the destination./Medicaid Transportation/[employer]

## 2024-11-20 NOTE — ED ADULT NURSE NOTE - CHIEF COMPLAINT QUOTE
discharged from [facility] today and went home and possibly stepped on foot and now bleeding. s.p foot surgery

## 2024-11-20 NOTE — H&P ADULT - PROBLEM SELECTOR PLAN 1
was discharged on PO doxy 100 mg BID   ID reconsulted; Dr. Frederick orellana, lab work from Nov 20th AM wnl    plan  continue Doxy  ID consulted for any additional recs  nonweight bearing of L foot, ambulate with crutches, PT consult

## 2024-11-20 NOTE — ED PROVIDER NOTE - OBJECTIVE STATEMENT
Detail Level: Zone 49-year-old male with history of diabetes mellitus, hypertension, hyperlipidemia, neuropathy, peripheral vascular disease, chronic kidney disease, and EtOH abuser brought in by ambulance for bleeding from left foot that occurred prior to arrival.  Patient has a history of diabetic foot ulcer with cellulitis.  Was diagnosed with acute on chronic osteomyelitis of left foot.  On November 12 had amputation of toe on left foot by Dr. Davenport.  Patient had bleeding from foot on November 17 and a rapid response team was called.  Was taken to the OR and homeostasis was obtained.  Was transfused 1 unit at that time.  Patient has not had any bleeding since then and was discharged this morning.  Patient states he was walking in the driveway having a verbal confrontation with his landlord.  Believes he stepped on his forefoot and started to bleed.  EMS was called.  States on arrival to emergency room bleeding has now stopped.  Denies other pain or complaints  PCP Ricardo Masters  [facility]

## 2024-11-20 NOTE — ED ADULT NURSE REASSESSMENT NOTE - NS ED NURSE REASSESS COMMENT FT1
pt was seen and examined by podiatry. pt wants to go upstairs to a room. explained admission process to pt. pt refusing IV and blood work at this time. call placed to nursing supervisor at this time

## 2024-11-20 NOTE — SOCIAL WORK PROGRESS NOTE - NSSWPROGRESSNOTE_GEN_ALL_CORE
Shanthi and this  met w/ patient @ bedside on [address] w/ unit RN Walt on request of patient. Patient emphasized needing a letter indicating that he is currently hospitalized, and  relayed this request to patient experience dept. Patient also emphasized needing crutches or another assistive device that he can utilize to get from a taxi to the front door of his home b/c his crutches are inside the home, and  relayed this request to physical therapy dept who stated that patient needs a new P.T. order and then can be dispensed crutches. Additionally, patient emphasized needing to see the hospitalist, and  relayed this request to hospitalist. Will continue to follow.

## 2024-11-20 NOTE — DISCHARGE NOTE PROVIDER - NSDCMRMEDTOKEN_GEN_ALL_CORE_FT
acetaminophen 325 mg oral tablet: 2 tab(s) orally every 6 hours As needed Temp greater or equal to 38C (100.4F), Mild Pain (1 - 3), Moderate Pain (4 - 6)  Acidophilus oral tablet: 2 tab(s) orally once a day  amLODIPine 10 mg oral tablet: 1 tab(s) orally once a day  amoxicillin-clavulanate 875 mg-125 mg oral tablet: 875 milligram(s) orally 2 times a day MDD: 2  carvedilol 6.25 mg oral tablet: 1 tab(s) orally every 12 hours  gabapentin 100 mg oral capsule: 1 cap(s) orally 3 times a day  Lantus Solostar Pen 100 units/mL subcutaneous solution: 20 unit(s) subcutaneous once a day (at bedtime) unit(s) subcutaneous once a day  NovoLOG FlexPen 100 units/mL injectable solution: 7 unit(s) subcutaneous 3 times a day (with meals) 9 units if glucose readings 151-200  11 units if glucose readings 201-250  13 units if glucose readings 251-300  15 units if glucose readings 301-350  17 units if glucose readings 351-400  19 units if glucose readings &gt;400 and call MD  thiamine 100 mg oral tablet: 1 tab(s) orally once a day   acetaminophen 325 mg oral tablet: 2 tab(s) orally every 6 hours As needed Temp greater or equal to 38C (100.4F), Mild Pain (1 - 3), Moderate Pain (4 - 6)  Acidophilus oral tablet: 2 tab(s) orally once a day  amLODIPine 10 mg oral tablet: 1 tab(s) orally once a day  carvedilol 6.25 mg oral tablet: 1 tab(s) orally every 12 hours  gabapentin 100 mg oral capsule: 1 cap(s) orally 3 times a day  Lantus Solostar Pen 100 units/mL subcutaneous solution: 45 unit(s) subcutaneous once a day (at bedtime) unit(s) subcutaneous once a day  Monodox 100 mg oral capsule: 1 cap(s) orally 2 times a day  NovoLOG FlexPen 100 units/mL injectable solution: 15 unit(s) subcutaneous 3 times a day (with meals)  thiamine 100 mg oral tablet: 1 tab(s) orally once a day   acetaminophen 325 mg oral tablet: 2 tab(s) orally every 6 hours As needed Temp greater or equal to 38C (100.4F), Mild Pain (1 - 3), Moderate Pain (4 - 6)  Acidophilus oral tablet: 2 tab(s) orally once a day  amLODIPine 10 mg oral tablet: 1 tab(s) orally once a day  carvedilol 6.25 mg oral tablet: 1 tab(s) orally every 12 hours  ferrous sulfate 324 mg (65 mg elemental iron) oral delayed release tablet: 1 tab(s) orally once a day  gabapentin 100 mg oral capsule: 1 cap(s) orally 3 times a day  Lantus Solostar Pen 100 units/mL subcutaneous solution: 45 unit(s) subcutaneous once a day (at bedtime) unit(s) subcutaneous once a day  Monodox 100 mg oral capsule: 1 cap(s) orally 2 times a day  NovoLOG FlexPen 100 units/mL injectable solution: 15 unit(s) subcutaneous 3 times a day (with meals)  thiamine 100 mg oral tablet: 1 tab(s) orally once a day

## 2024-11-20 NOTE — DISCHARGE NOTE PROVIDER - NSDCCPCAREPLAN_GEN_ALL_CORE_FT
PRINCIPAL DISCHARGE DIAGNOSIS  Diagnosis: Foot infection  Assessment and Plan of Treatment: We are giving you an antibiotic called doxycycline, take it twice a day for 14 days. It's important to finish this mediction even if you feel better.      SECONDARY DISCHARGE DIAGNOSES  Diagnosis: Hypoxia  Assessment and Plan of Treatment:      PRINCIPAL DISCHARGE DIAGNOSIS  Diagnosis: Foot infection  Assessment and Plan of Treatment: We are giving you an antibiotic called doxycycline, take it twice a day for 14 days. It's important to finish this mediction even if you feel better.  Do not put weight on your left foot until cleared by your podiatrist      SECONDARY DISCHARGE DIAGNOSES  Diagnosis: Hypoxia  Assessment and Plan of Treatment:

## 2024-11-21 ENCOUNTER — TRANSCRIPTION ENCOUNTER (OUTPATIENT)
Age: 49
End: 2024-11-21

## 2024-11-21 DIAGNOSIS — K74.60 UNSPECIFIED CIRRHOSIS OF LIVER: ICD-10-CM

## 2024-11-21 DIAGNOSIS — F10.10 ALCOHOL ABUSE, UNCOMPLICATED: ICD-10-CM

## 2024-11-21 LAB
A1C WITH ESTIMATED AVERAGE GLUCOSE RESULT: 7.8 % — HIGH (ref 4–5.6)
ALBUMIN SERPL ELPH-MCNC: 2.5 G/DL — LOW (ref 3.3–5)
ALP SERPL-CCNC: 120 U/L — SIGNIFICANT CHANGE UP (ref 30–120)
ALT FLD-CCNC: 41 U/L — SIGNIFICANT CHANGE UP (ref 10–60)
ANION GAP SERPL CALC-SCNC: 15 MMOL/L — SIGNIFICANT CHANGE UP (ref 5–17)
APTT BLD: 30.5 SEC — SIGNIFICANT CHANGE UP (ref 24.5–35.6)
AST SERPL-CCNC: 34 U/L — SIGNIFICANT CHANGE UP (ref 10–40)
BILIRUB SERPL-MCNC: 0.2 MG/DL — SIGNIFICANT CHANGE UP (ref 0.2–1.2)
BLD GP AB SCN SERPL QL: SIGNIFICANT CHANGE UP
BUN SERPL-MCNC: 76 MG/DL — HIGH (ref 7–23)
CALCIUM SERPL-MCNC: 8.8 MG/DL — SIGNIFICANT CHANGE UP (ref 8.4–10.5)
CHLORIDE SERPL-SCNC: 104 MMOL/L — SIGNIFICANT CHANGE UP (ref 96–108)
CO2 SERPL-SCNC: 17 MMOL/L — LOW (ref 22–31)
CREAT SERPL-MCNC: 3.15 MG/DL — HIGH (ref 0.5–1.3)
EGFR: 23 ML/MIN/1.73M2 — LOW
ESTIMATED AVERAGE GLUCOSE: 177 MG/DL — HIGH (ref 68–114)
ETHANOL SERPL-MCNC: 39 MG/DL — HIGH (ref 0–3)
FERRITIN SERPL-MCNC: 87 NG/ML — SIGNIFICANT CHANGE UP (ref 30–400)
GLUCOSE BLDC GLUCOMTR-MCNC: 229 MG/DL — HIGH (ref 70–99)
GLUCOSE BLDC GLUCOMTR-MCNC: 236 MG/DL — HIGH (ref 70–99)
GLUCOSE BLDC GLUCOMTR-MCNC: 263 MG/DL — HIGH (ref 70–99)
GLUCOSE BLDC GLUCOMTR-MCNC: 268 MG/DL — HIGH (ref 70–99)
GLUCOSE SERPL-MCNC: 213 MG/DL — HIGH (ref 70–99)
HCT VFR BLD CALC: 19.7 % — CRITICAL LOW (ref 39–50)
HCT VFR BLD CALC: 23.3 % — LOW (ref 39–50)
HGB BLD-MCNC: 6.4 G/DL — CRITICAL LOW (ref 13–17)
HGB BLD-MCNC: 7.5 G/DL — LOW (ref 13–17)
INR BLD: 1.1 RATIO — SIGNIFICANT CHANGE UP (ref 0.85–1.16)
IRON SATN MFR SERPL: 16 UG/DL — LOW (ref 45–165)
IRON SATN MFR SERPL: 5 % — LOW (ref 16–55)
MCHC RBC-ENTMCNC: 28.1 PG — SIGNIFICANT CHANGE UP (ref 27–34)
MCHC RBC-ENTMCNC: 28.6 PG — SIGNIFICANT CHANGE UP (ref 27–34)
MCHC RBC-ENTMCNC: 32.2 G/DL — SIGNIFICANT CHANGE UP (ref 32–36)
MCHC RBC-ENTMCNC: 32.5 G/DL — SIGNIFICANT CHANGE UP (ref 32–36)
MCV RBC AUTO: 87.3 FL — SIGNIFICANT CHANGE UP (ref 80–100)
MCV RBC AUTO: 87.9 FL — SIGNIFICANT CHANGE UP (ref 80–100)
NRBC # BLD: 0 /100 WBCS — SIGNIFICANT CHANGE UP (ref 0–0)
NRBC # BLD: 0 /100 WBCS — SIGNIFICANT CHANGE UP (ref 0–0)
PLATELET # BLD AUTO: 194 K/UL — SIGNIFICANT CHANGE UP (ref 150–400)
PLATELET # BLD AUTO: 204 K/UL — SIGNIFICANT CHANGE UP (ref 150–400)
POTASSIUM SERPL-MCNC: 4.5 MMOL/L — SIGNIFICANT CHANGE UP (ref 3.5–5.3)
POTASSIUM SERPL-SCNC: 4.5 MMOL/L — SIGNIFICANT CHANGE UP (ref 3.5–5.3)
PROT SERPL-MCNC: 6.5 G/DL — SIGNIFICANT CHANGE UP (ref 6–8.3)
PROTHROM AB SERPL-ACNC: 13 SEC — SIGNIFICANT CHANGE UP (ref 9.9–13.4)
RBC # BLD: 2.24 M/UL — LOW (ref 4.2–5.8)
RBC # BLD: 2.67 M/UL — LOW (ref 4.2–5.8)
RBC # FLD: 13.1 % — SIGNIFICANT CHANGE UP (ref 10.3–14.5)
RBC # FLD: 13.3 % — SIGNIFICANT CHANGE UP (ref 10.3–14.5)
SODIUM SERPL-SCNC: 136 MMOL/L — SIGNIFICANT CHANGE UP (ref 135–145)
TIBC SERPL-MCNC: 318 UG/DL — SIGNIFICANT CHANGE UP (ref 220–430)
TRANSFERRIN SERPL-MCNC: 276 MG/DL — SIGNIFICANT CHANGE UP (ref 200–360)
UIBC SERPL-MCNC: 301 UG/DL — SIGNIFICANT CHANGE UP (ref 110–370)
WBC # BLD: 9.33 K/UL — SIGNIFICANT CHANGE UP (ref 3.8–10.5)
WBC # BLD: 9.85 K/UL — SIGNIFICANT CHANGE UP (ref 3.8–10.5)
WBC # FLD AUTO: 9.33 K/UL — SIGNIFICANT CHANGE UP (ref 3.8–10.5)
WBC # FLD AUTO: 9.85 K/UL — SIGNIFICANT CHANGE UP (ref 3.8–10.5)

## 2024-11-21 PROCEDURE — 99233 SBSQ HOSP IP/OBS HIGH 50: CPT

## 2024-11-21 RX ORDER — DIPHENHYDRAMINE HCL 25 MG
25 CAPSULE ORAL ONCE
Refills: 0 | Status: DISCONTINUED | OUTPATIENT
Start: 2024-11-21 | End: 2024-11-21

## 2024-11-21 RX ORDER — IRON SUCROSE 20 MG/ML
100 INJECTION, SOLUTION INTRAVENOUS EVERY 24 HOURS
Refills: 0 | Status: COMPLETED | OUTPATIENT
Start: 2024-11-21 | End: 2024-11-24

## 2024-11-21 RX ORDER — DIPHENHYDRAMINE HCL 25 MG
25 CAPSULE ORAL ONCE
Refills: 0 | Status: COMPLETED | OUTPATIENT
Start: 2024-11-21 | End: 2024-11-21

## 2024-11-21 RX ADMIN — Medication 1: at 21:23

## 2024-11-21 RX ADMIN — GABAPENTIN 100 MILLIGRAM(S): 300 CAPSULE ORAL at 05:19

## 2024-11-21 RX ADMIN — Medication 10 UNIT(S): at 12:11

## 2024-11-21 RX ADMIN — GABAPENTIN 100 MILLIGRAM(S): 300 CAPSULE ORAL at 13:28

## 2024-11-21 RX ADMIN — CARVEDILOL 6.25 MILLIGRAM(S): 25 TABLET, FILM COATED ORAL at 05:19

## 2024-11-21 RX ADMIN — DOXYCYCLINE HYCLATE 100 MILLIGRAM(S): 150 TABLET, COATED ORAL at 05:19

## 2024-11-21 RX ADMIN — ACETAMINOPHEN 500MG 650 MILLIGRAM(S): 500 TABLET, COATED ORAL at 05:19

## 2024-11-21 RX ADMIN — CARVEDILOL 6.25 MILLIGRAM(S): 25 TABLET, FILM COATED ORAL at 17:36

## 2024-11-21 RX ADMIN — AMLODIPINE BESYLATE 10 MILLIGRAM(S): 10 TABLET ORAL at 05:19

## 2024-11-21 RX ADMIN — ACETAMINOPHEN, DIPHENHYDRAMINE HCL, PHENYLEPHRINE HCL 3 MILLIGRAM(S): 325; 25; 5 TABLET ORAL at 21:22

## 2024-11-21 RX ADMIN — ACETAMINOPHEN 500MG 650 MILLIGRAM(S): 500 TABLET, COATED ORAL at 13:25

## 2024-11-21 RX ADMIN — INSULIN GLARGINE 40 UNIT(S): 100 INJECTION, SOLUTION SUBCUTANEOUS at 21:23

## 2024-11-21 RX ADMIN — Medication 2: at 12:11

## 2024-11-21 RX ADMIN — Medication 100 MILLIGRAM(S): at 12:10

## 2024-11-21 RX ADMIN — ACETAMINOPHEN 500MG 650 MILLIGRAM(S): 500 TABLET, COATED ORAL at 18:50

## 2024-11-21 RX ADMIN — GABAPENTIN 100 MILLIGRAM(S): 300 CAPSULE ORAL at 21:22

## 2024-11-21 RX ADMIN — Medication 25 MILLIGRAM(S): at 21:53

## 2024-11-21 RX ADMIN — Medication 1 TABLET(S): at 12:10

## 2024-11-21 RX ADMIN — DOXYCYCLINE HYCLATE 100 MILLIGRAM(S): 150 TABLET, COATED ORAL at 17:36

## 2024-11-21 RX ADMIN — Medication 10 UNIT(S): at 08:30

## 2024-11-21 RX ADMIN — ACETAMINOPHEN 500MG 650 MILLIGRAM(S): 500 TABLET, COATED ORAL at 05:49

## 2024-11-21 RX ADMIN — Medication 2: at 17:26

## 2024-11-21 RX ADMIN — Medication 10 UNIT(S): at 17:29

## 2024-11-21 RX ADMIN — Medication 3: at 08:29

## 2024-11-21 RX ADMIN — Medication 325 MILLIGRAM(S): at 12:10

## 2024-11-21 RX ADMIN — ACETAMINOPHEN 500MG 650 MILLIGRAM(S): 500 TABLET, COATED ORAL at 12:12

## 2024-11-21 RX ADMIN — ACETAMINOPHEN 500MG 650 MILLIGRAM(S): 500 TABLET, COATED ORAL at 17:36

## 2024-11-21 NOTE — PROGRESS NOTE ADULT - PROBLEM SELECTOR PLAN 1
ID reconsulted; Dr. Mack  continue doxycycline 100 mg BID to complete 14 day course  nonweight bearing of L foot, ambulate with crutches, PT consult  podiatry following, may need surgery ID reconsulted; Dr. Mack  continue doxycycline 100 mg BID to complete 14 day course, stop date 12/3  nonweight bearing of L foot, ambulate with crutches, PT consult  podiatry following, may need surgery

## 2024-11-21 NOTE — PATIENT CHOICE NOTE. - NSPTCHOICESTATE_GEN_ALL_CORE

## 2024-11-21 NOTE — PROGRESS NOTE ADULT - PROBLEM SELECTOR PLAN 6
SCDs in setting acute blood loss anemia  Regular Diet w/ Consistent Carbohydrate   Ambulate w/ Crutches  PT consult/SW consult/ Case Management consult alcohol reportedly found in room  serum alcohol pending  will monitor on CIWA

## 2024-11-21 NOTE — DIETITIAN INITIAL EVALUATION ADULT - NS FNS ENTERAL CURRENT ORDER
[facility] Emergency Dept  [address] [place] [address]  Phone: [phone]  Fax: [phone]                                    Neeta Verde   MRN: [id number]    Department: [facility] Emergency Dept   Date of Visit: 12/30/2020           After Visit Summary Signature Page    I have received my discharge instructions, and my questions have been answered. I have discussed any challenges I see with this plan with the nurse or doctor.    ..........................................................................................................................................  Patient/Patient Representative Signature      ..........................................................................................................................................  Patient Representative Print Name and Relationship to Patient    ..................................................               ................................................  Date                                   Time    ..........................................................................................................................................  Reviewed by Signature/Title    ...................................................              ..............................................  Date                                               Time          22EPIC Rev 08/18        Current diet order meets estimated nutrient requirements

## 2024-11-21 NOTE — CARE COORDINATION ASSESSMENT. - OTHER PERTINENT DISCHARGE PLANNING INFORMATION:
49-year-old male with history of diabetes mellitus, hypertension, hyperlipidemia, neuropathy, peripheral vascular disease, chronic kidney disease, and EtOH abuser brought in by ambulance for bleeding from left foot that occurred prior to arrival. Admitted for hematological workup to assess the cause of recurrent bleeds he is having from L foot, even after hemostasis was achieved in OR. Met patient at bedside.  Explained role of CM, verbalized understanding. Pt was made aware a CM will remain available through hospitalization.  Contact information given in discharge/ transitions resource folder.

## 2024-11-21 NOTE — DIETITIAN INITIAL EVALUATION ADULT - PERTINENT LABORATORY DATA
11-21    136  |  104  |  76[H]  ----------------------------<  213[H]  4.5   |  17[L]  |  3.15[H]    Ca    8.8      21 Nov 2024 08:01    TPro  6.5  /  Alb  2.5[L]  /  TBili  0.2  /  DBili  x   /  AST  34  /  ALT  41  /  AlkPhos  120  11-21  POCT Blood Glucose.: 229 mg/dL (11-21-24 @ 11:58)  A1C with Estimated Average Glucose Result: 8.5 % (11-07-24 @ 14:04)  A1C with Estimated Average Glucose Result: 10.6 % (09-18-24 @ 05:30)

## 2024-11-21 NOTE — CARE COORDINATION ASSESSMENT. - NSDCPLANSERVICES_GEN_ALL_CORE
DX:49-year-old male with history of diabetes mellitus, hypertension, hyperlipidemia, neuropathy, peripheral vascular disease, chronic kidney disease, and EtOH abuser brought in by ambulance for bleeding from left foot that occurred prior to arrival. Admitted for hematological workup to assess the cause of recurrent bleeds he is having from L foot, even after hemostasis was achieved in OR.      Patient H&H 6.4/19.7 Today.  Podiatry at bedside doing Dressing Changes this morning at bedside.    Pending Heme Consult.  Pending PT Consult.     CM met with patient at bedside. Patient alert times 3. Patient is a Readmit was Discharge yesterday from Hospital. Patient has no steps to enter in the house. Patient lives alone. As per Podiatry want to do dressing changes in the office.     CM verified:   PCP: Sonam Hou phone [phone].  Pharmacy: [facility] in [place] [address].  Insurance: [employer].  [place]: NO.     CM explained about homecare services with a verbal understanding. Pending PT Consult. Cm will setup referral and continue to follow case./Home Care/Outpatient Center/Clinic

## 2024-11-21 NOTE — PROGRESS NOTE ADULT - PROBLEM SELECTOR PLAN 2
patient had another episode of bleeding from L toe after being discharged, likely in setting of placing pressure on foot, and exacerbating wound causing bleeding  Ordered 1u pRBC transfusion, will monitor and give additional transfusions, goal Hb>7.0  will send AM labs with Anemia workup, PT/INR/PTT, vWF  Day team to consult Hematology; unable to reach answering service overnight; fu recs given repeat episdoes of bleeding-> assess for any undiagnosed bleeding disorderes?  likely in setting of components of CKD, Chronic Alcohol abuse ( plt in low end of normal), and trauma  transfuse to keep Hgb>7. likely in setting of components of CKD, Chronic Alcohol abuse  patient had another episode of bleeding from L toe after being discharged, likely in setting of placing pressure on foot, and exacerbating wound causing bleeding  Consult Hematology, will consider DDAVP  1u pRBC transfused, will monitor and transfuse to keep Hgb>7

## 2024-11-21 NOTE — CARE COORDINATION ASSESSMENT. - NS SW READMITTED REASON
DX:49-year-old male with history of diabetes mellitus, hypertension, hyperlipidemia, neuropathy, peripheral vascular disease, chronic kidney disease, and EtOH abuser brought in by ambulance for bleeding from left foot that occurred prior to arrival. Admitted for hematological workup to assess the cause of recurrent bleeds he is having from L foot, even after hemostasis was achieved in OR./medical/surgical complications

## 2024-11-21 NOTE — PROGRESS NOTE ADULT - SUBJECTIVE AND OBJECTIVE BOX
Subjective: brought back soon after dc yest due to L foot bleed.       MEDICATIONS  (STANDING):  acetaminophen     Tablet .. 650 milliGRAM(s) Oral every 6 hours  amLODIPine   Tablet 10 milliGRAM(s) Oral daily  carvedilol 6.25 milliGRAM(s) Oral every 12 hours  dextrose 5%. 1000 milliLiter(s) (50 mL/Hr) IV Continuous <Continuous>  dextrose 5%. 1000 milliLiter(s) (100 mL/Hr) IV Continuous <Continuous>  dextrose 50% Injectable 25 Gram(s) IV Push once  dextrose 50% Injectable 12.5 Gram(s) IV Push once  dextrose 50% Injectable 25 Gram(s) IV Push once  doxycycline monohydrate Capsule 100 milliGRAM(s) Oral every 12 hours  ferrous    sulfate 325 milliGRAM(s) Oral daily  gabapentin 100 milliGRAM(s) Oral three times a day  glucagon  Injectable 1 milliGRAM(s) IntraMuscular once  insulin glargine Injectable (LANTUS) 40 Unit(s) SubCutaneous at bedtime  insulin lispro (ADMELOG) corrective regimen sliding scale   SubCutaneous three times a day before meals  insulin lispro (ADMELOG) corrective regimen sliding scale   SubCutaneous at bedtime  insulin lispro Injectable (ADMELOG) 10 Unit(s) SubCutaneous three times a day before meals  lactobacillus acidophilus 1 Tablet(s) Oral daily  thiamine 100 milliGRAM(s) Oral daily    MEDICATIONS  (PRN):  acetaminophen     Tablet .. 650 milliGRAM(s) Oral every 6 hours PRN Temp greater or equal to 38C (100.4F), Mild Pain (1 - 3)  dextrose Oral Gel 15 Gram(s) Oral once PRN Blood Glucose LESS THAN 70 milliGRAM(s)/deciliter  melatonin 3 milliGRAM(s) Oral at bedtime PRN Insomnia          T(C): 36.6 (11-21-24 @ 05:07), Max: 37.7 (11-20-24 @ 18:17)  HR: 85 (11-21-24 @ 05:07) (85 - 97)  BP: 109/69 (11-21-24 @ 05:07) (100/70 - 155/80)  RR: 18 (11-21-24 @ 05:07) (16 - 18)  SpO2: 91% (11-21-24 @ 05:07) (91% - 99%)  Wt(kg): --        I&O's Detail           PHYSICAL EXAM:    GENERAL: NAD  NECK: Supple, no inc in JVP  CHEST/LUNG: Clear  HEART: S1S2  ABDOMEN: Soft, Nontender, Nondistended; Bowel sounds present  EXTREMITIES:  trace edema. L foot heavily dressed.   NEURO: no asterixis      LABS:  CBC Full  -  ( 20 Nov 2024 08:01 )  WBC Count : 8.96 K/uL  RBC Count : 2.90 M/uL  Hemoglobin : 8.1 g/dL  Hematocrit : 25.4 %  Platelet Count - Automated : 184 K/uL  Mean Cell Volume : 87.6 fL  Mean Cell Hemoglobin : 27.9 pg  Mean Cell Hemoglobin Concentration : 31.9 g/dL  Auto Neutrophil # : 5.29 K/uL  Auto Lymphocyte # : 1.92 K/uL  Auto Monocyte # : 1.21 K/uL  Auto Eosinophil # : 0.29 K/uL  Auto Basophil # : 0.15 K/uL  Auto Neutrophil % : 59.1 %  Auto Lymphocyte % : 21.4 %  Auto Monocyte % : 13.5 %  Auto Eosinophil % : 3.2 %  Auto Basophil % : 1.7 %    11-20    139  |  106  |  74[H]  ----------------------------<  219[H]  5.1   |  22  |  2.87[H]    Ca    9.3      20 Nov 2024 08:01    TPro  6.0  /  Alb  2.7[L]  /  TBili  0.4  /  DBili  x   /  AST  40  /  ALT  44  /  AlkPhos  112  11-19        Impression:  LUISA 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  Stabilized  Diabetes with chronic foot wound  Hypertension, improved.   Blood loss anemia    Recommend:  Cont to hold ARB  Follow repeat BMP today and tomorrow if to remain in house.

## 2024-11-21 NOTE — PROGRESS NOTE ADULT - PROBLEM SELECTOR PLAN 3
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   ARB was discontinued in previous admission    plan  encourage PO intake as patient refuses IV placement and blood draws  Follow AM CMP Previously seen by Nephrology-> LUISA CKD 4 due to ischemic/nephrotoxic ATN, stabilized after hemostasis was achieved.   ARB was discontinued in previous admission  monitor BMP

## 2024-11-21 NOTE — SOCIAL WORK PROGRESS NOTE - NSSWPROGRESSNOTE_GEN_ALL_CORE
Social Work Consult received in [facility] electronic health record regarding assistance in the home. Patient is known to this  from hospitalizations on same medical unit from 09/17 to 10/04/2024, 11/01 to 11/04/2024 (left AMA), and 11/07 to 11/20/2024. Patient was discharged to home address on 11/20/2024 via [facility] w/ stop @ [facility] in [place] so that he could  his antibiotics. Discharge plan remains for same as prior.  to continue to follow.

## 2024-11-21 NOTE — PROGRESS NOTE ADULT - PROBLEM SELECTOR PLAN 1
Discussed diagnosis and treatment with patient.  Patient is s/p surgical debridement of all non-viable soft tissue and bone left foot with partial 1st ray resection and packing all left foot . DOS 11/12/2024   s/p surgical exploration of left foot for active bleed DOS 11/17/2024  Upon dressing change, no arterial bleed was noted.   Appreciates Hematology and ID consults   Continue Abx and follow ID recommendations    Non weight bearing on left foot   Recs elevation on left lower extremities   Pt may require serial surgical debridements/amputation and possible BKA if symptoms persist.    Pt understands the severity of his condition and  that he is at risk to lose part of the left foot, all the foot or below the knee amputation. All questions were asked and answered for patient satisfaction    Medical management as per primary team   Will cont to monitor and discuss all options with patient and all attendings.

## 2024-11-21 NOTE — DIETITIAN INITIAL EVALUATION ADULT - LITERATURE/VIDEOS GIVEN
Patient declined education at this time. Frustrated he's been admitted several times the last few months.

## 2024-11-21 NOTE — CARE COORDINATION ASSESSMENT. - NSCAREPROVIDERS_GEN_ALL_CORE_FT
CARE PROVIDERS:  Accepting Physician: Salvatore Granados  Administration: Francia Dennis  Administration: Antoni White  Admitting: Salvatore Granados  Attending: Salvatore Granados  Case Management: Shanthi Mccurdy  Consultant: Edvin Aviles  Consultant: Linsey Mack  Consultant: Saman Mayo  Consultant: Jayesh Varma  Covering Team: Salvatore Granados  ED ACP: Rosalva Miramontes  ED Attending: Mica Platt  ED Nurse: Jessi Diaz  Infection Control: Leeann Edwards  Nurse: Carley Del Valle  Nurse: Tomasa Helms  Nurse: Kong Combs  Nurse: Danielle Del Castillo  Nurse: Gracia Marks  Nurse: Irena Torre  Outpatient Provider: Todd Canchola  Outpatient Provider: Walter Chawla  Override: Gracia Marks  Physical Therapy: Marbella Lorenzo  Physical Therapy: Celena Roberts  Primary Team: Alessandro Greenfield  Primary Team: Kong Villalobos  Registered Dietitian: Milena Crooks  Registered Dietitian: Mary Carcamo  Respiratory Therapy: Salvatore Lua  Respiratory Therapy: Yadira Tariq  : Marta Willett  Team: LENORA  Hospitalists, Team  UR// Supp. Assoc.: Mirna Gaffney

## 2024-11-21 NOTE — DIETITIAN INITIAL EVALUATION ADULT - ADD RECOMMEND
1. Continue diet order of Consistent Carbohydrate Meals with Evening Snack.   2. Recommend Manish BID (7g Arginine, 7g Glutamine, and 1.5g HMB per serving) to aid in diabetic ulcer healing.   3. Obtain and honor food preferences as able.  1. Continue diet order of Consistent Carbohydrate Meals with Evening Snack.   2. Recommend Manish BID (7g Arginine, 7g Glutamine, and 1.2g HMB per serving) to aid in diabetic ulcer healing.   3. Obtain and honor food preferences as able.

## 2024-11-21 NOTE — DIETITIAN INITIAL EVALUATION ADULT - ORAL INTAKE PTA/DIET HISTORY
Patient reports "good" appetite and adequate intake. Several prior admissions in the last few months in which he's been on a consistent carbohydrate diet order.

## 2024-11-21 NOTE — CONSULT NOTE ADULT - ASSESSMENT
Recurrent bleeding from left foot wound of potential multifactorial in etiology. His PT, PTT are normal but he could have a platelet function disorder related to renal insufficiency.  He could also have a anatomic issue from the wound precipitating the bleeding.  His underlying cirrhosis may also be playing a role.(noted on CT scan)    Recommendations:  1.  follow CBC and transfuse as indicated  2.  if ok with renal and if continues to bleed dDavp may be helpful for the platelet dysfunction  3.  if still no benefit will need surgical intervention since no other evidence of bleeding at present  4.  further  heme recommendations pending above Recurrent bleeding from left foot wound of potential multifactorial in etiology. His PT, PTT are normal but he could have a platelet function disorder related to renal insufficiency.  He could also have a anatomic issue from the wound precipitating the bleeding.  His underlying cirrhosis may also be playing a role.(noted on CT scan)    Recommendations:  1.  follow CBC and transfuse as indicated  2.  if ok with renal and if continues to bleed dDavp may be helpful for the platelet dysfunction  3.  if still no benefit will need surgical intervention since no other evidence of bleeding at present  4.  transfuse 1 unit PRBC  5.  further  heme recommendations pending above

## 2024-11-21 NOTE — DIETITIAN INITIAL EVALUATION ADULT - ORAL NUTRITION SUPPLEMENTS
Manish BID which provides 7g Arginine, 7g Glutamine, and 1.5g HMB per serving.  Manish BID which provides 7g Arginine, 7g Glutamine, and 1.2g HMB per serving.

## 2024-11-21 NOTE — CONSULT NOTE ADULT - SUBJECTIVE AND OBJECTIVE BOX
Patient is a 49y old  Male who presents with a chief complaint of bleeding L toe (21 Nov 2024 11:54)      HPI:  49-year-old male with history of diabetes mellitus, hypertension, hyperlipidemia, neuropathy, peripheral vascular disease, chronic kidney disease, and EtOH abuser brought in by ambulance for bleeding from left foot that occurred prior to arrival.  Patient has a history of diabetic foot ulcer with cellulitis.  Was diagnosed with acute on chronic osteomyelitis of left foot.  On November 12 had amputation of toe on left foot by Dr. Davenport.  Patient had bleeding from foot on November 17 and a rapid response team was called.  Was taken to the OR and homeostasis was obtained.  Was transfused 1 unit at that time.  Patient has not had any bleeding since then and was discharged this morning.  Patient states he was walking in the driveway having a verbal confrontation with his landlord.  Believes he stepped on his forefoot and started to bleed.  EMS was called.  States on arrival to emergency room bleeding has now stopped.  Denies other pain or complaints    In ED:  Vitals- T 98.7  HR 97  BP  104/58  RR 18 O2S- 95%  Labs- Denied blood work in ED; agreeable for AM labs; Nov 20th AM labs prior to DC->  Hgb 8.1 Hct 25.4  MCV 87.6  Cr 2.87  BUN 74  eGFR 26  Consults made in ED-> Podiatry; recs appreciated      (20 Nov 2024 23:00)       ROS:  Negative except for:    PAST MEDICAL & SURGICAL HISTORY:  Prediabetes      HTN (hypertension)      HLD (hyperlipidemia)      DM2 (diabetes mellitus, type 2)          SOCIAL HISTORY:    FAMILY HISTORY:      MEDICATIONS  (STANDING):  acetaminophen     Tablet .. 650 milliGRAM(s) Oral every 6 hours  amLODIPine   Tablet 10 milliGRAM(s) Oral daily  carvedilol 6.25 milliGRAM(s) Oral every 12 hours  dextrose 5%. 1000 milliLiter(s) (50 mL/Hr) IV Continuous <Continuous>  dextrose 5%. 1000 milliLiter(s) (100 mL/Hr) IV Continuous <Continuous>  dextrose 50% Injectable 25 Gram(s) IV Push once  dextrose 50% Injectable 12.5 Gram(s) IV Push once  dextrose 50% Injectable 25 Gram(s) IV Push once  doxycycline monohydrate Capsule 100 milliGRAM(s) Oral every 12 hours  ferrous    sulfate 325 milliGRAM(s) Oral daily  gabapentin 100 milliGRAM(s) Oral three times a day  glucagon  Injectable 1 milliGRAM(s) IntraMuscular once  insulin glargine Injectable (LANTUS) 40 Unit(s) SubCutaneous at bedtime  insulin lispro (ADMELOG) corrective regimen sliding scale   SubCutaneous three times a day before meals  insulin lispro (ADMELOG) corrective regimen sliding scale   SubCutaneous at bedtime  insulin lispro Injectable (ADMELOG) 10 Unit(s) SubCutaneous three times a day before meals  lactobacillus acidophilus 1 Tablet(s) Oral daily  thiamine 100 milliGRAM(s) Oral daily    MEDICATIONS  (PRN):  acetaminophen     Tablet .. 650 milliGRAM(s) Oral every 6 hours PRN Temp greater or equal to 38C (100.4F), Mild Pain (1 - 3)  dextrose Oral Gel 15 Gram(s) Oral once PRN Blood Glucose LESS THAN 70 milliGRAM(s)/deciliter  melatonin 3 milliGRAM(s) Oral at bedtime PRN Insomnia      Allergies    No Known Allergies    Intolerances        Vital Signs Last 24 Hrs  T(C): 36.6 (21 Nov 2024 10:48), Max: 37.7 (20 Nov 2024 18:17)  T(F): 97.8 (21 Nov 2024 10:48), Max: 99.9 (20 Nov 2024 18:17)  HR: 83 (21 Nov 2024 10:48) (82 - 97)  BP: 93/56 (21 Nov 2024 10:48) (93/56 - 155/80)  BP(mean): --  RR: 18 (21 Nov 2024 10:48) (16 - 18)  SpO2: 98% (21 Nov 2024 10:48) (91% - 99%)    Parameters below as of 21 Nov 2024 10:48  Patient On (Oxygen Delivery Method): room air        PHYSICAL EXAM  General: adult in NAD  HEENT: clear oropharynx, anicteric sclera, pink conjunctivae  Neck: supple  CV: normal S1S2 with no murmur rubs or gallops  Lungs: clear to auscultation, no wheezes, no rhales  Abdomen: soft non-tender non-distended, no hepato/splenomegaly  Ext: no clubbing cyanosis or edema  Skin: no rashes and no petichiae  Neuro: alert and oriented X3 no focal deficits      LABS:    CBC Full  -  ( 21 Nov 2024 08:01 )  WBC Count : 9.85 K/uL  RBC Count : 2.24 M/uL  Hemoglobin : 6.4 g/dL  Hematocrit : 19.7 %  Platelet Count - Automated : 194 K/uL  Mean Cell Volume : 87.9 fL  Mean Cell Hemoglobin : 28.6 pg  Mean Cell Hemoglobin Concentration : 32.5 g/dL  Auto Neutrophil # : x  Auto Lymphocyte # : x  Auto Monocyte # : x  Auto Eosinophil # : x  Auto Basophil # : x  Auto Neutrophil % : x  Auto Lymphocyte % : x  Auto Monocyte % : x  Auto Eosinophil % : x  Auto Basophil % : x    11-21    136  |  104  |  76[H]  ----------------------------<  213[H]  4.5   |  17[L]  |  3.15[H]    Ca    8.8      21 Nov 2024 08:01    TPro  6.5  /  Alb  2.5[L]  /  TBili  0.2  /  DBili  x   /  AST  34  /  ALT  41  /  AlkPhos  120  11-21    PT/INR - ( 21 Nov 2024 08:01 )   PT: 13.0 sec;   INR: 1.10 ratio         PTT - ( 21 Nov 2024 08:01 )  PTT:30.5 sec          BLOOD SMEAR INTERPRETATION:    RADIOLOGY & ADDITIONAL STUDIES:    < from: CT Abdomen and Pelvis No Cont (11.02.24 @ 13:16) >  ACC: [id number] EXAM:  CT CHEST   ORDERED BY: DEREK PHAM     ACC: [id number] EXAM:  CT ABDOMEN AND PELVIS   ORDERED BY: DEREK PHAM     PROCEDURE DATE:  11/02/2024          INTERPRETATION:  CLINICAL INFORMATION: Abnormal chest x-ray.EtOH use.   Hypertension. Diabetes.    COMPARISON: CTA chest 4/24/2021.    CONTRAST/COMPLICATIONS:  IV Contrast: NONE  Oral Contrast: NONE  Complications: None reported at time of study completion    PROCEDURE:  CT of the Chest, Abdomen and Pelvis was performed.  Sagittal and coronal reformats were performed.    FINDINGS:  CHEST:  LUNGS AND LARGE AIRWAYS: Patent central airways. Mild atelectatic   changes. No pulmonary consolidation or suspicious nodules.  PLEURA: Trace right pleural effusion.  VESSELS: No thoracic aortic aneurysm.  HEART: Heart size is normal. Coronary calcifications. No pericardial   effusion.  MEDIASTINUM AND SHEELA: No lymphadenopathy.  CHEST WALL AND LOWER NECK: Within normal limits.    ABDOMEN AND PELVIS:  LIVER: Hepatomegaly, hepatic steatosis and cirrhotic morphology.  BILE DUCTS: Normal caliber.  GALLBLADDER: Within normal limits.  SPLEEN: Mild splenomegaly measuring 15 cm.  PANCREAS: Within normal limits.  ADRENALS: Within normal limits.  KIDNEYS/URETERS: No hydronephrosis or nephrolithiasis. Mild nonspecific   perinephric stranding.    BLADDER: Within normal limits.  REPRODUCTIVE ORGANS: No prostatic enlargement. Calcification of the vas   deferens.    BOWEL: No bowel obstruction. Appendix is normal.  PERITONEUM/RETROPERITONEUM: No ascites.  VESSELS: No abdominal aortic aneurysm. Recanalized umbilical vein.  LYMPH NODES: No lymphadenopathy.  ABDOMINAL WALL: Within normal limits.  BONES: Mild degenerative changes.    IMPRESSION:    No pulmonary consolidation. Trace right pleural effusion.    Steatotic and steatotic liver with evidence of portal hypertension   including mild splenomegaly and recanalized umbilical vein.        --- End of Report ---            LUIS ALBERTO ONEAL MD; Attending Radiologist  This document has been electronically signed. Nov  3 2024  9:30AM    < end of copied text >  
Patient is a 49y old  Male who presents with a chief complaint of left foot bleeding s/p left 1st partial ray ambutation    HPI:   49-year-old male with history of diabetes mellitus, hypertension, hyperlipidemia, neuropathy, peripheral vascular disease, chronic kidney disease, and EtOH abuser brought in by ambulance for bleeding from left foot that occurred prior to arrival.  Patient has a history of diabetic foot ulcer with cellulitis.  Was diagnosed with acute on chronic osteomyelitis of left foot.  On November 12 had amputation of toe on left foot by Dr. Davenport.  Patient had bleeding from foot on November 17 and a rapid response team was called.  Was taken to the OR and homeostasis was obtained.  Was transfused 1 unit at that time.  Patient has not had any bleeding since then and was discharged this morning.  Patient states he was walking in the driveway having a verbal confrontation with his landlord.  Believes he stepped on his forefoot and started to bleed.  EMS was called.  States on arrival to emergency room bleeding has now stopped.  Denies other pain or complaints    PAST MEDICAL & SURGICAL HISTORY:  Prediabetes      HTN (hypertension)      HLD (hyperlipidemia)      DM2 (diabetes mellitus, type 2)          MEDICATIONS  (STANDING):    MEDICATIONS  (PRN):      Allergies    No Known Allergies    Intolerances        VITALS:    Vital Signs Last 24 Hrs  T(C): 37.7 (20 Nov 2024 18:17), Max: 37.7 (20 Nov 2024 18:17)  T(F): 99.9 (20 Nov 2024 18:17), Max: 99.9 (20 Nov 2024 18:17)  HR: 95 (20 Nov 2024 18:17) (88 - 95)  BP: 106/66 (20 Nov 2024 18:17) (106/66 - 155/80)  BP(mean): --  RR: 18 (20 Nov 2024 18:17) (16 - 18)  SpO2: 99% (20 Nov 2024 18:17) (94% - 99%)    Parameters below as of 20 Nov 2024 18:17  Patient On (Oxygen Delivery Method): room air        LABS:                          8.1    8.96  )-----------( 184      ( 20 Nov 2024 08:01 )             25.4       11-20    139  |  106  |  74[H]  ----------------------------<  219[H]  5.1   |  22  |  2.87[H]    Ca    9.3      20 Nov 2024 08:01    TPro  6.0  /  Alb  2.7[L]  /  TBili  0.4  /  DBili  x   /  AST  40  /  ALT  44  /  AlkPhos  112  11-19      CAPILLARY BLOOD GLUCOSE      POCT Blood Glucose.: 239 mg/dL (20 Nov 2024 12:22)  POCT Blood Glucose.: 225 mg/dL (20 Nov 2024 08:02)  POCT Blood Glucose.: 248 mg/dL (19 Nov 2024 20:41)          LOWER EXTREMITY PHYSICAL EXAM:    left foot s/p surgical Debridement of all non-viable soft tissue and bone, debridement of deep space infection of left foot with left 1st partial ray resection and packing all left foot. Strikethrough bleeding in dressing, dressing are wet but no  active bleeding noted .  The sutures are intact and the flap is viable at this time.  There is decreased erythema, edema and calor noted. Patient has guarded prognosis . Will cont to allow any further demarcation to occur and will closely monitor.    Sterile dressing change performed today with application of packing under sterile technique.   Vascular: Dorsalis Pedis and Posterior Tibial pulses palpable 2/4 bilaterally.  Capillary re-fill time less then 3 seconds digits 1-5 bilateral.  Mild-moderate edema bilaterally up to the ankle left foot   Neuro: Protective sensation diminished to the level of the digits bilateral.   MSK: Muscle strength 5/5 all major muscle groups bilateral.    RADIOLOGY & ADDITIONAL STUDIES:  Left foot Xray : pending

## 2024-11-21 NOTE — PROGRESS NOTE ADULT - PROBLEM SELECTOR PLAN 4
on 45 units lantus at night  and 15 units lispro TID    plan  continue 40 units long acting and 10 units TID short acting  LDSS A1c 8.5%  continue Lantus 40 units (home dose 45)  10 units insulin lispro TID (on home 15)  insulin sliding scale

## 2024-11-21 NOTE — CARE COORDINATION ASSESSMENT. - PRO ARRIVE FROM
DX:49-year-old male with history of diabetes mellitus, hypertension, hyperlipidemia, neuropathy, peripheral vascular disease, chronic kidney disease, and EtOH abuser brought in by ambulance for bleeding from left foot that occurred prior to arrival. Admitted for hematological workup to assess the cause of recurrent bleeds he is having from L foot, even after hemostasis was achieved in OR.      Patient H&H 6.4/19.7 Today.  Podiatry at bedside doing Dressing Changes this morning at bedside.    Pending Heme Consult.  Pending PT Consult.     CM met with patient at bedside. Patient alert times 3. Patient is a Readmit was Discharge yesterday from Hospital. Patient has no steps to enter in the house. Patient lives alone. As per Podiatry want to do dressing changes in the office.     CM verified:   PCP: Sonam Hou phone [phone].  Pharmacy: [facility] in [place] [address].  Insurance: [employer].  [place]: NO.     CM explained about homecare services with a verbal understanding. Pending PT Consult. Cm will setup referral and continue to follow case./home

## 2024-11-21 NOTE — PATIENT CHOICE NOTE. - NSPTCHOICENOTES_GEN_ALL_CORE
[facility] care agency [phone] will reach out to you within 24-72 hours of your discharge to schedule home care visit/eval appointment with you. Please call agency for any queries regarding home care services.

## 2024-11-21 NOTE — PROVIDER CONTACT NOTE (CRITICAL VALUE NOTIFICATION) - ACTION/TREATMENT ORDERED:
Per Dr. Villalobos, will get blood consent and order blood, pt's RN Tomasa made aware of result and situation

## 2024-11-21 NOTE — PROGRESS NOTE ADULT - SUBJECTIVE AND OBJECTIVE BOX
Patient is a 49y old  Male who presents with a chief complaint of bleeding L toe (21 Nov 2024 07:48)      INTERVAL HPI/OVERNIGHT EVENTS:  Patient seen asleep in bed. His morning labs showed anemia and he was consented by staff for a transfusion. U/S guided IV placed by critical care PA this AM. No reported overnight events      REVIEW OF SYSTEMS deferred as patient asleep    PHYSICAL EXAM:  GENERAL: NAD, well-groomed, well-developed  HEAD:  Atraumatic, Normocephalic  ENMT: Moist mucous membranes  NECK: Supple, No JVD appreciated  NERVOUS SYSTEM:  Asleep, not responding to voice  CHEST/LUNG: No increased work of breathing, no wheezing appreciated  HEART: No limb edema appreciated  ABDOMEN: Nondistended  EXTREMITIES: L foot in ACE bandage. No clubbing, cyanosis, or edema appreciated  LYMPH: No lymphadenopathy noted  SKIN: No rashes or lesions appreciated on visible skin    MEDICATIONS  (STANDING):  acetaminophen     Tablet .. 650 milliGRAM(s) Oral every 6 hours  amLODIPine   Tablet 10 milliGRAM(s) Oral daily  carvedilol 6.25 milliGRAM(s) Oral every 12 hours  dextrose 5%. 1000 milliLiter(s) (50 mL/Hr) IV Continuous <Continuous>  dextrose 5%. 1000 milliLiter(s) (100 mL/Hr) IV Continuous <Continuous>  dextrose 50% Injectable 25 Gram(s) IV Push once  dextrose 50% Injectable 12.5 Gram(s) IV Push once  dextrose 50% Injectable 25 Gram(s) IV Push once  doxycycline monohydrate Capsule 100 milliGRAM(s) Oral every 12 hours  ferrous    sulfate 325 milliGRAM(s) Oral daily  gabapentin 100 milliGRAM(s) Oral three times a day  glucagon  Injectable 1 milliGRAM(s) IntraMuscular once  insulin glargine Injectable (LANTUS) 40 Unit(s) SubCutaneous at bedtime  insulin lispro (ADMELOG) corrective regimen sliding scale   SubCutaneous three times a day before meals  insulin lispro (ADMELOG) corrective regimen sliding scale   SubCutaneous at bedtime  insulin lispro Injectable (ADMELOG) 10 Unit(s) SubCutaneous three times a day before meals  lactobacillus acidophilus 1 Tablet(s) Oral daily  thiamine 100 milliGRAM(s) Oral daily    MEDICATIONS  (PRN):  acetaminophen     Tablet .. 650 milliGRAM(s) Oral every 6 hours PRN Temp greater or equal to 38C (100.4F), Mild Pain (1 - 3)  dextrose Oral Gel 15 Gram(s) Oral once PRN Blood Glucose LESS THAN 70 milliGRAM(s)/deciliter  melatonin 3 milliGRAM(s) Oral at bedtime PRN Insomnia      Allergies    No Known Allergies    Intolerances        Vital Signs Last 24 Hrs  T(C): 36.6 (21 Nov 2024 10:48), Max: 37.7 (20 Nov 2024 18:17)  T(F): 97.8 (21 Nov 2024 10:48), Max: 99.9 (20 Nov 2024 18:17)  HR: 83 (21 Nov 2024 10:48) (82 - 97)  BP: 93/56 (21 Nov 2024 10:48) (93/56 - 155/80)  BP(mean): --  RR: 18 (21 Nov 2024 10:48) (16 - 18)  SpO2: 98% (21 Nov 2024 10:48) (91% - 99%)    Parameters below as of 21 Nov 2024 10:48  Patient On (Oxygen Delivery Method): room air        LABS:                        6.4    9.85  )-----------( 194      ( 21 Nov 2024 08:01 )             19.7     21 Nov 2024 08:01    136    |  104    |  76     ----------------------------<  213    4.5     |  17     |  3.15     Ca    8.8        21 Nov 2024 08:01    TPro  6.5    /  Alb  2.5    /  TBili  0.2    /  DBili  x      /  AST  34     /  ALT  41     /  AlkPhos  120    21 Nov 2024 08:01    PT/INR - ( 21 Nov 2024 08:01 )   PT: 13.0 sec;   INR: 1.10 ratio         PTT - ( 21 Nov 2024 08:01 )  PTT:30.5 sec  Urinalysis Basic - ( 21 Nov 2024 08:01 )    Color: x / Appearance: x / SG: x / pH: x  Gluc: 213 mg/dL / Ketone: x  / Bili: x / Urobili: x   Blood: x / Protein: x / Nitrite: x   Leuk Esterase: x / RBC: x / WBC x   Sq Epi: x / Non Sq Epi: x / Bacteria: x      CAPILLARY BLOOD GLUCOSE      POCT Blood Glucose.: 229 mg/dL (21 Nov 2024 11:58)  POCT Blood Glucose.: 268 mg/dL (21 Nov 2024 07:58)  POCT Blood Glucose.: 301 mg/dL (20 Nov 2024 22:49)  POCT Blood Glucose.: 239 mg/dL (20 Nov 2024 12:22)     Patient is a 49y old  Male who presents with a chief complaint of bleeding L toe (21 Nov 2024 07:48)      INTERVAL HPI/OVERNIGHT EVENTS:  Patient seen asleep in bed. His morning labs showed anemia and he was consented by staff for a transfusion. U/S guided IV placed by critical care PA this AM. No reported overnight events. Nurse reports she believes patient had bottles of alcohol in room this afternoon.       REVIEW OF SYSTEMS deferred as patient asleep    PHYSICAL EXAM:  GENERAL: NAD, well-groomed, well-developed  HEAD:  Atraumatic, Normocephalic  ENMT: Moist mucous membranes  NECK: Supple, No JVD appreciated  NERVOUS SYSTEM:  Asleep, not responding to voice  CHEST/LUNG: No increased work of breathing, no wheezing appreciated  HEART: No limb edema appreciated  ABDOMEN: Nondistended  EXTREMITIES: L foot in ACE bandage. No clubbing, cyanosis, or edema appreciated  LYMPH: No lymphadenopathy noted  SKIN: No rashes or lesions appreciated on visible skin    MEDICATIONS  (STANDING):  acetaminophen     Tablet .. 650 milliGRAM(s) Oral every 6 hours  amLODIPine   Tablet 10 milliGRAM(s) Oral daily  carvedilol 6.25 milliGRAM(s) Oral every 12 hours  dextrose 5%. 1000 milliLiter(s) (50 mL/Hr) IV Continuous <Continuous>  dextrose 5%. 1000 milliLiter(s) (100 mL/Hr) IV Continuous <Continuous>  dextrose 50% Injectable 25 Gram(s) IV Push once  dextrose 50% Injectable 12.5 Gram(s) IV Push once  dextrose 50% Injectable 25 Gram(s) IV Push once  doxycycline monohydrate Capsule 100 milliGRAM(s) Oral every 12 hours  ferrous    sulfate 325 milliGRAM(s) Oral daily  gabapentin 100 milliGRAM(s) Oral three times a day  glucagon  Injectable 1 milliGRAM(s) IntraMuscular once  insulin glargine Injectable (LANTUS) 40 Unit(s) SubCutaneous at bedtime  insulin lispro (ADMELOG) corrective regimen sliding scale   SubCutaneous three times a day before meals  insulin lispro (ADMELOG) corrective regimen sliding scale   SubCutaneous at bedtime  insulin lispro Injectable (ADMELOG) 10 Unit(s) SubCutaneous three times a day before meals  lactobacillus acidophilus 1 Tablet(s) Oral daily  thiamine 100 milliGRAM(s) Oral daily    MEDICATIONS  (PRN):  acetaminophen     Tablet .. 650 milliGRAM(s) Oral every 6 hours PRN Temp greater or equal to 38C (100.4F), Mild Pain (1 - 3)  dextrose Oral Gel 15 Gram(s) Oral once PRN Blood Glucose LESS THAN 70 milliGRAM(s)/deciliter  melatonin 3 milliGRAM(s) Oral at bedtime PRN Insomnia      Allergies    No Known Allergies    Intolerances        Vital Signs Last 24 Hrs  T(C): 36.6 (21 Nov 2024 10:48), Max: 37.7 (20 Nov 2024 18:17)  T(F): 97.8 (21 Nov 2024 10:48), Max: 99.9 (20 Nov 2024 18:17)  HR: 83 (21 Nov 2024 10:48) (82 - 97)  BP: 93/56 (21 Nov 2024 10:48) (93/56 - 155/80)  BP(mean): --  RR: 18 (21 Nov 2024 10:48) (16 - 18)  SpO2: 98% (21 Nov 2024 10:48) (91% - 99%)    Parameters below as of 21 Nov 2024 10:48  Patient On (Oxygen Delivery Method): room air        LABS:                        6.4    9.85  )-----------( 194      ( 21 Nov 2024 08:01 )             19.7     21 Nov 2024 08:01    136    |  104    |  76     ----------------------------<  213    4.5     |  17     |  3.15     Ca    8.8        21 Nov 2024 08:01    TPro  6.5    /  Alb  2.5    /  TBili  0.2    /  DBili  x      /  AST  34     /  ALT  41     /  AlkPhos  120    21 Nov 2024 08:01    PT/INR - ( 21 Nov 2024 08:01 )   PT: 13.0 sec;   INR: 1.10 ratio         PTT - ( 21 Nov 2024 08:01 )  PTT:30.5 sec  Urinalysis Basic - ( 21 Nov 2024 08:01 )    Color: x / Appearance: x / SG: x / pH: x  Gluc: 213 mg/dL / Ketone: x  / Bili: x / Urobili: x   Blood: x / Protein: x / Nitrite: x   Leuk Esterase: x / RBC: x / WBC x   Sq Epi: x / Non Sq Epi: x / Bacteria: x      CAPILLARY BLOOD GLUCOSE      POCT Blood Glucose.: 229 mg/dL (21 Nov 2024 11:58)  POCT Blood Glucose.: 268 mg/dL (21 Nov 2024 07:58)  POCT Blood Glucose.: 301 mg/dL (20 Nov 2024 22:49)  POCT Blood Glucose.: 239 mg/dL (20 Nov 2024 12:22)

## 2024-11-21 NOTE — PROGRESS NOTE ADULT - SUBJECTIVE AND OBJECTIVE BOX
PROGRESS NOTE   Patient is a 49y old  Male who presents with a chief complaint of Other injury of unspecified body region, initial encounter     (21 Nov 2024 14:00)      HPI:  49-year-old male with history of diabetes mellitus, hypertension, hyperlipidemia, neuropathy, peripheral vascular disease, chronic kidney disease, and EtOH abuser brought in by ambulance for bleeding from left foot that occurred prior to arrival.  Patient has a history of diabetic foot ulcer with cellulitis.  Was diagnosed with acute on chronic osteomyelitis of left foot.  On November 12 had amputation of toe on left foot by Dr. Davenport.  Patient had bleeding from foot on November 17 and a rapid response team was called.  Was taken to the OR and homeostasis was obtained.  Was transfused 1 unit at that time.  Patient has not had any bleeding since then and was discharged this morning.  Patient states he was walking in the driveway having a verbal confrontation with his landlord.  Believes he stepped on his forefoot and started to bleed.  EMS was called.  States on arrival to emergency room bleeding has now stopped.  Denies other pain or complaints    In ED:  Vitals- T 98.7  HR 97  BP  104/58  RR 18 O2S- 95%  Labs- Denied blood work in ED; agreeable for AM labs; Nov 20th AM labs prior to DC->  Hgb 8.1 Hct 25.4  MCV 87.6  Cr 2.87  BUN 74  eGFR 26  Consults made in ED-> Podiatry; recs appreciated      (20 Nov 2024 23:00)      Vital Signs Last 24 Hrs  T(C): 36.6 (21 Nov 2024 10:48), Max: 37.7 (20 Nov 2024 18:17)  T(F): 97.8 (21 Nov 2024 10:48), Max: 99.9 (20 Nov 2024 18:17)  HR: 83 (21 Nov 2024 10:48) (82 - 97)  BP: 93/56 (21 Nov 2024 10:48) (93/56 - 155/80)  BP(mean): --  RR: 18 (21 Nov 2024 10:48) (16 - 18)  SpO2: 98% (21 Nov 2024 10:48) (91% - 99%)    Parameters below as of 21 Nov 2024 10:48  Patient On (Oxygen Delivery Method): room air                              6.4    9.85  )-----------( 194      ( 21 Nov 2024 08:01 )             19.7               11-21    136  |  104  |  76[H]  ----------------------------<  213[H]  4.5   |  17[L]  |  3.15[H]    Ca    8.8      21 Nov 2024 08:01    TPro  6.5  /  Alb  2.5[L]  /  TBili  0.2  /  DBili  x   /  AST  34  /  ALT  41  /  AlkPhos  120  11-21        LOWER EXTREMITY PHYSICAL EXAM:    left foot s/p surgical Debridement of all non-viable soft tissue and bone, debridement of deep space infection of left foot with left 1st partial ray resection and packing all left foot. no  active bleeding noted .  The sutures are intact and the flap is viable at this time.  There is decreased erythema, edema and calor noted. Patient has guarded prognosis . Will cont to allow any further demarcation to occur and will closely monitor.    Sterile dressing change performed today with application of packing under sterile technique.   Vascular: Dorsalis Pedis and Posterior Tibial pulses palpable 2/4 bilaterally.  Capillary re-fill time less then 3 seconds digits 1-5 bilateral.  Mild-moderate edema bilaterally up to the ankle left foot   Neuro: Protective sensation diminished to the level of the digits bilateral.   MSK: Muscle strength 5/5 all major muscle groups bilateral.

## 2024-11-21 NOTE — PROGRESS NOTE ADULT - SUBJECTIVE AND OBJECTIVE BOX
JOBSUZANNA is a 49yMale , patient examined and chart reviewed.     INTERVAL HPI/ OVERNIGHT EVENTS:  Events noted- Dc'd home yesterday afternoon and stubbed toe on object at home and started bleeding and got readmitted.  Seen by podiatry last night - surgical site repacked. Drop in H/H noted. Sp transfusion.  Afebrile.    PAST MEDICAL & SURGICAL HISTORY:  Prediabetes  HTN (hypertension)  HLD (hyperlipidemia)  DM2 (diabetes mellitus, type 2)    For details regarding the patient's social history, family history, and other miscellaneous elements, please refer the initial infectious diseases consultation and/or the admitting history and physical examination for this admission.    ROS:  CONSTITUTIONAL:  Negative fever or chills  EYES:  Negative  blurry vision or double vision  CARDIOVASCULAR:  Negative for chest pain or palpitations  RESPIRATORY:  Negative for cough, wheezing, or SOB   GASTROINTESTINAL:  Negative for nausea, vomiting, diarrhea, constipation, or abdominal pain  GENITOURINARY:  Negative frequency, urgency or dysuria  NEUROLOGIC:  No headache, confusion, dizziness, lightheadedness  All other systems were reviewed and are negative     No Known Allergies      Current inpatient medications :    ANTIBIOTICS/RELEVANT:  doxycycline monohydrate Capsule 100 milliGRAM(s) Oral every 12 hours    MEDICATIONS  (STANDING):  acetaminophen     Tablet .. 650 milliGRAM(s) Oral every 6 hours  amLODIPine   Tablet 10 milliGRAM(s) Oral daily  carvedilol 6.25 milliGRAM(s) Oral every 12 hours  dextrose 5%. 1000 milliLiter(s) (50 mL/Hr) IV Continuous <Continuous>  dextrose 5%. 1000 milliLiter(s) (100 mL/Hr) IV Continuous <Continuous>  dextrose 50% Injectable 25 Gram(s) IV Push once  dextrose 50% Injectable 12.5 Gram(s) IV Push once  dextrose 50% Injectable 25 Gram(s) IV Push once  ferrous    sulfate 325 milliGRAM(s) Oral daily  gabapentin 100 milliGRAM(s) Oral three times a day  glucagon  Injectable 1 milliGRAM(s) IntraMuscular once  insulin glargine Injectable (LANTUS) 40 Unit(s) SubCutaneous at bedtime  insulin lispro (ADMELOG) corrective regimen sliding scale   SubCutaneous three times a day before meals  insulin lispro (ADMELOG) corrective regimen sliding scale   SubCutaneous at bedtime  insulin lispro Injectable (ADMELOG) 10 Unit(s) SubCutaneous three times a day before meals  lactobacillus acidophilus 1 Tablet(s) Oral daily  thiamine 100 milliGRAM(s) Oral daily    MEDICATIONS  (PRN):  acetaminophen     Tablet .. 650 milliGRAM(s) Oral every 6 hours PRN Temp greater or equal to 38C (100.4F), Mild Pain (1 - 3)  dextrose Oral Gel 15 Gram(s) Oral once PRN Blood Glucose LESS THAN 70 milliGRAM(s)/deciliter  melatonin 3 milliGRAM(s) Oral at bedtime PRN Insomnia        Objective:  Vital Signs Last 24 Hrs  T(C): 36.6 (21 Nov 2024 10:48), Max: 37.7 (20 Nov 2024 18:17)  T(F): 97.8 (21 Nov 2024 10:48), Max: 99.9 (20 Nov 2024 18:17)  HR: 83 (21 Nov 2024 10:48) (82 - 97)  BP: 93/56 (21 Nov 2024 10:48) (93/56 - 155/80)  RR: 18 (21 Nov 2024 10:48) (16 - 18)  SpO2: 98% (21 Nov 2024 10:48) (91% - 99%)    Parameters below as of 21 Nov 2024 10:48  Patient On (Oxygen Delivery Method): room air      Physical Exam:  General: no acute distress  Neck: supple, trachea midline  Lungs: clear, no wheeze/rhonchi  Cardiovascular: regular rate and rhythm, S1 S2  Abdomen: soft, nontender,  bowel sounds normal  Neurological: alert and oriented x3  Skin: no rash  Extremities: Left foot drsg c/d/i      LABS:                        6.4    9.85  )-----------( 194      ( 21 Nov 2024 08:01 )             19.7   11-21    136  |  104  |  76[H]  ----------------------------<  213[H]  4.5   |  17[L]  |  3.15[H]    Ca    8.8      21 Nov 2024 08:01    TPro  6.5  /  Alb  2.5[L]  /  TBili  0.2  /  DBili  x   /  AST  34  /  ALT  41  /  AlkPhos  120  11-21      MICROBIOLOGY:  Culture - Tissue with Gram Stain (11.12.24 @ 18:21    -  Trimethoprim/Sulfamethoxazole: R >2/38   -  Vancomycin: S 1   -  Clindamycin: R >4   -  Erythromycin: R >4   -  Gentamicin: S <=4 Should not be used as monotherapy   -  Oxacillin: R >2   -  Penicillin: R >2   -  Rifampin: S <=1 Should not be used as monotherapy   -  Tetracycline: S <=4   Gram Stain:   No polymorphonuclear leukocytes seen per low power field  No organisms seen per oil power field   Specimen Source: Bone   Culture Results:   Rare Staphylococcus epidermidis  Few Corynebacterium resistens "Susceptibilities not performed"   Organism Identification: Staphylococcus epidermidis   Organism: Staphylococcus epidermidis   Method Type: STEPHANIE    Culture - Tissue with Gram Stain (collected 12 Nov 2024 18:21)  Source: Tissue  Gram Stain (14 Nov 2024 14:21):    No polymorphonuclear leukocytes seen per low power field    No organisms seen per oil power field  Preliminary Report (14 Nov 2024 14:21):    Rare Staphylococcus simulans    Culture - Wound Aerobic (collected 12 Nov 2024 18:21)  Source: .Other  Preliminary Report (14 Nov 2024 13:25):    Rare Staphylococcus simulans "Susceptibilities not performed"    Culture - Wound Aerobic/Anaerobic (collected 12 Nov 2024 18:21)  Source: .Surgical Swab  Preliminary Report (14 Nov 2024 13:07):    Commensal nicholas consistent with body site    Culture - Blood (collected 01 Nov 2024 20:00)  Source: .Blood BLOOD  Preliminary Report (04 Nov 2024 01:02):    No growth at 48 Hours    Culture - Blood (collected 01 Nov 2024 20:00)  Source: .Blood BLOOD  Preliminary Report (04 Nov 2024 01:02):    No growth at 48 Hours    RADIOLOGY & ADDITIONAL STUDIES:      Assessment :  49-year-old male with  DM2, HTN, HLD, NEUROPATHY, PVD,ckd4, ETOH abuse, Diabetic foot ulcer with cellullitis ,ac on ch OM 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  He has been on amoxicillin twice daily and states he has been compliant with his antibiotic.  Patient noted that his surgical site had developed a bad odor with some discharge.  Left foot with exposed metatarsal head.    Sp Amputation, foot, first ray Debulking, flap, foot 12-Nov-2024  OR cultures with CNS  Bleeding at surgical site resolved  Path at clean margins with mild chronic OM  Events noted 11/17. Went back to OR as developed profuse bleeding from surgical wound.  No further bleed   LUISA CKD better  Anemia sp transfusion  11/12 Events noted- Went home yesterday afternoon and stubbed toe on object at home and started bleeding and got readmitted.  Seen by podiatry last night - surgical site repacked. Drop in H/H noted. Sp transfusion. LUISA on CKD    Plan :   Cont po doxycycline 100mg bid x 14 days till 12/3  Trend temps and cbc, renal fx  Podiatry following  Stable from ID standpoint      Continue with present regiment.  Appropriate use of antibiotics and adverse effects reviewed.      > 35 minutes were spent in direct patient care reviewing notes, medications ,labs data/ imaging , discussion with multidisciplinary team.    Thank you for allowing me to participate in care of your patient .    Linsey Mack MD  Infectious Disease  [phone]

## 2024-11-21 NOTE — DIETITIAN INITIAL EVALUATION ADULT - PERTINENT MEDS FT
MEDICATIONS  (STANDING):  acetaminophen     Tablet .. 650 milliGRAM(s) Oral every 6 hours  amLODIPine   Tablet 10 milliGRAM(s) Oral daily  carvedilol 6.25 milliGRAM(s) Oral every 12 hours  dextrose 5%. 1000 milliLiter(s) (50 mL/Hr) IV Continuous <Continuous>  dextrose 5%. 1000 milliLiter(s) (100 mL/Hr) IV Continuous <Continuous>  dextrose 50% Injectable 25 Gram(s) IV Push once  dextrose 50% Injectable 12.5 Gram(s) IV Push once  dextrose 50% Injectable 25 Gram(s) IV Push once  doxycycline monohydrate Capsule 100 milliGRAM(s) Oral every 12 hours  ferrous    sulfate 325 milliGRAM(s) Oral daily  gabapentin 100 milliGRAM(s) Oral three times a day  glucagon  Injectable 1 milliGRAM(s) IntraMuscular once  insulin glargine Injectable (LANTUS) 40 Unit(s) SubCutaneous at bedtime  insulin lispro (ADMELOG) corrective regimen sliding scale   SubCutaneous three times a day before meals  insulin lispro (ADMELOG) corrective regimen sliding scale   SubCutaneous at bedtime  insulin lispro Injectable (ADMELOG) 10 Unit(s) SubCutaneous three times a day before meals  lactobacillus acidophilus 1 Tablet(s) Oral daily  thiamine 100 milliGRAM(s) Oral daily    MEDICATIONS  (PRN):  acetaminophen     Tablet .. 650 milliGRAM(s) Oral every 6 hours PRN Temp greater or equal to 38C (100.4F), Mild Pain (1 - 3)  dextrose Oral Gel 15 Gram(s) Oral once PRN Blood Glucose LESS THAN 70 milliGRAM(s)/deciliter  melatonin 3 milliGRAM(s) Oral at bedtime PRN Insomnia

## 2024-11-22 LAB
ANION GAP SERPL CALC-SCNC: 14 MMOL/L — SIGNIFICANT CHANGE UP (ref 5–17)
BILIRUB SERPL-MCNC: 0.4 MG/DL — SIGNIFICANT CHANGE UP (ref 0.2–1.2)
BUN SERPL-MCNC: 86 MG/DL — HIGH (ref 7–23)
CALCIUM SERPL-MCNC: 9 MG/DL — SIGNIFICANT CHANGE UP (ref 8.4–10.5)
CHLORIDE SERPL-SCNC: 107 MMOL/L — SIGNIFICANT CHANGE UP (ref 96–108)
CO2 SERPL-SCNC: 19 MMOL/L — LOW (ref 22–31)
CREAT SERPL-MCNC: 2.83 MG/DL — HIGH (ref 0.5–1.3)
EGFR: 26 ML/MIN/1.73M2 — LOW
GLUCOSE BLDC GLUCOMTR-MCNC: 160 MG/DL — HIGH (ref 70–99)
GLUCOSE BLDC GLUCOMTR-MCNC: 260 MG/DL — HIGH (ref 70–99)
GLUCOSE BLDC GLUCOMTR-MCNC: 285 MG/DL — HIGH (ref 70–99)
GLUCOSE BLDC GLUCOMTR-MCNC: 292 MG/DL — HIGH (ref 70–99)
GLUCOSE SERPL-MCNC: 138 MG/DL — HIGH (ref 70–99)
HCT VFR BLD CALC: 22.5 % — LOW (ref 39–50)
HGB BLD-MCNC: 7.4 G/DL — LOW (ref 13–17)
INR BLD: 1.15 RATIO — SIGNIFICANT CHANGE UP (ref 0.85–1.16)
MAGNESIUM SERPL-MCNC: 1.8 MG/DL — SIGNIFICANT CHANGE UP (ref 1.6–2.6)
MCHC RBC-ENTMCNC: 28.2 PG — SIGNIFICANT CHANGE UP (ref 27–34)
MCHC RBC-ENTMCNC: 32.9 G/DL — SIGNIFICANT CHANGE UP (ref 32–36)
MCV RBC AUTO: 85.9 FL — SIGNIFICANT CHANGE UP (ref 80–100)
MELD SCORE WITH DIALYSIS: 21 POINTS — SIGNIFICANT CHANGE UP
MELD SCORE WITHOUT DIALYSIS: 18 POINTS — SIGNIFICANT CHANGE UP
NRBC # BLD: 0 /100 WBCS — SIGNIFICANT CHANGE UP (ref 0–0)
PLATELET # BLD AUTO: 204 K/UL — SIGNIFICANT CHANGE UP (ref 150–400)
POTASSIUM SERPL-MCNC: 4.5 MMOL/L — SIGNIFICANT CHANGE UP (ref 3.5–5.3)
POTASSIUM SERPL-SCNC: 4.5 MMOL/L — SIGNIFICANT CHANGE UP (ref 3.5–5.3)
PROTHROM AB SERPL-ACNC: 13.3 SEC — SIGNIFICANT CHANGE UP (ref 9.9–13.4)
RBC # BLD: 2.62 M/UL — LOW (ref 4.2–5.8)
RBC # FLD: 13.2 % — SIGNIFICANT CHANGE UP (ref 10.3–14.5)
SODIUM SERPL-SCNC: 140 MMOL/L — SIGNIFICANT CHANGE UP (ref 135–145)
VWF:RCO ACT/NOR PPP PL AGG: 216 % — HIGH (ref 45–133)
WBC # BLD: 9.19 K/UL — SIGNIFICANT CHANGE UP (ref 3.8–10.5)
WBC # FLD AUTO: 9.19 K/UL — SIGNIFICANT CHANGE UP (ref 3.8–10.5)

## 2024-11-22 RX ORDER — DIPHENHYDRAMINE HCL 25 MG
25 CAPSULE ORAL ONCE
Refills: 0 | Status: COMPLETED | OUTPATIENT
Start: 2024-11-22 | End: 2024-11-22

## 2024-11-22 RX ADMIN — GABAPENTIN 100 MILLIGRAM(S): 300 CAPSULE ORAL at 13:36

## 2024-11-22 RX ADMIN — DOXYCYCLINE HYCLATE 100 MILLIGRAM(S): 150 TABLET, COATED ORAL at 17:46

## 2024-11-22 RX ADMIN — Medication 1 TABLET(S): at 12:19

## 2024-11-22 RX ADMIN — Medication 1: at 21:30

## 2024-11-22 RX ADMIN — CARVEDILOL 6.25 MILLIGRAM(S): 25 TABLET, FILM COATED ORAL at 05:12

## 2024-11-22 RX ADMIN — ACETAMINOPHEN, DIPHENHYDRAMINE HCL, PHENYLEPHRINE HCL 3 MILLIGRAM(S): 325; 25; 5 TABLET ORAL at 21:29

## 2024-11-22 RX ADMIN — Medication 10 UNIT(S): at 12:15

## 2024-11-22 RX ADMIN — Medication 3: at 12:15

## 2024-11-22 RX ADMIN — CARVEDILOL 6.25 MILLIGRAM(S): 25 TABLET, FILM COATED ORAL at 17:46

## 2024-11-22 RX ADMIN — IRON SUCROSE 210 MILLIGRAM(S): 20 INJECTION, SOLUTION INTRAVENOUS at 05:12

## 2024-11-22 RX ADMIN — DOXYCYCLINE HYCLATE 100 MILLIGRAM(S): 150 TABLET, COATED ORAL at 05:12

## 2024-11-22 RX ADMIN — Medication 325 MILLIGRAM(S): at 12:19

## 2024-11-22 RX ADMIN — INSULIN GLARGINE 40 UNIT(S): 100 INJECTION, SOLUTION SUBCUTANEOUS at 21:29

## 2024-11-22 RX ADMIN — Medication 1: at 08:19

## 2024-11-22 RX ADMIN — Medication 3: at 17:42

## 2024-11-22 RX ADMIN — AMLODIPINE BESYLATE 10 MILLIGRAM(S): 10 TABLET ORAL at 05:12

## 2024-11-22 RX ADMIN — GABAPENTIN 100 MILLIGRAM(S): 300 CAPSULE ORAL at 21:29

## 2024-11-22 RX ADMIN — Medication 100 MILLIGRAM(S): at 12:19

## 2024-11-22 RX ADMIN — Medication 10 UNIT(S): at 17:43

## 2024-11-22 RX ADMIN — Medication 10 UNIT(S): at 08:19

## 2024-11-22 RX ADMIN — Medication 25 MILLIGRAM(S): at 21:58

## 2024-11-22 RX ADMIN — GABAPENTIN 100 MILLIGRAM(S): 300 CAPSULE ORAL at 05:12

## 2024-11-22 NOTE — PROGRESS NOTE ADULT - PROBLEM SELECTOR PLAN 2
likely in setting of components of CKD, Chronic Alcohol abuse  patient had another episode of bleeding from L toe after being discharged, likely in setting of placing pressure on foot, and exacerbating wound causing bleeding  Consult Hematology, will consider DDAVP  1u pRBC transfused, will monitor and transfuse to keep Hgb>7

## 2024-11-22 NOTE — PATIENT PROFILE ADULT - NSPROGENBLOODRESTRICT_GEN_A_NUR
How Severe Are Your Spot(S)?: mild
Have Your Spot(S) Been Treated In The Past?: has not been treated
Hpi Title: Evaluation of Skin Lesions
Location: Right medial superior chest
Year Removed: 2010
none

## 2024-11-22 NOTE — PROGRESS NOTE ADULT - PROBLEM SELECTOR PLAN 4
A1c 8.5%  continue Lantus 40 units (home dose 45)  10 units insulin lispro TID (on home 15)  insulin sliding scale

## 2024-11-22 NOTE — PATIENT PROFILE ADULT - FALL HARM RISK - UNIVERSAL INTERVENTIONS
Bed in lowest position, wheels locked, appropriate side rails in place/Call bell, personal items and telephone in reach/Instruct patient to call for assistance before getting out of bed or chair/Non-slip footwear when patient is out of bed/[place] to call system/Physically safe environment - no spills, clutter or unnecessary equipment/Purposeful Proactive Rounding/Room/bathroom lighting operational, light cord in reach

## 2024-11-22 NOTE — PROGRESS NOTE ADULT - PROBLEM SELECTOR PLAN 1
ID reconsulted; Dr. Mack  continue doxycycline 100 mg BID to complete 14 day course, stop date 12/3  nonweight bearing of L foot, ambulate with crutches, PT consult  podiatry following, may need surgery

## 2024-11-22 NOTE — CASE MANAGEMENT PROGRESS NOTE - NSCMPROGRESSNOTE_GEN_ALL_CORE
RN/CM noted pt's case discussed during Interdisciplinary rounds, pt remains acute. H&H 7.4/22.5- s/p 1 unit PRBC. IV Venofer X 3 days.  Pt is s/p 11/12/2024 surgical debridement of all non-viable soft tissue and bone left foot with partial 1st ray resection and packing all left foot; s/p 11/17/2024 surgical exploration of left foot for active bleed. Upon pt's request last adm, MSW has facilitated a NEW community PCP appt with DR Emmanuel Ngo on 12/09/2024 @ 2:30 PM- pt made aware. CM remains available and continues to follow case.

## 2024-11-22 NOTE — PROGRESS NOTE ADULT - SUBJECTIVE AND OBJECTIVE BOX
NEPHROLOGY PROGRESS NOTE    CHIEF COMPLAINT:  CKD    HPI:  Renal function a little better after PRBC.  No further bleeding from foot.  BP is well controlled.    EXAM:  Vital Signs Last 24 Hrs  T(C): 37.3 (22 Nov 2024 14:00), Max: 37.3 (22 Nov 2024 14:00)  T(F): 99.2 (22 Nov 2024 14:00), Max: 99.2 (22 Nov 2024 14:00)  HR: 87 (22 Nov 2024 14:00) (87 - 100)  BP: 127/77 (22 Nov 2024 14:00) (127/77 - 161/86)  BP(mean): --  RR: 18 (22 Nov 2024 14:00) (18 - 18)  SpO2: 96% (22 Nov 2024 14:00) (93% - 97%)    Parameters below as of 22 Nov 2024 14:00  Patient On (Oxygen Delivery Method): room air      I&O's Summary    21 Nov 2024 07:01  -  22 Nov 2024 07:00  --------------------------------------------------------  IN: 420 mL / OUT: 200 mL / NET: 220 mL        Conversant, in no apparent distress  Normal respiratory effort, lungs clear bilaterally  Heart RRR with no murmur, no peripheral edema    LABS                        7.4    9.19  )-----------( 204      ( 22 Nov 2024 08:11 )             22.5     11-22    140  |  107  |  86[H]  ----------------------------<  138[H]  4.5   |  19[L]  |  2.83[H]    Ca    9.0      22 Nov 2024 08:11  Mg     1.8     11-22    TPro  x   /  Alb  x   /  TBili  0.4  /  DBili  x   /  AST  x   /  ALT  x   /  AlkPhos  x   11-22      Impression:  LUISA 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  Stabilized  Diabetes with chronic foot wound  Hypertension, improved.  Blood loss anemia    Recommend:  Cont to hold ARB  Daily BMP while inpatient

## 2024-11-22 NOTE — PROGRESS NOTE ADULT - SUBJECTIVE AND OBJECTIVE BOX
Patient is a 49y old  Male who presents with a chief complaint of bleeding L toe (21 Nov 2024 15:24)      INTERVAL HPI/OVERNIGHT EVENTS:overnight events noted    Home Medications:  acetaminophen 325 mg oral tablet: 2 tab(s) orally every 6 hours As needed Temp greater or equal to 38C (100.4F), Mild Pain (1 - 3), Moderate Pain (4 - 6) (20 Nov 2024 21:59)      MEDICATIONS  (STANDING):  acetaminophen     Tablet .. 650 milliGRAM(s) Oral every 6 hours  amLODIPine   Tablet 10 milliGRAM(s) Oral daily  carvedilol 6.25 milliGRAM(s) Oral every 12 hours  dextrose 5%. 1000 milliLiter(s) (50 mL/Hr) IV Continuous <Continuous>  dextrose 5%. 1000 milliLiter(s) (100 mL/Hr) IV Continuous <Continuous>  dextrose 50% Injectable 25 Gram(s) IV Push once  dextrose 50% Injectable 12.5 Gram(s) IV Push once  dextrose 50% Injectable 25 Gram(s) IV Push once  doxycycline monohydrate Capsule 100 milliGRAM(s) Oral every 12 hours  ferrous    sulfate 325 milliGRAM(s) Oral daily  gabapentin 100 milliGRAM(s) Oral three times a day  glucagon  Injectable 1 milliGRAM(s) IntraMuscular once  insulin glargine Injectable (LANTUS) 40 Unit(s) SubCutaneous at bedtime  insulin lispro (ADMELOG) corrective regimen sliding scale   SubCutaneous three times a day before meals  insulin lispro (ADMELOG) corrective regimen sliding scale   SubCutaneous at bedtime  insulin lispro Injectable (ADMELOG) 10 Unit(s) SubCutaneous three times a day before meals  iron sucrose IVPB 100 milliGRAM(s) IV Intermittent every 24 hours  lactobacillus acidophilus 1 Tablet(s) Oral daily  thiamine 100 milliGRAM(s) Oral daily    MEDICATIONS  (PRN):  acetaminophen     Tablet .. 650 milliGRAM(s) Oral every 6 hours PRN Temp greater or equal to 38C (100.4F), Mild Pain (1 - 3)  dextrose Oral Gel 15 Gram(s) Oral once PRN Blood Glucose LESS THAN 70 milliGRAM(s)/deciliter  melatonin 3 milliGRAM(s) Oral at bedtime PRN Insomnia      Allergies    No Known Allergies    Intolerances        REVIEW OF SYSTEMS:  CONSTITUTIONAL: No fever, weight loss, or fatigue  EYES: No eye pain, visual disturbances, or discharge  ENMT:  No difficulty hearing, tinnitus, vertigo; No sinus or throat pain  NECK: No pain or stiffness  BREASTS: No pain, masses, or nipple discharge  RESPIRATORY: No cough, wheezing, chills or hemoptysis; No shortness of breath  CARDIOVASCULAR: No chest pain, palpitations, dizziness, or leg swelling  GASTROINTESTINAL: No abdominal or epigastric pain. No nausea, vomiting, or hematemesis; No diarrhea or constipation. No melena or hematochezia.  GENITOURINARY: No dysuria, frequency, hematuria, or incontinence  NEUROLOGICAL: No headaches, memory loss, loss of strength, numbness, or tremors  SKIN: No itching, burning, rashes, or lesions     Vital Signs Last 24 Hrs  T(C): 36.8 (22 Nov 2024 04:55), Max: 36.8 (21 Nov 2024 10:17)  T(F): 98.2 (22 Nov 2024 04:55), Max: 98.3 (21 Nov 2024 17:46)  HR: 90 (22 Nov 2024 04:55) (82 - 100)  BP: 161/86 (22 Nov 2024 04:55) (93/56 - 161/86)  BP(mean): --  RR: 18 (22 Nov 2024 04:55) (16 - 18)  SpO2: 93% (22 Nov 2024 04:55) (91% - 98%)    Parameters below as of 22 Nov 2024 04:55  Patient On (Oxygen Delivery Method): room air        PHYSICAL EXAM:  GENERAL: NAD, well-groomed, well-developed  HEAD:  Atraumatic, Normocephalic  EYES: EOMI, PERRLA, conjunctiva and sclera clear  ENMT: Moist mucous membranes,   NECK: Supple, No JVD, Normal thyroid  NERVOUS SYSTEM:  Alert & Oriented X3,non focal  CHEST/LUNG: Clear to percussion bilaterally;   HEART: Regular rate and rhythm; No murmurs, rubs, or gallops  ABDOMEN: Soft, Nontender, Nondistended; Bowel sounds present  EXTREMITIES:  2+ Peripheral Pulses, No clubbing, cyanosis, or edema, left foot in dressing  SKIN: No rashes or lesions    LABS:                        7.4    9.19  )-----------( 204      ( 22 Nov 2024 08:11 )             22.5     11-22    140  |  107  |  86[H]  ----------------------------<  138[H]  4.5   |  19[L]  |  2.83[H]    Ca    9.0      22 Nov 2024 08:11  Mg     1.8     11-22    TPro  x   /  Alb  x   /  TBili  0.4  /  DBili  x   /  AST  x   /  ALT  x   /  AlkPhos  x   11-22    PT/INR - ( 22 Nov 2024 08:11 )   PT: 13.3 sec;   INR: 1.15 ratio         PTT - ( 21 Nov 2024 08:01 )  PTT:30.5 sec  Urinalysis Basic - ( 22 Nov 2024 08:11 )    Color: x / Appearance: x / SG: x / pH: x  Gluc: 138 mg/dL / Ketone: x  / Bili: x / Urobili: x   Blood: x / Protein: x / Nitrite: x   Leuk Esterase: x / RBC: x / WBC x   Sq Epi: x / Non Sq Epi: x / Bacteria: x      CAPILLARY BLOOD GLUCOSE      POCT Blood Glucose.: 160 mg/dL (22 Nov 2024 07:51)  POCT Blood Glucose.: 263 mg/dL (21 Nov 2024 21:02)  POCT Blood Glucose.: 236 mg/dL (21 Nov 2024 16:26)  POCT Blood Glucose.: 229 mg/dL (21 Nov 2024 11:58)          I&O's Summary    21 Nov 2024 07:01  -  22 Nov 2024 07:00  --------------------------------------------------------  IN: 420 mL / OUT: 200 mL / NET: 220 mL        RADIOLOGY & ADDITIONAL TESTS:    Imaging Personally Reviewed:  [ x YES  [ ] NO    Consultant(s) Notes Reviewed:  [x ] YES  [ ] NO    Care Discussed with Consultants/Other Providers x[ ] YES  [ ] NO

## 2024-11-22 NOTE — PROGRESS NOTE ADULT - SUBJECTIVE AND OBJECTIVE BOX
PROGRESS NOTE   Patient is a 49y old  Male who presents with a chief complaint of bleeding L toe (22 Nov 2024 11:57)      HPI:  49-year-old male with history of diabetes mellitus, hypertension, hyperlipidemia, neuropathy, peripheral vascular disease, chronic kidney disease, and EtOH abuser brought in by ambulance for bleeding from left foot that occurred prior to arrival.  Patient has a history of diabetic foot ulcer with cellulitis.  Was diagnosed with acute on chronic osteomyelitis of left foot.  On November 12 had amputation of toe on left foot by Dr. Davenport.  Patient had bleeding from foot on November 17 and a rapid response team was called.  Was taken to the OR and homeostasis was obtained.  Was transfused 1 unit at that time.  Patient has not had any bleeding since then and was discharged this morning.  Patient states he was walking in the driveway having a verbal confrontation with his landlord.  Believes he stepped on his forefoot and started to bleed.  EMS was called.  States on arrival to emergency room bleeding has now stopped.  Denies other pain or complaints    In ED:  Vitals- T 98.7  HR 97  BP  104/58  RR 18 O2S- 95%  Labs- Denied blood work in ED; agreeable for AM labs; Nov 20th AM labs prior to DC->  Hgb 8.1 Hct 25.4  MCV 87.6  Cr 2.87  BUN 74  eGFR 26  Consults made in ED-> Podiatry; recs appreciated      (20 Nov 2024 23:00)      Vital Signs Last 24 Hrs  T(C): 37.3 (22 Nov 2024 14:00), Max: 37.3 (22 Nov 2024 14:00)  T(F): 99.2 (22 Nov 2024 14:00), Max: 99.2 (22 Nov 2024 14:00)  HR: 87 (22 Nov 2024 14:00) (87 - 100)  BP: 127/77 (22 Nov 2024 14:00) (127/77 - 161/86)  BP(mean): --  RR: 18 (22 Nov 2024 14:00) (18 - 18)  SpO2: 96% (22 Nov 2024 14:00) (93% - 97%)    Parameters below as of 22 Nov 2024 14:00  Patient On (Oxygen Delivery Method): room air                              7.4    9.19  )-----------( 204      ( 22 Nov 2024 08:11 )             22.5               11-22    140  |  107  |  86[H]  ----------------------------<  138[H]  4.5   |  19[L]  |  2.83[H]    Ca    9.0      22 Nov 2024 08:11  Mg     1.8     11-22    TPro  x   /  Alb  x   /  TBili  0.4  /  DBili  x   /  AST  x   /  ALT  x   /  AlkPhos  x   11-22      LOWER EXTREMITY PHYSICAL EXAM:    left foot s/p surgical Debridement of all non-viable soft tissue and bone, debridement of deep space infection of left foot with left 1st partial ray resection and packing all left foot. no  active bleeding noted .  The sutures are intact and the flap is viable at this time.  There is decreased erythema, edema and calor noted. Patient has guarded prognosis . Will cont to allow any further demarcation to occur and will closely monitor.    Sterile dressing change performed today with application of packing under sterile technique.   Vascular: Dorsalis Pedis and Posterior Tibial pulses palpable 2/4 bilaterally.  Capillary re-fill time less then 3 seconds digits 1-5 bilateral.  Mild-moderate edema bilaterally up to the ankle left foot   Neuro: Protective sensation diminished to the level of the digits bilateral.   MSK: Muscle strength 5/5 all major muscle groups bilateral.

## 2024-11-22 NOTE — PROGRESS NOTE ADULT - SUBJECTIVE AND OBJECTIVE BOX
JOBSUZANNA is a 49yMale , patient examined and chart reviewed.     INTERVAL HPI/ OVERNIGHT EVENTS:  Afebrile no events.    PAST MEDICAL & SURGICAL HISTORY:  Prediabetes  HTN (hypertension)  HLD (hyperlipidemia)  DM2 (diabetes mellitus, type 2)    For details regarding the patient's social history, family history, and other miscellaneous elements, please refer the initial infectious diseases consultation and/or the admitting history and physical examination for this admission.    ROS:  CONSTITUTIONAL:  Negative fever or chills  EYES:  Negative  blurry vision or double vision  CARDIOVASCULAR:  Negative for chest pain or palpitations  RESPIRATORY:  Negative for cough, wheezing, or SOB   GASTROINTESTINAL:  Negative for nausea, vomiting, diarrhea, constipation, or abdominal pain  GENITOURINARY:  Negative frequency, urgency or dysuria  NEUROLOGIC:  No headache, confusion, dizziness, lightheadedness  All other systems were reviewed and are negative     No Known Allergies      Current inpatient medications :    ANTIBIOTICS/RELEVANT:  doxycycline monohydrate Capsule 100 milliGRAM(s) Oral every 12 hours    MEDICATIONS  (STANDING):  acetaminophen     Tablet .. 650 milliGRAM(s) Oral every 6 hours  amLODIPine   Tablet 10 milliGRAM(s) Oral daily  carvedilol 6.25 milliGRAM(s) Oral every 12 hours  dextrose 5%. 1000 milliLiter(s) (50 mL/Hr) IV Continuous <Continuous>  dextrose 5%. 1000 milliLiter(s) (100 mL/Hr) IV Continuous <Continuous>  dextrose 50% Injectable 25 Gram(s) IV Push once  dextrose 50% Injectable 12.5 Gram(s) IV Push once  dextrose 50% Injectable 25 Gram(s) IV Push once  ferrous    sulfate 325 milliGRAM(s) Oral daily  gabapentin 100 milliGRAM(s) Oral three times a day  glucagon  Injectable 1 milliGRAM(s) IntraMuscular once  insulin glargine Injectable (LANTUS) 40 Unit(s) SubCutaneous at bedtime  insulin lispro (ADMELOG) corrective regimen sliding scale   SubCutaneous three times a day before meals  insulin lispro (ADMELOG) corrective regimen sliding scale   SubCutaneous at bedtime  insulin lispro Injectable (ADMELOG) 10 Unit(s) SubCutaneous three times a day before meals  iron sucrose IVPB 100 milliGRAM(s) IV Intermittent every 24 hours  lactobacillus acidophilus 1 Tablet(s) Oral daily  thiamine 100 milliGRAM(s) Oral daily    MEDICATIONS  (PRN):  acetaminophen     Tablet .. 650 milliGRAM(s) Oral every 6 hours PRN Temp greater or equal to 38C (100.4F), Mild Pain (1 - 3)  dextrose Oral Gel 15 Gram(s) Oral once PRN Blood Glucose LESS THAN 70 milliGRAM(s)/deciliter  melatonin 3 milliGRAM(s) Oral at bedtime PRN Insomnia      Objective:  Vital Signs Last 24 Hrs  T(C): 36.8 (22 Nov 2024 04:55), Max: 36.8 (21 Nov 2024 17:46)  T(F): 98.2 (22 Nov 2024 04:55), Max: 98.3 (21 Nov 2024 17:46)  HR: 90 (22 Nov 2024 04:55) (90 - 100)  BP: 161/86 (22 Nov 2024 04:55) (128/62 - 161/86)  RR: 18 (22 Nov 2024 04:55) (16 - 18)  SpO2: 93% (22 Nov 2024 04:55) (93% - 97%)    Parameters below as of 22 Nov 2024 04:55  Patient On (Oxygen Delivery Method): room air      Physical Exam:  General: no acute distress  Neck: supple, trachea midline  Lungs: clear, no wheeze/rhonchi  Cardiovascular: regular rate and rhythm, S1 S2  Abdomen: soft, nontender,  bowel sounds normal  Neurological: alert and oriented x3  Skin: no rash  Extremities: Left foot drsg c/d/i      LABS:                        7.4    9.19  )-----------( 204      ( 22 Nov 2024 08:11 )             22.5   11-22    140  |  107  |  86[H]  ----------------------------<  138[H]  4.5   |  19[L]  |  2.83[H]    Ca    9.0      22 Nov 2024 08:11  Mg     1.8     11-22    TPro  x   /  Alb  x   /  TBili  0.4  /  DBili  x   /  AST  x   /  ALT  x   /  AlkPhos  x   11-22    MICROBIOLOGY:  Culture - Tissue with Gram Stain (11.12.24 @ 18:21    -  Trimethoprim/Sulfamethoxazole: R >2/38   -  Vancomycin: S 1   -  Clindamycin: R >4   -  Erythromycin: R >4   -  Gentamicin: S <=4 Should not be used as monotherapy   -  Oxacillin: R >2   -  Penicillin: R >2   -  Rifampin: S <=1 Should not be used as monotherapy   -  Tetracycline: S <=4   Gram Stain:   No polymorphonuclear leukocytes seen per low power field  No organisms seen per oil power field   Specimen Source: Bone   Culture Results:   Rare Staphylococcus epidermidis  Few Corynebacterium resistens "Susceptibilities not performed"   Organism Identification: Staphylococcus epidermidis   Organism: Staphylococcus epidermidis   Method Type: STEPHANIE    Culture - Tissue with Gram Stain (collected 12 Nov 2024 18:21)  Source: Tissue  Gram Stain (14 Nov 2024 14:21):    No polymorphonuclear leukocytes seen per low power field    No organisms seen per oil power field  Preliminary Report (14 Nov 2024 14:21):    Rare Staphylococcus simulans    Culture - Wound Aerobic (collected 12 Nov 2024 18:21)  Source: .Other  Preliminary Report (14 Nov 2024 13:25):    Rare Staphylococcus simulans "Susceptibilities not performed"    Culture - Wound Aerobic/Anaerobic (collected 12 Nov 2024 18:21)  Source: .Surgical Swab  Preliminary Report (14 Nov 2024 13:07):    Commensal nicholas consistent with body site    Culture - Blood (collected 01 Nov 2024 20:00)  Source: .Blood BLOOD  Preliminary Report (04 Nov 2024 01:02):    No growth at 48 Hours    Culture - Blood (collected 01 Nov 2024 20:00)  Source: .Blood BLOOD  Preliminary Report (04 Nov 2024 01:02):    No growth at 48 Hours    RADIOLOGY & ADDITIONAL STUDIES:      Assessment :  49-year-old male with  DM2, HTN, HLD, NEUROPATHY, PVD,ckd4, ETOH abuse, Diabetic foot ulcer with cellullitis ,ac on ch OM 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  He has been on amoxicillin twice daily and states he has been compliant with his antibiotic.  Patient noted that his surgical site had developed a bad odor with some discharge.  Left foot with exposed metatarsal head.    Sp Amputation, foot, first ray Debulking, flap, foot 12-Nov-2024  OR cultures with CNS  Bleeding at surgical site resolved  Path at clean margins with mild chronic OM  Events noted 11/17. Went back to OR as developed profuse bleeding from surgical wound.  No further bleed   LUISA CKD better  Anemia sp transfusion  11/12 Events noted- Went home yesterday afternoon and stubbed toe on object at home and started bleeding and got readmitted.  Seen by podiatry  - surgical site repacked. Drop in H/H noted. Sp transfusion. LUISA on CKD  H/H stable  LUISA on CKD better    Plan :   Cont po doxycycline 100mg bid x 14 days till 12/3  Trend temps and cbc, renal fx  Podiatry following  Stable from ID standpoint      Continue with present regiment.  Appropriate use of antibiotics and adverse effects reviewed.      > 35 minutes were spent in direct patient care reviewing notes, medications ,labs data/ imaging , discussion with multidisciplinary team.    Thank you for allowing me to participate in care of your patient .    Linsey Mack MD  Infectious Disease  [phone]

## 2024-11-22 NOTE — PROGRESS NOTE ADULT - PROBLEM SELECTOR PLAN 1
Discussed diagnosis and treatment with patient.  Patient is s/p surgical debridement of all non-viable soft tissue and bone left foot with partial 1st ray resection and packing all left foot . DOS 11/12/2024   s/p surgical exploration of left foot for active bleed DOS 11/17/2024  Upon dressing change, no active bleeding was noted. Removed all packings today without any incident.  Appreciates Hematology and ID consults   Continue Abx and follow ID recommendations    Non weight bearing on left foot   Recs elevation on left lower extremities   Pt may require serial surgical debridements/amputation and possible BKA if symptoms persist.    Pt understands the severity of his condition and  that he is at risk to lose part of the left foot, all the foot or below the knee amputation. All questions were asked and answered for patient satisfaction    Medical management as per primary team   Will cont to monitor and discuss all options with patient and all attendings.

## 2024-11-22 NOTE — PROGRESS NOTE ADULT - PROBLEM SELECTOR PLAN 3
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   ARB was discontinued in previous admission  monitor BMP

## 2024-11-23 LAB
ANION GAP SERPL CALC-SCNC: 12 MMOL/L — SIGNIFICANT CHANGE UP (ref 5–17)
BUN SERPL-MCNC: 75 MG/DL — HIGH (ref 7–23)
CALCIUM SERPL-MCNC: 9.4 MG/DL — SIGNIFICANT CHANGE UP (ref 8.4–10.5)
CHLORIDE SERPL-SCNC: 108 MMOL/L — SIGNIFICANT CHANGE UP (ref 96–108)
CO2 SERPL-SCNC: 21 MMOL/L — LOW (ref 22–31)
CREAT SERPL-MCNC: 2.54 MG/DL — HIGH (ref 0.5–1.3)
EGFR: 30 ML/MIN/1.73M2 — LOW
GLUCOSE BLDC GLUCOMTR-MCNC: 178 MG/DL — HIGH (ref 70–99)
GLUCOSE BLDC GLUCOMTR-MCNC: 200 MG/DL — HIGH (ref 70–99)
GLUCOSE BLDC GLUCOMTR-MCNC: 284 MG/DL — HIGH (ref 70–99)
GLUCOSE BLDC GLUCOMTR-MCNC: 328 MG/DL — HIGH (ref 70–99)
GLUCOSE SERPL-MCNC: 180 MG/DL — HIGH (ref 70–99)
HCT VFR BLD CALC: 22.6 % — LOW (ref 39–50)
HGB BLD-MCNC: 7.4 G/DL — LOW (ref 13–17)
MCHC RBC-ENTMCNC: 28.4 PG — SIGNIFICANT CHANGE UP (ref 27–34)
MCHC RBC-ENTMCNC: 32.7 G/DL — SIGNIFICANT CHANGE UP (ref 32–36)
MCV RBC AUTO: 86.6 FL — SIGNIFICANT CHANGE UP (ref 80–100)
NRBC # BLD: 0 /100 WBCS — SIGNIFICANT CHANGE UP (ref 0–0)
PLATELET # BLD AUTO: 202 K/UL — SIGNIFICANT CHANGE UP (ref 150–400)
POTASSIUM SERPL-MCNC: 4.5 MMOL/L — SIGNIFICANT CHANGE UP (ref 3.5–5.3)
POTASSIUM SERPL-SCNC: 4.5 MMOL/L — SIGNIFICANT CHANGE UP (ref 3.5–5.3)
RBC # BLD: 2.61 M/UL — LOW (ref 4.2–5.8)
RBC # FLD: 13.4 % — SIGNIFICANT CHANGE UP (ref 10.3–14.5)
SODIUM SERPL-SCNC: 141 MMOL/L — SIGNIFICANT CHANGE UP (ref 135–145)
WBC # BLD: 9.04 K/UL — SIGNIFICANT CHANGE UP (ref 3.8–10.5)
WBC # FLD AUTO: 9.04 K/UL — SIGNIFICANT CHANGE UP (ref 3.8–10.5)

## 2024-11-23 RX ORDER — DIPHENHYDRAMINE HCL 25 MG
25 CAPSULE ORAL AT BEDTIME
Refills: 0 | Status: DISCONTINUED | OUTPATIENT
Start: 2024-11-23 | End: 2024-11-24

## 2024-11-23 RX ADMIN — Medication 100 MILLIGRAM(S): at 12:24

## 2024-11-23 RX ADMIN — GABAPENTIN 100 MILLIGRAM(S): 300 CAPSULE ORAL at 13:12

## 2024-11-23 RX ADMIN — CARVEDILOL 6.25 MILLIGRAM(S): 25 TABLET, FILM COATED ORAL at 06:04

## 2024-11-23 RX ADMIN — Medication 10 UNIT(S): at 08:08

## 2024-11-23 RX ADMIN — Medication 3: at 12:24

## 2024-11-23 RX ADMIN — Medication 13 UNIT(S): at 17:04

## 2024-11-23 RX ADMIN — Medication 1: at 08:08

## 2024-11-23 RX ADMIN — DOXYCYCLINE HYCLATE 100 MILLIGRAM(S): 150 TABLET, COATED ORAL at 06:05

## 2024-11-23 RX ADMIN — AMLODIPINE BESYLATE 10 MILLIGRAM(S): 10 TABLET ORAL at 06:05

## 2024-11-23 RX ADMIN — Medication 25 MILLIGRAM(S): at 22:23

## 2024-11-23 RX ADMIN — Medication 10 UNIT(S): at 12:24

## 2024-11-23 RX ADMIN — GABAPENTIN 100 MILLIGRAM(S): 300 CAPSULE ORAL at 22:04

## 2024-11-23 RX ADMIN — Medication 4: at 22:05

## 2024-11-23 RX ADMIN — INSULIN GLARGINE 40 UNIT(S): 100 INJECTION, SOLUTION SUBCUTANEOUS at 22:04

## 2024-11-23 RX ADMIN — DOXYCYCLINE HYCLATE 100 MILLIGRAM(S): 150 TABLET, COATED ORAL at 17:05

## 2024-11-23 RX ADMIN — GABAPENTIN 100 MILLIGRAM(S): 300 CAPSULE ORAL at 06:05

## 2024-11-23 RX ADMIN — CARVEDILOL 6.25 MILLIGRAM(S): 25 TABLET, FILM COATED ORAL at 17:05

## 2024-11-23 RX ADMIN — IRON SUCROSE 210 MILLIGRAM(S): 20 INJECTION, SOLUTION INTRAVENOUS at 06:05

## 2024-11-23 RX ADMIN — Medication 2: at 17:05

## 2024-11-23 RX ADMIN — ACETAMINOPHEN, DIPHENHYDRAMINE HCL, PHENYLEPHRINE HCL 3 MILLIGRAM(S): 325; 25; 5 TABLET ORAL at 22:23

## 2024-11-23 RX ADMIN — Medication 325 MILLIGRAM(S): at 12:24

## 2024-11-23 RX ADMIN — Medication 1 TABLET(S): at 12:24

## 2024-11-23 NOTE — PROGRESS NOTE ADULT - SUBJECTIVE AND OBJECTIVE BOX
ANUJASUZANNA HURLEY is a 49yMale , patient examined and chart reviewed.     INTERVAL HPI/ OVERNIGHT EVENTS:  Afebrile no events.  NAD.    PAST MEDICAL & SURGICAL HISTORY:  Prediabetes  HTN (hypertension)  HLD (hyperlipidemia)  DM2 (diabetes mellitus, type 2)    For details regarding the patient's social history, family history, and other miscellaneous elements, please refer the initial infectious diseases consultation and/or the admitting history and physical examination for this admission.    ROS:  CONSTITUTIONAL:  Negative fever or chills  EYES:  Negative  blurry vision or double vision  CARDIOVASCULAR:  Negative for chest pain or palpitations  RESPIRATORY:  Negative for cough, wheezing, or SOB   GASTROINTESTINAL:  Negative for nausea, vomiting, diarrhea, constipation, or abdominal pain  GENITOURINARY:  Negative frequency, urgency or dysuria  NEUROLOGIC:  No headache, confusion, dizziness, lightheadedness  All other systems were reviewed and are negative     No Known Allergies      Current inpatient medications :    ANTIBIOTICS/RELEVANT:  doxycycline monohydrate Capsule 100 milliGRAM(s) Oral every 12 hours    MEDICATIONS  (STANDING):  acetaminophen     Tablet .. 650 milliGRAM(s) Oral every 6 hours  amLODIPine   Tablet 10 milliGRAM(s) Oral daily  carvedilol 6.25 milliGRAM(s) Oral every 12 hours  dextrose 5%. 1000 milliLiter(s) (50 mL/Hr) IV Continuous <Continuous>  dextrose 5%. 1000 milliLiter(s) (100 mL/Hr) IV Continuous <Continuous>  dextrose 50% Injectable 25 Gram(s) IV Push once  dextrose 50% Injectable 12.5 Gram(s) IV Push once  dextrose 50% Injectable 25 Gram(s) IV Push once  ferrous    sulfate 325 milliGRAM(s) Oral daily  gabapentin 100 milliGRAM(s) Oral three times a day  glucagon  Injectable 1 milliGRAM(s) IntraMuscular once  insulin glargine Injectable (LANTUS) 40 Unit(s) SubCutaneous at bedtime  insulin lispro (ADMELOG) corrective regimen sliding scale   SubCutaneous three times a day before meals  insulin lispro (ADMELOG) corrective regimen sliding scale   SubCutaneous at bedtime  insulin lispro Injectable (ADMELOG) 10 Unit(s) SubCutaneous three times a day before meals  iron sucrose IVPB 100 milliGRAM(s) IV Intermittent every 24 hours  lactobacillus acidophilus 1 Tablet(s) Oral daily  thiamine 100 milliGRAM(s) Oral daily    MEDICATIONS  (PRN):  acetaminophen     Tablet .. 650 milliGRAM(s) Oral every 6 hours PRN Temp greater or equal to 38C (100.4F), Mild Pain (1 - 3)  dextrose Oral Gel 15 Gram(s) Oral once PRN Blood Glucose LESS THAN 70 milliGRAM(s)/deciliter  melatonin 3 milliGRAM(s) Oral at bedtime PRN Insomnia      Objective:  Vital Signs Last 24 Hrs  T(C): 36.7 (23 Nov 2024 13:20), Max: 37.2 (22 Nov 2024 22:17)  T(F): 98.1 (23 Nov 2024 13:20), Max: 99 (22 Nov 2024 22:17)  HR: 88 (23 Nov 2024 13:20) (82 - 96)  BP: 148/81 (23 Nov 2024 13:20) (126/74 - 160/87)  RR: 18 (23 Nov 2024 13:20) (18 - 18)  SpO2: 97% (23 Nov 2024 13:20) (92% - 97%)    Parameters below as of 23 Nov 2024 13:20  Patient On (Oxygen Delivery Method): room air      Physical Exam:  General: no acute distress  Neck: supple, trachea midline  Lungs: clear, no wheeze/rhonchi  Cardiovascular: regular rate and rhythm, S1 S2  Abdomen: soft, nontender,  bowel sounds normal  Neurological: alert and oriented x3  Skin: no rash  Extremities: Left foot drsg c/d/i      LABS:                        7.4    9.04  )-----------( 202      ( 23 Nov 2024 06:00 )             22.6   11-23    141  |  108  |  75[H]  ----------------------------<  180[H]  4.5   |  21[L]  |  2.54[H]    Ca    9.4      23 Nov 2024 06:00  Mg     1.8     11-22    TPro  x   /  Alb  x   /  TBili  0.4  /  DBili  x   /  AST  x   /  ALT  x   /  AlkPhos  x   11-22      MICROBIOLOGY:  Culture - Tissue with Gram Stain (11.12.24 @ 18:21    -  Trimethoprim/Sulfamethoxazole: R >2/38   -  Vancomycin: S 1   -  Clindamycin: R >4   -  Erythromycin: R >4   -  Gentamicin: S <=4 Should not be used as monotherapy   -  Oxacillin: R >2   -  Penicillin: R >2   -  Rifampin: S <=1 Should not be used as monotherapy   -  Tetracycline: S <=4   Gram Stain:   No polymorphonuclear leukocytes seen per low power field  No organisms seen per oil power field   Specimen Source: Bone   Culture Results:   Rare Staphylococcus epidermidis  Few Corynebacterium resistens "Susceptibilities not performed"   Organism Identification: Staphylococcus epidermidis   Organism: Staphylococcus epidermidis   Method Type: STEPHANIE    Culture - Tissue with Gram Stain (collected 12 Nov 2024 18:21)  Source: Tissue  Gram Stain (14 Nov 2024 14:21):    No polymorphonuclear leukocytes seen per low power field    No organisms seen per oil power field  Preliminary Report (14 Nov 2024 14:21):    Rare Staphylococcus simulans    Culture - Wound Aerobic (collected 12 Nov 2024 18:21)  Source: .Other  Preliminary Report (14 Nov 2024 13:25):    Rare Staphylococcus simulans "Susceptibilities not performed"    Culture - Wound Aerobic/Anaerobic (collected 12 Nov 2024 18:21)  Source: .Surgical Swab  Preliminary Report (14 Nov 2024 13:07):    Commensal nicholas consistent with body site    Culture - Blood (collected 01 Nov 2024 20:00)  Source: .Blood BLOOD  Preliminary Report (04 Nov 2024 01:02):    No growth at 48 Hours    Culture - Blood (collected 01 Nov 2024 20:00)  Source: .Blood BLOOD  Preliminary Report (04 Nov 2024 01:02):    No growth at 48 Hours    RADIOLOGY & ADDITIONAL STUDIES:      Assessment :  49-year-old male with  DM2, HTN, HLD, NEUROPATHY, PVD,ckd4, ETOH abuse, Diabetic foot ulcer with cellullitis ,ac on ch OM 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  He has been on amoxicillin twice daily and states he has been compliant with his antibiotic.  Patient noted that his surgical site had developed a bad odor with some discharge.  Left foot with exposed metatarsal head.    Sp Amputation, foot, first ray Debulking, flap, foot 12-Nov-2024  OR cultures with CNS  Bleeding at surgical site resolved  Path at clean margins with mild chronic OM  Events noted 11/17. Went back to OR as developed profuse bleeding from surgical wound.  No further bleed   LUISA CKD better  Anemia sp transfusion  11/12 Events noted- Went home yesterday afternoon and stubbed toe on object at home and started bleeding and got readmitted.  Seen by podiatry  - surgical site repacked. Drop in H/H noted. Sp transfusion. LUISA on CKD  H/H stable  LUISA on CKD better  Clinically stable    Plan :   Cont po doxycycline 100mg bid x 14 days till 12/3  Trend temps and cbc, renal fx  Podiatry following  Stable from ID standpoint      Continue with present regiment.  Appropriate use of antibiotics and adverse effects reviewed.      > 35 minutes were spent in direct patient care reviewing notes, medications ,labs data/ imaging , discussion with multidisciplinary team.    Thank you for allowing me to participate in care of your patient .    Linsey Mack MD  Infectious Disease  [phone]

## 2024-11-23 NOTE — PROGRESS NOTE ADULT - SUBJECTIVE AND OBJECTIVE BOX
PROGRESS NOTE   Patient is a 49y old  Male who presents with a chief complaint of bleeding L toe (23 Nov 2024 10:44)      HPI:  49-year-old male with history of diabetes mellitus, hypertension, hyperlipidemia, neuropathy, peripheral vascular disease, chronic kidney disease, and EtOH abuser brought in by ambulance for bleeding from left foot that occurred prior to arrival.  Patient has a history of diabetic foot ulcer with cellulitis.  Was diagnosed with acute on chronic osteomyelitis of left foot.  On November 12 had amputation of toe on left foot by Dr. Davenport.  Patient had bleeding from foot on November 17 and a rapid response team was called.  Was taken to the OR and homeostasis was obtained.  Was transfused 1 unit at that time.  Patient has not had any bleeding since then and was discharged this morning.  Patient states he was walking in the driveway having a verbal confrontation with his landlord.  Believes he stepped on his forefoot and started to bleed.  EMS was called.  States on arrival to emergency room bleeding has now stopped.  Denies other pain or complaints    In ED:  Vitals- T 98.7  HR 97  BP  104/58  RR 18 O2S- 95%  Labs- Denied blood work in ED; agreeable for AM labs; Nov 20th AM labs prior to DC->  Hgb 8.1 Hct 25.4  MCV 87.6  Cr 2.87  BUN 74  eGFR 26  Consults made in ED-> Podiatry; recs appreciated      (20 Nov 2024 23:00)      Vital Signs Last 24 Hrs  T(C): 36.7 (23 Nov 2024 09:32), Max: 37.2 (22 Nov 2024 22:17)  T(F): 98.1 (23 Nov 2024 09:32), Max: 99 (22 Nov 2024 22:17)  HR: 82 (23 Nov 2024 09:32) (82 - 96)  BP: 126/74 (23 Nov 2024 09:32) (126/74 - 160/87)  BP(mean): --  RR: 18 (23 Nov 2024 09:32) (18 - 18)  SpO2: 92% (23 Nov 2024 09:32) (92% - 97%)    Parameters below as of 23 Nov 2024 09:32  Patient On (Oxygen Delivery Method): room air                              7.4    9.04  )-----------( 202      ( 23 Nov 2024 06:00 )             22.6               11-23    141  |  108  |  75[H]  ----------------------------<  180[H]  4.5   |  21[L]  |  2.54[H]    Ca    9.4      23 Nov 2024 06:00  Mg     1.8     11-22    TPro  x   /  Alb  x   /  TBili  0.4  /  DBili  x   /  AST  x   /  ALT  x   /  AlkPhos  x   11-22      LOWER EXTREMITY PHYSICAL EXAM:    left foot s/p surgical Debridement of all non-viable soft tissue and bone, debridement of deep space infection of left foot with left 1st partial ray resection and packing all left foot. no  active bleeding noted .  The sutures are intact and the flap is viable at this time.  There is decreased erythema, edema and calor noted. Patient has guarded prognosis . Will cont to allow any further demarcation to occur and will closely monitor.    Sterile dressing change performed today with application of packing under sterile technique.   Vascular: Dorsalis Pedis and Posterior Tibial pulses palpable 2/4 bilaterally.  Capillary re-fill time less then 3 seconds digits 1-5 bilateral.  Mild-moderate edema bilaterally up to the ankle left foot   Neuro: Protective sensation diminished to the level of the digits bilateral.   MSK: Muscle strength 5/5 all major muscle groups bilateral.   PROGRESS NOTE   Patient is a 49y old  Male who presents with a chief complaint of bleeding L toe (23 Nov 2024 10:44)      HPI:  49-year-old male with history of diabetes mellitus, hypertension, hyperlipidemia, neuropathy, peripheral vascular disease, chronic kidney disease, and EtOH abuser brought in by ambulance for bleeding from left foot that occurred prior to arrival.  Patient has a history of diabetic foot ulcer with cellulitis.  Was diagnosed with acute on chronic osteomyelitis of left foot.  On November 12 had amputation of toe on left foot by Dr. Davenport.  Patient had bleeding from foot on November 17 and a rapid response team was called.  Was taken to the OR and homeostasis was obtained.  Was transfused 1 unit at that time.  Patient has not had any bleeding since then and was discharged this morning.  Patient states he was walking in the driveway having a verbal confrontation with his landlord.  Believes he stepped on his forefoot and started to bleed.  EMS was called.  States on arrival to emergency room bleeding has now stopped.  Denies other pain or complaints    In ED:  Vitals- T 98.7  HR 97  BP  104/58  RR 18 O2S- 95%  Labs- Denied blood work in ED; agreeable for AM labs; Nov 20th AM labs prior to DC->  Hgb 8.1 Hct 25.4  MCV 87.6  Cr 2.87  BUN 74  eGFR 26  Consults made in ED-> Podiatry; recs appreciated      (20 Nov 2024 23:00)      Vital Signs Last 24 Hrs  T(C): 36.7 (23 Nov 2024 09:32), Max: 37.2 (22 Nov 2024 22:17)  T(F): 98.1 (23 Nov 2024 09:32), Max: 99 (22 Nov 2024 22:17)  HR: 82 (23 Nov 2024 09:32) (82 - 96)  BP: 126/74 (23 Nov 2024 09:32) (126/74 - 160/87)  BP(mean): --  RR: 18 (23 Nov 2024 09:32) (18 - 18)  SpO2: 92% (23 Nov 2024 09:32) (92% - 97%)    Parameters below as of 23 Nov 2024 09:32  Patient On (Oxygen Delivery Method): room air                              7.4    9.04  )-----------( 202      ( 23 Nov 2024 06:00 )             22.6               11-23    141  |  108  |  75[H]  ----------------------------<  180[H]  4.5   |  21[L]  |  2.54[H]    Ca    9.4      23 Nov 2024 06:00  Mg     1.8     11-22    TPro  x   /  Alb  x   /  TBili  0.4  /  DBili  x   /  AST  x   /  ALT  x   /  AlkPhos  x   11-22      LOWER EXTREMITY PHYSICAL EXAM:    left foot s/p surgical Debridement of all non-viable soft tissue and bone, debridement of deep space infection of left foot with left 1st partial ray resection and packing all left foot. no  active bleeding noted .  The sutures are intact and the flap is viable at this time.  There is decreased erythema, edema and calor noted. Patient has guarded prognosis . Will cont to allow any further demarcation to occur and will closely monitor.    Vascular: Dorsalis Pedis and Posterior Tibial pulses palpable 2/4 bilaterally.  Capillary re-fill time less then 3 seconds digits 1-5 bilateral.  Mild-moderate edema bilaterally up to the ankle left foot   Neuro: Protective sensation diminished to the level of the digits bilateral.   MSK: Muscle strength 5/5 all major muscle groups bilateral.

## 2024-11-23 NOTE — PROGRESS NOTE ADULT - SUBJECTIVE AND OBJECTIVE BOX
Patient is a 49y old  Male who presents with a chief complaint of bleeding L toe (23 Nov 2024 06:36)      INTERVAL HPI/OVERNIGHT EVENTS:overnight events noted    Home Medications:  acetaminophen 325 mg oral tablet: 2 tab(s) orally every 6 hours As needed Temp greater or equal to 38C (100.4F), Mild Pain (1 - 3), Moderate Pain (4 - 6) (20 Nov 2024 21:59)      MEDICATIONS  (STANDING):  acetaminophen     Tablet .. 650 milliGRAM(s) Oral every 6 hours  amLODIPine   Tablet 10 milliGRAM(s) Oral daily  carvedilol 6.25 milliGRAM(s) Oral every 12 hours  dextrose 5%. 1000 milliLiter(s) (50 mL/Hr) IV Continuous <Continuous>  dextrose 5%. 1000 milliLiter(s) (100 mL/Hr) IV Continuous <Continuous>  dextrose 50% Injectable 25 Gram(s) IV Push once  dextrose 50% Injectable 12.5 Gram(s) IV Push once  dextrose 50% Injectable 25 Gram(s) IV Push once  doxycycline monohydrate Capsule 100 milliGRAM(s) Oral every 12 hours  ferrous    sulfate 325 milliGRAM(s) Oral daily  gabapentin 100 milliGRAM(s) Oral three times a day  glucagon  Injectable 1 milliGRAM(s) IntraMuscular once  insulin glargine Injectable (LANTUS) 40 Unit(s) SubCutaneous at bedtime  insulin lispro (ADMELOG) corrective regimen sliding scale   SubCutaneous three times a day before meals  insulin lispro (ADMELOG) corrective regimen sliding scale   SubCutaneous at bedtime  insulin lispro Injectable (ADMELOG) 10 Unit(s) SubCutaneous three times a day before meals  iron sucrose IVPB 100 milliGRAM(s) IV Intermittent every 24 hours  lactobacillus acidophilus 1 Tablet(s) Oral daily  thiamine 100 milliGRAM(s) Oral daily    MEDICATIONS  (PRN):  acetaminophen     Tablet .. 650 milliGRAM(s) Oral every 6 hours PRN Temp greater or equal to 38C (100.4F), Mild Pain (1 - 3)  dextrose Oral Gel 15 Gram(s) Oral once PRN Blood Glucose LESS THAN 70 milliGRAM(s)/deciliter  melatonin 3 milliGRAM(s) Oral at bedtime PRN Insomnia      Allergies    No Known Allergies    Intolerances        REVIEW OF SYSTEMS:  CONSTITUTIONAL: No fever, weight loss, or fatigue  EYES: No eye pain, visual disturbances, or discharge  ENMT:  No difficulty hearing, tinnitus, vertigo; No sinus or throat pain  NECK: No pain or stiffness  BREASTS: No pain, masses, or nipple discharge  RESPIRATORY: No cough, wheezing, chills or hemoptysis; No shortness of breath  CARDIOVASCULAR: No chest pain, palpitations, dizziness, or leg swelling  GASTROINTESTINAL: No abdominal or epigastric pain. No nausea, vomiting, or hematemesis; No diarrhea or constipation. No melena or hematochezia.  GENITOURINARY: No dysuria, frequency, hematuria, or incontinence  NEUROLOGICAL: No headaches, memory loss, loss of strength, numbness, or tremors  SKIN: No itching, burning, rashes, or lesions   LYMPH NODES: No enlarged glands  ENDOCRINE: No heat or cold intolerance; No hair loss  MUSCULOSKELETAL: No joint pain or swelling; No muscle, back, or extremity pain  PSYCHIATRIC: No depression, anxiety, mood swings, or difficulty sleeping  HEME/LYMPH: No easy bruising, or bleeding gums  ALLERGY AND IMMUNOLOGIC: No hives or eczema    Vital Signs Last 24 Hrs  T(C): 36.7 (23 Nov 2024 09:32), Max: 37.3 (22 Nov 2024 14:00)  T(F): 98.1 (23 Nov 2024 09:32), Max: 99.2 (22 Nov 2024 14:00)  HR: 82 (23 Nov 2024 09:32) (82 - 96)  BP: 126/74 (23 Nov 2024 09:32) (126/74 - 160/87)  BP(mean): --  RR: 18 (23 Nov 2024 09:32) (18 - 18)  SpO2: 92% (23 Nov 2024 09:32) (92% - 97%)    Parameters below as of 23 Nov 2024 09:32  Patient On (Oxygen Delivery Method): room air        PHYSICAL EXAM:  GENERAL: NAD, well-groomed, well-developed  HEAD:  Atraumatic, Normocephalic  EYES: EOMI, PERRLA, conjunctiva and sclera clear  ENMT: Moist mucous membranes,   NECK: Supple, No JVD, Normal thyroid  NERVOUS SYSTEM:  Alert & Oriented X3, non focal  CHEST/LUNG: Clear to percussion bilaterally; No rales, rhonchi, wheezing, or rubs  HEART: Regular rate and rhythm; No murmurs, rubs, or gallops  ABDOMEN: Soft, Nontender, Nondistended; Bowel sounds present  EXTREMITIES: left foot in dressing , with healing wound  LYMPH: No lymphadenopathy noted  SKIN: No rashes or lesions    LABS:                        7.4    9.04  )-----------( 202      ( 23 Nov 2024 06:00 )             22.6     11-23    141  |  108  |  75[H]  ----------------------------<  180[H]  4.5   |  21[L]  |  2.54[H]    Ca    9.4      23 Nov 2024 06:00  Mg     1.8     11-22    TPro  x   /  Alb  x   /  TBili  0.4  /  DBili  x   /  AST  x   /  ALT  x   /  AlkPhos  x   11-22    PT/INR - ( 22 Nov 2024 08:11 )   PT: 13.3 sec;   INR: 1.15 ratio           Urinalysis Basic - ( 23 Nov 2024 06:00 )    Color: x / Appearance: x / SG: x / pH: x  Gluc: 180 mg/dL / Ketone: x  / Bili: x / Urobili: x   Blood: x / Protein: x / Nitrite: x   Leuk Esterase: x / RBC: x / WBC x   Sq Epi: x / Non Sq Epi: x / Bacteria: x      CAPILLARY BLOOD GLUCOSE      POCT Blood Glucose.: 178 mg/dL (23 Nov 2024 07:49)  POCT Blood Glucose.: 292 mg/dL (22 Nov 2024 21:27)  POCT Blood Glucose.: 285 mg/dL (22 Nov 2024 17:19)  POCT Blood Glucose.: 260 mg/dL (22 Nov 2024 12:02)          I&O's Summary    22 Nov 2024 07:01  -  23 Nov 2024 07:00  --------------------------------------------------------  IN: 0 mL / OUT: 899 mL / NET: -899 mL        RADIOLOGY & ADDITIONAL TESTS:    Imaging Personally Reviewed:  [x ] YES  [ ] NO    Consultant(s) Notes Reviewed:  [x ] YES  [ ] NO    Care Discussed with Consultants/Other Providers [x ] YES  [ ] NO

## 2024-11-23 NOTE — PROGRESS NOTE ADULT - SUBJECTIVE AND OBJECTIVE BOX
Subjective: no complaints.       MEDICATIONS  (STANDING):  acetaminophen     Tablet .. 650 milliGRAM(s) Oral every 6 hours  amLODIPine   Tablet 10 milliGRAM(s) Oral daily  carvedilol 6.25 milliGRAM(s) Oral every 12 hours  dextrose 5%. 1000 milliLiter(s) (50 mL/Hr) IV Continuous <Continuous>  dextrose 5%. 1000 milliLiter(s) (100 mL/Hr) IV Continuous <Continuous>  dextrose 50% Injectable 25 Gram(s) IV Push once  dextrose 50% Injectable 12.5 Gram(s) IV Push once  dextrose 50% Injectable 25 Gram(s) IV Push once  doxycycline monohydrate Capsule 100 milliGRAM(s) Oral every 12 hours  ferrous    sulfate 325 milliGRAM(s) Oral daily  gabapentin 100 milliGRAM(s) Oral three times a day  glucagon  Injectable 1 milliGRAM(s) IntraMuscular once  insulin glargine Injectable (LANTUS) 40 Unit(s) SubCutaneous at bedtime  insulin lispro (ADMELOG) corrective regimen sliding scale   SubCutaneous three times a day before meals  insulin lispro (ADMELOG) corrective regimen sliding scale   SubCutaneous at bedtime  insulin lispro Injectable (ADMELOG) 10 Unit(s) SubCutaneous three times a day before meals  iron sucrose IVPB 100 milliGRAM(s) IV Intermittent every 24 hours  lactobacillus acidophilus 1 Tablet(s) Oral daily  thiamine 100 milliGRAM(s) Oral daily    MEDICATIONS  (PRN):  acetaminophen     Tablet .. 650 milliGRAM(s) Oral every 6 hours PRN Temp greater or equal to 38C (100.4F), Mild Pain (1 - 3)  dextrose Oral Gel 15 Gram(s) Oral once PRN Blood Glucose LESS THAN 70 milliGRAM(s)/deciliter  melatonin 3 milliGRAM(s) Oral at bedtime PRN Insomnia          T(C): 36.8 (11-23-24 @ 05:11), Max: 37.3 (11-22-24 @ 14:00)  HR: 87 (11-23-24 @ 05:11) (87 - 96)  BP: 139/81 (11-23-24 @ 05:11) (127/77 - 160/87)  RR: 18 (11-23-24 @ 05:11) (18 - 18)  SpO2: 97% (11-22-24 @ 22:17) (96% - 97%)  Wt(kg): --        I&O's Detail    21 Nov 2024 07:01  -  22 Nov 2024 07:00  --------------------------------------------------------  IN:    IV PiggyBack: 100 mL    PRBCs (Packed Red Blood Cells): 320 mL  Total IN: 420 mL    OUT:    Voided (mL): 200 mL  Total OUT: 200 mL    Total NET: 220 mL      22 Nov 2024 07:01  -  23 Nov 2024 06:36  --------------------------------------------------------  IN:  Total IN: 0 mL    OUT:    Voided (mL): 899 mL  Total OUT: 899 mL    Total NET: -899 mL               PHYSICAL EXAM:    GENERAL: NAD  NECK: Supple, no inc in JVP  CHEST/LUNG: Clear  HEART: S1S2  ABDOMEN: Soft, Nontender, Nondistended; Bowel sounds present  EXTREMITIES:  trace edema. L foot dressed.   NEURO: no asterixis      LABS:  CBC Full  -  ( 22 Nov 2024 08:11 )  WBC Count : 9.19 K/uL  RBC Count : 2.62 M/uL  Hemoglobin : 7.4 g/dL  Hematocrit : 22.5 %  Platelet Count - Automated : 204 K/uL  Mean Cell Volume : 85.9 fL  Mean Cell Hemoglobin : 28.2 pg  Mean Cell Hemoglobin Concentration : 32.9 g/dL  Auto Neutrophil # : x  Auto Lymphocyte # : x  Auto Monocyte # : x  Auto Eosinophil # : x  Auto Basophil # : x  Auto Neutrophil % : x  Auto Lymphocyte % : x  Auto Monocyte % : x  Auto Eosinophil % : x  Auto Basophil % : x    11-22    140  |  107  |  86[H]  ----------------------------<  138[H]  4.5   |  19[L]  |  2.83[H]    Ca    9.0      22 Nov 2024 08:11  Mg     1.8     11-22    TPro  x   /  Alb  x   /  TBili  0.4  /  DBili  x   /  AST  x   /  ALT  x   /  AlkPhos  x   11-22    PT/INR - ( 22 Nov 2024 08:11 )   PT: 13.3 sec;   INR: 1.15 ratio           Impression:  LUISA 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  Stabilized  Diabetes with chronic foot wound  Hypertension, improved control.   Blood loss anemia    Recommend:  Cont to hold ARB  Daily BMP while remains in house.

## 2024-11-23 NOTE — PROGRESS NOTE ADULT - SUBJECTIVE AND OBJECTIVE BOX
All interim records and events noted.    comfortable in bed      MEDICATIONS  (STANDING):  acetaminophen     Tablet .. 650 milliGRAM(s) Oral every 6 hours  amLODIPine   Tablet 10 milliGRAM(s) Oral daily  carvedilol 6.25 milliGRAM(s) Oral every 12 hours  dextrose 5%. 1000 milliLiter(s) (50 mL/Hr) IV Continuous <Continuous>  dextrose 5%. 1000 milliLiter(s) (100 mL/Hr) IV Continuous <Continuous>  dextrose 50% Injectable 25 Gram(s) IV Push once  dextrose 50% Injectable 12.5 Gram(s) IV Push once  dextrose 50% Injectable 25 Gram(s) IV Push once  doxycycline monohydrate Capsule 100 milliGRAM(s) Oral every 12 hours  ferrous    sulfate 325 milliGRAM(s) Oral daily  gabapentin 100 milliGRAM(s) Oral three times a day  glucagon  Injectable 1 milliGRAM(s) IntraMuscular once  insulin glargine Injectable (LANTUS) 40 Unit(s) SubCutaneous at bedtime  insulin lispro (ADMELOG) corrective regimen sliding scale   SubCutaneous three times a day before meals  insulin lispro (ADMELOG) corrective regimen sliding scale   SubCutaneous at bedtime  insulin lispro Injectable (ADMELOG) 10 Unit(s) SubCutaneous three times a day before meals  iron sucrose IVPB 100 milliGRAM(s) IV Intermittent every 24 hours  lactobacillus acidophilus 1 Tablet(s) Oral daily  thiamine 100 milliGRAM(s) Oral daily    MEDICATIONS  (PRN):  acetaminophen     Tablet .. 650 milliGRAM(s) Oral every 6 hours PRN Temp greater or equal to 38C (100.4F), Mild Pain (1 - 3)  dextrose Oral Gel 15 Gram(s) Oral once PRN Blood Glucose LESS THAN 70 milliGRAM(s)/deciliter  melatonin 3 milliGRAM(s) Oral at bedtime PRN Insomnia      Vital Signs Last 24 Hrs  T(C): 36.7 (23 Nov 2024 13:20), Max: 37.2 (22 Nov 2024 22:17)  T(F): 98.1 (23 Nov 2024 13:20), Max: 99 (22 Nov 2024 22:17)  HR: 88 (23 Nov 2024 13:20) (82 - 96)  BP: 148/81 (23 Nov 2024 13:20) (126/74 - 160/87)  BP(mean): --  RR: 18 (23 Nov 2024 13:20) (18 - 18)  SpO2: 97% (23 Nov 2024 13:20) (92% - 97%)    Parameters below as of 23 Nov 2024 13:20  Patient On (Oxygen Delivery Method): room air        PHYSICAL EXAM  General: in no acute distress  Head: atraumatic, normocephalic  ENT: nose midline  Neck: supple, trachea midline  CV: S1 S2, regular rate and rhythm  Lungs: clear to auscultation, no wheezes/rhonchi  Abdomen: soft, nontender, bowel sounds present  Skin: no significant increased ecchymosis/petechiae        LABS:             7.4    9.04  )-----------( 202      ( 11-23 @ 06:00 )             22.6                7.4    9.19  )-----------( 204      ( 11-22 @ 08:11 )             22.5                7.5    9.33  )-----------( 204      ( 11-21 @ 16:08 )             23.3                6.4    9.85  )-----------( 194      ( 11-21 @ 08:01 )             19.7       11-23    141  |  108  |  75[H]  ----------------------------<  180[H]  4.5   |  21[L]  |  2.54[H]    Ca    9.4      23 Nov 2024 06:00  Mg     1.8     11-22    TPro  x   /  Alb  x   /  TBili  0.4  /  DBili  x   /  AST  x   /  ALT  x   /  AlkPhos  x   11-22 11-22 @ 08:11  PT13.3 INR1.15  PTT--  11-21 @ 08:01  PT13.0 INR1.10  PTT30.5      RADIOLOGY & ADDITIONAL STUDIES:    IMPRESSION/RECOMMENDATIONS:

## 2024-11-23 NOTE — PROGRESS NOTE ADULT - PROBLEM SELECTOR PLAN 1
Discussed diagnosis and treatment with patient.  Patient is s/p surgical debridement of all non-viable soft tissue and bone left foot with partial 1st ray resection and packing all left foot . DOS 11/12/2024   s/p surgical exploration of left foot for active bleed DOS 11/17/2024  Upon dressing change, no active bleeding was noted. Removed all packings today without any incident.  Appreciates Hematology and ID consults   Continue Abx and follow ID recommendations    Non weight bearing on left foot   Recs elevation on left lower extremities   Pt may require serial surgical debridements/amputation and possible BKA if symptoms persist.    Pt understands the severity of his condition and  that he is at risk to lose part of the left foot, all the foot or below the knee amputation. All questions were asked and answered for patient satisfaction    Medical management as per primary team   Will cont to monitor and discuss all options with patient and all attendings. Discussed diagnosis and treatment with patient.  Patient is s/p surgical debridement of all non-viable soft tissue and bone left foot with partial 1st ray resection and packing all left foot . DOS 11/12/2024   s/p surgical exploration of left foot for active bleed DOS 11/17/2024  Upon dressing change, no active bleeding was noted.  Appreciates Hematology and ID consults   Continue Abx and follow ID recommendations    Non weight bearing on left foot   Recs elevation on left lower extremities   Pt may require serial surgical debridements/amputation and possible BKA if symptoms persist.    Pt understands the severity of his condition and  that he is at risk to lose part of the left foot, all the foot or below the knee amputation. All questions were asked and answered for patient satisfaction    Medical management as per primary team   Will cont to monitor and discuss all options with patient and all attendings.

## 2024-11-23 NOTE — PROGRESS NOTE ADULT - PROBLEM SELECTOR PLAN 1
increase admelog 13 units 3x/day before meals  cont lantus 40 units qhs  change mod dose admelog corrective scale coverage qac/qhs  cont cons cho diet  goal bg 100-180 in hosp setting

## 2024-11-23 NOTE — PROGRESS NOTE ADULT - SUBJECTIVE AND OBJECTIVE BOX
Patient is a 49y old  Male who presents with a chief complaint of bleeding L toe (23 Nov 2024 16:24)      Reason For Consult: dm2 uncontrolled    HPI:  49-year-old male with history of diabetes mellitus, hypertension, hyperlipidemia, neuropathy, peripheral vascular disease, chronic kidney disease, and EtOH abuser brought in by ambulance for bleeding from left foot that occurred prior to arrival.  Patient has a history of diabetic foot ulcer with cellulitis.  Was diagnosed with acute on chronic osteomyelitis of left foot.  On November 12 had amputation of toe on left foot by Dr. Davenport.  Patient had bleeding from foot on November 17 and a rapid response team was called.  Was taken to the OR and homeostasis was obtained.  Was transfused 1 unit at that time.  Patient has not had any bleeding since then and was discharged this morning.  Patient states he was walking in the driveway having a verbal confrontation with his landlord.  Believes he stepped on his forefoot and started to bleed.  EMS was called.  States on arrival to emergency room bleeding has now stopped.  Denies other pain or complaints    In ED:  Vitals- T 98.7  HR 97  BP  104/58  RR 18 O2S- 95%  Labs- Denied blood work in ED; agreeable for AM labs; Nov 20th AM labs prior to DC->  Hgb 8.1 Hct 25.4  MCV 87.6  Cr 2.87  BUN 74  eGFR 26  Consults made in ED-> Podiatry; recs appreciated      (20 Nov 2024 23:00)      PAST MEDICAL & SURGICAL HISTORY:  Prediabetes      HTN (hypertension)      HLD (hyperlipidemia)      DM2 (diabetes mellitus, type 2)          FAMILY HISTORY:        Social History:    MEDICATIONS  (STANDING):  acetaminophen     Tablet .. 650 milliGRAM(s) Oral every 6 hours  amLODIPine   Tablet 10 milliGRAM(s) Oral daily  carvedilol 6.25 milliGRAM(s) Oral every 12 hours  dextrose 5%. 1000 milliLiter(s) (50 mL/Hr) IV Continuous <Continuous>  dextrose 5%. 1000 milliLiter(s) (100 mL/Hr) IV Continuous <Continuous>  dextrose 50% Injectable 25 Gram(s) IV Push once  dextrose 50% Injectable 12.5 Gram(s) IV Push once  dextrose 50% Injectable 25 Gram(s) IV Push once  doxycycline monohydrate Capsule 100 milliGRAM(s) Oral every 12 hours  ferrous    sulfate 325 milliGRAM(s) Oral daily  gabapentin 100 milliGRAM(s) Oral three times a day  glucagon  Injectable 1 milliGRAM(s) IntraMuscular once  insulin glargine Injectable (LANTUS) 40 Unit(s) SubCutaneous at bedtime  insulin lispro (ADMELOG) corrective regimen sliding scale   SubCutaneous three times a day before meals  insulin lispro (ADMELOG) corrective regimen sliding scale   SubCutaneous at bedtime  insulin lispro Injectable (ADMELOG) 10 Unit(s) SubCutaneous three times a day before meals  iron sucrose IVPB 100 milliGRAM(s) IV Intermittent every 24 hours  lactobacillus acidophilus 1 Tablet(s) Oral daily  thiamine 100 milliGRAM(s) Oral daily    MEDICATIONS  (PRN):  acetaminophen     Tablet .. 650 milliGRAM(s) Oral every 6 hours PRN Temp greater or equal to 38C (100.4F), Mild Pain (1 - 3)  dextrose Oral Gel 15 Gram(s) Oral once PRN Blood Glucose LESS THAN 70 milliGRAM(s)/deciliter  melatonin 3 milliGRAM(s) Oral at bedtime PRN Insomnia        T(C): 36.7 (11-23-24 @ 13:20), Max: 37.2 (11-22-24 @ 22:17)  HR: 88 (11-23-24 @ 13:20) (82 - 96)  BP: 148/81 (11-23-24 @ 13:20) (126/74 - 160/87)  RR: 18 (11-23-24 @ 13:20) (18 - 18)  SpO2: 97% (11-23-24 @ 13:20) (92% - 97%)  Wt(kg): --    PHYSICAL EXAM:  GENERAL: NAD, well-groomed, well-developed  HEAD:  Atraumatic, Normocephalic  NECK: Supple, No JVD, Normal thyroid  CHEST/LUNG: Clear to percussion bilaterally; No rales, rhonchi, wheezing, or rubs  HEART: Regular rate and rhythm; No murmurs, rubs, or gallops  ABDOMEN: Soft, Nontender, Nondistended; Bowel sounds present  EXTREMITIES:  le foot dsg intact    CAPILLARY BLOOD GLUCOSE      POCT Blood Glucose.: 284 mg/dL (23 Nov 2024 12:11)  POCT Blood Glucose.: 178 mg/dL (23 Nov 2024 07:49)  POCT Blood Glucose.: 292 mg/dL (22 Nov 2024 21:27)  POCT Blood Glucose.: 285 mg/dL (22 Nov 2024 17:19)                            7.4    9.04  )-----------( 202      ( 23 Nov 2024 06:00 )             22.6       CMP:  11-23 @ 06:00  SGPT --  Albumin --   Alk Phos --   Anion Gap 12   SGOT --   Total Bili --   BUN 75   Calcium Total 9.4   CO2 21   Chloride 108   Creatinine 2.54   eGFR if AA --   eGFR if non AA --   Glucose 180   Potassium 4.5   Protein --   Sodium 141      Thyroid Function Tests:      Diabetes Tests:       Radiology:

## 2024-11-24 VITALS
SYSTOLIC BLOOD PRESSURE: 154 MMHG | TEMPERATURE: 98 F | DIASTOLIC BLOOD PRESSURE: 89 MMHG | OXYGEN SATURATION: 97 % | HEART RATE: 94 BPM | RESPIRATION RATE: 18 BRPM

## 2024-11-24 LAB
ALBUMIN SERPL ELPH-MCNC: 2.8 G/DL — LOW (ref 3.3–5)
ALP SERPL-CCNC: 167 U/L — HIGH (ref 30–120)
ALT FLD-CCNC: 52 U/L — SIGNIFICANT CHANGE UP (ref 10–60)
ANION GAP SERPL CALC-SCNC: 11 MMOL/L — SIGNIFICANT CHANGE UP (ref 5–17)
AST SERPL-CCNC: 34 U/L — SIGNIFICANT CHANGE UP (ref 10–40)
BASOPHILS # BLD AUTO: 0.17 K/UL — SIGNIFICANT CHANGE UP (ref 0–0.2)
BASOPHILS NFR BLD AUTO: 1.8 % — SIGNIFICANT CHANGE UP (ref 0–2)
BILIRUB SERPL-MCNC: 0.3 MG/DL — SIGNIFICANT CHANGE UP (ref 0.2–1.2)
BUN SERPL-MCNC: 70 MG/DL — HIGH (ref 7–23)
CALCIUM SERPL-MCNC: 9.3 MG/DL — SIGNIFICANT CHANGE UP (ref 8.4–10.5)
CHLORIDE SERPL-SCNC: 107 MMOL/L — SIGNIFICANT CHANGE UP (ref 96–108)
CO2 SERPL-SCNC: 22 MMOL/L — SIGNIFICANT CHANGE UP (ref 22–31)
CREAT SERPL-MCNC: 2.52 MG/DL — HIGH (ref 0.5–1.3)
EGFR: 30 ML/MIN/1.73M2 — LOW
EOSINOPHIL # BLD AUTO: 0.28 K/UL — SIGNIFICANT CHANGE UP (ref 0–0.5)
EOSINOPHIL NFR BLD AUTO: 2.9 % — SIGNIFICANT CHANGE UP (ref 0–6)
GLUCOSE BLDC GLUCOMTR-MCNC: 206 MG/DL — HIGH (ref 70–99)
GLUCOSE BLDC GLUCOMTR-MCNC: 232 MG/DL — HIGH (ref 70–99)
GLUCOSE SERPL-MCNC: 205 MG/DL — HIGH (ref 70–99)
HCT VFR BLD CALC: 23.2 % — LOW (ref 39–50)
HGB BLD-MCNC: 7.5 G/DL — LOW (ref 13–17)
IMM GRANULOCYTES NFR BLD AUTO: 2 % — HIGH (ref 0–0.9)
LYMPHOCYTES # BLD AUTO: 2.01 K/UL — SIGNIFICANT CHANGE UP (ref 1–3.3)
LYMPHOCYTES # BLD AUTO: 20.7 % — SIGNIFICANT CHANGE UP (ref 13–44)
MCHC RBC-ENTMCNC: 28 PG — SIGNIFICANT CHANGE UP (ref 27–34)
MCHC RBC-ENTMCNC: 32.3 G/DL — SIGNIFICANT CHANGE UP (ref 32–36)
MCV RBC AUTO: 86.6 FL — SIGNIFICANT CHANGE UP (ref 80–100)
MONOCYTES # BLD AUTO: 0.91 K/UL — HIGH (ref 0–0.9)
MONOCYTES NFR BLD AUTO: 9.4 % — SIGNIFICANT CHANGE UP (ref 2–14)
NEUTROPHILS # BLD AUTO: 6.13 K/UL — SIGNIFICANT CHANGE UP (ref 1.8–7.4)
NEUTROPHILS NFR BLD AUTO: 63.2 % — SIGNIFICANT CHANGE UP (ref 43–77)
NRBC # BLD: 0 /100 WBCS — SIGNIFICANT CHANGE UP (ref 0–0)
PLATELET # BLD AUTO: 211 K/UL — SIGNIFICANT CHANGE UP (ref 150–400)
POTASSIUM SERPL-MCNC: 4.5 MMOL/L — SIGNIFICANT CHANGE UP (ref 3.5–5.3)
POTASSIUM SERPL-SCNC: 4.5 MMOL/L — SIGNIFICANT CHANGE UP (ref 3.5–5.3)
PROT SERPL-MCNC: 7.4 G/DL — SIGNIFICANT CHANGE UP (ref 6–8.3)
RBC # BLD: 2.68 M/UL — LOW (ref 4.2–5.8)
RBC # FLD: 13.3 % — SIGNIFICANT CHANGE UP (ref 10.3–14.5)
SODIUM SERPL-SCNC: 140 MMOL/L — SIGNIFICANT CHANGE UP (ref 135–145)
WBC # BLD: 9.69 K/UL — SIGNIFICANT CHANGE UP (ref 3.8–10.5)
WBC # FLD AUTO: 9.69 K/UL — SIGNIFICANT CHANGE UP (ref 3.8–10.5)

## 2024-11-24 PROCEDURE — 86901 BLOOD TYPING SEROLOGIC RH(D): CPT

## 2024-11-24 PROCEDURE — 83550 IRON BINDING TEST: CPT

## 2024-11-24 PROCEDURE — 82962 GLUCOSE BLOOD TEST: CPT

## 2024-11-24 PROCEDURE — 36430 TRANSFUSION BLD/BLD COMPNT: CPT

## 2024-11-24 PROCEDURE — 80307 DRUG TEST PRSMV CHEM ANLYZR: CPT

## 2024-11-24 PROCEDURE — 80053 COMPREHEN METABOLIC PANEL: CPT

## 2024-11-24 PROCEDURE — 85025 COMPLETE CBC W/AUTO DIFF WBC: CPT

## 2024-11-24 PROCEDURE — 86900 BLOOD TYPING SEROLOGIC ABO: CPT

## 2024-11-24 PROCEDURE — 84466 ASSAY OF TRANSFERRIN: CPT

## 2024-11-24 PROCEDURE — 83540 ASSAY OF IRON: CPT

## 2024-11-24 PROCEDURE — 83036 HEMOGLOBIN GLYCOSYLATED A1C: CPT

## 2024-11-24 PROCEDURE — 85027 COMPLETE CBC AUTOMATED: CPT

## 2024-11-24 PROCEDURE — 83735 ASSAY OF MAGNESIUM: CPT

## 2024-11-24 PROCEDURE — 86850 RBC ANTIBODY SCREEN: CPT

## 2024-11-24 PROCEDURE — 99285 EMERGENCY DEPT VISIT HI MDM: CPT

## 2024-11-24 PROCEDURE — 36415 COLL VENOUS BLD VENIPUNCTURE: CPT

## 2024-11-24 PROCEDURE — P9016: CPT

## 2024-11-24 PROCEDURE — 82247 BILIRUBIN TOTAL: CPT

## 2024-11-24 PROCEDURE — 85610 PROTHROMBIN TIME: CPT

## 2024-11-24 PROCEDURE — 82728 ASSAY OF FERRITIN: CPT

## 2024-11-24 PROCEDURE — 85245 CLOT FACTOR VIII VW RISTOCTN: CPT

## 2024-11-24 PROCEDURE — 80048 BASIC METABOLIC PNL TOTAL CA: CPT

## 2024-11-24 PROCEDURE — 85730 THROMBOPLASTIN TIME PARTIAL: CPT

## 2024-11-24 PROCEDURE — 86923 COMPATIBILITY TEST ELECTRIC: CPT

## 2024-11-24 RX ORDER — DOXYCYCLINE HYCLATE 150 MG/1
1 TABLET, COATED ORAL
Qty: 28 | Refills: 0
Start: 2024-11-24 | End: 2024-12-07

## 2024-11-24 RX ADMIN — Medication 1 TABLET(S): at 12:13

## 2024-11-24 RX ADMIN — Medication 4: at 12:15

## 2024-11-24 RX ADMIN — IRON SUCROSE 210 MILLIGRAM(S): 20 INJECTION, SOLUTION INTRAVENOUS at 06:19

## 2024-11-24 RX ADMIN — ACETAMINOPHEN 500MG 650 MILLIGRAM(S): 500 TABLET, COATED ORAL at 06:30

## 2024-11-24 RX ADMIN — Medication 13 UNIT(S): at 12:13

## 2024-11-24 RX ADMIN — GABAPENTIN 100 MILLIGRAM(S): 300 CAPSULE ORAL at 06:01

## 2024-11-24 RX ADMIN — Medication 325 MILLIGRAM(S): at 12:13

## 2024-11-24 RX ADMIN — Medication 100 MILLIGRAM(S): at 12:13

## 2024-11-24 RX ADMIN — CARVEDILOL 6.25 MILLIGRAM(S): 25 TABLET, FILM COATED ORAL at 06:00

## 2024-11-24 RX ADMIN — Medication 13 UNIT(S): at 08:10

## 2024-11-24 RX ADMIN — DOXYCYCLINE HYCLATE 100 MILLIGRAM(S): 150 TABLET, COATED ORAL at 06:18

## 2024-11-24 RX ADMIN — ACETAMINOPHEN 500MG 650 MILLIGRAM(S): 500 TABLET, COATED ORAL at 06:00

## 2024-11-24 RX ADMIN — GABAPENTIN 100 MILLIGRAM(S): 300 CAPSULE ORAL at 13:26

## 2024-11-24 RX ADMIN — AMLODIPINE BESYLATE 10 MILLIGRAM(S): 10 TABLET ORAL at 06:00

## 2024-11-24 RX ADMIN — Medication 4: at 08:10

## 2024-11-24 NOTE — CASE MANAGEMENT PROGRESS NOTE - NSCMPROGRESSNOTE_GEN_ALL_CORE
RN/CM noted pt being cleared for transition home today 11/24/2024 on PO ABT. CM met with pt, he is agreeable with DC today, DC notice provided. Pt declined need for skilled home care services, pt will follow-up with podiatrist Dr Salvatore Davenport [phone] for wound care. Pt requested assistance with DC transport, CM reached out to  leadership and obtained approval for trip: [employer]  @ 3:30 PM today [facility] to [facility] [address] [facility] then to his home at [address] [place] [address] (As per Gina of [employer] - $27.29 for entire trip). MSW has facilitated obtaining community PCP appt with DR Emmanuel Ngo on 12/09/2024 @ 2:30 PM- pt made aware. Staff on unit apprised re: all above

## 2024-11-24 NOTE — PROGRESS NOTE ADULT - SUBJECTIVE AND OBJECTIVE BOX
PROGRESS NOTE   Patient is a 49y old  Male who presents with a chief complaint of bleeding L toe (24 Nov 2024 10:16)      HPI:  49-year-old male with history of diabetes mellitus, hypertension, hyperlipidemia, neuropathy, peripheral vascular disease, chronic kidney disease, and EtOH abuser brought in by ambulance for bleeding from left foot that occurred prior to arrival.  Patient has a history of diabetic foot ulcer with cellulitis.  Was diagnosed with acute on chronic osteomyelitis of left foot.  On November 12 had amputation of toe on left foot by Dr. Davenport.  Patient had bleeding from foot on November 17 and a rapid response team was called.  Was taken to the OR and homeostasis was obtained.  Was transfused 1 unit at that time.  Patient has not had any bleeding since then and was discharged this morning.  Patient states he was walking in the driveway having a verbal confrontation with his landlord.  Believes he stepped on his forefoot and started to bleed.  EMS was called.  States on arrival to emergency room bleeding has now stopped.  Denies other pain or complaints    In ED:  Vitals- T 98.7  HR 97  BP  104/58  RR 18 O2S- 95%  Labs- Denied blood work in ED; agreeable for AM labs; Nov 20th AM labs prior to DC->  Hgb 8.1 Hct 25.4  MCV 87.6  Cr 2.87  BUN 74  eGFR 26  Consults made in ED-> Podiatry; recs appreciated      (20 Nov 2024 23:00)      Vital Signs Last 24 Hrs  T(C): 37.1 (24 Nov 2024 05:21), Max: 37.2 (24 Nov 2024 00:58)  T(F): 98.8 (24 Nov 2024 05:21), Max: 99 (24 Nov 2024 00:58)  HR: 89 (24 Nov 2024 05:21) (89 - 94)  BP: 146/78 (24 Nov 2024 05:21) (141/79 - 157/-)  BP(mean): --  RR: 17 (24 Nov 2024 05:21) (17 - 18)  SpO2: 95% (24 Nov 2024 05:21) (95% - 97%)    Parameters below as of 24 Nov 2024 05:21  Patient On (Oxygen Delivery Method): room air                              7.5    9.69  )-----------( 211      ( 24 Nov 2024 06:00 )             23.2               11-24    140  |  107  |  70[H]  ----------------------------<  205[H]  4.5   |  22  |  2.52[H]    Ca    9.3      24 Nov 2024 06:00    TPro  7.4  /  Alb  2.8[L]  /  TBili  0.3  /  DBili  x   /  AST  34  /  ALT  52  /  AlkPhos  167[H]  11-24      LOWER EXTREMITY PHYSICAL EXAM:    Left foot s/p surgical Debridement of all non-viable soft tissue and bone, debridement of deep space infection of left foot with left 1st partial ray resection and packing all left foot. no  active bleeding noted .  The sutures are intact and the flap is viable at this time.  There is decreased erythema, edema and calor noted. Patient has guarded prognosis . Will cont to allow any further demarcation to occur and will closely monitor.    Vascular: Dorsalis Pedis and Posterior Tibial pulses palpable 2/4 bilaterally.  Capillary re-fill time less then 3 seconds digits 1-5 bilateral.  Mild-moderate edema bilaterally up to the ankle left foot   Neuro: Protective sensation diminished to the level of the digits bilateral.   MSK: Muscle strength 5/5 all major muscle groups bilateral.

## 2024-11-24 NOTE — PROGRESS NOTE ADULT - PROBLEM SELECTOR PROBLEM 1
Osteomyelitis of ankle or foot, acute
Diabetes
Osteomyelitis of ankle or foot, acute

## 2024-11-24 NOTE — DISCHARGE NOTE NURSING/CASE MANAGEMENT/SOCIAL WORK - NSDCFUADDAPPT_GEN_ALL_CORE_FT
New primary care appointment scheduled for Monday, 12/09/2024, @ 2:30 PM, w/ Dr. Huber Benitez MD, @ [facility], located @ [address] [place] [id number], phone [phone]. Please arrive 15 minutes early & bring your photo ID card and insurance cards.    You will follow-up as discussed, with DR Salvatore Davenport -podiatrist @ [address] [place] [address] [phone] for your wound care.

## 2024-11-24 NOTE — PROGRESS NOTE ADULT - ASSESSMENT
{\rtf1\uwhtfr17346\ansi\cbcsuoy6335\ftnbj\uc1\deff0  {\fonttbl{\f0 \fnil Segoe UI;}{\f1 \fnil \fcharset0 Microsoft Sans Serif;}{\f2 \fnil Times New [place];}}  {\colortbl ;\ptw644\ioljz483\axii538 ;\red0\green0\blue0 ;\red0\green0\zkwr164 ;\red0\green0\blue0 ;}  {\stylesheet{\f0\fs20 Normal;}{\cs1 Default Paragraph Font;}{\cs2\f0\fs16 Line Number;}{\cs3\f2\fs24\ul\cf3 Hyperlink;}}  {\*\revtbl{Unknown;}}  \myoeri99681\ncerir46934\btiaq9142\feiyj2386\xequz2073\ozaoj9920\xupugtz076\adbqtbe860\nogrowautofit\hnhlvw800\formshade\nofeaturethrottle1\dntblnsbdb\fet4\aendnotes\aftnnrlc\pgbrdrhead\pgbrdrfoot  \sectd\nmwjdw27912\ndghrr30155\guttersxn0\dnppdykn4454\lvlnbaax8041\mmbolpsm3465\ebvxieaa9303\louaggx052\baxkvcj412\sbkpage\pgncont\pgndec  \plain\plain\f0\fs24\ql\plain\f0\fs24\plain\f1\fs20\ypas0796\hich\f1\dbch\f1\loch\f1\fs20 49-year-old male with history of diabetes mellitus, hypertension, hyperlipidemia, neuropathy, peripheral vascular disease, chronic kidney disease, and EtOH abuser   brought in by ambulance for bleeding from left foot that occurred prior to arrival. Admitted for hematological workup to assess the cause of recurrent bleeds he is having from L foot, even after hemostasis was achieved in OR.\par  \plain\f1\fs16\cieo7392\hich\f1\dbch\f1\loch\f1\cf2\fs16\b\ul{\field{\*\fldinst HYPERLINK [id number] }{\fldrslt Problem/Plan - 1:}}\plain\f1\fs16\oyxs7422\hich\f1\dbch\f1\loch\f1\cf2\fs16\ql\par  \'b7  {\*\bkmkstart [id number]}{\*\bkmkend uj71090063683}Problem: {\*\bkmkstart kd84349690405}{\*\bkmkend jf36033429060}Osteomyelitis of ankle or foot, acute. \par  \'b7  {\*\bkmkstart ji13456055254}{\*\bkmkend by52655744791}Plan: {\*\bkmkstart ff10967403077}{\*\bkmkend ba41498000634}ID reconsulted; Dr. Mack\par  continue doxycycline 100 mg BID to complete 14 day course, stop date 12/3\par  nonweight bearing of L foot, ambulate with crutches, PT consult\par  podiatry following, may need surgery.\plain\f1\fs16\xnjf4364\hich\f1\dbch\f1\loch\f1\cf2\fs16\strike\plain\f1\fs16\uktz9467\hich\f1\dbch\f1\loch\f1\cf2\fs16\par  \par  \plain\f1\fs16\mxly4465\hich\f1\dbch\f1\loch\f1\cf2\fs16\b\ul{\field{\*\fldinst HYPERLINK [id number] }{\fldrslt Problem/Plan - 2:}}\plain\f1\fs16\jveq7179\hich\f1\dbch\f1\loch\f1\cf2\fs16\ql\par  \'b7  {\*\bkmkstart pf48828329392}{\*\bkmkend of12253303243}Problem: {\*\bkmkstart es47341720371}{\*\bkmkend mo21232032499}Anemia due to acute blood loss. \par  \'b7  {\*\bkmkstart uy32453859903}{\*\bkmkend vz86980788702}Plan: {\*\bkmkstart fu26010391907}{\*\bkmkend ev47172290930}likely in setting of components of CKD, Chronic Alcohol abuse\par  patient had another episode of bleeding from L toe after being discharged, likely in setting of placing pressure on foot, and exacerbating wound causing bleeding\par   Hematology consult noted , will consider DDAVP if ok with nephro\par  1u pRBC transfused, will monitor and transfuse to keep Hgb>7.\par  monitoring CBC . HB stable\par  \par  \plain\f1\fs16\ahgo0065\hich\f1\dbch\f1\loch\f1\cf2\fs16\b\ul{\field{\*\fldinst HYPERLINK [id number] }{\fldrslt Problem/Plan - 3:}}\plain\f1\fs16\fbof6957\hich\f1\dbch\f1\loch\f1\cf2\fs16\ql\par  \'b7  {\*\bkmkstart lb68370724463}{\*\bkmkend ma96595968061}Problem: {\*\bkmkstart cj61992374709}{\*\bkmkend vz66961442221}Stage 4 chronic kidney disease. \par  \'b7  {\*\bkmkstart ab32662399205}{\*\bkmkend ve37944993550}Plan: {\*\bkmkstart ku81260544631}{\*\bkmkend vo31775353951}Previously seen by Nephrology-> LUISA CKD 4 due to ischemic/nephrotoxic ATN, stabilized after hemostasis was achieved. \par  ARB was discontinued in previous admission\par  monitor BMP.\par  \par  \plain\f1\fs16\hzft0306\hich\f1\dbch\f1\loch\f1\cf2\fs16\b\ul{\field{\*\fldinst HYPERLINK [id number] }{\fldrslt Problem/Plan - 4:}}\plain\f1\fs16\ermg2719\hich\f1\dbch\f1\loch\f1\cf2\fs16\ql\par  \'b7  {\*\bkmkstart kf09143408907}{\*\bkmkend mm24447344010}Problem: {\*\bkmkstart yh34858197387}{\*\bkmkend en36839636857}Diabetes. \par  \'b7  {\*\bkmkstart gu38105235608}{\*\bkmkend dd98205780953}Plan: {\*\bkmkstart tx72330650727}{\*\bkmkend jf23401043077}A1c 8.5%\par  continue Lantus 40 units (home dose 45)\par  10 units insulin lispro TID (on home 15)\par  insulin sliding scale.\par  \par  \plain\f1\fs16\lero1858\hich\f1\dbch\f1\loch\f1\cf2\fs16\b\ul{\field{\*\fldinst HYPERLINK [id number] }{\fldrslt Problem/Plan - 5:}}\plain\f1\fs16\rjmc1375\hich\f1\dbch\f1\loch\f1\cf2\fs16\ql\par  \'b7  {\*\bkmkstart ao71249032794}{\*\bkmkend li09155653653}Problem: {\*\bkmkstart kp30171229242}{\*\bkmkend yy93617630139}HTN (hypertension). \par  \'b7  {\*\bkmkstart bo57182084526}{\*\bkmkend xs63290098015}Plan: {\*\bkmkstart di82287879694}{\*\bkmkend qe60237689338}continue amlodipine 10 mg daily  and carvedilol 6.25 BID with holding parameters.\par  \par  \plain\f1\fs16\waxq1990\hich\f1\dbch\f1\loch\f1\cf2\fs16\b\ul{\field{\*\fldinst HYPERLINK [id number] }{\fldrslt Problem/Plan - 6:}}\plain\f1\fs16\zenv1884\hich\f1\dbch\f1\loch\f1\cf2\fs16\ql\par  \'b7  {\*\bkmkstart je78932525638}{\*\bkmkend ec22280574056}Problem: {\*\bkmkstart yz50866344821}{\*\bkmkend di43404662402}Alcohol abuse. \par  \'b7  {\*\bkmkstart bk80217242145}{\*\bkmkend ur32757037037}Plan: {\*\bkmkstart cc68381089312}{\*\bkmkend yq63250739510}alcohol reportedly found in room\par  serum alcohol pending\par  will monitor on CIWA.\par  \par  \plain\f1\fs16\tbiw1605\hich\f1\dbch\f1\loch\f1\cf2\fs16\b\ul{\field{\*\fldinst HYPERLINK [id number] }{\fldrslt Problem/Plan - 7:}}\plain\f1\fs16\nbmp8123\hich\f1\dbch\f1\loch\f1\cf2\fs16\ql\par  \'b7  {\*\bkmkstart bk50166958410}{\*\bkmkend dc05413397809}Problem: {\*\bkmkstart ud12525193493}{\*\bkmkend ml80193946142}Need for prophylactic measure. \par  \'b7  {\*\bkmkstart zu49442950532}{\*\bkmkend xy76429701921}Plan: {\*\bkmkstart oc67417509468}{\*\bkmkend iv61500476858}SCDs in setting acute blood loss anemia\par  Regular Diet w/ Consistent Carbohydrate \par  Ambulate w/ Crutches\par  PT consult/SW consult/ Case Management consult.\plain\f1\fs20\yvol2698\hich\f1\dbch\f1\loch\f1\fs20\par  }  
49 y.o with left foot OM
anemia due to chronic ds, inflammation, renal insuff, acute illness, osteo, blood loss, poor response during stress  appropriate incr Hgb after PRBC tx and remained stable    -anemia w/u also w iron studies that can be c/w iron deficiency, started on Venofer 100mg IV x3 Thur, tolerating well  -continue present management  -follow serial CBC, can transfuise as clinically indicated    will sign off for now, plese call if any questions
49 y.o with left foot OM
49 y.o with left foot OM  
49-year-old male with history of diabetes mellitus, hypertension, hyperlipidemia, neuropathy, peripheral vascular disease, chronic kidney disease, and EtOH abuser brought in by ambulance for bleeding from left foot that occurred prior to arrival. Admitted for hematological workup to assess the cause of recurrent bleeds he is having from L foot, even after hemostasis was achieved in OR.  Problem/Plan - 1:  ·  Problem: Osteomyelitis of ankle or foot, acute.   ·  Plan: ID reconsulted; Dr. Mack  continue doxycycline 100 mg BID to complete 14 day course, stop date 12/3  nonweight bearing of L foot, ambulate with crutches, PT consult  podiatry following,   cont abx as per ID.    Problem/Plan - 2:  ·  Problem: Anemia due to acute blood loss.   ·  Plan: likely in setting of components of CKD, Chronic Alcohol abuse  patient had another episode of bleeding from L toe after being discharged, likely in setting of placing pressure on foot, and exacerbating wound causing bleeding   Hematology consult noted , will consider DDAVP if ok with nephro  1u pRBC transfused, will monitor and transfuse to keep Hgb>7.  monitoring CBC . HB stable, on venofer, PRBC prn,     Problem/Plan - 3:  ·  Problem: Stage 4 chronic kidney disease.   ·  Plan: Previously seen by Nephrology-> LUISA CKD 4 due to ischemic/nephrotoxic ATN, stabilized after hemostasis was achieved.   ARB was discontinued in previous admission  monitor BMP.    Problem/Plan - 4:  ·  Problem: Diabetes.   ·  Plan: A1c 8.5%  continue Lantus 40 units (home dose 45)  10 units insulin lispro TID (on home 15)  insulin sliding scale.    Problem/Plan - 5:  ·  Problem: HTN (hypertension).   ·  Plan: continue amlodipine 10 mg daily  and carvedilol 6.25 BID with holding parameters.    Problem/Plan - 6:  ·  Problem: Alcohol abuse.   ·  Plan: alcohol reportedly found in room  serum alcohol pending  will monitor on CIWA.    Problem/Plan - 7:  ·  Problem: Need for prophylactic measure.   ·  Plan: SCDs in setting acute blood loss anemia  Regular Diet w/ Consistent Carbohydrate   Ambulate w/ Crutches  PT consult/SW consult/ Case Management consult.
49 y.o with left foot OM
49-year-old male with history of diabetes mellitus, hypertension, hyperlipidemia, neuropathy, peripheral vascular disease, chronic kidney disease, and EtOH abuser brought in by ambulance for bleeding from left foot that occurred prior to arrival. Admitted for hematological workup to assess the cause of recurrent bleeds he is having from L foot, even after hemostasis was achieved in OR.
49-year-old male with history of diabetes mellitus, hypertension, hyperlipidemia, neuropathy, peripheral vascular disease, chronic kidney disease, and EtOH abuser brought in by ambulance for bleeding from left foot that occurred prior to arrival. Admitted for hematological workup to assess the cause of recurrent bleeds he is having from L foot, even after hemostasis was achieved in OR.  Problem/Plan - 1:  ·  Problem: Osteomyelitis of ankle or foot, acute.   ·  Plan: ID reconsulted; Dr. Mack  continue doxycycline 100 mg BID to complete 14 day course, stop date 12/3  nonweight bearing of L foot, ambulate with crutches, PT consult  podiatry following,   cont abx as per ID.    Problem/Plan - 2:  ·  Problem: Anemia due to acute blood loss. on anemia of ch disease with CKD and multiple co morbidities  ·  Plan: likely in setting of components of CKD, Chronic Alcohol abuse  patient had another episode of bleeding from L toe after being discharged, likely in setting of placing pressure on foot, and exacerbating wound causing bleeding   Hematology consult noted , will consider DDAVP if ok with nephro  1u pRBC transfused, will monitor and transfuse to keep Hgb>7.  monitoring CBC . HB stable, received venofer x 3 days  d/w DR Hughes, stable for discharge on PO iron  out pt f up with hematology    Problem/Plan - 3:  ·  Problem: Stage 4 chronic kidney disease.   ·  Plan: Previously seen by Nephrology-> LUISA CKD 4 due to ischemic/nephrotoxic ATN, stabilized after hemostasis was achieved.   ARB was discontinued in previous admission, cont current care, nephro f up noted      Problem/Plan - 4:  ·  Problem: Diabetes.   ·  Plan: A1c 8.5%  continue Lantus 40 units (home dose 45)  13units insulin lispro TID (on home 15)  insulin sliding scale.   endo consult noted    Problem/Plan - 5:  ·  Problem: HTN (hypertension).   ·  Plan: continue amlodipine 10 mg daily  and carvedilol 6.25 BID with holding parameters.    Problem/Plan - 6:  ·  Problem: Alcohol abuse.   ·  Plan: alcohol reportedly found in room  serum alcohol pending  will monitor on CIWA.    Problem/Plan - 7:  ·  Problem: Need for prophylactic measure.   ·  Plan: SCDs in setting acute blood loss anemia  Regular Diet w/ Consistent Carbohydrate   Ambulate w/ Crutches  PT consult/SW consult/ Case Management consult.   Dc plan

## 2024-11-24 NOTE — DISCHARGE NOTE NURSING/CASE MANAGEMENT/SOCIAL WORK - FINANCIAL ASSISTANCE
[facility] provides services at a reduced cost to those who are determined to be eligible through [facility]’s financial assistance program. Information regarding [facility]’s financial assistance program can be found by going to [email or web address] or by calling [phone].

## 2024-11-24 NOTE — PROGRESS NOTE ADULT - ATTENDING COMMENTS
reviewed medical history, physical exam and care plan   note read and reviewed

## 2024-11-24 NOTE — DISCHARGE NOTE PROVIDER - NSDCFUSCHEDAPPT_GEN_ALL_CORE_FT
[facility]  [facility] [address]  Scheduled Appointment: 12/09/2024

## 2024-11-24 NOTE — PROGRESS NOTE ADULT - REASON FOR ADMISSION
bleeding L toe

## 2024-11-24 NOTE — DISCHARGE NOTE NURSING/CASE MANAGEMENT/SOCIAL WORK - PATIENT PORTAL LINK FT
You can access the FollowMyHealth Patient Portal offered by [facility] by registering at the following website: [email or web address]. By joining [facility]’s FollowMyHealth portal, you will also be able to view your health information using other applications (apps) compatible with our system.

## 2024-11-24 NOTE — DISCHARGE NOTE PROVIDER - CARE PROVIDER_API CALL
Gabriel Collado  Medical Oncology  [address], [address]  [place] [address]  Phone: [phone]  Fax: [phone]  Follow Up Time:     Salvatore Davenport  Podiatric Medicine and Surgery  [address] [place] [address]  Phone: [phone]  Fax: [phone]  Follow Up Time:

## 2024-11-24 NOTE — PROGRESS NOTE ADULT - PROVIDER SPECIALTY LIST ADULT
Heme/Onc
Infectious Disease
Infectious Disease
Nephrology
Infectious Disease
Internal Medicine
Podiatry
Nephrology
Podiatry
Endocrinology
Podiatry
Podiatry
Hospitalist
Internal Medicine
Internal Medicine

## 2024-11-24 NOTE — DISCHARGE NOTE PROVIDER - NSDCCPCAREPLAN_GEN_ALL_CORE_FT
PRINCIPAL DISCHARGE DIAGNOSIS  Diagnosis: Bleeding from wound  Assessment and Plan of Treatment: hemostatized      SECONDARY DISCHARGE DIAGNOSES  Diagnosis: Osteomyelitis of ankle or foot, acute  Assessment and Plan of Treatment: cont abx till 12/3    Diagnosis: Anemia due to acute blood loss  Assessment and Plan of Treatment: received PRBC and venoferx 3, out pt f up with hematology and PO iron

## 2024-11-24 NOTE — PROGRESS NOTE ADULT - PROBLEM SELECTOR PLAN 1
Discussed diagnosis and treatment with patient.  Patient is s/p surgical debridement of all non-viable soft tissue and bone left foot with partial 1st ray resection and packing all left foot . DOS 11/12/2024.   s/p surgical exploration of left foot for active bleed DOS 11/17/2024  Upon dressing change, no active bleeding was noted.  Appreciates Hematology and ID consults.   Continue Abx and follow ID recommendations.  Recs elevation of left lower extremity.   Pt may require serial surgical debridements/amputation and possible BKA if symptoms persist.    Pt understands the severity of his condition and  that he is at risk to lose part of the left foot, all the foot or below the knee amputation. All questions were asked and answered for patient satisfaction    Medical management as per primary team   Will cont to monitor and discuss all options with patient and all attendings.

## 2024-11-24 NOTE — PROGRESS NOTE ADULT - SUBJECTIVE AND OBJECTIVE BOX
Patient is a 49y old  Male who presents with a chief complaint of bleeding L toe (24 Nov 2024 08:47)      INTERVAL HPI/OVERNIGHT EVENTS:overnight events noted    Home Medications:  acetaminophen 325 mg oral tablet: 2 tab(s) orally every 6 hours As needed Temp greater or equal to 38C (100.4F), Mild Pain (1 - 3), Moderate Pain (4 - 6) (20 Nov 2024 21:59)      MEDICATIONS  (STANDING):  acetaminophen     Tablet .. 650 milliGRAM(s) Oral every 6 hours  amLODIPine   Tablet 10 milliGRAM(s) Oral daily  carvedilol 6.25 milliGRAM(s) Oral every 12 hours  dextrose 5%. 1000 milliLiter(s) (100 mL/Hr) IV Continuous <Continuous>  dextrose 5%. 1000 milliLiter(s) (50 mL/Hr) IV Continuous <Continuous>  dextrose 50% Injectable 25 Gram(s) IV Push once  dextrose 50% Injectable 12.5 Gram(s) IV Push once  dextrose 50% Injectable 25 Gram(s) IV Push once  doxycycline monohydrate Capsule 100 milliGRAM(s) Oral every 12 hours  ferrous    sulfate 325 milliGRAM(s) Oral daily  gabapentin 100 milliGRAM(s) Oral three times a day  glucagon  Injectable 1 milliGRAM(s) IntraMuscular once  insulin glargine Injectable (LANTUS) 40 Unit(s) SubCutaneous at bedtime  insulin lispro (ADMELOG) corrective regimen sliding scale   SubCutaneous three times a day before meals  insulin lispro (ADMELOG) corrective regimen sliding scale   SubCutaneous at bedtime  insulin lispro Injectable (ADMELOG) 13 Unit(s) SubCutaneous three times a day before meals  lactobacillus acidophilus 1 Tablet(s) Oral daily  thiamine 100 milliGRAM(s) Oral daily    MEDICATIONS  (PRN):  acetaminophen     Tablet .. 650 milliGRAM(s) Oral every 6 hours PRN Temp greater or equal to 38C (100.4F), Mild Pain (1 - 3)  dextrose Oral Gel 15 Gram(s) Oral once PRN Blood Glucose LESS THAN 70 milliGRAM(s)/deciliter  diphenhydrAMINE 25 milliGRAM(s) Oral at bedtime PRN Insomnia  melatonin 3 milliGRAM(s) Oral at bedtime PRN Insomnia      Allergies    No Known Allergies    Intolerances        REVIEW OF SYSTEMS:  CONSTITUTIONAL: No fever, weight loss, or fatigue  EYES: No eye pain, visual disturbances, or discharge  ENMT:  No difficulty hearing, tinnitus, vertigo; No sinus or throat pain  NECK: No pain or stiffness  BREASTS: No pain, masses, or nipple discharge  RESPIRATORY: No cough, wheezing, chills or hemoptysis; No shortness of breath  CARDIOVASCULAR: No chest pain, palpitations, dizziness, or leg swelling  GASTROINTESTINAL: No abdominal or epigastric pain. No nausea, vomiting, or hematemesis; No diarrhea or constipation. No melena or hematochezia.  GENITOURINARY: No dysuria, frequency, hematuria, or incontinence  NEUROLOGICAL: No headaches, memory loss, loss of strength, numbness, or tremors  SKIN: No itching, burning, rashes, or lesions     Vital Signs Last 24 Hrs  T(C): 37.1 (24 Nov 2024 05:21), Max: 37.2 (24 Nov 2024 00:58)  T(F): 98.8 (24 Nov 2024 05:21), Max: 99 (24 Nov 2024 00:58)  HR: 89 (24 Nov 2024 05:21) (88 - 94)  BP: 146/78 (24 Nov 2024 05:21) (141/79 - 157/-)  BP(mean): --  RR: 17 (24 Nov 2024 05:21) (17 - 18)  SpO2: 95% (24 Nov 2024 05:21) (95% - 97%)    Parameters below as of 24 Nov 2024 05:21  Patient On (Oxygen Delivery Method): room air        PHYSICAL EXAM:  GENERAL: NAD, well-groomed, well-developed  HEAD:  Atraumatic, Normocephalic  EYES: EOMI, PERRLA, conjunctiva and sclera clear  ENMT: Moist mucous membranes,   NECK: Supple, No JVD, Normal thyroid  NERVOUS SYSTEM:  Alert & Oriented X3,non focal  CHEST/LUNG: Clear to percussion bilaterally;   HEART: Regular rate and rhythm;  ABDOMEN: Soft, Nontender, Nondistended; Bowel sounds present  EXTREMITIES:  2+ Peripheral Pulses, No clubbing, cyanosis, or edema  SKIN: No rashes or lesions    LABS:                        7.5    9.69  )-----------( 211      ( 24 Nov 2024 06:00 )             23.2     11-24    140  |  107  |  70[H]  ----------------------------<  205[H]  4.5   |  22  |  2.52[H]    Ca    9.3      24 Nov 2024 06:00    TPro  7.4  /  Alb  2.8[L]  /  TBili  0.3  /  DBili  x   /  AST  34  /  ALT  52  /  AlkPhos  167[H]  11-24      Urinalysis Basic - ( 24 Nov 2024 06:00 )    Color: x / Appearance: x / SG: x / pH: x  Gluc: 205 mg/dL / Ketone: x  / Bili: x / Urobili: x   Blood: x / Protein: x / Nitrite: x   Leuk Esterase: x / RBC: x / WBC x   Sq Epi: x / Non Sq Epi: x / Bacteria: x      CAPILLARY BLOOD GLUCOSE      POCT Blood Glucose.: 206 mg/dL (24 Nov 2024 07:55)  POCT Blood Glucose.: 328 mg/dL (23 Nov 2024 21:42)  POCT Blood Glucose.: 200 mg/dL (23 Nov 2024 16:56)  POCT Blood Glucose.: 284 mg/dL (23 Nov 2024 12:11)          I&O's Summary      RADIOLOGY & ADDITIONAL TESTS:    Imaging Personally Reviewed:  [x ] YES  [ ] NO    Consultant(s) Notes Reviewed:  [ x] YES  [ ] NO    Care Discussed with Consultants/Other Providers [ x] YES  [ ] NO

## 2024-11-24 NOTE — DISCHARGE NOTE PROVIDER - HOSPITAL COURSE
49-year-old male with history of diabetes mellitus, hypertension, hyperlipidemia, neuropathy, peripheral vascular disease, chronic kidney disease, and EtOH abuser brought in by ambulance for bleeding from left foot that occurred prior to arrival. Admitted for hematological workup to assess the cause of recurrent bleeds he is having from L foot, even after hemostasis was achieved in OR.  Problem/Plan - 1:  ·  Problem: Osteomyelitis of ankle or foot, acute.   ·  Plan: ID reconsulted; Dr. Mack  continue doxycycline 100 mg BID to complete 14 day course, stop date 12/3  nonweight bearing of L foot, ambulate with crutches, PT consult  podiatry following,   cont abx as per ID.    Problem/Plan - 2:  ·  Problem: Anemia due to acute blood loss. on anemia of ch disease with CKD and multiple co morbidities  ·  Plan: likely in setting of components of CKD, Chronic Alcohol abuse  patient had another episode of bleeding from L toe after being discharged, likely in setting of placing pressure on foot, and exacerbating wound causing bleeding   Hematology consult noted , will consider DDAVP if ok with nephro  1u pRBC transfused, will monitor and transfuse to keep Hgb>7.  monitoring CBC . HB stable, received venofer x 3 days  d/w DR Hughes, stable for discharge on PO iron  out pt f up with hematology    Problem/Plan - 3:  ·  Problem: Stage 4 chronic kidney disease.   ·  Plan: Previously seen by Nephrology-> LUISA CKD 4 due to ischemic/nephrotoxic ATN, stabilized after hemostasis was achieved.   ARB was discontinued in previous admission, cont current care, nephro f up noted      Problem/Plan - 4:  ·  Problem: Diabetes with nephropathy , and neuropathy, and Diabetic foot, PVD  ·  Plan: A1c 8.5%  continue Lantus 40 units (home dose 45)  13units insulin lispro TID (on home 15)  insulin sliding scale.   endo consult noted    Problem/Plan - 5:  ·  Problem: HTN (hypertension).   ·  Plan: continue amlodipine 10 mg daily  and carvedilol 6.25 BID with holding parameters.    Problem/Plan - 6:  ·  Problem: Alcohol abuse.   ·  Plan: alcohol reportedly found in room  serum alcohol pending  will monitor on CIWA.    Problem/Plan - 7:  ·  Problem: Need for prophylactic measure.   ·  Plan: SCDs in setting acute blood loss anemia  Regular Diet w/ Consistent Carbohydrate   Ambulate w/ Crutches  PT consult/SW consult/ Case Management consult.   Dc plan

## 2024-11-24 NOTE — PROGRESS NOTE ADULT - SUBJECTIVE AND OBJECTIVE BOX
Subjective: no pain or dyspnea.       MEDICATIONS  (STANDING):  acetaminophen     Tablet .. 650 milliGRAM(s) Oral every 6 hours  amLODIPine   Tablet 10 milliGRAM(s) Oral daily  carvedilol 6.25 milliGRAM(s) Oral every 12 hours  dextrose 5%. 1000 milliLiter(s) (100 mL/Hr) IV Continuous <Continuous>  dextrose 5%. 1000 milliLiter(s) (50 mL/Hr) IV Continuous <Continuous>  dextrose 50% Injectable 25 Gram(s) IV Push once  dextrose 50% Injectable 12.5 Gram(s) IV Push once  dextrose 50% Injectable 25 Gram(s) IV Push once  doxycycline monohydrate Capsule 100 milliGRAM(s) Oral every 12 hours  ferrous    sulfate 325 milliGRAM(s) Oral daily  gabapentin 100 milliGRAM(s) Oral three times a day  glucagon  Injectable 1 milliGRAM(s) IntraMuscular once  insulin glargine Injectable (LANTUS) 40 Unit(s) SubCutaneous at bedtime  insulin lispro (ADMELOG) corrective regimen sliding scale   SubCutaneous three times a day before meals  insulin lispro (ADMELOG) corrective regimen sliding scale   SubCutaneous at bedtime  insulin lispro Injectable (ADMELOG) 13 Unit(s) SubCutaneous three times a day before meals  lactobacillus acidophilus 1 Tablet(s) Oral daily  thiamine 100 milliGRAM(s) Oral daily    MEDICATIONS  (PRN):  acetaminophen     Tablet .. 650 milliGRAM(s) Oral every 6 hours PRN Temp greater or equal to 38C (100.4F), Mild Pain (1 - 3)  dextrose Oral Gel 15 Gram(s) Oral once PRN Blood Glucose LESS THAN 70 milliGRAM(s)/deciliter  diphenhydrAMINE 25 milliGRAM(s) Oral at bedtime PRN Insomnia  melatonin 3 milliGRAM(s) Oral at bedtime PRN Insomnia          T(C): 37.1 (11-24-24 @ 05:21), Max: 37.2 (11-24-24 @ 00:58)  HR: 89 (11-24-24 @ 05:21) (82 - 94)  BP: 146/78 (11-24-24 @ 05:21) (126/74 - 157/-)  RR: 17 (11-24-24 @ 05:21) (17 - 18)  SpO2: 95% (11-24-24 @ 05:21) (92% - 97%)  Wt(kg): --        I&O's Detail           PHYSICAL EXAM:    GENERAL: NAD  NECK: Supple, no inc in JVP  CHEST/LUNG: Clear  HEART: S1S2  ABDOMEN: Soft, Nontender, Nondistended; Bowel sounds present  EXTREMITIES:  L foot dressed. Trace periph edema.   NEURO: no asterixis      LABS:  CBC Full  -  ( 24 Nov 2024 06:00 )  WBC Count : 9.69 K/uL  RBC Count : 2.68 M/uL  Hemoglobin : 7.5 g/dL  Hematocrit : 23.2 %  Platelet Count - Automated : 211 K/uL  Mean Cell Volume : 86.6 fL  Mean Cell Hemoglobin : 28.0 pg  Mean Cell Hemoglobin Concentration : 32.3 g/dL  Auto Neutrophil # : 6.13 K/uL  Auto Lymphocyte # : 2.01 K/uL  Auto Monocyte # : 0.91 K/uL  Auto Eosinophil # : 0.28 K/uL  Auto Basophil # : 0.17 K/uL  Auto Neutrophil % : 63.2 %  Auto Lymphocyte % : 20.7 %  Auto Monocyte % : 9.4 %  Auto Eosinophil % : 2.9 %  Auto Basophil % : 1.8 %    11-24    140  |  107  |  70[H]  ----------------------------<  205[H]  4.5   |  22  |  2.52[H]    Ca    9.3      24 Nov 2024 06:00    TPro  7.4  /  Alb  2.8[L]  /  TBili  0.3  /  DBili  x   /  AST  34  /  ALT  52  /  AlkPhos  167[H]  11-24          Impression:  LUISA 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  Stabilized  Diabetes with chronic foot wound  Hypertension, improved control.   Blood loss anemia, Fe def    Recommend:  Cont to hold ARB  Daily BMP while remains in house.

## 2024-11-24 NOTE — PROGRESS NOTE ADULT - PROBLEM SELECTOR PLAN 5
continue amlodipine 10 mg daily  and carvedilol 6.25 BID with holding parameters.

## 2024-11-25 ENCOUNTER — TRANSCRIPTION ENCOUNTER (OUTPATIENT)
Age: 49
End: 2024-11-25

## 2024-12-09 ENCOUNTER — APPOINTMENT (OUTPATIENT)
Dept: INTERNAL MEDICINE | Facility: CLINIC | Age: 49
End: 2024-12-09

## 2024-12-11 LAB
CULTURE RESULTS: SIGNIFICANT CHANGE UP
SPECIMEN SOURCE: SIGNIFICANT CHANGE UP

## 2024-12-19 NOTE — ED ADULT NURSE NOTE - CHIEF COMPLAINT
A1C >15.1, DIABETIC FOOT ULCER , ACUTE OSTEOMYLITIS The patient is a 45y Male complaining of altered mental status.

## 2025-01-22 ENCOUNTER — TRANSCRIPTION ENCOUNTER (OUTPATIENT)
Age: 50
End: 2025-01-22

## 2025-04-27 NOTE — CONSULT NOTE ADULT - SUBJECTIVE AND OBJECTIVE BOX
INCOMPLETE NOTE.  Documentation in Progress  PT SEEN AND EVALUATED.   FULL/ADDITIONAL RECOMMENDATIONS TO FOLLOW   ***************************************************************    Patient is a 49y old  Male who presents with a chief complaint of infected toe (30 Sep 2024 15:43)    HPI:  49-year-old male with past medical history of DM2, HTN, HLD, NEUROPATHY, PVD, NON COMPLIANT WITH TT, ETOH ABUSE, presents today due to left toe infection.  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  Patient experienced a blister to the left toe in which he eventually fell orders follow-up on its own.  Patient notes that the blister opened up and he developed some blood in the blister as well.  Patient notes that the blister scab has slightly fallen off but now his toe is red and painful.  Patient not currently on antibiotics.  Patient denies fever, injury, numbness, weakness, or any other complaints.   IN ED TEMP 99, /100, HR 89, had consult with podiatry and sidney started on Cipro and admitted for further management (17 Sep 2024 19:36)    PAST MEDICAL & SURGICAL HISTORY:  Prediabetes  HTN (hypertension)  HLD (hyperlipidemia)  DM2 (diabetes mellitus, type 2)     HEALTH ISSUES - PROBLEM Dx:  Diabetic toe ulcer  Uncontrolled type 2 diabetes mellitus with hyperglycemia  Osteomyelitis of ankle or foot, acute    Type 2 diabetes mellitus with other skin complication [E11.628]  Prediabetes [R73.03]  HTN (hypertension) [I10]  HLD (hyperlipidemia) [E78.5]  DM2 (diabetes mellitus, type 2) [E11.9]    FAMILY HISTORY:    [SOCIAL HISTORY: ]     smoking:  none currently     EtOH:  none currently     illicit drugs:  none currently     occupation:  Retired     marital status:       Other:     [ALLERGIES/INTOLERANCES:]  Allergies     No Known Allergies  Intolerances    [MEDICATIONS]  MEDICATIONS  (STANDING):  amLODIPine   Tablet 10 milliGRAM(s) Oral daily  carvedilol 6.25 milliGRAM(s) Oral every 12 hours  dextrose 5%. 1000 milliLiter(s) (100 mL/Hr) IV Continuous <Continuous>  dextrose 5%. 1000 milliLiter(s) (50 mL/Hr) IV Continuous <Continuous>  dextrose 50% Injectable 25 Gram(s) IV Push once  dextrose 50% Injectable 12.5 Gram(s) IV Push once  dextrose 50% Injectable 25 Gram(s) IV Push once  enoxaparin Injectable 40 milliGRAM(s) SubCutaneous every 24 hours  gabapentin 100 milliGRAM(s) Oral three times a day  glucagon  Injectable 1 milliGRAM(s) IntraMuscular once  influenza   Vaccine 0.5 milliLiter(s) IntraMuscular once  insulin glargine Injectable (LANTUS) 35 Unit(s) SubCutaneous at bedtime  insulin lispro (ADMELOG) corrective regimen sliding scale   SubCutaneous at bedtime  insulin lispro (ADMELOG) corrective regimen sliding scale   SubCutaneous three times a day before meals  insulin lispro Injectable (ADMELOG) 12 Unit(s) SubCutaneous three times a day before meals  lactobacillus acidophilus 1 Tablet(s) Oral two times a day with meals  mupirocin 2% Ointment 1 Application(s) Topical <User Schedule>    MEDICATIONS  (PRN):  acetaminophen     Tablet .. 650 milliGRAM(s) Oral every 6 hours PRN Temp greater or equal to 38C (100.4F), Mild Pain (1 - 3), Moderate Pain (4 - 6)  dextrose Oral Gel 15 Gram(s) Oral once PRN Blood Glucose LESS THAN 70 milliGRAM(s)/deciliter  diphenhydrAMINE 25 milliGRAM(s) Oral every 6 hours PRN Rash and/or Itching  labetalol Injectable 10 milliGRAM(s) IV Push every 8 hours PRN Systolic blood pressure >180  melatonin 3 milliGRAM(s) Oral at bedtime PRN Insomnia    [REVIEW OF SYSTEMS: ]  CONSTITUTIONAL: normal, no fever, no shakes, no chills   EYES: No eye pain, no visual disturbances, no discharge  ENMT:  no discharge  NECK: No pain, no stiffness  BREASTS: No pain, no masses, no nipple discharge  RESPIRATORY: No cough, no wheezing, no chills, no hemoptysis; No shortness of breath  CARDIOVASCULAR: No chest pain, no palpitations, no dizziness, no leg swelling  GASTROINTESTINAL: No abdominal, no epigastric pain. No nausea, no vomiting, no hematemesis; No diarrhea , no constipation. No melena, no hematochezia.  GENITOURINARY: No dysuria, no frequency, no hematuria, no incontinence  NEUROLOGICAL: No headaches, no memory loss, no loss of strength, no numbness, no tremors  SKIN: No itching, no burning, no rashes, no lesions   LYMPH NODES: No enlarged glands  ENDOCRINE: No heat or cold intolerance; No hair loss  MUSCULOSKELETAL: No joint pain or swelling; No muscle, no back, no extremity pain  PSYCHIATRIC: No depression, no anxiety, no mood swings, no difficulty sleeping  HEME/LYMPH: No easy bruising, no bleeding gums    [VITALS SIGNS 24hrs]  Vital Signs Last 24 Hrs  T(C): 37.1 (30 Sep 2024 13:15), Max: 37.6 (29 Sep 2024 20:44)  T(F): 98.7 (30 Sep 2024 13:15), Max: 99.7 (29 Sep 2024 20:44)  HR: 87 (30 Sep 2024 13:15) (74 - 93)  BP: 135/85 (30 Sep 2024 13:15) (135/73 - 147/83)  BP(mean): --  RR: 18 (30 Sep 2024 13:15) (18 - 18)  SpO2: 95% (30 Sep 2024 13:15) (93% - 96%)    Parameters below as of 30 Sep 2024 13:15  Patient On (Oxygen Delivery Method): room air    Daily       I&O's Summary  29 Sep 2024 07:01  -  30 Sep 2024 07:00  --------------------------------------------------------  IN: 25 mL / OUT: 1200 mL / NET: -1175 mL    [PHYSICAL EXAM]  GEN:   HEENT: normocephalic and atraumatic. EOMI. PERRL.    NECK: Supple.  No lymphadenopathy   LUNGS: Clear to auscultation.  HEART: S1S2 Regular rate and rhythm, no MRG  ABDOMEN: Soft, nontender, and nondistended.  Positive bowel sounds.    : No CVA tenderness  EXTREMITIES: Without edema.  NEUROLOGIC: grossly intact.  PSYCHIATRIC: Appropriate affect .  SKIN: No rash     [LABS: ]             8.8    7.06  )-----------( 201      ( 30 Sep 2024 07:59 )             26.6   CBC Full  -  ( 30 Sep 2024 07:59 )  WBC Count : 7.06 K/uL  RBC Count : 2.98 M/uL  Hemoglobin : 8.8 g/dL  Hematocrit : 26.6 %  Platelet Count - Automated : 201 K/uL  Mean Cell Volume : 89.3 fL  Mean Cell Hemoglobin : 29.5 pg  Mean Cell Hemoglobin Concentration : 33.1 g/dL  Auto Neutrophil # : 4.58 K/uL  Auto Lymphocyte # : 1.20 K/uL  Auto Monocyte # : 0.90 K/uL  Auto Eosinophil # : 0.24 K/uL  Auto Basophil # : 0.06 K/uL  Auto Neutrophil % : 65.0 %  Auto Lymphocyte % : 17.0 %  Auto Monocyte % : 12.7 %  Auto Eosinophil % : 3.4 %  Auto Basophil % : 0.8 %    09-30  134[L]  |  100  |  60[H]  ----------------------------<  178[H]  4.4   |  26  |  3.16[H]  Ca    9.2      30 Sep 2024 07:59  TPro  6.3  /  Alb  2.5[L]  /  TBili  0.5  /  DBili  x   /  AST  43[H]  /  ALT  58  /  AlkPhos  113  09-30  LIVER FUNCTIONS - ( 30 Sep 2024 07:59 )  Alb: 2.5 g/dL / Pro: 6.3 g/dL / ALK PHOS: 113 U/L / ALT: 58 U/L / AST: 43 U/L / GGT: x           Urinalysis Basic - ( 30 Sep 2024 07:59 )  Color: x / Appearance: x / SG: x / pH: x  Gluc: 178 mg/dL / Ketone: x  / Bili: x / Urobili: x   Blood: x / Protein: x / Nitrite: x   Leuk Esterase: x / RBC: x / WBC x   Sq Epi: x / Non Sq Epi: x / Bacteria: x    CBC TREND (5 Days)  WBC Count: 7.06 K/uL (09-30 @ 07:59)  WBC Count: 7.69 K/uL (09-29 @ 06:00)  WBC Count: 7.02 K/uL (09-28 @ 06:00)  Hemoglobin: 8.8 g/dL (09-30 @ 07:59)  Hemoglobin: 9.9 g/dL (09-29 @ 06:00)  Hemoglobin: 9.7 g/dL (09-28 @ 06:00)  Hematocrit: 26.6 % (09-30 @ 07:59)  Hematocrit: 29.9 % (09-29 @ 06:00)  Hematocrit: 29.1 % (09-28 @ 06:00)  Platelet Count - Automated: 201 K/uL (09-30 @ 07:59)  Platelet Count - Automated: 206 K/uL (09-29 @ 06:00)  Platelet Count - Automated: 169 K/uL (09-28 @ 06:00)    Ferritin: 406 ng/mL (09-26 @ 07:52)  Ferritin: 622 ng/mL (09-19 @ 08:01)  Iron Total: 31 ug/dL (09-26 @ 07:52)  Iron Total: 41 ug/dL (09-19 @ 08:00)  Vitamin B12, Serum: 809 pg/mL (09-26 @ 07:52)  Vitamin B12, Serum: 782 pg/mL (09-19 @ 08:01)     Folate, Serum: 11.0 ng/mL (09-26 @ 07:52)  Folate, Serum: 8.5 ng/mL (09-19 @ 08:01)     Sedimentation Rate, Erythrocyte: 89 mm/Hr (09-17 @ 18:00)      [MICROBIOLOGY /  VIROLOGY:]      [PATHOLOGY]       [RADIOLOGY & ADDITIONAL STUDIES:]        Patient is a 49y old  Male who presents with a chief complaint of infected toe (30 Sep 2024 15:43)    HPI:  49-year-old male with past medical history of DM2, HTN, HLD, NEUROPATHY, PVD, NON COMPLIANT WITH TT, ETOH ABUSE, presents today due to left toe infection.  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  Patient experienced a blister to the left toe in which he eventually fell orders follow-up on its own.  Patient notes that the blister opened up and he developed some blood in the blister as well.  Patient notes that the blister scab has slightly fallen off but now his toe is red and painful.  Patient not currently on antibiotics.  Patient denies fever, injury, numbness, weakness, or any other complaints.   IN ED TEMP 99, /100, HR 89, had consult with podiatry and sidney started on Cipro and admitted for further management (17 Sep 2024 19:36)    PAST MEDICAL & SURGICAL HISTORY:  Prediabetes  HTN (hypertension)  HLD (hyperlipidemia)  DM2 (diabetes mellitus, type 2)     HEALTH ISSUES - PROBLEM Dx:  Diabetic toe ulcer  Uncontrolled type 2 diabetes mellitus with hyperglycemia  Osteomyelitis of ankle or foot, acute    Type 2 diabetes mellitus with other skin complication [E11.628]  Prediabetes [R73.03]  HTN (hypertension) [I10]  HLD (hyperlipidemia) [E78.5]  DM2 (diabetes mellitus, type 2) [E11.9]    FAMILY HISTORY:    [SOCIAL HISTORY: ]     smoking:  none currently     EtOH:  none currently     illicit drugs:  none currently     occupation:  Retired     marital status:       Other:     [ALLERGIES/INTOLERANCES:]  Allergies     No Known Allergies  Intolerances    [MEDICATIONS]  MEDICATIONS  (STANDING):  amLODIPine   Tablet 10 milliGRAM(s) Oral daily  carvedilol 6.25 milliGRAM(s) Oral every 12 hours  dextrose 5%. 1000 milliLiter(s) (100 mL/Hr) IV Continuous <Continuous>  dextrose 5%. 1000 milliLiter(s) (50 mL/Hr) IV Continuous <Continuous>  dextrose 50% Injectable 25 Gram(s) IV Push once  dextrose 50% Injectable 12.5 Gram(s) IV Push once  dextrose 50% Injectable 25 Gram(s) IV Push once  enoxaparin Injectable 40 milliGRAM(s) SubCutaneous every 24 hours  gabapentin 100 milliGRAM(s) Oral three times a day  glucagon  Injectable 1 milliGRAM(s) IntraMuscular once  influenza   Vaccine 0.5 milliLiter(s) IntraMuscular once  insulin glargine Injectable (LANTUS) 35 Unit(s) SubCutaneous at bedtime  insulin lispro (ADMELOG) corrective regimen sliding scale   SubCutaneous at bedtime  insulin lispro (ADMELOG) corrective regimen sliding scale   SubCutaneous three times a day before meals  insulin lispro Injectable (ADMELOG) 12 Unit(s) SubCutaneous three times a day before meals  lactobacillus acidophilus 1 Tablet(s) Oral two times a day with meals  mupirocin 2% Ointment 1 Application(s) Topical <User Schedule>    MEDICATIONS  (PRN):  acetaminophen     Tablet .. 650 milliGRAM(s) Oral every 6 hours PRN Temp greater or equal to 38C (100.4F), Mild Pain (1 - 3), Moderate Pain (4 - 6)  dextrose Oral Gel 15 Gram(s) Oral once PRN Blood Glucose LESS THAN 70 milliGRAM(s)/deciliter  diphenhydrAMINE 25 milliGRAM(s) Oral every 6 hours PRN Rash and/or Itching  labetalol Injectable 10 milliGRAM(s) IV Push every 8 hours PRN Systolic blood pressure >180  melatonin 3 milliGRAM(s) Oral at bedtime PRN Insomnia    [REVIEW OF SYSTEMS: ]  CONSTITUTIONAL: normal, no fever, no shakes, no chills   EYES: No eye pain, no visual disturbances, no discharge  ENMT:  no discharge  NECK: No pain, no stiffness  BREASTS: No pain, no masses, no nipple discharge  RESPIRATORY: No cough, no wheezing, no chills, no hemoptysis; No shortness of breath  CARDIOVASCULAR: No chest pain, no palpitations, no dizziness, no leg swelling  GASTROINTESTINAL: No abdominal, no epigastric pain. No nausea, no vomiting, no hematemesis; No diarrhea , no constipation. No melena, no hematochezia.  GENITOURINARY: No dysuria, no frequency, no hematuria, no incontinence  NEUROLOGICAL: No headaches, no memory loss, no loss of strength, no numbness, no tremors  SKIN: No itching, no burning, no rashes, no lesions   LYMPH NODES: No enlarged glands  ENDOCRINE: No heat or cold intolerance; No hair loss  MUSCULOSKELETAL: No joint pain or swelling; No muscle, no back, no extremity pain  PSYCHIATRIC: No depression, no anxiety, no mood swings, no difficulty sleeping  HEME/LYMPH: No easy bruising, no bleeding gums    [VITALS SIGNS 24hrs]  Vital Signs Last 24 Hrs  T(C): 37.1 (30 Sep 2024 13:15), Max: 37.6 (29 Sep 2024 20:44)  T(F): 98.7 (30 Sep 2024 13:15), Max: 99.7 (29 Sep 2024 20:44)  HR: 87 (30 Sep 2024 13:15) (74 - 93)  BP: 135/85 (30 Sep 2024 13:15) (135/73 - 147/83)  BP(mean): --  RR: 18 (30 Sep 2024 13:15) (18 - 18)  SpO2: 95% (30 Sep 2024 13:15) (93% - 96%)    Parameters below as of 30 Sep 2024 13:15  Patient On (Oxygen Delivery Method): room air    Daily       I&O's Summary  29 Sep 2024 07:01  -  30 Sep 2024 07:00  --------------------------------------------------------  IN: 25 mL / OUT: 1200 mL / NET: -1175 mL    [PHYSICAL EXAM]  GEN: WN WD NAD  HEENT: normocephalic and atraumatic. EOMI. PERRL.    NECK: Supple.  No lymphadenopathy   LUNGS: Clear to auscultation.  HEART: S1S2 Regular rate and rhythm, no MRG  ABDOMEN: Soft, nontender, and nondistended.  Positive bowel sounds.    : No CVA tenderness  EXTREMITIES: +edema. LE wounds  NEUROLOGIC: grossly intact.  PSYCHIATRIC: Appropriate affect .  SKIN: No rash     [LABS: ]             8.8    7.06  )-----------( 201      ( 30 Sep 2024 07:59 )             26.6   CBC Full  -  ( 30 Sep 2024 07:59 )  WBC Count : 7.06 K/uL  RBC Count : 2.98 M/uL  Hemoglobin : 8.8 g/dL  Hematocrit : 26.6 %  Platelet Count - Automated : 201 K/uL  Mean Cell Volume : 89.3 fL  Mean Cell Hemoglobin : 29.5 pg  Mean Cell Hemoglobin Concentration : 33.1 g/dL  Auto Neutrophil # : 4.58 K/uL  Auto Lymphocyte # : 1.20 K/uL  Auto Monocyte # : 0.90 K/uL  Auto Eosinophil # : 0.24 K/uL  Auto Basophil # : 0.06 K/uL  Auto Neutrophil % : 65.0 %  Auto Lymphocyte % : 17.0 %  Auto Monocyte % : 12.7 %  Auto Eosinophil % : 3.4 %  Auto Basophil % : 0.8 %    09-30  134[L]  |  100  |  60[H]  ----------------------------<  178[H]  4.4   |  26  |  3.16[H]  Ca    9.2      30 Sep 2024 07:59  TPro  6.3  /  Alb  2.5[L]  /  TBili  0.5  /  DBili  x   /  AST  43[H]  /  ALT  58  /  AlkPhos  113  09-30  LIVER FUNCTIONS - ( 30 Sep 2024 07:59 )  Alb: 2.5 g/dL / Pro: 6.3 g/dL / ALK PHOS: 113 U/L / ALT: 58 U/L / AST: 43 U/L / GGT: x           Urinalysis Basic - ( 30 Sep 2024 07:59 )  Color: x / Appearance: x / SG: x / pH: x  Gluc: 178 mg/dL / Ketone: x  / Bili: x / Urobili: x   Blood: x / Protein: x / Nitrite: x   Leuk Esterase: x / RBC: x / WBC x   Sq Epi: x / Non Sq Epi: x / Bacteria: x    CBC TREND (5 Days)  WBC Count: 7.06 K/uL (09-30 @ 07:59)  WBC Count: 7.69 K/uL (09-29 @ 06:00)  WBC Count: 7.02 K/uL (09-28 @ 06:00)  Hemoglobin: 8.8 g/dL (09-30 @ 07:59)  Hemoglobin: 9.9 g/dL (09-29 @ 06:00)  Hemoglobin: 9.7 g/dL (09-28 @ 06:00)  Hematocrit: 26.6 % (09-30 @ 07:59)  Hematocrit: 29.9 % (09-29 @ 06:00)  Hematocrit: 29.1 % (09-28 @ 06:00)  Platelet Count - Automated: 201 K/uL (09-30 @ 07:59)  Platelet Count - Automated: 206 K/uL (09-29 @ 06:00)  Platelet Count - Automated: 169 K/uL (09-28 @ 06:00)    Ferritin: 406 ng/mL (09-26 @ 07:52)  Ferritin: 622 ng/mL (09-19 @ 08:01)  Iron Total: 31 ug/dL (09-26 @ 07:52)  Iron Total: 41 ug/dL (09-19 @ 08:00)  Vitamin B12, Serum: 809 pg/mL (09-26 @ 07:52)  Vitamin B12, Serum: 782 pg/mL (09-19 @ 08:01)     Folate, Serum: 11.0 ng/mL (09-26 @ 07:52)  Folate, Serum: 8.5 ng/mL (09-19 @ 08:01)     Sedimentation Rate, Erythrocyte: 89 mm/Hr (09-17 @ 18:00)      [MICROBIOLOGY /  VIROLOGY:]      [PATHOLOGY]       [RADIOLOGY & ADDITIONAL STUDIES:]     ACC: [id number] EXAM:  US KIDNEY(S)   ORDERED BY: JUAN VALENCIA   PROCEDURE DATE:  09/19/2024    INTERPRETATION:  CLINICAL INFORMATION: Acute kidney injury  COMPARISON: Upper abdominal images included on Chest CT 4/24/2021 .  TECHNIQUE: Sonography of the kidneys and bladder.  FINDINGS:  Right kidney: 11.3 cm. No renal mass, hydronephrosis or calculi.  Left kidney: 12.3 cm. No renal mass, hydronephrosis or calculi.  Urinary bladder: Underdistended urinary bladder.  IMPRESSION:  No obstructive uropathy.  --- End of Report ---  JULIANO ANTONIO MD; Attending Radiologist  This document has been electronically signed. Sep 19 2024  2:31PM        ACC: [id number] EXAM:  US KIDNEY(S)   ORDERED BY: JUAN VALENCIA   PROCEDURE DATE:  09/19/2024    INTERPRETATION:  CLINICAL INFORMATION: Acute kidney injury  COMPARISON: Upper abdominal images included on Chest CT 4/24/2021 .  TECHNIQUE: Sonography of the kidneys and bladder.  FINDINGS:  Right kidney: 11.3 cm. No renal mass, hydronephrosis or calculi.  Left kidney: 12.3 cm. No renal mass, hydronephrosis or calculi.  Urinary bladder: Underdistended urinary bladder.  IMPRESSION:  No obstructive uropathy.  --- End of Report ---  JULIANO ANTONIO MD; Attending Radiologist  This document has been electronically signed. Sep 19 2024  2:31PM      ACC: [id number] EXAM:  US DPLX LWR EXT ARTS COMPL BI   ORDERED BY: LISA ALY   PROCEDURE DATE:  09/17/2024    INTERPRETATION:  US LOWER EXTREMITY ARTERIAL DUPLEX COMPLETE BILATERAL  CLINICAL INDICATION:  Peripheral artery disease of the legs  COMPARISON: None  Real-time sonography of the bilateral lower extremity arterial system was performed using a high-resolution linear array transducer and including color and spectral Doppler.  Mild scattered bilateral plaque. No soft tissue abnormalities are demonstrated in either leg. Flow phase patterns and peak systolic velocity measurements (in cm/s) were observed as follows:  RIGHT:  CFA: Triphasic; 143  Proximal SFA: Triphasic; 104  Mid SFA: Triphasic; 125  Distal SFA: Triphasic;  112  Popliteal: Triphasic;  94, 92  Anterior tibial: Triphasic; 96  Posterior tibial: Triphasic; 129  Peroneal: Triphasic;  61  Dorsalis pedis: Triphasic;  96  LEFT:  CFA: Triphasic; 128  Proximal SFA: Triphasic; 137  Mid SFA: Triphasic; 113  Distal SFA: Triphasic; 99  Popliteal: Triphasic;  107, 121  Anterior tibial: Triphasic; 112  Posterior tibial: Biphasic; 132  Peroneal: Triphasic; 84  Dorsalis pedis: Triphasic;  118  IMPRESSION:  *  No significant arterial disease in the legs.  --- End of Report ---  JINNY NAVA MD; Attending Radiologist  This document has been electronically signed. Sep 17 2024 10:06PM      ACC: [id number] EXAM:  MR FOOT LT   ORDERED BY: LISA ALY   PROCEDURE DATE:  09/19/2024    INTERPRETATION:  LEFT FOOT MRI  CLINICAL INDICATION: Diabetic foot ulcer, for evaluation of osteomyelitis.  COMPARISON: Radiographs dated 9/17/2024.  TECHNIQUE: Multiplanar, multisequence MRI was obtained of the left foot.  FINDINGS:  OSSEOUS: Acute on chronic osteomyelitis/septic arthritis at the proximal interphalangeal joint of the second toe in the proper clinical setting. Focal acute osteomyelitis along the plantar lateral cortex of the great toe distal phalanx base with ill-defined overlying deep soft tissue ulceration. Lisfranc interval is not widened on this non-weightbearing examination.  TENDONS: Visualized flexor and extensor tendons are intact.  LIGAMENTS: Lisfranc ligament is intact.  GENERAL: No drainable encapsulated fluid collection. No significant intermetatarsal bursal fluid distension. No interdigital neuroma. Mild to moderate heterogeneous chronic muscular atrophy with superimposed patchy denervation edema like signal.  IMPRESSION:  1.  Acute on chronic osteomyelitis/septic arthritis at the proximal interphalangeal joint of left second toe in the proper clinical setting.\  2.Focal acute osteomyelitis along the plantar lateral cortex of left great toe distal phalanx base with overlying deep soft tissue ulceration.  --- End of Report ---  JUANA URIBE MD; Attending Radiologist  This document has been electronically signed. Sep 19 2024  8:58AM   1000

## 2025-06-02 ENCOUNTER — APPOINTMENT (OUTPATIENT)
Dept: NEUROLOGY | Facility: CLINIC | Age: 50
End: 2025-06-02
Payer: MEDICAID

## 2025-06-02 ENCOUNTER — NON-APPOINTMENT (OUTPATIENT)
Age: 50
End: 2025-06-02

## 2025-06-02 VITALS
OXYGEN SATURATION: 98 % | HEART RATE: 72 BPM | WEIGHT: 222 LBS | TEMPERATURE: 98.4 F | SYSTOLIC BLOOD PRESSURE: 135 MMHG | HEIGHT: 69 IN | BODY MASS INDEX: 32.88 KG/M2 | DIASTOLIC BLOOD PRESSURE: 77 MMHG

## 2025-06-02 DIAGNOSIS — G62.9 POLYNEUROPATHY, UNSPECIFIED: ICD-10-CM

## 2025-06-02 DIAGNOSIS — G57.93 UNSPECIFIED MONONEUROPATHY OF BILATERAL LOWER LIMBS: ICD-10-CM

## 2025-06-02 PROCEDURE — 99204 OFFICE O/P NEW MOD 45 MIN: CPT

## 2025-06-02 PROCEDURE — G2211 COMPLEX E/M VISIT ADD ON: CPT | Mod: NC

## 2025-06-02 RX ORDER — METOLAZONE 2.5 MG/1
2.5 TABLET ORAL
Refills: 0 | Status: ACTIVE | COMMUNITY

## 2025-06-02 RX ORDER — FUROSEMIDE 40 MG/1
40 TABLET ORAL
Refills: 0 | Status: ACTIVE | COMMUNITY

## 2025-06-02 RX ORDER — CHLORHEXIDINE GLUCONATE 4 %
5 LIQUID (ML) TOPICAL
Refills: 0 | Status: ACTIVE | COMMUNITY

## 2025-06-02 RX ORDER — ACETAMINOPHEN 325 MG/1
325 TABLET ORAL
Refills: 0 | Status: ACTIVE | COMMUNITY

## 2025-06-02 RX ORDER — ASPIRIN 81 MG
81 TABLET, DELAYED RELEASE (ENTERIC COATED) ORAL
Refills: 0 | Status: ACTIVE | COMMUNITY

## 2025-06-02 RX ORDER — INSULIN GLARGINE 100 [IU]/ML
100 INJECTION, SOLUTION SUBCUTANEOUS
Refills: 0 | Status: ACTIVE | COMMUNITY

## 2025-06-02 RX ORDER — INSULIN LISPRO 100 U/ML
100 INJECTION, SOLUTION INTRAVENOUS; SUBCUTANEOUS
Refills: 0 | Status: ACTIVE | COMMUNITY

## 2025-06-02 RX ORDER — LORATADINE 10 MG/1
10 TABLET ORAL
Refills: 0 | Status: ACTIVE | COMMUNITY

## 2025-06-02 RX ORDER — DULOXETINE HYDROCHLORIDE 30 MG/1
30 CAPSULE, DELAYED RELEASE ORAL
Refills: 0 | Status: ACTIVE | COMMUNITY

## 2025-07-16 ENCOUNTER — TRANSCRIPTION ENCOUNTER (OUTPATIENT)
Age: 50
End: 2025-07-16

## 2025-08-26 ENCOUNTER — APPOINTMENT (OUTPATIENT)
Dept: NEUROLOGY | Facility: CLINIC | Age: 50
End: 2025-08-26
Payer: MEDICAID

## 2025-08-26 VITALS
BODY MASS INDEX: 31.84 KG/M2 | SYSTOLIC BLOOD PRESSURE: 122 MMHG | OXYGEN SATURATION: 95 % | DIASTOLIC BLOOD PRESSURE: 74 MMHG | WEIGHT: 215 LBS | HEART RATE: 80 BPM | TEMPERATURE: 98.2 F | HEIGHT: 69 IN

## 2025-08-26 DIAGNOSIS — G62.9 POLYNEUROPATHY, UNSPECIFIED: ICD-10-CM

## 2025-08-26 DIAGNOSIS — I73.9 PERIPHERAL VASCULAR DISEASE, UNSPECIFIED: ICD-10-CM

## 2025-08-26 DIAGNOSIS — G57.93 UNSPECIFIED MONONEUROPATHY OF BILATERAL LOWER LIMBS: ICD-10-CM

## 2025-08-26 PROCEDURE — 99214 OFFICE O/P EST MOD 30 MIN: CPT

## 2025-08-26 PROCEDURE — G2211 COMPLEX E/M VISIT ADD ON: CPT | Mod: NC

## (undated) DEVICE — SUT POLYSORB 4-0 18" P-12 UNDYED

## (undated) DEVICE — DRSG ACE BANDAGE 6"

## (undated) DEVICE — VENODYNE/SCD SLEEVE CALF MEDIUM

## (undated) DEVICE — SUT POLYSORB 3-0 18" P-12 UNDYED

## (undated) DEVICE — DRSG ACE BANDAGE 4"

## (undated) DEVICE — SUT POLYSORB 2-0 30" GS-11 UNDYED

## (undated) DEVICE — DRSG GAUZE VASELINE PETROLEUM 3 X 18"

## (undated) DEVICE — DRSG WEBRIL 4"

## (undated) DEVICE — DRAPE XRAY CASSETTE COVER DOUBLE 20X40"

## (undated) DEVICE — DRSG CURITY GAUZE SPONGE 4 X 4" 12-PLY

## (undated) DEVICE — DRSG ADAPTIC 3 X 3"

## (undated) DEVICE — WARMING BLANKET FULL ADULT

## (undated) DEVICE — SUT MONOSOF 4-0 18" P-12

## (undated) DEVICE — ARTHREX BIOTENODESIS KIT DISP

## (undated) DEVICE — SYR LUER LOK 20CC

## (undated) DEVICE — BLADE SURGICAL #15 CARBON

## (undated) DEVICE — DRAPE C ARM MINI

## (undated) DEVICE — SOL IRR POUR H2O 1000ML

## (undated) DEVICE — WARMING BLANKET UPPER ADULT

## (undated) DEVICE — SUT POLYSORB 1 27" GS-21 UNDYED

## (undated) DEVICE — SUT POLYSORB 4-0 P-12 UNDYED

## (undated) DEVICE — SYR LUER LOK 10CC

## (undated) DEVICE — CATH IV SAFE INSYTE 18G X 1.88" (GREEN)

## (undated) DEVICE — PACK FOOT CUST

## (undated) DEVICE — TOURNIQUET ESMARK 6"

## (undated) DEVICE — SOL IRR POUR NS 0.9% 1000ML

## (undated) DEVICE — SUT POLYSORB 3-0 30" P-12 UNDYED

## (undated) DEVICE — CATH IV SAFE INSYTE 18G X 1.16" (GREEN)

## (undated) DEVICE — DRSG CAST PLASTER XFAST 4"

## (undated) DEVICE — GLV 7.5 PROTEXIS (WHITE)

## (undated) DEVICE — SUT POLYSORB 3-0 60" REEL

## (undated) DEVICE — NDL HYPO REGULAR BEVEL 25G X 1.5" (BLUE)

## (undated) DEVICE — SUT POLYSORB 1 36" GS-11 UNDYED

## (undated) DEVICE — STAPLER COVIDIEN SKIN APPOSE 35

## (undated) DEVICE — TOURNIQUET CUFF 18" DUAL PORT SINGLE BLADDER W PLC  (BLACK)

## (undated) DEVICE — DRSG TELFA 3 X 4

## (undated) DEVICE — SUT TICRON 2 30" GS-18 DA

## (undated) DEVICE — DRSG ADAPTIC 3 X 8"

## (undated) DEVICE — DRSG SCOTCH CAST TAPE 4"

## (undated) DEVICE — SYM-TOURNIQUET 3013LAAB: Type: DURABLE MEDICAL EQUIPMENT